# Patient Record
Sex: FEMALE | Race: WHITE | NOT HISPANIC OR LATINO | Employment: FULL TIME | URBAN - METROPOLITAN AREA
[De-identification: names, ages, dates, MRNs, and addresses within clinical notes are randomized per-mention and may not be internally consistent; named-entity substitution may affect disease eponyms.]

---

## 2017-01-09 ENCOUNTER — HOSPITAL ENCOUNTER (OUTPATIENT)
Dept: PHYSICAL THERAPY | Facility: CLINIC | Age: 52
Setting detail: THERAPIES SERIES
End: 2017-01-09
Attending: FAMILY MEDICINE
Payer: COMMERCIAL

## 2017-01-09 PROCEDURE — 97110 THERAPEUTIC EXERCISES: CPT | Mod: GP | Performed by: PHYSICAL THERAPIST

## 2017-01-09 PROCEDURE — 97033 APP MDLTY 1+IONTPHRSIS EA 15: CPT | Mod: GP | Performed by: PHYSICAL THERAPIST

## 2017-01-09 PROCEDURE — 97162 PT EVAL MOD COMPLEX 30 MIN: CPT | Mod: GP | Performed by: PHYSICAL THERAPIST

## 2017-01-09 PROCEDURE — 40000718 ZZHC STATISTIC PT DEPARTMENT ORTHO VISIT: Performed by: PHYSICAL THERAPIST

## 2017-01-09 NOTE — PROGRESS NOTES
"  TMJ/TMD  Home Exercise Progression:      Exercises Details Date Started Discontinued   Chin Tucks   X 5 5x per day  1/9/17    TATU   Through out day  1/9/17     Controlled Rotation   x5  5x per day  1/9/17     Rhythmic Stab   x5  5x per day     Sub. Occ. Stretch   10\" x 3 2-3x per day     Scap Stab   x10 2x per day  1/9/17     Self Distraction   x5  5x per day                                       "

## 2017-01-09 NOTE — PROGRESS NOTES
01/09/17 0900   General Information   Type of Visit Initial OP Ortho PT Evaluation   Start of Care Date 01/09/17   Referring Physician Crispin Rahman    Patient/Family Goals Statement Be able to chew harder food, Talk, Yawn.    Orders Evaluate and Treat   Orders Comment Henry, Phone,    Date of Order 12/28/16   Insurance Type Other   Insurance Comments/Visits Authorized [employer] - Auth'd    Medical Diagnosis L TMJ ID/Pain    Surgical/Medical history reviewed (Asthma, Fx R Foot, Carpel tunnel, Sprain R shoulder, )   Precautions/Limitations no known precautions/limitations   Body Part(s)   Body Part(s) TMJ   Presentation and Etiology   Pertinent history of current problem (include personal factors and/or comorbidities that impact the POC) Oct or Nov got sore throat and developed blisters in L ear, Went to the ENT, and was sent to dentist. Was made a top .  Current doctor adjusted the top  and is making a bottom one for the day. No ex's yet. Had TMJ problems when got braces removed in 1985. Didn't need treatment then and it has been ok over the years. Lately has been doing hot and cold packs.    Impairments A. Pain   Functional Limitations (Limited in choiced for food. Talking, yawning,)   Symptom Location L>R Pain in front of the ear.    How/Where did it occur From insidious onset   Onset date of current episode/exacerbation 12/28/16  (MD Visit )   Chronicity Chronic   Pain rating (0-10 point scale) (5/10 )   Frequency of pain/symptoms A. Constant   Pain/symptoms are: The same all the time   Pain/symptoms exacerbated by (Jaw use. )   Pain/symptoms eased by G. Heat;H. Cold   Progression of symptoms since onset: (Worse, then better. )   Prior Level of Function   Functional Level Prior Comment Independent w/ ADL's currently.    Current Level of Function   Current Community Support Other  (Lives alone )   Patient role/employment history A. Employed   Employment Comments [employer].    Living  environment [facility]   Fall Risk Screen   Fall screen completed by PT   Per patient - Fall 2 or more times in past year? No   Per patient - Fall with injury in past year? No   Is patient a fall risk? No   Functional Scales   Functional Scales Patient Specific Functional Scale;Other   Patient Specific Functional Scale Q2:;Q1:;Q3:   Q1: Chewing tough food or hard bread 8/10    Q2: Yawning 6/10   Q3: Talking 6/10    Other Scales  0 = No limitation.    TMJ Objective Findings   Observation No acute distress.    Integumentary  No swelling   Posture Head forward, dowager's, flexed at hips.    Joint noises None   Joint lock Does not lock   Pain Chewing;Talking;Yawning;Clenching;Brushing, flossing;Opening   Associated symptoms None   Headache None   Habits Unilateral chewing;Clenching   Tongue position Taught today    Difficulty swallowing No   Muscal Ridging No   Tongue Scalloping Yes  (Slight )   Accelerated Tooth Wear No   Overjet (mm) 3   Overbite (mm) 5   Intraoral mouth appliance Yes - appliance helps   Appliance wear Day and night   Bite/Occlusion Bite feels off   Major dental work (Just braces at 18 - 20 years of age. )   Tired or painful jaw muscles No   Opening click No   Closing click Yes   Crepitus No   Subluxation No   Early translation Yes   Passive testing - Distraction Normal   Passive testing - Translation Normal   Lateral Collateral Ligament Tender L>R    Temporomandibular Ligament Tender B   TMJ ROM Mandible Opening;Mandible Protrusion;Left Laterotrusion;Right Laterotrusion;Deviation with Opening   Palpation Posterior capsule;Lateral capsule  (L>R Masseter, L Med Pterygoid. )   Accessory Motion Rocabado (0-9)   Bite comment Feels like teeth bump in the front.    Mandible Opening 45   Mandible Protrusion 6   Left Laterotrusion 8   Right Laterotrusion 11   Deviation with Opening S Curve w/ opening   Lateral capsule Tender    Posterior capsule Tender    Translation comment About 15 mm    Rocabado  Comments 4/9    Planned Therapy Interventions   Planned Therapy Interventions Comment MT, STM, Jt mobs, Develop HEP    Planned Modality Interventions   Planned Modality Interventions Comments Iontophoresis, Phonophoresis.    Clinical Impression   Criteria for Skilled Therapeutic Interventions Met yes, treatment indicated   PT Diagnosis ID L TMJ, Pain   Influenced by the following impairments Pain   Functional limitations due to impairments Jaw functions, ie, talking, yawning, eating chewier foods.    Clinical Presentation Evolving/Changing   Clinical Presentation Rationale Asthma, High limitations w/Jaw functions, Decreased Laterotrusion, Posture, previous TMJ issues.    Clinical Decision Making (Complexity) Moderate complexity   Therapy Frequency 2 times/Week   Predicted Duration of Therapy Intervention (days/wks) 8 visits.    Risk & Benefits of therapy have been explained Yes   Patient, Family & other staff in agreement with plan of care Yes   Clinical Impression Comments ID L TMJ, Pain   Education Assessment   Preferred Learning Style Listening;Demonstration;Pictures/video   Barriers to Learning No barriers   ORTHO GOALS   PT Ortho Eval Goals 1;2;3;4   Ortho Goal 1   Goal Identifier 1   Goal Description STG: Pt able to Chew tough food like hard bread 5/10 on functional scale.    Target Date 01/30/17   Ortho Goal 2   Goal Identifier 2   Goal Description STG: Pt will be able to yawn 2/10 on functional scale.    Target Date 01/30/17   Ortho Goal 3   Goal Identifier 3   Goal Description STG: Pt will be able to talk for prolonged periods 2/10 on functional scale.    Target Date 01/30/17   Ortho Goal 4   Goal Identifier 4   Goal Description LTG: Pt will be independent w/HEP and self cares to manage TMJ sx's independently.    Target Date 02/10/17   Total Evaluation Time   Total Evaluation Time 65 Total (Eval x 30, Ex x 10, Ionto x 25)    Radha Nice PT, [facility] (#[id number])  [facility]  [place]            [phone]  Fax          [phone]  Appt #      [phone]

## 2017-01-11 ENCOUNTER — ALLIED HEALTH/NURSE VISIT (OUTPATIENT)
Dept: ALLERGY | Facility: CLINIC | Age: 52
End: 2017-01-11
Payer: COMMERCIAL

## 2017-01-11 DIAGNOSIS — J30.9 ALLERGIC RHINITIS, UNSPECIFIED: Primary | ICD-10-CM

## 2017-01-11 PROCEDURE — 95117 IMMUNOTHERAPY INJECTIONS: CPT

## 2017-01-12 ENCOUNTER — HOSPITAL ENCOUNTER (OUTPATIENT)
Dept: PHYSICAL THERAPY | Facility: CLINIC | Age: 52
Setting detail: THERAPIES SERIES
End: 2017-01-12
Attending: DENTIST
Payer: COMMERCIAL

## 2017-01-12 PROCEDURE — 97033 APP MDLTY 1+IONTPHRSIS EA 15: CPT | Mod: GP | Performed by: PHYSICAL THERAPIST

## 2017-01-12 PROCEDURE — 40000718 ZZHC STATISTIC PT DEPARTMENT ORTHO VISIT: Performed by: PHYSICAL THERAPIST

## 2017-01-18 ENCOUNTER — HOSPITAL ENCOUNTER (OUTPATIENT)
Dept: PHYSICAL THERAPY | Facility: CLINIC | Age: 52
Setting detail: THERAPIES SERIES
End: 2017-01-18
Attending: DENTIST
Payer: COMMERCIAL

## 2017-01-18 PROCEDURE — 97033 APP MDLTY 1+IONTPHRSIS EA 15: CPT | Mod: GP | Performed by: PHYSICAL THERAPIST

## 2017-01-18 PROCEDURE — 40000718 ZZHC STATISTIC PT DEPARTMENT ORTHO VISIT: Performed by: PHYSICAL THERAPIST

## 2017-01-18 PROCEDURE — 97110 THERAPEUTIC EXERCISES: CPT | Mod: GP | Performed by: PHYSICAL THERAPIST

## 2017-01-20 ENCOUNTER — HOSPITAL ENCOUNTER (OUTPATIENT)
Dept: PHYSICAL THERAPY | Facility: CLINIC | Age: 52
Setting detail: THERAPIES SERIES
End: 2017-01-20
Attending: DENTIST
Payer: COMMERCIAL

## 2017-01-20 PROCEDURE — 97140 MANUAL THERAPY 1/> REGIONS: CPT | Mod: GP | Performed by: PHYSICAL THERAPIST

## 2017-01-20 PROCEDURE — 97033 APP MDLTY 1+IONTPHRSIS EA 15: CPT | Mod: GP | Performed by: PHYSICAL THERAPIST

## 2017-01-20 PROCEDURE — 97110 THERAPEUTIC EXERCISES: CPT | Mod: GP | Performed by: PHYSICAL THERAPIST

## 2017-01-20 PROCEDURE — 40000718 ZZHC STATISTIC PT DEPARTMENT ORTHO VISIT: Performed by: PHYSICAL THERAPIST

## 2017-01-23 ENCOUNTER — HOSPITAL ENCOUNTER (OUTPATIENT)
Dept: PHYSICAL THERAPY | Facility: CLINIC | Age: 52
Setting detail: THERAPIES SERIES
End: 2017-01-23
Attending: DENTIST
Payer: COMMERCIAL

## 2017-01-23 PROCEDURE — 40000718 ZZHC STATISTIC PT DEPARTMENT ORTHO VISIT: Performed by: PHYSICAL THERAPIST

## 2017-01-23 PROCEDURE — 97033 APP MDLTY 1+IONTPHRSIS EA 15: CPT | Mod: GP | Performed by: PHYSICAL THERAPIST

## 2017-01-23 PROCEDURE — 97140 MANUAL THERAPY 1/> REGIONS: CPT | Mod: GP | Performed by: PHYSICAL THERAPIST

## 2017-01-26 ENCOUNTER — HOSPITAL ENCOUNTER (OUTPATIENT)
Dept: PHYSICAL THERAPY | Facility: CLINIC | Age: 52
Setting detail: THERAPIES SERIES
End: 2017-01-26
Attending: DENTIST
Payer: COMMERCIAL

## 2017-01-26 PROCEDURE — 97033 APP MDLTY 1+IONTPHRSIS EA 15: CPT | Mod: GP | Performed by: PHYSICAL THERAPIST

## 2017-01-26 PROCEDURE — 40000718 ZZHC STATISTIC PT DEPARTMENT ORTHO VISIT: Performed by: PHYSICAL THERAPIST

## 2017-01-26 PROCEDURE — 97140 MANUAL THERAPY 1/> REGIONS: CPT | Mod: GP | Performed by: PHYSICAL THERAPIST

## 2017-02-03 ENCOUNTER — HOSPITAL ENCOUNTER (OUTPATIENT)
Dept: PHYSICAL THERAPY | Facility: CLINIC | Age: 52
Setting detail: THERAPIES SERIES
End: 2017-02-03
Attending: DENTIST
Payer: COMMERCIAL

## 2017-02-03 PROCEDURE — 97140 MANUAL THERAPY 1/> REGIONS: CPT | Mod: GP | Performed by: PHYSICAL THERAPIST

## 2017-02-03 PROCEDURE — 40000718 ZZHC STATISTIC PT DEPARTMENT ORTHO VISIT: Performed by: PHYSICAL THERAPIST

## 2017-02-03 PROCEDURE — 97033 APP MDLTY 1+IONTPHRSIS EA 15: CPT | Mod: GP | Performed by: PHYSICAL THERAPIST

## 2017-02-03 NOTE — PROGRESS NOTES
"Outpatient Physical Therapy Discharge Note     Patient: Dilia Solomon  : 1965    Beginning/End Dates of Reporting Period:  17 to 2/3/2017.  Total # of Rx sessions:     Referring Provider: Crispin Rahman Diagnosis:  L TMJ ID/Pain      Client Self Report: No clicking, no pain, everything going well.     Objective Measurements:  Objective Measure: Rocabado   Details:     Objective Measure: TMJ ROM   Details: 17 Opening 50, Protrusion 8mm, Laterotrusion L 10, R 12 mm.   INIITIALLY: Opening 45, Protrusion 6 mm, Laterotrusion L 8, R 11,     Objective Measure: Early Translation   Details: 17 - 22 mm. INITIALLY: 15 mm     Goals:  Goal Identifier 1   Goal Description STG: Pt able to Chew tough food like hard bread 5/10 on functional scale.  17 No problems now with chewing.   Target Date 17   Date Met  17   Progress:     Goal Identifier 2   Goal Description STG: Pt will be able to yawn 2/10 on functional scale. 17 No problems w/ yawning now.    Target Date 17   Date Met  17   Progress:     Goal Identifier 3   Goal Description STG: Pt will be able to talk for prolonged periods 2/10 on functional scale. 17 Okay, no problem now.    Target Date 17   Date Met  17   Progress:     Goal Identifier 4   Goal Description LTG: Pt will be independent w/HEP and self cares to manage TMJ sx's independently.  2/3/17 MET    Target Date 02/10/17   Date Met  17   Progress:     Progress Toward Goals:   Progress this reporting period: Pt has met all STG and LTG\"s.     Plan:  Discharge from therapy.    Discharge:    Reason for Discharge: Patient has met all goals.  Patient chooses to discontinue therapy.    Equipment Issued: Has new splint.     Discharge Plan: Patient to continue home program.  Pt to follow up w/MD as appropriate.   Radha Nice, PT, [facility] (#[id number])  [facility]           [phone]  Fax          " [phone]  Appt #      [phone]

## 2017-02-08 ENCOUNTER — ALLIED HEALTH/NURSE VISIT (OUTPATIENT)
Dept: ALLERGY | Facility: CLINIC | Age: 52
End: 2017-02-08
Payer: COMMERCIAL

## 2017-02-08 DIAGNOSIS — J30.9 ALLERGIC RHINITIS, UNSPECIFIED: Primary | ICD-10-CM

## 2017-02-08 PROCEDURE — 95117 IMMUNOTHERAPY INJECTIONS: CPT

## 2017-03-03 ENCOUNTER — OFFICE VISIT (OUTPATIENT)
Dept: FAMILY MEDICINE | Facility: CLINIC | Age: 52
End: 2017-03-03
Payer: COMMERCIAL

## 2017-03-03 VITALS
HEART RATE: 79 BPM | BODY MASS INDEX: 35.93 KG/M2 | HEIGHT: 63 IN | OXYGEN SATURATION: 98 % | WEIGHT: 202.8 LBS | SYSTOLIC BLOOD PRESSURE: 108 MMHG | RESPIRATION RATE: 24 BRPM | TEMPERATURE: 97.6 F | DIASTOLIC BLOOD PRESSURE: 62 MMHG

## 2017-03-03 DIAGNOSIS — J32.1 CHRONIC FRONTAL SINUSITIS: Primary | ICD-10-CM

## 2017-03-03 PROCEDURE — 99213 OFFICE O/P EST LOW 20 MIN: CPT | Performed by: NURSE PRACTITIONER

## 2017-03-03 RX ORDER — AMOXICILLIN 500 MG/1
500 CAPSULE ORAL 3 TIMES DAILY
Qty: 30 CAPSULE | Refills: 0 | Status: SHIPPED | OUTPATIENT
Start: 2017-03-03 | End: 2017-05-16

## 2017-03-03 ASSESSMENT — PAIN SCALES - GENERAL: PAINLEVEL: MODERATE PAIN (5)

## 2017-03-03 NOTE — PATIENT INSTRUCTIONS
Complete full course of antibiotics even if you start to feel better.    Increase your fluids and rest and eat a well balanced diet.    Would be good to humidify the air in your home, especially in the bedroom.   Tylenol or Ibuprofen if able to take for fevers and discomfort. Do not exceed 4 grams of Tylenol in a 24 hour period. Take Ibuprofen with food.   Follow up in 3-5 days if not improving or return sooner if worsening or fail to improve as anticipated.

## 2017-03-03 NOTE — PROGRESS NOTES
"  SUBJECTIVE:                                                    Dilia Solomon is a 51 year old female who presents to clinic today for the following health issues:      RESPIRATORY SYMPTOMS      Duration: 4 days    Description  nasal congestion, facial pain/pressure, cough and ear pain left    Severity: moderate    Accompanying signs and symptoms: None    History (predisposing factors):  asthma    Precipitating or alleviating factors: None    Therapies tried and outcome:  nasal spray/wash - sinus rinses and cough drops (all natural)   Started last Weds. Has asthma flare. Left ear pain, some in the right. History of hearing loss on left. Pain from left ear down to jaw. Congestion started Weds. Nasal disch green. Doing sinus rinses with neti pot every night for allergies.   No fever. Just started cough today. Dry cough.  Asthma- no wheeziing . Cough does not keep awake at night.       Problem list and histories reviewed & adjusted, as indicated.  Additional history: as documented      Reviewed and updated as needed this visit by clinical staff  Tobacco  Allergies  Med Hx  Surg Hx  Fam Hx  Soc Hx      Reviewed and updated as needed this visit by Provider         ROS:  Constitutional, HEENT, cardiovascular, pulmonary, gi and gu systems are negative, except as otherwise noted.    OBJECTIVE:                                                    /62 (BP Location: Left arm, Patient Position: Chair, Cuff Size: Adult Regular)  Pulse 79  Temp 97.6  F (36.4  C) (Tympanic)  Resp 24  Ht 5' 3.25\" (1.607 m)  Wt 202 lb 12.8 oz (92 kg)  LMP 07/18/2015 (Approximate)  SpO2 98%  Breastfeeding? No  BMI 35.64 kg/m2  Body mass index is 35.64 kg/(m^2).  GENERAL: healthy, alert and no distress  HENT: normal cephalic/atraumatic, ear canals and TM's normal, nose and mouth without ulcers or lesions, oropharynx clear, oral mucous membranes moist and sinuses: maxillary, frontal tenderness and swelling on bilaterally  NECK: no " adenopathy, no asymmetry, masses, or scars and thyroid normal to palpation  RESP: lungs clear to auscultation - no rales, rhonchi or wheezes  CV: regular rate and rhythm, normal S1 S2, no S3 or S4, no murmur, click or rub, no peripheral edema and peripheral pulses strong  MS: no gross musculoskeletal defects noted, no edema    Diagnostic Test Results:  none      ASSESSMENT/PLAN:                                                        1. Chronic frontal sinusitis  - amoxicillin (AMOXIL) 500 MG capsule; Take 1 capsule (500 mg) by mouth 3 times daily  Dispense: 30 capsule; Refill: 0    Patient Instructions   Complete full course of antibiotics even if you start to feel better.    Increase your fluids and rest and eat a well balanced diet.    Would be good to humidify the air in your home, especially in the bedroom.   Tylenol or Ibuprofen if able to take for fevers and discomfort. Do not exceed 4 grams of Tylenol in a 24 hour period. Take Ibuprofen with food.   Follow up in 3-5 days if not improving or return sooner if worsening or fail to improve as anticipated.          Dinorah Engel, PAMELA, APRN CNP  [facility]

## 2017-03-03 NOTE — NURSING NOTE
"Chief Complaint   Patient presents with     URI       Initial LMP 07/18/2015 (Approximate) Estimated body mass index is 35.68 kg/(m^2) as calculated from the following:    Height as of 12/7/16: 5' 3\" (1.6 m).    Weight as of 12/7/16: 201 lb 6.4 oz (91.4 kg).  Medication Reconciliation: complete  "

## 2017-03-03 NOTE — MR AVS SNAPSHOT
"              After Visit Summary   3/3/2017    Dilia Solomon    MRN: [id number]           Patient Information     Date Of Birth          1965        Visit Information        Provider Department      3/3/2017 9:20 AM Dinorah Engel APRN CNP [facility]        Today's Diagnoses     Chronic frontal sinusitis    -  1      Care Instructions    Complete full course of antibiotics even if you start to feel better.    Increase your fluids and rest and eat a well balanced diet.    Would be good to humidify the air in your home, especially in the bedroom.   Tylenol or Ibuprofen if able to take for fevers and discomfort. Do not exceed 4 grams of Tylenol in a 24 hour period. Take Ibuprofen with food.   Follow up in 3-5 days if not improving or return sooner if worsening or fail to improve as anticipated.            Follow-ups after your visit        Who to contact     If you have questions or need follow up information about today's clinic visit or your schedule please contact [facility] directly at [phone].  Normal or non-critical lab and imaging results will be communicated to you by [employer], letter or phone within 4 business days after the clinic has received the results. If you do not hear from us within 7 days, please contact the clinic through [employer] or phone. If you have a critical or abnormal lab result, we will notify you by phone as soon as possible.  Submit refill requests through [employer] or call your pharmacy and they will forward the refill request to us. Please allow 3 business days for your refill to be completed.          Additional Information About Your Visit        [employer] Information     [employer] lets you send messages to your doctor, view your test results, renew your prescriptions, schedule appointments and more. To sign up, go to [email or web address] . Click on \"Log in\" on the left side of the screen, which will take you to the Welcome page. Then click " "on \"Sign up Now\" on the right side of the page.     You will be asked to enter the access code listed below, as well as some personal information. Please follow the directions to create your username and password.     Your access code is: [email or web address]  Expires: 2017 10:07 AM     Your access code will  in 90 days. If you need help or a new code, please call your [place] clinic or [phone].        Care EveryWhere ID     This is your Care EveryWhere ID. This could be used by other organizations to access your [place] medical records  [id number]        Your Vitals Were     Pulse Temperature Respirations Height Last Period Pulse Oximetry    79 97.6  F (36.4  C) (Tympanic) 24 5' 3.25\" (1.607 m) 2015 (Approximate) 98%    Breastfeeding? BMI (Body Mass Index)                No 35.64 kg/m2           Blood Pressure from Last 3 Encounters:   17 108/62   16 130/76   16 124/80    Weight from Last 3 Encounters:   17 202 lb 12.8 oz (92 kg)   16 201 lb 6.4 oz (91.4 kg)   16 200 lb (90.7 kg)              Today, you had the following     No orders found for display         Today's Medication Changes          These changes are accurate as of: 3/3/17 10:08 AM.  If you have any questions, ask your nurse or doctor.               Start taking these medicines.        Dose/Directions    amoxicillin 500 MG capsule   Commonly known as:  AMOXIL   Used for:  Chronic frontal sinusitis   Started by:  Dinorah Engel APRN CNP        Dose:  500 mg   Take 1 capsule (500 mg) by mouth 3 times daily   Quantity:  30 capsule   Refills:  0         These medicines have changed or have updated prescriptions.        Dose/Directions    * predniSONE 20 MG tablet   Commonly known as:  DELTASONE   This may have changed:  additional instructions   Used for:  Asthma flare   Changed by:  Natalya Wakefield MD        Dose:  20 mg   Take 1 tablet (20 mg) by mouth daily   Quantity:  5 tablet "   Refills:  0       * predniSONE 20 MG tablet   Commonly known as:  DELTASONE   This may have changed:  Another medication with the same name was changed. Make sure you understand how and when to take each.   Used for:  Asthma flare, Allergic rhinitis, unspecified   Changed by:  Bernardo Watson MD        2 tabs daily for 3 days, then one tab for 7 days   Quantity:  13 tablet   Refills:  0       * Notice:  This list has 2 medication(s) that are the same as other medications prescribed for you. Read the directions carefully, and ask your doctor or other care provider to review them with you.         Where to get your medicines      These medications were sent to [facility] [address] [address]     Phone:  [phone]     amoxicillin 500 MG capsule                Primary Care Provider Office Phone # Fax #    Cookie Conklin [phone]       [facility] [address] [facility] [address]        Thank you!     Thank you for choosing [facility]  for your care. Our goal is always to provide you with excellent care. Hearing back from our patients is one way we can continue to improve our services. Please take a few minutes to complete the written survey that you may receive in the mail after your visit with us. Thank you!             Your Updated Medication List - Protect others around you: Learn how to safely use, store and throw away your medicines at www.disposemymeds.org.          This list is accurate as of: 3/3/17 10:08 AM.  Always use your most recent med list.                   Brand Name Dispense Instructions for use    * albuterol (2.5 MG/3ML) 0.083% neb solution     1 Box    Take 1 vial (2.5 mg) by nebulization every 4 hours as needed       * albuterol 108 (90 BASE) MCG/ACT Inhaler    PROAIR HFA/PROVENTIL HFA/VENTOLIN HFA    1 Inhaler    Inhale 2 puffs into the lungs every 4 hours as needed       *  ALLERGEN IMMUNOTHERAPY PRESCRIPTION     10 mL    Name of Mix: Mix #1  Mold, Dust Mite Dust Mites F. 10,000 AU/mL, HS  1.2 ml Dust Mites P. 10,000 AU/mL, HS  0.8 ml  Alternaria alternata 1:20 w/v GLY, WILKINS  1.0 ml Aspergillus Fumigatus GLY 1:10 w/v, HS  1.0 ml Epicoccum 20, 000 pnu/mL (1:10 w/v),  ALK  1.0 ml Fusarium Vasinfectum GLY 1:10 w/v, HS  1.0 ml Hormodendrum Cladosporioides 1:10 w/, HS 1.0 ml Phoma HerbarumGLY 1:10 w/v, HS  1.0 ml Diluent: HSA qs to 10ml       * ALLERGEN IMMUNOTHERAPY PRESCRIPTION     10 mL    Name of Mix: Mix #2  Tree , Weeds Trees, Weeds Master Mix ([facility])  7.8 ml Diluent: HSA qs to 10ml       * ALLERGEN IMMUNOTHERAPY PRESCRIPTION     10 mL    Name of Mix: Mix #3  Cat, Dog, Grass Cat, Dog, Grass Master Mix ([facility]) 6.2 ml Diluent: HSA qs to 10ml       amoxicillin 500 MG capsule    AMOXIL    30 capsule    Take 1 capsule (500 mg) by mouth 3 times daily       azelastine 0.05 % Soln ophthalmic solution    OPTIVAR    1 Bottle    Apply 1 drop to eye 2 times daily       CERAVE Crea     2 Bottle    Externally apply 1 dose * topically 2 times daily       EPINEPHrine 0.3 MG/0.3ML injection     0.6 mL    Inject 0.3 mLs (0.3 mg) into the muscle once as needed for anaphylaxis       fluticasone-salmeterol 500-50 MCG/DOSE diskus inhaler    ADVAIR    3 Inhaler    Inhale 1 puff into the lungs 2 times daily       loratadine 10 MG tablet    CLARITIN    30 tablet    Take 1 tablet (10 mg) by mouth daily       montelukast 10 MG tablet    SINGULAIR    30 tablet    Take 1 tablet (10 mg) by mouth At Bedtime       MULTIVITAMIN PO      1 tab daily       * predniSONE 20 MG tablet    DELTASONE    5 tablet    Take 1 tablet (20 mg) by mouth daily       * predniSONE 20 MG tablet    DELTASONE    13 tablet    2 tabs daily for 3 days, then one tab for 7 days       triamcinolone 0.1 % cream    KENALOG    80 g    Apply  topically 2 times daily as needed.       * Notice:  This list has 7 medication(s) that are the same as  other medications prescribed for you. Read the directions carefully, and ask your doctor or other care provider to review them with you.

## 2017-03-22 ENCOUNTER — ALLIED HEALTH/NURSE VISIT (OUTPATIENT)
Dept: ALLERGY | Facility: CLINIC | Age: 52
End: 2017-03-22
Payer: COMMERCIAL

## 2017-03-22 DIAGNOSIS — J30.9 ALLERGIC RHINITIS, UNSPECIFIED: Primary | ICD-10-CM

## 2017-03-22 PROCEDURE — 95117 IMMUNOTHERAPY INJECTIONS: CPT

## 2017-03-22 NOTE — PROGRESS NOTES
Patient presented after waiting 30 minutes with no reaction to  injections. Discharged from clinic.    Shi Koo LPN

## 2017-03-22 NOTE — MR AVS SNAPSHOT
"              After Visit Summary   3/22/2017    Dilia Solomon    MRN: [id number]           Patient Information     Date Of Birth          1965        Visit Information        Provider Department      3/22/2017 11:15 AM ALLERGY [facility]        Today's Diagnoses     Allergic rhinitis, unspecified    -  1       Follow-ups after your visit        Your next 10 appointments already scheduled     Apr 05, 2017 11:00 AM CDT   Nurse Only with ALLERGY [facility] ([facility])    [address] [facility] [facility] [address] [phone]           Every allergy patient MUST wait 30 minutes after their allergy shot. No exceptions.  Xolair shots #1-3 should plan to wait 2 hours in clinic Xolair shots after #4 should plan 30 minute wait in clinic              Who to contact     If you have questions or need follow up information about today's clinic visit or your schedule please contact [facility] directly at [phone].  Normal or non-critical lab and imaging results will be communicated to you by Red Butlerhart, letter or phone within 4 business days after the clinic has received the results. If you do not hear from us within 7 days, please contact the clinic through Inquisitive System[employer] or phone. If you have a critical or abnormal lab result, we will notify you by phone as soon as possible.  Submit refill requests through Stream or call your pharmacy and they will forward the refill request to us. Please allow 3 business days for your refill to be completed.          Additional Information About Your Visit        Red ButlerharWututu Information     Stream lets you send messages to your doctor, view your test results, renew your prescriptions, schedule appointments and more. To sign up, go to [email or web address] . Click on \"Log in\" on the left side of the screen, which will take you to the Welcome page. Then click on \"Sign up Now\" on the right side of the " page.     You will be asked to enter the access code listed below, as well as some personal information. Please follow the directions to create your username and password.     Your access code is: [email or web address]  Expires: 2017 11:07 AM     Your access code will  in 90 days. If you need help or a new code, please call your [place] clinic or [phone].        Care EveryWhere ID     This is your Care EveryWhere ID. This could be used by other organizations to access your [place] medical records  [id number]        Your Vitals Were     Last Period                   2015 (Approximate)            Blood Pressure from Last 3 Encounters:   17 108/62   16 130/76   16 124/80    Weight from Last 3 Encounters:   17 202 lb 12.8 oz (92 kg)   16 201 lb 6.4 oz (91.4 kg)   16 200 lb (90.7 kg)              We Performed the Following     Allergy Shot: Two or more injections          Today's Medication Changes          These changes are accurate as of: 3/22/17  2:01 PM.  If you have any questions, ask your nurse or doctor.               These medicines have changed or have updated prescriptions.        Dose/Directions    * predniSONE 20 MG tablet   Commonly known as:  DELTASONE   This may have changed:  additional instructions   Used for:  Asthma flare        Dose:  20 mg   Take 1 tablet (20 mg) by mouth daily   Quantity:  5 tablet   Refills:  0       * predniSONE 20 MG tablet   Commonly known as:  DELTASONE   This may have changed:  Another medication with the same name was changed. Make sure you understand how and when to take each.   Used for:  Asthma flare, Allergic rhinitis, unspecified        2 tabs daily for 3 days, then one tab for 7 days   Quantity:  13 tablet   Refills:  0       * Notice:  This list has 2 medication(s) that are the same as other medications prescribed for you. Read the directions carefully, and ask your doctor or other care provider to review them with  you.             Primary Care Provider Office Phone # Fax #    Cookie Conklin [phone]       [facility] [address] [facility] [address]        Thank you!     Thank you for choosing [facility]  for your care. Our goal is always to provide you with excellent care. Hearing back from our patients is one way we can continue to improve our services. Please take a few minutes to complete the written survey that you may receive in the mail after your visit with us. Thank you!             Your Updated Medication List - Protect others around you: Learn how to safely use, store and throw away your medicines at www.disposemymeds.org.          This list is accurate as of: 3/22/17  2:01 PM.  Always use your most recent med list.                   Brand Name Dispense Instructions for use    * albuterol (2.5 MG/3ML) 0.083% neb solution     1 Box    Take 1 vial (2.5 mg) by nebulization every 4 hours as needed       * albuterol 108 (90 BASE) MCG/ACT Inhaler    PROAIR HFA/PROVENTIL HFA/VENTOLIN HFA    1 Inhaler    Inhale 2 puffs into the lungs every 4 hours as needed       * ALLERGEN IMMUNOTHERAPY PRESCRIPTION     10 mL    Name of Mix: Mix #1  Mold, Dust Mite Dust Mites F. 10,000 AU/mL, HS  1.2 ml Dust Mites P. 10,000 AU/mL, HS  0.8 ml  Alternaria alternata 1:20 w/v GLY, WILKINS  1.0 ml Aspergillus Fumigatus GLY 1:10 w/v, HS  1.0 ml Epicoccum 20, 000 pnu/mL (1:10 w/v),  ALK  1.0 ml Fusarium Vasinfectum GLY 1:10 w/v, HS  1.0 ml Hormodendrum Cladosporioides 1:10 w/, HS 1.0 ml Phoma HerbarumGLY 1:10 w/v, HS  1.0 ml Diluent: HSA qs to 10ml       * ALLERGEN IMMUNOTHERAPY PRESCRIPTION     10 mL    Name of Mix: Mix #2  Tree , Weeds Trees, Weeds Master Mix ([facility])  7.8 ml Diluent: HSA qs to 10ml       * ALLERGEN IMMUNOTHERAPY PRESCRIPTION     10 mL    Name of Mix: Mix #3  Cat, Dog, Grass Cat, Dog, Grass Master Mix ([facility]) 6.2 ml Diluent: HSA qs to 10ml       amoxicillin 500 MG capsule     AMOXIL    30 capsule    Take 1 capsule (500 mg) by mouth 3 times daily       azelastine 0.05 % Soln ophthalmic solution    OPTIVAR    1 Bottle    Apply 1 drop to eye 2 times daily       CERAVE Crea     2 Bottle    Externally apply 1 dose * topically 2 times daily       EPINEPHrine 0.3 MG/0.3ML injection     0.6 mL    Inject 0.3 mLs (0.3 mg) into the muscle once as needed for anaphylaxis       fluticasone-salmeterol 500-50 MCG/DOSE diskus inhaler    ADVAIR    3 Inhaler    Inhale 1 puff into the lungs 2 times daily       loratadine 10 MG tablet    CLARITIN    30 tablet    Take 1 tablet (10 mg) by mouth daily       montelukast 10 MG tablet    SINGULAIR    30 tablet    Take 1 tablet (10 mg) by mouth At Bedtime       MULTIVITAMIN PO      1 tab daily       * predniSONE 20 MG tablet    DELTASONE    5 tablet    Take 1 tablet (20 mg) by mouth daily       * predniSONE 20 MG tablet    DELTASONE    13 tablet    2 tabs daily for 3 days, then one tab for 7 days       triamcinolone 0.1 % cream    KENALOG    80 g    Apply  topically 2 times daily as needed.       * Notice:  This list has 7 medication(s) that are the same as other medications prescribed for you. Read the directions carefully, and ask your doctor or other care provider to review them with you.

## 2017-04-05 ENCOUNTER — ALLIED HEALTH/NURSE VISIT (OUTPATIENT)
Dept: ALLERGY | Facility: CLINIC | Age: 52
End: 2017-04-05
Payer: COMMERCIAL

## 2017-04-05 DIAGNOSIS — J30.9 ALLERGIC RHINITIS, UNSPECIFIED: Primary | ICD-10-CM

## 2017-04-05 PROCEDURE — 95117 IMMUNOTHERAPY INJECTIONS: CPT

## 2017-04-05 NOTE — MR AVS SNAPSHOT
"              After Visit Summary   4/5/2017    Dilia Solomon    MRN: [id number]           Patient Information     Date Of Birth          1965        Visit Information        Provider Department      4/5/2017 11:00 AM ALLERGY [facility]        Today's Diagnoses     Allergic rhinitis, unspecified    -  1       Follow-ups after your visit        Your next 10 appointments already scheduled     Apr 26, 2017  4:15 PM CDT   Nurse Only with ALLERGY [facility] ([facility])    [address] [address] [phone]           Every allergy patient MUST wait 30 minutes after their allergy shot. No exceptions.  Xolair shots #1-3 should plan to wait 2 hours in clinic Xolair shots after #4 should plan 30 minute wait in clinic              Who to contact     If you have questions or need follow up information about today's clinic visit or your schedule please contact [facility] directly at [phone].  Normal or non-critical lab and imaging results will be communicated to you by treadalonghart, letter or phone within 4 business days after the clinic has received the results. If you do not hear from us within 7 days, please contact the clinic through [employer] or phone. If you have a critical or abnormal lab result, we will notify you by phone as soon as possible.  Submit refill requests through [employer] or call your pharmacy and they will forward the refill request to us. Please allow 3 business days for your refill to be completed.          Additional Information About Your Visit        treadalongharDxNA Information     [employer] lets you send messages to your doctor, view your test results, renew your prescriptions, schedule appointments and more. To sign up, go to [email or web address] . Click on \"Log in\" on the left side of the screen, which will take you to the Welcome page. Then click on \"Sign up Now\" on the right side of the " page.     You will be asked to enter the access code listed below, as well as some personal information. Please follow the directions to create your username and password.     Your access code is: [email or web address]  Expires: 2017 11:07 AM     Your access code will  in 90 days. If you need help or a new code, please call your [place] clinic or [phone].        Care EveryWhere ID     This is your Care EveryWhere ID. This could be used by other organizations to access your [place] medical records  [id number]        Your Vitals Were     Last Period                   2015 (Approximate)            Blood Pressure from Last 3 Encounters:   17 108/62   16 130/76   16 124/80    Weight from Last 3 Encounters:   17 202 lb 12.8 oz (92 kg)   16 201 lb 6.4 oz (91.4 kg)   16 200 lb (90.7 kg)              Today, you had the following     No orders found for display         Today's Medication Changes          These changes are accurate as of: 17 11:59 AM.  If you have any questions, ask your nurse or doctor.               These medicines have changed or have updated prescriptions.        Dose/Directions    * predniSONE 20 MG tablet   Commonly known as:  DELTASONE   This may have changed:  additional instructions   Used for:  Asthma flare        Dose:  20 mg   Take 1 tablet (20 mg) by mouth daily   Quantity:  5 tablet   Refills:  0       * predniSONE 20 MG tablet   Commonly known as:  DELTASONE   This may have changed:  Another medication with the same name was changed. Make sure you understand how and when to take each.   Used for:  Asthma flare, Allergic rhinitis, unspecified        2 tabs daily for 3 days, then one tab for 7 days   Quantity:  13 tablet   Refills:  0       * Notice:  This list has 2 medication(s) that are the same as other medications prescribed for you. Read the directions carefully, and ask your doctor or other care provider to review them with you.              Primary Care Provider Office Phone # Fax #    Cookie Conklin [phone]       [facility] [address] [facility] [address]        Thank you!     Thank you for choosing [facility]  for your care. Our goal is always to provide you with excellent care. Hearing back from our patients is one way we can continue to improve our services. Please take a few minutes to complete the written survey that you may receive in the mail after your visit with us. Thank you!             Your Updated Medication List - Protect others around you: Learn how to safely use, store and throw away your medicines at www.disposemymeds.org.          This list is accurate as of: 4/5/17 11:59 AM.  Always use your most recent med list.                   Brand Name Dispense Instructions for use    * albuterol (2.5 MG/3ML) 0.083% neb solution     1 Box    Take 1 vial (2.5 mg) by nebulization every 4 hours as needed       * albuterol 108 (90 BASE) MCG/ACT Inhaler    PROAIR HFA/PROVENTIL HFA/VENTOLIN HFA    1 Inhaler    Inhale 2 puffs into the lungs every 4 hours as needed       * ALLERGEN IMMUNOTHERAPY PRESCRIPTION     10 mL    Name of Mix: Mix #1  Mold, Dust Mite Dust Mites F. 10,000 AU/mL, HS  1.2 ml Dust Mites P. 10,000 AU/mL, HS  0.8 ml  Alternaria alternata 1:20 w/v GLY, WILKINS  1.0 ml Aspergillus Fumigatus GLY 1:10 w/v, HS  1.0 ml Epicoccum 20, 000 pnu/mL (1:10 w/v),  ALK  1.0 ml Fusarium Vasinfectum GLY 1:10 w/v, HS  1.0 ml Hormodendrum Cladosporioides 1:10 w/, HS 1.0 ml Phoma HerbarumGLY 1:10 w/v, HS  1.0 ml Diluent: HSA qs to 10ml       * ALLERGEN IMMUNOTHERAPY PRESCRIPTION     10 mL    Name of Mix: Mix #2  Tree , Weeds Trees, Weeds Master Mix ([facility])  7.8 ml Diluent: HSA qs to 10ml       * ALLERGEN IMMUNOTHERAPY PRESCRIPTION     10 mL    Name of Mix: Mix #3  Cat, Dog, Grass Cat, Dog, Grass Master Mix ([facility]) 6.2 ml Diluent: HSA qs to 10ml       amoxicillin 500 MG capsule    AMOXIL    30  capsule    Take 1 capsule (500 mg) by mouth 3 times daily       azelastine 0.05 % Soln ophthalmic solution    OPTIVAR    1 Bottle    Apply 1 drop to eye 2 times daily       CERAVE Crea     2 Bottle    Externally apply 1 dose * topically 2 times daily       EPINEPHrine 0.3 MG/0.3ML injection     0.6 mL    Inject 0.3 mLs (0.3 mg) into the muscle once as needed for anaphylaxis       fluticasone-salmeterol 500-50 MCG/DOSE diskus inhaler    ADVAIR    3 Inhaler    Inhale 1 puff into the lungs 2 times daily       loratadine 10 MG tablet    CLARITIN    30 tablet    Take 1 tablet (10 mg) by mouth daily       montelukast 10 MG tablet    SINGULAIR    30 tablet    Take 1 tablet (10 mg) by mouth At Bedtime       MULTIVITAMIN PO      1 tab daily       * predniSONE 20 MG tablet    DELTASONE    5 tablet    Take 1 tablet (20 mg) by mouth daily       * predniSONE 20 MG tablet    DELTASONE    13 tablet    2 tabs daily for 3 days, then one tab for 7 days       triamcinolone 0.1 % cream    KENALOG    80 g    Apply  topically 2 times daily as needed.       * Notice:  This list has 7 medication(s) that are the same as other medications prescribed for you. Read the directions carefully, and ask your doctor or other care provider to review them with you.

## 2017-04-05 NOTE — PROGRESS NOTES
Patient presented after waiting 30 minutes with no reaction to  injections. Discharged from clinic.  Maricruz Parks RN

## 2017-04-26 ENCOUNTER — ALLIED HEALTH/NURSE VISIT (OUTPATIENT)
Dept: ALLERGY | Facility: CLINIC | Age: 52
End: 2017-04-26
Payer: COMMERCIAL

## 2017-04-26 DIAGNOSIS — J30.9 ALLERGIC RHINITIS, UNSPECIFIED: Primary | ICD-10-CM

## 2017-04-26 PROCEDURE — 95117 IMMUNOTHERAPY INJECTIONS: CPT

## 2017-04-26 NOTE — MR AVS SNAPSHOT
"              After Visit Summary   4/26/2017    Dilia Solomon    MRN: [id number]           Patient Information     Date Of Birth          1965        Visit Information        Provider Department      4/26/2017 3:45 PM ALLERGY [facility]        Today's Diagnoses     Allergic rhinitis, unspecified    -  1       Follow-ups after your visit        Your next 10 appointments already scheduled     May 17, 2017  4:00 PM CDT   Nurse Only with ALLERGY [facility] ([facility])    [address] [address] [phone]           Every allergy patient MUST wait 30 minutes after their allergy shot. No exceptions.  Xolair shots #1-3 should plan to wait 2 hours in clinic Xolair shots after #4 should plan 30 minute wait in clinic              Who to contact     If you have questions or need follow up information about today's clinic visit or your schedule please contact [facility] directly at [phone].  Normal or non-critical lab and imaging results will be communicated to you by Eteloshart, letter or phone within 4 business days after the clinic has received the results. If you do not hear from us within 7 days, please contact the clinic through Zwitt[employer] or phone. If you have a critical or abnormal lab result, we will notify you by phone as soon as possible.  Submit refill requests through [employer] or call your pharmacy and they will forward the refill request to us. Please allow 3 business days for your refill to be completed.          Additional Information About Your Visit        EtelosharGuestShots Information     [employer] lets you send messages to your doctor, view your test results, renew your prescriptions, schedule appointments and more. To sign up, go to [email or web address] . Click on \"Log in\" on the left side of the screen, which will take you to the Welcome page. Then click on \"Sign up Now\" on the right side of the " page.     You will be asked to enter the access code listed below, as well as some personal information. Please follow the directions to create your username and password.     Your access code is: [email or web address]  Expires: 2017 11:07 AM     Your access code will  in 90 days. If you need help or a new code, please call your [place] clinic or [phone].        Care EveryWhere ID     This is your Care EveryWhere ID. This could be used by other organizations to access your [place] medical records  [id number]        Your Vitals Were     Last Period                   2015 (Approximate)            Blood Pressure from Last 3 Encounters:   17 108/62   16 130/76   16 124/80    Weight from Last 3 Encounters:   17 202 lb 12.8 oz (92 kg)   16 201 lb 6.4 oz (91.4 kg)   16 200 lb (90.7 kg)              We Performed the Following     Allergy Shot: Two or more injections          Today's Medication Changes          These changes are accurate as of: 17  4:20 PM.  If you have any questions, ask your nurse or doctor.               These medicines have changed or have updated prescriptions.        Dose/Directions    * predniSONE 20 MG tablet   Commonly known as:  DELTASONE   This may have changed:  additional instructions   Used for:  Asthma flare        Dose:  20 mg   Take 1 tablet (20 mg) by mouth daily   Quantity:  5 tablet   Refills:  0       * predniSONE 20 MG tablet   Commonly known as:  DELTASONE   This may have changed:  Another medication with the same name was changed. Make sure you understand how and when to take each.   Used for:  Asthma flare, Allergic rhinitis, unspecified        2 tabs daily for 3 days, then one tab for 7 days   Quantity:  13 tablet   Refills:  0       * Notice:  This list has 2 medication(s) that are the same as other medications prescribed for you. Read the directions carefully, and ask your doctor or other care provider to review them with  you.             Primary Care Provider Office Phone # Fax #    Cookie Conklin [phone]       [facility] [address] [facility] [address]        Thank you!     Thank you for choosing [facility]  for your care. Our goal is always to provide you with excellent care. Hearing back from our patients is one way we can continue to improve our services. Please take a few minutes to complete the written survey that you may receive in the mail after your visit with us. Thank you!             Your Updated Medication List - Protect others around you: Learn how to safely use, store and throw away your medicines at www.disposemymeds.org.          This list is accurate as of: 4/26/17  4:20 PM.  Always use your most recent med list.                   Brand Name Dispense Instructions for use    * albuterol (2.5 MG/3ML) 0.083% neb solution     1 Box    Take 1 vial (2.5 mg) by nebulization every 4 hours as needed       * albuterol 108 (90 BASE) MCG/ACT Inhaler    PROAIR HFA/PROVENTIL HFA/VENTOLIN HFA    1 Inhaler    Inhale 2 puffs into the lungs every 4 hours as needed       * ALLERGEN IMMUNOTHERAPY PRESCRIPTION     10 mL    Name of Mix: Mix #1  Mold, Dust Mite Dust Mites F. 10,000 AU/mL, HS  1.2 ml Dust Mites P. 10,000 AU/mL, HS  0.8 ml  Alternaria alternata 1:20 w/v GLY, WILKINS  1.0 ml Aspergillus Fumigatus GLY 1:10 w/v, HS  1.0 ml Epicoccum 20, 000 pnu/mL (1:10 w/v),  ALK  1.0 ml Fusarium Vasinfectum GLY 1:10 w/v, HS  1.0 ml Hormodendrum Cladosporioides 1:10 w/, HS 1.0 ml Phoma HerbarumGLY 1:10 w/v, HS  1.0 ml Diluent: HSA qs to 10ml       * ALLERGEN IMMUNOTHERAPY PRESCRIPTION     10 mL    Name of Mix: Mix #2  Tree , Weeds Trees, Weeds Master Mix ([facility])  7.8 ml Diluent: HSA qs to 10ml       * ALLERGEN IMMUNOTHERAPY PRESCRIPTION     10 mL    Name of Mix: Mix #3  Cat, Dog, Grass Cat, Dog, Grass Master Mix ([facility]) 6.2 ml Diluent: HSA qs to 10ml       amoxicillin 500 MG capsule     AMOXIL    30 capsule    Take 1 capsule (500 mg) by mouth 3 times daily       azelastine 0.05 % Soln ophthalmic solution    OPTIVAR    1 Bottle    Apply 1 drop to eye 2 times daily       CERAVE Crea     2 Bottle    Externally apply 1 dose * topically 2 times daily       EPINEPHrine 0.3 MG/0.3ML injection     0.6 mL    Inject 0.3 mLs (0.3 mg) into the muscle once as needed for anaphylaxis       fluticasone-salmeterol 500-50 MCG/DOSE diskus inhaler    ADVAIR    3 Inhaler    Inhale 1 puff into the lungs 2 times daily       loratadine 10 MG tablet    CLARITIN    30 tablet    Take 1 tablet (10 mg) by mouth daily       montelukast 10 MG tablet    SINGULAIR    30 tablet    Take 1 tablet (10 mg) by mouth At Bedtime       MULTIVITAMIN PO      1 tab daily       * predniSONE 20 MG tablet    DELTASONE    5 tablet    Take 1 tablet (20 mg) by mouth daily       * predniSONE 20 MG tablet    DELTASONE    13 tablet    2 tabs daily for 3 days, then one tab for 7 days       triamcinolone 0.1 % cream    KENALOG    80 g    Apply  topically 2 times daily as needed.       * Notice:  This list has 7 medication(s) that are the same as other medications prescribed for you. Read the directions carefully, and ask your doctor or other care provider to review them with you.

## 2017-05-01 ENCOUNTER — TELEPHONE (OUTPATIENT)
Dept: ALLERGY | Facility: CLINIC | Age: 52
End: 2017-05-01

## 2017-05-01 NOTE — TELEPHONE ENCOUNTER
Patients C,D,G serum discarded as it will  5/10/17.  Last injection 17 so wouldn't be able to receive anymore from the vial.    Concepcion Vaca, CMA

## 2017-05-16 ENCOUNTER — OFFICE VISIT (OUTPATIENT)
Dept: FAMILY MEDICINE | Facility: CLINIC | Age: 52
End: 2017-05-16
Payer: COMMERCIAL

## 2017-05-16 VITALS
TEMPERATURE: 98 F | HEART RATE: 86 BPM | BODY MASS INDEX: 36.55 KG/M2 | DIASTOLIC BLOOD PRESSURE: 70 MMHG | RESPIRATION RATE: 24 BRPM | SYSTOLIC BLOOD PRESSURE: 120 MMHG | WEIGHT: 208 LBS | OXYGEN SATURATION: 98 %

## 2017-05-16 DIAGNOSIS — J32.1 CHRONIC FRONTAL SINUSITIS: Primary | ICD-10-CM

## 2017-05-16 PROCEDURE — 99213 OFFICE O/P EST LOW 20 MIN: CPT | Performed by: NURSE PRACTITIONER

## 2017-05-16 RX ORDER — AMOXICILLIN 500 MG/1
500 CAPSULE ORAL 3 TIMES DAILY
Qty: 30 CAPSULE | Refills: 0 | Status: SHIPPED | OUTPATIENT
Start: 2017-05-16 | End: 2017-06-28

## 2017-05-16 NOTE — PROGRESS NOTES
SUBJECTIVE:                                                    Dilia Solomon is a 52 year old female who presents to clinic today for the following health issues:      ALLERGIES      Duration: 3 days for sinus congestion and 1 days of laryngitis.    Had nausea on Saturday    Description:   Nasal congestion: YES    Accompanying signs and symptoms: laryngitis    History (similar episodes/allergy testing): allergy testing done and immunotherapy, allergic rhinitis and asthma    Precipitating or alleviating factors: patient states was exposed to work  To a bleach/dish detergent mix that  used on the floor and has been ill since    Therapies tried and outcome: None     Was exposed to bleach smell at work 3 days ago.  On the following day, she developed a dry sore throat.  Sinus congestion started last night. Sinus tenderness. Thick discharge with some blood in it..  2 days ago had sore throat only.   Today developed laryngitis.  Has never reacted to bleach in the past- but not supposed to be around due to asthma.   Apparently this happens every Friday and Saturday when halfway staff wash the floor..   In the past could smell the bleach but had no severe reaction. She would just leave the work area.   Until recently, has never said anything to her boss.  She has asked employer to use different chemical for cleanting floors   Wonders if this is considered workers Comp.    Problem list and histories reviewed & adjusted, as indicated.  Additional history: as documented      Reviewed and updated as needed this visit by clinical staff  Allergies       Reviewed and updated as needed this visit by Provider         ROS:  Constitutional, HEENT, cardiovascular, pulmonary, gi and gu systems are negative, except as otherwise noted.    OBJECTIVE:                                                    /70 (BP Location: Left arm, Patient Position: Chair, Cuff Size: Adult Regular)  Pulse 86  Temp 98  F (36.7  C)  (Tympanic)  Resp 24  Wt 208 lb (94.3 kg)  LMP 07/18/2015 (Approximate)  SpO2 98%  Breastfeeding? No  BMI 36.55 kg/m2  Body mass index is 36.55 kg/(m^2).  GENERAL: healthy, alert and no distress  HENT: normal cephalic/atraumatic, ear canals and TM's normal, nasal mucosa edematous , oropharynx clear, oral mucous membranes moist and sinuses: frontal tenderness bilaterally, frontal swelling bilaterally  NECK: no adenopathy, no asymmetry, masses, or scars and thyroid normal to palpation  RESP: lungs clear to auscultation - no rales, rhonchi or wheezes  CV: regular rate and rhythm, normal S1 S2, no S3 or S4, no murmur, click or rub, no peripheral edema and peripheral pulses strong    Diagnostic Test Results:  No results found for this or any previous visit (from the past 24 hour(s)).     ASSESSMENT/PLAN:                                                        1. Chronic frontal sinusitis  Sinusitis, not related to exposure to bleach smell at work. Her asthma may indeed be triggered by the bleach smell.  - amoxicillin (AMOXIL) 500 MG capsule; Take 1 capsule (500 mg) by mouth 3 times daily  Dispense: 30 capsule; Refill: 0    Patient Instructions     Complete full course of antibiotics even if you start to feel better.    Increase your fluids and rest and eat a well balanced diet.    Would be good to humidify the air in your home, especially in the bedroom.     Tylenol or Ibuprofen if able to take for fevers and discomfort. Do not exceed 4 grams of Tylenol in a 24 hour period. Take Ibuprofen with food.   Follow up in 3-5 days if not improving or return sooner if worsening or fail to improve as anticipated.            Dinorah Engel, NP, APRN [facility]

## 2017-05-16 NOTE — NURSING NOTE
"Chief Complaint   Patient presents with     Allergies       Initial LMP 07/18/2015 (Approximate) Estimated body mass index is 35.64 kg/(m^2) as calculated from the following:    Height as of 3/3/17: 5' 3.25\" (1.607 m).    Weight as of 3/3/17: 202 lb 12.8 oz (92 kg).  Medication Reconciliation: complete  "

## 2017-05-16 NOTE — LETTER
My Asthma Action Plan  Name: Dilia Solomon   YOB: 1965  Date: 5/16/2017   My doctor: Dinorah Engel, PAMELA, APRN CNP   My clinic: [facility]        My Control Medicine: Fluticasone + salmeterol (Advair) -  Diskus 500/50 mcg twice daily  Montelukast (Singulair) -  10 mg daily  My Rescue Medicine: Albuterol nebulizer solution as needed  Albuterol (Proair/Ventolin/Proventil) inhaler 2 puffs as needed  My Oral Steroid Medicine: Prednisone My Asthma Severity: moderate persistent  Avoid your asthma triggers: allergens               GREEN ZONE     Good Control    I feel good    No cough or wheeze    Can work, sleep and play without asthma symptoms       Take your asthma control medicine every day.     1. If exercise triggers your asthma, take your rescue medication    15 minutes before exercise or sports, and    During exercise if you have asthma symptoms  2. Spacer to use with inhaler: If you have a spacer, make sure to use it with your inhaler             YELLOW ZONE     Getting Worse  I have ANY of these:    I do not feel good    Cough or wheeze    Chest feels tight    Wake up at night   1. Keep taking your Green Zone medications  2. Start taking your rescue medicine:    every 20 minutes for up to 1 hour. Then every 4 hours for 24-48 hours.  3. If you stay in the Yellow Zone for more than 12-24 hours, contact your doctor.  4. If you do not return to the Green Zone in 12-24 hours or you get worse, start taking your oral steroid medicine if prescribed by your provider.           RED ZONE     Medical Alert - Get Help  I have ANY of these:    I feel awful    Medicine is not helping    Breathing getting harder    Trouble walking or talking    Nose opens wide to breathe       1. Take your rescue medicine NOW  2. If your provider has prescribed an oral steroid medicine, start taking it NOW  3. Call your doctor NOW  4. If you are still in the Red Zone after 20 minutes and you have not reached  your doctor:    Take your rescue medicine again and    Call 911 or go to the emergency room right away    See your regular doctor within 2 weeks of an Emergency Room or Urgent Care visit for follow-up treatment.        Electronically signed by: Kelsie Ruiz, May 16, 2017    Annual Reminders:  Meet with Asthma Educator,  Flu Shot in the Fall, consider Pneumonia Vaccination for patients with asthma (aged 19 and older).    Pharmacy:    [facility] - [place], [place] - [address] [facility] [place] [facility] [place], [place] - [address] [facility] [place], [place] - [address]                     Asthma Triggers  How To Control Things That Make Your Asthma Worse    Triggers are things that make your asthma worse.  Look at the list below to help you find your triggers and what you can do about them.  You can help prevent asthma flare-ups by staying away from your triggers.      Trigger                                                          What you can do   Cigarette Smoke  Tobacco smoke can make asthma worse. Do not allow smoking in your home, car or around you.  Be sure no one smokes at a child s day care or school.  If you smoke, ask your health care provider for ways to help you quit.  Ask family members to quit too.  Ask your health care provider for a referral to Quit Plan to help you quit smoking, or call 5-139-090-PLAN.     Colds, Flu, Bronchitis  These are common triggers of asthma. Wash your hands often.  Don t touch your eyes, nose or mouth.  Get a flu shot every year.     Dust Mites  These are tiny bugs that live in cloth or carpet. They are too small to see. Wash sheets and blankets in hot water every week.   Encase pillows and mattress in dust mite proof covers.  Avoid having carpet if you can. If you have carpet, vacuum weekly.   Use a dust mask and HEPA vacuum.   Pollen and Outdoor Mold  Some people are allergic to trees, grass, or weed pollen, or molds. Try to  keep your windows closed.  Limit time out doors when pollen count is high.   Ask you health care provider about taking medicine during allergy season.     Animal Dander  Some people are allergic to skin flakes, urine or saliva from pets with fur or feathers. Keep pets with fur or feathers out of your home.    If you can t keep the pet outdoors, then keep the pet out of your bedroom.  Keep the bedroom door closed.  Keep pets off cloth furniture and away from stuffed toys.     Mice, Rats, and Cockroaches  Some people are allergic to the waste from these pests.   Cover food and garbage.  Clean up spills and food crumbs.  Store grease in the refrigerator.   Keep food out of the bedroom.   Indoor Mold  This can be a trigger if your home has high moisture. Fix leaking faucets, pipes, or other sources of water.   Clean moldy surfaces.  Dehumidify basement if it is damp and smelly.   Smoke, Strong Odors, and Sprays  These can reduce air quality. Stay away from strong odors and sprays, such as perfume, powder, hair spray, paints, smoke incense, paint, cleaning products, candles and new carpet.   Exercise or Sports  Some people with asthma have this trigger. Be active!  Ask your doctor about taking medicine before sports or exercise to prevent symptoms.    Warm up for 5-10 minutes before and after sports or exercise.     Other Triggers of Asthma  Cold air:  Cover your nose and mouth with a scarf.  Sometimes laughing or crying can be a trigger.  Some medicines and food can trigger asthma.

## 2017-05-16 NOTE — MR AVS SNAPSHOT
"              After Visit Summary   5/16/2017    Dilia Solomon    MRN: [id number]           Patient Information     Date Of Birth          1965        Visit Information        Provider Department      5/16/2017 12:40 PM Dinorah Engel APRN CNP [facility]        Today's Diagnoses     Chronic frontal sinusitis    -  1      Care Instructions      Complete full course of antibiotics even if you start to feel better.    Increase your fluids and rest and eat a well balanced diet.    Would be good to humidify the air in your home, especially in the bedroom.     Tylenol or Ibuprofen if able to take for fevers and discomfort. Do not exceed 4 grams of Tylenol in a 24 hour period. Take Ibuprofen with food.   Follow up in 3-5 days if not improving or return sooner if worsening or fail to improve as anticipated.              Follow-ups after your visit        Who to contact     If you have questions or need follow up information about today's clinic visit or your schedule please contact [facility] directly at [phone].  Normal or non-critical lab and imaging results will be communicated to you by Screenburnhart, letter or phone within 4 business days after the clinic has received the results. If you do not hear from us within 7 days, please contact the clinic through [place] or phone. If you have a critical or abnormal lab result, we will notify you by phone as soon as possible.  Submit refill requests through SeatNinja or call your pharmacy and they will forward the refill request to us. Please allow 3 business days for your refill to be completed.          Additional Information About Your Visit        ScreenburnharChimerix Information     SeatNinja lets you send messages to your doctor, view your test results, renew your prescriptions, schedule appointments and more. To sign up, go to [email or web address] . Click on \"Log in\" on the left side of the screen, which will take you to the Welcome page. " "Then click on \"Sign up Now\" on the right side of the page.     You will be asked to enter the access code listed below, as well as some personal information. Please follow the directions to create your username and password.     Your access code is: [email or web address]  Expires: 2017 11:07 AM     Your access code will  in 90 days. If you need help or a new code, please call your [facility] clinic or [phone].        Care EveryWhere ID     This is your Care EveryWhere ID. This could be used by other organizations to access your [facility] medical records  [id number]        Your Vitals Were     Pulse Temperature Respirations Last Period Pulse Oximetry Breastfeeding?    86 98  F (36.7  C) (Tympanic) 24 2015 (Approximate) 98% No    BMI (Body Mass Index)                   36.55 kg/m2            Blood Pressure from Last 3 Encounters:   17 120/70   17 108/62   16 130/76    Weight from Last 3 Encounters:   17 208 lb (94.3 kg)   17 202 lb 12.8 oz (92 kg)   16 201 lb 6.4 oz (91.4 kg)              We Performed the Following     Asthma Action Plan (AAP)          Today's Medication Changes          These changes are accurate as of: 17  1:09 PM.  If you have any questions, ask your nurse or doctor.               These medicines have changed or have updated prescriptions.        Dose/Directions    * predniSONE 20 MG tablet   Commonly known as:  DELTASONE   This may have changed:  additional instructions   Used for:  Asthma flare   Changed by:  Natalya Wakefield MD        Dose:  20 mg   Take 1 tablet (20 mg) by mouth daily   Quantity:  5 tablet   Refills:  0       * predniSONE 20 MG tablet   Commonly known as:  DELTASONE   This may have changed:  Another medication with the same name was changed. Make sure you understand how and when to take each.   Used for:  Asthma flare, Allergic rhinitis, unspecified   Changed by:  Bernardo Watson MD        2 tabs daily for 3 days, " then one tab for 7 days   Quantity:  13 tablet   Refills:  0       * Notice:  This list has 2 medication(s) that are the same as other medications prescribed for you. Read the directions carefully, and ask your doctor or other care provider to review them with you.         Where to get your medicines      These medications were sent to [facility] - [place], [place] - [address]  [address] [facility] [address]     Phone:  [phone]     amoxicillin 500 MG capsule                Primary Care Provider Office Phone # Fax #    Cookie Conklin [phone]       [facility] [address]  [facility] [address]        Thank you!     Thank you for choosing [facility]  for your care. Our goal is always to provide you with excellent care. Hearing back from our patients is one way we can continue to improve our services. Please take a few minutes to complete the written survey that you may receive in the mail after your visit with us. Thank you!             Your Updated Medication List - Protect others around you: Learn how to safely use, store and throw away your medicines at www.disposemymeds.org.          This list is accurate as of: 5/16/17  1:09 PM.  Always use your most recent med list.                   Brand Name Dispense Instructions for use    * albuterol (2.5 MG/3ML) 0.083% neb solution     1 Box    Take 1 vial (2.5 mg) by nebulization every 4 hours as needed       * albuterol 108 (90 BASE) MCG/ACT Inhaler    PROAIR HFA/PROVENTIL HFA/VENTOLIN HFA    1 Inhaler    Inhale 2 puffs into the lungs every 4 hours as needed       * ALLERGEN IMMUNOTHERAPY PRESCRIPTION     10 mL    Name of Mix: Mix #1  Mold, Dust Mite Dust Mites F. 10,000 AU/mL, HS  1.2 ml Dust Mites P. 10,000 AU/mL, HS  0.8 ml  Alternaria alternata 1:20 w/v GLY, WILKINS  1.0 ml Aspergillus Fumigatus GLY 1:10 w/v, HS  1.0 ml Epicoccum 20, 000 pnu/mL (1:10 w/v),  ALK  1.0 ml Fusarium  Vasinfectum GLY 1:10 w/v, HS  1.0 ml Hormodendrum Cladosporioides 1:10 w/, HS 1.0 ml Phoma HerbarumGLY 1:10 w/v, HS  1.0 ml Diluent: HSA qs to 10ml       * ALLERGEN IMMUNOTHERAPY PRESCRIPTION     10 mL    Name of Mix: Mix #2  Tree , Weeds Trees, Weeds Master Mix ([facility])  7.8 ml Diluent: HSA qs to 10ml       * ALLERGEN IMMUNOTHERAPY PRESCRIPTION     10 mL    Name of Mix: Mix #3  Cat, Dog, Grass Cat, Dog, Grass Master Mix ([facility]) 6.2 ml Diluent: HSA qs to 10ml       amoxicillin 500 MG capsule    AMOXIL    30 capsule    Take 1 capsule (500 mg) by mouth 3 times daily       azelastine 0.05 % Soln ophthalmic solution    OPTIVAR    1 Bottle    Apply 1 drop to eye 2 times daily       CERAVE Crea     2 Bottle    Externally apply 1 dose * topically 2 times daily       EPINEPHrine 0.3 MG/0.3ML injection     0.6 mL    Inject 0.3 mLs (0.3 mg) into the muscle once as needed for anaphylaxis       fluticasone-salmeterol 500-50 MCG/DOSE diskus inhaler    ADVAIR    3 Inhaler    Inhale 1 puff into the lungs 2 times daily       loratadine 10 MG tablet    CLARITIN    30 tablet    Take 1 tablet (10 mg) by mouth daily       montelukast 10 MG tablet    SINGULAIR    30 tablet    Take 1 tablet (10 mg) by mouth At Bedtime       MULTIVITAMIN PO      1 tab daily       * predniSONE 20 MG tablet    DELTASONE    5 tablet    Take 1 tablet (20 mg) by mouth daily       * predniSONE 20 MG tablet    DELTASONE    13 tablet    2 tabs daily for 3 days, then one tab for 7 days       triamcinolone 0.1 % cream    KENALOG    80 g    Apply  topically 2 times daily as needed.       * Notice:  This list has 7 medication(s) that are the same as other medications prescribed for you. Read the directions carefully, and ask your doctor or other care provider to review them with you.

## 2017-05-17 ASSESSMENT — ASTHMA QUESTIONNAIRES: ACT_TOTALSCORE: 19

## 2017-05-30 ENCOUNTER — TELEPHONE (OUTPATIENT)
Dept: ALLERGY | Facility: CLINIC | Age: 52
End: 2017-05-30

## 2017-06-28 ENCOUNTER — OFFICE VISIT (OUTPATIENT)
Dept: ALLERGY | Facility: CLINIC | Age: 52
End: 2017-06-28
Payer: COMMERCIAL

## 2017-06-28 VITALS
DIASTOLIC BLOOD PRESSURE: 72 MMHG | HEART RATE: 72 BPM | TEMPERATURE: 98.5 F | BODY MASS INDEX: 37.04 KG/M2 | WEIGHT: 210.76 LBS | SYSTOLIC BLOOD PRESSURE: 129 MMHG | OXYGEN SATURATION: 100 %

## 2017-06-28 DIAGNOSIS — J45.40 MODERATE PERSISTENT ASTHMA WITHOUT COMPLICATION: Primary | ICD-10-CM

## 2017-06-28 DIAGNOSIS — J30.89 OTHER ALLERGIC RHINITIS: ICD-10-CM

## 2017-06-28 LAB
FEF 25/75: NORMAL
FEV-1: NORMAL
FEV1/FVC: NORMAL
FVC: NORMAL

## 2017-06-28 PROCEDURE — 36415 COLL VENOUS BLD VENIPUNCTURE: CPT | Performed by: ALLERGY & IMMUNOLOGY

## 2017-06-28 PROCEDURE — 86003 ALLG SPEC IGE CRUDE XTRC EA: CPT | Mod: 90 | Performed by: ALLERGY & IMMUNOLOGY

## 2017-06-28 PROCEDURE — 99000 SPECIMEN HANDLING OFFICE-LAB: CPT | Performed by: ALLERGY & IMMUNOLOGY

## 2017-06-28 PROCEDURE — 94010 BREATHING CAPACITY TEST: CPT | Performed by: ALLERGY & IMMUNOLOGY

## 2017-06-28 PROCEDURE — 99213 OFFICE O/P EST LOW 20 MIN: CPT | Mod: 25 | Performed by: ALLERGY & IMMUNOLOGY

## 2017-06-28 PROCEDURE — 86003 ALLG SPEC IGE CRUDE XTRC EA: CPT | Mod: 91 | Performed by: ALLERGY & IMMUNOLOGY

## 2017-06-28 NOTE — LETTER
My Asthma Action Plan  Name: Dilia Solomon   YOB: 1965  Date: 6/28/17  My doctor: Butch Horowitz MD  My clinic: [facility]      My Control Medicine: Fluticasone+salmeterol (Advair) -  Diskus 500/50 mcg - 1 puff twice daily  Montelukast (Singulair) -  10 mg        Dose: 1 tablet daily  My Rescue Medicine: Albuterol (Proair/Ventolin/Proventil) HFA        Dose: 2-4 puffs every 4 hours as needed   My Asthma Severity: moderate persistent  Avoid your asthma triggers: smoke and upper respiratory infections        GREEN ZONE   Good Control    I feel good    No cough or wheeze    Can work, sleep and play without asthma symptoms       Take your asthma control medicine every day.     1. If exercise triggers your asthma, take your rescue medication    15 minutes before exercise or sports, and    During exercise if you have asthma symptoms  2. Spacer to use with inhaler: If you have a spacer, make sure to use it with your inhaler             YELLOW ZONE Getting Worse  I have ANY of these:    I do not feel good    Cough or wheeze    Chest feels tight    Wake up at night   1. Keep taking your Green Zone medications  2. Start taking your rescue medicine:    every 20 minutes for up to 1 hour. Then every 4 hours for 24-48 hours.  3. If you stay in the Yellow Zone for more than 12-24 hours, contact your doctor.             RED ZONE Medical Alert - Get Help  I have ANY of these:    I feel awful    Medicine is not helping    Breathing getting harder    Trouble walking or talking    Nose opens wide to breathe       1. Take your rescue medicine NOW  2. If your provider has prescribed an oral steroid medicine, start taking it NOW  3. Call your doctor NOW  4. If you are still in the Red Zone after 20 minutes and you have not reached your doctor:    Take your rescue medicine again and    Call 911 or go to the emergency room right away    See your regular doctor within 2 weeks of an Emergency Room or Urgent Care  visit for follow-up treatment.        The above medication may be given at school or day care?: N/A (Adult Patient)  Child can carry and use inhaler(s) at school with approval of school nurse?: N/A (Adult Patient)    Electronically signed by: Butch Horowitz, June 28, 2017    Annual Reminders:  Meet with Asthma Educator,  Flu Shot in the Fall, consider Pneumonia Vaccination for patients with asthma (aged 19 and older).    Pharmacy:    [facility] [place], [place] - [address] [facility] [place] [address] [facility] [address] [place] [address] - [address]                    Asthma Triggers  How To Control Things That Make Your Asthma Worse    Triggers are things that make your asthma worse.  Look at the list below to help you find your triggers and what you can do about them.  You can help prevent asthma flare-ups by staying away from your triggers.      Trigger                                                          What you can do   Cigarette Smoke  Tobacco smoke can make asthma worse. Do not allow smoking in your home, car or around you.  Be sure no one smokes at a child s day care or school.  If you smoke, ask your health care provider for ways to help you quit.  Ask family members to quit too.  Ask your health care provider for a referral to Quit Plan to help you quit smoking, or call 4-614-426-PLAN.     Colds, Flu, Bronchitis  These are common triggers of asthma. Wash your hands often.  Don t touch your eyes, nose or mouth.  Get a flu shot every year.     Dust Mites  These are tiny bugs that live in cloth or carpet. They are too small to see. Wash sheets and blankets in hot water every week.   Encase pillows and mattress in dust mite proof covers.  Avoid having carpet if you can. If you have carpet, vacuum weekly.   Use a dust mask and HEPA vacuum.   Pollen and Outdoor Mold  Some people are allergic to trees, grass, or weed pollen, or molds. Try to  keep your windows closed.  Limit time out doors when pollen count is high.   Ask you health care provider about taking medicine during allergy season.     Animal Dander  Some people are allergic to skin flakes, urine or saliva from pets with fur or feathers. Keep pets with fur or feathers out of your home.    If you can t keep the pet outdoors, then keep the pet out of your bedroom.  Keep the bedroom door closed.  Keep pets off cloth furniture and away from stuffed toys.     Mice, Rats, and Cockroaches  Some people are allergic to the waste from these pests.   Cover food and garbage.  Clean up spills and food crumbs.  Store grease in the refrigerator.   Keep food out of the bedroom.   Indoor Mold  This can be a trigger if your home has high moisture. Fix leaking faucets, pipes, or other sources of water.   Clean moldy surfaces.  Dehumidify basement if it is damp and smelly.   Smoke, Strong Odors, and Sprays  These can reduce air quality. Stay away from strong odors and sprays, such as perfume, powder, hair spray, paints, smoke incense, paint, cleaning products, candles and new carpet.   Exercise or Sports  Some people with asthma have this trigger. Be active!  Ask your doctor about taking medicine before sports or exercise to prevent symptoms.    Warm up for 5-10 minutes before and after sports or exercise.     Other Triggers of Asthma  Cold air:  Cover your nose and mouth with a scarf.  Sometimes laughing or crying can be a trigger.  Some medicines and food can trigger asthma.

## 2017-06-28 NOTE — PROGRESS NOTES
Dilia Solomon was seen in the Allergy Clinic at [facility]. The following are my recommendations regarding her Moderate Persistent Asthma and Allergic Rhinitis    1. Will obtain in vitro IgE testing to seasonal and perennial aeroallergens  2. Consider allergen immunotherapy treatment pending lab results  3. Continue loratadine 10mg daily  4. Begin fluticasone nasal spray, 2 sprays in each nostril daily  5. Continue sinus irrigation rinse daily  6. Continue advair 500/50mcg, 1 puff twice daily  7. Continue montelukast 10mg daily  8. Continue albuterol HFA, 2-4 puffs every 4 hours as needed  9. Follow-up in 3 months      Dilia Solomon is a 52 year old American female who is seen today for follow-up of allergies and ashtma. She states that last month she did not feel well and had increased asthma symptoms. Dilia attributes this having increased asthma symptoms. She has continued to take advair and montelukast daily. Over the last several weeks Dilia reports that her asthma has been well controlled. She denies nocturnal asthma symptoms or limitations in her activity.    Dilia discontinued immunotherapy in April. She states that in May she had symtpoms of itchy eyes, nasal congestion, chest tightness, and left ear pain. She was taking claritin, singulair, and tylenol daily and also resumed using a sinus irrigation rinse. In the last month her allergy symptoms have improved but she continues to have mild symptoms. Dilia would like to resume allergen immunotherapy treatment.      REVIEW OF SYSTEMS:  General: negative for weight gain. negative for weight loss. negative for changes in sleep.   Eyes: positive  for itching. positive  for redness. positive  for tearing/watering.  Ears: positive  for fullness. positive  for hearing loss. negative for dizziness.   Nose: negative for snoring.negative for changes in smell. negative for drainage.   Throat: positive  for hoarseness. positive  for sore throat.  positive  for trouble swallowing.   Lungs: negative for shortness of breath.negative for wheezing. negative for sputum production.   Cardiovascular: negative for chest pain. negative for swelling of ankles. negative for fast or irregular heartbeat.   Gastrointestinal: negative for nausea. negative for heartburn. negative for acid reflux.   Musculoskeletal: negative for joint pain. negative for joint stiffness. negative for joint swelling.   Neurologic: negative for seizures. negative for fainting. negative for weakness.   Psychiatric: negative for changes in mood. negative for anxiety.   Endocrine: negative for cold intolerance. negative for heat intolerance. negative for tremors.   Hematologic: negative for easy bruising. negative for easy bleeding.  Integumentary: negative for rash. negative for scaling. negative for nail changes.       Current Outpatient Prescriptions:      amoxicillin (AMOXIL) 500 MG capsule, Take 1 capsule (500 mg) by mouth 3 times daily, Disp: 30 capsule, Rfl: 0     loratadine (CLARITIN) 10 MG tablet, Take 1 tablet (10 mg) by mouth daily, Disp: 30 tablet, Rfl: 11     fluticasone-salmeterol (ADVAIR) 500-50 MCG/DOSE diskus inhaler, Inhale 1 puff into the lungs 2 times daily, Disp: 3 Inhaler, Rfl: 1     azelastine (OPTIVAR) 0.05 % SOLN ophthalmic solution, Apply 1 drop to eye 2 times daily, Disp: 1 Bottle, Rfl: 5     montelukast (SINGULAIR) 10 MG tablet, Take 1 tablet (10 mg) by mouth At Bedtime, Disp: 30 tablet, Rfl: 11     Emollient (CERAVE) CREA, Externally apply 1 dose * topically 2 times daily, Disp: 2 Bottle, Rfl: 11     albuterol (2.5 MG/3ML) 0.083% neb solution, Take 1 vial (2.5 mg) by nebulization every 4 hours as needed, Disp: 1 Box, Rfl: 3     EPINEPHrine 0.3 MG/0.3ML injection, Inject 0.3 mLs (0.3 mg) into the muscle once as needed for anaphylaxis, Disp: 0.6 mL, Rfl: 3     triamcinolone (KENALOG) 0.1 % cream, Apply  topically 2 times daily as needed., Disp: 80 g, Rfl: 2     albuterol  (PROAIR HFA/PROVENTIL HFA/VENTOLIN HFA) 108 (90 BASE) MCG/ACT Inhaler, Inhale 2 puffs into the lungs every 4 hours as needed, Disp: 1 Inhaler, Rfl: 2     predniSONE (DELTASONE) 20 MG tablet, 2 tabs daily for 3 days, then one tab for 7 days, Disp: 13 tablet, Rfl: 0     predniSONE (DELTASONE) 20 MG tablet, Take 1 tablet (20 mg) by mouth daily (Patient taking differently: Take 20 mg by mouth daily Take as needed for asthma.), Disp: 5 tablet, Rfl: 0     ORDER FOR ALLERGEN IMMUNOTHERAPY, Name of Mix: Mix #1  Mold, Dust Mite Dust Mites F. 10,000 AU/mL, HS  1.2 ml Dust Mites P. 10,000 AU/mL, HS  0.8 ml  Alternaria alternata 1:20 w/v GLY, WILKINS  1.0 ml Aspergillus Fumigatus GLY 1:10 w/v, HS  1.0 ml Epicoccum 20, 000 pnu/mL (1:10 w/v),  ALK  1.0 ml Fusarium Vasinfectum GLY 1:10 w/v, HS  1.0 ml Hormodendrum Cladosporioides 1:10 w/, HS 1.0 ml Phoma HerbarumGLY 1:10 w/v, HS  1.0 ml Diluent: HSA qs to 10ml, Disp: 10 mL, Rfl: PRN     ORDER FOR ALLERGEN IMMUNOTHERAPY, Name of Mix: Mix #2  Tree , Weeds Trees, Weeds Master Mix ([facility])  7.8 ml Diluent: HSA qs to 10ml, Disp: 10 mL, Rfl: PRN     ORDER FOR ALLERGEN IMMUNOTHERAPY, Name of Mix: Mix #3  Cat, Dog, Grass Cat, Dog, Grass Master Mix ([facility]) 6.2 ml Diluent: HSA qs to 10ml, Disp: 10 mL, Rfl: PRN     MULTIVITAMIN OR, 1 tab daily, Disp: , Rfl:     EXAM:   /72 (BP Location: Right arm, Patient Position: Chair, Cuff Size: Adult Large)  Pulse 72  Temp 98.5  F (36.9  C) (Tympanic)  Wt 210 lb 12.2 oz (95.6 kg)  LMP 07/18/2015 (Approximate)  SpO2 100%  BMI 37.04 kg/m2  GENERAL APPEARANCE: alert, cooperative and not in distress  SKIN: no rashes, no lesions  HEAD: atraumatic, normocephalic  ENT: no scars or lesions, nasal exam showed no discharge, swelling or lesions noted, tongue midline and normal, soft palate, uvula, and tonsils normal  NECK: no asymmetry, masses, or scars, supple without significant adenopathy  LUNGS: unlabored respirations, no intercostal retractions or  accessory muscle use, clear to auscultation without rales or wheezes  HEART: regular rate and rhythm without murmurs and normal S1 and S2  MUSCULOSKELETAL: no musculoskeletal defects are noted  NEURO: no focal deficits noted, mental status intact  PSYCH: does not appear depressed or anxious and short and long term memory appears intact      WORKUP:   SPIROMETRY       FVC 3.07L (92% of predicted).     FEV1 2.52L (95% of predicted).     FEV1/FVC 82%     FEF 25%-75%  2.70L/s (103% of predicted).    These values are consistent with normal lung function without evidence of airflow obstruction.    Asthma Control Test (ACT) total score: 20      ASSESSMENT/PLAN:  Dilia Solomon is a 52 year old female here for follow-up of asthma and allergic rhinitis. Her asthma has remained well controlled since she was last seen 6 months ago. She also recently completed a 5 year course of allergen immunotherapy treatment but reports that her symptoms have returned. Her previous skin testing results were reviewed - she had positive tests based only on intradermal skin tests with no documented negative intradermal control. Dilia was counseled that these results are not likely to be consistent with significant aeroallergen sensitization. We discussed symptoms of nonallergic rhinitis along with potential causes of nonallergic rhinitis. Dilia remained interested in pursuing further evaluation for allergic rhinitis. Skin prick testing could not be performed today due to recent antihistamine use and we will pursue additional evaluation with IgE testing.     1. Will obtain in vitro IgE testing to seasonal and perennial aeroallergens  2. Consider allergen immunotherapy treatment pending lab results  3. Continue loratadine 10mg daily  4. Begin fluticasone nasal spray, 2 sprays in each nostril daily  5. Continue sinus irrigation rinse daily  6. Continue advair 500/50mcg, 1 puff twice daily  7. Continue montelukast 10mg daily  8. Continue albuterol  HFA, 2-4 puffs every 4 hours as needed  9. Follow-up in 3 months      Butch Horowitz MD  Allergy/Immunology  [facility] and [place]      Chart documentation done in part with Dragon Voice Recognition Software. Although reviewed after completion, some word and grammatical errors may remain.

## 2017-06-28 NOTE — NURSING NOTE
"Chief Complaint   Patient presents with     Allergy Recheck     Asthma Recheck       Initial /72 (BP Location: Right arm, Patient Position: Chair, Cuff Size: Adult Large)  Pulse 72  Temp 98.5  F (36.9  C) (Tympanic)  Wt 210 lb 12.2 oz (95.6 kg)  LMP 07/18/2015 (Approximate)  SpO2 100%  BMI 37.04 kg/m2 Estimated body mass index is 37.04 kg/(m^2) as calculated from the following:    Height as of 3/3/17: 5' 3.25\" (1.607 m).    Weight as of this encounter: 210 lb 12.2 oz (95.6 kg).  Medication Reconciliation: complete    "

## 2017-06-28 NOTE — MR AVS SNAPSHOT
After Visit Summary   6/28/2017    Dilia Solomon    MRN: [id number]           Patient Information     Date Of Birth          1965        Visit Information        Provider Department      6/28/2017 4:00 PM Butch Horowitz MD [facility]        Today's Diagnoses     Moderate persistent asthma without complication    -  1    Other allergic rhinitis          Care Instructions    If you have any questions regarding your allergies, asthma, or what we discussed during your visit today please call the allergy clinic or contact us via [employer].    [facility] ([place], [place]): [phone]      Continue to take your advair and singulair (montelukast) as prescribed    Keep taking the claritin (loratadine) once to twice daily    If your blood tests are negative for allergies then we will not be able to start you back on shots. You may be having symptoms but we will need to treat your symptoms with medication    If you want to do repeat skin tests you will need to stop the claritin for at least 7 days before coming back to the clinic for testing          Follow-ups after your visit        Who to contact     If you have questions or need follow up information about today's clinic visit or your schedule please contact [facility] directly at [phone].  Normal or non-critical lab and imaging results will be communicated to you by MyChart, letter or phone within 4 business days after the clinic has received the results. If you do not hear from us within 7 days, please contact the clinic through [employer] or phone. If you have a critical or abnormal lab result, we will notify you by phone as soon as possible.  Submit refill requests through [employer] or call your pharmacy and they will forward the refill request to us. Please allow 3 business days for your refill to be completed.          Additional Information About Your Visit        Care EveryWhere ID     This is your Care  EveryWhere ID. This could be used by other organizations to access your [facility] medical records  [id number]        Your Vitals Were     Pulse Temperature Last Period Pulse Oximetry BMI (Body Mass Index)       72 98.5  F (36.9  C) (Tympanic) 07/18/2015 (Approximate) 100% 37.04 kg/m2        Blood Pressure from Last 3 Encounters:   06/28/17 129/72   05/16/17 120/70   03/03/17 108/62    Weight from Last 3 Encounters:   06/28/17 210 lb 12.2 oz (95.6 kg)   05/16/17 208 lb (94.3 kg)   03/03/17 202 lb 12.8 oz (92 kg)              We Performed the Following     Allergen alternaria alternata IgE     Allergen neymar white IgE     Allergen aspergillus fumigatus IgE     Allergen cat epithellium IgE     Allergen Cedar IgE     Allergen cladosporium herbarum IgE     Allergen cottonwood IgE     Allergen D farinae IgE     Allergen D pteronyssinus IgE     Allergen dog epithelium IgE     Allergen elm IgE     Allergen English plantain IgE     Allergen Epicoccum purpurascens IgE     Allergen giant ragweed IgE     Allergen Jonathan grass IgE     Allergen lamb's quarter IgE     Allergen maple box elder IgE     Allergen Mugwort IgE     Allergen Kapaa White     Allergen oak white IgE     Allergen orchard grass IgE     Allergen penicillium notatum IgE     Allergen ragweed short IgE     Allergen Red Kapaa IgE     Allergen Sagebrush Wormwood IgE     Allergen Sheep Sorrel IgE     Allergen silver  birch IgE     Allergen thistle Russian IgE     Allergen ronald IgE     Allergen Bellevue Tree     Allergen Weed Nettle IgE     Allergen white pine IgE     Allergen, Kochia/Firebush     Spirometry, Breathing Capacity: Normal Order, Clinic Performed          Today's Medication Changes          These changes are accurate as of: 6/28/17  4:36 PM.  If you have any questions, ask your nurse or doctor.               Stop taking these medicines if you haven't already. Please contact your care team if you have questions.     predniSONE 20 MG tablet    Commonly known as:  DELTASONE   Stopped by:  Butch Horowitz MD                    Primary Care Provider Office Phone # Fax #    Cookie Conklin [phone]       [facility] [address] [facility] [address]        Equal Access to Services     ARAVINDGABBY APRIL : Hadii aad ku hadasho Soomaali, waaxda luqadaha, qaybta kaalmada adeegyada, waxay idiin hayaan adetanika bangura laVandanajulio cesarjoy howard. So [facility] [phone].    ATENCIÓN: Si habla español, tiene a pruitt disposición servicios gratuitos de asistencia lingüística. Llame al [phone].    We comply with applicable federal civil rights laws and [place] laws. We do not discriminate on the basis of race, color, national origin, age, disability sex, sexual orientation or gender identity.            Thank you!     Thank you for choosing [facility]  for your care. Our goal is always to provide you with excellent care. Hearing back from our patients is one way we can continue to improve our services. Please take a few minutes to complete the written survey that you may receive in the mail after your visit with us. Thank you!             Your Updated Medication List - Protect others around you: Learn how to safely use, store and throw away your medicines at www.disposemymeds.org.          This list is accurate as of: 6/28/17  4:36 PM.  Always use your most recent med list.                   Brand Name Dispense Instructions for use Diagnosis    * albuterol (2.5 MG/3ML) 0.083% neb solution     1 Box    Take 1 vial (2.5 mg) by nebulization every 4 hours as needed    Moderate persistent asthma, uncomplicated       * albuterol 108 (90 BASE) MCG/ACT Inhaler    PROAIR HFA/PROVENTIL HFA/VENTOLIN HFA    1 Inhaler    Inhale 2 puffs into the lungs every 4 hours as needed    Moderate persistent asthma, uncomplicated       azelastine 0.05 % Soln ophthalmic solution    OPTIVAR    1 Bottle    Apply 1 drop to eye 2 times daily    Conjunctivitis, allergic,  unspecified laterality       CERAVE Crea     2 Bottle    Externally apply 1 dose * topically 2 times daily    Eczema, unspecified type       EPINEPHrine 0.3 MG/0.3ML injection     0.6 mL    Inject 0.3 mLs (0.3 mg) into the muscle once as needed for anaphylaxis    Food allergy, Need for desensitization to allergens       fluticasone-salmeterol 500-50 MCG/DOSE diskus inhaler    ADVAIR    3 Inhaler    Inhale 1 puff into the lungs 2 times daily    Moderate persistent asthma, uncomplicated       loratadine 10 MG tablet    CLARITIN    30 tablet    Take 1 tablet (10 mg) by mouth daily    Seasonal allergic rhinitis, unspecified allergic rhinitis trigger, Chronic allergic rhinitis, Conjunctivitis, allergic, unspecified laterality       montelukast 10 MG tablet    SINGULAIR    30 tablet    Take 1 tablet (10 mg) by mouth At Bedtime    Moderate persistent asthma, uncomplicated, Seasonal allergic rhinitis, unspecified allergic rhinitis trigger, Chronic allergic rhinitis       MULTIVITAMIN PO      1 tab daily        triamcinolone 0.1 % cream    KENALOG    80 g    Apply  topically 2 times daily as needed.    Eczema, unspecified type       * Notice:  This list has 2 medication(s) that are the same as other medications prescribed for you. Read the directions carefully, and ask your doctor or other care provider to review them with you.

## 2017-06-28 NOTE — PATIENT INSTRUCTIONS
If you have any questions regarding your allergies, asthma, or what we discussed during your visit today please call the allergy clinic or contact us via [employer].    [facility] ([place], [place]): [phone]      Continue to take your advair and singulair (montelukast) as prescribed    Keep taking the claritin (loratadine) once to twice daily    If your blood tests are negative for allergies then we will not be able to start you back on shots. You may be having symptoms but we will need to treat your symptoms with medication    If you want to do repeat skin tests you will need to stop the claritin for at least 7 days before coming back to the clinic for testing

## 2017-06-29 ASSESSMENT — ASTHMA QUESTIONNAIRES: ACT_TOTALSCORE: 20

## 2017-07-02 LAB
CALIF WALNUT IGE QN: 1.14
DEPRECATED MISC ALLERGEN IGE RAST QL: NORMAL
WHITE MULBERRY IGE QN: 0.42

## 2017-07-06 ENCOUNTER — TELEPHONE (OUTPATIENT)
Dept: ALLERGY | Facility: CLINIC | Age: 52
End: 2017-07-06

## 2017-07-06 LAB
A ALTERNATA IGE QN: 0.39 KU(A)/L
A FUMIGATUS IGE QN: 1.12 KU(A)/L
C HERBARUM IGE QN: 0.49 KU(A)/L
CAT DANDER IGG QN: 0.42 KU(A)/L
CEDAR IGE QN: 0.14 KU(A)/L
COCKSFOOT IGE QN: 1.09 KU(A)/L
COMMON RAGWEED IGE QN: 0.4 KU(A)/L
COTTONWOOD IGE QN: 2.13 KU(A)/L
D FARINAE IGE QN: 1.63 KU(A)/L
D PTERONYSS IGE QN: 0.63 KU(A)/L
DOG DANDER+EPITH IGE QN: 0.34 KU(A)/L
E PURPURASCENS IGE QN: 1.41 KU(A)/L
EAST WHITE PINE IGE QN: 0.2 KU(A)/L
ENGL PLANTAIN IGE QN: 0.56 KU(A)/L
FIREBUSH IGE QN: 0.46 KU(A)/L
GIANT RAGWEED IGE QN: 1.24 KU(A)/L
GOOSEFOOT IGE QN: 0.93 KU(A)/L
JOHNSON GRASS IGE QN: 0.49 KU(A)/L
MAPLE IGE QN: 0.72 KU(A)/L
MUGWORT IGE QN: 1.58 KU(A)/L
NETTLE IGE QN: 3.11 KU(A)/L
P NOTATUM IGE QN: 5.69 KU(A)/L
RED MULBERRY IGE QN: 0.46 KU(A)/L
SALTWORT IGE QN: 0.74 KU(A)/L
SHEEP SORREL IGE QN: 0.79 KU(A)/L
SILVER BIRCH IGE QN: 0.45 KU(A)/L
TIMOTHY IGE QN: 1.29 KU(A)/L
WHITE ASH IGE QN: 1.19 KU(A)/L
WHITE ELM IGE QN: 0.72 KU(A)/L
WHITE OAK IGE QN: 0.5 KU(A)/L
WORMWOOD IGE QN: 1.06 KU(A)/L

## 2017-07-06 NOTE — TELEPHONE ENCOUNTER
Please call patient to discuss lab results. Her tests were positive to cat, dog, dust mite, and pollen. If she is interested in resuming immunotherapy treatment we can re-start with a new prescription. She will require at least 3 but possibly 4 injections based on her current lab results. A new consent form will need to be signed before new serum orders can be placed.

## 2017-07-10 NOTE — TELEPHONE ENCOUNTER
Yes, molds will be included in her allergy shots. She will not need additional skin testing. Once consent form has been signed orders can be placed for immunotherapy. Please advise patient that she will need a total of 4 injections in order to include all of the necessary seasonal/environmental allergens into her immunotherapy treatment.

## 2017-07-10 NOTE — TELEPHONE ENCOUNTER
Spoke with patient and informed her that molds will be included in her shots and she will get 4 injections then.  Patient states understanding.  Also aware no additional testing is needed.  Patient will come to her appointment on Wed to review the packet and sign consent form.  Dee Martínez RN

## 2017-07-10 NOTE — TELEPHONE ENCOUNTER
Patient called back and spoke with her regarding Dr. Horowitz's message.  Patient states understanding and specifically asks about mold allergy.  She did show some positive results, would this be included in her shots?  Patient also thinks she should have skin testing done for the same allergies.  I told patient that lab and skin testing for environmental allergies usually go hand in hand and didn't think this would be necessary.  Made an appointment for nurse visit for this Wed at 4.  Will need to review allergy packet and have patient sign consent form.    Patient states she plans to stop her antihistamines on Wednesday in case Dr. Horowitz does want to do skin testing the following week.  Please advise if skin testing is necessary and if molds would be included in her allergy shots?  Dee Martínez RN

## 2017-07-12 ENCOUNTER — ALLIED HEALTH/NURSE VISIT (OUTPATIENT)
Dept: ALLERGY | Facility: CLINIC | Age: 52
End: 2017-07-12
Payer: COMMERCIAL

## 2017-07-12 DIAGNOSIS — Z71.9 COUNSELED BY NURSE: Primary | ICD-10-CM

## 2017-07-12 PROCEDURE — 99207 ZZC NO CHARGE NURSE ONLY: CPT | Performed by: ALLERGY & IMMUNOLOGY

## 2017-07-12 NOTE — NURSING NOTE
Reviewed immunotherapy packet with patient. Patient states understanding. Has no further questions. Patient signed the consent form for immunotherapy and was told that the Allergy Lab would contact patient once the serums arrive. Signed consent sent to jenn.   Kaylee GRANT RN  Specialty Flex

## 2017-07-12 NOTE — MR AVS SNAPSHOT
After Visit Summary   7/12/2017    Dilia Solomon    MRN: [id number]           Patient Information     Date Of Birth          1965        Visit Information        Provider Department      7/12/2017 4:00 PM Butch Horowitz MD [facility]        Today's Diagnoses     Counseled by nurse    -  1       Follow-ups after your visit        Who to contact     If you have questions or need follow up information about today's clinic visit or your schedule please contact [facility] directly at [phone].  Normal or non-critical lab and imaging results will be communicated to you by MyChart, letter or phone within 4 business days after the clinic has received the results. If you do not hear from us within 7 days, please contact the clinic through MyChart or phone. If you have a critical or abnormal lab result, we will notify you by phone as soon as possible.  Submit refill requests through [employer] or call your pharmacy and they will forward the refill request to us. Please allow 3 business days for your refill to be completed.          Additional Information About Your Visit        Care EveryWhere ID     This is your Care EveryWhere ID. This could be used by other organizations to access your [place] medical records  [id number]        Your Vitals Were     Last Period                   07/18/2015 (Approximate)            Blood Pressure from Last 3 Encounters:   06/28/17 129/72   05/16/17 120/70   03/03/17 108/62    Weight from Last 3 Encounters:   06/28/17 210 lb 12.2 oz (95.6 kg)   05/16/17 208 lb (94.3 kg)   03/03/17 202 lb 12.8 oz (92 kg)              Today, you had the following     No orders found for display       Primary Care Provider Office Phone # Fax #    Cookie Conklin [phone]       [facility] [address] [place] [address]        Equal Access to Services     KODY CHEN : sruthi Abdi qaybta  hieu zacarias rudyjoy kanwal shen ah. [facility] [phone].    ATENCIÓN: Si pamela santo, tiene a pruitt disposición servicios gratuitos de asistencia lingüística. Lucas al [phone].    We comply with applicable federal civil rights laws and [place] laws. We do not discriminate on the basis of race, color, national origin, age, disability sex, sexual orientation or gender identity.            Thank you!     Thank you for choosing [facility]  for your care. Our goal is always to provide you with excellent care. Hearing back from our patients is one way we can continue to improve our services. Please take a few minutes to complete the written survey that you may receive in the mail after your visit with us. Thank you!             Your Updated Medication List - Protect others around you: Learn how to safely use, store and throw away your medicines at www.disposemymeds.org.          This list is accurate as of: 7/12/17  5:31 PM.  Always use your most recent med list.                   Brand Name Dispense Instructions for use Diagnosis    * albuterol (2.5 MG/3ML) 0.083% neb solution     1 Box    Take 1 vial (2.5 mg) by nebulization every 4 hours as needed    Moderate persistent asthma, uncomplicated       * albuterol 108 (90 BASE) MCG/ACT Inhaler    PROAIR HFA/PROVENTIL HFA/VENTOLIN HFA    1 Inhaler    Inhale 2 puffs into the lungs every 4 hours as needed    Moderate persistent asthma, uncomplicated       azelastine 0.05 % Soln ophthalmic solution    OPTIVAR    1 Bottle    Apply 1 drop to eye 2 times daily    Conjunctivitis, allergic, unspecified laterality       CERAVE Crea     2 Bottle    Externally apply 1 dose * topically 2 times daily    Eczema, unspecified type       EPINEPHrine 0.3 MG/0.3ML injection     0.6 mL    Inject 0.3 mLs (0.3 mg) into the muscle once as needed for anaphylaxis    Food allergy, Need for desensitization to allergens       fluticasone-salmeterol 500-50  MCG/DOSE diskus inhaler    ADVAIR    3 Inhaler    Inhale 1 puff into the lungs 2 times daily    Moderate persistent asthma, uncomplicated       loratadine 10 MG tablet    CLARITIN    30 tablet    Take 1 tablet (10 mg) by mouth daily    Seasonal allergic rhinitis, unspecified allergic rhinitis trigger, Chronic allergic rhinitis, Conjunctivitis, allergic, unspecified laterality       montelukast 10 MG tablet    SINGULAIR    30 tablet    Take 1 tablet (10 mg) by mouth At Bedtime    Moderate persistent asthma, uncomplicated, Seasonal allergic rhinitis, unspecified allergic rhinitis trigger, Chronic allergic rhinitis       MULTIVITAMIN PO      1 tab daily        triamcinolone 0.1 % cream    KENALOG    80 g    Apply  topically 2 times daily as needed.    Eczema, unspecified type       * Notice:  This list has 2 medication(s) that are the same as other medications prescribed for you. Read the directions carefully, and ask your doctor or other care provider to review them with you.

## 2017-07-13 DIAGNOSIS — J30.1 CHRONIC SEASONAL ALLERGIC RHINITIS DUE TO POLLEN: ICD-10-CM

## 2017-07-13 DIAGNOSIS — J30.89 ALLERGIC RHINITIS DUE TO MOLD: ICD-10-CM

## 2017-07-13 DIAGNOSIS — J30.89 ALLERGIC RHINITIS DUE TO DUST MITE: ICD-10-CM

## 2017-07-13 DIAGNOSIS — J30.81 CHRONIC ALLERGIC RHINITIS DUE TO ANIMAL HAIR AND DANDER: Primary | ICD-10-CM

## 2017-07-13 NOTE — PROGRESS NOTES
ALLERGY SOLUTION NEW REQUEST    Dilia Solomon 1965 MRN: [id number]    DATE NEEDED:  1-2 weeks  Vial Color   Content   Top Dose         Vial Size  Green 1:1,000, Blue 1:100, Yellow 1:10 and Red 1:1  Molds    Red 1:1 0.5   5mL  Green 1:1,000, Blue 1:100, Yellow 1:10 and Red 1:1  Cat, Dog, Dust Mite  Red 1:1 0.5   5mL  Green 1:1,000, Blue 1:100, Yellow 1:10 and Red 1:1  Grass, Trees   Red 1:1 0.5   5mL  Green 1:1,000, Blue 1:100, Yellow 1:10 and Red 1:1  Weeds    Red 1:1 0.5   5mL        Shot Clinic Location:  [place]  Ship to Location: [place]  Special Instructions:  Please call patient once serum is ready so she may schedule appointments      Updated Prescription Needed: No      Requester Signature  Butch Horowitz MD

## 2017-07-18 ENCOUNTER — TELEPHONE (OUTPATIENT)
Dept: ALLERGY | Facility: CLINIC | Age: 52
End: 2017-07-18

## 2017-07-18 DIAGNOSIS — J30.2 SEASONAL ALLERGIC RHINITIS: Primary | ICD-10-CM

## 2017-07-18 PROCEDURE — 95165 ANTIGEN THERAPY SERVICES: CPT | Performed by: ALLERGY & IMMUNOLOGY

## 2017-07-18 NOTE — PROGRESS NOTES
Allergy serums billed at [place].     Vials received below:    Vial Color Content                      Vial Size Expiration Date  Green 1:1,000 Cat, Dog, Dust Mite 5mL  1/17/18  Blue 1:100 Cat, Dog, Dust Mite 5mL  7/17/18  Yellow 1:10 Cat, Dog, Dust Mite 5mL  7/17/18  Red 1:1 Cat, Dog, Dust Mite 5mL  7/17/18    Green 1:1,000 Molds 5mL  1/17/18  Blue 1:100 Molds 5mL  7/17/18  Yellow 1:10 Molds 5mL  7/17/18  Red 1:1 Molds 5mL  7/17/18      Green 1:1,000 Weeds 5mL  1/17/18  Blue 1:100 Weeds 5mL  7/17/18  Yellow 1:10 Weeds 5mL  7/17/18  Red 1:1 Weeds 5mL  7/17/18      Green 1:1,000 Grass, Trees 5mL  1/17/18  Blue 1:100 Grass, Trees 5mL  7/17/18  Yellow 1:10 Grass, Trees 5mL  7/17/18  Red 1:1 Grass, Trees 5mL  7/17/18        Signature  Tami Solis  Original Refill encounter date: 7/13/17      Signature  Tami Solis

## 2017-07-18 NOTE — PROGRESS NOTES
Allergy serums received at [place].     Vials received below:    Vial Color Content                      Vial Size Expiration Date  Green 1:1,000 Cat, Dog, Dust Mite 5mL  1/17/18  Blue 1:100 Cat, Dog, Dust Mite 5mL  7/17/18  Yellow 1:10 Cat, Dog, Dust Mite 5mL  7/17/18  Red 1:1 Cat, Dog, Dust Mite 5mL  7/17/18    Green 1:1,000 Molds 5mL  1/17/18  Blue 1:100 Molds 5mL  7/17/18  Yellow 1:10 Molds 5mL  7/17/18  Red 1:1 Molds 5mL  7/17/18      Green 1:1,000 Weeds 5mL  1/17/18  Blue 1:100 Weeds 5mL  7/17/18  Yellow 1:10 Weeds 5mL  7/17/18  Red 1:1 Weeds 5mL  7/17/18      Green 1:1,000 Grass, Trees 5mL  1/17/18  Blue 1:100 Grass, Trees 5mL  7/17/18  Yellow 1:10 Grass, Trees 5mL  7/17/18  Red 1:1 Grass, Trees 5mL  7/17/18        Jason Solis

## 2017-07-18 NOTE — TELEPHONE ENCOUNTER
Left message to notify patient that allergy serums have been received in [place] and that she may call to schedule injections at her convenience.     Tami Solis MA

## 2017-07-19 ENCOUNTER — ALLIED HEALTH/NURSE VISIT (OUTPATIENT)
Dept: ALLERGY | Facility: CLINIC | Age: 52
End: 2017-07-19
Payer: COMMERCIAL

## 2017-07-19 DIAGNOSIS — J30.9 ALLERGIC RHINITIS, UNSPECIFIED: Primary | ICD-10-CM

## 2017-07-19 PROCEDURE — 99207 ZZC NO CHARGE LOS: CPT

## 2017-07-19 PROCEDURE — 95117 IMMUNOTHERAPY INJECTIONS: CPT

## 2017-07-19 NOTE — MR AVS SNAPSHOT
After Visit Summary   7/19/2017    Dilia Solomon    MRN: [id number]           Patient Information     Date Of Birth          1965        Visit Information        Provider Department      7/19/2017 3:15 PM ALLERGY [facility]        Today's Diagnoses     Allergic rhinitis, unspecified    -  1       Follow-ups after your visit        Who to contact     If you have questions or need follow up information about today's clinic visit or your schedule please contact [facility] directly at [phone].  Normal or non-critical lab and imaging results will be communicated to you by MyChart, letter or phone within 4 business days after the clinic has received the results. If you do not hear from us within 7 days, please contact the clinic through MyChart or phone. If you have a critical or abnormal lab result, we will notify you by phone as soon as possible.  Submit refill requests through [email or web address] or call your pharmacy and they will forward the refill request to us. Please allow 3 business days for your refill to be completed.          Additional Information About Your Visit        Care EveryWhere ID     This is your Care EveryWhere ID. This could be used by other organizations to access your [place] medical records  [id number]        Your Vitals Were     Last Period                   07/18/2015 (Approximate)            Blood Pressure from Last 3 Encounters:   06/28/17 129/72   05/16/17 120/70   03/03/17 108/62    Weight from Last 3 Encounters:   06/28/17 210 lb 12.2 oz (95.6 kg)   05/16/17 208 lb (94.3 kg)   03/03/17 202 lb 12.8 oz (92 kg)              We Performed the Following     Allergy Shot: Two or more injections        Primary Care Provider Office Phone # Fax #    Cookie Conklin [phone]       [facility] [address] [place] [address]        Equal Access to Services     KODY CHEN : Marcel Anguiano,  wajavida luqadaha, qaybta kaalmada anna, hieu christiansonaan ah. So [facility] [phone].    ATENCIÓN: Si pamela santo, tiene a pruitt disposición servicios gratuitos de asistencia lingüística. Lucas al [phone].    We comply with applicable federal civil rights laws and [place] laws. We do not discriminate on the basis of race, color, national origin, age, disability sex, sexual orientation or gender identity.            Thank you!     Thank you for choosing [facility]  for your care. Our goal is always to provide you with excellent care. Hearing back from our patients is one way we can continue to improve our services. Please take a few minutes to complete the written survey that you may receive in the mail after your visit with us. Thank you!             Your Updated Medication List - Protect others around you: Learn how to safely use, store and throw away your medicines at www.disposemymeds.org.          This list is accurate as of: 7/19/17  3:52 PM.  Always use your most recent med list.                   Brand Name Dispense Instructions for use Diagnosis    * albuterol (2.5 MG/3ML) 0.083% neb solution     1 Box    Take 1 vial (2.5 mg) by nebulization every 4 hours as needed    Moderate persistent asthma, uncomplicated       * albuterol 108 (90 BASE) MCG/ACT Inhaler    PROAIR HFA/PROVENTIL HFA/VENTOLIN HFA    1 Inhaler    Inhale 2 puffs into the lungs every 4 hours as needed    Moderate persistent asthma, uncomplicated       * ALLERGEN IMMUNOTHERAPY PRESCRIPTION     5 mL    Name of Mix: Mix #1  Mold Alternaria Tenuis GLY 1:10 w/v, HS  0.5 ml Aspergillus Fumigatus GLY 1:10 w/v, HS  0.5 ml Epicoccum Nigrum 1:10 w/v, HS 0.5 ml Hormodendrum Cladosporioides 1:10 w/v, HS 0.5 ml Penicillium Mix GLY 1:10 w/v, HS  0.5 ml Diluent: HSA qs to 5ml    Chronic allergic rhinitis due to animal hair and dander, Allergic rhinitis due to dust mite, Allergic rhinitis due to mold, Chronic seasonal  allergic rhinitis due to pollen       * ALLERGEN IMMUNOTHERAPY PRESCRIPTION     5 mL    Name of Mix: Mix #2  Dust Mite, Cat, Dog Cat Hair, Standardized 10,000 BAU/mL, ALK  2.0 ml Dog Hair Dander, A. P.  1:100 w/v, HS  1.0 ml Dust Mites F 30,000AU/mL, HS  0.3 ml Dust Mites P. 30,000 AU/mL, HS  0.3 ml  Diluent: HSA qs to 5ml    Chronic allergic rhinitis due to animal hair and dander, Allergic rhinitis due to dust mite, Allergic rhinitis due to mold, Chronic seasonal allergic rhinitis due to pollen       * ALLERGEN IMMUNOTHERAPY PRESCRIPTION     5 mL    Name of Mix: Mix #3 Grass,Tree  Dmitry,White GLY 1:20w/v, HS 0.5ml Birch Mix GLY 1:20w/v, HS 0.5ml Boxelder-Maple Mix BHR (Boxelder Hard Red) 1:20w/v, HS 0.5ml [place],Common GLY 1:20w/v, HS 0.5ml Elm,American GLY 1:20w/v, HS 0.5ml [place] Mix GLY 1:20w/v, HS 0.5ml Oak Mix RVW GLY 1:20w/v, HS 0.5ml [place] Tree,Black GLY 1:20w/v, HS 0.5ml Grass Mix #7 100,000 BAU/mL, HS 0.4ml Jonathan Grass 1:20w/v, HS 0.5ml Diluent: HSA qs to 5ml    Chronic allergic rhinitis due to animal hair and dander, Allergic rhinitis due to dust mite, Allergic rhinitis due to mold, Chronic seasonal allergic rhinitis due to pollen       * ALLERGEN IMMUNOTHERAPY PRESCRIPTION     5 mL    Name of Mix: Mix #4  Weeds Kochia GLY 1:20 w/v, HS 0.5 ml Lamb's Quarters GLY 1:20 w/v, HS 0.5 ml Nettle GLY 1:20 w/v, HS 0.5 ml Plantain, English GLY 1:20 w/v, HS 0.5 ml Ragweed Mixed 1:20 w/v ALK  0.5 ml Russian Thistle GLY 1:20 w/v, HS 0.5 ml Sagebrush, Mugwort GLY 1:20 w/v, HS 0.5 ml Sorrel, Sheep GLY 1:20 w/v, HS 0.5 ml Diluent: HSA qs to 5ml    Chronic allergic rhinitis due to animal hair and dander, Allergic rhinitis due to dust mite, Allergic rhinitis due to mold, Chronic seasonal allergic rhinitis due to pollen       azelastine 0.05 % Soln ophthalmic solution    OPTIVAR    1 Bottle    Apply 1 drop to eye 2 times daily    Conjunctivitis, allergic, unspecified laterality       CERAVE Crea     2 Bottle     Externally apply 1 dose * topically 2 times daily    Eczema, unspecified type       EPINEPHrine 0.3 MG/0.3ML injection 2-pack    EPIPEN/ADRENACLICK/or ANY BX GENERIC EQUIV    0.6 mL    Inject 0.3 mLs (0.3 mg) into the muscle once as needed for anaphylaxis    Food allergy, Need for desensitization to allergens       fluticasone-salmeterol 500-50 MCG/DOSE diskus inhaler    ADVAIR    3 Inhaler    Inhale 1 puff into the lungs 2 times daily    Moderate persistent asthma, uncomplicated       loratadine 10 MG tablet    CLARITIN    30 tablet    Take 1 tablet (10 mg) by mouth daily    Seasonal allergic rhinitis, unspecified allergic rhinitis trigger, Chronic allergic rhinitis, Conjunctivitis, allergic, unspecified laterality       montelukast 10 MG tablet    SINGULAIR    30 tablet    Take 1 tablet (10 mg) by mouth At Bedtime    Moderate persistent asthma, uncomplicated, Seasonal allergic rhinitis, unspecified allergic rhinitis trigger, Chronic allergic rhinitis       MULTIVITAMIN PO      1 tab daily        triamcinolone 0.1 % cream    KENALOG    80 g    Apply  topically 2 times daily as needed.    Eczema, unspecified type       * Notice:  This list has 6 medication(s) that are the same as other medications prescribed for you. Read the directions carefully, and ask your doctor or other care provider to review them with you.

## 2017-07-25 ENCOUNTER — ALLIED HEALTH/NURSE VISIT (OUTPATIENT)
Dept: ALLERGY | Facility: CLINIC | Age: 52
End: 2017-07-25
Payer: COMMERCIAL

## 2017-07-25 DIAGNOSIS — J30.9 ALLERGIC RHINITIS, UNSPECIFIED: Primary | ICD-10-CM

## 2017-07-25 PROCEDURE — 95117 IMMUNOTHERAPY INJECTIONS: CPT

## 2017-07-25 PROCEDURE — 99207 ZZC NO CHARGE LOS: CPT

## 2017-07-25 NOTE — PROGRESS NOTES
Patient presented after waiting 30 minutes with no reaction to  injections. Discharged from clinic.      Gia Bae RN

## 2017-07-25 NOTE — MR AVS SNAPSHOT
After Visit Summary   7/25/2017    Dilia Solomon    MRN: [id number]           Patient Information     Date Of Birth          1965        Visit Information        Provider Department      7/25/2017 7:00 AM ALLERGY [facility]        Today's Diagnoses     Allergic rhinitis, unspecified    -  1       Follow-ups after your visit        Your next 10 appointments already scheduled     Jul 28, 2017  7:15 AM CDT   Nurse Only with ALLERGY [facility] ([facility])    [address] [address]   [phone]           Every allergy patient MUST wait 30 minutes after their allergy shot. No exceptions.  Xolair shots #1-3 should plan to wait 2 hours in clinic Xolair shots after #4 should plan 30 minute wait in clinic            Aug 01, 2017  7:00 AM CDT   Nurse Only with ALLERGY [facility] ([facility])    [address] [address]   [phone]           Every allergy patient MUST wait 30 minutes after their allergy shot. No exceptions.  Xolair shots #1-3 should plan to wait 2 hours in clinic Xolair shots after #4 should plan 30 minute wait in clinic            Aug 04, 2017  7:00 AM CDT   Nurse Only with ALLERGY [facility] ([facility])    [address] [address]   [phone]           Every allergy patient MUST wait 30 minutes after their allergy shot. No exceptions.  Xolair shots #1-3 should plan to wait 2 hours in clinic Xolair shots after #4 should plan 30 minute wait in clinic            Aug 08, 2017  7:15 AM CDT   Nurse Only with ALLERGY [facility] ([facility])    [address] [place] [address]   [phone]           Every allergy patient MUST wait 30 minutes after their allergy shot. No exceptions.  Xolair shots #1-3 should  plan to wait 2 hours in clinic Xolair shots after #4 should plan 30 minute wait in clinic            Aug 11, 2017  7:00 AM CDT   Nurse Only with ALLERGY [facility] ([facility])    [address] [place] [address]   [phone]           Every allergy patient MUST wait 30 minutes after their allergy shot. No exceptions.  Xolair shots #1-3 should plan to wait 2 hours in clinic Xolair shots after #4 should plan 30 minute wait in clinic            Aug 15, 2017  7:00 AM CDT   Nurse Only with ALLERGY [facility] ([facility])    [address] [place] [address]   [phone]           Every allergy patient MUST wait 30 minutes after their allergy shot. No exceptions.  Xolair shots #1-3 should plan to wait 2 hours in clinic Xolair shots after #4 should plan 30 minute wait in clinic            Aug 18, 2017  7:00 AM CDT   Nurse Only with ALLERGY [facility] ([facility])    [address] [place] [address]   [phone]           Every allergy patient MUST wait 30 minutes after their allergy shot. No exceptions.  Xolair shots #1-3 should plan to wait 2 hours in clinic Xolair shots after #4 should plan 30 minute wait in clinic            Aug 22, 2017  7:00 AM CDT   Nurse Only with ALLERGY [facility] ([facility])    [address] [place] [address]   [phone]           Every allergy patient MUST wait 30 minutes after their allergy shot. No exceptions.  Xolair shots #1-3 should plan to wait 2 hours in clinic Xolair shots after #4 should plan 30 minute wait in clinic            Aug 25, 2017  7:00 AM CDT   Nurse Only with ALLERGY [facility] ([facility])    [address] [place] [address]   [phone]           Every allergy patient MUST wait 30  minutes after their allergy shot. No exceptions.  Xolair shots #1-3 should plan to wait 2 hours in clinic Xolair shots after #4 should plan 30 minute wait in clinic            Aug 29, 2017  7:00 AM CDT   Nurse Only with ALLERGY [facility] ([facility])    [address] [address]   [phone]           Every allergy patient MUST wait 30 minutes after their allergy shot. No exceptions.  Xolair shots #1-3 should plan to wait 2 hours in clinic Xolair shots after #4 should plan 30 minute wait in clinic              Who to contact     If you have questions or need follow up information about today's clinic visit or your schedule please contact [facility] directly at [phone].  Normal or non-critical lab and imaging results will be communicated to you by MyChart, letter or phone within 4 business days after the clinic has received the results. If you do not hear from us within 7 days, please contact the clinic through MyChart or phone. If you have a critical or abnormal lab result, we will notify you by phone as soon as possible.  Submit refill requests through [employer] or call your pharmacy and they will forward the refill request to us. Please allow 3 business days for your refill to be completed.          Additional Information About Your Visit        Care EveryWhere ID     This is your Care EveryWhere ID. This could be used by other organizations to access your [place] medical records  [id number]        Your Vitals Were     Last Period                   07/18/2015 (Approximate)            Blood Pressure from Last 3 Encounters:   06/28/17 129/72   05/16/17 120/70   03/03/17 108/62    Weight from Last 3 Encounters:   06/28/17 210 lb 12.2 oz (95.6 kg)   05/16/17 208 lb (94.3 kg)   03/03/17 202 lb 12.8 oz (92 kg)              We Performed the Following     Allergy Shot: Two or more injections        Primary Care Provider Office Phone #  Fax #    Cookie Conklin [phone]       [facility] [address] [facility] [address]        Equal Access to Services     ARAVINDGABBY APRIL : Hadii ulices amaral tonio Sokashmirali, waaxda luqadaha, qaybta kaalmada ademelida, hieu bangura larhinajoy howard. So [facility] [phone].    ATENCIÓN: Si habla español, tiene a pruitt disposición servicios gratuitos de asistencia lingüística. Llame al [phone].    We comply with applicable federal civil rights laws and [place] laws. We do not discriminate on the basis of race, color, national origin, age, disability sex, sexual orientation or gender identity.            Thank you!     Thank you for choosing [facility]  for your care. Our goal is always to provide you with excellent care. Hearing back from our patients is one way we can continue to improve our services. Please take a few minutes to complete the written survey that you may receive in the mail after your visit with us. Thank you!             Your Updated Medication List - Protect others around you: Learn how to safely use, store and throw away your medicines at www.disposemymeds.org.          This list is accurate as of: 7/25/17  7:36 AM.  Always use your most recent med list.                   Brand Name Dispense Instructions for use Diagnosis    * albuterol (2.5 MG/3ML) 0.083% neb solution     1 Box    Take 1 vial (2.5 mg) by nebulization every 4 hours as needed    Moderate persistent asthma, uncomplicated       * albuterol 108 (90 BASE) MCG/ACT Inhaler    PROAIR HFA/PROVENTIL HFA/VENTOLIN HFA    1 Inhaler    Inhale 2 puffs into the lungs every 4 hours as needed    Moderate persistent asthma, uncomplicated       * ALLERGEN IMMUNOTHERAPY PRESCRIPTION     5 mL    Name of Mix: Mix #1  Mold Alternaria Tenuis GLY 1:10 w/v, HS  0.5 ml Aspergillus Fumigatus GLY 1:10 w/v, HS  0.5 ml Epicoccum Nigrum 1:10 w/v, HS 0.5 ml Hormodendrum Cladosporioides 1:10 w/v, HS 0.5 ml  Penicillium Mix GLY 1:10 w/v, HS  0.5 ml Diluent: HSA qs to 5ml    Chronic allergic rhinitis due to animal hair and dander, Allergic rhinitis due to dust mite, Allergic rhinitis due to mold, Chronic seasonal allergic rhinitis due to pollen       * ALLERGEN IMMUNOTHERAPY PRESCRIPTION     5 mL    Name of Mix: Mix #2  Dust Mite, Cat, Dog Cat Hair, Standardized 10,000 BAU/mL, ALK  2.0 ml Dog Hair Dander, A. P.  1:100 w/v, HS  1.0 ml Dust Mites F 30,000AU/mL, HS  0.3 ml Dust Mites P. 30,000 AU/mL, HS  0.3 ml  Diluent: HSA qs to 5ml    Chronic allergic rhinitis due to animal hair and dander, Allergic rhinitis due to dust mite, Allergic rhinitis due to mold, Chronic seasonal allergic rhinitis due to pollen       * ALLERGEN IMMUNOTHERAPY PRESCRIPTION     5 mL    Name of Mix: Mix #3 Grass,Tree  Dmitry,White GLY 1:20w/v, HS 0.5ml Birch Mix GLY 1:20w/v, HS 0.5ml Boxelder-Maple Mix BHR (Boxelder Hard Red) 1:20w/v, HS 0.5ml [place],Common GLY 1:20w/v, HS 0.5ml Elm,American GLY 1:20w/v, HS 0.5ml [place] Mix GLY 1:20w/v, HS 0.5ml Oak Mix RVW GLY 1:20w/v, HS 0.5ml [place] Tree,Black GLY 1:20w/v, HS 0.5ml Grass Mix #7 100,000 BAU/mL, HS 0.4ml Jonathan Grass 1:20w/v, HS 0.5ml Diluent: HSA qs to 5ml    Chronic allergic rhinitis due to animal hair and dander, Allergic rhinitis due to dust mite, Allergic rhinitis due to mold, Chronic seasonal allergic rhinitis due to pollen       * ALLERGEN IMMUNOTHERAPY PRESCRIPTION     5 mL    Name of Mix: Mix #4  Weeds Kochia GLY 1:20 w/v, HS 0.5 ml Lamb's Quarters GLY 1:20 w/v, HS 0.5 ml Nettle GLY 1:20 w/v, HS 0.5 ml Plantain, English GLY 1:20 w/v, HS 0.5 ml Ragweed Mixed 1:20 w/v ALK  0.5 ml Russian Thistle GLY 1:20 w/v, HS 0.5 ml Sagebrush, Mugwort GLY 1:20 w/v, HS 0.5 ml Sorrel, Sheep GLY 1:20 w/v, HS 0.5 ml Diluent: HSA qs to 5ml    Chronic allergic rhinitis due to animal hair and dander, Allergic rhinitis due to dust mite, Allergic rhinitis due to mold, Chronic seasonal allergic rhinitis due to  pollen       azelastine 0.05 % Soln ophthalmic solution    OPTIVAR    1 Bottle    Apply 1 drop to eye 2 times daily    Conjunctivitis, allergic, unspecified laterality       CERAVE Crea     2 Bottle    Externally apply 1 dose * topically 2 times daily    Eczema, unspecified type       EPINEPHrine 0.3 MG/0.3ML injection 2-pack    EPIPEN/ADRENACLICK/or ANY BX GENERIC EQUIV    0.6 mL    Inject 0.3 mLs (0.3 mg) into the muscle once as needed for anaphylaxis    Food allergy, Need for desensitization to allergens       fluticasone-salmeterol 500-50 MCG/DOSE diskus inhaler    ADVAIR    3 Inhaler    Inhale 1 puff into the lungs 2 times daily    Moderate persistent asthma, uncomplicated       loratadine 10 MG tablet    CLARITIN    30 tablet    Take 1 tablet (10 mg) by mouth daily    Seasonal allergic rhinitis, unspecified allergic rhinitis trigger, Chronic allergic rhinitis, Conjunctivitis, allergic, unspecified laterality       montelukast 10 MG tablet    SINGULAIR    30 tablet    Take 1 tablet (10 mg) by mouth At Bedtime    Moderate persistent asthma, uncomplicated, Seasonal allergic rhinitis, unspecified allergic rhinitis trigger, Chronic allergic rhinitis       MULTIVITAMIN PO      1 tab daily        triamcinolone 0.1 % cream    KENALOG    80 g    Apply  topically 2 times daily as needed.    Eczema, unspecified type       * Notice:  This list has 6 medication(s) that are the same as other medications prescribed for you. Read the directions carefully, and ask your doctor or other care provider to review them with you.

## 2017-07-28 ENCOUNTER — ALLIED HEALTH/NURSE VISIT (OUTPATIENT)
Dept: ALLERGY | Facility: CLINIC | Age: 52
End: 2017-07-28
Payer: COMMERCIAL

## 2017-07-28 DIAGNOSIS — J30.9 ALLERGIC RHINITIS, UNSPECIFIED: Primary | ICD-10-CM

## 2017-07-28 PROCEDURE — 99207 ZZC NO CHARGE LOS: CPT

## 2017-07-28 PROCEDURE — 95117 IMMUNOTHERAPY INJECTIONS: CPT

## 2017-07-28 NOTE — MR AVS SNAPSHOT
After Visit Summary   7/28/2017    Dilia Solomon    MRN: [id number]           Patient Information     Date Of Birth          1965        Visit Information        Provider Department      7/28/2017 7:15 AM ALLERGY [facility]        Today's Diagnoses     Allergic rhinitis, unspecified    -  1       Follow-ups after your visit        Your next 10 appointments already scheduled     Aug 01, 2017  7:00 AM CDT   Nurse Only with ALLERGY [facility] ([facility])    [address] [address]   [phone]           Every allergy patient MUST wait 30 minutes after their allergy shot. No exceptions.  Xolair shots #1-3 should plan to wait 2 hours in clinic Xolair shots after #4 should plan 30 minute wait in clinic            Aug 04, 2017  7:00 AM CDT   Nurse Only with ALLERGY [facility] ([facility])    [address] [address]   [phone]           Every allergy patient MUST wait 30 minutes after their allergy shot. No exceptions.  Xolair shots #1-3 should plan to wait 2 hours in clinic Xolair shots after #4 should plan 30 minute wait in clinic            Aug 08, 2017  7:15 AM CDT   Nurse Only with ALLERGY [facility] ([facility])    [address] [address]   [phone]           Every allergy patient MUST wait 30 minutes after their allergy shot. No exceptions.  Xolair shots #1-3 should plan to wait 2 hours in clinic Xolair shots after #4 should plan 30 minute wait in clinic            Aug 11, 2017  7:00 AM CDT   Nurse Only with ALLERGY [facility] ([facility])    [address] [place] [address]   [phone]           Every allergy patient MUST wait 30 minutes after their allergy shot. No exceptions.  Xolair shots #1-3 should  plan to wait 2 hours in clinic Xolair shots after #4 should plan 30 minute wait in clinic            Aug 15, 2017  7:00 AM CDT   Nurse Only with ALLERGY [facility] ([facility])    [address] [place] [address]   [phone]           Every allergy patient MUST wait 30 minutes after their allergy shot. No exceptions.  Xolair shots #1-3 should plan to wait 2 hours in clinic Xolair shots after #4 should plan 30 minute wait in clinic            Aug 18, 2017  7:00 AM CDT   Nurse Only with ALLERGY [facility] ([facility])    [address] [place] [address]   [phone]           Every allergy patient MUST wait 30 minutes after their allergy shot. No exceptions.  Xolair shots #1-3 should plan to wait 2 hours in clinic Xolair shots after #4 should plan 30 minute wait in clinic            Aug 22, 2017  7:00 AM CDT   Nurse Only with ALLERGY [facility] ([facility])    [address] [place] [address]   [phone]           Every allergy patient MUST wait 30 minutes after their allergy shot. No exceptions.  Xolair shots #1-3 should plan to wait 2 hours in clinic Xolair shots after #4 should plan 30 minute wait in clinic            Aug 25, 2017  7:00 AM CDT   Nurse Only with ALLERGY [facility] ([facility])    [address] [place] [address]   [phone]           Every allergy patient MUST wait 30 minutes after their allergy shot. No exceptions.  Xolair shots #1-3 should plan to wait 2 hours in clinic Xolair shots after #4 should plan 30 minute wait in clinic            Aug 29, 2017  7:00 AM CDT   Nurse Only with ALLERGY [facility] ([facility])    [address] [place] [address]   [phone]           Every allergy patient MUST wait 30  minutes after their allergy shot. No exceptions.  Xolair shots #1-3 should plan to wait 2 hours in clinic Xolair shots after #4 should plan 30 minute wait in clinic              Who to contact     If you have questions or need follow up information about today's clinic visit or your schedule please contact [facility] directly at [phone].  Normal or non-critical lab and imaging results will be communicated to you by MyChart, letter or phone within 4 business days after the clinic has received the results. If you do not hear from us within 7 days, please contact the clinic through MyChart or phone. If you have a critical or abnormal lab result, we will notify you by phone as soon as possible.  Submit refill requests through [employer] or call your pharmacy and they will forward the refill request to us. Please allow 3 business days for your refill to be completed.          Additional Information About Your Visit        Care EveryWhere ID     This is your Care EveryWhere ID. This could be used by other organizations to access your [place] medical records  [id number]        Your Vitals Were     Last Period                   07/18/2015 (Approximate)            Blood Pressure from Last 3 Encounters:   06/28/17 129/72   05/16/17 120/70   03/03/17 108/62    Weight from Last 3 Encounters:   06/28/17 210 lb 12.2 oz (95.6 kg)   05/16/17 208 lb (94.3 kg)   03/03/17 202 lb 12.8 oz (92 kg)              We Performed the Following     Allergy Shot: Two or more injections        Primary Care Provider Office Phone # Fax #    Cookie Conklin [phone]       [facility] [address] [place] [address]        Equal Access to Services     GABBY CHEN : Hadii ulices Anguiano, waaxda luqadaha, qaybta kaalmahieu carson. So [facility] [phone].    ATENCIÓN: Si habla español, tiene a pruitt disposición servicios gratuitos de asistencia  lingüísticaJorge Zavala al [phone].    We comply with applicable federal civil rights laws and [place] laws. We do not discriminate on the basis of race, color, national origin, age, disability sex, sexual orientation or gender identity.            Thank you!     Thank you for choosing [facility]  for your care. Our goal is always to provide you with excellent care. Hearing back from our patients is one way we can continue to improve our services. Please take a few minutes to complete the written survey that you may receive in the mail after your visit with us. Thank you!             Your Updated Medication List - Protect others around you: Learn how to safely use, store and throw away your medicines at www.disposemymeds.org.          This list is accurate as of: 7/28/17  8:23 AM.  Always use your most recent med list.                   Brand Name Dispense Instructions for use Diagnosis    * albuterol (2.5 MG/3ML) 0.083% neb solution     1 Box    Take 1 vial (2.5 mg) by nebulization every 4 hours as needed    Moderate persistent asthma, uncomplicated       * albuterol 108 (90 BASE) MCG/ACT Inhaler    PROAIR HFA/PROVENTIL HFA/VENTOLIN HFA    1 Inhaler    Inhale 2 puffs into the lungs every 4 hours as needed    Moderate persistent asthma, uncomplicated       * ALLERGEN IMMUNOTHERAPY PRESCRIPTION     5 mL    Name of Mix: Mix #1  Mold Alternaria Tenuis GLY 1:10 w/v, HS  0.5 ml Aspergillus Fumigatus GLY 1:10 w/v, HS  0.5 ml Epicoccum Nigrum 1:10 w/v, HS 0.5 ml Hormodendrum Cladosporioides 1:10 w/v, HS 0.5 ml Penicillium Mix GLY 1:10 w/v, HS  0.5 ml Diluent: HSA qs to 5ml    Chronic allergic rhinitis due to animal hair and dander, Allergic rhinitis due to dust mite, Allergic rhinitis due to mold, Chronic seasonal allergic rhinitis due to pollen       * ALLERGEN IMMUNOTHERAPY PRESCRIPTION     5 mL    Name of Mix: Mix #2  Dust Mite, Cat, Dog Cat Hair, Standardized 10,000 BAU/mL, ALK  2.0 ml Dog Hair Dander,  A. P.  1:100 w/v, HS  1.0 ml Dust Mites F 30,000AU/mL, HS  0.3 ml Dust Mites P. 30,000 AU/mL, HS  0.3 ml  Diluent: HSA qs to 5ml    Chronic allergic rhinitis due to animal hair and dander, Allergic rhinitis due to dust mite, Allergic rhinitis due to mold, Chronic seasonal allergic rhinitis due to pollen       * ALLERGEN IMMUNOTHERAPY PRESCRIPTION     5 mL    Name of Mix: Mix #3 Grass,Tree  Dmitry,White GLY 1:20w/v, HS 0.5ml Birch Mix GLY 1:20w/v, HS 0.5ml Boxelder-Maple Mix BHR (Boxelder Hard Red) 1:20w/v, HS 0.5ml [place],Common GLY 1:20w/v, HS 0.5ml Elm,American GLY 1:20w/v, HS 0.5ml [place] Mix GLY 1:20w/v, HS 0.5ml Oak Mix RVW GLY 1:20w/v, HS 0.5ml [place] Tree,Black GLY 1:20w/v, HS 0.5ml Grass Mix #7 100,000 BAU/mL, HS 0.4ml Jonathan Grass 1:20w/v, HS 0.5ml Diluent: HSA qs to 5ml    Chronic allergic rhinitis due to animal hair and dander, Allergic rhinitis due to dust mite, Allergic rhinitis due to mold, Chronic seasonal allergic rhinitis due to pollen       * ALLERGEN IMMUNOTHERAPY PRESCRIPTION     5 mL    Name of Mix: Mix #4  Weeds Kochia GLY 1:20 w/v, HS 0.5 ml Lamb's Quarters GLY 1:20 w/v, HS 0.5 ml Nettle GLY 1:20 w/v, HS 0.5 ml Plantain, English GLY 1:20 w/v, HS 0.5 ml Ragweed Mixed 1:20 w/v ALK  0.5 ml Russian Thistle GLY 1:20 w/v, HS 0.5 ml Sagebrush, Mugwort GLY 1:20 w/v, HS 0.5 ml Sorrel, Sheep GLY 1:20 w/v, HS 0.5 ml Diluent: HSA qs to 5ml    Chronic allergic rhinitis due to animal hair and dander, Allergic rhinitis due to dust mite, Allergic rhinitis due to mold, Chronic seasonal allergic rhinitis due to pollen       azelastine 0.05 % Soln ophthalmic solution    OPTIVAR    1 Bottle    Apply 1 drop to eye 2 times daily    Conjunctivitis, allergic, unspecified laterality       CERAVE Crea     2 Bottle    Externally apply 1 dose * topically 2 times daily    Eczema, unspecified type       EPINEPHrine 0.3 MG/0.3ML injection 2-pack    EPIPEN/ADRENACLICK/or ANY BX GENERIC EQUIV    0.6 mL    Inject 0.3 mLs  (0.3 mg) into the muscle once as needed for anaphylaxis    Food allergy, Need for desensitization to allergens       fluticasone-salmeterol 500-50 MCG/DOSE diskus inhaler    ADVAIR    3 Inhaler    Inhale 1 puff into the lungs 2 times daily    Moderate persistent asthma, uncomplicated       loratadine 10 MG tablet    CLARITIN    30 tablet    Take 1 tablet (10 mg) by mouth daily    Seasonal allergic rhinitis, unspecified allergic rhinitis trigger, Chronic allergic rhinitis, Conjunctivitis, allergic, unspecified laterality       montelukast 10 MG tablet    SINGULAIR    30 tablet    Take 1 tablet (10 mg) by mouth At Bedtime    Moderate persistent asthma, uncomplicated, Seasonal allergic rhinitis, unspecified allergic rhinitis trigger, Chronic allergic rhinitis       MULTIVITAMIN PO      1 tab daily        triamcinolone 0.1 % cream    KENALOG    80 g    Apply  topically 2 times daily as needed.    Eczema, unspecified type       * Notice:  This list has 6 medication(s) that are the same as other medications prescribed for you. Read the directions carefully, and ask your doctor or other care provider to review them with you.

## 2017-08-01 ENCOUNTER — ALLIED HEALTH/NURSE VISIT (OUTPATIENT)
Dept: ALLERGY | Facility: CLINIC | Age: 52
End: 2017-08-01
Payer: COMMERCIAL

## 2017-08-01 DIAGNOSIS — J30.9 ALLERGIC RHINITIS, UNSPECIFIED: Primary | ICD-10-CM

## 2017-08-01 PROCEDURE — 99207 ZZC NO CHARGE LOS: CPT

## 2017-08-01 PROCEDURE — 95117 IMMUNOTHERAPY INJECTIONS: CPT

## 2017-08-01 NOTE — MR AVS SNAPSHOT
After Visit Summary   8/1/2017    Dilia Solomon    MRN: [id number]           Patient Information     Date Of Birth          1965        Visit Information        Provider Department      8/1/2017 7:00 AM ALLERGY [facility]        Today's Diagnoses     Allergic rhinitis, unspecified    -  1       Follow-ups after your visit        Your next 10 appointments already scheduled     Aug 04, 2017  7:00 AM CDT   Nurse Only with ALLERGY [facility] ([facility])    [address] [address]   [phone]           Every allergy patient MUST wait 30 minutes after their allergy shot. No exceptions.  Xolair shots #1-3 should plan to wait 2 hours in clinic Xolair shots after #4 should plan 30 minute wait in clinic            Aug 08, 2017  7:15 AM CDT   Nurse Only with ALLERGY [facility] ([facility])    [address] [address]   [phone]           Every allergy patient MUST wait 30 minutes after their allergy shot. No exceptions.  Xolair shots #1-3 should plan to wait 2 hours in clinic Xolair shots after #4 should plan 30 minute wait in clinic            Aug 11, 2017  7:00 AM CDT   Nurse Only with ALLERGY [facility] ([facility])    [address] [address]   [phone]           Every allergy patient MUST wait 30 minutes after their allergy shot. No exceptions.  Xolair shots #1-3 should plan to wait 2 hours in clinic Xolair shots after #4 should plan 30 minute wait in clinic            Aug 15, 2017  7:00 AM CDT   Nurse Only with ALLERGY [facility] ([facility])    [address] [place] [address]   [phone]           Every allergy patient MUST wait 30 minutes after their allergy shot. No exceptions.  Xolair shots #1-3 should plan  to wait 2 hours in clinic Xolair shots after #4 should plan 30 minute wait in clinic            Aug 18, 2017  7:00 AM CDT   Nurse Only with ALLERGY [facility] ([facility])    [address] [place] [address]   [phone]           Every allergy patient MUST wait 30 minutes after their allergy shot. No exceptions.  Xolair shots #1-3 should plan to wait 2 hours in clinic Xolair shots after #4 should plan 30 minute wait in clinic            Aug 22, 2017  7:00 AM CDT   Nurse Only with ALLERGY [facility] ([facility])    [address] [address]   [phone]           Every allergy patient MUST wait 30 minutes after their allergy shot. No exceptions.  Xolair shots #1-3 should plan to wait 2 hours in clinic Xolair shots after #4 should plan 30 minute wait in clinic            Aug 25, 2017  7:00 AM CDT   Nurse Only with ALLERGY [facility] ([facility])    [address] [address]   [phone]           Every allergy patient MUST wait 30 minutes after their allergy shot. No exceptions.  Xolair shots #1-3 should plan to wait 2 hours in clinic Xolair shots after #4 should plan 30 minute wait in clinic            Aug 29, 2017  7:00 AM CDT   Nurse Only with ALLERGY [facility] ([facility])    [address] [address]   [phone]           Every allergy patient MUST wait 30 minutes after their allergy shot. No exceptions.  Xolair shots #1-3 should plan to wait 2 hours in clinic Xolair shots after #4 should plan 30 minute wait in clinic              Who to contact     If you have questions or need follow up information about today's clinic visit or your schedule please contact [facility] directly at [phone].  Normal or non-critical lab and imaging results  will be communicated to you by MyChart, letter or phone within 4 business days after the clinic has received the results. If you do not hear from us within 7 days, please contact the clinic through MyChart or phone. If you have a critical or abnormal lab result, we will notify you by phone as soon as possible.  Submit refill requests through [employer] or call your pharmacy and they will forward the refill request to us. Please allow 3 business days for your refill to be completed.          Additional Information About Your Visit        Care EveryWhere ID     This is your Care EveryWhere ID. This could be used by other organizations to access your [place] medical records  [id number]        Your Vitals Were     Last Period                   07/18/2015 (Approximate)            Blood Pressure from Last 3 Encounters:   06/28/17 129/72   05/16/17 120/70   03/03/17 108/62    Weight from Last 3 Encounters:   06/28/17 210 lb 12.2 oz (95.6 kg)   05/16/17 208 lb (94.3 kg)   03/03/17 202 lb 12.8 oz (92 kg)              We Performed the Following     Allergy Shot: Two or more injections        Primary Care Provider Office Phone # Fax #    Cookie Conklin [phone]       [facility] [address] [place] [address]        Equal Access to Services     KODY CHEN : Hadii ulices ku hadasho Soomaali, waaxda luqadaha, qaybta kaalmada adeegyada, waxay idiin hayjulio cesarn kanwal howard. So [facility] [phone].    ATENCIÓN: Si habla español, tiene a pruitt disposición servicios gratuitos de asistencia lingüística. Llame al [phone].    We comply with applicable federal civil rights laws and [place] laws. We do not discriminate on the basis of race, color, national origin, age, disability sex, sexual orientation or gender identity.            Thank you!     Thank you for choosing [facility]  for your care. Our goal is always to provide you with excellent care. Hearing back from our  patients is one way we can continue to improve our services. Please take a few minutes to complete the written survey that you may receive in the mail after your visit with us. Thank you!             Your Updated Medication List - Protect others around you: Learn how to safely use, store and throw away your medicines at www.disposemymeds.org.          This list is accurate as of: 8/1/17  9:01 AM.  Always use your most recent med list.                   Brand Name Dispense Instructions for use Diagnosis    * albuterol (2.5 MG/3ML) 0.083% neb solution     1 Box    Take 1 vial (2.5 mg) by nebulization every 4 hours as needed    Moderate persistent asthma, uncomplicated       * albuterol 108 (90 BASE) MCG/ACT Inhaler    PROAIR HFA/PROVENTIL HFA/VENTOLIN HFA    1 Inhaler    Inhale 2 puffs into the lungs every 4 hours as needed    Moderate persistent asthma, uncomplicated       * ALLERGEN IMMUNOTHERAPY PRESCRIPTION     5 mL    Name of Mix: Mix #1  Mold Alternaria Tenuis GLY 1:10 w/v, HS  0.5 ml Aspergillus Fumigatus GLY 1:10 w/v, HS  0.5 ml Epicoccum Nigrum 1:10 w/v, HS 0.5 ml Hormodendrum Cladosporioides 1:10 w/v, HS 0.5 ml Penicillium Mix GLY 1:10 w/v, HS  0.5 ml Diluent: HSA qs to 5ml    Chronic allergic rhinitis due to animal hair and dander, Allergic rhinitis due to dust mite, Allergic rhinitis due to mold, Chronic seasonal allergic rhinitis due to pollen       * ALLERGEN IMMUNOTHERAPY PRESCRIPTION     5 mL    Name of Mix: Mix #2  Dust Mite, Cat, Dog Cat Hair, Standardized 10,000 BAU/mL, ALK  2.0 ml Dog Hair Dander, A. P.  1:100 w/v, HS  1.0 ml Dust Mites F 30,000AU/mL, HS  0.3 ml Dust Mites P. 30,000 AU/mL, HS  0.3 ml  Diluent: HSA qs to 5ml    Chronic allergic rhinitis due to animal hair and dander, Allergic rhinitis due to dust mite, Allergic rhinitis due to mold, Chronic seasonal allergic rhinitis due to pollen       * ALLERGEN IMMUNOTHERAPY PRESCRIPTION     5 mL    Name of Mix: Mix #3 Grass,Tree  Dmitry,White GLY  1:20w/v, HS 0.5ml Birch Mix GLY 1:20w/v, HS 0.5ml Boxelder-Maple Mix BHR (Boxelder Hard Red) 1:20w/v, HS 0.5ml [place],Common GLY 1:20w/v, HS 0.5ml Elm,American GLY 1:20w/v, HS 0.5ml [place] Mix GLY 1:20w/v, HS 0.5ml Oak Mix RVW GLY 1:20w/v, HS 0.5ml [place] Tree,Black GLY 1:20w/v, HS 0.5ml Grass Mix #7 100,000 BAU/mL, HS 0.4ml Jonathan Grass 1:20w/v, HS 0.5ml Diluent: HSA qs to 5ml    Chronic allergic rhinitis due to animal hair and dander, Allergic rhinitis due to dust mite, Allergic rhinitis due to mold, Chronic seasonal allergic rhinitis due to pollen       * ALLERGEN IMMUNOTHERAPY PRESCRIPTION     5 mL    Name of Mix: Mix #4  Weeds Kochia GLY 1:20 w/v, HS 0.5 ml Lamb's Quarters GLY 1:20 w/v, HS 0.5 ml Nettle GLY 1:20 w/v, HS 0.5 ml Plantain, English GLY 1:20 w/v, HS 0.5 ml Ragweed Mixed 1:20 w/v ALK  0.5 ml Russian Thistle GLY 1:20 w/v, HS 0.5 ml Sagebrush, Mugwort GLY 1:20 w/v, HS 0.5 ml Sorrel, Sheep GLY 1:20 w/v, HS 0.5 ml Diluent: HSA qs to 5ml    Chronic allergic rhinitis due to animal hair and dander, Allergic rhinitis due to dust mite, Allergic rhinitis due to mold, Chronic seasonal allergic rhinitis due to pollen       azelastine 0.05 % Soln ophthalmic solution    OPTIVAR    1 Bottle    Apply 1 drop to eye 2 times daily    Conjunctivitis, allergic, unspecified laterality       CERAVE Crea     2 Bottle    Externally apply 1 dose * topically 2 times daily    Eczema, unspecified type       EPINEPHrine 0.3 MG/0.3ML injection 2-pack    EPIPEN/ADRENACLICK/or ANY BX GENERIC EQUIV    0.6 mL    Inject 0.3 mLs (0.3 mg) into the muscle once as needed for anaphylaxis    Food allergy, Need for desensitization to allergens       fluticasone-salmeterol 500-50 MCG/DOSE diskus inhaler    ADVAIR    3 Inhaler    Inhale 1 puff into the lungs 2 times daily    Moderate persistent asthma, uncomplicated       loratadine 10 MG tablet    CLARITIN    30 tablet    Take 1 tablet (10 mg) by mouth daily    Seasonal allergic  rhinitis, unspecified allergic rhinitis trigger, Chronic allergic rhinitis, Conjunctivitis, allergic, unspecified laterality       montelukast 10 MG tablet    SINGULAIR    30 tablet    Take 1 tablet (10 mg) by mouth At Bedtime    Moderate persistent asthma, uncomplicated, Seasonal allergic rhinitis, unspecified allergic rhinitis trigger, Chronic allergic rhinitis       MULTIVITAMIN PO      1 tab daily        triamcinolone 0.1 % cream    KENALOG    80 g    Apply  topically 2 times daily as needed.    Eczema, unspecified type       * Notice:  This list has 6 medication(s) that are the same as other medications prescribed for you. Read the directions carefully, and ask your doctor or other care provider to review them with you.

## 2017-08-04 ENCOUNTER — ALLIED HEALTH/NURSE VISIT (OUTPATIENT)
Dept: ALLERGY | Facility: CLINIC | Age: 52
End: 2017-08-04
Payer: COMMERCIAL

## 2017-08-04 DIAGNOSIS — J30.9 ALLERGIC RHINITIS, UNSPECIFIED: Primary | ICD-10-CM

## 2017-08-04 PROCEDURE — 99207 ZZC NO CHARGE LOS: CPT

## 2017-08-04 PROCEDURE — 95117 IMMUNOTHERAPY INJECTIONS: CPT

## 2017-08-04 NOTE — MR AVS SNAPSHOT
After Visit Summary   8/4/2017    Dilia Solomon    MRN: [id number]           Patient Information     Date Of Birth          1965        Visit Information        Provider Department      8/4/2017 7:00 AM ALLERGY [facility]        Today's Diagnoses     Allergic rhinitis, unspecified    -  1       Follow-ups after your visit        Your next 10 appointments already scheduled     Aug 08, 2017  7:15 AM CDT   Nurse Only with ALLERGY [facility] ([facility])    [address] [address]   [phone]           Every allergy patient MUST wait 30 minutes after their allergy shot. No exceptions.  Xolair shots #1-3 should plan to wait 2 hours in clinic Xolair shots after #4 should plan 30 minute wait in clinic            Aug 11, 2017  7:00 AM CDT   Nurse Only with ALLERGY [facility] ([facility])    [address] [address]   [phone]           Every allergy patient MUST wait 30 minutes after their allergy shot. No exceptions.  Xolair shots #1-3 should plan to wait 2 hours in clinic Xolair shots after #4 should plan 30 minute wait in clinic            Aug 15, 2017  7:00 AM CDT   Nurse Only with ALLERGY [facility] ([facility])    [address] [address]   [phone]           Every allergy patient MUST wait 30 minutes after their allergy shot. No exceptions.  Xolair shots #1-3 should plan to wait 2 hours in clinic Xolair shots after #4 should plan 30 minute wait in clinic            Aug 18, 2017  7:00 AM CDT   Nurse Only with ALLERGY [facility] ([facility])    [address] [facility] [place] [address]   [phone]           Every allergy patient MUST wait 30 minutes after their allergy shot. No exceptions.  Xolair shots #1-3 should plan  to wait 2 hours in clinic Xolair shots after #4 should plan 30 minute wait in clinic            Aug 22, 2017  7:00 AM CDT   Nurse Only with ALLERGY [facility] ([facility])    [address] [address]   [phone]           Every allergy patient MUST wait 30 minutes after their allergy shot. No exceptions.  Xolair shots #1-3 should plan to wait 2 hours in clinic Xolair shots after #4 should plan 30 minute wait in clinic            Aug 25, 2017  7:00 AM CDT   Nurse Only with ALLERGY [facility] ([facility])    [address] [address]   [phone]           Every allergy patient MUST wait 30 minutes after their allergy shot. No exceptions.  Xolair shots #1-3 should plan to wait 2 hours in clinic Xolair shots after #4 should plan 30 minute wait in clinic            Aug 29, 2017  7:00 AM CDT   Nurse Only with ALLERGY [facility] ([facility])    [address] [address]   [phone]           Every allergy patient MUST wait 30 minutes after their allergy shot. No exceptions.  Xolair shots #1-3 should plan to wait 2 hours in clinic Xolair shots after #4 should plan 30 minute wait in clinic              Who to contact     If you have questions or need follow up information about today's clinic visit or your schedule please contact [facility] directly at [phone].  Normal or non-critical lab and imaging results will be communicated to you by MyChart, letter or phone within 4 business days after the clinic has received the results. If you do not hear from us within 7 days, please contact the clinic through MyChart or phone. If you have a critical or abnormal lab result, we will notify you by phone as soon as possible.  Submit refill requests through [employer] or call your pharmacy and they will forward the refill  request to us. Please allow 3 business days for your refill to be completed.          Additional Information About Your Visit        Care EveryWhere ID     This is your Care EveryWhere ID. This could be used by other organizations to access your [place] medical records  [id number]        Your Vitals Were     Last Period                   07/18/2015 (Approximate)            Blood Pressure from Last 3 Encounters:   06/28/17 129/72   05/16/17 120/70   03/03/17 108/62    Weight from Last 3 Encounters:   06/28/17 210 lb 12.2 oz (95.6 kg)   05/16/17 208 lb (94.3 kg)   03/03/17 202 lb 12.8 oz (92 kg)              We Performed the Following     Allergy Shot: Two or more injections        Primary Care Provider Office Phone # Fax #    Cookie Conklin [phone]       [facility] [address] [facility] [address]        Equal Access to Services     KODY CHEN : Hadii aad ku hadasho Soomaali, waaxda luqadaha, qaybta kaalmada adeegyada, waxay idiin hayaan kanwal macaramark jeffersonn . So [facility] [phone].    ATENCIÓN: Si habla español, tiene a pruitt disposición servicios gratuitos de asistencia lingüística. Lucas al [phone].    We comply with applicable federal civil rights laws and [place] laws. We do not discriminate on the basis of race, color, national origin, age, disability sex, sexual orientation or gender identity.            Thank you!     Thank you for choosing [facility]  for your care. Our goal is always to provide you with excellent care. Hearing back from our patients is one way we can continue to improve our services. Please take a few minutes to complete the written survey that you may receive in the mail after your visit with us. Thank you!             Your Updated Medication List - Protect others around you: Learn how to safely use, store and throw away your medicines at www.disposemymeds.org.          This list is accurate as of: 8/4/17  7:35 AM.  Always use  your most recent med list.                   Brand Name Dispense Instructions for use Diagnosis    * albuterol (2.5 MG/3ML) 0.083% neb solution     1 Box    Take 1 vial (2.5 mg) by nebulization every 4 hours as needed    Moderate persistent asthma, uncomplicated       * albuterol 108 (90 BASE) MCG/ACT Inhaler    PROAIR HFA/PROVENTIL HFA/VENTOLIN HFA    1 Inhaler    Inhale 2 puffs into the lungs every 4 hours as needed    Moderate persistent asthma, uncomplicated       * ALLERGEN IMMUNOTHERAPY PRESCRIPTION     5 mL    Name of Mix: Mix #1  Mold Alternaria Tenuis GLY 1:10 w/v, HS  0.5 ml Aspergillus Fumigatus GLY 1:10 w/v, HS  0.5 ml Epicoccum Nigrum 1:10 w/v, HS 0.5 ml Hormodendrum Cladosporioides 1:10 w/v, HS 0.5 ml Penicillium Mix GLY 1:10 w/v, HS  0.5 ml Diluent: HSA qs to 5ml    Chronic allergic rhinitis due to animal hair and dander, Allergic rhinitis due to dust mite, Allergic rhinitis due to mold, Chronic seasonal allergic rhinitis due to pollen       * ALLERGEN IMMUNOTHERAPY PRESCRIPTION     5 mL    Name of Mix: Mix #2  Dust Mite, Cat, Dog Cat Hair, Standardized 10,000 BAU/mL, ALK  2.0 ml Dog Hair Dander, A. P.  1:100 w/v, HS  1.0 ml Dust Mites F 30,000AU/mL, HS  0.3 ml Dust Mites P. 30,000 AU/mL, HS  0.3 ml  Diluent: HSA qs to 5ml    Chronic allergic rhinitis due to animal hair and dander, Allergic rhinitis due to dust mite, Allergic rhinitis due to mold, Chronic seasonal allergic rhinitis due to pollen       * ALLERGEN IMMUNOTHERAPY PRESCRIPTION     5 mL    Name of Mix: Mix #3 Grass,Tree  Dmitry,White GLY 1:20w/v, HS 0.5ml Birch Mix GLY 1:20w/v, HS 0.5ml Boxelder-Maple Mix BHR (Boxelder Hard Red) 1:20w/v, HS 0.5ml [place],Common GLY 1:20w/v, HS 0.5ml Elm,American GLY 1:20w/v, HS 0.5ml [place] Mix GLY 1:20w/v, HS 0.5ml Oak Mix RVW GLY 1:20w/v, HS 0.5ml [place] Tree,Black GLY 1:20w/v, HS 0.5ml Grass Mix #7 100,000 BAU/mL, HS 0.4ml Jonathan Grass 1:20w/v, HS 0.5ml Diluent: HSA qs to 5ml    Chronic allergic  rhinitis due to animal hair and dander, Allergic rhinitis due to dust mite, Allergic rhinitis due to mold, Chronic seasonal allergic rhinitis due to pollen       * ALLERGEN IMMUNOTHERAPY PRESCRIPTION     5 mL    Name of Mix: Mix #4  Weeds Kochia GLY 1:20 w/v, HS 0.5 ml Lamb's Quarters GLY 1:20 w/v, HS 0.5 ml Nettle GLY 1:20 w/v, HS 0.5 ml Plantain, English GLY 1:20 w/v, HS 0.5 ml Ragweed Mixed 1:20 w/v ALK  0.5 ml Russian Thistle GLY 1:20 w/v, HS 0.5 ml Sagebrush, Mugwort GLY 1:20 w/v, HS 0.5 ml Sorrel, Sheep GLY 1:20 w/v, HS 0.5 ml Diluent: HSA qs to 5ml    Chronic allergic rhinitis due to animal hair and dander, Allergic rhinitis due to dust mite, Allergic rhinitis due to mold, Chronic seasonal allergic rhinitis due to pollen       azelastine 0.05 % Soln ophthalmic solution    OPTIVAR    1 Bottle    Apply 1 drop to eye 2 times daily    Conjunctivitis, allergic, unspecified laterality       CERAVE Crea     2 Bottle    Externally apply 1 dose * topically 2 times daily    Eczema, unspecified type       EPINEPHrine 0.3 MG/0.3ML injection 2-pack    EPIPEN/ADRENACLICK/or ANY BX GENERIC EQUIV    0.6 mL    Inject 0.3 mLs (0.3 mg) into the muscle once as needed for anaphylaxis    Food allergy, Need for desensitization to allergens       fluticasone-salmeterol 500-50 MCG/DOSE diskus inhaler    ADVAIR    3 Inhaler    Inhale 1 puff into the lungs 2 times daily    Moderate persistent asthma, uncomplicated       loratadine 10 MG tablet    CLARITIN    30 tablet    Take 1 tablet (10 mg) by mouth daily    Seasonal allergic rhinitis, unspecified allergic rhinitis trigger, Chronic allergic rhinitis, Conjunctivitis, allergic, unspecified laterality       montelukast 10 MG tablet    SINGULAIR    30 tablet    Take 1 tablet (10 mg) by mouth At Bedtime    Moderate persistent asthma, uncomplicated, Seasonal allergic rhinitis, unspecified allergic rhinitis trigger, Chronic allergic rhinitis       MULTIVITAMIN PO      1 tab daily         triamcinolone 0.1 % cream    KENALOG    80 g    Apply  topically 2 times daily as needed.    Eczema, unspecified type       * Notice:  This list has 6 medication(s) that are the same as other medications prescribed for you. Read the directions carefully, and ask your doctor or other care provider to review them with you.

## 2017-08-08 ENCOUNTER — ALLIED HEALTH/NURSE VISIT (OUTPATIENT)
Dept: ALLERGY | Facility: CLINIC | Age: 52
End: 2017-08-08
Payer: COMMERCIAL

## 2017-08-08 DIAGNOSIS — J30.9 ALLERGIC RHINITIS, UNSPECIFIED: Primary | ICD-10-CM

## 2017-08-08 PROCEDURE — 99207 ZZC NO CHARGE LOS: CPT

## 2017-08-08 PROCEDURE — 95117 IMMUNOTHERAPY INJECTIONS: CPT

## 2017-08-08 NOTE — MR AVS SNAPSHOT
After Visit Summary   8/8/2017    Dilia Solomon    MRN: [id number]           Patient Information     Date Of Birth          1965        Visit Information        Provider Department      8/8/2017 7:15 AM ALLERGY [facility]        Today's Diagnoses     Allergic rhinitis, unspecified    -  1       Follow-ups after your visit        Your next 10 appointments already scheduled     Aug 11, 2017  7:00 AM CDT   Nurse Only with ALLERGY [facility] ([facility])    [address] [address]   [phone]           Every allergy patient MUST wait 30 minutes after their allergy shot. No exceptions.  Xolair shots #1-3 should plan to wait 2 hours in clinic Xolair shots after #4 should plan 30 minute wait in clinic            Aug 15, 2017  7:00 AM CDT   Nurse Only with ALLERGY [facility] ([facility])    [address] [address]   [phone]           Every allergy patient MUST wait 30 minutes after their allergy shot. No exceptions.  Xolair shots #1-3 should plan to wait 2 hours in clinic Xolair shots after #4 should plan 30 minute wait in clinic            Aug 18, 2017  7:00 AM CDT   Nurse Only with ALLERGY [facility] ([facility])    [address] [address]   [phone]           Every allergy patient MUST wait 30 minutes after their allergy shot. No exceptions.  Xolair shots #1-3 should plan to wait 2 hours in clinic Xolair shots after #4 should plan 30 minute wait in clinic            Aug 22, 2017  7:00 AM CDT   Nurse Only with ALLERGY [facility] ([facility])    [address] [place] [address]   [phone]           Every allergy patient MUST wait 30 minutes after their allergy shot. No exceptions.  Xolair shots #1-3 should plan  to wait 2 hours in clinic Xolair shots after #4 should plan 30 minute wait in clinic            Aug 25, 2017  7:00 AM CDT   Nurse Only with [place]ERGY [facility] [place]    [address] [address]   [phone]           Every allergy patient MUST wait 30 minutes after their allergy shot. No exceptions.  Xolair shots #1-3 should plan to wait 2 hours in clinic Xolair shots after #4 should plan 30 minute wait in clinic            Aug 29, 2017  7:00 AM CDT   Nurse Only with [place]ERGY [facility] [place]    [address] [address]   [phone]           Every allergy patient MUST wait 30 minutes after their allergy shot. No exceptions.  Xolair shots #1-3 should plan to wait 2 hours in clinic Xolair shots after #4 should plan 30 minute wait in clinic              Who to contact     If you have questions or need follow up information about today's clinic visit or your schedule please contact [facility] directly at [phone].  Normal or non-critical lab and imaging results will be communicated to you by MyChart, letter or phone within 4 business days after the clinic has received the results. If you do not hear from us within 7 days, please contact the clinic through MyChart or phone. If you have a critical or abnormal lab result, we will notify you by phone as soon as possible.  Submit refill requests through [employer]onew or call your pharmacy and they will forward the refill request to us. Please allow 3 business days for your refill to be completed.          Additional Information About Your Visit        Care EveryWhere ID     This is your Care EveryWhere ID. This could be used by other organizations to access your [place] medical records  [id number]        Your Vitals Were     Last Period                   07/18/2015 (Approximate)            Blood Pressure from Last 3  Encounters:   06/28/17 129/72   05/16/17 120/70   03/03/17 108/62    Weight from Last 3 Encounters:   06/28/17 210 lb 12.2 oz (95.6 kg)   05/16/17 208 lb (94.3 kg)   03/03/17 202 lb 12.8 oz (92 kg)              We Performed the Following     Allergy Shot: Two or more injections        Primary Care Provider Office Phone # Fax #    Cookie Conklin [phone]       [facility] [address] [facility] [address]        Equal Access to Services     : Hadii aad ku hadasho Soomaali, waaxda luqadaha, qaybta kaalmada adeegyada, hieu cross hayfrandy adetanika shen . So [facility] [phone].    ATENCIÓN: Si habla español, tiene a pruitt disposición servicios gratuitos de asistencia lingüística. [facility] [phone].    We comply with applicable federal civil rights laws and [place] laws. We do not discriminate on the basis of race, color, national origin, age, disability sex, sexual orientation or gender identity.            Thank you!     Thank you for choosing [facility]  for your care. Our goal is always to provide you with excellent care. Hearing back from our patients is one way we can continue to improve our services. Please take a few minutes to complete the written survey that you may receive in the mail after your visit with us. Thank you!             Your Updated Medication List - Protect others around you: Learn how to safely use, store and throw away your medicines at www.disposemymeds.org.          This list is accurate as of: 8/8/17  7:46 AM.  Always use your most recent med list.                   Brand Name Dispense Instructions for use Diagnosis    * albuterol (2.5 MG/3ML) 0.083% neb solution     1 Box    Take 1 vial (2.5 mg) by nebulization every 4 hours as needed    Moderate persistent asthma, uncomplicated       * albuterol 108 (90 BASE) MCG/ACT Inhaler    PROAIR HFA/PROVENTIL HFA/VENTOLIN HFA    1 Inhaler    Inhale 2 puffs into the lungs every 4  hours as needed    Moderate persistent asthma, uncomplicated       * ALLERGEN IMMUNOTHERAPY PRESCRIPTION     5 mL    Name of Mix: Mix #1  Mold Alternaria Tenuis GLY 1:10 w/v, HS  0.5 ml Aspergillus Fumigatus GLY 1:10 w/v, HS  0.5 ml Epicoccum Nigrum 1:10 w/v, HS 0.5 ml Hormodendrum Cladosporioides 1:10 w/v, HS 0.5 ml Penicillium Mix GLY 1:10 w/v, HS  0.5 ml Diluent: HSA qs to 5ml    Chronic allergic rhinitis due to animal hair and dander, Allergic rhinitis due to dust mite, Allergic rhinitis due to mold, Chronic seasonal allergic rhinitis due to pollen       * ALLERGEN IMMUNOTHERAPY PRESCRIPTION     5 mL    Name of Mix: Mix #2  Dust Mite, Cat, Dog Cat Hair, Standardized 10,000 BAU/mL, ALK  2.0 ml Dog Hair Dander, A. P.  1:100 w/v, HS  1.0 ml Dust Mites F 30,000AU/mL, HS  0.3 ml Dust Mites P. 30,000 AU/mL, HS  0.3 ml  Diluent: HSA qs to 5ml    Chronic allergic rhinitis due to animal hair and dander, Allergic rhinitis due to dust mite, Allergic rhinitis due to mold, Chronic seasonal allergic rhinitis due to pollen       * ALLERGEN IMMUNOTHERAPY PRESCRIPTION     5 mL    Name of Mix: Mix #3 Grass,Tree  Dmitry,White GLY 1:20w/v, HS 0.5ml Birch Mix GLY 1:20w/v, HS 0.5ml Boxelder-Maple Mix BHR (Boxelder Hard Red) 1:20w/v, HS 0.5ml [place],Common GLY 1:20w/v, HS 0.5ml Elm,American GLY 1:20w/v, HS 0.5ml [place] Mix GLY 1:20w/v, HS 0.5ml Oak Mix RVW GLY 1:20w/v, HS 0.5ml [place] Tree,Black GLY 1:20w/v, HS 0.5ml Grass Mix #7 100,000 BAU/mL, HS 0.4ml Jonathan Grass 1:20w/v, HS 0.5ml Diluent: HSA qs to 5ml    Chronic allergic rhinitis due to animal hair and dander, Allergic rhinitis due to dust mite, Allergic rhinitis due to mold, Chronic seasonal allergic rhinitis due to pollen       * ALLERGEN IMMUNOTHERAPY PRESCRIPTION     5 mL    Name of Mix: Mix #4  Weeds Kochia GLY 1:20 w/v, HS 0.5 ml Lamb's Quarters GLY 1:20 w/v, HS 0.5 ml Nettle GLY 1:20 w/v, HS 0.5 ml Plantain, English GLY 1:20 w/v, HS 0.5 ml Ragweed Mixed 1:20 w/v ALK   0.5 ml Russian Thistle GLY 1:20 w/v, HS 0.5 ml Sagebrush, Mugwort GLY 1:20 w/v, HS 0.5 ml Sorrel, Sheep GLY 1:20 w/v, HS 0.5 ml Diluent: HSA qs to 5ml    Chronic allergic rhinitis due to animal hair and dander, Allergic rhinitis due to dust mite, Allergic rhinitis due to mold, Chronic seasonal allergic rhinitis due to pollen       azelastine 0.05 % Soln ophthalmic solution    OPTIVAR    1 Bottle    Apply 1 drop to eye 2 times daily    Conjunctivitis, allergic, unspecified laterality       CERAVE Crea     2 Bottle    Externally apply 1 dose * topically 2 times daily    Eczema, unspecified type       EPINEPHrine 0.3 MG/0.3ML injection 2-pack    EPIPEN/ADRENACLICK/or ANY BX GENERIC EQUIV    0.6 mL    Inject 0.3 mLs (0.3 mg) into the muscle once as needed for anaphylaxis    Food allergy, Need for desensitization to allergens       fluticasone-salmeterol 500-50 MCG/DOSE diskus inhaler    ADVAIR    3 Inhaler    Inhale 1 puff into the lungs 2 times daily    Moderate persistent asthma, uncomplicated       loratadine 10 MG tablet    CLARITIN    30 tablet    Take 1 tablet (10 mg) by mouth daily    Seasonal allergic rhinitis, unspecified allergic rhinitis trigger, Chronic allergic rhinitis, Conjunctivitis, allergic, unspecified laterality       montelukast 10 MG tablet    SINGULAIR    30 tablet    Take 1 tablet (10 mg) by mouth At Bedtime    Moderate persistent asthma, uncomplicated, Seasonal allergic rhinitis, unspecified allergic rhinitis trigger, Chronic allergic rhinitis       MULTIVITAMIN PO      1 tab daily        triamcinolone 0.1 % cream    KENALOG    80 g    Apply  topically 2 times daily as needed.    Eczema, unspecified type       * Notice:  This list has 6 medication(s) that are the same as other medications prescribed for you. Read the directions carefully, and ask your doctor or other care provider to review them with you.

## 2017-08-11 ENCOUNTER — ALLIED HEALTH/NURSE VISIT (OUTPATIENT)
Dept: ALLERGY | Facility: CLINIC | Age: 52
End: 2017-08-11
Payer: COMMERCIAL

## 2017-08-11 DIAGNOSIS — J30.9 ALLERGIC RHINITIS, UNSPECIFIED: Primary | ICD-10-CM

## 2017-08-11 PROCEDURE — 95117 IMMUNOTHERAPY INJECTIONS: CPT

## 2017-08-11 PROCEDURE — 99207 ZZC NO CHARGE LOS: CPT

## 2017-08-11 NOTE — MR AVS SNAPSHOT
After Visit Summary   8/11/2017    Dilia Solomon    MRN: [id number]           Patient Information     Date Of Birth          1965        Visit Information        Provider Department      8/11/2017 7:00 AM ALLERGY [facility]        Today's Diagnoses     Allergic rhinitis, unspecified    -  1       Follow-ups after your visit        Your next 10 appointments already scheduled     Aug 15, 2017  7:00 AM CDT   Nurse Only with ALLERGY [facility] ([facility])    [address] [address]   [phone]           Every allergy patient MUST wait 30 minutes after their allergy shot. No exceptions.  Xolair shots #1-3 should plan to wait 2 hours in clinic Xolair shots after #4 should plan 30 minute wait in clinic            Aug 18, 2017  7:00 AM CDT   Nurse Only with ALLERGY [facility] ([facility])    [address] [address]   [phone]           Every allergy patient MUST wait 30 minutes after their allergy shot. No exceptions.  Xolair shots #1-3 should plan to wait 2 hours in clinic Xolair shots after #4 should plan 30 minute wait in clinic            Aug 22, 2017  7:00 AM CDT   Nurse Only with ALLERGY [facility] ([facility])    [address] [address]   [phone]           Every allergy patient MUST wait 30 minutes after their allergy shot. No exceptions.  Xolair shots #1-3 should plan to wait 2 hours in clinic Xolair shots after #4 should plan 30 minute wait in clinic            Aug 25, 2017  7:00 AM CDT   Nurse Only with ALLERGY [facility] ([facility])    [address] [place] [address]   [phone]           Every allergy patient MUST wait 30 minutes after their allergy shot. No exceptions.  Xolair shots #1-3 should  plan to wait 2 hours in clinic Xolair shots after #4 should plan 30 minute wait in clinic            Aug 29, 2017  7:00 AM CDT   Nurse Only with ALLERGY [facility] ([facility])    [address] [address]   [phone]           Every allergy patient MUST wait 30 minutes after their allergy shot. No exceptions.  Xolair shots #1-3 should plan to wait 2 hours in clinic Xolair shots after #4 should plan 30 minute wait in clinic              Who to contact     If you have questions or need follow up information about today's clinic visit or your schedule please contact [facility] directly at [phone].  Normal or non-critical lab and imaging results will be communicated to you by MyChart, letter or phone within 4 business days after the clinic has received the results. If you do not hear from us within 7 days, please contact the clinic through MyChart or phone. If you have a critical or abnormal lab result, we will notify you by phone as soon as possible.  Submit refill requests through [employer] or call your pharmacy and they will forward the refill request to us. Please allow 3 business days for your refill to be completed.          Additional Information About Your Visit        Care EveryWhere ID     This is your Care EveryWhere ID. This could be used by other organizations to access your [place] medical records  [id number]        Your Vitals Were     Last Period                   07/18/2015 (Approximate)            Blood Pressure from Last 3 Encounters:   06/28/17 129/72   05/16/17 120/70   03/03/17 108/62    Weight from Last 3 Encounters:   06/28/17 210 lb 12.2 oz (95.6 kg)   05/16/17 208 lb (94.3 kg)   03/03/17 202 lb 12.8 oz (92 kg)              We Performed the Following     Allergy Shot: Two or more injections        Primary Care Provider Office Phone # Fax #    Cookie Conklin [phone]  [facility] [address]        Equal Access to Services     GABBY APRIL : Hadii aad ku matti Sokashmirali, waaxda luqadaha, qaybta kaalmada moisesmelida, hieu ellain hayaajoy schofield willem hermelinda howard. So [facility] [phone].    ATENCIÓN: Si habla español, tiene a pruitt disposición servicios gratuitos de asistencia lingüística. [facility] [phone].    We comply with applicable federal civil rights laws and [place] laws. We do not discriminate on the basis of race, color, national origin, age, disability sex, sexual orientation or gender identity.            Thank you!     Thank you for choosing [facility]  for your care. Our goal is always to provide you with excellent care. Hearing back from our patients is one way we can continue to improve our services. Please take a few minutes to complete the written survey that you may receive in the mail after your visit with us. Thank you!             Your Updated Medication List - Protect others around you: Learn how to safely use, store and throw away your medicines at www.disposemymeds.org.          This list is accurate as of: 8/11/17  7:42 AM.  Always use your most recent med list.                   Brand Name Dispense Instructions for use Diagnosis    * albuterol (2.5 MG/3ML) 0.083% neb solution     1 Box    Take 1 vial (2.5 mg) by nebulization every 4 hours as needed    Moderate persistent asthma, uncomplicated       * albuterol 108 (90 BASE) MCG/ACT Inhaler    PROAIR HFA/PROVENTIL HFA/VENTOLIN HFA    1 Inhaler    Inhale 2 puffs into the lungs every 4 hours as needed    Moderate persistent asthma, uncomplicated       * ALLERGEN IMMUNOTHERAPY PRESCRIPTION     5 mL    Name of Mix: Mix #1  Mold Alternaria Tenuis GLY 1:10 w/v, HS  0.5 ml Aspergillus Fumigatus GLY 1:10 w/v, HS  0.5 ml Epicoccum Nigrum 1:10 w/v, HS 0.5 ml Hormodendrum Cladosporioides 1:10 w/v, HS 0.5 ml Penicillium Mix GLY 1:10 w/v, HS  0.5 ml Diluent: HSA qs to 5ml    Chronic allergic rhinitis due to  animal hair and dander, Allergic rhinitis due to dust mite, Allergic rhinitis due to mold, Chronic seasonal allergic rhinitis due to pollen       * ALLERGEN IMMUNOTHERAPY PRESCRIPTION     5 mL    Name of Mix: Mix #2  Dust Mite, Cat, Dog Cat Hair, Standardized 10,000 BAU/mL, ALK  2.0 ml Dog Hair Dander, A. P.  1:100 w/v, HS  1.0 ml Dust Mites F 30,000AU/mL, HS  0.3 ml Dust Mites P. 30,000 AU/mL, HS  0.3 ml  Diluent: HSA qs to 5ml    Chronic allergic rhinitis due to animal hair and dander, Allergic rhinitis due to dust mite, Allergic rhinitis due to mold, Chronic seasonal allergic rhinitis due to pollen       * ALLERGEN IMMUNOTHERAPY PRESCRIPTION     5 mL    Name of Mix: Mix #3 Grass,Tree  Dmitry,White GLY 1:20w/v, HS 0.5ml Birch Mix GLY 1:20w/v, HS 0.5ml Boxelder-Maple Mix BHR (Boxelder Hard Red) 1:20w/v, HS 0.5ml [place],Common GLY 1:20w/v, HS 0.5ml Elm,American GLY 1:20w/v, HS 0.5ml [place] Mix GLY 1:20w/v, HS 0.5ml Oak Mix RVW GLY 1:20w/v, HS 0.5ml [place] Tree,Black GLY 1:20w/v, HS 0.5ml Grass Mix #7 100,000 BAU/mL, HS 0.4ml Jonathan Grass 1:20w/v, HS 0.5ml Diluent: HSA qs to 5ml    Chronic allergic rhinitis due to animal hair and dander, Allergic rhinitis due to dust mite, Allergic rhinitis due to mold, Chronic seasonal allergic rhinitis due to pollen       * ALLERGEN IMMUNOTHERAPY PRESCRIPTION     5 mL    Name of Mix: Mix #4  Weeds Kochia GLY 1:20 w/v, HS 0.5 ml Lamb's Quarters GLY 1:20 w/v, HS 0.5 ml Nettle GLY 1:20 w/v, HS 0.5 ml Plantain, English GLY 1:20 w/v, HS 0.5 ml Ragweed Mixed 1:20 w/v ALK  0.5 ml Russian Thistle GLY 1:20 w/v, HS 0.5 ml Sagebrush, Mugwort GLY 1:20 w/v, HS 0.5 ml Sorrel, Sheep GLY 1:20 w/v, HS 0.5 ml Diluent: HSA qs to 5ml    Chronic allergic rhinitis due to animal hair and dander, Allergic rhinitis due to dust mite, Allergic rhinitis due to mold, Chronic seasonal allergic rhinitis due to pollen       azelastine 0.05 % Soln ophthalmic solution    OPTIVAR    1 Bottle    Apply 1 drop to  eye 2 times daily    Conjunctivitis, allergic, unspecified laterality       CERAVE Crea     2 Bottle    Externally apply 1 dose * topically 2 times daily    Eczema, unspecified type       EPINEPHrine 0.3 MG/0.3ML injection 2-pack    EPIPEN/ADRENACLICK/or ANY BX GENERIC EQUIV    0.6 mL    Inject 0.3 mLs (0.3 mg) into the muscle once as needed for anaphylaxis    Food allergy, Need for desensitization to allergens       fluticasone-salmeterol 500-50 MCG/DOSE diskus inhaler    ADVAIR    3 Inhaler    Inhale 1 puff into the lungs 2 times daily    Moderate persistent asthma, uncomplicated       loratadine 10 MG tablet    CLARITIN    30 tablet    Take 1 tablet (10 mg) by mouth daily    Seasonal allergic rhinitis, unspecified allergic rhinitis trigger, Chronic allergic rhinitis, Conjunctivitis, allergic, unspecified laterality       montelukast 10 MG tablet    SINGULAIR    30 tablet    Take 1 tablet (10 mg) by mouth At Bedtime    Moderate persistent asthma, uncomplicated, Seasonal allergic rhinitis, unspecified allergic rhinitis trigger, Chronic allergic rhinitis       MULTIVITAMIN PO      1 tab daily        triamcinolone 0.1 % cream    KENALOG    80 g    Apply  topically 2 times daily as needed.    Eczema, unspecified type       * Notice:  This list has 6 medication(s) that are the same as other medications prescribed for you. Read the directions carefully, and ask your doctor or other care provider to review them with you.

## 2017-08-15 ENCOUNTER — ALLIED HEALTH/NURSE VISIT (OUTPATIENT)
Dept: ALLERGY | Facility: CLINIC | Age: 52
End: 2017-08-15
Payer: COMMERCIAL

## 2017-08-15 DIAGNOSIS — J30.9 ALLERGIC RHINITIS, UNSPECIFIED: Primary | ICD-10-CM

## 2017-08-15 PROCEDURE — 95117 IMMUNOTHERAPY INJECTIONS: CPT

## 2017-08-15 PROCEDURE — 99207 ZZC NO CHARGE LOS: CPT

## 2017-08-15 NOTE — MR AVS SNAPSHOT
After Visit Summary   8/15/2017    Dilia Solomon    MRN: [id number]           Patient Information     Date Of Birth          1965        Visit Information        Provider Department      8/15/2017 7:00 AM ALLERGY [facility]        Today's Diagnoses     Allergic rhinitis, unspecified    -  1       Follow-ups after your visit        Your next 10 appointments already scheduled     Aug 18, 2017  7:00 AM CDT   Nurse Only with ALLERGY [facility] ([facility])    [address] [address]   [phone]           Every allergy patient MUST wait 30 minutes after their allergy shot. No exceptions.  Xolair shots #1-3 should plan to wait 2 hours in clinic Xolair shots after #4 should plan 30 minute wait in clinic            Aug 22, 2017  7:00 AM CDT   Nurse Only with ALLERGY [facility] ([facility])    [address] [address]   [phone]           Every allergy patient MUST wait 30 minutes after their allergy shot. No exceptions.  Xolair shots #1-3 should plan to wait 2 hours in clinic Xolair shots after #4 should plan 30 minute wait in clinic            Aug 25, 2017  7:00 AM CDT   Nurse Only with ALLERGY [facility] ([facility])    [address] [address]   [phone]           Every allergy patient MUST wait 30 minutes after their allergy shot. No exceptions.  Xolair shots #1-3 should plan to wait 2 hours in clinic Xolair shots after #4 should plan 30 minute wait in clinic            Aug 29, 2017  7:00 AM CDT   Nurse Only with ALLERGY [facility] ([facility])    [address] [place] [address]   [phone]           Every allergy patient MUST wait 30 minutes after their allergy shot. No exceptions.  Xolair shots #1-3 should  "plan to wait 2 hours in clinic Xolair shots after #4 should plan 30 minute wait in clinic              Who to contact     If you have questions or need follow up information about today's clinic visit or your schedule please contact [facility] directly at [phone].  Normal or non-critical lab and imaging results will be communicated to you by MyChart, letter or phone within 4 business days after the clinic has received the results. If you do not hear from us within 7 days, please contact the clinic through MyChart or phone. If you have a critical or abnormal lab result, we will notify you by phone as soon as possible.  Submit refill requests through [employer] or call your pharmacy and they will forward the refill request to us. Please allow 3 business days for your refill to be completed.          Additional Information About Your Visit        MyChart Information     [employer] lets you send messages to your doctor, view your test results, renew your prescriptions, schedule appointments and more. To sign up, go to [email or web address] . Click on \"Log in\" on the left side of the screen, which will take you to the Welcome page. Then click on \"Sign up Now\" on the right side of the page.     You will be asked to enter the access code listed below, as well as some personal information. Please follow the directions to create your username and password.     Your access code is: [email or web address]  Expires: 2017  7:40 AM     Your access code will  in 90 days. If you need help or a new code, please call your [facility] clinic or [phone].        Care EveryWhere ID     This is your Care EveryWhere ID. This could be used by other organizations to access your [facility] medical records  [id number]        Your Vitals Were     Last Period                   2015 (Approximate)            Blood Pressure from Last 3 Encounters:   17 129/72   17 120/70   17 108/62    Weight from Last 3 " Encounters:   06/28/17 210 lb 12.2 oz (95.6 kg)   05/16/17 208 lb (94.3 kg)   03/03/17 202 lb 12.8 oz (92 kg)              We Performed the Following     Allergy Shot: Two or more injections        Primary Care Provider Office Phone # Fax #    Cookie Conklin [phone]       [address] [facility] [address]        Equal Access to Services     KODY CHEN : Hadii aad ku hadasho Soomaali, waaxda luqadaha, qaybta kaalmada adeegyada, waxay idiin hayaan adeeg atifsh ladejan . So [facility] [phone].    ATENCIÓN: Si maricarmenla espcharito, tiene a pruitt disposición servicios gratuitos de asistencia lingüística. Llame al [phone].    We comply with applicable federal civil rights laws and [place] laws. We do not discriminate on the basis of race, color, national origin, age, disability sex, sexual orientation or gender identity.            Thank you!     Thank you for choosing [facility]  for your care. Our goal is always to provide you with excellent care. Hearing back from our patients is one way we can continue to improve our services. Please take a few minutes to complete the written survey that you may receive in the mail after your visit with us. Thank you!             Your Updated Medication List - Protect others around you: Learn how to safely use, store and throw away your medicines at www.disposemymeds.org.          This list is accurate as of: 8/15/17  7:40 AM.  Always use your most recent med list.                   Brand Name Dispense Instructions for use Diagnosis    * albuterol (2.5 MG/3ML) 0.083% neb solution     1 Box    Take 1 vial (2.5 mg) by nebulization every 4 hours as needed    Moderate persistent asthma, uncomplicated       * albuterol 108 (90 BASE) MCG/ACT Inhaler    PROAIR HFA/PROVENTIL HFA/VENTOLIN HFA    1 Inhaler    Inhale 2 puffs into the lungs every 4 hours as needed    Moderate persistent asthma, uncomplicated       * ALLERGEN IMMUNOTHERAPY PRESCRIPTION     5 mL     Name of Mix: Mix #1  Mold Alternaria Tenuis GLY 1:10 w/v, HS  0.5 ml Aspergillus Fumigatus GLY 1:10 w/v, HS  0.5 ml Epicoccum Nigrum 1:10 w/v, HS 0.5 ml Hormodendrum Cladosporioides 1:10 w/v, HS 0.5 ml Penicillium Mix GLY 1:10 w/v, HS  0.5 ml Diluent: HSA qs to 5ml    Chronic allergic rhinitis due to animal hair and dander, Allergic rhinitis due to dust mite, Allergic rhinitis due to mold, Chronic seasonal allergic rhinitis due to pollen       * ALLERGEN IMMUNOTHERAPY PRESCRIPTION     5 mL    Name of Mix: Mix #2  Dust Mite, Cat, Dog Cat Hair, Standardized 10,000 BAU/mL, ALK  2.0 ml Dog Hair Dander, A. P.  1:100 w/v, HS  1.0 ml Dust Mites F 30,000AU/mL, HS  0.3 ml Dust Mites P. 30,000 AU/mL, HS  0.3 ml  Diluent: HSA qs to 5ml    Chronic allergic rhinitis due to animal hair and dander, Allergic rhinitis due to dust mite, Allergic rhinitis due to mold, Chronic seasonal allergic rhinitis due to pollen       * ALLERGEN IMMUNOTHERAPY PRESCRIPTION     5 mL    Name of Mix: Mix #3 Grass,Tree  Dmitry,White GLY 1:20w/v, HS 0.5ml Birch Mix GLY 1:20w/v, HS 0.5ml Boxelder-Maple Mix BHR (Boxelder Hard Red) 1:20w/v, HS 0.5ml [place],Common GLY 1:20w/v, HS 0.5ml Elm,American GLY 1:20w/v, HS 0.5ml [place] Mix GLY 1:20w/v, HS 0.5ml Oak Mix RVW GLY 1:20w/v, HS 0.5ml [place] Tree,Black GLY 1:20w/v, HS 0.5ml Grass Mix #7 100,000 BAU/mL, HS 0.4ml Jonathan Grass 1:20w/v, HS 0.5ml Diluent: HSA qs to 5ml    Chronic allergic rhinitis due to animal hair and dander, Allergic rhinitis due to dust mite, Allergic rhinitis due to mold, Chronic seasonal allergic rhinitis due to pollen       * ALLERGEN IMMUNOTHERAPY PRESCRIPTION     5 mL    Name of Mix: Mix #4  Weeds Kochia GLY 1:20 w/v, HS 0.5 ml Lamb's Quarters GLY 1:20 w/v, HS 0.5 ml Nettle GLY 1:20 w/v, HS 0.5 ml Plantain, English GLY 1:20 w/v, HS 0.5 ml Ragweed Mixed 1:20 w/v ALK  0.5 ml Russian Thistle GLY 1:20 w/v, HS 0.5 ml Sagebrush, Mugwort GLY 1:20 w/v, HS 0.5 ml Sorrel, Sheep GLY 1:20  w/v, HS 0.5 ml Diluent: HSA qs to 5ml    Chronic allergic rhinitis due to animal hair and dander, Allergic rhinitis due to dust mite, Allergic rhinitis due to mold, Chronic seasonal allergic rhinitis due to pollen       azelastine 0.05 % Soln ophthalmic solution    OPTIVAR    1 Bottle    Apply 1 drop to eye 2 times daily    Conjunctivitis, allergic, unspecified laterality       CERAVE Crea     2 Bottle    Externally apply 1 dose * topically 2 times daily    Eczema, unspecified type       EPINEPHrine 0.3 MG/0.3ML injection 2-pack    EPIPEN/ADRENACLICK/or ANY BX GENERIC EQUIV    0.6 mL    Inject 0.3 mLs (0.3 mg) into the muscle once as needed for anaphylaxis    Food allergy, Need for desensitization to allergens       fluticasone-salmeterol 500-50 MCG/DOSE diskus inhaler    ADVAIR    3 Inhaler    Inhale 1 puff into the lungs 2 times daily    Moderate persistent asthma, uncomplicated       loratadine 10 MG tablet    CLARITIN    30 tablet    Take 1 tablet (10 mg) by mouth daily    Seasonal allergic rhinitis, unspecified allergic rhinitis trigger, Chronic allergic rhinitis, Conjunctivitis, allergic, unspecified laterality       montelukast 10 MG tablet    SINGULAIR    30 tablet    Take 1 tablet (10 mg) by mouth At Bedtime    Moderate persistent asthma, uncomplicated, Seasonal allergic rhinitis, unspecified allergic rhinitis trigger, Chronic allergic rhinitis       MULTIVITAMIN PO      1 tab daily        triamcinolone 0.1 % cream    KENALOG    80 g    Apply  topically 2 times daily as needed.    Eczema, unspecified type       * Notice:  This list has 6 medication(s) that are the same as other medications prescribed for you. Read the directions carefully, and ask your doctor or other care provider to review them with you.

## 2017-08-18 ENCOUNTER — ALLIED HEALTH/NURSE VISIT (OUTPATIENT)
Dept: ALLERGY | Facility: CLINIC | Age: 52
End: 2017-08-18
Payer: COMMERCIAL

## 2017-08-18 DIAGNOSIS — J30.9 ALLERGIC RHINITIS, UNSPECIFIED: Primary | ICD-10-CM

## 2017-08-18 PROCEDURE — 95117 IMMUNOTHERAPY INJECTIONS: CPT

## 2017-08-18 NOTE — MR AVS SNAPSHOT
After Visit Summary   8/18/2017    Dilia Solomon    MRN: [id number]           Patient Information     Date Of Birth          1965        Visit Information        Provider Department      8/18/2017 7:00 AM ALLERGY [facility]        Today's Diagnoses     Allergic rhinitis, unspecified    -  1       Follow-ups after your visit        Your next 10 appointments already scheduled     Aug 22, 2017  7:00 AM CDT   Nurse Only with ALLERGY [facility] ([facility])    [address] [address]   [phone]           Every allergy patient MUST wait 30 minutes after their allergy shot. No exceptions.  Xolair shots #1-3 should plan to wait 2 hours in clinic Xolair shots after #4 should plan 30 minute wait in clinic            Aug 25, 2017  7:00 AM CDT   Nurse Only with ALLERGY [facility] ([facility])    [address] [address]   [phone]           Every allergy patient MUST wait 30 minutes after their allergy shot. No exceptions.  Xolair shots #1-3 should plan to wait 2 hours in clinic Xolair shots after #4 should plan 30 minute wait in clinic            Aug 29, 2017  7:00 AM CDT   Nurse Only with ALLERGY [facility] ([facility])    [address] [address]   [phone]           Every allergy patient MUST wait 30 minutes after their allergy shot. No exceptions.  Xolair shots #1-3 should plan to wait 2 hours in clinic Xolair shots after #4 should plan 30 minute wait in clinic            Sep 05, 2017  7:15 AM CDT   Nurse Only with ALLERGY [facility] ([facility])    [address] [place] [address]   [phone]           Every allergy patient MUST wait 30 minutes after their allergy shot. No exceptions.  Xolair shots #1-3 should  plan to wait 2 hours in clinic Xolair shots after #4 should plan 30 minute wait in clinic            Sep 08, 2017  7:00 AM CDT   Nurse Only with ALLERGY [facility] ([facility])    [address] [place] [address]   [phone]           Every allergy patient MUST wait 30 minutes after their allergy shot. No exceptions.  Xolair shots #1-3 should plan to wait 2 hours in clinic Xolair shots after #4 should plan 30 minute wait in clinic            Sep 12, 2017  7:00 AM CDT   Nurse Only with ALLERGY [facility] ([facility])    [address] [place] [address]   [phone]           Every allergy patient MUST wait 30 minutes after their allergy shot. No exceptions.  Xolair shots #1-3 should plan to wait 2 hours in clinic Xolair shots after #4 should plan 30 minute wait in clinic            Sep 15, 2017  7:00 AM CDT   Nurse Only with ALLERGY [facility] ([facility])    [address] [place] [address]   [phone]           Every allergy patient MUST wait 30 minutes after their allergy shot. No exceptions.  Xolair shots #1-3 should plan to wait 2 hours in clinic Xolair shots after #4 should plan 30 minute wait in clinic            Sep 19, 2017  7:00 AM CDT   Nurse Only with ALLERGY [facility] ([facility])    [address] [place] [address]   [phone]           Every allergy patient MUST wait 30 minutes after their allergy shot. No exceptions.  Xolair shots #1-3 should plan to wait 2 hours in clinic Xolair shots after #4 should plan 30 minute wait in clinic            Sep 22, 2017  7:00 AM CDT   Nurse Only with ALLERGY [facility] ([facility])    [address] [place] [address]   [phone]           Every allergy patient MUST wait 30  "minutes after their allergy shot. No exceptions.  Xolair shots #1-3 should plan to wait 2 hours in clinic Xolair shots after #4 should plan 30 minute wait in clinic            Sep 26, 2017  7:00 AM CDT   Nurse Only with ALLERGY [facility] ([facility])    [address] [address]   [phone]           Every allergy patient MUST wait 30 minutes after their allergy shot. No exceptions.  Xolair shots #1-3 should plan to wait 2 hours in clinic Xolair shots after #4 should plan 30 minute wait in clinic              Who to contact     If you have questions or need follow up information about today's clinic visit or your schedule please contact [facility] directly at [phone].  Normal or non-critical lab and imaging results will be communicated to you by [employer], letter or phone within 4 business days after the clinic has received the results. If you do not hear from us within 7 days, please contact the clinic through [employer] or phone. If you have a critical or abnormal lab result, we will notify you by phone as soon as possible.  Submit refill requests through [employer] or call your pharmacy and they will forward the refill request to us. Please allow 3 business days for your refill to be completed.          Additional Information About Your Visit        [employer] Information     [employer] lets you send messages to your doctor, view your test results, renew your prescriptions, schedule appointments and more. To sign up, go to [email or web address] . Click on \"Log in\" on the left side of the screen, which will take you to the Welcome page. Then click on \"Sign up Now\" on the right side of the page.     You will be asked to enter the access code listed below, as well as some personal information. Please follow the directions to create your username and password.     Your access code is: [email or web address]  Expires: 11/13/2017  7:40 AM     Your access " code will  in 90 days. If you need help or a new code, please call your [place] clinic or [phone].        Care EveryWhere ID     This is your Care EveryWhere ID. This could be used by other organizations to access your [place] medical records  [id number]        Your Vitals Were     Last Period                   2015 (Approximate)            Blood Pressure from Last 3 Encounters:   17 129/72   17 120/70   17 108/62    Weight from Last 3 Encounters:   17 210 lb 12.2 oz (95.6 kg)   17 208 lb (94.3 kg)   17 202 lb 12.8 oz (92 kg)              We Performed the Following     Allergy Shot: Two or more injections        Primary Care Provider Office Phone # Fax #    Cookie Conklin [phone]       [address] [place] [address]        Equal Access to Services     KODY CHEN : Hadii aad ku hadasho Soomaali, waaxda luqadaha, qaybta kaalmada adeegyada, waxay ellain hayjulio cesarn kanwal shen . So [facility] [phone].    ATENCIÓN: Si habla español, tiene a pruitt disposición servicios gratuitos de asistencia lingüística. Llame al [phone].    We comply with applicable federal civil rights laws and [place] laws. We do not discriminate on the basis of race, color, national origin, age, disability sex, sexual orientation or gender identity.            Thank you!     Thank you for choosing [facility]  for your care. Our goal is always to provide you with excellent care. Hearing back from our patients is one way we can continue to improve our services. Please take a few minutes to complete the written survey that you may receive in the mail after your visit with us. Thank you!             Your Updated Medication List - Protect others around you: Learn how to safely use, store and throw away your medicines at www.disposemymeds.org.          This list is accurate as of: 17  7:42 AM.  Always use your most recent med list.                    Brand Name Dispense Instructions for use Diagnosis    * albuterol (2.5 MG/3ML) 0.083% neb solution     1 Box    Take 1 vial (2.5 mg) by nebulization every 4 hours as needed    Moderate persistent asthma, uncomplicated       * albuterol 108 (90 BASE) MCG/ACT Inhaler    PROAIR HFA/PROVENTIL HFA/VENTOLIN HFA    1 Inhaler    Inhale 2 puffs into the lungs every 4 hours as needed    Moderate persistent asthma, uncomplicated       * ALLERGEN IMMUNOTHERAPY PRESCRIPTION     5 mL    Name of Mix: Mix #1  Mold Alternaria Tenuis GLY 1:10 w/v, HS  0.5 ml Aspergillus Fumigatus GLY 1:10 w/v, HS  0.5 ml Epicoccum Nigrum 1:10 w/v, HS 0.5 ml Hormodendrum Cladosporioides 1:10 w/v, HS 0.5 ml Penicillium Mix GLY 1:10 w/v, HS  0.5 ml Diluent: HSA qs to 5ml    Chronic allergic rhinitis due to animal hair and dander, Allergic rhinitis due to dust mite, Allergic rhinitis due to mold, Chronic seasonal allergic rhinitis due to pollen       * ALLERGEN IMMUNOTHERAPY PRESCRIPTION     5 mL    Name of Mix: Mix #2  Dust Mite, Cat, Dog Cat Hair, Standardized 10,000 BAU/mL, ALK  2.0 ml Dog Hair Dander, A. P.  1:100 w/v, HS  1.0 ml Dust Mites F 30,000AU/mL, HS  0.3 ml Dust Mites P. 30,000 AU/mL, HS  0.3 ml  Diluent: HSA qs to 5ml    Chronic allergic rhinitis due to animal hair and dander, Allergic rhinitis due to dust mite, Allergic rhinitis due to mold, Chronic seasonal allergic rhinitis due to pollen       * ALLERGEN IMMUNOTHERAPY PRESCRIPTION     5 mL    Name of Mix: Mix #3 Grass,Tree  Dmitry,White GLY 1:20w/v, HS 0.5ml Birch Mix GLY 1:20w/v, HS 0.5ml Boxelder-Maple Mix BHR (Boxelder Hard Red) 1:20w/v, HS 0.5ml [place],Common GLY 1:20w/v, HS 0.5ml Elm,American GLY 1:20w/v, HS 0.5ml [place] Mix GLY 1:20w/v, HS 0.5ml Oak Mix RVW GLY 1:20w/v, HS 0.5ml [place] Tree,Black GLY 1:20w/v, HS 0.5ml Grass Mix #7 100,000 BAU/mL, HS 0.4ml Jonathan Grass 1:20w/v, HS 0.5ml Diluent: HSA qs to 5ml    Chronic allergic rhinitis due to animal hair and dander,  Allergic rhinitis due to dust mite, Allergic rhinitis due to mold, Chronic seasonal allergic rhinitis due to pollen       * ALLERGEN IMMUNOTHERAPY PRESCRIPTION     5 mL    Name of Mix: Mix #4  Weeds Kochia GLY 1:20 w/v, HS 0.5 ml Lamb's Quarters GLY 1:20 w/v, HS 0.5 ml Nettle GLY 1:20 w/v, HS 0.5 ml Plantain, English GLY 1:20 w/v, HS 0.5 ml Ragweed Mixed 1:20 w/v ALK  0.5 ml Russian Thistle GLY 1:20 w/v, HS 0.5 ml Sagebrush, Mugwort GLY 1:20 w/v, HS 0.5 ml Sorrel, Sheep GLY 1:20 w/v, HS 0.5 ml Diluent: HSA qs to 5ml    Chronic allergic rhinitis due to animal hair and dander, Allergic rhinitis due to dust mite, Allergic rhinitis due to mold, Chronic seasonal allergic rhinitis due to pollen       azelastine 0.05 % Soln ophthalmic solution    OPTIVAR    1 Bottle    Apply 1 drop to eye 2 times daily    Conjunctivitis, allergic, unspecified laterality       CERAVE Crea     2 Bottle    Externally apply 1 dose * topically 2 times daily    Eczema, unspecified type       EPINEPHrine 0.3 MG/0.3ML injection 2-pack    EPIPEN/ADRENACLICK/or ANY BX GENERIC EQUIV    0.6 mL    Inject 0.3 mLs (0.3 mg) into the muscle once as needed for anaphylaxis    Food allergy, Need for desensitization to allergens       fluticasone-salmeterol 500-50 MCG/DOSE diskus inhaler    ADVAIR    3 Inhaler    Inhale 1 puff into the lungs 2 times daily    Moderate persistent asthma, uncomplicated       loratadine 10 MG tablet    CLARITIN    30 tablet    Take 1 tablet (10 mg) by mouth daily    Seasonal allergic rhinitis, unspecified allergic rhinitis trigger, Chronic allergic rhinitis, Conjunctivitis, allergic, unspecified laterality       montelukast 10 MG tablet    SINGULAIR    30 tablet    Take 1 tablet (10 mg) by mouth At Bedtime    Moderate persistent asthma, uncomplicated, Seasonal allergic rhinitis, unspecified allergic rhinitis trigger, Chronic allergic rhinitis       MULTIVITAMIN PO      1 tab daily        triamcinolone 0.1 % cream    KENALOG    80  g    Apply  topically 2 times daily as needed.    Eczema, unspecified type       * Notice:  This list has 6 medication(s) that are the same as other medications prescribed for you. Read the directions carefully, and ask your doctor or other care provider to review them with you.

## 2017-08-22 ENCOUNTER — ALLIED HEALTH/NURSE VISIT (OUTPATIENT)
Dept: ALLERGY | Facility: CLINIC | Age: 52
End: 2017-08-22
Payer: COMMERCIAL

## 2017-08-22 DIAGNOSIS — J30.9 ALLERGIC RHINITIS, UNSPECIFIED: Primary | ICD-10-CM

## 2017-08-22 PROCEDURE — 95117 IMMUNOTHERAPY INJECTIONS: CPT

## 2017-08-22 NOTE — MR AVS SNAPSHOT
After Visit Summary   8/22/2017    Dilia Solomon    MRN: [id number]           Patient Information     Date Of Birth          1965        Visit Information        Provider Department      8/22/2017 7:00 AM ALLERGY [facility]        Today's Diagnoses     Allergic rhinitis, unspecified    -  1       Follow-ups after your visit        Your next 10 appointments already scheduled     Aug 25, 2017  7:00 AM CDT   Nurse Only with ALLERGY [facility] ([facility])    [address] [address]   [phone]           Every allergy patient MUST wait 30 minutes after their allergy shot. No exceptions.  Xolair shots #1-3 should plan to wait 2 hours in clinic Xolair shots after #4 should plan 30 minute wait in clinic            Aug 29, 2017  7:00 AM CDT   Nurse Only with ALLERGY [facility] ([facility])    [address] [address]   [phone]           Every allergy patient MUST wait 30 minutes after their allergy shot. No exceptions.  Xolair shots #1-3 should plan to wait 2 hours in clinic Xolair shots after #4 should plan 30 minute wait in clinic            Sep 05, 2017  7:15 AM CDT   Nurse Only with ALLERGY [facility] ([facility])    [address] [address]   [phone]           Every allergy patient MUST wait 30 minutes after their allergy shot. No exceptions.  Xolair shots #1-3 should plan to wait 2 hours in clinic Xolair shots after #4 should plan 30 minute wait in clinic            Sep 08, 2017  7:00 AM CDT   Nurse Only with ALLERGY [facility] ([facility])    [address] [facility] [address] [facility] [place] [address]   [phone]           Every allergy patient MUST wait 30 minutes after their allergy shot. No exceptions.  Xolair shots #1-3 should  plan to wait 2 hours in clinic Xolair shots after #4 should plan 30 minute wait in clinic            Sep 12, 2017  7:00 AM CDT   Nurse Only with ALLERGY [facility] ([facility])    [address] [facility] [place] [address]   [phone]           Every allergy patient MUST wait 30 minutes after their allergy shot. No exceptions.  Xolair shots #1-3 should plan to wait 2 hours in clinic Xolair shots after #4 should plan 30 minute wait in clinic            Sep 15, 2017  7:00 AM CDT   Nurse Only with ALLERGY [facility] ([facility])    [address] [facility] [place] [address]   [phone]           Every allergy patient MUST wait 30 minutes after their allergy shot. No exceptions.  Xolair shots #1-3 should plan to wait 2 hours in clinic Xolair shots after #4 should plan 30 minute wait in clinic            Sep 19, 2017  7:00 AM CDT   Nurse Only with ALLERGY [facility] ([facility])    [address] [facility] [place] [address]   [phone]           Every allergy patient MUST wait 30 minutes after their allergy shot. No exceptions.  Xolair shots #1-3 should plan to wait 2 hours in clinic Xolair shots after #4 should plan 30 minute wait in clinic            Sep 22, 2017  7:00 AM CDT   Nurse Only with ALLERGY [facility] ([facility])    [address] [facility] [facility] [place] [address]   [phone]           Every allergy patient MUST wait 30 minutes after their allergy shot. No exceptions.  Xolair shots #1-3 should plan to wait 2 hours in clinic Xolair shots after #4 should plan 30 minute wait in clinic            Sep 26, 2017  7:00 AM CDT   Nurse Only with ALLERGY [facility] ([facility])    [address] [facility] [facility] [place] [address]   [phone]           Every allergy patient MUST wait 30  "minutes after their allergy shot. No exceptions.  Xolair shots #1-3 should plan to wait 2 hours in clinic Xolair shots after #4 should plan 30 minute wait in clinic            Sep 29, 2017  7:00 AM CDT   Nurse Only with ALLERGY [facility] ([facility])    [address] [facility] [address]   [phone]           Every allergy patient MUST wait 30 minutes after their allergy shot. No exceptions.  Xolair shots #1-3 should plan to wait 2 hours in clinic Xolair shots after #4 should plan 30 minute wait in clinic              Who to contact     If you have questions or need follow up information about today's clinic visit or your schedule please contact [facility] directly at [phone].  Normal or non-critical lab and imaging results will be communicated to you by Yuuguuhart, letter or phone within 4 business days after the clinic has received the results. If you do not hear from us within 7 days, please contact the clinic through Yuuguuhart or phone. If you have a critical or abnormal lab result, we will notify you by phone as soon as possible.  Submit refill requests through SNAPP' or call your pharmacy and they will forward the refill request to us. Please allow 3 business days for your refill to be completed.          Additional Information About Your Visit        YuuguuharHackerRank Information     SNAPP' lets you send messages to your doctor, view your test results, renew your prescriptions, schedule appointments and more. To sign up, go to [email or web address] . Click on \"Log in\" on the left side of the screen, which will take you to the Welcome page. Then click on \"Sign up Now\" on the right side of the page.     You will be asked to enter the access code listed below, as well as some personal information. Please follow the directions to create your username and password.     Your access code is: [email or web address]  Expires: 11/13/2017  7:40 AM     Your access " code will  in 90 days. If you need help or a new code, please call your [place] clinic or [phone].        Care EveryWhere ID     This is your Care EveryWhere ID. This could be used by other organizations to access your [place] medical records  [id number]        Your Vitals Were     Last Period                   2015 (Approximate)            Blood Pressure from Last 3 Encounters:   17 129/72   17 120/70   17 108/62    Weight from Last 3 Encounters:   17 210 lb 12.2 oz (95.6 kg)   17 208 lb (94.3 kg)   17 202 lb 12.8 oz (92 kg)              We Performed the Following     Allergy Shot: Two or more injections        Primary Care Provider Office Phone # Fax #    Cookie Conklin [phone]       [address] [place] [address]        Equal Access to Services     KODY CHEN : Hadii aad ku hadasho Soomaali, waaxda luqadaha, qaybta kaalmada adeegyada, waxay ellain hayjulio cesarn kanwal shen . So [facility] [phone].    ATENCIÓN: Si habla español, tiene a pruitt disposición servicios gratuitos de asistencia lingüística. Llame al [phone].    We comply with applicable federal civil rights laws and [place] laws. We do not discriminate on the basis of race, color, national origin, age, disability sex, sexual orientation or gender identity.            Thank you!     Thank you for choosing [facility]  for your care. Our goal is always to provide you with excellent care. Hearing back from our patients is one way we can continue to improve our services. Please take a few minutes to complete the written survey that you may receive in the mail after your visit with us. Thank you!             Your Updated Medication List - Protect others around you: Learn how to safely use, store and throw away your medicines at www.disposemymeds.org.          This list is accurate as of: 17  7:53 AM.  Always use your most recent med list.                    Brand Name Dispense Instructions for use Diagnosis    * albuterol (2.5 MG/3ML) 0.083% neb solution     1 Box    Take 1 vial (2.5 mg) by nebulization every 4 hours as needed    Moderate persistent asthma, uncomplicated       * albuterol 108 (90 BASE) MCG/ACT Inhaler    PROAIR HFA/PROVENTIL HFA/VENTOLIN HFA    1 Inhaler    Inhale 2 puffs into the lungs every 4 hours as needed    Moderate persistent asthma, uncomplicated       * ALLERGEN IMMUNOTHERAPY PRESCRIPTION     5 mL    Name of Mix: Mix #1  Mold Alternaria Tenuis GLY 1:10 w/v, HS  0.5 ml Aspergillus Fumigatus GLY 1:10 w/v, HS  0.5 ml Epicoccum Nigrum 1:10 w/v, HS 0.5 ml Hormodendrum Cladosporioides 1:10 w/v, HS 0.5 ml Penicillium Mix GLY 1:10 w/v, HS  0.5 ml Diluent: HSA qs to 5ml    Chronic allergic rhinitis due to animal hair and dander, Allergic rhinitis due to dust mite, Allergic rhinitis due to mold, Chronic seasonal allergic rhinitis due to pollen       * ALLERGEN IMMUNOTHERAPY PRESCRIPTION     5 mL    Name of Mix: Mix #2  Dust Mite, Cat, Dog Cat Hair, Standardized 10,000 BAU/mL, ALK  2.0 ml Dog Hair Dander, A. P.  1:100 w/v, HS  1.0 ml Dust Mites F 30,000AU/mL, HS  0.3 ml Dust Mites P. 30,000 AU/mL, HS  0.3 ml  Diluent: HSA qs to 5ml    Chronic allergic rhinitis due to animal hair and dander, Allergic rhinitis due to dust mite, Allergic rhinitis due to mold, Chronic seasonal allergic rhinitis due to pollen       * ALLERGEN IMMUNOTHERAPY PRESCRIPTION     5 mL    Name of Mix: Mix #3 Grass,Tree  Dmitry,White GLY 1:20w/v, HS 0.5ml Birch Mix GLY 1:20w/v, HS 0.5ml Boxelder-Maple Mix BHR (Boxelder Hard Red) 1:20w/v, HS 0.5ml [place],Common GLY 1:20w/v, HS 0.5ml Elm,American GLY 1:20w/v, HS 0.5ml [place] Mix GLY 1:20w/v, HS 0.5ml Oak Mix RVW GLY 1:20w/v, HS 0.5ml [place] Tree,Black GLY 1:20w/v, HS 0.5ml Grass Mix #7 100,000 BAU/mL, HS 0.4ml Jonathan Grass 1:20w/v, HS 0.5ml Diluent: HSA qs to 5ml    Chronic allergic rhinitis due to animal hair and dander,  Allergic rhinitis due to dust mite, Allergic rhinitis due to mold, Chronic seasonal allergic rhinitis due to pollen       * ALLERGEN IMMUNOTHERAPY PRESCRIPTION     5 mL    Name of Mix: Mix #4  Weeds Kochia GLY 1:20 w/v, HS 0.5 ml Lamb's Quarters GLY 1:20 w/v, HS 0.5 ml Nettle GLY 1:20 w/v, HS 0.5 ml Plantain, English GLY 1:20 w/v, HS 0.5 ml Ragweed Mixed 1:20 w/v ALK  0.5 ml Russian Thistle GLY 1:20 w/v, HS 0.5 ml Sagebrush, Mugwort GLY 1:20 w/v, HS 0.5 ml Sorrel, Sheep GLY 1:20 w/v, HS 0.5 ml Diluent: HSA qs to 5ml    Chronic allergic rhinitis due to animal hair and dander, Allergic rhinitis due to dust mite, Allergic rhinitis due to mold, Chronic seasonal allergic rhinitis due to pollen       azelastine 0.05 % Soln ophthalmic solution    OPTIVAR    1 Bottle    Apply 1 drop to eye 2 times daily    Conjunctivitis, allergic, unspecified laterality       CERAVE Crea     2 Bottle    Externally apply 1 dose * topically 2 times daily    Eczema, unspecified type       EPINEPHrine 0.3 MG/0.3ML injection 2-pack    EPIPEN/ADRENACLICK/or ANY BX GENERIC EQUIV    0.6 mL    Inject 0.3 mLs (0.3 mg) into the muscle once as needed for anaphylaxis    Food allergy, Need for desensitization to allergens       fluticasone-salmeterol 500-50 MCG/DOSE diskus inhaler    ADVAIR    3 Inhaler    Inhale 1 puff into the lungs 2 times daily    Moderate persistent asthma, uncomplicated       loratadine 10 MG tablet    CLARITIN    30 tablet    Take 1 tablet (10 mg) by mouth daily    Seasonal allergic rhinitis, unspecified allergic rhinitis trigger, Chronic allergic rhinitis, Conjunctivitis, allergic, unspecified laterality       montelukast 10 MG tablet    SINGULAIR    30 tablet    Take 1 tablet (10 mg) by mouth At Bedtime    Moderate persistent asthma, uncomplicated, Seasonal allergic rhinitis, unspecified allergic rhinitis trigger, Chronic allergic rhinitis       MULTIVITAMIN PO      1 tab daily        triamcinolone 0.1 % cream    KENALOG    80  g    Apply  topically 2 times daily as needed.    Eczema, unspecified type       * Notice:  This list has 6 medication(s) that are the same as other medications prescribed for you. Read the directions carefully, and ask your doctor or other care provider to review them with you.

## 2017-08-29 ENCOUNTER — ALLIED HEALTH/NURSE VISIT (OUTPATIENT)
Dept: ALLERGY | Facility: CLINIC | Age: 52
End: 2017-08-29
Payer: COMMERCIAL

## 2017-08-29 DIAGNOSIS — J30.9 ALLERGIC RHINITIS, UNSPECIFIED: Primary | ICD-10-CM

## 2017-08-29 PROCEDURE — 95117 IMMUNOTHERAPY INJECTIONS: CPT

## 2017-08-29 NOTE — MR AVS SNAPSHOT
After Visit Summary   8/29/2017    Dilia Solomon    MRN: [id number]           Patient Information     Date Of Birth          1965        Visit Information        Provider Department      8/29/2017 7:00 AM ALLERGY [facility]        Today's Diagnoses     Allergic rhinitis, unspecified    -  1       Follow-ups after your visit        Your next 10 appointments already scheduled     Sep 05, 2017  7:15 AM CDT   Nurse Only with ALLERGY [facility] ([facility])    [address] [place] [address]   [phone]           Every allergy patient MUST wait 30 minutes after their allergy shot. No exceptions.  Xolair shots #1-3 should plan to wait 2 hours in clinic Xolair shots after #4 should plan 30 minute wait in clinic            Sep 08, 2017  7:00 AM CDT   Nurse Only with ALLERGY [facility] ([facility])    [address] [facility] [address]   [phone]           Every allergy patient MUST wait 30 minutes after their allergy shot. No exceptions.  Xolair shots #1-3 should plan to wait 2 hours in clinic Xolair shots after #4 should plan 30 minute wait in clinic            Sep 12, 2017  7:00 AM CDT   Nurse Only with ALLERGY [facility] ([facility])    [address] [address]   [phone]           Every allergy patient MUST wait 30 minutes after their allergy shot. No exceptions.  Xolair shots #1-3 should plan to wait 2 hours in clinic Xolair shots after #4 should plan 30 minute wait in clinic            Sep 15, 2017  7:00 AM CDT   Nurse Only with ALLERGY [facility] ([facility])    [address] [facility] [place] [address]   [phone]           Every allergy patient MUST wait 30 minutes after their allergy shot. No exceptions.  Xolair shots #1-3 should  plan to wait 2 hours in clinic Xolair shots after #4 should plan 30 minute wait in clinic            Sep 19, 2017  7:00 AM CDT   Nurse Only with ALLERGY [facility] ([facility])    [address] [facility] [place] [address]   [phone]           Every allergy patient MUST wait 30 minutes after their allergy shot. No exceptions.  Xolair shots #1-3 should plan to wait 2 hours in clinic Xolair shots after #4 should plan 30 minute wait in clinic            Sep 22, 2017  7:00 AM CDT   Nurse Only with ALLERGY [facility] ([facility])    [address] [facility] [address]   [phone]           Every allergy patient MUST wait 30 minutes after their allergy shot. No exceptions.  Xolair shots #1-3 should plan to wait 2 hours in clinic Xolair shots after #4 should plan 30 minute wait in clinic            Sep 26, 2017  7:00 AM CDT   Nurse Only with ALLERGY [facility] ([facility])    [address] [facility] [address]   [phone]           Every allergy patient MUST wait 30 minutes after their allergy shot. No exceptions.  Xolair shots #1-3 should plan to wait 2 hours in clinic Xolair shots after #4 should plan 30 minute wait in clinic            Sep 29, 2017  7:00 AM CDT   Nurse Only with ALLERGY [facility] ([facility])    [address] [facility] [address]   [phone]           Every allergy patient MUST wait 30 minutes after their allergy shot. No exceptions.  Xolair shots #1-3 should plan to wait 2 hours in clinic Xolair shots after #4 should plan 30 minute wait in clinic              Who to contact     If you have questions or need follow up information about today's clinic visit or your schedule please contact [facility] directly at [phone].  Normal or non-critical lab and imaging  "results will be communicated to you by MyChart, letter or phone within 4 business days after the clinic has received the results. If you do not hear from us within 7 days, please contact the clinic through Tristart or phone. If you have a critical or abnormal lab result, we will notify you by phone as soon as possible.  Submit refill requests through Absolute Antibody or call your pharmacy and they will forward the refill request to us. Please allow 3 business days for your refill to be completed.          Additional Information About Your Visit        InThrMaharWadeCo Specialties Information     Absolute Antibody lets you send messages to your doctor, view your test results, renew your prescriptions, schedule appointments and more. To sign up, go to [email or web address] . Click on \"Log in\" on the left side of the screen, which will take you to the Welcome page. Then click on \"Sign up Now\" on the right side of the page.     You will be asked to enter the access code listed below, as well as some personal information. Please follow the directions to create your username and password.     Your access code is: [email or web address]  Expires: 2017  7:40 AM     Your access code will  in 90 days. If you need help or a new code, please call your [place] clinic or [phone].        Care EveryWhere ID     This is your Care EveryWhere ID. This could be used by other organizations to access your [place] medical records  [id number]        Your Vitals Were     Last Period                   2015 (Approximate)            Blood Pressure from Last 3 Encounters:   17 129/72   17 120/70   17 108/62    Weight from Last 3 Encounters:   17 210 lb 12.2 oz (95.6 kg)   17 208 lb (94.3 kg)   17 202 lb 12.8 oz (92 kg)              We Performed the Following     Allergy Shot: Two or more injections        Primary Care Provider Office Phone # Fax #    Cookie Conklin [phone]       [address] [place] [address] " [phone]        Equal Access to Services     [facility]: Hadii aad ku matti Anguiano, waaxda luqadaha, qaybta kaalmada anna, hieu america rudyjoy schofiedl willem hermelinda . So [facility] [phone].    ATENCIÓN: Si habla español, tiene a pruitt disposición servicios gratuitos de asistencia lingüística. Jenniferame al [phone].    We comply with applicable federal civil rights laws and [place] laws. We do not discriminate on the basis of race, color, national origin, age, disability sex, sexual orientation or gender identity.            Thank you!     Thank you for choosing [facility]  for your care. Our goal is always to provide you with excellent care. Hearing back from our patients is one way we can continue to improve our services. Please take a few minutes to complete the written survey that you may receive in the mail after your visit with us. Thank you!             Your Updated Medication List - Protect others around you: Learn how to safely use, store and throw away your medicines at www.disposemymeds.org.          This list is accurate as of: 8/29/17  9:57 AM.  Always use your most recent med list.                   Brand Name Dispense Instructions for use Diagnosis    * albuterol (2.5 MG/3ML) 0.083% neb solution     1 Box    Take 1 vial (2.5 mg) by nebulization every 4 hours as needed    Moderate persistent asthma, uncomplicated       * albuterol 108 (90 BASE) MCG/ACT Inhaler    PROAIR HFA/PROVENTIL HFA/VENTOLIN HFA    1 Inhaler    Inhale 2 puffs into the lungs every 4 hours as needed    Moderate persistent asthma, uncomplicated       * ALLERGEN IMMUNOTHERAPY PRESCRIPTION     5 mL    Name of Mix: Mix #1  Mold Alternaria Tenuis GLY 1:10 w/v, HS  0.5 ml Aspergillus Fumigatus GLY 1:10 w/v, HS  0.5 ml Epicoccum Nigrum 1:10 w/v, HS 0.5 ml Hormodendrum Cladosporioides 1:10 w/v, HS 0.5 ml Penicillium Mix GLY 1:10 w/v, HS  0.5 ml Diluent: HSA qs to 5ml    Chronic allergic rhinitis due to animal hair and  dander, Allergic rhinitis due to dust mite, Allergic rhinitis due to mold, Chronic seasonal allergic rhinitis due to pollen       * ALLERGEN IMMUNOTHERAPY PRESCRIPTION     5 mL    Name of Mix: Mix #2  Dust Mite, Cat, Dog Cat Hair, Standardized 10,000 BAU/mL, ALK  2.0 ml Dog Hair Dander, A. P.  1:100 w/v, HS  1.0 ml Dust Mites F 30,000AU/mL, HS  0.3 ml Dust Mites P. 30,000 AU/mL, HS  0.3 ml  Diluent: HSA qs to 5ml    Chronic allergic rhinitis due to animal hair and dander, Allergic rhinitis due to dust mite, Allergic rhinitis due to mold, Chronic seasonal allergic rhinitis due to pollen       * ALLERGEN IMMUNOTHERAPY PRESCRIPTION     5 mL    Name of Mix: Mix #3 Grass,Tree  Dmitry,White GLY 1:20w/v, HS 0.5ml Birch Mix GLY 1:20w/v, HS 0.5ml Boxelder-Maple Mix BHR (Boxelder Hard Red) 1:20w/v, HS 0.5ml [place],Common GLY 1:20w/v, HS 0.5ml Elm,American GLY 1:20w/v, HS 0.5ml [place] Mix GLY 1:20w/v, HS 0.5ml Oak Mix RVW GLY 1:20w/v, HS 0.5ml [place] Tree,Black GLY 1:20w/v, HS 0.5ml Grass Mix #7 100,000 BAU/mL, HS 0.4ml Jonathan Grass 1:20w/v, HS 0.5ml Diluent: HSA qs to 5ml    Chronic allergic rhinitis due to animal hair and dander, Allergic rhinitis due to dust mite, Allergic rhinitis due to mold, Chronic seasonal allergic rhinitis due to pollen       * ALLERGEN IMMUNOTHERAPY PRESCRIPTION     5 mL    Name of Mix: Mix #4  Weeds Kochia GLY 1:20 w/v, HS 0.5 ml Lamb's Quarters GLY 1:20 w/v, HS 0.5 ml Nettle GLY 1:20 w/v, HS 0.5 ml Plantain, English GLY 1:20 w/v, HS 0.5 ml Ragweed Mixed 1:20 w/v ALK  0.5 ml Russian Thistle GLY 1:20 w/v, HS 0.5 ml Sagebrush, Mugwort GLY 1:20 w/v, HS 0.5 ml Sorrel, Sheep GLY 1:20 w/v, HS 0.5 ml Diluent: HSA qs to 5ml    Chronic allergic rhinitis due to animal hair and dander, Allergic rhinitis due to dust mite, Allergic rhinitis due to mold, Chronic seasonal allergic rhinitis due to pollen       azelastine 0.05 % Soln ophthalmic solution    OPTIVAR    1 Bottle    Apply 1 drop to eye 2 times daily     Conjunctivitis, allergic, unspecified laterality       CERAVE Crea     2 Bottle    Externally apply 1 dose * topically 2 times daily    Eczema, unspecified type       EPINEPHrine 0.3 MG/0.3ML injection 2-pack    EPIPEN/ADRENACLICK/or ANY BX GENERIC EQUIV    0.6 mL    Inject 0.3 mLs (0.3 mg) into the muscle once as needed for anaphylaxis    Food allergy, Need for desensitization to allergens       fluticasone-salmeterol 500-50 MCG/DOSE diskus inhaler    ADVAIR    3 Inhaler    Inhale 1 puff into the lungs 2 times daily    Moderate persistent asthma, uncomplicated       loratadine 10 MG tablet    CLARITIN    30 tablet    Take 1 tablet (10 mg) by mouth daily    Seasonal allergic rhinitis, unspecified allergic rhinitis trigger, Chronic allergic rhinitis, Conjunctivitis, allergic, unspecified laterality       montelukast 10 MG tablet    SINGULAIR    30 tablet    Take 1 tablet (10 mg) by mouth At Bedtime    Moderate persistent asthma, uncomplicated, Seasonal allergic rhinitis, unspecified allergic rhinitis trigger, Chronic allergic rhinitis       MULTIVITAMIN PO      1 tab daily        triamcinolone 0.1 % cream    KENALOG    80 g    Apply  topically 2 times daily as needed.    Eczema, unspecified type       * Notice:  This list has 6 medication(s) that are the same as other medications prescribed for you. Read the directions carefully, and ask your doctor or other care provider to review them with you.

## 2017-09-01 ENCOUNTER — OFFICE VISIT (OUTPATIENT)
Dept: FAMILY MEDICINE | Facility: CLINIC | Age: 52
End: 2017-09-01
Payer: COMMERCIAL

## 2017-09-01 VITALS
TEMPERATURE: 97.2 F | HEIGHT: 63 IN | HEART RATE: 72 BPM | WEIGHT: 208.3 LBS | SYSTOLIC BLOOD PRESSURE: 122 MMHG | DIASTOLIC BLOOD PRESSURE: 81 MMHG | BODY MASS INDEX: 36.91 KG/M2

## 2017-09-01 DIAGNOSIS — L30.9 DERMATITIS: Primary | ICD-10-CM

## 2017-09-01 PROCEDURE — 99213 OFFICE O/P EST LOW 20 MIN: CPT | Performed by: NURSE PRACTITIONER

## 2017-09-01 RX ORDER — PREDNISONE 20 MG/1
20 TABLET ORAL 2 TIMES DAILY
Qty: 10 TABLET | Refills: 0 | Status: SHIPPED | OUTPATIENT
Start: 2017-09-01 | End: 2017-09-06

## 2017-09-01 RX ORDER — TRIAMCINOLONE ACETONIDE 1 MG/G
CREAM TOPICAL
Qty: 80 G | Refills: 0 | Status: SHIPPED | OUTPATIENT
Start: 2017-09-01 | End: 2017-10-20

## 2017-09-01 NOTE — NURSING NOTE
"Chief Complaint   Patient presents with     Derm Problem     Rash for one week        Initial /81  Pulse 72  Temp 97.2  F (36.2  C) (Tympanic)  Ht 5' 3.25\" (1.607 m)  Wt 208 lb 4.8 oz (94.5 kg)  LMP 07/18/2015 (Approximate)  BMI 36.61 kg/m2 Estimated body mass index is 36.61 kg/(m^2) as calculated from the following:    Height as of this encounter: 5' 3.25\" (1.607 m).    Weight as of this encounter: 208 lb 4.8 oz (94.5 kg).  Medication Reconciliation: complete  "

## 2017-09-01 NOTE — PATIENT INSTRUCTIONS
Kenalog apply 3 times daily for up to 14 days  Prednisone 20 mg twice daily for 5 days  May also take Claritin 10 mg daily

## 2017-09-01 NOTE — MR AVS SNAPSHOT
After Visit Summary   9/1/2017    Dilia Solomon    MRN: [id number]           Patient Information     Date Of Birth          1965        Visit Information        Provider Department      9/1/2017 7:00 AM Bonnie Carrillo APRN CNP [facility]        Today's Diagnoses     Dermatitis    -  1      Care Instructions    Kenalog apply 3 times daily for up to 14 days  Prednisone 20 mg twice daily for 5 days  May also take Claritin 10 mg daily                Follow-ups after your visit        Your next 10 appointments already scheduled     Sep 05, 2017  7:15 AM CDT   Nurse Only with [facility]    [address] [facility] [address]   [phone]           Every allergy patient MUST wait 30 minutes after their allergy shot. No exceptions.  Xolair shots #1-3 should plan to wait 2 hours in clinic Xolair shots after #4 should plan 30 minute wait in clinic            Sep 08, 2017  7:00 AM CDT   Nurse Only with ALLERGY [facility] ([facility])    [address] [facility] [address]   [phone]           Every allergy patient MUST wait 30 minutes after their allergy shot. No exceptions.  Xolair shots #1-3 should plan to wait 2 hours in clinic Xolair shots after #4 should plan 30 minute wait in clinic            Sep 12, 2017  7:00 AM CDT   Nurse Only with ALLERGY [facility] ([facility])    [address] [facility] [address]   [phone]           Every allergy patient MUST wait 30 minutes after their allergy shot. No exceptions.  Xolair shots #1-3 should plan to wait 2 hours in clinic Xolair shots after #4 should plan 30 minute wait in clinic            Sep 15, 2017  7:00 AM CDT   Nurse Only with ALLERGY [facility] ([facility])    [address] [facility] [place]  [address]   [phone]           Every allergy patient MUST wait 30 minutes after their allergy shot. No exceptions.  Xolair shots #1-3 should plan to wait 2 hours in clinic Xolair shots after #4 should plan 30 minute wait in clinic            Sep 19, 2017  7:00 AM CDT   Nurse Only with ALLERGY [facility] ([facility])    [address] [facility] [place] [address]   [phone]           Every allergy patient MUST wait 30 minutes after their allergy shot. No exceptions.  Xolair shots #1-3 should plan to wait 2 hours in clinic Xolair shots after #4 should plan 30 minute wait in clinic            Sep 22, 2017  7:00 AM CDT   Nurse Only with ALLERGY [facility] ([facility])    [address] [facility] [place] [address]   [phone]           Every allergy patient MUST wait 30 minutes after their allergy shot. No exceptions.  Xolair shots #1-3 should plan to wait 2 hours in clinic Xolair shots after #4 should plan 30 minute wait in clinic            Sep 26, 2017  7:00 AM CDT   Nurse Only with ALLERGY [facility] ([facility])    [address] [facility] [place] [address]   [phone]           Every allergy patient MUST wait 30 minutes after their allergy shot. No exceptions.  Xolair shots #1-3 should plan to wait 2 hours in clinic Xolair shots after #4 should plan 30 minute wait in clinic            Sep 29, 2017  7:00 AM CDT   Nurse Only with ALLERGY [facility] ([facility])    [address] [facility] [place] [address]   [phone]           Every allergy patient MUST wait 30 minutes after their allergy shot. No exceptions.  Xolair shots #1-3 should plan to wait 2 hours in clinic Xolair shots after #4 should plan 30 minute wait in clinic              Who to contact     If you have questions or need follow up information about  "today's clinic visit or your schedule please contact [facility] directly at [phone].  Normal or non-critical lab and imaging results will be communicated to you by MyChart, letter or phone within 4 business days after the clinic has received the results. If you do not hear from us within 7 days, please contact the clinic through [employer] or phone. If you have a critical or abnormal lab result, we will notify you by phone as soon as possible.  Submit refill requests through [employer] or call your pharmacy and they will forward the refill request to us. Please allow 3 business days for your refill to be completed.          Additional Information About Your Visit        [employer] Information     [employer] lets you send messages to your doctor, view your test results, renew your prescriptions, schedule appointments and more. To sign up, go to [email or web address] . Click on \"Log in\" on the left side of the screen, which will take you to the Welcome page. Then click on \"Sign up Now\" on the right side of the page.     You will be asked to enter the access code listed below, as well as some personal information. Please follow the directions to create your username and password.     Your access code is: [email or web address]  Expires: 2017  7:40 AM     Your access code will  in 90 days. If you need help or a new code, please call your [place] clinic or [phone].        Care EveryWhere ID     This is your Care EveryWhere ID. This could be used by other organizations to access your [place] medical records  [id number]        Your Vitals Were     Pulse Temperature Height Last Period BMI (Body Mass Index)       72 97.2  F (36.2  C) (Tympanic) 5' 3.25\" (1.607 m) 2015 (Approximate) 36.61 kg/m2        Blood Pressure from Last 3 Encounters:   17 122/81   17 129/72   17 120/70    Weight from Last 3 Encounters:   17 208 lb 4.8 oz (94.5 kg)   17 210 lb 12.2 oz (95.6 kg) "   05/16/17 208 lb (94.3 kg)              Today, you had the following     No orders found for display         Today's Medication Changes          These changes are accurate as of: 9/1/17  7:26 AM.  If you have any questions, ask your nurse or doctor.               Start taking these medicines.        Dose/Directions    predniSONE 20 MG tablet   Commonly known as:  DELTASONE   Used for:  Dermatitis   Started by:  Bonnie Carrillo APRN CNP        Dose:  20 mg   Take 1 tablet (20 mg) by mouth 2 times daily   Quantity:  10 tablet   Refills:  0         These medicines have changed or have updated prescriptions.        Dose/Directions    * triamcinolone 0.1 % cream   Commonly known as:  KENALOG   This may have changed:  Another medication with the same name was added. Make sure you understand how and when to take each.   Used for:  Eczema, unspecified type   Changed by:  Butch Horowitz MD        Apply  topically 2 times daily as needed.   Quantity:  80 g   Refills:  2       * triamcinolone 0.1 % cream   Commonly known as:  KENALOG   This may have changed:  You were already taking a medication with the same name, and this prescription was added. Make sure you understand how and when to take each.   Used for:  Dermatitis   Changed by:  Bonnie Carrillo APRN CNP        Apply sparingly to affected area three times daily as needed   Quantity:  80 g   Refills:  0       * Notice:  This list has 2 medication(s) that are the same as other medications prescribed for you. Read the directions carefully, and ask your doctor or other care provider to review them with you.         Where to get your medicines      These medications were sent to [facility] [address] [address]     Phone:  [phone]     predniSONE 20 MG tablet    triamcinolone 0.1 % cream                Primary Care Provider Office Phone # Fax #    Cookie Conklin [phone]  [phone]       [address] [place] [id number]        Equal Access to Services     KODY APRIL : Hadii aad ku hadneymaro Sokashmirali, waaxda luqadaha, qaybta kaalmada ademelida, hieu ellain hayaajoy de leontanika bangura hermelinda . So [facility] [phone].    ATENCIÓN: Si habla español, tiene a pruitt disposición servicios gratuitos de asistencia lingüística. Llame al [phone].    We comply with applicable federal civil rights laws and [place] laws. We do not discriminate on the basis of race, color, national origin, age, disability sex, sexual orientation or gender identity.            Thank you!     Thank you for choosing [facility]  for your care. Our goal is always to provide you with excellent care. Hearing back from our patients is one way we can continue to improve our services. Please take a few minutes to complete the written survey that you may receive in the mail after your visit with us. Thank you!             Your Updated Medication List - Protect others around you: Learn how to safely use, store and throw away your medicines at www.disposemymeds.org.          This list is accurate as of: 9/1/17  7:26 AM.  Always use your most recent med list.                   Brand Name Dispense Instructions for use Diagnosis    * albuterol (2.5 MG/3ML) 0.083% neb solution     1 Box    Take 1 vial (2.5 mg) by nebulization every 4 hours as needed    Moderate persistent asthma, uncomplicated       * albuterol 108 (90 BASE) MCG/ACT Inhaler    PROAIR HFA/PROVENTIL HFA/VENTOLIN HFA    1 Inhaler    Inhale 2 puffs into the lungs every 4 hours as needed    Moderate persistent asthma, uncomplicated       * ALLERGEN IMMUNOTHERAPY PRESCRIPTION     5 mL    Name of Mix: Mix #1  Mold Alternaria Tenuis GLY 1:10 w/v, HS  0.5 ml Aspergillus Fumigatus GLY 1:10 w/v, HS  0.5 ml Epicoccum Nigrum 1:10 w/v, HS 0.5 ml Hormodendrum Cladosporioides 1:10 w/v, HS 0.5 ml Penicillium Mix GLY 1:10 w/v, HS  0.5 ml Diluent: HSA qs to 5ml    Chronic  allergic rhinitis due to animal hair and dander, Allergic rhinitis due to dust mite, Allergic rhinitis due to mold, Chronic seasonal allergic rhinitis due to pollen       * ALLERGEN IMMUNOTHERAPY PRESCRIPTION     5 mL    Name of Mix: Mix #2  Dust Mite, Cat, Dog Cat Hair, Standardized 10,000 BAU/mL, ALK  2.0 ml Dog Hair Dander, A. P.  1:100 w/v, HS  1.0 ml Dust Mites F 30,000AU/mL, HS  0.3 ml Dust Mites P. 30,000 AU/mL, HS  0.3 ml  Diluent: HSA qs to 5ml    Chronic allergic rhinitis due to animal hair and dander, Allergic rhinitis due to dust mite, Allergic rhinitis due to mold, Chronic seasonal allergic rhinitis due to pollen       * ALLERGEN IMMUNOTHERAPY PRESCRIPTION     5 mL    Name of Mix: Mix #3 Grass,Tree  Dmitry,White GLY 1:20w/v, HS 0.5ml Birch Mix GLY 1:20w/v, HS 0.5ml Boxelder-Maple Mix BHR (Boxelder Hard Red) 1:20w/v, HS 0.5ml McDonough,Common GLY 1:20w/v, HS 0.5ml Elm,American GLY 1:20w/v, HS 0.5ml Blanch Mix GLY 1:20w/v, HS 0.5ml Oak Mix RVW GLY 1:20w/v, HS 0.5ml Stone Tree,Black GLY 1:20w/v, HS 0.5ml Grass Mix #7 100,000 BAU/mL, HS 0.4ml Jonathan Grass 1:20w/v, HS 0.5ml Diluent: HSA qs to 5ml    Chronic allergic rhinitis due to animal hair and dander, Allergic rhinitis due to dust mite, Allergic rhinitis due to mold, Chronic seasonal allergic rhinitis due to pollen       * ALLERGEN IMMUNOTHERAPY PRESCRIPTION     5 mL    Name of Mix: Mix #4  Weeds Kochia GLY 1:20 w/v, HS 0.5 ml Lamb's Quarters GLY 1:20 w/v, HS 0.5 ml Nettle GLY 1:20 w/v, HS 0.5 ml Plantain, English GLY 1:20 w/v, HS 0.5 ml Ragweed Mixed 1:20 w/v ALK  0.5 ml Russian Thistle GLY 1:20 w/v, HS 0.5 ml Sagebrush, Mugwort GLY 1:20 w/v, HS 0.5 ml Sorrel, Sheep GLY 1:20 w/v, HS 0.5 ml Diluent: HSA qs to 5ml    Chronic allergic rhinitis due to animal hair and dander, Allergic rhinitis due to dust mite, Allergic rhinitis due to mold, Chronic seasonal allergic rhinitis due to pollen       azelastine 0.05 % Soln ophthalmic solution    OPTIVAR    1  Bottle    Apply 1 drop to eye 2 times daily    Conjunctivitis, allergic, unspecified laterality       CERAVE Crea     2 Bottle    Externally apply 1 dose * topically 2 times daily    Eczema, unspecified type       EPINEPHrine 0.3 MG/0.3ML injection 2-pack    EPIPEN/ADRENACLICK/or ANY BX GENERIC EQUIV    0.6 mL    Inject 0.3 mLs (0.3 mg) into the muscle once as needed for anaphylaxis    Food allergy, Need for desensitization to allergens       fluticasone-salmeterol 500-50 MCG/DOSE diskus inhaler    ADVAIR    3 Inhaler    Inhale 1 puff into the lungs 2 times daily    Moderate persistent asthma, uncomplicated       loratadine 10 MG tablet    CLARITIN    30 tablet    Take 1 tablet (10 mg) by mouth daily    Seasonal allergic rhinitis, unspecified allergic rhinitis trigger, Chronic allergic rhinitis, Conjunctivitis, allergic, unspecified laterality       montelukast 10 MG tablet    SINGULAIR    30 tablet    Take 1 tablet (10 mg) by mouth At Bedtime    Moderate persistent asthma, uncomplicated, Seasonal allergic rhinitis, unspecified allergic rhinitis trigger, Chronic allergic rhinitis       MULTIVITAMIN PO      1 tab daily        predniSONE 20 MG tablet    DELTASONE    10 tablet    Take 1 tablet (20 mg) by mouth 2 times daily    Dermatitis       * triamcinolone 0.1 % cream    KENALOG    80 g    Apply  topically 2 times daily as needed.    Eczema, unspecified type       * triamcinolone 0.1 % cream    KENALOG    80 g    Apply sparingly to affected area three times daily as needed    Dermatitis       * Notice:  This list has 8 medication(s) that are the same as other medications prescribed for you. Read the directions carefully, and ask your doctor or other care provider to review them with you.

## 2017-09-06 ENCOUNTER — OFFICE VISIT (OUTPATIENT)
Dept: ALLERGY | Facility: CLINIC | Age: 52
End: 2017-09-06
Payer: COMMERCIAL

## 2017-09-06 VITALS
DIASTOLIC BLOOD PRESSURE: 92 MMHG | BODY MASS INDEX: 37.03 KG/M2 | HEART RATE: 76 BPM | OXYGEN SATURATION: 98 % | SYSTOLIC BLOOD PRESSURE: 147 MMHG | HEIGHT: 63 IN | WEIGHT: 209 LBS

## 2017-09-06 DIAGNOSIS — L29.9 ITCHING: ICD-10-CM

## 2017-09-06 DIAGNOSIS — R21 RASH: Primary | ICD-10-CM

## 2017-09-06 PROCEDURE — 99213 OFFICE O/P EST LOW 20 MIN: CPT | Performed by: ALLERGY & IMMUNOLOGY

## 2017-09-06 RX ORDER — CETIRIZINE HYDROCHLORIDE 10 MG/1
10 TABLET ORAL AT BEDTIME
Qty: 30 TABLET | Refills: 11 | Status: SHIPPED | OUTPATIENT
Start: 2017-09-06 | End: 2017-12-06

## 2017-09-06 NOTE — NURSING NOTE
"Chief Complaint   Patient presents with     Allergy Recheck     3 month follow up on restarting immunotherapy     Derm Problem     Hives- started aprox 1 week ago       Initial BP (!) 147/92 (BP Location: Left arm, Patient Position: Chair, Cuff Size: Adult Large)  Pulse 76  Ht 5' 3.25\" (1.607 m)  Wt 209 lb (94.8 kg)  LMP 07/18/2015 (Approximate)  SpO2 98%  BMI 36.73 kg/m2 Estimated body mass index is 36.73 kg/(m^2) as calculated from the following:    Height as of this encounter: 5' 3.25\" (1.607 m).    Weight as of this encounter: 209 lb (94.8 kg).  Medication Reconciliation: complete    "

## 2017-09-06 NOTE — PATIENT INSTRUCTIONS
If you have any questions regarding your allergies, asthma, or what we discussed during your visit today please call the allergy clinic or contact us via [employer].    [facility] ([place], [place]): [phone]      1. Continue to take the claritin (loratadine) 10mg every morning    2. Continue to use the triamcinolone cream and apply this 2 to 3 times a day as needed    3. Start taking zyrtec (cetirizine) 10mg at bedtime to help with itching    If the rash does not go away then you will need to see the dermatologist (skin doctor)    You can start getting your allergy shots again

## 2017-09-06 NOTE — MR AVS SNAPSHOT
After Visit Summary   9/6/2017    Dilia Solomon    MRN: [id number]           Patient Information     Date Of Birth          1965        Visit Information        Provider Department      9/6/2017 9:00 AM Butch Horowitz MD [facility]        Today's Diagnoses     Rash    -  1    Itching          Care Instructions    If you have any questions regarding your allergies, asthma, or what we discussed during your visit today please call the allergy clinic or contact us via [employer].    [facility] ([place], [place]): [phone]      1. Continue to take the claritin (loratadine) 10mg every morning    2. Continue to use the triamcinolone cream and apply this 2 to 3 times a day as needed    3. Start taking zyrtec (cetirizine) 10mg at bedtime to help with itching    If the rash does not go away then you will need to see the dermatologist (skin doctor)    You can start getting your allergy shots again            Follow-ups after your visit        Your next 10 appointments already scheduled     Sep 08, 2017  7:00 AM CDT   Nurse Only with ALLERGY [facility] ([facility])    [address] [address]   [phone]           Every allergy patient MUST wait 30 minutes after their allergy shot. No exceptions.  Xolair shots #1-3 should plan to wait 2 hours in clinic Xolair shots after #4 should plan 30 minute wait in clinic            Sep 12, 2017  7:00 AM CDT   Nurse Only with ALLERGY [facility] ([facility])    [address] [address]   [phone]           Every allergy patient MUST wait 30 minutes after their allergy shot. No exceptions.  Xolair shots #1-3 should plan to wait 2 hours in clinic Xolair shots after #4 should plan 30 minute wait in clinic            Sep 15, 2017  7:00 AM CDT   Nurse Only with ALLERGY [facility]  [facility])    [address] [place] [address]   [phone]           Every allergy patient MUST wait 30 minutes after their allergy shot. No exceptions.  Xolair shots #1-3 should plan to wait 2 hours in clinic Xolair shots after #4 should plan 30 minute wait in clinic            Sep 19, 2017  7:00 AM CDT   Nurse Only with ALLERGY [facility] ([facility])    [address] [place] [address]   [phone]           Every allergy patient MUST wait 30 minutes after their allergy shot. No exceptions.  Xolair shots #1-3 should plan to wait 2 hours in clinic Xolair shots after #4 should plan 30 minute wait in clinic            Sep 22, 2017  7:00 AM CDT   Nurse Only with ALLERGY [facility] ([facility])    [address] [place] [address]   [phone]           Every allergy patient MUST wait 30 minutes after their allergy shot. No exceptions.  Xolair shots #1-3 should plan to wait 2 hours in clinic Xolair shots after #4 should plan 30 minute wait in clinic            Sep 26, 2017  7:00 AM CDT   Nurse Only with ALLERGY [facility] ([facility])    [address] [place] [address]   [phone]           Every allergy patient MUST wait 30 minutes after their allergy shot. No exceptions.  Xolair shots #1-3 should plan to wait 2 hours in clinic Xolair shots after #4 should plan 30 minute wait in clinic            Sep 29, 2017  7:00 AM CDT   Nurse Only with ALLERGY [facility] ([facility])    [address] [place] [address]   [phone]           Every allergy patient MUST wait 30 minutes after their allergy shot. No exceptions.  Xolair shots #1-3 should plan to wait 2 hours in clinic Xolair shots after #4 should plan 30 minute wait in clinic              Who to contact     If  "you have questions or need follow up information about today's clinic visit or your schedule please contact [facility] directly at [phone].  Normal or non-critical lab and imaging results will be communicated to you by MyChart, letter or phone within 4 business days after the clinic has received the results. If you do not hear from us within 7 days, please contact the clinic through [employer] or phone. If you have a critical or abnormal lab result, we will notify you by phone as soon as possible.  Submit refill requests through [employer] or call your pharmacy and they will forward the refill request to us. Please allow 3 business days for your refill to be completed.          Additional Information About Your Visit        [employer] Information     [employer] lets you send messages to your doctor, view your test results, renew your prescriptions, schedule appointments and more. To sign up, go to [email or web address] . Click on \"Log in\" on the left side of the screen, which will take you to the Welcome page. Then click on \"Sign up Now\" on the right side of the page.     You will be asked to enter the access code listed below, as well as some personal information. Please follow the directions to create your username and password.     Your access code is: [email or web address]  Expires: 2017  7:40 AM     Your access code will  in 90 days. If you need help or a new code, please call your [place] clinic or [phone].        Care EveryWhere ID     This is your Care EveryWhere ID. This could be used by other organizations to access your [place] medical records  [id number]        Your Vitals Were     Pulse Height Last Period Pulse Oximetry BMI (Body Mass Index)       76 5' 3.25\" (1.607 m) 2015 (Approximate) 98% 36.73 kg/m2        Blood Pressure from Last 3 Encounters:   17 (!) 147/92   17 122/81   17 129/72    Weight from Last 3 Encounters:   17 209 lb (94.8 kg)   17 " 208 lb 4.8 oz (94.5 kg)   06/28/17 210 lb 12.2 oz (95.6 kg)              Today, you had the following     No orders found for display         Today's Medication Changes          These changes are accurate as of: 9/6/17  9:17 AM.  If you have any questions, ask your nurse or doctor.               Start taking these medicines.        Dose/Directions    cetirizine 10 MG tablet   Commonly known as:  zyrTEC   Used for:  Rash, Itching   Started by:  Butch Horowitz MD        Dose:  10 mg   Take 1 tablet (10 mg) by mouth At Bedtime   Quantity:  30 tablet   Refills:  11            Where to get your medicines      These medications were sent to [facility] [address]  [address] [address]     Phone:  [phone]     cetirizine 10 MG tablet                Primary Care Provider Office Phone # Fax #    Cookie Conklin [phone]       [address] [place] [address]        Equal Access to Services     GABBY CHEN AH: Hadii aad ku hadasho Soomaali, waaxda luqadaha, qaybta kaalmada adeegyada, waxay idiin hayaan kanwal khlaith shen . So [facility] [phone].    ATENCIÓN: Si habla español, tiene a pruitt disposición servicios gratuitos de asistencia lingüística. Llame al [phone].    We comply with applicable federal civil rights laws and [place] laws. We do not discriminate on the basis of race, color, national origin, age, disability sex, sexual orientation or gender identity.            Thank you!     Thank you for choosing [facility]  for your care. Our goal is always to provide you with excellent care. Hearing back from our patients is one way we can continue to improve our services. Please take a few minutes to complete the written survey that you may receive in the mail after your visit with us. Thank you!             Your Updated Medication List - Protect others around you: Learn how to safely use, store and throw away your medicines at  www.disposemymeds.org.          This list is accurate as of: 9/6/17  9:17 AM.  Always use your most recent med list.                   Brand Name Dispense Instructions for use Diagnosis    * albuterol (2.5 MG/3ML) 0.083% neb solution     1 Box    Take 1 vial (2.5 mg) by nebulization every 4 hours as needed    Moderate persistent asthma, uncomplicated       * albuterol 108 (90 BASE) MCG/ACT Inhaler    PROAIR HFA/PROVENTIL HFA/VENTOLIN HFA    1 Inhaler    Inhale 2 puffs into the lungs every 4 hours as needed    Moderate persistent asthma, uncomplicated       * ALLERGEN IMMUNOTHERAPY PRESCRIPTION     5 mL    Name of Mix: Mix #1  Mold Alternaria Tenuis GLY 1:10 w/v, HS  0.5 ml Aspergillus Fumigatus GLY 1:10 w/v, HS  0.5 ml Epicoccum Nigrum 1:10 w/v, HS 0.5 ml Hormodendrum Cladosporioides 1:10 w/v, HS 0.5 ml Penicillium Mix GLY 1:10 w/v, HS  0.5 ml Diluent: HSA qs to 5ml    Chronic allergic rhinitis due to animal hair and dander, Allergic rhinitis due to dust mite, Allergic rhinitis due to mold, Chronic seasonal allergic rhinitis due to pollen       * ALLERGEN IMMUNOTHERAPY PRESCRIPTION     5 mL    Name of Mix: Mix #2  Dust Mite, Cat, Dog Cat Hair, Standardized 10,000 BAU/mL, ALK  2.0 ml Dog Hair Dander, A. P.  1:100 w/v, HS  1.0 ml Dust Mites F 30,000AU/mL, HS  0.3 ml Dust Mites P. 30,000 AU/mL, HS  0.3 ml  Diluent: HSA qs to 5ml    Chronic allergic rhinitis due to animal hair and dander, Allergic rhinitis due to dust mite, Allergic rhinitis due to mold, Chronic seasonal allergic rhinitis due to pollen       * ALLERGEN IMMUNOTHERAPY PRESCRIPTION     5 mL    Name of Mix: Mix #3 Grass,Tree  Dmitry,White GLY 1:20w/v, HS 0.5ml Birch Mix GLY 1:20w/v, HS 0.5ml Boxelder-Maple Mix BHR (Boxelder Hard Red) 1:20w/v, HS 0.5ml [place]lett,Common GLY 1:20w/v, HS 0.5ml Elm,American GLY 1:20w/v, HS 0.5ml Spring Mix GLY 1:20w/v, HS 0.5ml Oak Mix RVW GLY 1:20w/v, HS 0.5ml [place] Tree,Black GLY 1:20w/v, HS 0.5ml Grass Mix #7 100,000 BAU/mL,  HS 0.4ml Jonathan Grass 1:20w/v, HS 0.5ml Diluent: HSA qs to 5ml    Chronic allergic rhinitis due to animal hair and dander, Allergic rhinitis due to dust mite, Allergic rhinitis due to mold, Chronic seasonal allergic rhinitis due to pollen       * ALLERGEN IMMUNOTHERAPY PRESCRIPTION     5 mL    Name of Mix: Mix #4  Weeds Kochia GLY 1:20 w/v, HS 0.5 ml Lamb's Quarters GLY 1:20 w/v, HS 0.5 ml Nettle GLY 1:20 w/v, HS 0.5 ml Plantain, English GLY 1:20 w/v, HS 0.5 ml Ragweed Mixed 1:20 w/v ALK  0.5 ml Russian Thistle GLY 1:20 w/v, HS 0.5 ml Sagebrush, Mugwort GLY 1:20 w/v, HS 0.5 ml Sorrel, Sheep GLY 1:20 w/v, HS 0.5 ml Diluent: HSA qs to 5ml    Chronic allergic rhinitis due to animal hair and dander, Allergic rhinitis due to dust mite, Allergic rhinitis due to mold, Chronic seasonal allergic rhinitis due to pollen       azelastine 0.05 % Soln ophthalmic solution    OPTIVAR    1 Bottle    Apply 1 drop to eye 2 times daily    Conjunctivitis, allergic, unspecified laterality       CERAVE Crea     2 Bottle    Externally apply 1 dose * topically 2 times daily    Eczema, unspecified type       cetirizine 10 MG tablet    zyrTEC    30 tablet    Take 1 tablet (10 mg) by mouth At Bedtime    Rash, Itching       EPINEPHrine 0.3 MG/0.3ML injection 2-pack    EPIPEN/ADRENACLICK/or ANY BX GENERIC EQUIV    0.6 mL    Inject 0.3 mLs (0.3 mg) into the muscle once as needed for anaphylaxis    Food allergy, Need for desensitization to allergens       fluticasone-salmeterol 500-50 MCG/DOSE diskus inhaler    ADVAIR    3 Inhaler    Inhale 1 puff into the lungs 2 times daily    Moderate persistent asthma, uncomplicated       loratadine 10 MG tablet    CLARITIN    30 tablet    Take 1 tablet (10 mg) by mouth daily    Seasonal allergic rhinitis, unspecified allergic rhinitis trigger, Chronic allergic rhinitis, Conjunctivitis, allergic, unspecified laterality       montelukast 10 MG tablet    SINGULAIR    30 tablet    Take 1 tablet (10 mg) by mouth  At Bedtime    Moderate persistent asthma, uncomplicated, Seasonal allergic rhinitis, unspecified allergic rhinitis trigger, Chronic allergic rhinitis       MULTIVITAMIN PO      1 tab daily        predniSONE 20 MG tablet    DELTASONE    10 tablet    Take 1 tablet (20 mg) by mouth 2 times daily    Dermatitis       * triamcinolone 0.1 % cream    KENALOG    80 g    Apply  topically 2 times daily as needed.    Eczema, unspecified type       * triamcinolone 0.1 % cream    KENALOG    80 g    Apply sparingly to affected area three times daily as needed    Dermatitis       * Notice:  This list has 8 medication(s) that are the same as other medications prescribed for you. Read the directions carefully, and ask your doctor or other care provider to review them with you.

## 2017-09-06 NOTE — PROGRESS NOTES
Dilia Solomon was seen in the Allergy Clinic at [facility]. The following are my recommendations regarding her Rash and Itching    1. Continue loratadine 10mg every morning  2. Begin cetirizine 10mg every evening if needed  3. Continue to apply triamcinolone cream as needed  4. Follow-up with PCP and/or dermatology if rash returns  5. Continue with allergen immunotherapy per protocol  6. Follow-up in 3 months      Dilia Solomon is a 52 year old American female who is seen today for follow-up of a rash. She reports the symptoms began about 2 weeks ago and started on her right foot and spread to her neck, left wrist, and right flank down her right thigh. Dilia states the rash was very itchy and consisted of small, red dots almost like blisters. She noticed some fluid drainage from the lesions. Dilia was evaluated last week for these symptoms and prescribed a course of prednisone and topical steroid cream. She states the rash and itching have nearly completely resolved and she now only has a small residual rash on her left wrist.    Dilia reports she has been doing well with her immunotherapy injections and denies any significant reactions.      REVIEW OF SYSTEMS:  General: negative for weight gain. negative for weight loss. negative for changes in sleep.   Eyes: positive  for itching. positive  for redness. positive  for tearing/watering.  Ears: negative for fullness. negative for hearing loss. negative for dizziness.   Nose: negative for snoring.negative for changes in smell. negative for drainage.   Throat: negative for hoarseness. negative for sore throat. negative for trouble swallowing.   Lungs: negative for shortness of breath.negative for wheezing. negative for sputum production.   Cardiovascular: negative for chest pain. negative for swelling of ankles. negative for fast or irregular heartbeat.   Gastrointestinal: negative for nausea. negative for heartburn. negative for acid reflux.    Musculoskeletal: negative for joint pain. negative for joint stiffness. negative for joint swelling.   Neurologic: negative for seizures. negative for fainting. negative for weakness.   Psychiatric: negative for changes in mood. negative for anxiety.   Endocrine: negative for cold intolerance. negative for heat intolerance. negative for tremors.   Hematologic: negative for easy bruising. negative for easy bleeding.  Integumentary: positive  for rash. negative for scaling. negative for nail changes.       Current Outpatient Prescriptions:      triamcinolone (KENALOG) 0.1 % cream, Apply sparingly to affected area three times daily as needed, Disp: 80 g, Rfl: 0     predniSONE (DELTASONE) 20 MG tablet, Take 1 tablet (20 mg) by mouth 2 times daily, Disp: 10 tablet, Rfl: 0     ORDER FOR ALLERGEN IMMUNOTHERAPY, Name of Mix: Mix #1  Mold Alternaria Tenuis GLY 1:10 w/v, HS  0.5 ml Aspergillus Fumigatus GLY 1:10 w/v, HS  0.5 ml Epicoccum Nigrum 1:10 w/v, HS 0.5 ml Hormodendrum Cladosporioides 1:10 w/v, HS 0.5 ml Penicillium Mix GLY 1:10 w/v, HS  0.5 ml Diluent: HSA qs to 5ml, Disp: 5 mL, Rfl: PRN     ORDER FOR ALLERGEN IMMUNOTHERAPY, Name of Mix: Mix #2  Dust Mite, Cat, Dog Cat Hair, Standardized 10,000 BAU/mL, ALK  2.0 ml Dog Hair Dander, A. P.  1:100 w/v, HS  1.0 ml Dust Mites F 30,000AU/mL, HS  0.3 ml Dust Mites P. 30,000 AU/mL, HS  0.3 ml  Diluent: HSA qs to 5ml, Disp: 5 mL, Rfl: PRN     ORDER FOR ALLERGEN IMMUNOTHERAPY, Name of Mix: Mix #3 Grass,Tree  Dmitry,White GLY 1:20w/v, HS 0.5ml Birch Mix GLY 1:20w/v, HS 0.5ml Boxelder-Maple Mix BHR (Boxelder Hard Red) 1:20w/v, HS 0.5ml [place],Common GLY 1:20w/v, HS 0.5ml Elm,American GLY 1:20w/v, HS 0.5ml [place]se Mix GLY 1:20w/v, HS 0.5ml Oak Mix RVW GLY 1:20w/v, HS 0.5ml [place] Tree,Black GLY 1:20w/v, HS 0.5ml Grass Mix #7 100,000 BAU/mL, HS 0.4ml Jonathan Grass 1:20w/v, HS 0.5ml Diluent: HSA qs to 5ml, Disp: 5 mL, Rfl: PRN     ORDER FOR ALLERGEN IMMUNOTHERAPY, Name of Mix:  "Mix #4  Weeds Kochia GLY 1:20 w/v, HS 0.5 ml Lamb's Quarters GLY 1:20 w/v, HS 0.5 ml Nettle GLY 1:20 w/v, HS 0.5 ml Plantain, English GLY 1:20 w/v, HS 0.5 ml Ragweed Mixed 1:20 w/v ALK  0.5 ml Russian Thistle GLY 1:20 w/v, HS 0.5 ml Sagebrush, Mugwort GLY 1:20 w/v, HS 0.5 ml Sorrel, Sheep GLY 1:20 w/v, HS 0.5 ml Diluent: HSA qs to 5ml, Disp: 5 mL, Rfl: PRN     loratadine (CLARITIN) 10 MG tablet, Take 1 tablet (10 mg) by mouth daily, Disp: 30 tablet, Rfl: 11     fluticasone-salmeterol (ADVAIR) 500-50 MCG/DOSE diskus inhaler, Inhale 1 puff into the lungs 2 times daily, Disp: 3 Inhaler, Rfl: 1     azelastine (OPTIVAR) 0.05 % SOLN ophthalmic solution, Apply 1 drop to eye 2 times daily, Disp: 1 Bottle, Rfl: 5     montelukast (SINGULAIR) 10 MG tablet, Take 1 tablet (10 mg) by mouth At Bedtime, Disp: 30 tablet, Rfl: 11     Emollient (CERAVE) CREA, Externally apply 1 dose * topically 2 times daily, Disp: 2 Bottle, Rfl: 11     albuterol (2.5 MG/3ML) 0.083% neb solution, Take 1 vial (2.5 mg) by nebulization every 4 hours as needed (Patient not taking: Reported on 9/1/2017), Disp: 1 Box, Rfl: 3     EPINEPHrine 0.3 MG/0.3ML injection, Inject 0.3 mLs (0.3 mg) into the muscle once as needed for anaphylaxis (Patient not taking: Reported on 9/1/2017), Disp: 0.6 mL, Rfl: 3     triamcinolone (KENALOG) 0.1 % cream, Apply  topically 2 times daily as needed., Disp: 80 g, Rfl: 2     albuterol (PROAIR HFA/PROVENTIL HFA/VENTOLIN HFA) 108 (90 BASE) MCG/ACT Inhaler, Inhale 2 puffs into the lungs every 4 hours as needed, Disp: 1 Inhaler, Rfl: 2     MULTIVITAMIN OR, 1 tab daily, Disp: , Rfl:     EXAM:   BP (!) 147/92 (BP Location: Left arm, Patient Position: Chair, Cuff Size: Adult Large)  Pulse 76  Ht 1.607 m (5' 3.25\")  Wt 94.8 kg (209 lb)  LMP 07/18/2015 (Approximate)  SpO2 98%  BMI 36.73 kg/m2  GENERAL APPEARANCE: alert, cooperative and not in distress  SKIN: papular, erythematous rash on left wrist - extends about 2-3cm, scratch " marks on right flank but no other visible rash  HEAD: atraumatic, normocephalic  NECK: no asymmetry, masses, or scars, supple without significant adenopathy  LUNGS: unlabored respirations, no intercostal retractions or accessory muscle use, clear to auscultation without rales or wheezes  HEART: regular rate and rhythm without murmurs and normal S1 and S2  MUSCULOSKELETAL: no musculoskeletal defects are noted  NEURO: no focal deficits noted  PSYCH: does not appear depressed or anxious      WORKUP:  None    ASSESSMENT/PLAN:  Dilia Solomon is a 52 year old female here for evaluation of a rash. Her symptoms have now resolved with a small residual rash on her left wrist. Her current rash is not consistent in appearance with urticaria and these symptoms are unlikely to be related to her immunotherapy injections. Dilia was advised to continue taking antihistamines twice daily to help with any itching and may apply topical steroid to the rash on her wrist as needed.    1. Continue loratadine 10mg every morning  2. Begin cetirizine 10mg every evening if needed  3. Continue to apply triamcinolone cream as needed  4. Follow-up with PCP and/or dermatology if rash returns  5. Continue with allergen immunotherapy per protocol  6. Follow-up in 3 months      Butch Horowitz MD  Allergy/Immunology  [facility] and [place], [place]      Chart documentation done in part with Dragon Voice Recognition Software. Although reviewed after completion, some word and grammatical errors may remain.

## 2017-09-07 ASSESSMENT — ASTHMA QUESTIONNAIRES: ACT_TOTALSCORE: 22

## 2017-09-08 ENCOUNTER — ALLIED HEALTH/NURSE VISIT (OUTPATIENT)
Dept: ALLERGY | Facility: CLINIC | Age: 52
End: 2017-09-08
Payer: COMMERCIAL

## 2017-09-08 DIAGNOSIS — J30.9 ALLERGIC RHINITIS, UNSPECIFIED: Primary | ICD-10-CM

## 2017-09-08 PROCEDURE — 95117 IMMUNOTHERAPY INJECTIONS: CPT

## 2017-09-08 PROCEDURE — 99207 ZZC NO CHARGE LOS: CPT

## 2017-09-08 NOTE — MR AVS SNAPSHOT
After Visit Summary   9/8/2017    Dilia Solomon    MRN: [id number]           Patient Information     Date Of Birth          1965        Visit Information        Provider Department      9/8/2017 7:00 AM ALLERGY [facility]        Today's Diagnoses     Allergic rhinitis, unspecified    -  1       Follow-ups after your visit        Your next 10 appointments already scheduled     Sep 12, 2017  7:00 AM CDT   Nurse Only with ALLERGY [facility] ([facility])    [address] [address]   [phone]           Every allergy patient MUST wait 30 minutes after their allergy shot. No exceptions.  Xolair shots #1-3 should plan to wait 2 hours in clinic Xolair shots after #4 should plan 30 minute wait in clinic            Sep 15, 2017  7:00 AM CDT   Nurse Only with ALLERGY [facility] ([facility])    [address] [facility] [address]   [phone]           Every allergy patient MUST wait 30 minutes after their allergy shot. No exceptions.  Xolair shots #1-3 should plan to wait 2 hours in clinic Xolair shots after #4 should plan 30 minute wait in clinic            Sep 19, 2017  7:00 AM CDT   Nurse Only with ALLERGY [facility] ([facility])    [address] [facility] [address]   [phone]           Every allergy patient MUST wait 30 minutes after their allergy shot. No exceptions.  Xolair shots #1-3 should plan to wait 2 hours in clinic Xolair shots after #4 should plan 30 minute wait in clinic            Sep 22, 2017  7:00 AM CDT   Nurse Only with ALLERGY [facility] ([facility])    [address] [facility] [place] [address]   [phone]           Every allergy patient MUST wait 30 minutes after their allergy shot. No exceptions.  Xolair shots #1-3 should plan  to wait 2 hours in clinic Xolair shots after #4 should plan 30 minute wait in clinic            Sep 26, 2017  7:00 AM CDT   Nurse Only with ALLERGY [facility] ([facility])    [address] [address]   [phone]           Every allergy patient MUST wait 30 minutes after their allergy shot. No exceptions.  Xolair shots #1-3 should plan to wait 2 hours in clinic Xolair shots after #4 should plan 30 minute wait in clinic            Sep 29, 2017  7:00 AM CDT   Nurse Only with ALLERGY [facility] ([facility])    [address] [address]   [phone]           Every allergy patient MUST wait 30 minutes after their allergy shot. No exceptions.  Xolair shots #1-3 should plan to wait 2 hours in clinic Xolair shots after #4 should plan 30 minute wait in clinic            Dec 06, 2017  8:20 AM CST   Return Visit with Butch Horowitz MD   [facility] ([facility]    [address] [address]   [phone]              Who to contact     If you have questions or need follow up information about today's clinic visit or your schedule please contact [facility] directly at [phone].  Normal or non-critical lab and imaging results will be communicated to you by Ahalogyhart, letter or phone within 4 business days after the clinic has received the results. If you do not hear from us within 7 days, please contact the clinic through Hickiest or phone. If you have a critical or abnormal lab result, we will notify you by phone as soon as possible.  Submit refill requests through VasoNova or call your pharmacy and they will forward the refill request to us. Please allow 3 business days for your refill to be completed.          Additional Information About Your Visit        VasoNova Information     VasoNova lets you send messages to your doctor, view your  "test results, renew your prescriptions, schedule appointments and more. To sign up, go to [email or web address] . Click on \"Log in\" on the left side of the screen, which will take you to the Welcome page. Then click on \"Sign up Now\" on the right side of the page.     You will be asked to enter the access code listed below, as well as some personal information. Please follow the directions to create your username and password.     Your access code is: [email or web address]  Expires: 2017  7:40 AM     Your access code will  in 90 days. If you need help or a new code, please call your [place] clinic or [phone].        Care EveryWhere ID     This is your Care EveryWhere ID. This could be used by other organizations to access your [place] medical records  [id number]        Your Vitals Were     Last Period                   2015 (Approximate)            Blood Pressure from Last 3 Encounters:   17 (!) 147/92   17 122/81   17 129/72    Weight from Last 3 Encounters:   17 209 lb (94.8 kg)   17 208 lb 4.8 oz (94.5 kg)   17 210 lb 12.2 oz (95.6 kg)              We Performed the Following     Allergy Shot: Two or more injections        Primary Care Provider Office Phone # Fax #    Cookie Conklin [phone]       [address] [place] [address]        Equal Access to Services     [facility] : Hadii aad ku hadasho Soomaali, waaxda luqadaha, qaybta kaalmada adeegyada, waxay america shen . So [facility] [phone].    ATENCIÓN: Si habla español, tiene a pruitt disposición servicios gratuitos de asistencia lingüística. Llame al [phone].    We comply with applicable federal civil rights laws and [place] laws. We do not discriminate on the basis of race, color, national origin, age, disability sex, sexual orientation or gender identity.            Thank you!     Thank you for choosing [facility]  for your care. Our goal " is always to provide you with excellent care. Hearing back from our patients is one way we can continue to improve our services. Please take a few minutes to complete the written survey that you may receive in the mail after your visit with us. Thank you!             Your Updated Medication List - Protect others around you: Learn how to safely use, store and throw away your medicines at www.disposemymeds.org.          This list is accurate as of: 9/8/17  7:56 AM.  Always use your most recent med list.                   Brand Name Dispense Instructions for use Diagnosis    * albuterol (2.5 MG/3ML) 0.083% neb solution     1 Box    Take 1 vial (2.5 mg) by nebulization every 4 hours as needed    Moderate persistent asthma, uncomplicated       * albuterol 108 (90 BASE) MCG/ACT Inhaler    PROAIR HFA/PROVENTIL HFA/VENTOLIN HFA    1 Inhaler    Inhale 2 puffs into the lungs every 4 hours as needed    Moderate persistent asthma, uncomplicated       * ALLERGEN IMMUNOTHERAPY PRESCRIPTION     5 mL    Name of Mix: Mix #1  Mold Alternaria Tenuis GLY 1:10 w/v, HS  0.5 ml Aspergillus Fumigatus GLY 1:10 w/v, HS  0.5 ml Epicoccum Nigrum 1:10 w/v, HS 0.5 ml Hormodendrum Cladosporioides 1:10 w/v, HS 0.5 ml Penicillium Mix GLY 1:10 w/v, HS  0.5 ml Diluent: HSA qs to 5ml    Chronic allergic rhinitis due to animal hair and dander, Allergic rhinitis due to dust mite, Allergic rhinitis due to mold, Chronic seasonal allergic rhinitis due to pollen       * ALLERGEN IMMUNOTHERAPY PRESCRIPTION     5 mL    Name of Mix: Mix #2  Dust Mite, Cat, Dog Cat Hair, Standardized 10,000 BAU/mL, ALK  2.0 ml Dog Hair Dander, A. P.  1:100 w/v, HS  1.0 ml Dust Mites F 30,000AU/mL, HS  0.3 ml Dust Mites P. 30,000 AU/mL, HS  0.3 ml  Diluent: HSA qs to 5ml    Chronic allergic rhinitis due to animal hair and dander, Allergic rhinitis due to dust mite, Allergic rhinitis due to mold, Chronic seasonal allergic rhinitis due to pollen       * ALLERGEN IMMUNOTHERAPY  PRESCRIPTION     5 mL    Name of Mix: Mix #3 Grass,Tree  Dmitry,White GLY 1:20w/v, HS 0.5ml Birch Mix GLY 1:20w/v, HS 0.5ml Boxelder-Maple Mix BHR (Boxelder Hard Red) 1:20w/v, HS 0.5ml [place],Common GLY 1:20w/v, HS 0.5ml Elm,American GLY 1:20w/v, HS 0.5ml [place] Mix GLY 1:20w/v, HS 0.5ml Oak Mix RVW GLY 1:20w/v, HS 0.5ml [place] Tree,Black GLY 1:20w/v, HS 0.5ml Grass Mix #7 100,000 BAU/mL, HS 0.4ml Jonathan Grass 1:20w/v, HS 0.5ml Diluent: HSA qs to 5ml    Chronic allergic rhinitis due to animal hair and dander, Allergic rhinitis due to dust mite, Allergic rhinitis due to mold, Chronic seasonal allergic rhinitis due to pollen       * ALLERGEN IMMUNOTHERAPY PRESCRIPTION     5 mL    Name of Mix: Mix #4  Weeds Kochia GLY 1:20 w/v, HS 0.5 ml Lamb's Quarters GLY 1:20 w/v, HS 0.5 ml Nettle GLY 1:20 w/v, HS 0.5 ml Plantain, English GLY 1:20 w/v, HS 0.5 ml Ragweed Mixed 1:20 w/v ALK  0.5 ml Russian Thistle GLY 1:20 w/v, HS 0.5 ml Sagebrush, Mugwort GLY 1:20 w/v, HS 0.5 ml Sorrel, Sheep GLY 1:20 w/v, HS 0.5 ml Diluent: HSA qs to 5ml    Chronic allergic rhinitis due to animal hair and dander, Allergic rhinitis due to dust mite, Allergic rhinitis due to mold, Chronic seasonal allergic rhinitis due to pollen       azelastine 0.05 % Soln ophthalmic solution    OPTIVAR    1 Bottle    Apply 1 drop to eye 2 times daily    Conjunctivitis, allergic, unspecified laterality       CERAVE Crea     2 Bottle    Externally apply 1 dose * topically 2 times daily    Eczema, unspecified type       cetirizine 10 MG tablet    zyrTEC    30 tablet    Take 1 tablet (10 mg) by mouth At Bedtime    Rash, Itching       EPINEPHrine 0.3 MG/0.3ML injection 2-pack    EPIPEN/ADRENACLICK/or ANY BX GENERIC EQUIV    0.6 mL    Inject 0.3 mLs (0.3 mg) into the muscle once as needed for anaphylaxis    Food allergy, Need for desensitization to allergens       fluticasone-salmeterol 500-50 MCG/DOSE diskus inhaler    ADVAIR    3 Inhaler    Inhale 1 puff into the  lungs 2 times daily    Moderate persistent asthma, uncomplicated       loratadine 10 MG tablet    CLARITIN    30 tablet    Take 1 tablet (10 mg) by mouth daily    Seasonal allergic rhinitis, unspecified allergic rhinitis trigger, Chronic allergic rhinitis, Conjunctivitis, allergic, unspecified laterality       montelukast 10 MG tablet    SINGULAIR    30 tablet    Take 1 tablet (10 mg) by mouth At Bedtime    Moderate persistent asthma, uncomplicated, Seasonal allergic rhinitis, unspecified allergic rhinitis trigger, Chronic allergic rhinitis       MULTIVITAMIN PO      1 tab daily        predniSONE 20 MG tablet    DELTASONE    10 tablet    Take 1 tablet (20 mg) by mouth 2 times daily    Dermatitis       * triamcinolone 0.1 % cream    KENALOG    80 g    Apply  topically 2 times daily as needed.    Eczema, unspecified type       * triamcinolone 0.1 % cream    KENALOG    80 g    Apply sparingly to affected area three times daily as needed    Dermatitis       * Notice:  This list has 8 medication(s) that are the same as other medications prescribed for you. Read the directions carefully, and ask your doctor or other care provider to review them with you.

## 2017-09-12 ENCOUNTER — ALLIED HEALTH/NURSE VISIT (OUTPATIENT)
Dept: ALLERGY | Facility: CLINIC | Age: 52
End: 2017-09-12
Payer: COMMERCIAL

## 2017-09-12 DIAGNOSIS — J30.9 ALLERGIC RHINITIS, UNSPECIFIED: Primary | ICD-10-CM

## 2017-09-12 PROCEDURE — 95117 IMMUNOTHERAPY INJECTIONS: CPT

## 2017-09-12 NOTE — MR AVS SNAPSHOT
After Visit Summary   9/12/2017    Dilia Solomon    MRN: [id number]           Patient Information     Date Of Birth          1965        Visit Information        Provider Department      9/12/2017 7:00 AM ALLERGY [facility]        Today's Diagnoses     Allergic rhinitis, unspecified    -  1       Follow-ups after your visit        Your next 10 appointments already scheduled     Sep 15, 2017  7:00 AM CDT   Nurse Only with ALLERGY [facility] ([facility])    [address] [address]   [phone]           Every allergy patient MUST wait 30 minutes after their allergy shot. No exceptions.  Xolair shots #1-3 should plan to wait 2 hours in clinic Xolair shots after #4 should plan 30 minute wait in clinic            Sep 19, 2017  7:00 AM CDT   Nurse Only with ALLERGY [facility] ([facility])    [address] [address]   [phone]           Every allergy patient MUST wait 30 minutes after their allergy shot. No exceptions.  Xolair shots #1-3 should plan to wait 2 hours in clinic Xolair shots after #4 should plan 30 minute wait in clinic            Sep 22, 2017  7:00 AM CDT   Nurse Only with ALLERGY [facility] ([facility])    [address] [address]   [phone]           Every allergy patient MUST wait 30 minutes after their allergy shot. No exceptions.  Xolair shots #1-3 should plan to wait 2 hours in clinic Xolair shots after #4 should plan 30 minute wait in clinic            Sep 26, 2017  7:00 AM CDT   Nurse Only with ALLERGY [facility] ([facility])    [address] [place] [address]   [phone]           Every allergy patient MUST wait 30 minutes after their allergy shot. No exceptions.  Xolair shots #1-3 should  "plan to wait 2 hours in clinic Xolair shots after #4 should plan 30 minute wait in clinic            Sep 29, 2017  7:00 AM CDT   Nurse Only with ALLERGY [facility] ([facility])    [address] [address] [phone]           Every allergy patient MUST wait 30 minutes after their allergy shot. No exceptions.  Xolair shots #1-3 should plan to wait 2 hours in clinic Xolair shots after #4 should plan 30 minute wait in clinic            Dec 06, 2017  8:20 AM CST   Return Visit with Butch Horowitz MD   [facility] ([facility])    [address] [address] [phone]              Who to contact     If you have questions or need follow up information about today's clinic visit or your schedule please contact [facility] directly at [phone].  Normal or non-critical lab and imaging results will be communicated to you by GERShart, letter or phone within 4 business days after the clinic has received the results. If you do not hear from us within 7 days, please contact the clinic through Sunway Communication or phone. If you have a critical or abnormal lab result, we will notify you by phone as soon as possible.  Submit refill requests through Sunway Communication or call your pharmacy and they will forward the refill request to us. Please allow 3 business days for your refill to be completed.          Additional Information About Your Visit        Sunway Communication Information     [employer] Communication lets you send messages to your doctor, view your test results, renew your prescriptions, schedule appointments and more. To sign up, go to [email or web address] . Click on \"Log in\" on the left side of the screen, which will take you to the Welcome page. Then click on \"Sign up Now\" on the right side of the page.     You will be asked to enter the access code listed below, as well as some personal information. Please follow the directions to " create your username and password.     Your access code is: [email or web address]  Expires: 2017  7:40 AM     Your access code will  in 90 days. If you need help or a new code, please call your [place] clinic or [phone].        Care EveryWhere ID     This is your Care EveryWhere ID. This could be used by other organizations to access your [place] medical records  [id number]        Your Vitals Were     Last Period                   2015 (Approximate)            Blood Pressure from Last 3 Encounters:   17 (!) 147/92   17 122/81   17 129/72    Weight from Last 3 Encounters:   17 209 lb (94.8 kg)   17 208 lb 4.8 oz (94.5 kg)   17 210 lb 12.2 oz (95.6 kg)              We Performed the Following     Allergy Shot: Two or more injections        Primary Care Provider Office Phone # Fax #    Cookie Conklin [phone]       [address] [place] [address]        Equal Access to Services     [facility] : Hadii aad ku hadasho Soomaali, waaxda luqadaha, qaybta kaalmada adetanikayada, hieu shen . So [facility] [phone].    ATENCIÓN: Si habla español, tiene a pruitt disposición servicios gratuitos de asistencia lingüística. [facility] [phone].    We comply with applicable federal civil rights laws and [place] laws. We do not discriminate on the basis of race, color, national origin, age, disability sex, sexual orientation or gender identity.            Thank you!     Thank you for choosing [facility]  for your care. Our goal is always to provide you with excellent care. Hearing back from our patients is one way we can continue to improve our services. Please take a few minutes to complete the written survey that you may receive in the mail after your visit with us. Thank you!             Your Updated Medication List - Protect others around you: Learn how to safely use, store and throw away your medicines at  www.disposemymeds.org.          This list is accurate as of: 9/12/17  7:46 AM.  Always use your most recent med list.                   Brand Name Dispense Instructions for use Diagnosis    * albuterol (2.5 MG/3ML) 0.083% neb solution     1 Box    Take 1 vial (2.5 mg) by nebulization every 4 hours as needed    Moderate persistent asthma, uncomplicated       * albuterol 108 (90 BASE) MCG/ACT Inhaler    PROAIR HFA/PROVENTIL HFA/VENTOLIN HFA    1 Inhaler    Inhale 2 puffs into the lungs every 4 hours as needed    Moderate persistent asthma, uncomplicated       * ALLERGEN IMMUNOTHERAPY PRESCRIPTION     5 mL    Name of Mix: Mix #1  Mold Alternaria Tenuis GLY 1:10 w/v, HS  0.5 ml Aspergillus Fumigatus GLY 1:10 w/v, HS  0.5 ml Epicoccum Nigrum 1:10 w/v, HS 0.5 ml Hormodendrum Cladosporioides 1:10 w/v, HS 0.5 ml Penicillium Mix GLY 1:10 w/v, HS  0.5 ml Diluent: HSA qs to 5ml    Chronic allergic rhinitis due to animal hair and dander, Allergic rhinitis due to dust mite, Allergic rhinitis due to mold, Chronic seasonal allergic rhinitis due to pollen       * ALLERGEN IMMUNOTHERAPY PRESCRIPTION     5 mL    Name of Mix: Mix #2  Dust Mite, Cat, Dog Cat Hair, Standardized 10,000 BAU/mL, ALK  2.0 ml Dog Hair Dander, A. P.  1:100 w/v, HS  1.0 ml Dust Mites F 30,000AU/mL, HS  0.3 ml Dust Mites P. 30,000 AU/mL, HS  0.3 ml  Diluent: HSA qs to 5ml    Chronic allergic rhinitis due to animal hair and dander, Allergic rhinitis due to dust mite, Allergic rhinitis due to mold, Chronic seasonal allergic rhinitis due to pollen       * ALLERGEN IMMUNOTHERAPY PRESCRIPTION     5 mL    Name of Mix: Mix #3 Grass,Tree  Dmitry,White GLY 1:20w/v, HS 0.5ml Birch Mix GLY 1:20w/v, HS 0.5ml Boxelder-Maple Mix BHR (Boxelder Hard Red) 1:20w/v, HS 0.5ml [place],Common GLY 1:20w/v, HS 0.5ml Elm,American GLY 1:20w/v, HS 0.5ml [place] Mix GLY 1:20w/v, HS 0.5ml Oak Mix RVW GLY 1:20w/v, HS 0.5ml [place] Tree,Black GLY 1:20w/v, HS 0.5ml Grass Mix #7 100,000  BAU/mL, HS 0.4ml Jonathan Grass 1:20w/v, HS 0.5ml Diluent: HSA qs to 5ml    Chronic allergic rhinitis due to animal hair and dander, Allergic rhinitis due to dust mite, Allergic rhinitis due to mold, Chronic seasonal allergic rhinitis due to pollen       * ALLERGEN IMMUNOTHERAPY PRESCRIPTION     5 mL    Name of Mix: Mix #4  Weeds Kochia GLY 1:20 w/v, HS 0.5 ml Lamb's Quarters GLY 1:20 w/v, HS 0.5 ml Nettle GLY 1:20 w/v, HS 0.5 ml Plantain, English GLY 1:20 w/v, HS 0.5 ml Ragweed Mixed 1:20 w/v ALK  0.5 ml Russian Thistle GLY 1:20 w/v, HS 0.5 ml Sagebrush, Mugwort GLY 1:20 w/v, HS 0.5 ml Sorrel, Sheep GLY 1:20 w/v, HS 0.5 ml Diluent: HSA qs to 5ml    Chronic allergic rhinitis due to animal hair and dander, Allergic rhinitis due to dust mite, Allergic rhinitis due to mold, Chronic seasonal allergic rhinitis due to pollen       azelastine 0.05 % Soln ophthalmic solution    OPTIVAR    1 Bottle    Apply 1 drop to eye 2 times daily    Conjunctivitis, allergic, unspecified laterality       CERAVE Crea     2 Bottle    Externally apply 1 dose * topically 2 times daily    Eczema, unspecified type       cetirizine 10 MG tablet    zyrTEC    30 tablet    Take 1 tablet (10 mg) by mouth At Bedtime    Rash, Itching       EPINEPHrine 0.3 MG/0.3ML injection 2-pack    EPIPEN/ADRENACLICK/or ANY BX GENERIC EQUIV    0.6 mL    Inject 0.3 mLs (0.3 mg) into the muscle once as needed for anaphylaxis    Food allergy, Need for desensitization to allergens       fluticasone-salmeterol 500-50 MCG/DOSE diskus inhaler    ADVAIR    3 Inhaler    Inhale 1 puff into the lungs 2 times daily    Moderate persistent asthma, uncomplicated       loratadine 10 MG tablet    CLARITIN    30 tablet    Take 1 tablet (10 mg) by mouth daily    Seasonal allergic rhinitis, unspecified allergic rhinitis trigger, Chronic allergic rhinitis, Conjunctivitis, allergic, unspecified laterality       montelukast 10 MG tablet    SINGULAIR    30 tablet    Take 1 tablet (10 mg)  by mouth At Bedtime    Moderate persistent asthma, uncomplicated, Seasonal allergic rhinitis, unspecified allergic rhinitis trigger, Chronic allergic rhinitis       MULTIVITAMIN PO      1 tab daily        * triamcinolone 0.1 % cream    KENALOG    80 g    Apply  topically 2 times daily as needed.    Eczema, unspecified type       * triamcinolone 0.1 % cream    KENALOG    80 g    Apply sparingly to affected area three times daily as needed    Dermatitis       * Notice:  This list has 8 medication(s) that are the same as other medications prescribed for you. Read the directions carefully, and ask your doctor or other care provider to review them with you.

## 2017-09-15 ENCOUNTER — ALLIED HEALTH/NURSE VISIT (OUTPATIENT)
Dept: ALLERGY | Facility: CLINIC | Age: 52
End: 2017-09-15
Payer: COMMERCIAL

## 2017-09-15 DIAGNOSIS — J30.9 ALLERGIC RHINITIS, UNSPECIFIED: Primary | ICD-10-CM

## 2017-09-15 PROCEDURE — 95117 IMMUNOTHERAPY INJECTIONS: CPT

## 2017-09-15 NOTE — MR AVS SNAPSHOT
After Visit Summary   9/15/2017    Dilia Solomon    MRN: [id number]           Patient Information     Date Of Birth          1965        Visit Information        Provider Department      9/15/2017 7:00 AM ALLERGY [facility]        Today's Diagnoses     Allergic rhinitis, unspecified    -  1       Follow-ups after your visit        Your next 10 appointments already scheduled     Sep 19, 2017  7:00 AM CDT   Nurse Only with ALLERGY [facility] ([facility])    [address] [address]   [phone]           Every allergy patient MUST wait 30 minutes after their allergy shot. No exceptions.  Xolair shots #1-3 should plan to wait 2 hours in clinic Xolair shots after #4 should plan 30 minute wait in clinic            Sep 22, 2017  7:00 AM CDT   Nurse Only with ALLERGY [facility] ([facility])    [address] [address]   [phone]           Every allergy patient MUST wait 30 minutes after their allergy shot. No exceptions.  Xolair shots #1-3 should plan to wait 2 hours in clinic Xolair shots after #4 should plan 30 minute wait in clinic            Sep 26, 2017  7:00 AM CDT   Nurse Only with ALLERGY [facility] ([facility])    [address] [address]   [phone]           Every allergy patient MUST wait 30 minutes after their allergy shot. No exceptions.  Xolair shots #1-3 should plan to wait 2 hours in clinic Xolair shots after #4 should plan 30 minute wait in clinic            Sep 29, 2017  7:00 AM CDT   Nurse Only with ALLERGY [facility] ([facility])    [address] [facility] [place] [address]   [phone]           Every allergy patient MUST wait 30 minutes after their allergy shot. No exceptions.  Xolair shots #1-3 should  "plan to wait 2 hours in clinic Xolair shots after #4 should plan 30 minute wait in clinic            Dec 06, 2017  8:20 AM CST   [facility] with Butch Horowitz MD   [facility]    [address]  [facility] [address]   [phone]              Who to contact     If you have questions or need follow up information about today's clinic visit or your schedule please contact [facility] directly at [phone].  Normal or non-critical lab and imaging results will be communicated to you by [employer], letter or phone within 4 business days after the clinic has received the results. If you do not hear from us within 7 days, please contact the clinic through [employer] or phone. If you have a critical or abnormal lab result, we will notify you by phone as soon as possible.  Submit refill requests through Info or call your pharmacy and they will forward the refill request to us. Please allow 3 business days for your refill to be completed.          Additional Information About Your Visit        Info Information     Info lets you send messages to your doctor, view your test results, renew your prescriptions, schedule appointments and more. To sign up, go to [email or web address] . Click on \"Log in\" on the left side of the screen, which will take you to the Welcome page. Then click on \"Sign up Now\" on the right side of the page.     You will be asked to enter the access code listed below, as well as some personal information. Please follow the directions to create your username and password.     Your access code is: [email or web address]  Expires: 2017  7:40 AM     Your access code will  in 90 days. If you need help or a new code, please call your [facility] clinic or [phone].        Care EveryWhere ID     This is your Care EveryWhere ID. This could be used by other organizations to access your [facility] medical records  [id number]        Your Vitals " Were     Last Period                   07/18/2015 (Approximate)            Blood Pressure from Last 3 Encounters:   09/06/17 (!) 147/92   09/01/17 122/81   06/28/17 129/72    Weight from Last 3 Encounters:   09/06/17 209 lb (94.8 kg)   09/01/17 208 lb 4.8 oz (94.5 kg)   06/28/17 210 lb 12.2 oz (95.6 kg)              We Performed the Following     Allergy Shot: Two or more injections        Primary Care Provider Office Phone # Fax #    Cookie Conklin [phone]       [address] [facility] [address]        Equal Access to Services     [facility] APRIL : Hadii ulices muñozo Silvio, waaxda luqadaha, qaybta kaalmada anna, hieu shen . So [facility] [phone].    ATENCIÓN: Si habla español, tiene a pruitt disposición servicios gratuitos de asistencia lingüística. [facility] [phone].    We comply with applicable federal civil rights laws and [place] laws. We do not discriminate on the basis of race, color, national origin, age, disability sex, sexual orientation or gender identity.            Thank you!     Thank you for choosing [facility]  for your care. Our goal is always to provide you with excellent care. Hearing back from our patients is one way we can continue to improve our services. Please take a few minutes to complete the written survey that you may receive in the mail after your visit with us. Thank you!             Your Updated Medication List - Protect others around you: Learn how to safely use, store and throw away your medicines at www.disposemymeds.org.          This list is accurate as of: 9/15/17  7:56 AM.  Always use your most recent med list.                   Brand Name Dispense Instructions for use Diagnosis    * albuterol (2.5 MG/3ML) 0.083% neb solution     1 Box    Take 1 vial (2.5 mg) by nebulization every 4 hours as needed    Moderate persistent asthma, uncomplicated       * albuterol 108 (90 BASE) MCG/ACT Inhaler    PROAIR  HFA/PROVENTIL HFA/VENTOLIN HFA    1 Inhaler    Inhale 2 puffs into the lungs every 4 hours as needed    Moderate persistent asthma, uncomplicated       * ALLERGEN IMMUNOTHERAPY PRESCRIPTION     5 mL    Name of Mix: Mix #1  Mold Alternaria Tenuis GLY 1:10 w/v, HS  0.5 ml Aspergillus Fumigatus GLY 1:10 w/v, HS  0.5 ml Epicoccum Nigrum 1:10 w/v, HS 0.5 ml Hormodendrum Cladosporioides 1:10 w/v, HS 0.5 ml Penicillium Mix GLY 1:10 w/v, HS  0.5 ml Diluent: HSA qs to 5ml    Chronic allergic rhinitis due to animal hair and dander, Allergic rhinitis due to dust mite, Allergic rhinitis due to mold, Chronic seasonal allergic rhinitis due to pollen       * ALLERGEN IMMUNOTHERAPY PRESCRIPTION     5 mL    Name of Mix: Mix #2  Dust Mite, Cat, Dog Cat Hair, Standardized 10,000 BAU/mL, ALK  2.0 ml Dog Hair Dander, A. P.  1:100 w/v, HS  1.0 ml Dust Mites F 30,000AU/mL, HS  0.3 ml Dust Mites P. 30,000 AU/mL, HS  0.3 ml  Diluent: HSA qs to 5ml    Chronic allergic rhinitis due to animal hair and dander, Allergic rhinitis due to dust mite, Allergic rhinitis due to mold, Chronic seasonal allergic rhinitis due to pollen       * ALLERGEN IMMUNOTHERAPY PRESCRIPTION     5 mL    Name of Mix: Mix #3 Grass,Tree  Dmitry,White GLY 1:20w/v, HS 0.5ml Birch Mix GLY 1:20w/v, HS 0.5ml Boxelder-Maple Mix BHR (Boxelder Hard Red) 1:20w/v, HS 0.5ml [place],Common GLY 1:20w/v, HS 0.5ml Elm,American GLY 1:20w/v, HS 0.5ml [place] Mix GLY 1:20w/v, HS 0.5ml Oak Mix RVW GLY 1:20w/v, HS 0.5ml [place] Tree,Black GLY 1:20w/v, HS 0.5ml Grass Mix #7 100,000 BAU/mL, HS 0.4ml Jonathan Grass 1:20w/v, HS 0.5ml Diluent: HSA qs to 5ml    Chronic allergic rhinitis due to animal hair and dander, Allergic rhinitis due to dust mite, Allergic rhinitis due to mold, Chronic seasonal allergic rhinitis due to pollen       * ALLERGEN IMMUNOTHERAPY PRESCRIPTION     5 mL    Name of Mix: Mix #4  Weeds Kochia GLY 1:20 w/v, HS 0.5 ml Lamb's Quarters GLY 1:20 w/v, HS 0.5 ml Nettle GLY 1:20  w/v, HS 0.5 ml Plantain, English GLY 1:20 w/v, HS 0.5 ml Ragweed Mixed 1:20 w/v ALK  0.5 ml Russian Thistle GLY 1:20 w/v, HS 0.5 ml Sagebrush, Mugwort GLY 1:20 w/v, HS 0.5 ml Sorrel, Sheep GLY 1:20 w/v, HS 0.5 ml Diluent: HSA qs to 5ml    Chronic allergic rhinitis due to animal hair and dander, Allergic rhinitis due to dust mite, Allergic rhinitis due to mold, Chronic seasonal allergic rhinitis due to pollen       azelastine 0.05 % Soln ophthalmic solution    OPTIVAR    1 Bottle    Apply 1 drop to eye 2 times daily    Conjunctivitis, allergic, unspecified laterality       CERAVE Crea     2 Bottle    Externally apply 1 dose * topically 2 times daily    Eczema, unspecified type       cetirizine 10 MG tablet    zyrTEC    30 tablet    Take 1 tablet (10 mg) by mouth At Bedtime    Rash, Itching       EPINEPHrine 0.3 MG/0.3ML injection 2-pack    EPIPEN/ADRENACLICK/or ANY BX GENERIC EQUIV    0.6 mL    Inject 0.3 mLs (0.3 mg) into the muscle once as needed for anaphylaxis    Food allergy, Need for desensitization to allergens       fluticasone-salmeterol 500-50 MCG/DOSE diskus inhaler    ADVAIR    3 Inhaler    Inhale 1 puff into the lungs 2 times daily    Moderate persistent asthma, uncomplicated       loratadine 10 MG tablet    CLARITIN    30 tablet    Take 1 tablet (10 mg) by mouth daily    Seasonal allergic rhinitis, unspecified allergic rhinitis trigger, Chronic allergic rhinitis, Conjunctivitis, allergic, unspecified laterality       montelukast 10 MG tablet    SINGULAIR    30 tablet    Take 1 tablet (10 mg) by mouth At Bedtime    Moderate persistent asthma, uncomplicated, Seasonal allergic rhinitis, unspecified allergic rhinitis trigger, Chronic allergic rhinitis       MULTIVITAMIN PO      1 tab daily        * triamcinolone 0.1 % cream    KENALOG    80 g    Apply  topically 2 times daily as needed.    Eczema, unspecified type       * triamcinolone 0.1 % cream    KENALOG    80 g    Apply sparingly to affected area three  times daily as needed    Dermatitis       * Notice:  This list has 8 medication(s) that are the same as other medications prescribed for you. Read the directions carefully, and ask your doctor or other care provider to review them with you.

## 2017-09-19 ENCOUNTER — ALLIED HEALTH/NURSE VISIT (OUTPATIENT)
Dept: ALLERGY | Facility: CLINIC | Age: 52
End: 2017-09-19
Payer: COMMERCIAL

## 2017-09-19 DIAGNOSIS — J30.9 ALLERGIC RHINITIS, UNSPECIFIED: Primary | ICD-10-CM

## 2017-09-19 PROCEDURE — 95117 IMMUNOTHERAPY INJECTIONS: CPT

## 2017-09-19 NOTE — MR AVS SNAPSHOT
After Visit Summary   9/19/2017    Dilia Solomon    MRN: [id number]           Patient Information     Date Of Birth          1965        Visit Information        Provider Department      9/19/2017 7:00 AM ALLERGY [facility]        Today's Diagnoses     Allergic rhinitis, unspecified    -  1       Follow-ups after your visit        Your next 10 appointments already scheduled     Sep 22, 2017  7:00 AM CDT   Nurse Only with ALLERGY [facility] ([facility])    [address] [facility] [address]   [phone]           Every allergy patient MUST wait 30 minutes after their allergy shot. No exceptions.  Xolair shots #1-3 should plan to wait 2 hours in clinic Xolair shots after #4 should plan 30 minute wait in clinic            Sep 26, 2017  7:00 AM CDT   Nurse Only with ALLERGY [facility] ([facility])    [address] [facility] [address]   [phone]           Every allergy patient MUST wait 30 minutes after their allergy shot. No exceptions.  Xolair shots #1-3 should plan to wait 2 hours in clinic Xolair shots after #4 should plan 30 minute wait in clinic            Sep 29, 2017  7:00 AM CDT   Nurse Only with ALLERGY [facility] ([facility])    [address] [address]   [phone]           Every allergy patient MUST wait 30 minutes after their allergy shot. No exceptions.  Xolair shots #1-3 should plan to wait 2 hours in clinic Xolair shots after #4 should plan 30 minute wait in clinic            Dec 06, 2017  8:20 AM CST   Return Visit with Butch Horowitz MD   [facility] ([facility])    [address] [address]   [phone]              Who to contact     If you have questions or need follow up information about today's clinic visit or your  "schedule please contact [facility] directly at [phone].  Normal or non-critical lab and imaging results will be communicated to you by MyChart, letter or phone within 4 business days after the clinic has received the results. If you do not hear from us within 7 days, please contact the clinic through MyChart or phone. If you have a critical or abnormal lab result, we will notify you by phone as soon as possible.  Submit refill requests through [employer] or call your pharmacy and they will forward the refill request to us. Please allow 3 business days for your refill to be completed.          Additional Information About Your Visit        [employer] Information     [employer] lets you send messages to your doctor, view your test results, renew your prescriptions, schedule appointments and more. To sign up, go to [email or web address] . Click on \"Log in\" on the left side of the screen, which will take you to the Welcome page. Then click on \"Sign up Now\" on the right side of the page.     You will be asked to enter the access code listed below, as well as some personal information. Please follow the directions to create your username and password.     Your access code is: [email or web address]  Expires: 2017  7:40 AM     Your access code will  in 90 days. If you need help or a new code, please call your [place] clinic or [phone].        Care EveryWhere ID     This is your Care EveryWhere ID. This could be used by other organizations to access your [place] medical records  [id number]        Your Vitals Were     Last Period                   2015 (Approximate)            Blood Pressure from Last 3 Encounters:   17 (!) 147/92   17 122/81   17 129/72    Weight from Last 3 Encounters:   17 209 lb (94.8 kg)   17 208 lb 4.8 oz (94.5 kg)   17 210 lb 12.2 oz (95.6 kg)              We Performed the Following     Allergy Shot: Two or more injections        Primary " Care Provider Office Phone # Fax #    Cookie Conklin [phone]       [address] [place] [address]        Equal Access to Services     KODY WALTERSKAY : Hadii aad ku hadneymaro Sokashmirali, waaxda luqadaha, qaybta kaalmada ademelida, hieu jeffersonjoy lee. So [facility] [phone].    ATENCIÓN: Si habla español, tiene a pruitt disposición servicios gratuitos de asistencia lingüística. Llame al [phone].    We comply with applicable federal civil rights laws and [place] laws. We do not discriminate on the basis of race, color, national origin, age, disability sex, sexual orientation or gender identity.            Thank you!     Thank you for choosing [facility]  for your care. Our goal is always to provide you with excellent care. Hearing back from our patients is one way we can continue to improve our services. Please take a few minutes to complete the written survey that you may receive in the mail after your visit with us. Thank you!             Your Updated Medication List - Protect others around you: Learn how to safely use, store and throw away your medicines at www.disposemymeds.org.          This list is accurate as of: 9/19/17  7:47 AM.  Always use your most recent med list.                   Brand Name Dispense Instructions for use Diagnosis    * albuterol (2.5 MG/3ML) 0.083% neb solution     1 Box    Take 1 vial (2.5 mg) by nebulization every 4 hours as needed    Moderate persistent asthma, uncomplicated       * albuterol 108 (90 BASE) MCG/ACT Inhaler    PROAIR HFA/PROVENTIL HFA/VENTOLIN HFA    1 Inhaler    Inhale 2 puffs into the lungs every 4 hours as needed    Moderate persistent asthma, uncomplicated       * ALLERGEN IMMUNOTHERAPY PRESCRIPTION     5 mL    Name of Mix: Mix #1  Mold Alternaria Tenuis GLY 1:10 w/v, HS  0.5 ml Aspergillus Fumigatus GLY 1:10 w/v, HS  0.5 ml Epicoccum Nigrum 1:10 w/v, HS 0.5 ml Hormodendrum Cladosporioides 1:10 w/v, HS 0.5 ml  Penicillium Mix GLY 1:10 w/v, HS  0.5 ml Diluent: HSA qs to 5ml    Chronic allergic rhinitis due to animal hair and dander, Allergic rhinitis due to dust mite, Allergic rhinitis due to mold, Chronic seasonal allergic rhinitis due to pollen       * ALLERGEN IMMUNOTHERAPY PRESCRIPTION     5 mL    Name of Mix: Mix #2  Dust Mite, Cat, Dog Cat Hair, Standardized 10,000 BAU/mL, ALK  2.0 ml Dog Hair Dander, A. P.  1:100 w/v, HS  1.0 ml Dust Mites F 30,000AU/mL, HS  0.3 ml Dust Mites P. 30,000 AU/mL, HS  0.3 ml  Diluent: HSA qs to 5ml    Chronic allergic rhinitis due to animal hair and dander, Allergic rhinitis due to dust mite, Allergic rhinitis due to mold, Chronic seasonal allergic rhinitis due to pollen       * ALLERGEN IMMUNOTHERAPY PRESCRIPTION     5 mL    Name of Mix: Mix #3 Grass,Tree  Dmitry,White GLY 1:20w/v, HS 0.5ml Birch Mix GLY 1:20w/v, HS 0.5ml Boxelder-Maple Mix BHR (Boxelder Hard Red) 1:20w/v, HS 0.5ml [place],Common GLY 1:20w/v, HS 0.5ml Elm,American GLY 1:20w/v, HS 0.5ml [place] Mix GLY 1:20w/v, HS 0.5ml Oak Mix RVW GLY 1:20w/v, HS 0.5ml [place] Tree,Black GLY 1:20w/v, HS 0.5ml Grass Mix #7 100,000 BAU/mL, HS 0.4ml Jonathan Grass 1:20w/v, HS 0.5ml Diluent: HSA qs to 5ml    Chronic allergic rhinitis due to animal hair and dander, Allergic rhinitis due to dust mite, Allergic rhinitis due to mold, Chronic seasonal allergic rhinitis due to pollen       * ALLERGEN IMMUNOTHERAPY PRESCRIPTION     5 mL    Name of Mix: Mix #4  Weeds Kochia GLY 1:20 w/v, HS 0.5 ml Lamb's Quarters GLY 1:20 w/v, HS 0.5 ml Nettle GLY 1:20 w/v, HS 0.5 ml Plantain, English GLY 1:20 w/v, HS 0.5 ml Ragweed Mixed 1:20 w/v ALK  0.5 ml Russian Thistle GLY 1:20 w/v, HS 0.5 ml Sagebrush, Mugwort GLY 1:20 w/v, HS 0.5 ml Sorrel, Sheep GLY 1:20 w/v, HS 0.5 ml Diluent: HSA qs to 5ml    Chronic allergic rhinitis due to animal hair and dander, Allergic rhinitis due to dust mite, Allergic rhinitis due to mold, Chronic seasonal allergic rhinitis due to  pollen       azelastine 0.05 % Soln ophthalmic solution    OPTIVAR    1 Bottle    Apply 1 drop to eye 2 times daily    Conjunctivitis, allergic, unspecified laterality       CERAVE Crea     2 Bottle    Externally apply 1 dose * topically 2 times daily    Eczema, unspecified type       cetirizine 10 MG tablet    zyrTEC    30 tablet    Take 1 tablet (10 mg) by mouth At Bedtime    Rash, Itching       EPINEPHrine 0.3 MG/0.3ML injection 2-pack    EPIPEN/ADRENACLICK/or ANY BX GENERIC EQUIV    0.6 mL    Inject 0.3 mLs (0.3 mg) into the muscle once as needed for anaphylaxis    Food allergy, Need for desensitization to allergens       fluticasone-salmeterol 500-50 MCG/DOSE diskus inhaler    ADVAIR    3 Inhaler    Inhale 1 puff into the lungs 2 times daily    Moderate persistent asthma, uncomplicated       loratadine 10 MG tablet    CLARITIN    30 tablet    Take 1 tablet (10 mg) by mouth daily    Seasonal allergic rhinitis, unspecified allergic rhinitis trigger, Chronic allergic rhinitis, Conjunctivitis, allergic, unspecified laterality       montelukast 10 MG tablet    SINGULAIR    30 tablet    Take 1 tablet (10 mg) by mouth At Bedtime    Moderate persistent asthma, uncomplicated, Seasonal allergic rhinitis, unspecified allergic rhinitis trigger, Chronic allergic rhinitis       MULTIVITAMIN PO      1 tab daily        * triamcinolone 0.1 % cream    KENALOG    80 g    Apply  topically 2 times daily as needed.    Eczema, unspecified type       * triamcinolone 0.1 % cream    KENALOG    80 g    Apply sparingly to affected area three times daily as needed    Dermatitis       * Notice:  This list has 8 medication(s) that are the same as other medications prescribed for you. Read the directions carefully, and ask your doctor or other care provider to review them with you.

## 2017-09-22 ENCOUNTER — ALLIED HEALTH/NURSE VISIT (OUTPATIENT)
Dept: ALLERGY | Facility: CLINIC | Age: 52
End: 2017-09-22
Payer: COMMERCIAL

## 2017-09-22 DIAGNOSIS — J30.9 ALLERGIC RHINITIS, UNSPECIFIED: Primary | ICD-10-CM

## 2017-09-22 PROCEDURE — 95117 IMMUNOTHERAPY INJECTIONS: CPT

## 2017-09-22 NOTE — MR AVS SNAPSHOT
After Visit Summary   9/22/2017    Dilia Solomon    MRN: [id number]           Patient Information     Date Of Birth          1965        Visit Information        Provider Department      9/22/2017 7:00 AM ALLERGY [facility]        Today's Diagnoses     Allergic rhinitis, unspecified    -  1       Follow-ups after your visit        Your next 10 appointments already scheduled     Sep 26, 2017  7:00 AM CDT   Nurse Only with ALLERGY [facility] ([facility])    [address] [facility] [address]   [phone]           Every allergy patient MUST wait 30 minutes after their allergy shot. No exceptions.  Xolair shots #1-3 should plan to wait 2 hours in clinic Xolair shots after #4 should plan 30 minute wait in clinic            Sep 29, 2017  7:00 AM CDT   Nurse Only with ALLERGY [facility] ([facility])    [address] [facility] [address]   [phone]           Every allergy patient MUST wait 30 minutes after their allergy shot. No exceptions.  Xolair shots #1-3 should plan to wait 2 hours in clinic Xolair shots after #4 should plan 30 minute wait in clinic            Dec 06, 2017  8:20 AM CST   Return Visit with Butch Horowitz MD   [facility]    [address] [facility] [address]   [phone]              Who to contact     If you have questions or need follow up information about today's clinic visit or your schedule please contact [facility] directly at [phone].  Normal or non-critical lab and imaging results will be communicated to you by MyChart, letter or phone within 4 business days after the clinic has received the results. If you do not hear from us within 7 days, please contact the clinic through MyChart or phone. If you have a critical or abnormal lab result, we will notify you by  "phone as soon as possible.  Submit refill requests through BombBomb or call your pharmacy and they will forward the refill request to us. Please allow 3 business days for your refill to be completed.          Additional Information About Your Visit        GojimoharSecond Porch Information     BombBomb lets you send messages to your doctor, view your test results, renew your prescriptions, schedule appointments and more. To sign up, go to [email or web address] . Click on \"Log in\" on the left side of the screen, which will take you to the Welcome page. Then click on \"Sign up Now\" on the right side of the page.     You will be asked to enter the access code listed below, as well as some personal information. Please follow the directions to create your username and password.     Your access code is: [email or web address]  Expires: 2017  7:40 AM     Your access code will  in 90 days. If you need help or a new code, please call your [place] clinic or [phone].        Care EveryWhere ID     This is your Care EveryWhere ID. This could be used by other organizations to access your [place] medical records  [id number]        Your Vitals Were     Last Period                   2015 (Approximate)            Blood Pressure from Last 3 Encounters:   17 (!) 147/92   17 122/81   17 129/72    Weight from Last 3 Encounters:   17 209 lb (94.8 kg)   17 208 lb 4.8 oz (94.5 kg)   17 210 lb 12.2 oz (95.6 kg)              We Performed the Following     Allergy Shot: Two or more injections        Primary Care Provider Office Phone # Fax #    Cookie Conklin [phone] [address] [place] [address]        Equal Access to Services     GABBY CHEN : Hadii ulices Anguiano, wajavida celyadaha, qaybta kaalmada anna, hieu howard. So [facility] [phone].    ATENCIÓN: Si habla español, tiene a pruitt disposición servicios gratuitos de asistencia " lingüísticaJorge Zavala al [phone].    We comply with applicable federal civil rights laws and [place] laws. We do not discriminate on the basis of race, color, national origin, age, disability sex, sexual orientation or gender identity.            Thank you!     Thank you for choosing [facility]  for your care. Our goal is always to provide you with excellent care. Hearing back from our patients is one way we can continue to improve our services. Please take a few minutes to complete the written survey that you may receive in the mail after your visit with us. Thank you!             Your Updated Medication List - Protect others around you: Learn how to safely use, store and throw away your medicines at www.disposemymeds.org.          This list is accurate as of: 9/22/17  7:46 AM.  Always use your most recent med list.                   Brand Name Dispense Instructions for use Diagnosis    * albuterol (2.5 MG/3ML) 0.083% neb solution     1 Box    Take 1 vial (2.5 mg) by nebulization every 4 hours as needed    Moderate persistent asthma, uncomplicated       * albuterol 108 (90 BASE) MCG/ACT Inhaler    PROAIR HFA/PROVENTIL HFA/VENTOLIN HFA    1 Inhaler    Inhale 2 puffs into the lungs every 4 hours as needed    Moderate persistent asthma, uncomplicated       * ALLERGEN IMMUNOTHERAPY PRESCRIPTION     5 mL    Name of Mix: Mix #1  Mold Alternaria Tenuis GLY 1:10 w/v, HS  0.5 ml Aspergillus Fumigatus GLY 1:10 w/v, HS  0.5 ml Epicoccum Nigrum 1:10 w/v, HS 0.5 ml Hormodendrum Cladosporioides 1:10 w/v, HS 0.5 ml Penicillium Mix GLY 1:10 w/v, HS  0.5 ml Diluent: HSA qs to 5ml    Chronic allergic rhinitis due to animal hair and dander, Allergic rhinitis due to dust mite, Allergic rhinitis due to mold, Chronic seasonal allergic rhinitis due to pollen       * ALLERGEN IMMUNOTHERAPY PRESCRIPTION     5 mL    Name of Mix: Mix #2  Dust Mite, Cat, Dog Cat Hair, Standardized 10,000 BAU/mL, ALK  2.0 ml Dog Hair Dander,  A. P.  1:100 w/v, HS  1.0 ml Dust Mites F 30,000AU/mL, HS  0.3 ml Dust Mites P. 30,000 AU/mL, HS  0.3 ml  Diluent: HSA qs to 5ml    Chronic allergic rhinitis due to animal hair and dander, Allergic rhinitis due to dust mite, Allergic rhinitis due to mold, Chronic seasonal allergic rhinitis due to pollen       * ALLERGEN IMMUNOTHERAPY PRESCRIPTION     5 mL    Name of Mix: Mix #3 Grass,Tree  Dmitry,White GLY 1:20w/v, HS 0.5ml Birch Mix GLY 1:20w/v, HS 0.5ml Boxelder-Maple Mix BHR (Boxelder Hard Red) 1:20w/v, HS 0.5ml [place],Common GLY 1:20w/v, HS 0.5ml Elm,American GLY 1:20w/v, HS 0.5ml [place] Mix GLY 1:20w/v, HS 0.5ml Oak Mix RVW GLY 1:20w/v, HS 0.5ml [place] Tree,Black GLY 1:20w/v, HS 0.5ml Grass Mix #7 100,000 BAU/mL, HS 0.4ml Jonathan Grass 1:20w/v, HS 0.5ml Diluent: HSA qs to 5ml    Chronic allergic rhinitis due to animal hair and dander, Allergic rhinitis due to dust mite, Allergic rhinitis due to mold, Chronic seasonal allergic rhinitis due to pollen       * ALLERGEN IMMUNOTHERAPY PRESCRIPTION     5 mL    Name of Mix: Mix #4  Weeds Kochia GLY 1:20 w/v, HS 0.5 ml Lamb's Quarters GLY 1:20 w/v, HS 0.5 ml Nettle GLY 1:20 w/v, HS 0.5 ml Plantain, English GLY 1:20 w/v, HS 0.5 ml Ragweed Mixed 1:20 w/v ALK  0.5 ml Russian Thistle GLY 1:20 w/v, HS 0.5 ml Sagebrush, Mugwort GLY 1:20 w/v, HS 0.5 ml Sorrel, Sheep GLY 1:20 w/v, HS 0.5 ml Diluent: HSA qs to 5ml    Chronic allergic rhinitis due to animal hair and dander, Allergic rhinitis due to dust mite, Allergic rhinitis due to mold, Chronic seasonal allergic rhinitis due to pollen       azelastine 0.05 % Soln ophthalmic solution    OPTIVAR    1 Bottle    Apply 1 drop to eye 2 times daily    Conjunctivitis, allergic, unspecified laterality       CERAVE Crea     2 Bottle    Externally apply 1 dose * topically 2 times daily    Eczema, unspecified type       cetirizine 10 MG tablet    zyrTEC    30 tablet    Take 1 tablet (10 mg) by mouth At Bedtime    Rash, Itching        EPINEPHrine 0.3 MG/0.3ML injection 2-pack    EPIPEN/ADRENACLICK/or ANY BX GENERIC EQUIV    0.6 mL    Inject 0.3 mLs (0.3 mg) into the muscle once as needed for anaphylaxis    Food allergy, Need for desensitization to allergens       fluticasone-salmeterol 500-50 MCG/DOSE diskus inhaler    ADVAIR    3 Inhaler    Inhale 1 puff into the lungs 2 times daily    Moderate persistent asthma, uncomplicated       loratadine 10 MG tablet    CLARITIN    30 tablet    Take 1 tablet (10 mg) by mouth daily    Seasonal allergic rhinitis, unspecified allergic rhinitis trigger, Chronic allergic rhinitis, Conjunctivitis, allergic, unspecified laterality       montelukast 10 MG tablet    SINGULAIR    30 tablet    Take 1 tablet (10 mg) by mouth At Bedtime    Moderate persistent asthma, uncomplicated, Seasonal allergic rhinitis, unspecified allergic rhinitis trigger, Chronic allergic rhinitis       MULTIVITAMIN PO      1 tab daily        * triamcinolone 0.1 % cream    KENALOG    80 g    Apply  topically 2 times daily as needed.    Eczema, unspecified type       * triamcinolone 0.1 % cream    KENALOG    80 g    Apply sparingly to affected area three times daily as needed    Dermatitis       * Notice:  This list has 8 medication(s) that are the same as other medications prescribed for you. Read the directions carefully, and ask your doctor or other care provider to review them with you.

## 2017-09-26 ENCOUNTER — ALLIED HEALTH/NURSE VISIT (OUTPATIENT)
Dept: ALLERGY | Facility: CLINIC | Age: 52
End: 2017-09-26
Payer: COMMERCIAL

## 2017-09-26 DIAGNOSIS — J30.9 ALLERGIC RHINITIS, UNSPECIFIED: Primary | ICD-10-CM

## 2017-09-26 PROCEDURE — 99207 ZZC NO CHARGE LOS: CPT

## 2017-09-26 PROCEDURE — 95117 IMMUNOTHERAPY INJECTIONS: CPT

## 2017-09-26 NOTE — MR AVS SNAPSHOT
After Visit Summary   9/26/2017    Dilia Solomon    MRN: [id number]           Patient Information     Date Of Birth          1965        Visit Information        Provider Department      9/26/2017 7:00 AM ALLERGY [facility]        Today's Diagnoses     Allergic rhinitis, unspecified    -  1       Follow-ups after your visit        Your next 10 appointments already scheduled     Sep 29, 2017  7:00 AM CDT   Nurse Only with ALLERGY [facility] ([facility])    [address] [facility] [address]   [phone]           Every allergy patient MUST wait 30 minutes after their allergy shot. No exceptions.  Xolair shots #1-3 should plan to wait 2 hours in clinic Xolair shots after #4 should plan 30 minute wait in clinic            Oct 03, 2017  7:00 AM CDT   Nurse Only with ALLERGY [facility] ([facility])    [address] [facility] [address]   [phone]           Every allergy patient MUST wait 30 minutes after their allergy shot. No exceptions.  Xolair shots #1-3 should plan to wait 2 hours in clinic Xolair shots after #4 should plan 30 minute wait in clinic            Oct 06, 2017  7:00 AM CDT   Nurse Only with ALLERGY [facility] ([facility])    [address] [facility] [address]   [phone]           Every allergy patient MUST wait 30 minutes after their allergy shot. No exceptions.  Xolair shots #1-3 should plan to wait 2 hours in clinic Xolair shots after #4 should plan 30 minute wait in clinic            Oct 10, 2017  7:00 AM CDT   Nurse Only with ALLERGY [facility] ([facility])    [address] [place] [address]   [phone]           Every allergy patient MUST wait 30 minutes after their allergy shot. No exceptions.  Xolair shots #1-3 should  plan to wait 2 hours in clinic Xolair shots after #4 should plan 30 minute wait in clinic            Oct 13, 2017  7:15 AM CDT   Nurse Only with ALLERGY [facility] ([facility])    [address] [facility] [place] [address]   [phone]           Every allergy patient MUST wait 30 minutes after their allergy shot. No exceptions.  Xolair shots #1-3 should plan to wait 2 hours in clinic Xolair shots after #4 should plan 30 minute wait in clinic            Oct 17, 2017  7:00 AM CDT   Nurse Only with ALLERGY [facility] ([facility])    [address] [facility] [place] [address]   [phone]           Every allergy patient MUST wait 30 minutes after their allergy shot. No exceptions.  Xolair shots #1-3 should plan to wait 2 hours in clinic Xolair shots after #4 should plan 30 minute wait in clinic            Oct 20, 2017  7:00 AM CDT   Nurse Only with ALLERGY [facility] ([facility])    [address] [place] [address]   [phone]           Every allergy patient MUST wait 30 minutes after their allergy shot. No exceptions.  Xolair shots #1-3 should plan to wait 2 hours in clinic Xolair shots after #4 should plan 30 minute wait in clinic            Oct 24, 2017  7:00 AM CDT   Nurse Only with ALLERGY [facility] ([facility])    [address] [facility] [place] [address]   [phone]           Every allergy patient MUST wait 30 minutes after their allergy shot. No exceptions.  Xolair shots #1-3 should plan to wait 2 hours in clinic Xolair shots after #4 should plan 30 minute wait in clinic            Oct 27, 2017  7:15 AM CDT   Nurse Only with ALLERGY [facility] ([facility])    [address] [facility] [place] [address]   [phone]           Every allergy patient MUST wait 30  "minutes after their allergy shot. No exceptions.  Xolair shots #1-3 should plan to wait 2 hours in clinic Xolair shots after #4 should plan 30 minute wait in clinic            Oct 31, 2017  7:00 AM CDT   Nurse Only with ALLERGY [facility] ([facility])    [address] [facility] [address]   [phone]           Every allergy patient MUST wait 30 minutes after their allergy shot. No exceptions.  Xolair shots #1-3 should plan to wait 2 hours in clinic Xolair shots after #4 should plan 30 minute wait in clinic              Who to contact     If you have questions or need follow up information about today's clinic visit or your schedule please contact [facility] directly at [phone].  Normal or non-critical lab and imaging results will be communicated to you by Tigerspikehart, letter or phone within 4 business days after the clinic has received the results. If you do not hear from us within 7 days, please contact the clinic through Tigerspikehart or phone. If you have a critical or abnormal lab result, we will notify you by phone as soon as possible.  Submit refill requests through Akosha or call your pharmacy and they will forward the refill request to us. Please allow 3 business days for your refill to be completed.          Additional Information About Your Visit        TigerspikeharPact Apparel Information     Akosha lets you send messages to your doctor, view your test results, renew your prescriptions, schedule appointments and more. To sign up, go to [email or web address] . Click on \"Log in\" on the left side of the screen, which will take you to the Welcome page. Then click on \"Sign up Now\" on the right side of the page.     You will be asked to enter the access code listed below, as well as some personal information. Please follow the directions to create your username and password.     Your access code is: [email or web address]  Expires: 11/13/2017  7:40 AM     Your access " code will  in 90 days. If you need help or a new code, please call your [facility] clinic or [phone].        Care EveryWhere ID     This is your Care EveryWhere ID. This could be used by other organizations to access your [facility] medical records  [id number]        Your Vitals Were     Last Period                   2015 (Approximate)            Blood Pressure from Last 3 Encounters:   17 (!) 147/92   17 122/81   17 129/72    Weight from Last 3 Encounters:   17 209 lb (94.8 kg)   17 208 lb 4.8 oz (94.5 kg)   17 210 lb 12.2 oz (95.6 kg)              We Performed the Following     Allergy Shot: Two or more injections        Primary Care Provider Office Phone # Fax #    Cookie Conklin [phone]       [address] [place] [address]        Equal Access to Services     KODY CHEN : Hadii aad ku hadasho Soomaali, waaxda luqadaha, qaybta kaalmada adeegyada, waxay idiin hayjulio cesarn kanwal shen . So [facility] [phone].    ATENCIÓN: Si habla español, tiene a pruitt disposición servicios gratuitos de asistencia lingüística. Llame al [phone].    We comply with applicable federal civil rights laws and [place] laws. We do not discriminate on the basis of race, color, national origin, age, disability sex, sexual orientation or gender identity.            Thank you!     Thank you for choosing [facility]  for your care. Our goal is always to provide you with excellent care. Hearing back from our patients is one way we can continue to improve our services. Please take a few minutes to complete the written survey that you may receive in the mail after your visit with us. Thank you!             Your Updated Medication List - Protect others around you: Learn how to safely use, store and throw away your medicines at www.disposemymeds.org.          This list is accurate as of: 17  8:37 AM.  Always use your most recent med list.                    Brand Name Dispense Instructions for use Diagnosis    * albuterol (2.5 MG/3ML) 0.083% neb solution     1 Box    Take 1 vial (2.5 mg) by nebulization every 4 hours as needed    Moderate persistent asthma, uncomplicated       * albuterol 108 (90 BASE) MCG/ACT Inhaler    PROAIR HFA/PROVENTIL HFA/VENTOLIN HFA    1 Inhaler    Inhale 2 puffs into the lungs every 4 hours as needed    Moderate persistent asthma, uncomplicated       * ALLERGEN IMMUNOTHERAPY PRESCRIPTION     5 mL    Name of Mix: Mix #1  Mold Alternaria Tenuis GLY 1:10 w/v, HS  0.5 ml Aspergillus Fumigatus GLY 1:10 w/v, HS  0.5 ml Epicoccum Nigrum 1:10 w/v, HS 0.5 ml Hormodendrum Cladosporioides 1:10 w/v, HS 0.5 ml Penicillium Mix GLY 1:10 w/v, HS  0.5 ml Diluent: HSA qs to 5ml    Chronic allergic rhinitis due to animal hair and dander, Allergic rhinitis due to dust mite, Allergic rhinitis due to mold, Chronic seasonal allergic rhinitis due to pollen       * ALLERGEN IMMUNOTHERAPY PRESCRIPTION     5 mL    Name of Mix: Mix #2  Dust Mite, Cat, Dog Cat Hair, Standardized 10,000 BAU/mL, ALK  2.0 ml Dog Hair Dander, A. P.  1:100 w/v, HS  1.0 ml Dust Mites F 30,000AU/mL, HS  0.3 ml Dust Mites P. 30,000 AU/mL, HS  0.3 ml  Diluent: HSA qs to 5ml    Chronic allergic rhinitis due to animal hair and dander, Allergic rhinitis due to dust mite, Allergic rhinitis due to mold, Chronic seasonal allergic rhinitis due to pollen       * ALLERGEN IMMUNOTHERAPY PRESCRIPTION     5 mL    Name of Mix: Mix #3 Grass,Tree  Dmitry,White GLY 1:20w/v, HS 0.5ml Birch Mix GLY 1:20w/v, HS 0.5ml Boxelder-Maple Mix BHR (Boxelder Hard Red) 1:20w/v, HS 0.5ml [place],Common GLY 1:20w/v, HS 0.5ml Elm,American GLY 1:20w/v, HS 0.5ml [place] Mix GLY 1:20w/v, HS 0.5ml Oak Mix RVW GLY 1:20w/v, HS 0.5ml [place] Tree,Black GLY 1:20w/v, HS 0.5ml Grass Mix #7 100,000 BAU/mL, HS 0.4ml Jonathan Grass 1:20w/v, HS 0.5ml Diluent: HSA qs to 5ml    Chronic allergic rhinitis due to animal hair and dander,  Allergic rhinitis due to dust mite, Allergic rhinitis due to mold, Chronic seasonal allergic rhinitis due to pollen       * ALLERGEN IMMUNOTHERAPY PRESCRIPTION     5 mL    Name of Mix: Mix #4  Weeds Kochia GLY 1:20 w/v, HS 0.5 ml Lamb's Quarters GLY 1:20 w/v, HS 0.5 ml Nettle GLY 1:20 w/v, HS 0.5 ml Plantain, English GLY 1:20 w/v, HS 0.5 ml Ragweed Mixed 1:20 w/v ALK  0.5 ml Russian Thistle GLY 1:20 w/v, HS 0.5 ml Sagebrush, Mugwort GLY 1:20 w/v, HS 0.5 ml Sorrel, Sheep GLY 1:20 w/v, HS 0.5 ml Diluent: HSA qs to 5ml    Chronic allergic rhinitis due to animal hair and dander, Allergic rhinitis due to dust mite, Allergic rhinitis due to mold, Chronic seasonal allergic rhinitis due to pollen       azelastine 0.05 % Soln ophthalmic solution    OPTIVAR    1 Bottle    Apply 1 drop to eye 2 times daily    Conjunctivitis, allergic, unspecified laterality       CERAVE Crea     2 Bottle    Externally apply 1 dose * topically 2 times daily    Eczema, unspecified type       cetirizine 10 MG tablet    zyrTEC    30 tablet    Take 1 tablet (10 mg) by mouth At Bedtime    Rash, Itching       EPINEPHrine 0.3 MG/0.3ML injection 2-pack    EPIPEN/ADRENACLICK/or ANY BX GENERIC EQUIV    0.6 mL    Inject 0.3 mLs (0.3 mg) into the muscle once as needed for anaphylaxis    Food allergy, Need for desensitization to allergens       fluticasone-salmeterol 500-50 MCG/DOSE diskus inhaler    ADVAIR    3 Inhaler    Inhale 1 puff into the lungs 2 times daily    Moderate persistent asthma, uncomplicated       loratadine 10 MG tablet    CLARITIN    30 tablet    Take 1 tablet (10 mg) by mouth daily    Seasonal allergic rhinitis, unspecified allergic rhinitis trigger, Chronic allergic rhinitis, Conjunctivitis, allergic, unspecified laterality       montelukast 10 MG tablet    SINGULAIR    30 tablet    Take 1 tablet (10 mg) by mouth At Bedtime    Moderate persistent asthma, uncomplicated, Seasonal allergic rhinitis, unspecified allergic rhinitis trigger,  Chronic allergic rhinitis       MULTIVITAMIN PO      1 tab daily        * triamcinolone 0.1 % cream    KENALOG    80 g    Apply  topically 2 times daily as needed.    Eczema, unspecified type       * triamcinolone 0.1 % cream    KENALOG    80 g    Apply sparingly to affected area three times daily as needed    Dermatitis       * Notice:  This list has 8 medication(s) that are the same as other medications prescribed for you. Read the directions carefully, and ask your doctor or other care provider to review them with you.

## 2017-09-29 ENCOUNTER — ALLIED HEALTH/NURSE VISIT (OUTPATIENT)
Dept: ALLERGY | Facility: CLINIC | Age: 52
End: 2017-09-29
Payer: COMMERCIAL

## 2017-09-29 DIAGNOSIS — J30.9 ALLERGIC RHINITIS, UNSPECIFIED: Primary | ICD-10-CM

## 2017-09-29 PROCEDURE — 95117 IMMUNOTHERAPY INJECTIONS: CPT

## 2017-09-29 PROCEDURE — 99207 ZZC NO CHARGE LOS: CPT

## 2017-09-29 NOTE — MR AVS SNAPSHOT
After Visit Summary   9/29/2017    Dilia Solomon    MRN: [id number]           Patient Information     Date Of Birth          1965        Visit Information        Provider Department      9/29/2017 7:00 AM ALLERGY Formerly named [facility]        Today's Diagnoses     Allergic rhinitis, unspecified    -  1       Follow-ups after your visit        Your next 10 appointments already scheduled     Oct 03, 2017  7:00 AM CDT   Nurse Only with ALLERGY Formerly named [facility] ([facility])    [address] [facility] [address]   [phone]           Every allergy patient MUST wait 30 minutes after their allergy shot. No exceptions.  Xolair shots #1-3 should plan to wait 2 hours in clinic Xolair shots after #4 should plan 30 minute wait in clinic            Oct 06, 2017  7:00 AM CDT   Nurse Only with ALLERGY Formerly named [facility] ([facility])    [address] [facility] [address]   [phone]           Every allergy patient MUST wait 30 minutes after their allergy shot. No exceptions.  Xolair shots #1-3 should plan to wait 2 hours in clinic Xolair shots after #4 should plan 30 minute wait in clinic            Oct 10, 2017  7:00 AM CDT   Nurse Only with ALLERGY Formerly named [facility] ([facility])    [address] [facility] [address]   [phone]           Every allergy patient MUST wait 30 minutes after their allergy shot. No exceptions.  Xolair shots #1-3 should plan to wait 2 hours in clinic Xolair shots after #4 should plan 30 minute wait in clinic            Oct 13, 2017  7:15 AM CDT   Nurse Only with ALLERGY Formerly named [facility] ([facility])    [address] [facility] [place] [address]   [phone]           Every allergy patient MUST wait 30 minutes after their allergy shot. No exceptions.  Xolair shots #1-3 should  plan to wait 2 hours in clinic Xolair shots after #4 should plan 30 minute wait in clinic            Oct 17, 2017  7:00 AM CDT   Nurse Only with ALLERGY [facility] ([facility])    [address] [facility] [place] [address]   [phone]           Every allergy patient MUST wait 30 minutes after their allergy shot. No exceptions.  Xolair shots #1-3 should plan to wait 2 hours in clinic Xolair shots after #4 should plan 30 minute wait in clinic            Oct 20, 2017  7:00 AM CDT   Nurse Only with ALLERGY [facility] ([facility])    [address] [facility] [place] [address]   [phone]           Every allergy patient MUST wait 30 minutes after their allergy shot. No exceptions.  Xolair shots #1-3 should plan to wait 2 hours in clinic Xolair shots after #4 should plan 30 minute wait in clinic            Oct 24, 2017  7:00 AM CDT   Nurse Only with ALLERGY [facility] ([facility])    [address] [facility] [place] [address]   [phone]           Every allergy patient MUST wait 30 minutes after their allergy shot. No exceptions.  Xolair shots #1-3 should plan to wait 2 hours in clinic Xolair shots after #4 should plan 30 minute wait in clinic            Oct 27, 2017  7:15 AM CDT   Nurse Only with ALLERGY [facility] ([facility])    [address] [facility] [place] [address]   [phone]           Every allergy patient MUST wait 30 minutes after their allergy shot. No exceptions.  Xolair shots #1-3 should plan to wait 2 hours in clinic Xolair shots after #4 should plan 30 minute wait in clinic            Oct 31, 2017  7:00 AM CDT   Nurse Only with ALLERGY [facility] ([facility])    [address] [facility] [place] [address]   [phone]           Every allergy patient MUST wait 30  "minutes after their allergy shot. No exceptions.  Xolair shots #1-3 should plan to wait 2 hours in clinic Xolair shots after #4 should plan 30 minute wait in clinic            Nov 03, 2017  7:00 AM CDT   Nurse Only with ALLERGY [facility] ([facility])    [address] [facility] [address]   [phone]           Every allergy patient MUST wait 30 minutes after their allergy shot. No exceptions.  Xolair shots #1-3 should plan to wait 2 hours in clinic Xolair shots after #4 should plan 30 minute wait in clinic              Who to contact     If you have questions or need follow up information about today's clinic visit or your schedule please contact [facility] directly at [phone].  Normal or non-critical lab and imaging results will be communicated to you by Saiguohart, letter or phone within 4 business days after the clinic has received the results. If you do not hear from us within 7 days, please contact the clinic through Saiguohart or phone. If you have a critical or abnormal lab result, we will notify you by phone as soon as possible.  Submit refill requests through OpenSearchServer or call your pharmacy and they will forward the refill request to us. Please allow 3 business days for your refill to be completed.          Additional Information About Your Visit        SaiguoharAppSlingr Information     OpenSearchServer lets you send messages to your doctor, view your test results, renew your prescriptions, schedule appointments and more. To sign up, go to [email or web address] . Click on \"Log in\" on the left side of the screen, which will take you to the Welcome page. Then click on \"Sign up Now\" on the right side of the page.     You will be asked to enter the access code listed below, as well as some personal information. Please follow the directions to create your username and password.     Your access code is: [email or web address]  Expires: 11/13/2017  7:40 AM     Your access " code will  in 90 days. If you need help or a new code, please call your [place] clinic or [phone].        Care EveryWhere ID     This is your Care EveryWhere ID. This could be used by other organizations to access your [place] medical records  [id number]        Your Vitals Were     Last Period                   2015 (Approximate)            Blood Pressure from Last 3 Encounters:   17 (!) 147/92   17 122/81   17 129/72    Weight from Last 3 Encounters:   17 209 lb (94.8 kg)   17 208 lb 4.8 oz (94.5 kg)   17 210 lb 12.2 oz (95.6 kg)              We Performed the Following     Allergy Shot: Two or more injections        Primary Care Provider Office Phone # Fax #    Cookie Conklin [phone]       [address] [place] [address]        Equal Access to Services     KODY CHEN : Hadii aad ku hadasho Soomaali, waaxda luqadaha, qaybta kaalmada adeegyada, waxay idiin hayjulio cesarn kanwal shen . So [facility] [phone].    ATENCIÓN: Si habla español, tiene a pruitt disposición servicios gratuitos de asistencia lingüística. Llame al [phone].    We comply with applicable federal civil rights laws and [place] laws. We do not discriminate on the basis of race, color, national origin, age, disability sex, sexual orientation or gender identity.            Thank you!     Thank you for choosing [facility]  for your care. Our goal is always to provide you with excellent care. Hearing back from our patients is one way we can continue to improve our services. Please take a few minutes to complete the written survey that you may receive in the mail after your visit with us. Thank you!             Your Updated Medication List - Protect others around you: Learn how to safely use, store and throw away your medicines at www.disposemymeds.org.          This list is accurate as of: 17  7:44 AM.  Always use your most recent med list.                    Brand Name Dispense Instructions for use Diagnosis    * albuterol (2.5 MG/3ML) 0.083% neb solution     1 Box    Take 1 vial (2.5 mg) by nebulization every 4 hours as needed    Moderate persistent asthma, uncomplicated       * albuterol 108 (90 BASE) MCG/ACT Inhaler    PROAIR HFA/PROVENTIL HFA/VENTOLIN HFA    1 Inhaler    Inhale 2 puffs into the lungs every 4 hours as needed    Moderate persistent asthma, uncomplicated       * ALLERGEN IMMUNOTHERAPY PRESCRIPTION     5 mL    Name of Mix: Mix #1  Mold Alternaria Tenuis GLY 1:10 w/v, HS  0.5 ml Aspergillus Fumigatus GLY 1:10 w/v, HS  0.5 ml Epicoccum Nigrum 1:10 w/v, HS 0.5 ml Hormodendrum Cladosporioides 1:10 w/v, HS 0.5 ml Penicillium Mix GLY 1:10 w/v, HS  0.5 ml Diluent: HSA qs to 5ml    Chronic allergic rhinitis due to animal hair and dander, Allergic rhinitis due to dust mite, Allergic rhinitis due to mold, Chronic seasonal allergic rhinitis due to pollen       * ALLERGEN IMMUNOTHERAPY PRESCRIPTION     5 mL    Name of Mix: Mix #2  Dust Mite, Cat, Dog Cat Hair, Standardized 10,000 BAU/mL, ALK  2.0 ml Dog Hair Dander, A. P.  1:100 w/v, HS  1.0 ml Dust Mites F 30,000AU/mL, HS  0.3 ml Dust Mites P. 30,000 AU/mL, HS  0.3 ml  Diluent: HSA qs to 5ml    Chronic allergic rhinitis due to animal hair and dander, Allergic rhinitis due to dust mite, Allergic rhinitis due to mold, Chronic seasonal allergic rhinitis due to pollen       * ALLERGEN IMMUNOTHERAPY PRESCRIPTION     5 mL    Name of Mix: Mix #3 Grass,Tree  Dmitry,White GLY 1:20w/v, HS 0.5ml Birch Mix GLY 1:20w/v, HS 0.5ml Boxelder-Maple Mix BHR (Boxelder Hard Red) 1:20w/v, HS 0.5ml [place],Common GLY 1:20w/v, HS 0.5ml Elm,American GLY 1:20w/v, HS 0.5ml [place] Mix GLY 1:20w/v, HS 0.5ml Oak Mix RVW GLY 1:20w/v, HS 0.5ml [place] Tree,Black GLY 1:20w/v, HS 0.5ml Grass Mix #7 100,000 BAU/mL, HS 0.4ml Jonathan Grass 1:20w/v, HS 0.5ml Diluent: HSA qs to 5ml    Chronic allergic rhinitis due to animal hair and dander,  Allergic rhinitis due to dust mite, Allergic rhinitis due to mold, Chronic seasonal allergic rhinitis due to pollen       * ALLERGEN IMMUNOTHERAPY PRESCRIPTION     5 mL    Name of Mix: Mix #4  Weeds Kochia GLY 1:20 w/v, HS 0.5 ml Lamb's Quarters GLY 1:20 w/v, HS 0.5 ml Nettle GLY 1:20 w/v, HS 0.5 ml Plantain, English GLY 1:20 w/v, HS 0.5 ml Ragweed Mixed 1:20 w/v ALK  0.5 ml Russian Thistle GLY 1:20 w/v, HS 0.5 ml Sagebrush, Mugwort GLY 1:20 w/v, HS 0.5 ml Sorrel, Sheep GLY 1:20 w/v, HS 0.5 ml Diluent: HSA qs to 5ml    Chronic allergic rhinitis due to animal hair and dander, Allergic rhinitis due to dust mite, Allergic rhinitis due to mold, Chronic seasonal allergic rhinitis due to pollen       azelastine 0.05 % Soln ophthalmic solution    OPTIVAR    1 Bottle    Apply 1 drop to eye 2 times daily    Conjunctivitis, allergic, unspecified laterality       CERAVE Crea     2 Bottle    Externally apply 1 dose * topically 2 times daily    Eczema, unspecified type       cetirizine 10 MG tablet    zyrTEC    30 tablet    Take 1 tablet (10 mg) by mouth At Bedtime    Rash, Itching       EPINEPHrine 0.3 MG/0.3ML injection 2-pack    EPIPEN/ADRENACLICK/or ANY BX GENERIC EQUIV    0.6 mL    Inject 0.3 mLs (0.3 mg) into the muscle once as needed for anaphylaxis    Food allergy, Need for desensitization to allergens       fluticasone-salmeterol 500-50 MCG/DOSE diskus inhaler    ADVAIR    3 Inhaler    Inhale 1 puff into the lungs 2 times daily    Moderate persistent asthma, uncomplicated       loratadine 10 MG tablet    CLARITIN    30 tablet    Take 1 tablet (10 mg) by mouth daily    Seasonal allergic rhinitis, unspecified allergic rhinitis trigger, Chronic allergic rhinitis, Conjunctivitis, allergic, unspecified laterality       montelukast 10 MG tablet    SINGULAIR    30 tablet    Take 1 tablet (10 mg) by mouth At Bedtime    Moderate persistent asthma, uncomplicated, Seasonal allergic rhinitis, unspecified allergic rhinitis trigger,  Chronic allergic rhinitis       MULTIVITAMIN PO      1 tab daily        * triamcinolone 0.1 % cream    KENALOG    80 g    Apply  topically 2 times daily as needed.    Eczema, unspecified type       * triamcinolone 0.1 % cream    KENALOG    80 g    Apply sparingly to affected area three times daily as needed    Dermatitis       * Notice:  This list has 8 medication(s) that are the same as other medications prescribed for you. Read the directions carefully, and ask your doctor or other care provider to review them with you.

## 2017-10-03 ENCOUNTER — ALLIED HEALTH/NURSE VISIT (OUTPATIENT)
Dept: ALLERGY | Facility: CLINIC | Age: 52
End: 2017-10-03
Payer: COMMERCIAL

## 2017-10-03 DIAGNOSIS — J30.1 CHRONIC SEASONAL ALLERGIC RHINITIS DUE TO POLLEN: Primary | ICD-10-CM

## 2017-10-03 PROCEDURE — 95117 IMMUNOTHERAPY INJECTIONS: CPT

## 2017-10-03 NOTE — MR AVS SNAPSHOT
After Visit Summary   10/3/2017    Dilia Solomon    MRN: [id number]           Patient Information     Date Of Birth          1965        Visit Information        Provider Department      10/3/2017 7:00 AM ALLERGY [facility]        Today's Diagnoses     Chronic seasonal allergic rhinitis due to pollen    -  1       Follow-ups after your visit        Your next 10 appointments already scheduled     Oct 06, 2017  7:00 AM CDT   Nurse Only with ALLERGY [facility] ([facility])    [address] [facility] [place] [address]   [phone]           Every allergy patient MUST wait 30 minutes after their allergy shot. No exceptions.  Xolair shots #1-3 should plan to wait 2 hours in clinic Xolair shots after #4 should plan 30 minute wait in clinic            Oct 10, 2017  7:00 AM CDT   Nurse Only with ALLERGY [facility] ([facility])    [address] [facility] [address]   [phone]           Every allergy patient MUST wait 30 minutes after their allergy shot. No exceptions.  Xolair shots #1-3 should plan to wait 2 hours in clinic Xolair shots after #4 should plan 30 minute wait in clinic            Oct 13, 2017  7:15 AM CDT   Nurse Only with ALLERGY [facility] ([facility])    [address] [facility] [place] [address]   [phone]           Every allergy patient MUST wait 30 minutes after their allergy shot. No exceptions.  Xolair shots #1-3 should plan to wait 2 hours in clinic Xolair shots after #4 should plan 30 minute wait in clinic            Oct 17, 2017  7:00 AM CDT   Nurse Only with ALLERGY [facility] ([facility])    [address] [facility] [place] [address]   [phone]           Every allergy patient MUST wait 30 minutes after their allergy shot. No exceptions.  Xolair  shots #1-3 should plan to wait 2 hours in clinic Xolair shots after #4 should plan 30 minute wait in clinic            Oct 20, 2017  7:00 AM CDT   Nurse Only with ALLERGY [facility] ([facility])    [address] [facility] [facility] [place] [address]   [phone]           Every allergy patient MUST wait 30 minutes after their allergy shot. No exceptions.  Xolair shots #1-3 should plan to wait 2 hours in clinic Xolair shots after #4 should plan 30 minute wait in clinic            Oct 24, 2017  7:00 AM CDT   Nurse Only with ALLERGY [facility] ([facility])    [address] [facility] [place] [address]   [phone]           Every allergy patient MUST wait 30 minutes after their allergy shot. No exceptions.  Xolair shots #1-3 should plan to wait 2 hours in clinic Xolair shots after #4 should plan 30 minute wait in clinic            Oct 27, 2017  7:15 AM CDT   Nurse Only with ALLERGY [facility] ([facility])    [address] [facility] [place] [address]   [phone]           Every allergy patient MUST wait 30 minutes after their allergy shot. No exceptions.  Xolair shots #1-3 should plan to wait 2 hours in clinic Xolair shots after #4 should plan 30 minute wait in clinic            Oct 31, 2017  7:00 AM CDT   Nurse Only with ALLERGY [facility] ([facility])    [address] [facility] [place] [address]   [phone]           Every allergy patient MUST wait 30 minutes after their allergy shot. No exceptions.  Xolair shots #1-3 should plan to wait 2 hours in clinic Xolair shots after #4 should plan 30 minute wait in clinic            Nov 03, 2017  7:00 AM CDT   Nurse Only with ALLERGY [facility] ([facility])    [address] [facility] [place] [address]   [phone]           Every allergy  "patient MUST wait 30 minutes after their allergy shot. No exceptions.  Xolair shots #1-3 should plan to wait 2 hours in clinic Xolair shots after #4 should plan 30 minute wait in clinic            Dec 06, 2017  8:20 AM CST   Return Visit with Butch Horowitz MD   [facility] ([facility])    [address] [place]  [facility] [address]   [phone]              Who to contact     If you have questions or need follow up information about today's clinic visit or your schedule please contact [facility] directly at [phone].  Normal or non-critical lab and imaging results will be communicated to you by MyChart, letter or phone within 4 business days after the clinic has received the results. If you do not hear from us within 7 days, please contact the clinic through [employer] or phone. If you have a critical or abnormal lab result, we will notify you by phone as soon as possible.  Submit refill requests through Roll[employer] or call your pharmacy and they will forward the refill request to us. Please allow 3 business days for your refill to be completed.          Additional Information About Your Visit        [employer] Information     Rollbar lets you send messages to your doctor, view your test results, renew your prescriptions, schedule appointments and more. To sign up, go to [email or web address] . Click on \"Log in\" on the left side of the screen, which will take you to the Welcome page. Then click on \"Sign up Now\" on the right side of the page.     You will be asked to enter the access code listed below, as well as some personal information. Please follow the directions to create your username and password.     Your access code is: [email or web address]  Expires: 2017  7:40 AM     Your access code will  in 90 days. If you need help or a new code, please call your [facility] or [phone].        Care EveryWhere ID     This is your Care EveryWhere ID. This could be used " by other organizations to access your [place] medical records  [id number]        Your Vitals Were     Last Period                   07/18/2015 (Approximate)            Blood Pressure from Last 3 Encounters:   09/06/17 (!) 147/92   09/01/17 122/81   06/28/17 129/72    Weight from Last 3 Encounters:   09/06/17 209 lb (94.8 kg)   09/01/17 208 lb 4.8 oz (94.5 kg)   06/28/17 210 lb 12.2 oz (95.6 kg)              We Performed the Following     Allergy Shot: Two or more injections        Primary Care Provider Office Phone # Fax #    Cookie Conklin [phone] [address] [place] [address]        Equal Access to Services     KODY CHEN : Hadii aad ku hadasho Sofaith, waaxda luqadaha, qaybta kaalmada adeegyada, waxay america howard. So [facility] [phone].    ATENCIÓN: Si habla español, tiene a pruitt disposición servicios gratuitos de asistencia lingüística. [facility] [phone].    We comply with applicable federal civil rights laws and [place] laws. We do not discriminate on the basis of race, color, national origin, age, disability, sex, sexual orientation, or gender identity.            Thank you!     Thank you for choosing [facility]  for your care. Our goal is always to provide you with excellent care. Hearing back from our patients is one way we can continue to improve our services. Please take a few minutes to complete the written survey that you may receive in the mail after your visit with us. Thank you!             Your Updated Medication List - Protect others around you: Learn how to safely use, store and throw away your medicines at www.disposemymeds.org.          This list is accurate as of: 10/3/17  9:00 AM.  Always use your most recent med list.                   Brand Name Dispense Instructions for use Diagnosis    * albuterol (2.5 MG/3ML) 0.083% neb solution     1 Box    Take 1 vial (2.5 mg) by nebulization every 4 hours as needed     Moderate persistent asthma, uncomplicated       * albuterol 108 (90 BASE) MCG/ACT Inhaler    PROAIR HFA/PROVENTIL HFA/VENTOLIN HFA    1 Inhaler    Inhale 2 puffs into the lungs every 4 hours as needed    Moderate persistent asthma, uncomplicated       * ALLERGEN IMMUNOTHERAPY PRESCRIPTION     5 mL    Name of Mix: Mix #1  Mold Alternaria Tenuis GLY 1:10 w/v, HS  0.5 ml Aspergillus Fumigatus GLY 1:10 w/v, HS  0.5 ml Epicoccum Nigrum 1:10 w/v, HS 0.5 ml Hormodendrum Cladosporioides 1:10 w/v, HS 0.5 ml Penicillium Mix GLY 1:10 w/v, HS  0.5 ml Diluent: HSA qs to 5ml    Chronic allergic rhinitis due to animal hair and dander, Allergic rhinitis due to dust mite, Allergic rhinitis due to mold, Chronic seasonal allergic rhinitis due to pollen       * ALLERGEN IMMUNOTHERAPY PRESCRIPTION     5 mL    Name of Mix: Mix #2  Dust Mite, Cat, Dog Cat Hair, Standardized 10,000 BAU/mL, ALK  2.0 ml Dog Hair Dander, A. P.  1:100 w/v, HS  1.0 ml Dust Mites F 30,000AU/mL, HS  0.3 ml Dust Mites P. 30,000 AU/mL, HS  0.3 ml  Diluent: HSA qs to 5ml    Chronic allergic rhinitis due to animal hair and dander, Allergic rhinitis due to dust mite, Allergic rhinitis due to mold, Chronic seasonal allergic rhinitis due to pollen       * ALLERGEN IMMUNOTHERAPY PRESCRIPTION     5 mL    Name of Mix: Mix #3 Grass,Tree  Dmitry,White GLY 1:20w/v, HS 0.5ml Birch Mix GLY 1:20w/v, HS 0.5ml Boxelder-Maple Mix BHR (Boxelder Hard Red) 1:20w/v, HS 0.5ml Lyman,Common GLY 1:20w/v, HS 0.5ml Elm,American GLY 1:20w/v, HS 0.5ml [place] Mix GLY 1:20w/v, HS 0.5ml Oak Mix RVW GLY 1:20w/v, HS 0.5ml [place] Tree,Black GLY 1:20w/v, HS 0.5ml Grass Mix #7 100,000 BAU/mL, HS 0.4ml Jonathan Grass 1:20w/v, HS 0.5ml Diluent: HSA qs to 5ml    Chronic allergic rhinitis due to animal hair and dander, Allergic rhinitis due to dust mite, Allergic rhinitis due to mold, Chronic seasonal allergic rhinitis due to pollen       * ALLERGEN IMMUNOTHERAPY PRESCRIPTION     5 mL    Name of Mix:  Mix #4  Weeds Kochia GLY 1:20 w/v, HS 0.5 ml Lamb's Quarters GLY 1:20 w/v, HS 0.5 ml Nettle GLY 1:20 w/v, HS 0.5 ml Plantain, English GLY 1:20 w/v, HS 0.5 ml Ragweed Mixed 1:20 w/v ALK  0.5 ml Russian Thistle GLY 1:20 w/v, HS 0.5 ml Sagebrush, Mugwort GLY 1:20 w/v, HS 0.5 ml Sorrel, Sheep GLY 1:20 w/v, HS 0.5 ml Diluent: HSA qs to 5ml    Chronic allergic rhinitis due to animal hair and dander, Allergic rhinitis due to dust mite, Allergic rhinitis due to mold, Chronic seasonal allergic rhinitis due to pollen       azelastine 0.05 % Soln ophthalmic solution    OPTIVAR    1 Bottle    Apply 1 drop to eye 2 times daily    Conjunctivitis, allergic, unspecified laterality       CERAVE Crea     2 Bottle    Externally apply 1 dose * topically 2 times daily    Eczema, unspecified type       cetirizine 10 MG tablet    zyrTEC    30 tablet    Take 1 tablet (10 mg) by mouth At Bedtime    Rash, Itching       EPINEPHrine 0.3 MG/0.3ML injection 2-pack    EPIPEN/ADRENACLICK/or ANY BX GENERIC EQUIV    0.6 mL    Inject 0.3 mLs (0.3 mg) into the muscle once as needed for anaphylaxis    Food allergy, Need for desensitization to allergens       fluticasone-salmeterol 500-50 MCG/DOSE diskus inhaler    ADVAIR    3 Inhaler    Inhale 1 puff into the lungs 2 times daily    Moderate persistent asthma, uncomplicated       loratadine 10 MG tablet    CLARITIN    30 tablet    Take 1 tablet (10 mg) by mouth daily    Seasonal allergic rhinitis, unspecified allergic rhinitis trigger, Chronic allergic rhinitis, Conjunctivitis, allergic, unspecified laterality       montelukast 10 MG tablet    SINGULAIR    30 tablet    Take 1 tablet (10 mg) by mouth At Bedtime    Moderate persistent asthma, uncomplicated, Seasonal allergic rhinitis, unspecified allergic rhinitis trigger, Chronic allergic rhinitis       MULTIVITAMIN PO      1 tab daily        * triamcinolone 0.1 % cream    KENALOG    80 g    Apply  topically 2 times daily as needed.    Eczema,  unspecified type       * triamcinolone 0.1 % cream    KENALOG    80 g    Apply sparingly to affected area three times daily as needed    Dermatitis       * Notice:  This list has 8 medication(s) that are the same as other medications prescribed for you. Read the directions carefully, and ask your doctor or other care provider to review them with you.

## 2017-10-06 ENCOUNTER — ALLIED HEALTH/NURSE VISIT (OUTPATIENT)
Dept: ALLERGY | Facility: CLINIC | Age: 52
End: 2017-10-06
Payer: COMMERCIAL

## 2017-10-06 DIAGNOSIS — J30.2 CHRONIC SEASONAL ALLERGIC RHINITIS, UNSPECIFIED TRIGGER: Primary | ICD-10-CM

## 2017-10-06 PROCEDURE — 95117 IMMUNOTHERAPY INJECTIONS: CPT

## 2017-10-06 NOTE — PROGRESS NOTES
Patient presented after waiting 30 minutes with no reaction to  injections. Discharged from clinic.    Kaylee GRANT RN  Specialty Flex

## 2017-10-06 NOTE — MR AVS SNAPSHOT
After Visit Summary   10/6/2017    Dilia Solomon    MRN: [id number]           Patient Information     Date Of Birth          1965        Visit Information        Provider Department      10/6/2017 7:00 AM ALLERGY [facility]        Today's Diagnoses     Chronic seasonal allergic rhinitis, unspecified trigger    -  1       Follow-ups after your visit        Your next 10 appointments already scheduled     Oct 10, 2017  7:00 AM CDT   Nurse Only with ALLERGY [facility] ([facility])    [address] [facility] [place] [address]   [phone]           Every allergy patient MUST wait 30 minutes after their allergy shot. No exceptions.  Xolair shots #1-3 should plan to wait 2 hours in clinic Xolair shots after #4 should plan 30 minute wait in clinic            Oct 13, 2017  7:15 AM CDT   Nurse Only with ALLERGY [facility] ([facility])    [address] [facility] [place] [address]   [phone]           Every allergy patient MUST wait 30 minutes after their allergy shot. No exceptions.  Xolair shots #1-3 should plan to wait 2 hours in clinic Xolair shots after #4 should plan 30 minute wait in clinic            Oct 17, 2017  7:00 AM CDT   Nurse Only with ALLERGY [facility] ([facility])    [address] [facility] [place] [address]   [phone]           Every allergy patient MUST wait 30 minutes after their allergy shot. No exceptions.  Xolair shots #1-3 should plan to wait 2 hours in clinic Xolair shots after #4 should plan 30 minute wait in clinic            Oct 20, 2017  7:00 AM CDT   Nurse Only with ALLERGY [facility] ([facility])    [address] [place] [address]   [phone]           Every allergy patient MUST wait 30 minutes after their allergy shot. No exceptions.   Xolair shots #1-3 should plan to wait 2 hours in clinic Xolair shots after #4 should plan 30 minute wait in clinic            Oct 24, 2017  7:00 AM CDT   Nurse Only with ALLERGY [facility] ([facility])    [address] [facility] [facility] [address]   [phone]           Every allergy patient MUST wait 30 minutes after their allergy shot. No exceptions.  Xolair shots #1-3 should plan to wait 2 hours in clinic Xolair shots after #4 should plan 30 minute wait in clinic            Oct 27, 2017  7:15 AM CDT   Nurse Only with ALLERGY [facility] ([facility])    [address] [facility] [address]   [phone]           Every allergy patient MUST wait 30 minutes after their allergy shot. No exceptions.  Xolair shots #1-3 should plan to wait 2 hours in clinic Xolair shots after #4 should plan 30 minute wait in clinic            Oct 31, 2017  7:00 AM CDT   Nurse Only with ALLERGY [facility] ([facility])    [address] [facility] [address]   [phone]           Every allergy patient MUST wait 30 minutes after their allergy shot. No exceptions.  Xolair shots #1-3 should plan to wait 2 hours in clinic Xolair shots after #4 should plan 30 minute wait in clinic            Nov 03, 2017  7:00 AM CDT   Nurse Only with ALLERGY [facility] ([facility])    [address] [facility] [address]   [phone]           Every allergy patient MUST wait 30 minutes after their allergy shot. No exceptions.  Xolair shots #1-3 should plan to wait 2 hours in clinic Xolair shots after #4 should plan 30 minute wait in clinic            Dec 06, 2017  8:20 AM CST   Return Visit with Butch Horowitz MD   [facility] ([facility])    [address] [facility] [address]   [phone]              Who to  "contact     If you have questions or need follow up information about today's clinic visit or your schedule please contact [facility] directly at [phone].  Normal or non-critical lab and imaging results will be communicated to you by MyChart, letter or phone within 4 business days after the clinic has received the results. If you do not hear from us within 7 days, please contact the clinic through MyChart or phone. If you have a critical or abnormal lab result, we will notify you by phone as soon as possible.  Submit refill requests through [email or web address] or call your pharmacy and they will forward the refill request to us. Please allow 3 business days for your refill to be completed.          Additional Information About Your Visit        [email or web address] Information     [email or web address] lets you send messages to your doctor, view your test results, renew your prescriptions, schedule appointments and more. To sign up, go to [email or web address] . Click on \"Log in\" on the left side of the screen, which will take you to the Welcome page. Then click on \"Sign up Now\" on the right side of the page.     You will be asked to enter the access code listed below, as well as some personal information. Please follow the directions to create your username and password.     Your access code is: [email or web address]  Expires: 2017  7:40 AM     Your access code will  in 90 days. If you need help or a new code, please call your [place] clinic or [phone].        Care EveryWhere ID     This is your Care EveryWhere ID. This could be used by other organizations to access your [place] medical records  [id number]        Your Vitals Were     Last Period                   2015 (Approximate)            Blood Pressure from Last 3 Encounters:   17 (!) 147/92   17 122/81   17 129/72    Weight from Last 3 Encounters:   17 209 lb (94.8 kg)   17 208 lb 4.8 oz (94.5 kg)   17 210 lb 12.2 oz (95.6 " kg)              We Performed the Following     Allergy Shot: Two or more injections        Primary Care Provider Office Phone # Fax #    Cookie Conklin [phone]       [address] [place] [address]        Equal Access to Services     KODY CHEN : Hadii aad ku hadneymaro Soomaali, waaxda luqadaha, qaybta kaalmada adeegyada, hieu grantn kanwal jeffersonjoy howard. So [facility] [phone].    ATENCIÓN: Si habla español, tiene a pruitt disposición servicios gratuitos de asistencia lingüística. Llame al [phone].    We comply with applicable federal civil rights laws and [place] laws. We do not discriminate on the basis of race, color, national origin, age, disability, sex, sexual orientation, or gender identity.            Thank you!     Thank you for choosing [facility]  for your care. Our goal is always to provide you with excellent care. Hearing back from our patients is one way we can continue to improve our services. Please take a few minutes to complete the written survey that you may receive in the mail after your visit with us. Thank you!             Your Updated Medication List - Protect others around you: Learn how to safely use, store and throw away your medicines at www.disposemymeds.org.          This list is accurate as of: 10/6/17  7:44 AM.  Always use your most recent med list.                   Brand Name Dispense Instructions for use Diagnosis    * albuterol (2.5 MG/3ML) 0.083% neb solution     1 Box    Take 1 vial (2.5 mg) by nebulization every 4 hours as needed    Moderate persistent asthma, uncomplicated       * albuterol 108 (90 BASE) MCG/ACT Inhaler    PROAIR HFA/PROVENTIL HFA/VENTOLIN HFA    1 Inhaler    Inhale 2 puffs into the lungs every 4 hours as needed    Moderate persistent asthma, uncomplicated       * ALLERGEN IMMUNOTHERAPY PRESCRIPTION     5 mL    Name of Mix: Mix #1  Mold Alternaria Tenuis GLY 1:10 w/v, HS  0.5 ml Aspergillus Fumigatus GLY 1:10  w/v, HS  0.5 ml Epicoccum Nigrum 1:10 w/v, HS 0.5 ml Hormodendrum Cladosporioides 1:10 w/v, HS 0.5 ml Penicillium Mix GLY 1:10 w/v, HS  0.5 ml Diluent: HSA qs to 5ml    Chronic allergic rhinitis due to animal hair and dander, Allergic rhinitis due to dust mite, Allergic rhinitis due to mold, Chronic seasonal allergic rhinitis due to pollen       * ALLERGEN IMMUNOTHERAPY PRESCRIPTION     5 mL    Name of Mix: Mix #2  Dust Mite, Cat, Dog Cat Hair, Standardized 10,000 BAU/mL, ALK  2.0 ml Dog Hair Dander, A. P.  1:100 w/v, HS  1.0 ml Dust Mites F 30,000AU/mL, HS  0.3 ml Dust Mites P. 30,000 AU/mL, HS  0.3 ml  Diluent: HSA qs to 5ml    Chronic allergic rhinitis due to animal hair and dander, Allergic rhinitis due to dust mite, Allergic rhinitis due to mold, Chronic seasonal allergic rhinitis due to pollen       * ALLERGEN IMMUNOTHERAPY PRESCRIPTION     5 mL    Name of Mix: Mix #3 Grass,Tree  Dmitry,White GLY 1:20w/v, HS 0.5ml Birch Mix GLY 1:20w/v, HS 0.5ml Boxelder-Maple Mix BHR (Boxelder Hard Red) 1:20w/v, HS 0.5ml Evans,Common GLY 1:20w/v, HS 0.5ml Elm,American GLY 1:20w/v, HS 0.5ml [place] Mix GLY 1:20w/v, HS 0.5ml Oak Mix RVW GLY 1:20w/v, HS 0.5ml [place] Tree,Black GLY 1:20w/v, HS 0.5ml Grass Mix #7 100,000 BAU/mL, HS 0.4ml Jonathan Grass 1:20w/v, HS 0.5ml Diluent: HSA qs to 5ml    Chronic allergic rhinitis due to animal hair and dander, Allergic rhinitis due to dust mite, Allergic rhinitis due to mold, Chronic seasonal allergic rhinitis due to pollen       * ALLERGEN IMMUNOTHERAPY PRESCRIPTION     5 mL    Name of Mix: Mix #4  Weeds Kochia GLY 1:20 w/v, HS 0.5 ml Lamb's Quarters GLY 1:20 w/v, HS 0.5 ml Nettle GLY 1:20 w/v, HS 0.5 ml Plantain, English GLY 1:20 w/v, HS 0.5 ml Ragweed Mixed 1:20 w/v ALK  0.5 ml Russian Thistle GLY 1:20 w/v, HS 0.5 ml Sagebrush, Mugwort GLY 1:20 w/v, HS 0.5 ml Sorrel, Sheep GLY 1:20 w/v, HS 0.5 ml Diluent: HSA qs to 5ml    Chronic allergic rhinitis due to animal hair and dander,  Allergic rhinitis due to dust mite, Allergic rhinitis due to mold, Chronic seasonal allergic rhinitis due to pollen       azelastine 0.05 % Soln ophthalmic solution    OPTIVAR    1 Bottle    Apply 1 drop to eye 2 times daily    Conjunctivitis, allergic, unspecified laterality       CERAVE Crea     2 Bottle    Externally apply 1 dose * topically 2 times daily    Eczema, unspecified type       cetirizine 10 MG tablet    zyrTEC    30 tablet    Take 1 tablet (10 mg) by mouth At Bedtime    Rash, Itching       EPINEPHrine 0.3 MG/0.3ML injection 2-pack    EPIPEN/ADRENACLICK/or ANY BX GENERIC EQUIV    0.6 mL    Inject 0.3 mLs (0.3 mg) into the muscle once as needed for anaphylaxis    Food allergy, Need for desensitization to allergens       fluticasone-salmeterol 500-50 MCG/DOSE diskus inhaler    ADVAIR    3 Inhaler    Inhale 1 puff into the lungs 2 times daily    Moderate persistent asthma, uncomplicated       loratadine 10 MG tablet    CLARITIN    30 tablet    Take 1 tablet (10 mg) by mouth daily    Seasonal allergic rhinitis, unspecified allergic rhinitis trigger, Chronic allergic rhinitis, Conjunctivitis, allergic, unspecified laterality       montelukast 10 MG tablet    SINGULAIR    30 tablet    Take 1 tablet (10 mg) by mouth At Bedtime    Moderate persistent asthma, uncomplicated, Seasonal allergic rhinitis, unspecified allergic rhinitis trigger, Chronic allergic rhinitis       MULTIVITAMIN PO      1 tab daily        * triamcinolone 0.1 % cream    KENALOG    80 g    Apply  topically 2 times daily as needed.    Eczema, unspecified type       * triamcinolone 0.1 % cream    KENALOG    80 g    Apply sparingly to affected area three times daily as needed    Dermatitis       * Notice:  This list has 8 medication(s) that are the same as other medications prescribed for you. Read the directions carefully, and ask your doctor or other care provider to review them with you.

## 2017-10-10 ENCOUNTER — ALLIED HEALTH/NURSE VISIT (OUTPATIENT)
Dept: ALLERGY | Facility: CLINIC | Age: 52
End: 2017-10-10
Payer: COMMERCIAL

## 2017-10-10 DIAGNOSIS — J30.1 ALLERGIC RHINITIS DUE TO POLLEN: Primary | ICD-10-CM

## 2017-10-10 PROCEDURE — 99207 ZZC NO CHARGE LOS: CPT

## 2017-10-10 PROCEDURE — 95117 IMMUNOTHERAPY INJECTIONS: CPT

## 2017-10-10 NOTE — MR AVS SNAPSHOT
After Visit Summary   10/10/2017    Dilia Solomon    MRN: [id number]           Patient Information     Date Of Birth          1965        Visit Information        Provider Department      10/10/2017 7:00 AM ALLERGY [facility] [place]        Today's Diagnoses     Allergic rhinitis due to pollen    -  1       Follow-ups after your visit        Your next 10 appointments already scheduled     Oct 13, 2017  7:15 AM CDT   Nurse Only with ALLERGY [facility] ([facility])    [address] [address]   [phone]           Every allergy patient MUST wait 30 minutes after their allergy shot. No exceptions.  Xolair shots #1-3 should plan to wait 2 hours in clinic Xolair shots after #4 should plan 30 minute wait in clinic            Oct 17, 2017  7:00 AM CDT   Nurse Only with ALLERGY [facility] ([facility])    [address] [address]   [phone]           Every allergy patient MUST wait 30 minutes after their allergy shot. No exceptions.  Xolair shots #1-3 should plan to wait 2 hours in clinic Xolair shots after #4 should plan 30 minute wait in clinic            Oct 20, 2017  7:00 AM CDT   Nurse Only with ALLERGY [facility] ([facility])    [address] [address]   [phone]           Every allergy patient MUST wait 30 minutes after their allergy shot. No exceptions.  Xolair shots #1-3 should plan to wait 2 hours in clinic Xolair shots after #4 should plan 30 minute wait in clinic            Oct 24, 2017  7:00 AM CDT   Nurse Only with ALLERGY [facility] ([facility])    [address] [facility] [place] [address]   [phone]           Every allergy patient MUST wait 30 minutes after their allergy shot. No exceptions.  Xolair shots #1-3 should  plan to wait 2 hours in clinic Xolair shots after #4 should plan 30 minute wait in clinic            Oct 27, 2017  7:15 AM CDT   Nurse Only with ALLERGY [facility] ([facility])    [address] [address]   [phone]           Every allergy patient MUST wait 30 minutes after their allergy shot. No exceptions.  Xolair shots #1-3 should plan to wait 2 hours in clinic Xolair shots after #4 should plan 30 minute wait in clinic            Oct 31, 2017  7:00 AM CDT   Nurse Only with ALLERGY [facility] ([facility])    [address] [address]   [phone]           Every allergy patient MUST wait 30 minutes after their allergy shot. No exceptions.  Xolair shots #1-3 should plan to wait 2 hours in clinic Xolair shots after #4 should plan 30 minute wait in clinic            Nov 03, 2017  7:00 AM CDT   Nurse Only with ALLERGY [facility] ([facility])    [address] [address]   [phone]           Every allergy patient MUST wait 30 minutes after their allergy shot. No exceptions.  Xolair shots #1-3 should plan to wait 2 hours in clinic Xolair shots after #4 should plan 30 minute wait in clinic            Dec 06, 2017  8:20 AM CST   Return Visit with Butch Horowitz MD   [facility] ([facility])    [address] [address]   [phone]              Who to contact     If you have questions or need follow up information about today's clinic visit or your schedule please contact [facility] directly at [phone].  Normal or non-critical lab and imaging results will be communicated to you by MyChart, letter or phone within 4 business days after the clinic has received the results. If you do not hear from us within 7 days, please contact the clinic through [employer] or  "phone. If you have a critical or abnormal lab result, we will notify you by phone as soon as possible.  Submit refill requests through [employer] or call your pharmacy and they will forward the refill request to us. Please allow 3 business days for your refill to be completed.          Additional Information About Your Visit        [employer] Information     [employer] lets you send messages to your doctor, view your test results, renew your prescriptions, schedule appointments and more. To sign up, go to [email or web address] . Click on \"Log in\" on the left side of the screen, which will take you to the Welcome page. Then click on \"Sign up Now\" on the right side of the page.     You will be asked to enter the access code listed below, as well as some personal information. Please follow the directions to create your username and password.     Your access code is: [email or web address]  Expires: 2017  7:40 AM     Your access code will  in 90 days. If you need help or a new code, please call your [place] clinic or [phone].        Care EveryWhere ID     This is your Care EveryWhere ID. This could be used by other organizations to access your [place] medical records  [id number]        Your Vitals Were     Last Period                   2015 (Approximate)            Blood Pressure from Last 3 Encounters:   17 (!) 147/92   17 122/81   17 129/72    Weight from Last 3 Encounters:   17 209 lb (94.8 kg)   17 208 lb 4.8 oz (94.5 kg)   17 210 lb 12.2 oz (95.6 kg)              We Performed the Following     Allergy Shot: Two or more injections        Primary Care Provider Office Phone # Fax #    Cookie Conklin [phone] [address] [place] [address]        Equal Access to Services     GABBY CHEN : Marcel Anguiano, sruthi mayberry, hieu schaefer. [facility] [phone].    ATENCIÓN: Si " pamela santo, tiene a pruitt disposición servicios gratuitos de asistencia lingüística. Lucas kruger [phone].    We comply with applicable federal civil rights laws and [place] laws. We do not discriminate on the basis of race, color, national origin, age, disability, sex, sexual orientation, or gender identity.            Thank you!     Thank you for choosing [facility]  for your care. Our goal is always to provide you with excellent care. Hearing back from our patients is one way we can continue to improve our services. Please take a few minutes to complete the written survey that you may receive in the mail after your visit with us. Thank you!             Your Updated Medication List - Protect others around you: Learn how to safely use, store and throw away your medicines at www.disposemymeds.org.          This list is accurate as of: 10/10/17  8:28 AM.  Always use your most recent med list.                   Brand Name Dispense Instructions for use Diagnosis    * albuterol (2.5 MG/3ML) 0.083% neb solution     1 Box    Take 1 vial (2.5 mg) by nebulization every 4 hours as needed    Moderate persistent asthma, uncomplicated       * albuterol 108 (90 BASE) MCG/ACT Inhaler    PROAIR HFA/PROVENTIL HFA/VENTOLIN HFA    1 Inhaler    Inhale 2 puffs into the lungs every 4 hours as needed    Moderate persistent asthma, uncomplicated       * ALLERGEN IMMUNOTHERAPY PRESCRIPTION     5 mL    Name of Mix: Mix #1  Mold Alternaria Tenuis GLY 1:10 w/v, HS  0.5 ml Aspergillus Fumigatus GLY 1:10 w/v, HS  0.5 ml Epicoccum Nigrum 1:10 w/v, HS 0.5 ml Hormodendrum Cladosporioides 1:10 w/v, HS 0.5 ml Penicillium Mix GLY 1:10 w/v, HS  0.5 ml Diluent: HSA qs to 5ml    Chronic allergic rhinitis due to animal hair and dander, Allergic rhinitis due to dust mite, Allergic rhinitis due to mold, Chronic seasonal allergic rhinitis due to pollen       * ALLERGEN IMMUNOTHERAPY PRESCRIPTION     5 mL    Name of Mix: Mix #2  Dust Mite,  Cat, Dog Cat Hair, Standardized 10,000 BAU/mL, ALK  2.0 ml Dog Hair Dander, A. P.  1:100 w/v, HS  1.0 ml Dust Mites F 30,000AU/mL, HS  0.3 ml Dust Mites P. 30,000 AU/mL, HS  0.3 ml  Diluent: HSA qs to 5ml    Chronic allergic rhinitis due to animal hair and dander, Allergic rhinitis due to dust mite, Allergic rhinitis due to mold, Chronic seasonal allergic rhinitis due to pollen       * ALLERGEN IMMUNOTHERAPY PRESCRIPTION     5 mL    Name of Mix: Mix #3 Grass,Tree  Dmitry,White GLY 1:20w/v, HS 0.5ml Birch Mix GLY 1:20w/v, HS 0.5ml Boxelder-Maple Mix BHR (Boxelder Hard Red) 1:20w/v, HS 0.5ml [place],Common GLY 1:20w/v, HS 0.5ml Elm,American GLY 1:20w/v, HS 0.5ml [place] Mix GLY 1:20w/v, HS 0.5ml Oak Mix RVW GLY 1:20w/v, HS 0.5ml [place] Tree,Black GLY 1:20w/v, HS 0.5ml Grass Mix #7 100,000 BAU/mL, HS 0.4ml Jonathan Grass 1:20w/v, HS 0.5ml Diluent: HSA qs to 5ml    Chronic allergic rhinitis due to animal hair and dander, Allergic rhinitis due to dust mite, Allergic rhinitis due to mold, Chronic seasonal allergic rhinitis due to pollen       * ALLERGEN IMMUNOTHERAPY PRESCRIPTION     5 mL    Name of Mix: Mix #4  Weeds Kochia GLY 1:20 w/v, HS 0.5 ml Lamb's Quarters GLY 1:20 w/v, HS 0.5 ml Nettle GLY 1:20 w/v, HS 0.5 ml Plantain, English GLY 1:20 w/v, HS 0.5 ml Ragweed Mixed 1:20 w/v ALK  0.5 ml Russian Thistle GLY 1:20 w/v, HS 0.5 ml Sagebrush, Mugwort GLY 1:20 w/v, HS 0.5 ml Sorrel, Sheep GLY 1:20 w/v, HS 0.5 ml Diluent: HSA qs to 5ml    Chronic allergic rhinitis due to animal hair and dander, Allergic rhinitis due to dust mite, Allergic rhinitis due to mold, Chronic seasonal allergic rhinitis due to pollen       azelastine 0.05 % Soln ophthalmic solution    OPTIVAR    1 Bottle    Apply 1 drop to eye 2 times daily    Conjunctivitis, allergic, unspecified laterality       CERAVE Crea     2 Bottle    Externally apply 1 dose * topically 2 times daily    Eczema, unspecified type       cetirizine 10 MG tablet    zyrTEC    30  tablet    Take 1 tablet (10 mg) by mouth At Bedtime    Rash, Itching       EPINEPHrine 0.3 MG/0.3ML injection 2-pack    EPIPEN/ADRENACLICK/or ANY BX GENERIC EQUIV    0.6 mL    Inject 0.3 mLs (0.3 mg) into the muscle once as needed for anaphylaxis    Food allergy, Need for desensitization to allergens       fluticasone-salmeterol 500-50 MCG/DOSE diskus inhaler    ADVAIR    3 Inhaler    Inhale 1 puff into the lungs 2 times daily    Moderate persistent asthma, uncomplicated       loratadine 10 MG tablet    CLARITIN    30 tablet    Take 1 tablet (10 mg) by mouth daily    Seasonal allergic rhinitis, unspecified allergic rhinitis trigger, Chronic allergic rhinitis, Conjunctivitis, allergic, unspecified laterality       montelukast 10 MG tablet    SINGULAIR    30 tablet    Take 1 tablet (10 mg) by mouth At Bedtime    Moderate persistent asthma, uncomplicated, Seasonal allergic rhinitis, unspecified allergic rhinitis trigger, Chronic allergic rhinitis       MULTIVITAMIN PO      1 tab daily        * triamcinolone 0.1 % cream    KENALOG    80 g    Apply  topically 2 times daily as needed.    Eczema, unspecified type       * triamcinolone 0.1 % cream    KENALOG    80 g    Apply sparingly to affected area three times daily as needed    Dermatitis       * Notice:  This list has 8 medication(s) that are the same as other medications prescribed for you. Read the directions carefully, and ask your doctor or other care provider to review them with you.

## 2017-10-13 ENCOUNTER — ALLIED HEALTH/NURSE VISIT (OUTPATIENT)
Dept: ALLERGY | Facility: CLINIC | Age: 52
End: 2017-10-13
Payer: COMMERCIAL

## 2017-10-13 DIAGNOSIS — J30.2 SEASONAL ALLERGIC RHINITIS: Primary | ICD-10-CM

## 2017-10-13 PROCEDURE — 99207 ZZC NO CHARGE LOS: CPT

## 2017-10-13 PROCEDURE — 95117 IMMUNOTHERAPY INJECTIONS: CPT

## 2017-10-13 NOTE — MR AVS SNAPSHOT
After Visit Summary   10/13/2017    Dilia Solomon    MRN: [id number]           Patient Information     Date Of Birth          1965        Visit Information        Provider Department      10/13/2017 8:45 AM ALLERGY [facility]        Today's Diagnoses     Seasonal allergic rhinitis    -  1       Follow-ups after your visit        Your next 10 appointments already scheduled     Oct 17, 2017  7:00 AM CDT   Nurse Only with ALLERGY [facility] ([facility])    [address] [address]   [phone]           Every allergy patient MUST wait 30 minutes after their allergy shot. No exceptions.  Xolair shots #1-3 should plan to wait 2 hours in clinic Xolair shots after #4 should plan 30 minute wait in clinic            Oct 20, 2017  7:00 AM CDT   Nurse Only with ALLERGY [facility] ([facility])    [address] [address]   [phone]           Every allergy patient MUST wait 30 minutes after their allergy shot. No exceptions.  Xolair shots #1-3 should plan to wait 2 hours in clinic Xolair shots after #4 should plan 30 minute wait in clinic            Oct 24, 2017  7:00 AM CDT   Nurse Only with ALLERGY [facility] ([facility])    [address] [address]   [phone]           Every allergy patient MUST wait 30 minutes after their allergy shot. No exceptions.  Xolair shots #1-3 should plan to wait 2 hours in clinic Xolair shots after #4 should plan 30 minute wait in clinic            Oct 27, 2017  7:15 AM CDT   Nurse Only with ALLERGY [facility] ([facility])    [address] [address]   [phone]           Every allergy patient MUST wait 30 minutes after their allergy shot. No exceptions.  Xolair shots #1-3 should plan  to wait 2 hours in clinic Xolair shots after #4 should plan 30 minute wait in clinic            Oct 31, 2017  7:00 AM CDT   Nurse Only with ALLERGY [facility] ([facility])    [address] [facility] [address]   [phone]           Every allergy patient MUST wait 30 minutes after their allergy shot. No exceptions.  Xolair shots #1-3 should plan to wait 2 hours in clinic Xolair shots after #4 should plan 30 minute wait in clinic            Nov 03, 2017  7:00 AM CDT   Nurse Only with ALLERGY [facility] ([facility])    [address] [address]   [phone]           Every allergy patient MUST wait 30 minutes after their allergy shot. No exceptions.  Xolair shots #1-3 should plan to wait 2 hours in clinic Xolair shots after #4 should plan 30 minute wait in clinic            Dec 06, 2017  8:20 AM CST   Return Visit with Butch Horowitz MD   [facility] ([facility])    [address] [address]   [phone]              Who to contact     If you have questions or need follow up information about today's clinic visit or your schedule please contact [facility] directly at [phone].  Normal or non-critical lab and imaging results will be communicated to you by Living[employer], letter or phone within 4 business days after the clinic has received the results. If you do not hear from us within 7 days, please contact the clinic through [employer] or phone. If you have a critical or abnormal lab result, we will notify you by phone as soon as possible.  Submit refill requests through [employer] or call your pharmacy and they will forward the refill request to us. Please allow 3 business days for your refill to be completed.          Additional Information About Your Visit        [employer] Information     [employer] lets you send messages to your doctor, view your  "test results, renew your prescriptions, schedule appointments and more. To sign up, go to [email or web address] . Click on \"Log in\" on the left side of the screen, which will take you to the Welcome page. Then click on \"Sign up Now\" on the right side of the page.     You will be asked to enter the access code listed below, as well as some personal information. Please follow the directions to create your username and password.     Your access code is: [email or web address]  Expires: 2017  7:40 AM     Your access code will  in 90 days. If you need help or a new code, please call your [place] clinic or [phone].        Care EveryWhere ID     This is your Care EveryWhere ID. This could be used by other organizations to access your [place] medical records  [id number]        Your Vitals Were     Last Period                   2015 (Approximate)            Blood Pressure from Last 3 Encounters:   17 (!) 147/92   17 122/81   17 129/72    Weight from Last 3 Encounters:   17 209 lb (94.8 kg)   17 208 lb 4.8 oz (94.5 kg)   17 210 lb 12.2 oz (95.6 kg)              We Performed the Following     Allergy Shot: Two or more injections        Primary Care Provider Office Phone # Fax #    Cookie Conklin [phone]       [address] [place] [address]        Equal Access to Services     [facility]: Hadii aad ku hadasho Soomaali, waaxda luqadaha, qaybta kaalmada adeegyada, waxay america shen . So [facility] [phone].    ATENCIÓN: Si habla español, tiene a pruitt disposición servicios gratuitos de asistencia lingüística. Llame al [phone].    We comply with applicable federal civil rights laws and [place] laws. We do not discriminate on the basis of race, color, national origin, age, disability, sex, sexual orientation, or gender identity.            Thank you!     Thank you for choosing [facility]  for your care. Our " goal is always to provide you with excellent care. Hearing back from our patients is one way we can continue to improve our services. Please take a few minutes to complete the written survey that you may receive in the mail after your visit with us. Thank you!             Your Updated Medication List - Protect others around you: Learn how to safely use, store and throw away your medicines at www.disposemymeds.org.          This list is accurate as of: 10/13/17  9:33 AM.  Always use your most recent med list.                   Brand Name Dispense Instructions for use Diagnosis    * albuterol (2.5 MG/3ML) 0.083% neb solution     1 Box    Take 1 vial (2.5 mg) by nebulization every 4 hours as needed    Moderate persistent asthma, uncomplicated       * albuterol 108 (90 BASE) MCG/ACT Inhaler    PROAIR HFA/PROVENTIL HFA/VENTOLIN HFA    1 Inhaler    Inhale 2 puffs into the lungs every 4 hours as needed    Moderate persistent asthma, uncomplicated       * ALLERGEN IMMUNOTHERAPY PRESCRIPTION     5 mL    Name of Mix: Mix #1  Mold Alternaria Tenuis GLY 1:10 w/v, HS  0.5 ml Aspergillus Fumigatus GLY 1:10 w/v, HS  0.5 ml Epicoccum Nigrum 1:10 w/v, HS 0.5 ml Hormodendrum Cladosporioides 1:10 w/v, HS 0.5 ml Penicillium Mix GLY 1:10 w/v, HS  0.5 ml Diluent: HSA qs to 5ml    Chronic allergic rhinitis due to animal hair and dander, Allergic rhinitis due to dust mite, Allergic rhinitis due to mold, Chronic seasonal allergic rhinitis due to pollen       * ALLERGEN IMMUNOTHERAPY PRESCRIPTION     5 mL    Name of Mix: Mix #2  Dust Mite, Cat, Dog Cat Hair, Standardized 10,000 BAU/mL, ALK  2.0 ml Dog Hair Dander, A. P.  1:100 w/v, HS  1.0 ml Dust Mites F 30,000AU/mL, HS  0.3 ml Dust Mites P. 30,000 AU/mL, HS  0.3 ml  Diluent: HSA qs to 5ml    Chronic allergic rhinitis due to animal hair and dander, Allergic rhinitis due to dust mite, Allergic rhinitis due to mold, Chronic seasonal allergic rhinitis due to pollen       * ALLERGEN  IMMUNOTHERAPY PRESCRIPTION     5 mL    Name of Mix: Mix #3 Grass,Tree  Dmitry,White GLY 1:20w/v, HS 0.5ml Birch Mix GLY 1:20w/v, HS 0.5ml Boxelder-Maple Mix BHR (Boxelder Hard Red) 1:20w/v, HS 0.5ml [place],Common GLY 1:20w/v, HS 0.5ml Elm,American GLY 1:20w/v, HS 0.5ml [place] Mix GLY 1:20w/v, HS 0.5ml Oak Mix RVW GLY 1:20w/v, HS 0.5ml [place] Tree,Black GLY 1:20w/v, HS 0.5ml Grass Mix #7 100,000 BAU/mL, HS 0.4ml Jonathan Grass 1:20w/v, HS 0.5ml Diluent: HSA qs to 5ml    Chronic allergic rhinitis due to animal hair and dander, Allergic rhinitis due to dust mite, Allergic rhinitis due to mold, Chronic seasonal allergic rhinitis due to pollen       * ALLERGEN IMMUNOTHERAPY PRESCRIPTION     5 mL    Name of Mix: Mix #4  Weeds Kochia GLY 1:20 w/v, HS 0.5 ml Lamb's Quarters GLY 1:20 w/v, HS 0.5 ml Nettle GLY 1:20 w/v, HS 0.5 ml Plantain, English GLY 1:20 w/v, HS 0.5 ml Ragweed Mixed 1:20 w/v ALK  0.5 ml Russian Thistle GLY 1:20 w/v, HS 0.5 ml Sagebrush, Mugwort GLY 1:20 w/v, HS 0.5 ml Sorrel, Sheep GLY 1:20 w/v, HS 0.5 ml Diluent: HSA qs to 5ml    Chronic allergic rhinitis due to animal hair and dander, Allergic rhinitis due to dust mite, Allergic rhinitis due to mold, Chronic seasonal allergic rhinitis due to pollen       azelastine 0.05 % Soln ophthalmic solution    OPTIVAR    1 Bottle    Apply 1 drop to eye 2 times daily    Conjunctivitis, allergic, unspecified laterality       CERAVE Crea     2 Bottle    Externally apply 1 dose * topically 2 times daily    Eczema, unspecified type       cetirizine 10 MG tablet    zyrTEC    30 tablet    Take 1 tablet (10 mg) by mouth At Bedtime    Rash, Itching       EPINEPHrine 0.3 MG/0.3ML injection 2-pack    EPIPEN/ADRENACLICK/or ANY BX GENERIC EQUIV    0.6 mL    Inject 0.3 mLs (0.3 mg) into the muscle once as needed for anaphylaxis    Food allergy, Need for desensitization to allergens       fluticasone-salmeterol 500-50 MCG/DOSE diskus inhaler    ADVAIR    3 Inhaler    Inhale 1  puff into the lungs 2 times daily    Moderate persistent asthma, uncomplicated       loratadine 10 MG tablet    CLARITIN    30 tablet    Take 1 tablet (10 mg) by mouth daily    Seasonal allergic rhinitis, unspecified allergic rhinitis trigger, Chronic allergic rhinitis, Conjunctivitis, allergic, unspecified laterality       montelukast 10 MG tablet    SINGULAIR    30 tablet    Take 1 tablet (10 mg) by mouth At Bedtime    Moderate persistent asthma, uncomplicated, Seasonal allergic rhinitis, unspecified allergic rhinitis trigger, Chronic allergic rhinitis       MULTIVITAMIN PO      1 tab daily        * triamcinolone 0.1 % cream    KENALOG    80 g    Apply  topically 2 times daily as needed.    Eczema, unspecified type       * triamcinolone 0.1 % cream    KENALOG    80 g    Apply sparingly to affected area three times daily as needed    Dermatitis       * Notice:  This list has 8 medication(s) that are the same as other medications prescribed for you. Read the directions carefully, and ask your doctor or other care provider to review them with you.

## 2017-10-17 ENCOUNTER — ALLIED HEALTH/NURSE VISIT (OUTPATIENT)
Dept: ALLERGY | Facility: CLINIC | Age: 52
End: 2017-10-17
Payer: COMMERCIAL

## 2017-10-17 DIAGNOSIS — J30.1 ALLERGIC RHINITIS DUE TO POLLEN: Primary | ICD-10-CM

## 2017-10-17 PROCEDURE — 99207 ZZC NO CHARGE LOS: CPT

## 2017-10-17 PROCEDURE — 95117 IMMUNOTHERAPY INJECTIONS: CPT

## 2017-10-17 NOTE — MR AVS SNAPSHOT
After Visit Summary   10/17/2017    Dilia Solomon    MRN: [id number]           Patient Information     Date Of Birth          1965        Visit Information        Provider Department      10/17/2017 7:00 AM ALLERGY [facility]        Today's Diagnoses     Allergic rhinitis due to pollen    -  1       Follow-ups after your visit        Your next 10 appointments already scheduled     Oct 20, 2017  7:00 AM CDT   Nurse Only with ALLERGY [facility] ([facility])    [address] [address]   [phone]           Every allergy patient MUST wait 30 minutes after their allergy shot. No exceptions.  Xolair shots #1-3 should plan to wait 2 hours in clinic Xolair shots after #4 should plan 30 minute wait in clinic            Oct 24, 2017  7:00 AM CDT   Nurse Only with ALLERGY [facility] ([facility])    [address] [address]   [phone]           Every allergy patient MUST wait 30 minutes after their allergy shot. No exceptions.  Xolair shots #1-3 should plan to wait 2 hours in clinic Xolair shots after #4 should plan 30 minute wait in clinic            Oct 27, 2017  7:15 AM CDT   Nurse Only with ALLERGY [facility] ([facility])    [address] [address]   [phone]           Every allergy patient MUST wait 30 minutes after their allergy shot. No exceptions.  Xolair shots #1-3 should plan to wait 2 hours in clinic Xolair shots after #4 should plan 30 minute wait in clinic            Oct 31, 2017  7:00 AM CDT   Nurse Only with ALLERGY [facility] ([facility])    [address] [facility] [place] [address]   [phone]           Every allergy patient MUST wait 30 minutes after their allergy shot. No exceptions.  Xolair shots #1-3 should  "plan to wait 2 hours in clinic Xolair shots after #4 should plan 30 minute wait in clinic            Nov 03, 2017  7:00 AM CDT   Nurse Only with ALLERGY [facility] ([facility])    [address] [address] [phone]           Every allergy patient MUST wait 30 minutes after their allergy shot. No exceptions.  Xolair shots #1-3 should plan to wait 2 hours in clinic Xolair shots after #4 should plan 30 minute wait in clinic            Dec 06, 2017  8:20 AM CST   Return Visit with Butch Horowitz MD   [facility] ([facility])    [address] [address] [phone]              Who to contact     If you have questions or need follow up information about today's clinic visit or your schedule please contact [facility] directly at [phone].  Normal or non-critical lab and imaging results will be communicated to you by [employer], letter or phone within 4 business days after the clinic has received the results. If you do not hear from us within 7 days, please contact the clinic through [employer] or phone. If you have a critical or abnormal lab result, we will notify you by phone as soon as possible.  Submit refill requests through [employer] or call your pharmacy and they will forward the refill request to us. Please allow 3 business days for your refill to be completed.          Additional Information About Your Visit        [employer] Information     [employer] lets you send messages to your doctor, view your test results, renew your prescriptions, schedule appointments and more. To sign up, go to [email or web address] . Click on \"Log in\" on the left side of the screen, which will take you to the Welcome page. Then click on \"Sign up Now\" on the right side of the page.     You will be asked to enter the access code listed below, as well as some personal information. Please follow the directions to " create your username and password.     Your access code is: [email or web address]  Expires: 2017  7:40 AM     Your access code will  in 90 days. If you need help or a new code, please call your [place] clinic or [phone].        Care EveryWhere ID     This is your Care EveryWhere ID. This could be used by other organizations to access your [place] medical records  [id number]        Your Vitals Were     Last Period                   2015 (Approximate)            Blood Pressure from Last 3 Encounters:   17 (!) 147/92   17 122/81   17 129/72    Weight from Last 3 Encounters:   17 209 lb (94.8 kg)   17 208 lb 4.8 oz (94.5 kg)   17 210 lb 12.2 oz (95.6 kg)              We Performed the Following     Allergy Shot: Two or more injections        Primary Care Provider Office Phone # Fax #    Cookie Conklin [phone]       [address] [place] [address]        Equal Access to Services     [facility] : Hadii aad ku hadasho Soomaali, waaxda luqadaha, qaybta kaalmada adetanikayada, hieu shen . So [facility] [phone].    ATENCIÓN: Si habla español, tiene a pruitt disposición servicios gratuitos de asistencia lingüística. [facility] [phone].    We comply with applicable federal civil rights laws and [place] laws. We do not discriminate on the basis of race, color, national origin, age, disability, sex, sexual orientation, or gender identity.            Thank you!     Thank you for choosing [facility]  for your care. Our goal is always to provide you with excellent care. Hearing back from our patients is one way we can continue to improve our services. Please take a few minutes to complete the written survey that you may receive in the mail after your visit with us. Thank you!             Your Updated Medication List - Protect others around you: Learn how to safely use, store and throw away your medicines at  www.disposemymeds.org.          This list is accurate as of: 10/17/17  8:27 AM.  Always use your most recent med list.                   Brand Name Dispense Instructions for use Diagnosis    * albuterol (2.5 MG/3ML) 0.083% neb solution     1 Box    Take 1 vial (2.5 mg) by nebulization every 4 hours as needed    Moderate persistent asthma, uncomplicated       * albuterol 108 (90 BASE) MCG/ACT Inhaler    PROAIR HFA/PROVENTIL HFA/VENTOLIN HFA    1 Inhaler    Inhale 2 puffs into the lungs every 4 hours as needed    Moderate persistent asthma, uncomplicated       * ALLERGEN IMMUNOTHERAPY PRESCRIPTION     5 mL    Name of Mix: Mix #1  Mold Alternaria Tenuis GLY 1:10 w/v, HS  0.5 ml Aspergillus Fumigatus GLY 1:10 w/v, HS  0.5 ml Epicoccum Nigrum 1:10 w/v, HS 0.5 ml Hormodendrum Cladosporioides 1:10 w/v, HS 0.5 ml Penicillium Mix GLY 1:10 w/v, HS  0.5 ml Diluent: HSA qs to 5ml    Chronic allergic rhinitis due to animal hair and dander, Allergic rhinitis due to dust mite, Allergic rhinitis due to mold, Chronic seasonal allergic rhinitis due to pollen       * ALLERGEN IMMUNOTHERAPY PRESCRIPTION     5 mL    Name of Mix: Mix #2  Dust Mite, Cat, Dog Cat Hair, Standardized 10,000 BAU/mL, ALK  2.0 ml Dog Hair Dander, A. P.  1:100 w/v, HS  1.0 ml Dust Mites F 30,000AU/mL, HS  0.3 ml Dust Mites P. 30,000 AU/mL, HS  0.3 ml  Diluent: HSA qs to 5ml    Chronic allergic rhinitis due to animal hair and dander, Allergic rhinitis due to dust mite, Allergic rhinitis due to mold, Chronic seasonal allergic rhinitis due to pollen       * ALLERGEN IMMUNOTHERAPY PRESCRIPTION     5 mL    Name of Mix: Mix #3 Grass,Tree  Dmitry,White GLY 1:20w/v, HS 0.5ml Birch Mix GLY 1:20w/v, HS 0.5ml Boxelder-Maple Mix BHR (Boxelder Hard Red) 1:20w/v, HS 0.5ml Ch[place],Common GLY 1:20w/v, HS 0.5ml Elm,American GLY 1:20w/v, HS 0.5ml [place] Mix GLY 1:20w/v, HS 0.5ml Oak Mix RVW GLY 1:20w/v, HS 0.5ml [place] Tree,Black GLY 1:20w/v, HS 0.5ml Grass Mix #7 100,000  BAU/mL, HS 0.4ml [place] Grass 1:20w/v, HS 0.5ml Diluent: HSA qs to 5ml    Chronic allergic rhinitis due to animal hair and dander, Allergic rhinitis due to dust mite, Allergic rhinitis due to mold, Chronic seasonal allergic rhinitis due to pollen       * ALLERGEN IMMUNOTHERAPY PRESCRIPTION     5 mL    Name of Mix: Mix #4  Weeds Kochia GLY 1:20 w/v, HS 0.5 ml Lamb's Quarters GLY 1:20 w/v, HS 0.5 ml Nettle GLY 1:20 w/v, HS 0.5 ml Plantain, English GLY 1:20 w/v, HS 0.5 ml Ragweed Mixed 1:20 w/v ALK  0.5 ml Russian Thistle GLY 1:20 w/v, HS 0.5 ml Sagebrush, Mugwort GLY 1:20 w/v, HS 0.5 ml Sorrel, Sheep GLY 1:20 w/v, HS 0.5 ml Diluent: HSA qs to 5ml    Chronic allergic rhinitis due to animal hair and dander, Allergic rhinitis due to dust mite, Allergic rhinitis due to mold, Chronic seasonal allergic rhinitis due to pollen       azelastine 0.05 % Soln ophthalmic solution    OPTIVAR    1 Bottle    Apply 1 drop to eye 2 times daily    Conjunctivitis, allergic, unspecified laterality       CERAVE Crea     2 Bottle    Externally apply 1 dose * topically 2 times daily    Eczema, unspecified type       cetirizine 10 MG tablet    zyrTEC    30 tablet    Take 1 tablet (10 mg) by mouth At Bedtime    Rash, Itching       EPINEPHrine 0.3 MG/0.3ML injection 2-pack    EPIPEN/ADRENACLICK/or ANY BX GENERIC EQUIV    0.6 mL    Inject 0.3 mLs (0.3 mg) into the muscle once as needed for anaphylaxis    Food allergy, Need for desensitization to allergens       fluticasone-salmeterol 500-50 MCG/DOSE diskus inhaler    ADVAIR    3 Inhaler    Inhale 1 puff into the lungs 2 times daily    Moderate persistent asthma, uncomplicated       loratadine 10 MG tablet    CLARITIN    30 tablet    Take 1 tablet (10 mg) by mouth daily    Seasonal allergic rhinitis, unspecified allergic rhinitis trigger, Chronic allergic rhinitis, Conjunctivitis, allergic, unspecified laterality       montelukast 10 MG tablet    SINGULAIR    30 tablet    Take 1 tablet (10 mg)  by mouth At Bedtime    Moderate persistent asthma, uncomplicated, Seasonal allergic rhinitis, unspecified allergic rhinitis trigger, Chronic allergic rhinitis       MULTIVITAMIN PO      1 tab daily        * triamcinolone 0.1 % cream    KENALOG    80 g    Apply  topically 2 times daily as needed.    Eczema, unspecified type       * triamcinolone 0.1 % cream    KENALOG    80 g    Apply sparingly to affected area three times daily as needed    Dermatitis       * Notice:  This list has 8 medication(s) that are the same as other medications prescribed for you. Read the directions carefully, and ask your doctor or other care provider to review them with you.

## 2017-10-20 ENCOUNTER — ALLIED HEALTH/NURSE VISIT (OUTPATIENT)
Dept: ALLERGY | Facility: CLINIC | Age: 52
End: 2017-10-20
Payer: COMMERCIAL

## 2017-10-20 DIAGNOSIS — L30.9 DERMATITIS: ICD-10-CM

## 2017-10-20 DIAGNOSIS — J45.40 MODERATE PERSISTENT ASTHMA, UNCOMPLICATED: ICD-10-CM

## 2017-10-20 DIAGNOSIS — J30.2 CHRONIC SEASONAL ALLERGIC RHINITIS DUE TO OTHER ALLERGEN: Primary | ICD-10-CM

## 2017-10-20 PROCEDURE — 95117 IMMUNOTHERAPY INJECTIONS: CPT

## 2017-10-20 PROCEDURE — 99207 ZZC NO CHARGE LOS: CPT

## 2017-10-23 RX ORDER — ALBUTEROL SULFATE 90 UG/1
2 AEROSOL, METERED RESPIRATORY (INHALATION) EVERY 4 HOURS PRN
Qty: 1 INHALER | Refills: 2 | Status: SHIPPED | OUTPATIENT
Start: 2017-10-23 | End: 2017-12-06

## 2017-10-24 ENCOUNTER — ALLIED HEALTH/NURSE VISIT (OUTPATIENT)
Dept: ALLERGY | Facility: CLINIC | Age: 52
End: 2017-10-24
Payer: COMMERCIAL

## 2017-10-24 DIAGNOSIS — J30.2 CHRONIC SEASONAL ALLERGIC RHINITIS, UNSPECIFIED TRIGGER: Primary | ICD-10-CM

## 2017-10-24 PROCEDURE — 95117 IMMUNOTHERAPY INJECTIONS: CPT

## 2017-10-24 PROCEDURE — 99207 ZZC NO CHARGE LOS: CPT

## 2017-10-24 RX ORDER — TRIAMCINOLONE ACETONIDE 1 MG/G
CREAM TOPICAL
Qty: 80 G | Refills: 0 | Status: SHIPPED | OUTPATIENT
Start: 2017-10-24 | End: 2018-01-24 | Stop reason: DRUGHIGH

## 2017-10-24 NOTE — MR AVS SNAPSHOT
After Visit Summary   10/24/2017    Dilia Solomon    MRN: [id number]           Patient Information     Date Of Birth          1965        Visit Information        Provider Department      10/24/2017 7:00 AM ALLERGY [facility]        Today's Diagnoses     Chronic seasonal allergic rhinitis, unspecified trigger    -  1       Follow-ups after your visit        Your next 10 appointments already scheduled     Oct 27, 2017  7:15 AM CDT   Nurse Only with ALLERGY [facility] ([facility])    [address] [address]   [phone]           Every allergy patient MUST wait 30 minutes after their allergy shot. No exceptions.  Xolair shots #1-3 should plan to wait 2 hours in clinic Xolair shots after #4 should plan 30 minute wait in clinic            Oct 31, 2017  7:00 AM CDT   Nurse Only with ALLERGY [facility] ([facility])    [address] [address]   [phone]           Every allergy patient MUST wait 30 minutes after their allergy shot. No exceptions.  Xolair shots #1-3 should plan to wait 2 hours in clinic Xolair shots after #4 should plan 30 minute wait in clinic            Nov 03, 2017  7:00 AM CDT   Nurse Only with ALLERGY [facility] ([facility])    [address] [address]   [phone]           Every allergy patient MUST wait 30 minutes after their allergy shot. No exceptions.  Xolair shots #1-3 should plan to wait 2 hours in clinic Xolair shots after #4 should plan 30 minute wait in clinic            Dec 06, 2017  8:20 AM CST   Return Visit with Butch Horowitz MD   [facility]    [address] [address]   [phone]              Who to contact     If you have questions or need follow up information about today's  "clinic visit or your schedule please contact [facility] directly at [phone].  Normal or non-critical lab and imaging results will be communicated to you by MyChart, letter or phone within 4 business days after the clinic has received the results. If you do not hear from us within 7 days, please contact the clinic through [employer] or phone. If you have a critical or abnormal lab result, we will notify you by phone as soon as possible.  Submit refill requests through [employer] or call your pharmacy and they will forward the refill request to us. Please allow 3 business days for your refill to be completed.          Additional Information About Your Visit        MyChart Information     [employer] lets you send messages to your doctor, view your test results, renew your prescriptions, schedule appointments and more. To sign up, go to [email or web address] . Click on \"Log in\" on the left side of the screen, which will take you to the Welcome page. Then click on \"Sign up Now\" on the right side of the page.     You will be asked to enter the access code listed below, as well as some personal information. Please follow the directions to create your username and password.     Your access code is: [email or web address]  Expires: 2017  7:40 AM     Your access code will  in 90 days. If you need help or a new code, please call your [place] clinic or [phone].        Care EveryWhere ID     This is your Care EveryWhere ID. This could be used by other organizations to access your [place] medical records  [id number]        Your Vitals Were     Last Period                   2015 (Approximate)            Blood Pressure from Last 3 Encounters:   17 (!) 147/92   17 122/81   17 129/72    Weight from Last 3 Encounters:   17 209 lb (94.8 kg)   17 208 lb 4.8 oz (94.5 kg)   17 210 lb 12.2 oz (95.6 kg)              We Performed the Following     Allergy Shot: Two or more " injections        Primary Care Provider Office Phone # Fax #    Cookie Conklin [phone]       [address] [place] [address]        Equal Access to Services     KODY WALTERSKAY : Hadagatha ulices ku tonio Sokashmirali, waaxda luqadaha, qaybta kaalmada ashishda, hieu jeffersonjoy lee. So [facility] [phone].    ATENCIÓN: Si habla español, tiene a pruitt disposición servicios gratuitos de asistencia lingüística. Llame al [phone].    We comply with applicable federal civil rights laws and [place] laws. We do not discriminate on the basis of race, color, national origin, age, disability, sex, sexual orientation, or gender identity.            Thank you!     Thank you for choosing [facility]  for your care. Our goal is always to provide you with excellent care. Hearing back from our patients is one way we can continue to improve our services. Please take a few minutes to complete the written survey that you may receive in the mail after your visit with us. Thank you!             Your Updated Medication List - Protect others around you: Learn how to safely use, store and throw away your medicines at www.disposemymeds.org.          This list is accurate as of: 10/24/17  7:57 AM.  Always use your most recent med list.                   Brand Name Dispense Instructions for use Diagnosis    * albuterol (2.5 MG/3ML) 0.083% neb solution     1 Box    Take 1 vial (2.5 mg) by nebulization every 4 hours as needed    Moderate persistent asthma, uncomplicated       * albuterol 108 (90 BASE) MCG/ACT Inhaler    PROAIR HFA/PROVENTIL HFA/VENTOLIN HFA    1 Inhaler    Inhale 2 puffs into the lungs every 4 hours as needed    Moderate persistent asthma, uncomplicated       * ALLERGEN IMMUNOTHERAPY PRESCRIPTION     5 mL    Name of Mix: Mix #1  Mold Alternaria Tenuis GLY 1:10 w/v, HS  0.5 ml Aspergillus Fumigatus GLY 1:10 w/v, HS  0.5 ml Epicoccum Nigrum 1:10 w/v, HS 0.5 ml Hormodendrum  Cladosporioides 1:10 w/v, HS 0.5 ml Penicillium Mix GLY 1:10 w/v, HS  0.5 ml Diluent: HSA qs to 5ml    Chronic allergic rhinitis due to animal hair and dander, Allergic rhinitis due to dust mite, Allergic rhinitis due to mold, Chronic seasonal allergic rhinitis due to pollen       * ALLERGEN IMMUNOTHERAPY PRESCRIPTION     5 mL    Name of Mix: Mix #2  Dust Mite, Cat, Dog Cat Hair, Standardized 10,000 BAU/mL, ALK  2.0 ml Dog Hair Dander, A. P.  1:100 w/v, HS  1.0 ml Dust Mites F 30,000AU/mL, HS  0.3 ml Dust Mites P. 30,000 AU/mL, HS  0.3 ml  Diluent: HSA qs to 5ml    Chronic allergic rhinitis due to animal hair and dander, Allergic rhinitis due to dust mite, Allergic rhinitis due to mold, Chronic seasonal allergic rhinitis due to pollen       * ALLERGEN IMMUNOTHERAPY PRESCRIPTION     5 mL    Name of Mix: Mix #3 Grass,Tree  Dmitry,White GLY 1:20w/v, HS 0.5ml Birch Mix GLY 1:20w/v, HS 0.5ml Boxelder-Maple Mix BHR (Boxelder Hard Red) 1:20w/v, HS 0.5ml [place],Common GLY 1:20w/v, HS 0.5ml Elm,American GLY 1:20w/v, HS 0.5ml [place] Mix GLY 1:20w/v, HS 0.5ml Oak Mix RVW GLY 1:20w/v, HS 0.5ml [place] Tree,Black GLY 1:20w/v, HS 0.5ml Grass Mix #7 100,000 BAU/mL, HS 0.4ml Jonathan Grass 1:20w/v, HS 0.5ml Diluent: HSA qs to 5ml    Chronic allergic rhinitis due to animal hair and dander, Allergic rhinitis due to dust mite, Allergic rhinitis due to mold, Chronic seasonal allergic rhinitis due to pollen       * ALLERGEN IMMUNOTHERAPY PRESCRIPTION     5 mL    Name of Mix: Mix #4  Weeds Kochia GLY 1:20 w/v, HS 0.5 ml Lamb's Quarters GLY 1:20 w/v, HS 0.5 ml Nettle GLY 1:20 w/v, HS 0.5 ml Plantain, English GLY 1:20 w/v, HS 0.5 ml Ragweed Mixed 1:20 w/v ALK  0.5 ml Russian Thistle GLY 1:20 w/v, HS 0.5 ml Sagebrush, Mugwort GLY 1:20 w/v, HS 0.5 ml Sorrel, Sheep GLY 1:20 w/v, HS 0.5 ml Diluent: HSA qs to 5ml    Chronic allergic rhinitis due to animal hair and dander, Allergic rhinitis due to dust mite, Allergic rhinitis due to mold,  Chronic seasonal allergic rhinitis due to pollen       azelastine 0.05 % Soln ophthalmic solution    OPTIVAR    1 Bottle    Apply 1 drop to eye 2 times daily    Conjunctivitis, allergic, unspecified laterality       CERAVE Crea     2 Bottle    Externally apply 1 dose * topically 2 times daily    Eczema, unspecified type       cetirizine 10 MG tablet    zyrTEC    30 tablet    Take 1 tablet (10 mg) by mouth At Bedtime    Rash, Itching       EPINEPHrine 0.3 MG/0.3ML injection 2-pack    EPIPEN/ADRENACLICK/or ANY BX GENERIC EQUIV    0.6 mL    Inject 0.3 mLs (0.3 mg) into the muscle once as needed for anaphylaxis    Food allergy, Need for desensitization to allergens       fluticasone-salmeterol 500-50 MCG/DOSE diskus inhaler    ADVAIR    3 Inhaler    Inhale 1 puff into the lungs 2 times daily    Moderate persistent asthma, uncomplicated       loratadine 10 MG tablet    CLARITIN    30 tablet    Take 1 tablet (10 mg) by mouth daily    Seasonal allergic rhinitis, unspecified allergic rhinitis trigger, Chronic allergic rhinitis, Conjunctivitis, allergic, unspecified laterality       montelukast 10 MG tablet    SINGULAIR    30 tablet    Take 1 tablet (10 mg) by mouth At Bedtime    Moderate persistent asthma, uncomplicated, Seasonal allergic rhinitis, unspecified allergic rhinitis trigger, Chronic allergic rhinitis       MULTIVITAMIN PO      1 tab daily        * triamcinolone 0.1 % cream    KENALOG    80 g    Apply  topically 2 times daily as needed.    Eczema, unspecified type       * triamcinolone 0.1 % cream    KENALOG    80 g    APPLY SPARINGLY TO AFFECTED AREA THREE TIMES A DAY AS NEEDED    Dermatitis       * Notice:  This list has 8 medication(s) that are the same as other medications prescribed for you. Read the directions carefully, and ask your doctor or other care provider to review them with you.

## 2017-10-27 ENCOUNTER — ALLIED HEALTH/NURSE VISIT (OUTPATIENT)
Dept: ALLERGY | Facility: CLINIC | Age: 52
End: 2017-10-27
Payer: COMMERCIAL

## 2017-10-27 DIAGNOSIS — J30.2 SEASONAL ALLERGIC RHINITIS, UNSPECIFIED CHRONICITY, UNSPECIFIED TRIGGER: Primary | ICD-10-CM

## 2017-10-27 PROCEDURE — 99207 ZZC NO CHARGE LOS: CPT

## 2017-10-27 PROCEDURE — 95117 IMMUNOTHERAPY INJECTIONS: CPT

## 2017-10-27 NOTE — PROGRESS NOTES
Patient presented after waiting 30 minutes with no reaction to  injections. Discharged from clinic.    Dee Martínez RN

## 2017-10-31 ENCOUNTER — ALLIED HEALTH/NURSE VISIT (OUTPATIENT)
Dept: ALLERGY | Facility: CLINIC | Age: 52
End: 2017-10-31
Payer: COMMERCIAL

## 2017-10-31 DIAGNOSIS — J30.2 CHRONIC SEASONAL ALLERGIC RHINITIS DUE TO OTHER ALLERGEN: Primary | ICD-10-CM

## 2017-10-31 PROCEDURE — 99207 ZZC NO CHARGE LOS: CPT

## 2017-10-31 PROCEDURE — 95117 IMMUNOTHERAPY INJECTIONS: CPT

## 2017-10-31 NOTE — MR AVS SNAPSHOT
After Visit Summary   10/31/2017    Dilia Solomon    MRN: [id number]           Patient Information     Date Of Birth          1965        Visit Information        Provider Department      10/31/2017 7:45 AM ALLERGY [facility]        Today's Diagnoses     Chronic seasonal allergic rhinitis due to other allergen    -  1       Follow-ups after your visit        Your next 10 appointments already scheduled     Nov 03, 2017  7:00 AM CDT   Nurse Only with ALLERGY [facility] ([facility])    [address] [address]   [phone]           Every allergy patient MUST wait 30 minutes after their allergy shot. No exceptions.  Xolair shots #1-3 should plan to wait 2 hours in clinic Xolair shots after #4 should plan 30 minute wait in clinic            Dec 06, 2017  8:20 AM CST   Return Visit with Butch Horwoitz MD   [facility] ([facility])    [address] [address]   [phone]              Who to contact     If you have questions or need follow up information about today's clinic visit or your schedule please contact [facility] directly at [phone].  Normal or non-critical lab and imaging results will be communicated to you by [employer], letter or phone within 4 business days after the clinic has received the results. If you do not hear from us within 7 days, please contact the clinic through [employer] or phone. If you have a critical or abnormal lab result, we will notify you by phone as soon as possible.  Submit refill requests through [employer] or call your pharmacy and they will forward the refill request to us. Please allow 3 business days for your refill to be completed.          Additional Information About Your Visit        X[employer]ala Information     [employer] lets you send messages to your doctor, view your test results, renew your prescriptions,  "schedule appointments and more. To sign up, go to [email or web address] . Click on \"Log in\" on the left side of the screen, which will take you to the Welcome page. Then click on \"Sign up Now\" on the right side of the page.     You will be asked to enter the access code listed below, as well as some personal information. Please follow the directions to create your username and password.     Your access code is: [email or web address]  Expires: 2017  7:40 AM     Your access code will  in 90 days. If you need help or a new code, please call your [facility] clinic or [phone].        Care EveryWhere ID     This is your Care EveryWhere ID. This could be used by other organizations to access your [facility] medical records  [id number]        Your Vitals Were     Last Period                   2015 (Approximate)            Blood Pressure from Last 3 Encounters:   17 (!) 147/92   17 122/81   17 129/72    Weight from Last 3 Encounters:   17 209 lb (94.8 kg)   17 208 lb 4.8 oz (94.5 kg)   17 210 lb 12.2 oz (95.6 kg)              We Performed the Following     Allergy Shot: Two or more injections        Primary Care Provider Office Phone # Fax #    Cookie Conklin [phone]       [address] [place] [address]        Equal Access to Services     [facility] : Hadii ulices ku hadasho Soomaali, waaxda luqadaha, qaybta kaalmada kanwalyamariaa, hieu shen . So [facility] [phone].    ATENCIÓN: Si habla russellañol, tiene a pruitt disposición servicios gratuitos de asistencia lingüística. Llame al [phone].    We comply with applicable federal civil rights laws and [place] laws. We do not discriminate on the basis of race, color, national origin, age, disability, sex, sexual orientation, or gender identity.            Thank you!     Thank you for choosing [facility]  for your care. Our goal is always to provide you with " excellent care. Hearing back from our patients is one way we can continue to improve our services. Please take a few minutes to complete the written survey that you may receive in the mail after your visit with us. Thank you!             Your Updated Medication List - Protect others around you: Learn how to safely use, store and throw away your medicines at www.disposemymeds.org.          This list is accurate as of: 10/31/17  8:48 AM.  Always use your most recent med list.                   Brand Name Dispense Instructions for use Diagnosis    * albuterol (2.5 MG/3ML) 0.083% neb solution     1 Box    Take 1 vial (2.5 mg) by nebulization every 4 hours as needed    Moderate persistent asthma, uncomplicated       * albuterol 108 (90 BASE) MCG/ACT Inhaler    PROAIR HFA/PROVENTIL HFA/VENTOLIN HFA    1 Inhaler    Inhale 2 puffs into the lungs every 4 hours as needed    Moderate persistent asthma, uncomplicated       * ALLERGEN IMMUNOTHERAPY PRESCRIPTION     5 mL    Name of Mix: Mix #1  Mold Alternaria Tenuis GLY 1:10 w/v, HS  0.5 ml Aspergillus Fumigatus GLY 1:10 w/v, HS  0.5 ml Epicoccum Nigrum 1:10 w/v, HS 0.5 ml Hormodendrum Cladosporioides 1:10 w/v, HS 0.5 ml Penicillium Mix GLY 1:10 w/v, HS  0.5 ml Diluent: HSA qs to 5ml    Chronic allergic rhinitis due to animal hair and dander, Allergic rhinitis due to dust mite, Allergic rhinitis due to mold, Chronic seasonal allergic rhinitis due to pollen       * ALLERGEN IMMUNOTHERAPY PRESCRIPTION     5 mL    Name of Mix: Mix #2  Dust Mite, Cat, Dog Cat Hair, Standardized 10,000 BAU/mL, ALK  2.0 ml Dog Hair Dander, A. P.  1:100 w/v, HS  1.0 ml Dust Mites F 30,000AU/mL, HS  0.3 ml Dust Mites P. 30,000 AU/mL, HS  0.3 ml  Diluent: HSA qs to 5ml    Chronic allergic rhinitis due to animal hair and dander, Allergic rhinitis due to dust mite, Allergic rhinitis due to mold, Chronic seasonal allergic rhinitis due to pollen       * ALLERGEN IMMUNOTHERAPY PRESCRIPTION     5 mL    Name of  Mix: Mix #3 Grass,Tree  Dmitry,White GLY 1:20w/v, HS 0.5ml Birch Mix GLY 1:20w/v, HS 0.5ml Boxelder-Maple Mix BHR (Boxelder Hard Red) 1:20w/v, HS 0.5ml [place],Common GLY 1:20w/v, HS 0.5ml Elm,American GLY 1:20w/v, HS 0.5ml Adrian Mix GLY 1:20w/v, HS 0.5ml Oak Mix RVW GLY 1:20w/v, HS 0.5ml [place] Tree,Black GLY 1:20w/v, HS 0.5ml Grass Mix #7 100,000 BAU/mL, HS 0.4ml Jonathan Grass 1:20w/v, HS 0.5ml Diluent: HSA qs to 5ml    Chronic allergic rhinitis due to animal hair and dander, Allergic rhinitis due to dust mite, Allergic rhinitis due to mold, Chronic seasonal allergic rhinitis due to pollen       * ALLERGEN IMMUNOTHERAPY PRESCRIPTION     5 mL    Name of Mix: Mix #4  Weeds Kochia GLY 1:20 w/v, HS 0.5 ml Lamb's Quarters GLY 1:20 w/v, HS 0.5 ml Nettle GLY 1:20 w/v, HS 0.5 ml Plantain, English GLY 1:20 w/v, HS 0.5 ml Ragweed Mixed 1:20 w/v ALK  0.5 ml Russian Thistle GLY 1:20 w/v, HS 0.5 ml Sagebrush, Mugwort GLY 1:20 w/v, HS 0.5 ml Sorrel, Sheep GLY 1:20 w/v, HS 0.5 ml Diluent: HSA qs to 5ml    Chronic allergic rhinitis due to animal hair and dander, Allergic rhinitis due to dust mite, Allergic rhinitis due to mold, Chronic seasonal allergic rhinitis due to pollen       azelastine 0.05 % Soln ophthalmic solution    OPTIVAR    1 Bottle    Apply 1 drop to eye 2 times daily    Conjunctivitis, allergic, unspecified laterality       CERAVE Crea     2 Bottle    Externally apply 1 dose * topically 2 times daily    Eczema, unspecified type       cetirizine 10 MG tablet    zyrTEC    30 tablet    Take 1 tablet (10 mg) by mouth At Bedtime    Rash, Itching       EPINEPHrine 0.3 MG/0.3ML injection 2-pack    EPIPEN/ADRENACLICK/or ANY BX GENERIC EQUIV    0.6 mL    Inject 0.3 mLs (0.3 mg) into the muscle once as needed for anaphylaxis    Food allergy, Need for desensitization to allergens       fluticasone-salmeterol 500-50 MCG/DOSE diskus inhaler    ADVAIR    3 Inhaler    Inhale 1 puff into the lungs 2 times daily    Moderate  persistent asthma, uncomplicated       loratadine 10 MG tablet    CLARITIN    30 tablet    Take 1 tablet (10 mg) by mouth daily    Seasonal allergic rhinitis, unspecified allergic rhinitis trigger, Chronic allergic rhinitis, Conjunctivitis, allergic, unspecified laterality       montelukast 10 MG tablet    SINGULAIR    30 tablet    Take 1 tablet (10 mg) by mouth At Bedtime    Moderate persistent asthma, uncomplicated, Seasonal allergic rhinitis, unspecified allergic rhinitis trigger, Chronic allergic rhinitis       MULTIVITAMIN PO      1 tab daily        * triamcinolone 0.1 % cream    KENALOG    80 g    Apply  topically 2 times daily as needed.    Eczema, unspecified type       * triamcinolone 0.1 % cream    KENALOG    80 g    APPLY SPARINGLY TO AFFECTED AREA THREE TIMES A DAY AS NEEDED    Dermatitis       * Notice:  This list has 8 medication(s) that are the same as other medications prescribed for you. Read the directions carefully, and ask your doctor or other care provider to review them with you.

## 2017-11-03 ENCOUNTER — ALLIED HEALTH/NURSE VISIT (OUTPATIENT)
Dept: ALLERGY | Facility: CLINIC | Age: 52
End: 2017-11-03
Payer: COMMERCIAL

## 2017-11-03 DIAGNOSIS — J30.1 CHRONIC ALLERGIC RHINITIS DUE TO POLLEN, UNSPECIFIED SEASONALITY: Primary | ICD-10-CM

## 2017-11-03 PROCEDURE — 95117 IMMUNOTHERAPY INJECTIONS: CPT

## 2017-11-03 NOTE — MR AVS SNAPSHOT
"              After Visit Summary   11/3/2017    Dilia Solomon    MRN: [id number]           Patient Information     Date Of Birth          1965        Visit Information        Provider Department      11/3/2017 7:00 AM ALLERGY [facility] - [facility]        Today's Diagnoses     Chronic allergic rhinitis due to pollen, unspecified seasonality    -  1       Follow-ups after your visit        Your next 10 appointments already scheduled     Dec 06, 2017  8:20 AM CST   Return Visit with Butch Horowitz MD   [facility]    [address] [address] [phone]              Who to contact     If you have questions or need follow up information about today's clinic visit or your schedule please contact [facility] directly at [phone].  Normal or non-critical lab and imaging results will be communicated to you by [employer], letter or phone within 4 business days after the clinic has received the results. If you do not hear from us within 7 days, please contact the clinic through MyChart or phone. If you have a critical or abnormal lab result, we will notify you by phone as soon as possible.  Submit refill requests through [employer] or call your pharmacy and they will forward the refill request to us. Please allow 3 business days for your refill to be completed.          Additional Information About Your Visit        MyChart Information     [employer] lets you send messages to your doctor, view your test results, renew your prescriptions, schedule appointments and more. To sign up, go to [email or web address] . Click on \"Log in\" on the left side of the screen, which will take you to the Welcome page. Then click on \"Sign up Now\" on the right side of the page.     You will be asked to enter the access code listed below, as well as some personal information. Please follow the directions to create your username and password.     Your " access code is: [email or web address]  Expires: 2017  7:40 AM     Your access code will  in 90 days. If you need help or a new code, please call your [facility] clinic or [phone].        Care EveryWhere ID     This is your Care EveryWhere ID. This could be used by other organizations to access your [facility] medical records  [id number]        Your Vitals Were     Last Period                   2015 (Approximate)            Blood Pressure from Last 3 Encounters:   17 (!) 147/92   17 122/81   17 129/72    Weight from Last 3 Encounters:   17 94.8 kg (209 lb)   17 94.5 kg (208 lb 4.8 oz)   17 95.6 kg (210 lb 12.2 oz)              We Performed the Following     Allergy Shot: Two or more injections        Primary Care Provider Office Phone # Fax #    Cookie Conklin [phone]       [address] [place] [address]        Equal Access to Services     [facility]: Hadii aad ku hadasho Soomaali, waaxda luqadaha, qaybta kaalmada adeegyada, waxay america hayfrandy shen . So [facility] [phone].    ATENCIÓN: Si habla español, tiene a pruitt disposición servicios gratuitos de asistencia lingüística. Llame al [phone].    We comply with applicable federal civil rights laws and [place] laws. We do not discriminate on the basis of race, color, national origin, age, disability, sex, sexual orientation, or gender identity.            Thank you!     Thank you for choosing [facility]  for your care. Our goal is always to provide you with excellent care. Hearing back from our patients is one way we can continue to improve our services. Please take a few minutes to complete the written survey that you may receive in the mail after your visit with us. Thank you!             Your Updated Medication List - Protect others around you: Learn how to safely use, store and throw away your medicines at www.disposemymeds.org.          This list is  accurate as of: 11/3/17  8:09 AM.  Always use your most recent med list.                   Brand Name Dispense Instructions for use Diagnosis    * albuterol (2.5 MG/3ML) 0.083% neb solution     1 Box    Take 1 vial (2.5 mg) by nebulization every 4 hours as needed    Moderate persistent asthma, uncomplicated       * albuterol 108 (90 BASE) MCG/ACT Inhaler    PROAIR HFA/PROVENTIL HFA/VENTOLIN HFA    1 Inhaler    Inhale 2 puffs into the lungs every 4 hours as needed    Moderate persistent asthma, uncomplicated       * ALLERGEN IMMUNOTHERAPY PRESCRIPTION     5 mL    Name of Mix: Mix #1  Mold Alternaria Tenuis GLY 1:10 w/v, HS  0.5 ml Aspergillus Fumigatus GLY 1:10 w/v, HS  0.5 ml Epicoccum Nigrum 1:10 w/v, HS 0.5 ml Hormodendrum Cladosporioides 1:10 w/v, HS 0.5 ml Penicillium Mix GLY 1:10 w/v, HS  0.5 ml Diluent: HSA qs to 5ml    Chronic allergic rhinitis due to animal hair and dander, Allergic rhinitis due to dust mite, Allergic rhinitis due to mold, Chronic seasonal allergic rhinitis due to pollen       * ALLERGEN IMMUNOTHERAPY PRESCRIPTION     5 mL    Name of Mix: Mix #2  Dust Mite, Cat, Dog Cat Hair, Standardized 10,000 BAU/mL, ALK  2.0 ml Dog Hair Dander, A. P.  1:100 w/v, HS  1.0 ml Dust Mites F 30,000AU/mL, HS  0.3 ml Dust Mites P. 30,000 AU/mL, HS  0.3 ml  Diluent: HSA qs to 5ml    Chronic allergic rhinitis due to animal hair and dander, Allergic rhinitis due to dust mite, Allergic rhinitis due to mold, Chronic seasonal allergic rhinitis due to pollen       * ALLERGEN IMMUNOTHERAPY PRESCRIPTION     5 mL    Name of Mix: Mix #3 Grass,Tree  Dmitry,White GLY 1:20w/v, HS 0.5ml Birch Mix GLY 1:20w/v, HS 0.5ml Boxelder-Maple Mix BHR (Boxelder Hard Red) 1:20w/v, HS 0.5ml [place],Common GLY 1:20w/v, HS 0.5ml Elm,American GLY 1:20w/v, HS 0.5ml [place] Mix GLY 1:20w/v, HS 0.5ml Oak Mix RVW GLY 1:20w/v, HS 0.5ml [place] Tree,Black GLY 1:20w/v, HS 0.5ml Grass Mix #7 100,000 BAU/mL, HS 0.4ml Jonathan Grass 1:20w/v, HS 0.5ml  Diluent: HSA qs to 5ml    Chronic allergic rhinitis due to animal hair and dander, Allergic rhinitis due to dust mite, Allergic rhinitis due to mold, Chronic seasonal allergic rhinitis due to pollen       * ALLERGEN IMMUNOTHERAPY PRESCRIPTION     5 mL    Name of Mix: Mix #4  Weeds Kochia GLY 1:20 w/v, HS 0.5 ml Lamb's Quarters GLY 1:20 w/v, HS 0.5 ml Nettle GLY 1:20 w/v, HS 0.5 ml Plantain, English GLY 1:20 w/v, HS 0.5 ml Ragweed Mixed 1:20 w/v ALK  0.5 ml Russian Thistle GLY 1:20 w/v, HS 0.5 ml Sagebrush, Mugwort GLY 1:20 w/v, HS 0.5 ml Sorrel, Sheep GLY 1:20 w/v, HS 0.5 ml Diluent: HSA qs to 5ml    Chronic allergic rhinitis due to animal hair and dander, Allergic rhinitis due to dust mite, Allergic rhinitis due to mold, Chronic seasonal allergic rhinitis due to pollen       azelastine 0.05 % Soln ophthalmic solution    OPTIVAR    1 Bottle    Apply 1 drop to eye 2 times daily    Conjunctivitis, allergic, unspecified laterality       CERAVE Crea     2 Bottle    Externally apply 1 dose * topically 2 times daily    Eczema, unspecified type       cetirizine 10 MG tablet    zyrTEC    30 tablet    Take 1 tablet (10 mg) by mouth At Bedtime    Rash, Itching       EPINEPHrine 0.3 MG/0.3ML injection 2-pack    EPIPEN/ADRENACLICK/or ANY BX GENERIC EQUIV    0.6 mL    Inject 0.3 mLs (0.3 mg) into the muscle once as needed for anaphylaxis    Food allergy, Need for desensitization to allergens       fluticasone-salmeterol 500-50 MCG/DOSE diskus inhaler    ADVAIR    3 Inhaler    Inhale 1 puff into the lungs 2 times daily    Moderate persistent asthma, uncomplicated       loratadine 10 MG tablet    CLARITIN    30 tablet    Take 1 tablet (10 mg) by mouth daily    Seasonal allergic rhinitis, unspecified allergic rhinitis trigger, Chronic allergic rhinitis, Conjunctivitis, allergic, unspecified laterality       montelukast 10 MG tablet    SINGULAIR    30 tablet    Take 1 tablet (10 mg) by mouth At Bedtime    Moderate persistent asthma,  uncomplicated, Seasonal allergic rhinitis, unspecified allergic rhinitis trigger, Chronic allergic rhinitis       MULTIVITAMIN PO      1 tab daily        * triamcinolone 0.1 % cream    KENALOG    80 g    Apply  topically 2 times daily as needed.    Eczema, unspecified type       * triamcinolone 0.1 % cream    KENALOG    80 g    APPLY SPARINGLY TO AFFECTED AREA THREE TIMES A DAY AS NEEDED    Dermatitis       * Notice:  This list has 8 medication(s) that are the same as other medications prescribed for you. Read the directions carefully, and ask your doctor or other care provider to review them with you.

## 2017-11-07 ENCOUNTER — ALLIED HEALTH/NURSE VISIT (OUTPATIENT)
Dept: ALLERGY | Facility: CLINIC | Age: 52
End: 2017-11-07
Payer: COMMERCIAL

## 2017-11-07 DIAGNOSIS — J30.2 CHRONIC SEASONAL ALLERGIC RHINITIS, UNSPECIFIED TRIGGER: Primary | ICD-10-CM

## 2017-11-07 PROCEDURE — 99207 ZZC NO CHARGE LOS: CPT

## 2017-11-07 PROCEDURE — 95117 IMMUNOTHERAPY INJECTIONS: CPT

## 2017-11-07 NOTE — MR AVS SNAPSHOT
After Visit Summary   11/7/2017    Dilia Solomon    MRN: [id number]           Patient Information     Date Of Birth          1965        Visit Information        Provider Department      11/7/2017 7:00 AM ALLERGY [facility]        Today's Diagnoses     Chronic seasonal allergic rhinitis, unspecified trigger    -  1       Follow-ups after your visit        Your next 10 appointments already scheduled     Nov 10, 2017  7:00 AM CST   Nurse Only with ALLERGY [facility] ([facility])    [address] [address]   [phone]           Every allergy patient MUST wait 30 minutes after their allergy shot. No exceptions.  Xolair shots #1-3 should plan to wait 2 hours in clinic Xolair shots after #4 should plan 30 minute wait in clinic            Nov 14, 2017  7:00 AM CST   Nurse Only with ALLERGY [facility] ([facility])    [address] [address]   [phone]           Every allergy patient MUST wait 30 minutes after their allergy shot. No exceptions.  Xolair shots #1-3 should plan to wait 2 hours in clinic Xolair shots after #4 should plan 30 minute wait in clinic            Nov 17, 2017  7:00 AM CST   Nurse Only with ALLERGY [facility] ([facility])    [address] [address]   [phone]           Every allergy patient MUST wait 30 minutes after their allergy shot. No exceptions.  Xolair shots #1-3 should plan to wait 2 hours in clinic Xolair shots after #4 should plan 30 minute wait in clinic            Nov 21, 2017  7:00 AM CST   Nurse Only with ALLERGY [facility] ([facility])    [address] [address]   [phone]           Every allergy patient MUST wait 30 minutes after their allergy shot. No exceptions.   Xolair shots #1-3 should plan to wait 2 hours in clinic Xolair shots after #4 should plan 30 minute wait in clinic            Nov 24, 2017  7:15 AM CST   Nurse Only with ALLERGY [facility] ([facility])    [address] [facility] [address] [facility] [address]   [phone]           Every allergy patient MUST wait 30 minutes after their allergy shot. No exceptions.  Xolair shots #1-3 should plan to wait 2 hours in clinic Xolair shots after #4 should plan 30 minute wait in clinic            Nov 28, 2017  7:00 AM CST   Nurse Only with ALLERGY [facility] ([facility])    [address] [facility] [address]   [phone]           Every allergy patient MUST wait 30 minutes after their allergy shot. No exceptions.  Xolair shots #1-3 should plan to wait 2 hours in clinic Xolair shots after #4 should plan 30 minute wait in clinic            Dec 01, 2017  7:00 AM CST   Nurse Only with ALLERGY [facility] ([facility])    [address] [facility] [address] [facility] [address]   [phone]           Every allergy patient MUST wait 30 minutes after their allergy shot. No exceptions.  Xolair shots #1-3 should plan to wait 2 hours in clinic Xolair shots after #4 should plan 30 minute wait in clinic            Dec 06, 2017  8:20 AM CST   Return Visit with Butch Horowitz MD   [facility] ([facility])    [address] [facility] [address] [facility] [address]   [phone]              Who to contact     If you have questions or need follow up information about today's clinic visit or your schedule please contact [facility] directly at [phone].  Normal or non-critical lab and imaging results will be communicated to you by MyChart, letter or phone within 4 business days after the clinic has received the results. If you do not hear from us within 7 days, please contact the  "clinic through basnohart or phone. If you have a critical or abnormal lab result, we will notify you by phone as soon as possible.  Submit refill requests through [employer] or call your pharmacy and they will forward the refill request to us. Please allow 3 business days for your refill to be completed.          Additional Information About Your Visit        basnohart Information     [employer] lets you send messages to your doctor, view your test results, renew your prescriptions, schedule appointments and more. To sign up, go to [email or web address] . Click on \"Log in\" on the left side of the screen, which will take you to the Welcome page. Then click on \"Sign up Now\" on the right side of the page.     You will be asked to enter the access code listed below, as well as some personal information. Please follow the directions to create your username and password.     Your access code is: [email or web address]  Expires: 2017  6:40 AM     Your access code will  in 90 days. If you need help or a new code, please call your [place] clinic or [phone].        Care EveryWhere ID     This is your Care EveryWhere ID. This could be used by other organizations to access your [place] medical records  [id number]        Your Vitals Were     Last Period                   2015 (Approximate)            Blood Pressure from Last 3 Encounters:   17 (!) 147/92   17 122/81   17 129/72    Weight from Last 3 Encounters:   17 94.8 kg (209 lb)   17 94.5 kg (208 lb 4.8 oz)   17 95.6 kg (210 lb 12.2 oz)              We Performed the Following     Allergy Shot: Two or more injections        Primary Care Provider Office Phone # Fax #    Cookie Conklin [phone] [address] [place] [address]        Equal Access to Services     KODY CHEN AH: Hadii aad ku hadasho Sofaith, waaxda luqadaha, qaybta kaalmada adeegyada, hieu shen . So [facility] " [phone].    ATENCIÓN: Si pamela santo, tiene a pruitt disposición servicios gratuitos de asistencia lingüística. Lucas kruger [phone].    We comply with applicable federal civil rights laws and [place] laws. We do not discriminate on the basis of race, color, national origin, age, disability, sex, sexual orientation, or gender identity.            Thank you!     Thank you for choosing [facility]  for your care. Our goal is always to provide you with excellent care. Hearing back from our patients is one way we can continue to improve our services. Please take a few minutes to complete the written survey that you may receive in the mail after your visit with us. Thank you!             Your Updated Medication List - Protect others around you: Learn how to safely use, store and throw away your medicines at www.disposemymeds.org.          This list is accurate as of: 11/7/17  7:46 AM.  Always use your most recent med list.                   Brand Name Dispense Instructions for use Diagnosis    * albuterol (2.5 MG/3ML) 0.083% neb solution     1 Box    Take 1 vial (2.5 mg) by nebulization every 4 hours as needed    Moderate persistent asthma, uncomplicated       * albuterol 108 (90 BASE) MCG/ACT Inhaler    PROAIR HFA/PROVENTIL HFA/VENTOLIN HFA    1 Inhaler    Inhale 2 puffs into the lungs every 4 hours as needed    Moderate persistent asthma, uncomplicated       * ALLERGEN IMMUNOTHERAPY PRESCRIPTION     5 mL    Name of Mix: Mix #1  Mold Alternaria Tenuis GLY 1:10 w/v, HS  0.5 ml Aspergillus Fumigatus GLY 1:10 w/v, HS  0.5 ml Epicoccum Nigrum 1:10 w/v, HS 0.5 ml Hormodendrum Cladosporioides 1:10 w/v, HS 0.5 ml Penicillium Mix GLY 1:10 w/v, HS  0.5 ml Diluent: HSA qs to 5ml    Chronic allergic rhinitis due to animal hair and dander, Allergic rhinitis due to dust mite, Allergic rhinitis due to mold, Chronic seasonal allergic rhinitis due to pollen       * ALLERGEN IMMUNOTHERAPY PRESCRIPTION     5 mL    Name  of Mix: Mix #2  Dust Mite, Cat, Dog Cat Hair, Standardized 10,000 BAU/mL, ALK  2.0 ml Dog Hair Dander, A. P.  1:100 w/v, HS  1.0 ml Dust Mites F 30,000AU/mL, HS  0.3 ml Dust Mites P. 30,000 AU/mL, HS  0.3 ml  Diluent: HSA qs to 5ml    Chronic allergic rhinitis due to animal hair and dander, Allergic rhinitis due to dust mite, Allergic rhinitis due to mold, Chronic seasonal allergic rhinitis due to pollen       * ALLERGEN IMMUNOTHERAPY PRESCRIPTION     5 mL    Name of Mix: Mix #3 Grass,Tree  Dmitry,White GLY 1:20w/v, HS 0.5ml Birch Mix GLY 1:20w/v, HS 0.5ml Boxelder-Maple Mix BHR (Boxelder Hard Red) 1:20w/v, HS 0.5ml [place],Common GLY 1:20w/v, HS 0.5ml Elm,American GLY 1:20w/v, HS 0.5ml [place] Mix GLY 1:20w/v, HS 0.5ml Oak Mix RVW GLY 1:20w/v, HS 0.5ml [place] Tree,Black GLY 1:20w/v, HS 0.5ml Grass Mix #7 100,000 BAU/mL, HS 0.4ml Jonathan Grass 1:20w/v, HS 0.5ml Diluent: HSA qs to 5ml    Chronic allergic rhinitis due to animal hair and dander, Allergic rhinitis due to dust mite, Allergic rhinitis due to mold, Chronic seasonal allergic rhinitis due to pollen       * ALLERGEN IMMUNOTHERAPY PRESCRIPTION     5 mL    Name of Mix: Mix #4  Weeds Kochia GLY 1:20 w/v, HS 0.5 ml Lamb's Quarters GLY 1:20 w/v, HS 0.5 ml Nettle GLY 1:20 w/v, HS 0.5 ml Plantain, English GLY 1:20 w/v, HS 0.5 ml Ragweed Mixed 1:20 w/v ALK  0.5 ml Russian Thistle GLY 1:20 w/v, HS 0.5 ml Sagebrush, Mugwort GLY 1:20 w/v, HS 0.5 ml Sorrel, Sheep GLY 1:20 w/v, HS 0.5 ml Diluent: HSA qs to 5ml    Chronic allergic rhinitis due to animal hair and dander, Allergic rhinitis due to dust mite, Allergic rhinitis due to mold, Chronic seasonal allergic rhinitis due to pollen       azelastine 0.05 % Soln ophthalmic solution    OPTIVAR    1 Bottle    Apply 1 drop to eye 2 times daily    Conjunctivitis, allergic, unspecified laterality       CERAVE Crea     2 Bottle    Externally apply 1 dose * topically 2 times daily    Eczema, unspecified type       cetirizine 10  MG tablet    zyrTEC    30 tablet    Take 1 tablet (10 mg) by mouth At Bedtime    Rash, Itching       EPINEPHrine 0.3 MG/0.3ML injection 2-pack    EPIPEN/ADRENACLICK/or ANY BX GENERIC EQUIV    0.6 mL    Inject 0.3 mLs (0.3 mg) into the muscle once as needed for anaphylaxis    Food allergy, Need for desensitization to allergens       fluticasone-salmeterol 500-50 MCG/DOSE diskus inhaler    ADVAIR    3 Inhaler    Inhale 1 puff into the lungs 2 times daily    Moderate persistent asthma, uncomplicated       loratadine 10 MG tablet    CLARITIN    30 tablet    Take 1 tablet (10 mg) by mouth daily    Seasonal allergic rhinitis, unspecified allergic rhinitis trigger, Chronic allergic rhinitis, Conjunctivitis, allergic, unspecified laterality       montelukast 10 MG tablet    SINGULAIR    30 tablet    Take 1 tablet (10 mg) by mouth At Bedtime    Moderate persistent asthma, uncomplicated, Seasonal allergic rhinitis, unspecified allergic rhinitis trigger, Chronic allergic rhinitis       MULTIVITAMIN PO      1 tab daily        * triamcinolone 0.1 % cream    KENALOG    80 g    Apply  topically 2 times daily as needed.    Eczema, unspecified type       * triamcinolone 0.1 % cream    KENALOG    80 g    APPLY SPARINGLY TO AFFECTED AREA THREE TIMES A DAY AS NEEDED    Dermatitis       * Notice:  This list has 8 medication(s) that are the same as other medications prescribed for you. Read the directions carefully, and ask your doctor or other care provider to review them with you.

## 2017-11-10 ENCOUNTER — ALLIED HEALTH/NURSE VISIT (OUTPATIENT)
Dept: ALLERGY | Facility: CLINIC | Age: 52
End: 2017-11-10
Payer: COMMERCIAL

## 2017-11-10 DIAGNOSIS — J30.2 CHRONIC SEASONAL ALLERGIC RHINITIS DUE TO OTHER ALLERGEN: Primary | ICD-10-CM

## 2017-11-10 PROCEDURE — 95117 IMMUNOTHERAPY INJECTIONS: CPT

## 2017-11-10 NOTE — MR AVS SNAPSHOT
After Visit Summary   11/10/2017    Dilia Solomon    MRN: [id number]           Patient Information     Date Of Birth          1965        Visit Information        Provider Department      11/10/2017 7:00 AM ALLERGY [facility]        Today's Diagnoses     Chronic seasonal allergic rhinitis due to other allergen    -  1       Follow-ups after your visit        Your next 10 appointments already scheduled     Nov 14, 2017  7:00 AM CST   Nurse Only with ALLERGY [facility] ([facility])    [address] [facility] [address]   [phone]           Every allergy patient MUST wait 30 minutes after their allergy shot. No exceptions.  Xolair shots #1-3 should plan to wait 2 hours in clinic Xolair shots after #4 should plan 30 minute wait in clinic            Nov 17, 2017  7:00 AM CST   Nurse Only with ALLERGY [facility] ([facility])    [address] [facility] [address]   [phone]           Every allergy patient MUST wait 30 minutes after their allergy shot. No exceptions.  Xolair shots #1-3 should plan to wait 2 hours in clinic Xolair shots after #4 should plan 30 minute wait in clinic            Nov 21, 2017  7:00 AM CST   Nurse Only with ALLERGY [facility] ([facility])    [address] [facility] [address]   [phone]           Every allergy patient MUST wait 30 minutes after their allergy shot. No exceptions.  Xolair shots #1-3 should plan to wait 2 hours in clinic Xolair shots after #4 should plan 30 minute wait in clinic            Nov 24, 2017  7:15 AM CST   Nurse Only with ALLERGY [facility] ([facility])    [address] [facility] [address]   [phone]           Every allergy patient MUST wait 30 minutes after their allergy shot. No exceptions.   Xolair shots #1-3 should plan to wait 2 hours in clinic Xolair shots after #4 should plan 30 minute wait in clinic            Nov 28, 2017  7:00 AM CST   Nurse Only with ALLERGY [facility] ([facility])    [address] [address]   [phone]           Every allergy patient MUST wait 30 minutes after their allergy shot. No exceptions.  Xolair shots #1-3 should plan to wait 2 hours in clinic Xolair shots after #4 should plan 30 minute wait in clinic            Dec 01, 2017  7:00 AM CST   Nurse Only with ALLERGY [facility] ([facility])    [address] [facility] [address]   [phone]           Every allergy patient MUST wait 30 minutes after their allergy shot. No exceptions.  Xolair shots #1-3 should plan to wait 2 hours in clinic Xolair shots after #4 should plan 30 minute wait in clinic            Dec 06, 2017  8:20 AM CST   Return Visit with Butch Horowitz MD   [facility] ([facility])    [address] [facility] [address]   [phone]              Who to contact     If you have questions or need follow up information about today's clinic visit or your schedule please contact [facility] directly at [phone].  Normal or non-critical lab and imaging results will be communicated to you by [employer], letter or phone within 4 business days after the clinic has received the results. If you do not hear from us within 7 days, please contact the clinic through [employer] or phone. If you have a critical or abnormal lab result, we will notify you by phone as soon as possible.  Submit refill requests through [employer] or call your pharmacy and they will forward the refill request to us. Please allow 3 business days for your refill to be completed.          Additional Information About Your Visit        [employer] Information     [employer] lets you send  "messages to your doctor, view your test results, renew your prescriptions, schedule appointments and more. To sign up, go to [email or web address] . Click on \"Log in\" on the left side of the screen, which will take you to the Welcome page. Then click on \"Sign up Now\" on the right side of the page.     You will be asked to enter the access code listed below, as well as some personal information. Please follow the directions to create your username and password.     Your access code is: [email or web address]  Expires: 2017  6:40 AM     Your access code will  in 90 days. If you need help or a new code, please call your [place] clinic or [phone].        Care EveryWhere ID     This is your Care EveryWhere ID. This could be used by other organizations to access your [place] medical records  [id number]        Your Vitals Were     Last Period                   2015 (Approximate)            Blood Pressure from Last 3 Encounters:   17 (!) 147/92   17 122/81   17 129/72    Weight from Last 3 Encounters:   17 94.8 kg (209 lb)   17 94.5 kg (208 lb 4.8 oz)   17 95.6 kg (210 lb 12.2 oz)              We Performed the Following     Allergy Shot: Two or more injections        Primary Care Provider Office Phone # Fax #    Cookie Conklin [phone]       [address] [place] [address]        Equal Access to Services     [facility] : Hadii ulices ku hadasho Sokashmirali, waaxda luqadaha, qaybta kaalmada adeegyada, hieu howard. So [facility] [place] [facility] [phone].    ATENCIÓN: Si habla español, tiene a pruitt disposición servicios gratuitos de asistencia lingüística. Llame al [phone].    We comply with applicable federal civil rights laws and [place] laws. We do not discriminate on the basis of race, color, national origin, age, disability, sex, sexual orientation, or gender identity.            Thank you!     Thank you for choosing [facility]  for your care. Our goal is always to provide you with excellent care. Hearing back from our patients is one way we can continue to improve our services. Please take a few minutes to complete the written survey that you may receive in the mail after your visit with us. Thank you!             Your Updated Medication List - Protect others around you: Learn how to safely use, store and throw away your medicines at www.disposemymeds.org.          This list is accurate as of: 11/10/17  7:59 AM.  Always use your most recent med list.                   Brand Name Dispense Instructions for use Diagnosis    * albuterol (2.5 MG/3ML) 0.083% neb solution     1 Box    Take 1 vial (2.5 mg) by nebulization every 4 hours as needed    Moderate persistent asthma, uncomplicated       * albuterol 108 (90 BASE) MCG/ACT Inhaler    PROAIR HFA/PROVENTIL HFA/VENTOLIN HFA    1 Inhaler    Inhale 2 puffs into the lungs every 4 hours as needed    Moderate persistent asthma, uncomplicated       * ALLERGEN IMMUNOTHERAPY PRESCRIPTION     5 mL    Name of Mix: Mix #1  Mold Alternaria Tenuis GLY 1:10 w/v, HS  0.5 ml Aspergillus Fumigatus GLY 1:10 w/v, HS  0.5 ml Epicoccum Nigrum 1:10 w/v, HS 0.5 ml Hormodendrum Cladosporioides 1:10 w/v, HS 0.5 ml Penicillium Mix GLY 1:10 w/v, HS  0.5 ml Diluent: HSA qs to 5ml    Chronic allergic rhinitis due to animal hair and dander, Allergic rhinitis due to dust mite, Allergic rhinitis due to mold, Chronic seasonal allergic rhinitis due to pollen       * ALLERGEN IMMUNOTHERAPY PRESCRIPTION     5 mL    Name of Mix: Mix #2  Dust Mite, Cat, Dog Cat Hair, Standardized 10,000 BAU/mL, ALK  2.0 ml Dog Hair Dander, A. P.  1:100 w/v, HS  1.0 ml Dust Mites F 30,000AU/mL, HS  0.3 ml Dust Mites P. 30,000 AU/mL, HS  0.3 ml  Diluent: HSA qs to 5ml    Chronic allergic rhinitis due to animal hair and dander, Allergic rhinitis due to dust mite, Allergic rhinitis due to mold, Chronic seasonal allergic rhinitis due to  pollen       * ALLERGEN IMMUNOTHERAPY PRESCRIPTION     5 mL    Name of Mix: Mix #3 Grass,Tree  Dmitry,White GLY 1:20w/v, HS 0.5ml Birch Mix GLY 1:20w/v, HS 0.5ml Boxelder-Maple Mix BHR (Boxelder Hard Red) 1:20w/v, HS 0.5ml [place],Common GLY 1:20w/v, HS 0.5ml Elm,American GLY 1:20w/v, HS 0.5ml [place] Mix GLY 1:20w/v, HS 0.5ml Oak Mix RVW GLY 1:20w/v, HS 0.5ml [place] Tree,Black GLY 1:20w/v, HS 0.5ml Grass Mix #7 100,000 BAU/mL, HS 0.4ml Jonathan Grass 1:20w/v, HS 0.5ml Diluent: HSA qs to 5ml    Chronic allergic rhinitis due to animal hair and dander, Allergic rhinitis due to dust mite, Allergic rhinitis due to mold, Chronic seasonal allergic rhinitis due to pollen       * ALLERGEN IMMUNOTHERAPY PRESCRIPTION     5 mL    Name of Mix: Mix #4  Weeds Kochia GLY 1:20 w/v, HS 0.5 ml Lamb's Quarters GLY 1:20 w/v, HS 0.5 ml Nettle GLY 1:20 w/v, HS 0.5 ml Plantain, English GLY 1:20 w/v, HS 0.5 ml Ragweed Mixed 1:20 w/v ALK  0.5 ml Russian Thistle GLY 1:20 w/v, HS 0.5 ml Sagebrush, Mugwort GLY 1:20 w/v, HS 0.5 ml Sorrel, Sheep GLY 1:20 w/v, HS 0.5 ml Diluent: HSA qs to 5ml    Chronic allergic rhinitis due to animal hair and dander, Allergic rhinitis due to dust mite, Allergic rhinitis due to mold, Chronic seasonal allergic rhinitis due to pollen       azelastine 0.05 % Soln ophthalmic solution    OPTIVAR    1 Bottle    Apply 1 drop to eye 2 times daily    Conjunctivitis, allergic, unspecified laterality       CERAVE Crea     2 Bottle    Externally apply 1 dose * topically 2 times daily    Eczema, unspecified type       cetirizine 10 MG tablet    zyrTEC    30 tablet    Take 1 tablet (10 mg) by mouth At Bedtime    Rash, Itching       EPINEPHrine 0.3 MG/0.3ML injection 2-pack    EPIPEN/ADRENACLICK/or ANY BX GENERIC EQUIV    0.6 mL    Inject 0.3 mLs (0.3 mg) into the muscle once as needed for anaphylaxis    Food allergy, Need for desensitization to allergens       fluticasone-salmeterol 500-50 MCG/DOSE diskus inhaler    ADVAIR     3 Inhaler    Inhale 1 puff into the lungs 2 times daily    Moderate persistent asthma, uncomplicated       loratadine 10 MG tablet    CLARITIN    30 tablet    Take 1 tablet (10 mg) by mouth daily    Seasonal allergic rhinitis, unspecified allergic rhinitis trigger, Chronic allergic rhinitis, Conjunctivitis, allergic, unspecified laterality       montelukast 10 MG tablet    SINGULAIR    30 tablet    Take 1 tablet (10 mg) by mouth At Bedtime    Moderate persistent asthma, uncomplicated, Seasonal allergic rhinitis, unspecified allergic rhinitis trigger, Chronic allergic rhinitis       MULTIVITAMIN PO      1 tab daily        * triamcinolone 0.1 % cream    KENALOG    80 g    Apply  topically 2 times daily as needed.    Eczema, unspecified type       * triamcinolone 0.1 % cream    KENALOG    80 g    APPLY SPARINGLY TO AFFECTED AREA THREE TIMES A DAY AS NEEDED    Dermatitis       * Notice:  This list has 8 medication(s) that are the same as other medications prescribed for you. Read the directions carefully, and ask your doctor or other care provider to review them with you.

## 2017-11-14 ENCOUNTER — ALLIED HEALTH/NURSE VISIT (OUTPATIENT)
Dept: ALLERGY | Facility: CLINIC | Age: 52
End: 2017-11-14
Payer: COMMERCIAL

## 2017-11-14 DIAGNOSIS — J30.2 CHRONIC SEASONAL ALLERGIC RHINITIS, UNSPECIFIED TRIGGER: Primary | ICD-10-CM

## 2017-11-14 DIAGNOSIS — J30.81 CHRONIC ALLERGIC RHINITIS DUE TO ANIMAL HAIR AND DANDER: ICD-10-CM

## 2017-11-14 DIAGNOSIS — J30.89 ALLERGIC RHINITIS DUE TO MOLD: ICD-10-CM

## 2017-11-14 DIAGNOSIS — J30.89 ALLERGIC RHINITIS DUE TO DUST MITE: ICD-10-CM

## 2017-11-14 DIAGNOSIS — J30.1 CHRONIC SEASONAL ALLERGIC RHINITIS DUE TO POLLEN: ICD-10-CM

## 2017-11-14 PROCEDURE — 99207 ZZC NO CHARGE LOS: CPT

## 2017-11-14 PROCEDURE — 95117 IMMUNOTHERAPY INJECTIONS: CPT

## 2017-11-14 NOTE — MR AVS SNAPSHOT
After Visit Summary   11/14/2017    Dilia Solomon    MRN: [id number]           Patient Information     Date Of Birth          1965        Visit Information        Provider Department      11/14/2017 7:00 AM ALLERGY [facility]        Today's Diagnoses     Chronic seasonal allergic rhinitis, unspecified trigger    -  1       Follow-ups after your visit        Your next 10 appointments already scheduled     Nov 17, 2017  7:00 AM CST   Nurse Only with ALLERGY [facility] ([facility])    [address] [address]   [phone]           Every allergy patient MUST wait 30 minutes after their allergy shot. No exceptions.  Xolair shots #1-3 should plan to wait 2 hours in clinic Xolair shots after #4 should plan 30 minute wait in clinic            Nov 21, 2017  7:00 AM CST   Nurse Only with ALLERGY [facility] ([facility])    [address] [address]   [phone]           Every allergy patient MUST wait 30 minutes after their allergy shot. No exceptions.  Xolair shots #1-3 should plan to wait 2 hours in clinic Xolair shots after #4 should plan 30 minute wait in clinic            Nov 24, 2017  7:15 AM CST   Nurse Only with ALLERGY [facility] ([facility])    [address] [address]   [phone]           Every allergy patient MUST wait 30 minutes after their allergy shot. No exceptions.  Xolair shots #1-3 should plan to wait 2 hours in clinic Xolair shots after #4 should plan 30 minute wait in clinic            Nov 28, 2017  7:00 AM CST   Nurse Only with ALLERGY [facility] ([facility])    [address] [address]   [phone]           Every allergy patient MUST wait 30 minutes after their allergy shot. No exceptions.   "Xolair shots #1-3 should plan to wait 2 hours in clinic Xolair shots after #4 should plan 30 minute wait in clinic            Dec 01, 2017  7:00 AM CST   Nurse Only with ALLERGY [facility] ([facility])    [address] [address]   [phone]           Every allergy patient MUST wait 30 minutes after their allergy shot. No exceptions.  Xolair shots #1-3 should plan to wait 2 hours in clinic Xolair shots after #4 should plan 30 minute wait in clinic            Dec 06, 2017  8:20 AM CST   Return Visit with Butch Horowitz MD   [facility]    [address] [address] [phone]              Who to contact     If you have questions or need follow up information about today's clinic visit or your schedule please contact [facility] directly at [phone].  Normal or non-critical lab and imaging results will be communicated to you by textPlushart, letter or phone within 4 business days after the clinic has received the results. If you do not hear from us within 7 days, please contact the clinic through textPlushart or phone. If you have a critical or abnormal lab result, we will notify you by phone as soon as possible.  Submit refill requests through [employer] or call your pharmacy and they will forward the refill request to us. Please allow 3 business days for your refill to be completed.          Additional Information About Your Visit        textPlushart Information     [employer] lets you send messages to your doctor, view your test results, renew your prescriptions, schedule appointments and more. To sign up, go to [email or web address] . Click on \"Log in\" on the left side of the screen, which will take you to the Welcome page. Then click on \"Sign up Now\" on the right side of the page.     You will be asked to enter the access code listed below, as well as some personal information. Please " follow the directions to create your username and password.     Your access code is: [email or web address]  Expires: 2018  7:58 AM     Your access code will  in 90 days. If you need help or a new code, please call your [place] clinic or [phone].        Care EveryWhere ID     This is your Care EveryWhere ID. This could be used by other organizations to access your [place] medical records  [id number]        Your Vitals Were     Last Period                   2015 (Approximate)            Blood Pressure from Last 3 Encounters:   17 (!) 147/92   17 122/81   17 129/72    Weight from Last 3 Encounters:   17 94.8 kg (209 lb)   17 94.5 kg (208 lb 4.8 oz)   17 95.6 kg (210 lb 12.2 oz)              We Performed the Following     Allergy Shot: Two or more injections        Primary Care Provider Office Phone # Fax #    Julimateo Conklin [phone]       [address] [place] [address]        Equal Access to Services     [facility] AH: Hadii aad ku hadasho Soomaali, waaxda luqadaha, qaybta kaalmada adeegyada, hieu shen . So [facility] [phone].    ATENCIÓN: Si habla español, tiene a pruitt disposición servicios gratuitos de asistencia lingüística. [facility] [phone].    We comply with applicable federal civil rights laws and [place] laws. We do not discriminate on the basis of race, color, national origin, age, disability, sex, sexual orientation, or gender identity.            Thank you!     Thank you for choosing [facility]  for your care. Our goal is always to provide you with excellent care. Hearing back from our patients is one way we can continue to improve our services. Please take a few minutes to complete the written survey that you may receive in the mail after your visit with us. Thank you!             Your Updated Medication List - Protect others around you: Learn how to safely use, store and throw away  your medicines at www.disposemymeds.org.          This list is accurate as of: 11/14/17  7:58 AM.  Always use your most recent med list.                   Brand Name Dispense Instructions for use Diagnosis    * albuterol (2.5 MG/3ML) 0.083% neb solution     1 Box    Take 1 vial (2.5 mg) by nebulization every 4 hours as needed    Moderate persistent asthma, uncomplicated       * albuterol 108 (90 BASE) MCG/ACT Inhaler    PROAIR HFA/PROVENTIL HFA/VENTOLIN HFA    1 Inhaler    Inhale 2 puffs into the lungs every 4 hours as needed    Moderate persistent asthma, uncomplicated       * ALLERGEN IMMUNOTHERAPY PRESCRIPTION     5 mL    Name of Mix: Mix #1  Mold Alternaria Tenuis GLY 1:10 w/v, HS  0.5 ml Aspergillus Fumigatus GLY 1:10 w/v, HS  0.5 ml Epicoccum Nigrum 1:10 w/v, HS 0.5 ml Hormodendrum Cladosporioides 1:10 w/v, HS 0.5 ml Penicillium Mix GLY 1:10 w/v, HS  0.5 ml Diluent: HSA qs to 5ml    Chronic allergic rhinitis due to animal hair and dander, Allergic rhinitis due to dust mite, Allergic rhinitis due to mold, Chronic seasonal allergic rhinitis due to pollen       * ALLERGEN IMMUNOTHERAPY PRESCRIPTION     5 mL    Name of Mix: Mix #2  Dust Mite, Cat, Dog Cat Hair, Standardized 10,000 BAU/mL, ALK  2.0 ml Dog Hair Dander, A. P.  1:100 w/v, HS  1.0 ml Dust Mites F 30,000AU/mL, HS  0.3 ml Dust Mites P. 30,000 AU/mL, HS  0.3 ml  Diluent: HSA qs to 5ml    Chronic allergic rhinitis due to animal hair and dander, Allergic rhinitis due to dust mite, Allergic rhinitis due to mold, Chronic seasonal allergic rhinitis due to pollen       * ALLERGEN IMMUNOTHERAPY PRESCRIPTION     5 mL    Name of Mix: Mix #3 Grass,Tree  Dmitry,White GLY 1:20w/v, HS 0.5ml Birch Mix GLY 1:20w/v, HS 0.5ml Boxelder-Maple Mix BHR (Boxelder Hard Red) 1:20w/v, HS 0.5ml [place],Common GLY 1:20w/v, HS 0.5ml Elm,American GLY 1:20w/v, HS 0.5ml [place]on Mix GLY 1:20w/v, HS 0.5ml Oak Mix RVW GLY 1:20w/v, HS 0.5ml [place] Tree,Black GLY 1:20w/v, HS 0.5ml Grass  Mix #7 100,000 BAU/mL, HS 0.4ml Jonathan Grass 1:20w/v, HS 0.5ml Diluent: HSA qs to 5ml    Chronic allergic rhinitis due to animal hair and dander, Allergic rhinitis due to dust mite, Allergic rhinitis due to mold, Chronic seasonal allergic rhinitis due to pollen       * ALLERGEN IMMUNOTHERAPY PRESCRIPTION     5 mL    Name of Mix: Mix #4  Weeds Kochia GLY 1:20 w/v, HS 0.5 ml Lamb's Quarters GLY 1:20 w/v, HS 0.5 ml Nettle GLY 1:20 w/v, HS 0.5 ml Plantain, English GLY 1:20 w/v, HS 0.5 ml Ragweed Mixed 1:20 w/v ALK  0.5 ml Russian Thistle GLY 1:20 w/v, HS 0.5 ml Sagebrush, Mugwort GLY 1:20 w/v, HS 0.5 ml Sorrel, Sheep GLY 1:20 w/v, HS 0.5 ml Diluent: HSA qs to 5ml    Chronic allergic rhinitis due to animal hair and dander, Allergic rhinitis due to dust mite, Allergic rhinitis due to mold, Chronic seasonal allergic rhinitis due to pollen       azelastine 0.05 % Soln ophthalmic solution    OPTIVAR    1 Bottle    Apply 1 drop to eye 2 times daily    Conjunctivitis, allergic, unspecified laterality       CERAVE Crea     2 Bottle    Externally apply 1 dose * topically 2 times daily    Eczema, unspecified type       cetirizine 10 MG tablet    zyrTEC    30 tablet    Take 1 tablet (10 mg) by mouth At Bedtime    Rash, Itching       EPINEPHrine 0.3 MG/0.3ML injection 2-pack    EPIPEN/ADRENACLICK/or ANY BX GENERIC EQUIV    0.6 mL    Inject 0.3 mLs (0.3 mg) into the muscle once as needed for anaphylaxis    Food allergy, Need for desensitization to allergens       fluticasone-salmeterol 500-50 MCG/DOSE diskus inhaler    ADVAIR    3 Inhaler    Inhale 1 puff into the lungs 2 times daily    Moderate persistent asthma, uncomplicated       loratadine 10 MG tablet    CLARITIN    30 tablet    Take 1 tablet (10 mg) by mouth daily    Seasonal allergic rhinitis, unspecified allergic rhinitis trigger, Chronic allergic rhinitis, Conjunctivitis, allergic, unspecified laterality       montelukast 10 MG tablet    SINGULAIR    30 tablet    Take 1  tablet (10 mg) by mouth At Bedtime    Moderate persistent asthma, uncomplicated, Seasonal allergic rhinitis, unspecified allergic rhinitis trigger, Chronic allergic rhinitis       MULTIVITAMIN PO      1 tab daily        * triamcinolone 0.1 % cream    KENALOG    80 g    Apply  topically 2 times daily as needed.    Eczema, unspecified type       * triamcinolone 0.1 % cream    KENALOG    80 g    APPLY SPARINGLY TO AFFECTED AREA THREE TIMES A DAY AS NEEDED    Dermatitis       * Notice:  This list has 8 medication(s) that are the same as other medications prescribed for you. Read the directions carefully, and ask your doctor or other care provider to review them with you.

## 2017-11-14 NOTE — TELEPHONE ENCOUNTER
ALLERGY SOLUTION RE-ORDER REQUEST    Dilia Solomon 1965 MRN: [id number]    DATE NEEDED:  11/28/2017  Vial Color Content   Top Dose       Last Dose     Vial Size  Red 1:1 Weeds   Red 1:1 0.5   Red 1:10.45 5  Red 1:1 Grass, Trees   Red 1:1 0.5   Red 1:10.45 5  Red 1:1 Molds   Red 1:1 0.5   Red 1:10.45 5  Red 1:1 Cat, Dog, Dust Mite   Red 1:1 0.5   Red 1:10.45 5      Shot Clinic Location:  [place]  Ship to Location: [place]  Special Instructions:  n/a      Updated Prescription Needed: No      Requester Signature  Bella Altamirano

## 2017-11-16 DIAGNOSIS — J30.2 CHRONIC SEASONAL ALLERGIC RHINITIS DUE TO OTHER ALLERGEN: Primary | ICD-10-CM

## 2017-11-16 PROCEDURE — 95165 ANTIGEN THERAPY SERVICES: CPT | Performed by: ALLERGY & IMMUNOLOGY

## 2017-11-16 NOTE — PROGRESS NOTES
Allergy serums billed at [place].     Vials received below:    Vials received below:    Vial Color Content                      Vial Size Expiration Date  Red 1:1 Cat, Dog, Dust Mite 5mL  11/15/18  Red 1:1 Weeds 5mL  11/15/18  Red 1:1 Molds 5mL  11/15/18  Red 1:1 Grass, Trees 5mL  11/15/18    Original Refill encounter date: 11/14/18      Signature  Silver Araujo

## 2017-11-16 NOTE — TELEPHONE ENCOUNTER
Allergy serums received at [place].     Vials received below:    Vial Color Content                      Vial Size Expiration Date  Red 1:1 Cat, Dog, Dust Mite 5mL  11/15/18  Red 1:1 Weeds 5mL  11/15/18  Red 1:1 Molds 5mL  11/15/18  Red 1:1 Grass, Trees 5mL  11/15/18      Signature  Silver Araujo

## 2017-11-17 ENCOUNTER — ALLIED HEALTH/NURSE VISIT (OUTPATIENT)
Dept: ALLERGY | Facility: CLINIC | Age: 52
End: 2017-11-17
Payer: COMMERCIAL

## 2017-11-17 DIAGNOSIS — J30.1 CHRONIC ALLERGIC RHINITIS DUE TO POLLEN, UNSPECIFIED SEASONALITY: Primary | ICD-10-CM

## 2017-11-17 PROCEDURE — 99207 ZZC NO CHARGE LOS: CPT

## 2017-11-17 PROCEDURE — 95117 IMMUNOTHERAPY INJECTIONS: CPT

## 2017-11-17 NOTE — MR AVS SNAPSHOT
After Visit Summary   11/17/2017    Dilia Solomon    MRN: [id number]           Patient Information     Date Of Birth          1965        Visit Information        Provider Department      11/17/2017 7:00 AM ALLERGY [facility]        Today's Diagnoses     Chronic allergic rhinitis due to pollen, unspecified seasonality    -  1       Follow-ups after your visit        Your next 10 appointments already scheduled     Dec 01, 2017  7:00 AM CST   Nurse Only with ALLERGY [facility] ([facility])    [address] [address]   [phone]           Every allergy patient MUST wait 30 minutes after their allergy shot. No exceptions.  Xolair shots #1-3 should plan to wait 2 hours in clinic Xolair shots after #4 should plan 30 minute wait in clinic            Dec 06, 2017  8:20 AM CST   Return Visit with Butch Horowitz MD   [facility] ([facility]    [address] [address]   [phone]              Who to contact     If you have questions or need follow up information about today's clinic visit or your schedule please contact [facility] directly at [phone].  Normal or non-critical lab and imaging results will be communicated to you by [employer] Medicalhart, letter or phone within 4 business days after the clinic has received the results. If you do not hear from us within 7 days, please contact the clinic through [employer] or phone. If you have a critical or abnormal lab result, we will notify you by phone as soon as possible.  Submit refill requests through [employer] or call your pharmacy and they will forward the refill request to us. Please allow 3 business days for your refill to be completed.          Additional Information About Your Visit        [employer] Information     [employer] lets you send messages to your doctor, view your test results, renew your  "prescriptions, schedule appointments and more. To sign up, go to [email or web address] . Click on \"Log in\" on the left side of the screen, which will take you to the Welcome page. Then click on \"Sign up Now\" on the right side of the page.     You will be asked to enter the access code listed below, as well as some personal information. Please follow the directions to create your username and password.     Your access code is: [email or web address]  Expires: 2018  7:58 AM     Your access code will  in 90 days. If you need help or a new code, please call your [place] clinic or [phone].        Care EveryWhere ID     This is your Care EveryWhere ID. This could be used by other organizations to access your [place] medical records  [id number]        Your Vitals Were     Last Period                   2015 (Approximate)            Blood Pressure from Last 3 Encounters:   17 (!) 147/92   17 122/81   17 129/72    Weight from Last 3 Encounters:   17 94.8 kg (209 lb)   17 94.5 kg (208 lb 4.8 oz)   17 95.6 kg (210 lb 12.2 oz)              We Performed the Following     Allergy Shot: Two or more injections        Primary Care Provider Office Phone # Fax #    Cookie Conklin [phone]       [address] [place] [address]        Equal Access to Services     [facility] AH: Hadii aad ku hadasho Soomaali, waaxda luqadaha, qaybta kaalmada adeegyada, hieu shen . So [facility] [phone].    ATENCIÓN: Si habla español, tiene a pruitt disposición servicios gratuitos de asistencia lingüística. Llame al [phone].    We comply with applicable federal civil rights laws and [place] laws. We do not discriminate on the basis of race, color, national origin, age, disability, sex, sexual orientation, or gender identity.            Thank you!     Thank you for choosing [facility]  for your care. Our goal is always to provide you " with excellent care. Hearing back from our patients is one way we can continue to improve our services. Please take a few minutes to complete the written survey that you may receive in the mail after your visit with us. Thank you!             Your Updated Medication List - Protect others around you: Learn how to safely use, store and throw away your medicines at www.disposemymeds.org.          This list is accurate as of: 11/17/17  8:05 AM.  Always use your most recent med list.                   Brand Name Dispense Instructions for use Diagnosis    * albuterol (2.5 MG/3ML) 0.083% neb solution     1 Box    Take 1 vial (2.5 mg) by nebulization every 4 hours as needed    Moderate persistent asthma, uncomplicated       * albuterol 108 (90 BASE) MCG/ACT Inhaler    PROAIR HFA/PROVENTIL HFA/VENTOLIN HFA    1 Inhaler    Inhale 2 puffs into the lungs every 4 hours as needed    Moderate persistent asthma, uncomplicated       * ALLERGEN IMMUNOTHERAPY PRESCRIPTION     5 mL    Name of Mix: Mix #1  Mold Alternaria Tenuis 1:10 w/v, HS  0.5 ml Aspergillus Fumigatus 1:10 w/v, HS  0.5 ml Epicoccum Nigrum 1:10 w/v, HS 0.5 ml Hormodendrum Cladosporioides 1:10 w/v, HS 0.5 ml Penicillium Mix 1:10 w/v, HS  0.5 ml Diluent: HSA qs to 5ml    Chronic allergic rhinitis due to animal hair and dander, Allergic rhinitis due to dust mite, Allergic rhinitis due to mold, Chronic seasonal allergic rhinitis due to pollen       * ALLERGEN IMMUNOTHERAPY PRESCRIPTION     5 mL    Name of Mix: Mix #2  Dust Mite, Cat, Dog Cat Hair, Standardized 10,000 BAU/mL, ALK  2.0 ml Dog Hair Dander, A. P.  1:100 w/v, HS  1.0 ml Dust Mites F 30,000AU/mL, HS  0.3 ml Dust Mites P. 30,000 AU/mL, HS  0.3 ml  Diluent: HSA qs to 5ml    Chronic allergic rhinitis due to animal hair and dander, Allergic rhinitis due to dust mite, Allergic rhinitis due to mold, Chronic seasonal allergic rhinitis due to pollen       * ALLERGEN IMMUNOTHERAPY PRESCRIPTION     5 mL    Name of Mix:  Mix #3 Grass,Tree  Dmitry,White 1:20w/v, HS 0.5ml Birch Mix PRW 1:20w/v, HS 0.5ml Boxelder-Maple Mix BHR (Boxelder Hard Red) 1:20w/v, HS 0.5ml [place],Common 1:20w/v, HS 0.5ml Elm,American 1:20w/v, HS 0.5ml [place] Mix 1:20w/v, HS 0.5ml Oak Mix RVW 1:20w/v, HS 0.5ml [place] Tree,Black 1:20w/v, HS 0.5ml Grass Mix #7 100,000 BAU/mL, HS 0.4ml Jonathan Grass 1:20w/v, HS 0.5ml Diluent: HSA qs to 5ml    Chronic allergic rhinitis due to animal hair and dander, Allergic rhinitis due to dust mite, Allergic rhinitis due to mold, Chronic seasonal allergic rhinitis due to pollen       * ALLERGEN IMMUNOTHERAPY PRESCRIPTION     5 mL    Name of Mix: Mix #4  Weeds Kochia 1:20 w/v, HS 0.5 ml Lamb's Quarters 1:20 w/v, HS 0.5 ml Nettle 1:20 w/v, HS 0.5 ml Plantain, English 1:20 w/v, HS 0.5 ml Ragweed Mixed 1:20 w/v ALK  0.5 ml Russian Thistle 1:20 w/v, HS 0.5 ml Sagebrush, Mugwort 1:20 w/v, HS 0.5 ml Sorrel, Sheep 1:20 w/v, HS 0.5 ml Diluent: HSA qs to 5ml    Chronic allergic rhinitis due to animal hair and dander, Allergic rhinitis due to dust mite, Allergic rhinitis due to mold, Chronic seasonal allergic rhinitis due to pollen       azelastine 0.05 % Soln ophthalmic solution    OPTIVAR    1 Bottle    Apply 1 drop to eye 2 times daily    Conjunctivitis, allergic, unspecified laterality       CERAVE Crea     2 Bottle    Externally apply 1 dose * topically 2 times daily    Eczema, unspecified type       cetirizine 10 MG tablet    zyrTEC    30 tablet    Take 1 tablet (10 mg) by mouth At Bedtime    Rash, Itching       EPINEPHrine 0.3 MG/0.3ML injection 2-pack    EPIPEN/ADRENACLICK/or ANY BX GENERIC EQUIV    0.6 mL    Inject 0.3 mLs (0.3 mg) into the muscle once as needed for anaphylaxis    Food allergy, Need for desensitization to allergens       fluticasone-salmeterol 500-50 MCG/DOSE diskus inhaler    ADVAIR    3 Inhaler    Inhale 1 puff into the lungs 2 times daily    Moderate persistent asthma, uncomplicated       loratadine 10 MG  tablet    CLARITIN    30 tablet    Take 1 tablet (10 mg) by mouth daily    Seasonal allergic rhinitis, unspecified allergic rhinitis trigger, Chronic allergic rhinitis, Conjunctivitis, allergic, unspecified laterality       montelukast 10 MG tablet    SINGULAIR    30 tablet    Take 1 tablet (10 mg) by mouth At Bedtime    Moderate persistent asthma, uncomplicated, Seasonal allergic rhinitis, unspecified allergic rhinitis trigger, Chronic allergic rhinitis       MULTIVITAMIN PO      1 tab daily        * triamcinolone 0.1 % cream    KENALOG    80 g    Apply  topically 2 times daily as needed.    Eczema, unspecified type       * triamcinolone 0.1 % cream    KENALOG    80 g    APPLY SPARINGLY TO AFFECTED AREA THREE TIMES A DAY AS NEEDED    Dermatitis       * Notice:  This list has 8 medication(s) that are the same as other medications prescribed for you. Read the directions carefully, and ask your doctor or other care provider to review them with you.

## 2017-12-01 ENCOUNTER — ALLIED HEALTH/NURSE VISIT (OUTPATIENT)
Dept: ALLERGY | Facility: CLINIC | Age: 52
End: 2017-12-01
Payer: COMMERCIAL

## 2017-12-01 DIAGNOSIS — J30.2 CHRONIC SEASONAL ALLERGIC RHINITIS DUE TO OTHER ALLERGEN: Primary | ICD-10-CM

## 2017-12-01 PROCEDURE — 99207 ZZC NO CHARGE LOS: CPT

## 2017-12-01 PROCEDURE — 95117 IMMUNOTHERAPY INJECTIONS: CPT

## 2017-12-01 NOTE — MR AVS SNAPSHOT
"              After Visit Summary   12/1/2017    Dilia Solomon    MRN: [id number]           Patient Information     Date Of Birth          1965        Visit Information        Provider Department      12/1/2017 7:00 AM ALLERGY [facility]        Today's Diagnoses     Chronic seasonal allergic rhinitis due to other allergen    -  1       Follow-ups after your visit        Your next 10 appointments already scheduled     Dec 06, 2017  8:20 AM CST   Return Visit with Butch Horowitz MD   [facility]    [address] [address] [phone]              Who to contact     If you have questions or need follow up information about today's clinic visit or your schedule please contact [facility] directly at [phone].  Normal or non-critical lab and imaging results will be communicated to you by MyChart, letter or phone within 4 business days after the clinic has received the results. If you do not hear from us within 7 days, please contact the clinic through MyChart or phone. If you have a critical or abnormal lab result, we will notify you by phone as soon as possible.  Submit refill requests through [employer] or call your pharmacy and they will forward the refill request to us. Please allow 3 business days for your refill to be completed.          Additional Information About Your Visit        MyChart Information     [employer] lets you send messages to your doctor, view your test results, renew your prescriptions, schedule appointments and more. To sign up, go to [email or web address] . Click on \"Log in\" on the left side of the screen, which will take you to the Welcome page. Then click on \"Sign up Now\" on the right side of the page.     You will be asked to enter the access code listed below, as well as some personal information. Please follow the directions to create your username and password.     Your access " code is: [email or web address]  Expires: 2018  7:58 AM     Your access code will  in 90 days. If you need help or a new code, please call your [place] clinic or [phone].        Care EveryWhere ID     This is your Care EveryWhere ID. This could be used by other organizations to access your [place] medical records  [id number]        Your Vitals Were     Last Period                   2015 (Approximate)            Blood Pressure from Last 3 Encounters:   17 (!) 147/92   17 122/81   17 129/72    Weight from Last 3 Encounters:   17 94.8 kg (209 lb)   17 94.5 kg (208 lb 4.8 oz)   17 95.6 kg (210 lb 12.2 oz)              We Performed the Following     Allergy Shot: Two or more injections        Primary Care Provider Office Phone # Fax #    Cookie Conklin [phone]       [address] [place] [address]        Equal Access to Services     [facility]: Hadii aad ku hadasho Soomaali, waaxda luqadaha, qaybta kaalmada adeegyada, waxay ellain hayfrandy shen . So [facility] [phone].    ATENCIÓN: Si habla español, tiene a pruitt disposición servicios gratuitos de asistencia lingüística. Llame al [phone].    We comply with applicable federal civil rights laws and [place] laws. We do not discriminate on the basis of race, color, national origin, age, disability, sex, sexual orientation, or gender identity.            Thank you!     Thank you for choosing [facility]  for your care. Our goal is always to provide you with excellent care. Hearing back from our patients is one way we can continue to improve our services. Please take a few minutes to complete the written survey that you may receive in the mail after your visit with us. Thank you!             Your Updated Medication List - Protect others around you: Learn how to safely use, store and throw away your medicines at www.disposemymeds.org.          This list is accurate as  of: 12/1/17  8:07 AM.  Always use your most recent med list.                   Brand Name Dispense Instructions for use Diagnosis    * albuterol (2.5 MG/3ML) 0.083% neb solution     1 Box    Take 1 vial (2.5 mg) by nebulization every 4 hours as needed    Moderate persistent asthma, uncomplicated       * albuterol 108 (90 BASE) MCG/ACT Inhaler    PROAIR HFA/PROVENTIL HFA/VENTOLIN HFA    1 Inhaler    Inhale 2 puffs into the lungs every 4 hours as needed    Moderate persistent asthma, uncomplicated       * ALLERGEN IMMUNOTHERAPY PRESCRIPTION     5 mL    Name of Mix: Mix #1  Mold Alternaria Tenuis 1:10 w/v, HS  0.5 ml Aspergillus Fumigatus 1:10 w/v, HS  0.5 ml Epicoccum Nigrum 1:10 w/v, HS 0.5 ml Hormodendrum Cladosporioides 1:10 w/v, HS 0.5 ml Penicillium Mix 1:10 w/v, HS  0.5 ml Diluent: HSA qs to 5ml    Chronic allergic rhinitis due to animal hair and dander, Allergic rhinitis due to dust mite, Allergic rhinitis due to mold, Chronic seasonal allergic rhinitis due to pollen       * ALLERGEN IMMUNOTHERAPY PRESCRIPTION     5 mL    Name of Mix: Mix #2  Dust Mite, Cat, Dog Cat Hair, Standardized 10,000 BAU/mL, ALK  2.0 ml Dog Hair Dander, A. P.  1:100 w/v, HS  1.0 ml Dust Mites F 30,000AU/mL, HS  0.3 ml Dust Mites P. 30,000 AU/mL, HS  0.3 ml  Diluent: HSA qs to 5ml    Chronic allergic rhinitis due to animal hair and dander, Allergic rhinitis due to dust mite, Allergic rhinitis due to mold, Chronic seasonal allergic rhinitis due to pollen       * ALLERGEN IMMUNOTHERAPY PRESCRIPTION     5 mL    Name of Mix: Mix #3 Grass,Tree  Dmitry,White 1:20w/v, HS 0.5ml Birch Mix PRW 1:20w/v, HS 0.5ml Boxelder-Maple Mix BHR (Boxelder Hard Red) 1:20w/v, HS 0.5ml [place],Common 1:20w/v, HS 0.5ml Elm,American 1:20w/v, HS 0.5ml [place] Mix 1:20w/v, HS 0.5ml Oak Mix RVW 1:20w/v, HS 0.5ml [place]yn Tree,Black 1:20w/v, HS 0.5ml Grass Mix #7 100,000 BAU/mL, HS 0.4ml Jonathan Grass 1:20w/v, HS 0.5ml Diluent: HSA qs to 5ml    Chronic allergic  rhinitis due to animal hair and dander, Allergic rhinitis due to dust mite, Allergic rhinitis due to mold, Chronic seasonal allergic rhinitis due to pollen       * ALLERGEN IMMUNOTHERAPY PRESCRIPTION     5 mL    Name of Mix: Mix #4  Weeds Kochia 1:20 w/v, HS 0.5 ml Lamb's Quarters 1:20 w/v, HS 0.5 ml Nettle 1:20 w/v, HS 0.5 ml Plantain, English 1:20 w/v, HS 0.5 ml Ragweed Mixed 1:20 w/v ALK  0.5 ml Russian Thistle 1:20 w/v, HS 0.5 ml Sagebrush, Mugwort 1:20 w/v, HS 0.5 ml Sorrel, Sheep 1:20 w/v, HS 0.5 ml Diluent: HSA qs to 5ml    Chronic allergic rhinitis due to animal hair and dander, Allergic rhinitis due to dust mite, Allergic rhinitis due to mold, Chronic seasonal allergic rhinitis due to pollen       azelastine 0.05 % Soln ophthalmic solution    OPTIVAR    1 Bottle    Apply 1 drop to eye 2 times daily    Conjunctivitis, allergic, unspecified laterality       CERAVE Crea     2 Bottle    Externally apply 1 dose * topically 2 times daily    Eczema, unspecified type       cetirizine 10 MG tablet    zyrTEC    30 tablet    Take 1 tablet (10 mg) by mouth At Bedtime    Rash, Itching       EPINEPHrine 0.3 MG/0.3ML injection 2-pack    EPIPEN/ADRENACLICK/or ANY BX GENERIC EQUIV    0.6 mL    Inject 0.3 mLs (0.3 mg) into the muscle once as needed for anaphylaxis    Food allergy, Need for desensitization to allergens       fluticasone-salmeterol 500-50 MCG/DOSE diskus inhaler    ADVAIR    3 Inhaler    Inhale 1 puff into the lungs 2 times daily    Moderate persistent asthma, uncomplicated       loratadine 10 MG tablet    CLARITIN    30 tablet    Take 1 tablet (10 mg) by mouth daily    Seasonal allergic rhinitis, unspecified allergic rhinitis trigger, Chronic allergic rhinitis, Conjunctivitis, allergic, unspecified laterality       montelukast 10 MG tablet    SINGULAIR    30 tablet    Take 1 tablet (10 mg) by mouth At Bedtime    Moderate persistent asthma, uncomplicated, Seasonal allergic rhinitis, unspecified allergic  rhinitis trigger, Chronic allergic rhinitis       MULTIVITAMIN PO      1 tab daily        * triamcinolone 0.1 % cream    KENALOG    80 g    Apply  topically 2 times daily as needed.    Eczema, unspecified type       * triamcinolone 0.1 % cream    KENALOG    80 g    APPLY SPARINGLY TO AFFECTED AREA THREE TIMES A DAY AS NEEDED    Dermatitis       * Notice:  This list has 8 medication(s) that are the same as other medications prescribed for you. Read the directions carefully, and ask your doctor or other care provider to review them with you.

## 2017-12-04 ENCOUNTER — OFFICE VISIT (OUTPATIENT)
Dept: FAMILY MEDICINE | Facility: CLINIC | Age: 52
End: 2017-12-04
Payer: OTHER MISCELLANEOUS

## 2017-12-04 ENCOUNTER — RADIANT APPOINTMENT (OUTPATIENT)
Dept: GENERAL RADIOLOGY | Facility: CLINIC | Age: 52
End: 2017-12-04
Attending: NURSE PRACTITIONER
Payer: COMMERCIAL

## 2017-12-04 VITALS
SYSTOLIC BLOOD PRESSURE: 120 MMHG | RESPIRATION RATE: 18 BRPM | HEART RATE: 83 BPM | DIASTOLIC BLOOD PRESSURE: 60 MMHG | WEIGHT: 216 LBS | TEMPERATURE: 97.4 F | BODY MASS INDEX: 37.96 KG/M2 | OXYGEN SATURATION: 98 %

## 2017-12-04 DIAGNOSIS — S99.921A INJURY OF RIGHT FOOT, INITIAL ENCOUNTER: Primary | ICD-10-CM

## 2017-12-04 DIAGNOSIS — S99.921A INJURY OF RIGHT FOOT, INITIAL ENCOUNTER: ICD-10-CM

## 2017-12-04 DIAGNOSIS — Z02.6 ENCOUNTER RELATED TO WORKER'S COMPENSATION CLAIM: ICD-10-CM

## 2017-12-04 PROCEDURE — 73630 X-RAY EXAM OF FOOT: CPT | Mod: RT

## 2017-12-04 PROCEDURE — 99214 OFFICE O/P EST MOD 30 MIN: CPT | Performed by: NURSE PRACTITIONER

## 2017-12-04 ASSESSMENT — PAIN SCALES - GENERAL: PAINLEVEL: EXTREME PAIN (8)

## 2017-12-04 NOTE — PATIENT INSTRUCTIONS
We will call you with the results of your x ray    Wear the boot when up    Elevate your leg frequently and apply ice.    At work, you can stand for 4 hours of your shift. Remainder of shift you should work from sitting position

## 2017-12-04 NOTE — NURSING NOTE
"Chief Complaint   Patient presents with     Work Comp       Initial /60  Pulse 83  Temp 97.4  F (36.3  C) (Tympanic)  Resp 18  Wt 216 lb (98 kg)  LMP 07/18/2015 (Approximate)  SpO2 98%  Breastfeeding? No  BMI 37.96 kg/m2 Estimated body mass index is 37.96 kg/(m^2) as calculated from the following:    Height as of 9/6/17: 5' 3.25\" (1.607 m).    Weight as of this encounter: 216 lb (98 kg).  Medication Reconciliation: complete    Health Maintenance that is potentially due pending provider review:  NONE    n/a    Is there anyone who you would like to be able to receive your results? No  If yes have patient fill out UVALDO    "

## 2017-12-04 NOTE — MR AVS SNAPSHOT
"              After Visit Summary   12/4/2017    Dilia Solomon    MRN: [id number]           Patient Information     Date Of Birth          1965        Visit Information        Provider Department      12/4/2017 10:00 AM Dinorah Engel APRN CNP [facility]        Today's Diagnoses     Injury of right foot, initial encounter    -  1    Encounter related to worker's compensation claim          Care Instructions    We will call you with the results of your x ray    Wear the boot when up    Elevate your leg frequently and apply ice.    At work, you can stand for 4 hours of your shift. Remainder of shift you should work from sitting position          Follow-ups after your visit        Your next 10 appointments already scheduled     Dec 06, 2017  8:20 AM CST   Return [facility] with Butch Horowitz MD   [facility]    [address] [address] [phone]              Who to contact     If you have questions or need follow up information about today's clinic visit or your schedule please contact [facility] directly at [phone].  Normal or non-critical lab and imaging results will be communicated to you by MyChart, letter or phone within 4 business days after the clinic has received the results. If you do not hear from us within 7 days, please contact the clinic through [employer] or phone. If you have a critical or abnormal lab result, we will notify you by phone as soon as possible.  Submit refill requests through "[employer] or call your pharmacy and they will forward the refill request to us. Please allow 3 business days for your refill to be completed.          Additional Information About Your Visit        MyChart Information     "[employer] lets you send messages to your doctor, view your test results, renew your prescriptions, schedule appointments and more. To sign up, go to [email or web address] . Click on \"Log in\" on " "the left side of the screen, which will take you to the Welcome page. Then click on \"Sign up Now\" on the right side of the page.     You will be asked to enter the access code listed below, as well as some personal information. Please follow the directions to create your username and password.     Your access code is: [email or web address]  Expires: 2018  7:58 AM     Your access code will  in 90 days. If you need help or a new code, please call your [place] clinic or [phone].        Care EveryWhere ID     This is your Care EveryWhere ID. This could be used by other organizations to access your [place] medical records  [id number]        Your Vitals Were     Pulse Temperature Respirations Last Period Pulse Oximetry Breastfeeding?    83 97.4  F (36.3  C) (Tympanic) 18 2015 (Approximate) 98% No    BMI (Body Mass Index)                   37.96 kg/m2            Blood Pressure from Last 3 Encounters:   17 120/60   17 (!) 147/92   17 122/81    Weight from Last 3 Encounters:   17 216 lb (98 kg)   17 209 lb (94.8 kg)   17 208 lb 4.8 oz (94.5 kg)               Primary Care Provider Office Phone # Fax #    Cookie Conklin [phone]       [address] [place] [address]        Equal Access to Services     [facility]: Hadii ulices muñozo Sofaith, waaxda luqadaha, qaybta kaalmada anna, hieu howard. So [facility] [phone].    ATENCIÓN: Si habla español, tiene a pruitt disposición servicios gratuitos de asistencia lingüística. Llame al [phone].    We comply with applicable federal civil rights laws and [place] laws. We do not discriminate on the basis of race, color, national origin, age, disability, sex, sexual orientation, or gender identity.            Thank you!     Thank you for choosing [facility]  for your care. Our goal is always to provide you with excellent care. Hearing back from our patients " is one way we can continue to improve our services. Please take a few minutes to complete the written survey that you may receive in the mail after your visit with us. Thank you!             Your Updated Medication List - Protect others around you: Learn how to safely use, store and throw away your medicines at www.disposemymeds.org.          This list is accurate as of: 12/4/17 11:00 AM.  Always use your most recent med list.                   Brand Name Dispense Instructions for use Diagnosis    * albuterol (2.5 MG/3ML) 0.083% neb solution     1 Box    Take 1 vial (2.5 mg) by nebulization every 4 hours as needed    Moderate persistent asthma, uncomplicated       * albuterol 108 (90 BASE) MCG/ACT Inhaler    PROAIR HFA/PROVENTIL HFA/VENTOLIN HFA    1 Inhaler    Inhale 2 puffs into the lungs every 4 hours as needed    Moderate persistent asthma, uncomplicated       * ALLERGEN IMMUNOTHERAPY PRESCRIPTION     5 mL    Name of Mix: Mix #1  Mold Alternaria Tenuis 1:10 w/v, HS  0.5 ml Aspergillus Fumigatus 1:10 w/v, HS  0.5 ml Epicoccum Nigrum 1:10 w/v, HS 0.5 ml Hormodendrum Cladosporioides 1:10 w/v, HS 0.5 ml Penicillium Mix 1:10 w/v, HS  0.5 ml Diluent: HSA qs to 5ml    Chronic allergic rhinitis due to animal hair and dander, Allergic rhinitis due to dust mite, Allergic rhinitis due to mold, Chronic seasonal allergic rhinitis due to pollen       * ALLERGEN IMMUNOTHERAPY PRESCRIPTION     5 mL    Name of Mix: Mix #2  Dust Mite, Cat, Dog Cat Hair, Standardized 10,000 BAU/mL, ALK  2.0 ml Dog Hair Dander, A. P.  1:100 w/v, HS  1.0 ml Dust Mites F 30,000AU/mL, HS  0.3 ml Dust Mites P. 30,000 AU/mL, HS  0.3 ml  Diluent: HSA qs to 5ml    Chronic allergic rhinitis due to animal hair and dander, Allergic rhinitis due to dust mite, Allergic rhinitis due to mold, Chronic seasonal allergic rhinitis due to pollen       * ALLERGEN IMMUNOTHERAPY PRESCRIPTION     5 mL    Name of Mix: Mix #3 Grass,Tree  Dmitry,White 1:20w/v, HS 0.5ml Birch  Mix PRW 1:20w/v, HS 0.5ml Boxelder-Maple Mix BHR (Boxelder Hard Red) 1:20w/v, HS 0.5ml [place],Common 1:20w/v, HS 0.5ml Elm,American 1:20w/v, HS 0.5ml [place] Mix 1:20w/v, HS 0.5ml Oak Mix RVW 1:20w/v, HS 0.5ml [place] Tree,Black 1:20w/v, HS 0.5ml Grass Mix #7 100,000 BAU/mL, HS 0.4ml Jonathan Grass 1:20w/v, HS 0.5ml Diluent: HSA qs to 5ml    Chronic allergic rhinitis due to animal hair and dander, Allergic rhinitis due to dust mite, Allergic rhinitis due to mold, Chronic seasonal allergic rhinitis due to pollen       * ALLERGEN IMMUNOTHERAPY PRESCRIPTION     5 mL    Name of Mix: Mix #4  Weeds Kochia 1:20 w/v, HS 0.5 ml Lamb's Quarters 1:20 w/v, HS 0.5 ml Nettle 1:20 w/v, HS 0.5 ml Plantain, English 1:20 w/v, HS 0.5 ml Ragweed Mixed 1:20 w/v ALK  0.5 ml Russian Thistle 1:20 w/v, HS 0.5 ml Sagebrush, Mugwort 1:20 w/v, HS 0.5 ml Sorrel, Sheep 1:20 w/v, HS 0.5 ml Diluent: HSA qs to 5ml    Chronic allergic rhinitis due to animal hair and dander, Allergic rhinitis due to dust mite, Allergic rhinitis due to mold, Chronic seasonal allergic rhinitis due to pollen       azelastine 0.05 % Soln ophthalmic solution    OPTIVAR    1 Bottle    Apply 1 drop to eye 2 times daily    Conjunctivitis, allergic, unspecified laterality       CERAVE Crea     2 Bottle    Externally apply 1 dose * topically 2 times daily    Eczema, unspecified type       cetirizine 10 MG tablet    zyrTEC    30 tablet    Take 1 tablet (10 mg) by mouth At Bedtime    Rash, Itching       EPINEPHrine 0.3 MG/0.3ML injection 2-pack    EPIPEN/ADRENACLICK/or ANY BX GENERIC EQUIV    0.6 mL    Inject 0.3 mLs (0.3 mg) into the muscle once as needed for anaphylaxis    Food allergy, Need for desensitization to allergens       fluticasone-salmeterol 500-50 MCG/DOSE diskus inhaler    ADVAIR    3 Inhaler    Inhale 1 puff into the lungs 2 times daily    Moderate persistent asthma, uncomplicated       loratadine 10 MG tablet    CLARITIN    30 tablet    Take 1 tablet (10 mg)  by mouth daily    Seasonal allergic rhinitis, unspecified allergic rhinitis trigger, Chronic allergic rhinitis, Conjunctivitis, allergic, unspecified laterality       montelukast 10 MG tablet    SINGULAIR    30 tablet    Take 1 tablet (10 mg) by mouth At Bedtime    Moderate persistent asthma, uncomplicated, Seasonal allergic rhinitis, unspecified allergic rhinitis trigger, Chronic allergic rhinitis       MULTIVITAMIN PO      1 tab daily        * triamcinolone 0.1 % cream    KENALOG    80 g    Apply  topically 2 times daily as needed.    Eczema, unspecified type       * triamcinolone 0.1 % cream    KENALOG    80 g    APPLY SPARINGLY TO AFFECTED AREA THREE TIMES A DAY AS NEEDED    Dermatitis       * Notice:  This list has 8 medication(s) that are the same as other medications prescribed for you. Read the directions carefully, and ask your doctor or other care provider to review them with you.

## 2017-12-04 NOTE — PROGRESS NOTES
SUBJECTIVE:   Dilia Solomon is a 52 year old female who presents to clinic today for the following health issues:      Musculoskeletal problem/pain      Duration: 12/1/2017    Description    Location: right foot (toes) mainly great toe    Bruise on right forearm    Intensity:  8/10    Accompanying signs and symptoms: swelling    History  Previous similar problem: no   Previous evaluation:  None  2002 had surgery on right foot for fx    Precipitating or alleviating factors:  Trauma or overuse: YES- at work, 2 foot long cutting board dropped on foot when placing it on the counter  Aggravating factors include: standing and walking    Therapies tried and outcome: ice and using an old walking boot    Continued to work all weekend    Is scheduled to work today at 1:00 pm      Injury occurred 3 days ago.  Patient has been using an old walking boot/Air cast.  Continues to have pain with walking boot in place.  He has pain when she tries to move her toes.  Toes are swollen and bruised.  Has been using ice but no OTCs such as ibuprofen and Tylenol      Problem list and histories reviewed & adjusted, as indicated.  Additional history: as documented    Patient Active Problem List   Diagnosis     Need for prophylactic vaccination and inoculation against influenza     Sprain of interphalangeal (joint) of hand     Radial styloid tenosynovitis     Carpal tunnel syndrome     Synovial cyst of popliteal space     Pain in joint, lower leg     Hyperlipidemia LDL goal <130     Advanced directives, counseling/discussion     Moderate persistent asthma     Allergic rhinitis due to dust mite     Food allergy     FH: diabetes mellitus     Chronic allergic rhinitis due to animal hair and dander     Past Surgical History:   Procedure Laterality Date     COLONOSCOPY N/A 10/6/2016    Procedure: COLONOSCOPY;  Surgeon: Shiva Johns MD;  Location: [facility]     ETHMOIDECTOMY  12/30/2013    Procedure: ETHMOIDECTOMY;  Bilateral Submucousal Reduction  of InferiorTurbinates and Total Ethmoidectomy with Multiple Sinusotomies;  Surgeon: Lio More MD;  Location: [facility]     SURGICAL HISTORY OF -   5/04    carpal tunnel release, bilateral       Social History   Substance Use Topics     Smoking status: Never Smoker     Smokeless tobacco: Never Used     Alcohol use No     Family History   Problem Relation Age of Onset     C.A.D. Mother      DIABETES Mother      CANCER Father      brain     Breast Cancer Maternal Aunt      Cancer - colorectal No family hx of          Current Outpatient Prescriptions   Medication Sig Dispense Refill     ORDER FOR ALLERGEN IMMUNOTHERAPY Name of Mix: Mix #1  Mold  Alternaria Tenuis 1:10 w/v, HS  0.5 ml  Aspergillus Fumigatus 1:10 w/v, HS  0.5 ml  Epicoccum Nigrum 1:10 w/v, HS 0.5 ml  Hormodendrum Cladosporioides 1:10 w/v, HS 0.5 ml  Penicillium Mix 1:10 w/v, HS  0.5 ml  Diluent: HSA qs to 5ml 5 mL PRN     ORDER FOR ALLERGEN IMMUNOTHERAPY Name of Mix: Mix #2  Dust Mite, Cat, Dog  Cat Hair, Standardized 10,000 BAU/mL, ALK  2.0 ml  Dog Hair Dander, A. P.  1:100 w/v, HS  1.0 ml  Dust Mites F 30,000AU/mL, HS  0.3 ml  Dust Mites P. 30,000 AU/mL, HS  0.3 ml   Diluent: HSA qs to 5ml 5 mL PRN     ORDER FOR ALLERGEN IMMUNOTHERAPY Name of Mix: Mix #3 Grass,Tree   Dmitry,White 1:20w/v, HS 0.5ml  Birch Mix PRW 1:20w/v, HS 0.5ml  Boxelder-Maple Mix BHR (Boxelder Hard Red) 1:20w/v, HS 0.5ml  Lares,Common 1:20w/v, HS 0.5ml  Elm,American 1:20w/v, HS 0.5ml  [place] Mix 1:20w/v, HS 0.5ml  Oak Mix RVW 1:20w/v, HS 0.5ml  [place] Tree,Black 1:20w/v, HS 0.5ml  Grass Mix #7 100,000 BAU/mL, HS 0.4ml  Jonathan Grass 1:20w/v, HS 0.5ml  Diluent: HSA qs to 5ml 5 mL PRN     ORDER FOR ALLERGEN IMMUNOTHERAPY Name of Mix: Mix #4  Weeds  Kochia 1:20 w/v, HS 0.5 ml  Lamb's Quarters 1:20 w/v, HS 0.5 ml  Nettle 1:20 w/v, HS 0.5 ml  Plantain, English 1:20 w/v, HS 0.5 ml  Ragweed Mixed 1:20 w/v ALK  0.5 ml  Russian Thistle 1:20 w/v, HS 0.5 ml  Sagebrush, Mugwort 1:20  w/v, HS 0.5 ml  Sorrel, Sheep 1:20 w/v, HS 0.5 ml  Diluent: HSA qs to 5ml 5 mL PRN     triamcinolone (KENALOG) 0.1 % cream APPLY SPARINGLY TO AFFECTED AREA THREE TIMES A DAY AS NEEDED 80 g 0     azelastine (OPTIVAR) 0.05 % SOLN ophthalmic solution Apply 1 drop to eye 2 times daily 1 Bottle 5     Emollient (CERAVE) CREA Externally apply 1 dose * topically 2 times daily 2 Bottle 11     albuterol (2.5 MG/3ML) 0.083% neb solution Take 1 vial (2.5 mg) by nebulization every 4 hours as needed (Patient not taking: Reported on 12/6/2017) 1 Box 3     EPINEPHrine 0.3 MG/0.3ML injection Inject 0.3 mLs (0.3 mg) into the muscle once as needed for anaphylaxis (Patient not taking: Reported on 12/6/2017) 0.6 mL 3     MULTIVITAMIN OR 1 tab daily       order for DME Equipment being ordered: Set of [employer]   [place] [phone] 1 Device 0     fluticasone (FLONASE) 50 MCG/ACT spray Spray 2 sprays into both nostrils daily 1 Bottle 11     triamcinolone (KENALOG) 0.1 % cream Apply  topically 2 times daily as needed. 80 g 2     montelukast (SINGULAIR) 10 MG tablet Take 1 tablet (10 mg) by mouth At Bedtime 30 tablet 11     fluticasone-salmeterol (ADVAIR) 500-50 MCG/DOSE diskus inhaler Inhale 1 puff into the lungs 2 times daily 3 Inhaler 1     loratadine (CLARITIN) 10 MG tablet Take 1 tablet (10 mg) by mouth daily 30 tablet 11     cetirizine (ZYRTEC) 10 MG tablet Take 1 tablet (10 mg) by mouth At Bedtime 30 tablet 11     albuterol (PROAIR HFA/PROVENTIL HFA/VENTOLIN HFA) 108 (90 BASE) MCG/ACT Inhaler Inhale 2 puffs into the lungs every 4 hours as needed 1 Inhaler 3     order for DME Equipment being ordered: knee scooter (Patient not taking: Reported on 12/6/2017) 1 Units 0         Reviewed and updated as needed this visit by clinical staffTobacco  Allergies  Meds  Problems       Reviewed and updated as needed this visit by Provider  Allergies  Meds  Problems         ROS:  Constitutional, HEENT, cardiovascular, pulmonary,  gi and gu systems are negative, except as otherwise noted.      OBJECTIVE:   /60  Pulse 83  Temp 97.4  F (36.3  C) (Tympanic)  Resp 18  Wt 216 lb (98 kg)  LMP 07/18/2015 (Approximate)  SpO2 98%  Breastfeeding? No  BMI 37.96 kg/m2  Body mass index is 37.96 kg/(m^2).  GENERAL: healthy, alert and no distress  RESP: lungs clear to auscultation - no rales, rhonchi or wheezes  CV: regular rate and rhythm, normal S1 S2, no S3 or S4, no murmur, click or rub, no peripheral edema and peripheral pulses strong  MS: Entire right great toe to  PIP and second toe are ecchymotic and swollen.. Swelling noted from great through 4th toes.  Unable to move toes due to pain.    Diagnostic Test Results:    XR FOOT RT G/E 3 VW 12/4/2017 11:00 AM     HISTORY: cutting board dropped on foot at work; Injury of right foot,  initial encounter; Encounter related to worker's compensation claim         IMPRESSION: Fracture of the distal end of the proximal phalanx of the  great toe.     WALKER ARELLANO MD    ASSESSMENT/PLAN:        (Z02.6) Encounter related to worker's compensation claim  Comment: Incident occurred when wooden cutting board fell onto her foot  Plan: XR Foot Right G/E 3 Views      (S99.921A) Injury of right foot, initial encounter  (primary encounter diagnosis)  Comment: Work injury.  Considerable swelling bruising and decreased range of motion.  Will obtain x-ray to rule out fracture.  Plan: XR Foot Right G/E 3 Views          Addendum: Spoke with Ortho on 12/4/2017.  Ortho Evra reviewed x-ray and due to fracture patient  should be non-weightbearing with boot on until seen by podiatry in 1-2 weeks.  I attempted to reach patient by phone, left her a detailed message and asked her to call back.  We will plan to get a knee scooter for patient to use so that she is nonweightbearing.      Patient Instructions   We will call you with the results of your x ray    Wear the boot when up    Elevate your leg frequently and apply  ice.    At work, you can stand for 4 hours of your shift. Remainder of shift you should work from sitting position      Dinorah Engel NP, APRN [facility]

## 2017-12-05 ENCOUNTER — TELEPHONE (OUTPATIENT)
Dept: FAMILY MEDICINE | Facility: CLINIC | Age: 52
End: 2017-12-05

## 2017-12-05 DIAGNOSIS — S92.401A CLOSED NON-PHYSEAL FRACTURE OF PHALANX OF RIGHT GREAT TOE, UNSPECIFIED PHALANX, INITIAL ENCOUNTER: Primary | ICD-10-CM

## 2017-12-05 NOTE — TELEPHONE ENCOUNTER
Called patient with findings of yesterday's X ray: Fracture of the distal end of the proximal phalanx of the right great toe. Reviewed Ortho recommendations- wear boot and non weightbearing until seen by Podiatry in 1-2 weeks. Will schedule appointment for patient per her request as she is at work. Call back at [phone] (work) or message at home [phone]. Patient voices understanding. Dinorah Engel RNC, NP  [facility]

## 2017-12-06 ENCOUNTER — OFFICE VISIT (OUTPATIENT)
Dept: ALLERGY | Facility: CLINIC | Age: 52
End: 2017-12-06
Payer: COMMERCIAL

## 2017-12-06 ENCOUNTER — TELEPHONE (OUTPATIENT)
Dept: FAMILY MEDICINE | Facility: CLINIC | Age: 52
End: 2017-12-06

## 2017-12-06 VITALS
BODY MASS INDEX: 37.85 KG/M2 | HEIGHT: 63 IN | WEIGHT: 213.63 LBS | SYSTOLIC BLOOD PRESSURE: 118 MMHG | OXYGEN SATURATION: 99 % | DIASTOLIC BLOOD PRESSURE: 75 MMHG | HEART RATE: 86 BPM

## 2017-12-06 DIAGNOSIS — J30.81 CHRONIC ALLERGIC RHINITIS DUE TO ANIMAL HAIR AND DANDER: ICD-10-CM

## 2017-12-06 DIAGNOSIS — J30.89 ALLERGIC RHINITIS DUE TO DUST MITE: ICD-10-CM

## 2017-12-06 DIAGNOSIS — J45.40 MODERATE PERSISTENT ASTHMA WITHOUT COMPLICATION: Primary | ICD-10-CM

## 2017-12-06 DIAGNOSIS — J30.1 CHRONIC SEASONAL ALLERGIC RHINITIS DUE TO POLLEN: ICD-10-CM

## 2017-12-06 DIAGNOSIS — H10.13 ALLERGIC CONJUNCTIVITIS OF BOTH EYES: ICD-10-CM

## 2017-12-06 DIAGNOSIS — L30.9 ECZEMA, UNSPECIFIED TYPE: ICD-10-CM

## 2017-12-06 DIAGNOSIS — J30.89 ALLERGIC RHINITIS DUE TO MOLD: ICD-10-CM

## 2017-12-06 LAB
FEF 25/75: NORMAL
FEV-1: NORMAL
FEV1/FVC: NORMAL
FVC: NORMAL

## 2017-12-06 PROCEDURE — 99214 OFFICE O/P EST MOD 30 MIN: CPT | Mod: 25 | Performed by: ALLERGY & IMMUNOLOGY

## 2017-12-06 PROCEDURE — 94010 BREATHING CAPACITY TEST: CPT | Performed by: ALLERGY & IMMUNOLOGY

## 2017-12-06 RX ORDER — CETIRIZINE HYDROCHLORIDE 10 MG/1
10 TABLET ORAL AT BEDTIME
Qty: 30 TABLET | Refills: 11 | Status: SHIPPED | OUTPATIENT
Start: 2017-12-06 | End: 2018-12-05

## 2017-12-06 RX ORDER — FLUTICASONE PROPIONATE 50 MCG
2 SPRAY, SUSPENSION (ML) NASAL DAILY
Qty: 1 BOTTLE | Refills: 11 | Status: SHIPPED | OUTPATIENT
Start: 2017-12-06 | End: 2018-12-05

## 2017-12-06 RX ORDER — ALBUTEROL SULFATE 90 UG/1
2 AEROSOL, METERED RESPIRATORY (INHALATION) EVERY 4 HOURS PRN
Qty: 1 INHALER | Refills: 3 | Status: SHIPPED | OUTPATIENT
Start: 2017-12-06 | End: 2018-12-05

## 2017-12-06 RX ORDER — MONTELUKAST SODIUM 10 MG/1
10 TABLET ORAL AT BEDTIME
Qty: 30 TABLET | Refills: 11 | Status: SHIPPED | OUTPATIENT
Start: 2017-12-06 | End: 2018-12-05

## 2017-12-06 RX ORDER — TRIAMCINOLONE ACETONIDE 1 MG/G
CREAM TOPICAL
Qty: 80 G | Refills: 2 | Status: SHIPPED | OUTPATIENT
Start: 2017-12-06 | End: 2018-07-17

## 2017-12-06 RX ORDER — LORATADINE 10 MG/1
10 TABLET ORAL DAILY
Qty: 30 TABLET | Refills: 11 | Status: SHIPPED | OUTPATIENT
Start: 2017-12-06 | End: 2018-12-05

## 2017-12-06 NOTE — TELEPHONE ENCOUNTER
Panel Management Review      Patient has the following on her problem list:     Asthma review     ACT Total Scores 12/6/2017   ACT TOTAL SCORE -   ASTHMA ER VISITS -   ASTHMA HOSPITALIZATIONS -   ACT TOTAL SCORE (Goal Greater than or Equal to 20) 22   In the past 12 months, how many times did you visit the emergency room for your asthma without being admitted to the hospital? 0   In the past 12 months, how many times were you hospitalized overnight because of your asthma? 0      1. Is Asthma diagnosis on the Problem List? Yes    2. Is Asthma listed on Health Maintenance? Yes    3. Patient is due for:  none        Composite cancer screening  Chart review shows that this patient is due/due soon for the following Pap Smear  Summary:    Patient is due/failing the following:   PAP    Action needed:   Patient needs office visit for physical.    Type of outreach:    Sent letter.    Questions for provider review:    None                                                                                                                                    Deysi WOLF [facility]       Chart routed to Care Team .

## 2017-12-06 NOTE — PROGRESS NOTES
Dilia Solomon was seen in the Allergy Clinic at [facility]. The following are my recommendations regarding her Moderate Persistent Asthma, Allergic Rhinitis Due to Animals, Allergic Rhinitis Due to Pollen, Allergic Rhinitis Due to Dust Mites, Allergic Rhinitis Due to Mold, Allergic Conjunctivitis and Eczema    1. Continue advair 500/50mcg, 1 puff twice daily  2. Continue albuterol HFA, 2-4 puffs every 4 hours as needed  3. Begin fluticasone nasal spray, 2 sprays in each nostril daily - appropriate nasal spray technique reviewed  4. Resume sinus irrigation rinse daily  5. Continue loratadine 10mg every morning  6. Continue cetirizine 10mg every evening  7. Continue montelukast 10mg daily  8. Apply triamcinolone 0.1% cream to affected areas twice daily as needed  9. Follow-up in 6 months, sooner if needed      Dilia Solomon is a 52 year old American female who is seen today for follow-up of asthma and allergic rhinitis. Dilia feels that her asthma has been well controlled. She denies nocturnal asthma symptoms or limitations in her activity. She has had no exacerbations or need for prednisone for her asthma in the past year. Dilia uses her albuterol inhaler only when she gets sick and hasn't used it in the last several months. She continues to take advair twice daily.    Dilia has resumed immunotherapy treatment and feels she is doing better than she had been on her previous injections. She continues to take zyrtec and singulair daily and hasn't been using any nasal sprays or eye drops. She has had no systemic reactions to immunotherapy injections.    Up until last week Dilia states she had been feeling healthy. She feels that now that the weather has become cold she is starting to get a sinus infection. Dilia reports having sneezing, watery eyes, and nasal congestion along with pain in her left ear. She denies fevers or chills and does not have a cough. Dilia does not feel her symptoms warrant  antibiotic therapy but she has been on antibiotics many times in the past for sinus symptoms. She is not currently using any nasal sprays. In the past Dilia did use a sinus irrigation rinse but has not been using this recently.      REVIEW OF SYSTEMS:  General: negative for weight gain. negative for weight loss. negative for changes in sleep.   Eyes: positive  for itching. positive  for redness. positive  for tearing/watering.  Ears: positive  for fullness (Left). negative for hearing loss. negative for dizziness.   Nose: negative for snoring.negative for changes in smell. negative for drainage. Positive for congestion.  Throat: negative for hoarseness. negative for sore throat. negative for trouble swallowing.   Lungs: negative for shortness of breath.negative for wheezing. negative for sputum production.   Cardiovascular: negative for chest pain. negative for swelling of ankles. negative for fast or irregular heartbeat.   Gastrointestinal: negative for nausea. negative for heartburn. negative for acid reflux.   Musculoskeletal: negative for joint pain. negative for joint stiffness. negative for joint swelling.   Neurologic: negative for seizures. negative for fainting. negative for weakness.   Psychiatric: negative for changes in mood. negative for anxiety.   Endocrine: negative for cold intolerance. negative for heat intolerance. negative for tremors.   Hematologic: negative for easy bruising. negative for easy bleeding.  Integumentary: negative for rash. negative for scaling. negative for nail changes.       Current Outpatient Prescriptions:      order for DME, Equipment being ordered: knee scooter, Disp: 1 Units, Rfl: 0     ORDER FOR ALLERGEN IMMUNOTHERAPY, Name of Mix: Mix #1  Mold Alternaria Tenuis 1:10 w/v, HS  0.5 ml Aspergillus Fumigatus 1:10 w/v, HS  0.5 ml Epicoccum Nigrum 1:10 w/v, HS 0.5 ml Hormodendrum Cladosporioides 1:10 w/v, HS 0.5 ml Penicillium Mix 1:10 w/v, HS  0.5 ml Diluent: HSA qs to 5ml,  Disp: 5 mL, Rfl: PRN     ORDER FOR ALLERGEN IMMUNOTHERAPY, Name of Mix: Mix #2  Dust Mite, Cat, Dog Cat Hair, Standardized 10,000 BAU/mL, ALK  2.0 ml Dog Hair Dander, A. P.  1:100 w/v, HS  1.0 ml Dust Mites F 30,000AU/mL, HS  0.3 ml Dust Mites P. 30,000 AU/mL, HS  0.3 ml  Diluent: HSA qs to 5ml, Disp: 5 mL, Rfl: PRN     ORDER FOR ALLERGEN IMMUNOTHERAPY, Name of Mix: Mix #3 Grass,Tree  Dmitry,White 1:20w/v, HS 0.5ml Birch Mix PRW 1:20w/v, HS 0.5ml Boxelder-Maple Mix BHR (Boxelder Hard Red) 1:20w/v, HS 0.5ml [place],Common 1:20w/v, HS 0.5ml Elm,American 1:20w/v, HS 0.5ml [place] Mix 1:20w/v, HS 0.5ml Oak Mix RVW 1:20w/v, HS 0.5ml [place] Tree,Black 1:20w/v, HS 0.5ml Grass Mix #7 100,000 BAU/mL, HS 0.4ml Jonathan Grass 1:20w/v, HS 0.5ml Diluent: HSA qs to 5ml, Disp: 5 mL, Rfl: PRN     ORDER FOR ALLERGEN IMMUNOTHERAPY, Name of Mix: Mix #4  Weeds Kochia 1:20 w/v, HS 0.5 ml Lamb's Quarters 1:20 w/v, HS 0.5 ml Nettle 1:20 w/v, HS 0.5 ml Plantain, English 1:20 w/v, HS 0.5 ml Ragweed Mixed 1:20 w/v ALK  0.5 ml Russian Thistle 1:20 w/v, HS 0.5 ml Sagebrush, Mugwort 1:20 w/v, HS 0.5 ml Sorrel, Sheep 1:20 w/v, HS 0.5 ml Diluent: HSA qs to 5ml, Disp: 5 mL, Rfl: PRN     triamcinolone (KENALOG) 0.1 % cream, APPLY SPARINGLY TO AFFECTED AREA THREE TIMES A DAY AS NEEDED, Disp: 80 g, Rfl: 0     albuterol (PROAIR HFA/PROVENTIL HFA/VENTOLIN HFA) 108 (90 BASE) MCG/ACT Inhaler, Inhale 2 puffs into the lungs every 4 hours as needed, Disp: 1 Inhaler, Rfl: 2     cetirizine (ZYRTEC) 10 MG tablet, Take 1 tablet (10 mg) by mouth At Bedtime, Disp: 30 tablet, Rfl: 11     loratadine (CLARITIN) 10 MG tablet, Take 1 tablet (10 mg) by mouth daily, Disp: 30 tablet, Rfl: 11     fluticasone-salmeterol (ADVAIR) 500-50 MCG/DOSE diskus inhaler, Inhale 1 puff into the lungs 2 times daily, Disp: 3 Inhaler, Rfl: 1     azelastine (OPTIVAR) 0.05 % SOLN ophthalmic solution, Apply 1 drop to eye 2 times daily, Disp: 1 Bottle, Rfl: 5     montelukast (SINGULAIR) 10  "MG tablet, Take 1 tablet (10 mg) by mouth At Bedtime, Disp: 30 tablet, Rfl: 11     Emollient (CERAVE) CREA, Externally apply 1 dose * topically 2 times daily, Disp: 2 Bottle, Rfl: 11     albuterol (2.5 MG/3ML) 0.083% neb solution, Take 1 vial (2.5 mg) by nebulization every 4 hours as needed, Disp: 1 Box, Rfl: 3     EPINEPHrine 0.3 MG/0.3ML injection, Inject 0.3 mLs (0.3 mg) into the muscle once as needed for anaphylaxis, Disp: 0.6 mL, Rfl: 3     triamcinolone (KENALOG) 0.1 % cream, Apply  topically 2 times daily as needed., Disp: 80 g, Rfl: 2     MULTIVITAMIN OR, 1 tab daily, Disp: , Rfl:     EXAM:   /75 (BP Location: Left arm, Patient Position: Chair, Cuff Size: Adult Large)  Pulse 86  Ht 1.607 m (5' 3.27\")  Wt 96.9 kg (213 lb 10 oz)  LMP 07/18/2015 (Approximate)  SpO2 99%  BMI 37.52 kg/m2  GENERAL APPEARANCE: alert, cooperative and not in distress  SKIN: no rashes, no lesions  HEAD: atraumatic, normocephalic  ENT: no scars or lesions, nasal exam showed clear rhinorrhea, tongue midline and normal, soft palate, uvula, and tonsils normal  NECK: no asymmetry, masses, or scars, supple without significant adenopathy  LUNGS: unlabored respirations, no intercostal retractions or accessory muscle use, clear to auscultation without rales or wheezes  HEART: regular rate and rhythm without murmurs and normal S1 and S2  MUSCULOSKELETAL: no musculoskeletal defects are noted  NEURO: no focal deficits noted  PSYCH: does not appear depressed or anxious      WORKUP:  Spirometry  SPIROMETRY       FVC 2.32L (70% of predicted).     FEV1 2.08L (78% of predicted).     FEV1/FVC 89%     FEF 25%-75%  2.85L/s (109% of predicted).    These values are consistent with mild airflow restriction. There has been decrease in FVC and FEV1 when compared to values obtained on 6/28/17.    Asthma Control Test (ACT) total score: 22       ASSESSMENT/PLAN:  Dilai Solomon is a 52 year old female here for follow-up of asthma and allergic " rhinitis. She has also begun having some URI symptoms in the past few days but does not feel these symptoms are severe. Dilia reports good control of her asthma though spirometry values were decreased today. She has been tolerating immunotherapy and feels her rhinitis symptoms have been well controlled.    1. Continue advair 500/50mcg, 1 puff twice daily  2. Continue albuterol HFA, 2-4 puffs every 4 hours as needed  3. Begin fluticasone nasal spray, 2 sprays in each nostril daily - appropriate nasal spray technique reviewed  4. Resume sinus irrigation rinse daily  5. Continue loratadine 10mg every morning  6. Continue cetirizine 10mg every evening  7. Continue montelukast 10mg daily  8. Apply triamcinolone 0.1% cream to affected areas twice daily as needed  9. Follow-up in 6 months, sooner if needed      Butch Horowitz MD  Allergy/Immunology  [facility] and [place], [place]      Chart documentation done in part with Dragon Voice Recognition Software. Although reviewed after completion, some word and grammatical errors may remain.

## 2017-12-06 NOTE — TELEPHONE ENCOUNTER
Dilia called and said [employer] is wanting to get the work form today if possible. Dilia says she is to be no weight bearing for 1-2 weeks. She got got a knee cart because she was afraid to use crutches. She will see Dr Vallejo in  next Wednesday, Dec 13th.

## 2017-12-06 NOTE — LETTER
12/6/2017         RE: Dilia Solomon  [address] [facility] [address]        Dear Colleague,    Thank you for referring your patient, Dilia Solomon, to the [facility]. Please see a copy of my visit note below.    Dilia Solomon was seen in the Allergy Clinic at [facility]. The following are my recommendations regarding her Moderate Persistent Asthma, Allergic Rhinitis Due to Animals, Allergic Rhinitis Due to Pollen, Allergic Rhinitis Due to Dust Mites, Allergic Rhinitis Due to Mold, Allergic Conjunctivitis and Eczema    1. Continue advair 500/50mcg, 1 puff twice daily  2. Continue albuterol HFA, 2-4 puffs every 4 hours as needed  3. Begin fluticasone nasal spray, 2 sprays in each nostril daily - appropriate nasal spray technique reviewed  4. Resume sinus irrigation rinse daily  5. Continue loratadine 10mg every morning  6. Continue cetirizine 10mg every evening  7. Continue montelukast 10mg daily  8. Apply triamcinolone 0.1% cream to affected areas twice daily as needed  9. Follow-up in 6 months, sooner if needed      Dilai Solomon is a 52 year old American female who is seen today for follow-up of asthma and allergic rhinitis. Dilia feels that her asthma has been well controlled. She denies nocturnal asthma symptoms or limitations in her activity. She has had no exacerbations or need for prednisone for her asthma in the past year. Dilia uses her albuterol inhaler only when she gets sick and hasn't used it in the last several months. She continues to take advair twice daily.    Dilia has resumed immunotherapy treatment and feels she is doing better than she had been on her previous injections. She continues to take zyrtec and singulair daily and hasn't been using any nasal sprays or eye drops. She has had no systemic reactions to immunotherapy injections.    Up until last week Dilia states she had been feeling healthy. She feels that now that the weather has become cold she  is starting to get a sinus infection. Dilia reports having sneezing, watery eyes, and nasal congestion along with pain in her left ear. She denies fevers or chills and does not have a cough. Dilia does not feel her symptoms warrant antibiotic therapy but she has been on antibiotics many times in the past for sinus symptoms. She is not currently using any nasal sprays. In the past Dilia did use a sinus irrigation rinse but has not been using this recently.      REVIEW OF SYSTEMS:  General: negative for weight gain. negative for weight loss. negative for changes in sleep.   Eyes: positive  for itching. positive  for redness. positive  for tearing/watering.  Ears: positive  for fullness (Left). negative for hearing loss. negative for dizziness.   Nose: negative for snoring.negative for changes in smell. negative for drainage. Positive for congestion.  Throat: negative for hoarseness. negative for sore throat. negative for trouble swallowing.   Lungs: negative for shortness of breath.negative for wheezing. negative for sputum production.   Cardiovascular: negative for chest pain. negative for swelling of ankles. negative for fast or irregular heartbeat.   Gastrointestinal: negative for nausea. negative for heartburn. negative for acid reflux.   Musculoskeletal: negative for joint pain. negative for joint stiffness. negative for joint swelling.   Neurologic: negative for seizures. negative for fainting. negative for weakness.   Psychiatric: negative for changes in mood. negative for anxiety.   Endocrine: negative for cold intolerance. negative for heat intolerance. negative for tremors.   Hematologic: negative for easy bruising. negative for easy bleeding.  Integumentary: negative for rash. negative for scaling. negative for nail changes.       Current Outpatient Prescriptions:      order for DME, Equipment being ordered: knee scooter, Disp: 1 Units, Rfl: 0     ORDER FOR ALLERGEN IMMUNOTHERAPY, Name of Mix: Mix #1  Mold  Alternaria Tenuis 1:10 w/v, HS  0.5 ml Aspergillus Fumigatus 1:10 w/v, HS  0.5 ml Epicoccum Nigrum 1:10 w/v, HS 0.5 ml Hormodendrum Cladosporioides 1:10 w/v, HS 0.5 ml Penicillium Mix 1:10 w/v, HS  0.5 ml Diluent: HSA qs to 5ml, Disp: 5 mL, Rfl: PRN     ORDER FOR ALLERGEN IMMUNOTHERAPY, Name of Mix: Mix #2  Dust Mite, Cat, Dog Cat Hair, Standardized 10,000 BAU/mL, ALK  2.0 ml Dog Hair Dander, A. P.  1:100 w/v, HS  1.0 ml Dust Mites F 30,000AU/mL, HS  0.3 ml Dust Mites P. 30,000 AU/mL, HS  0.3 ml  Diluent: HSA qs to 5ml, Disp: 5 mL, Rfl: PRN     ORDER FOR ALLERGEN IMMUNOTHERAPY, Name of Mix: Mix #3 Grass,Tree  Dmitry,White 1:20w/v, HS 0.5ml Birch Mix PRW 1:20w/v, HS 0.5ml Boxelder-Maple Mix BHR (Boxelder Hard Red) 1:20w/v, HS 0.5ml [place],Common 1:20w/v, HS 0.5ml Elm,American 1:20w/v, HS 0.5ml [place] Mix 1:20w/v, HS 0.5ml Oak Mix RVW 1:20w/v, HS 0.5ml [place] Tree,Black 1:20w/v, HS 0.5ml Grass Mix #7 100,000 BAU/mL, HS 0.4ml Jonathan Grass 1:20w/v, HS 0.5ml Diluent: HSA qs to 5ml, Disp: 5 mL, Rfl: PRN     ORDER FOR ALLERGEN IMMUNOTHERAPY, Name of Mix: Mix #4  Weeds Kochia 1:20 w/v, HS 0.5 ml Lamb's Quarters 1:20 w/v, HS 0.5 ml Nettle 1:20 w/v, HS 0.5 ml Plantain, English 1:20 w/v, HS 0.5 ml Ragweed Mixed 1:20 w/v ALK  0.5 ml Russian Thistle 1:20 w/v, HS 0.5 ml Sagebrush, Mugwort 1:20 w/v, HS 0.5 ml Sorrel, Sheep 1:20 w/v, HS 0.5 ml Diluent: HSA qs to 5ml, Disp: 5 mL, Rfl: PRN     triamcinolone (KENALOG) 0.1 % cream, APPLY SPARINGLY TO AFFECTED AREA THREE TIMES A DAY AS NEEDED, Disp: 80 g, Rfl: 0     albuterol (PROAIR HFA/PROVENTIL HFA/VENTOLIN HFA) 108 (90 BASE) MCG/ACT Inhaler, Inhale 2 puffs into the lungs every 4 hours as needed, Disp: 1 Inhaler, Rfl: 2     cetirizine (ZYRTEC) 10 MG tablet, Take 1 tablet (10 mg) by mouth At Bedtime, Disp: 30 tablet, Rfl: 11     loratadine (CLARITIN) 10 MG tablet, Take 1 tablet (10 mg) by mouth daily, Disp: 30 tablet, Rfl: 11     fluticasone-salmeterol (ADVAIR) 500-50 MCG/DOSE  "diskus inhaler, Inhale 1 puff into the lungs 2 times daily, Disp: 3 Inhaler, Rfl: 1     azelastine (OPTIVAR) 0.05 % SOLN ophthalmic solution, Apply 1 drop to eye 2 times daily, Disp: 1 Bottle, Rfl: 5     montelukast (SINGULAIR) 10 MG tablet, Take 1 tablet (10 mg) by mouth At Bedtime, Disp: 30 tablet, Rfl: 11     Emollient (CERAVE) CREA, Externally apply 1 dose * topically 2 times daily, Disp: 2 Bottle, Rfl: 11     albuterol (2.5 MG/3ML) 0.083% neb solution, Take 1 vial (2.5 mg) by nebulization every 4 hours as needed, Disp: 1 Box, Rfl: 3     EPINEPHrine 0.3 MG/0.3ML injection, Inject 0.3 mLs (0.3 mg) into the muscle once as needed for anaphylaxis, Disp: 0.6 mL, Rfl: 3     triamcinolone (KENALOG) 0.1 % cream, Apply  topically 2 times daily as needed., Disp: 80 g, Rfl: 2     MULTIVITAMIN OR, 1 tab daily, Disp: , Rfl:     EXAM:   /75 (BP Location: Left arm, Patient Position: Chair, Cuff Size: Adult Large)  Pulse 86  Ht 1.607 m (5' 3.27\")  Wt 96.9 kg (213 lb 10 oz)  LMP 07/18/2015 (Approximate)  SpO2 99%  BMI 37.52 kg/m2  GENERAL APPEARANCE: alert, cooperative and not in distress  SKIN: no rashes, no lesions  HEAD: atraumatic, normocephalic  ENT: no scars or lesions, nasal exam showed clear rhinorrhea, tongue midline and normal, soft palate, uvula, and tonsils normal  NECK: no asymmetry, masses, or scars, supple without significant adenopathy  LUNGS: unlabored respirations, no intercostal retractions or accessory muscle use, clear to auscultation without rales or wheezes  HEART: regular rate and rhythm without murmurs and normal S1 and S2  MUSCULOSKELETAL: no musculoskeletal defects are noted  NEURO: no focal deficits noted  PSYCH: does not appear depressed or anxious      WORKUP:  Spirometry  SPIROMETRY       FVC 2.32L (70% of predicted).     FEV1 2.08L (78% of predicted).     FEV1/FVC 89%     FEF 25%-75%  2.85L/s (109% of predicted).    These values are consistent with mild airflow restriction. There has " been decrease in FVC and FEV1 when compared to values obtained on 6/28/17.    Asthma Control Test (ACT) total score: 22       ASSESSMENT/PLAN:  Dilia Solomon is a 52 year old female here for follow-up of asthma and allergic rhinitis. She has also begun having some URI symptoms in the past few days but does not feel these symptoms are severe. Dilia reports good control of her asthma though spirometry values were decreased today. She has been tolerating immunotherapy and feels her rhinitis symptoms have been well controlled.    1. Continue advair 500/50mcg, 1 puff twice daily  2. Continue albuterol HFA, 2-4 puffs every 4 hours as needed  3. Begin fluticasone nasal spray, 2 sprays in each nostril daily - appropriate nasal spray technique reviewed  4. Resume sinus irrigation rinse daily  5. Continue loratadine 10mg every morning  6. Continue cetirizine 10mg every evening  7. Continue montelukast 10mg daily  8. Apply triamcinolone 0.1% cream to affected areas twice daily as needed  9. Follow-up in 6 months, sooner if needed      Butch Horowitz MD  Allergy/Immunology  [place], [place]      Chart documentation done in part with Dragon Voice Recognition Software. Although reviewed after completion, some word and grammatical errors may remain.      Again, thank you for allowing me to participate in the care of your patient.        Sincerely,        Butch Horowitz MD

## 2017-12-06 NOTE — NURSING NOTE
"Chief Complaint   Patient presents with     Allergy Recheck     Follow up dermatitis.       Initial /75 (BP Location: Left arm, Patient Position: Chair, Cuff Size: Adult Large)  Pulse 86  Ht 1.607 m (5' 3.27\")  Wt 96.9 kg (213 lb 10 oz)  LMP 07/18/2015 (Approximate)  SpO2 99%  BMI 37.52 kg/m2 Estimated body mass index is 37.52 kg/(m^2) as calculated from the following:    Height as of this encounter: 1.607 m (5' 3.27\").    Weight as of this encounter: 96.9 kg (213 lb 10 oz).  Medication Reconciliation: complete    "

## 2017-12-06 NOTE — LETTER
[facility]  [address] [facility] [address]  Phone: [phone]    December 6, 2017      Dilia Solomon  [address] [facility] [address]      Dear Dilia:    As part of [facility]'s commitment to health and wellness, we inform our patient when records indicate the need for specific health screening.  Please review the following health screening recommendations based on your age:    Ages 20-40:  Annual physical that includes a breast exam, pelvic exam and possibly a pap smear and other lab work.    Ages 40-75:  Annual physical that includes a breast exam, pelvic exam and possibly a pap smear and other lab work.  You also need to obtain a screening mammogram every year starting at age 40. At age 50, it is recommended that you be screened for colon cancer with a colonoscopy.  If your initial colonoscopy is negative, you probably won't need another one for 10 years.    Please bring a list of your current medications with you to your appointment.  If you will need refills prior to your appointment, please have your pharmacy fax a refill request to the appropriate provider; this helps to ensure that the correct medication and dose are ordered.    To schedule an appointment with a [facility] physician or nurse practitioner, please call:    [facility]            [phone]    [facility]           [phone]    [facility] Diagnostic Department           Family Practice Clinic . . . . . . [phone]                  (To Schedule a Mammogram)            Internal Medicine Clinic. . . . .  [phone]             OB/Gyn Clinic . . . . . . . . . . .  [phone]           Specialty Clinic . . . . . . . . . .  [phone]    Please schedule a physical exam.  Thank you,  Na Engel RNC,NP/dw

## 2017-12-06 NOTE — MR AVS SNAPSHOT
After Visit Summary   12/6/2017    Dilia Solomon    MRN: [id number]           Patient Information     Date Of Birth          1965        Visit Information        Provider Department      12/6/2017 8:20 AM Butch Horowitz MD [facility]        Today's Diagnoses     Moderate persistent asthma without complication    -  1    Eczema, unspecified type        Allergic conjunctivitis of both eyes        Rash        Itching        Chronic allergic rhinitis due to animal hair and dander        Allergic rhinitis due to dust mite        Allergic rhinitis due to mold        Chronic seasonal allergic rhinitis due to pollen          Care Instructions    If you have any questions regarding your allergies, asthma, or what we discussed during your visit today please call the allergy clinic or contact us via [employer].    [facility] ([place], [place]): [phone]      Continue to use your advair twice daily    Start using your albuterol every 4 hours as needed to help with your chest tightness and shortness of breath    Start using the flonase nasal spray for your nasal congestion - use 2 sprays in each nostril once a day    Use the sinus rinse twice a day    Follow-up in 6 months - sooner if needed. If your symptoms are getting worse you can see someone in [place] or come back to see me if you can drive down here safely.            Follow-ups after your visit        Follow-up notes from your care team     Return in about 6 months (around 6/6/2018).      Your next 10 appointments already scheduled     Dec 13, 2017  2:00 PM CST   New Visit with Carlos Vallejo DPM   [facility] ([facility]    [address] [facility] [address] [phone]              Who to contact     If you have questions or need follow up information about today's clinic visit or your schedule please contact [facility] directly at [phone].  Normal or non-critical  "lab and imaging results will be communicated to you by MyChart, letter or phone within 4 business days after the clinic has received the results. If you do not hear from us within 7 days, please contact the clinic through Get[employer] or phone. If you have a critical or abnormal lab result, we will notify you by phone as soon as possible.  Submit refill requests through ED01 or call your pharmacy and they will forward the refill request to us. Please allow 3 business days for your refill to be completed.          Additional Information About Your Visit        [employer] Information     ED01 lets you send messages to your doctor, view your test results, renew your prescriptions, schedule appointments and more. To sign up, go to [email or web address] . Click on \"Log in\" on the left side of the screen, which will take you to the Welcome page. Then click on \"Sign up Now\" on the right side of the page.     You will be asked to enter the access code listed below, as well as some personal information. Please follow the directions to create your username and password.     Your access code is: [email or web address]  Expires: 2018  7:58 AM     Your access code will  in 90 days. If you need help or a new code, please call your [place] clinic or [phone].        Care EveryWhere ID     This is your Care EveryWhere ID. This could be used by other organizations to access your [place] medical records  [id number]        Your Vitals Were     Pulse Height Last Period Pulse Oximetry BMI (Body Mass Index)       86 1.607 m (5' 3.27\") 2015 (Approximate) 99% 37.52 kg/m2        Blood Pressure from Last 3 Encounters:   17 118/75   17 120/60   17 (!) 147/92    Weight from Last 3 Encounters:   17 96.9 kg (213 lb 10 oz)   17 98 kg (216 lb)   17 94.8 kg (209 lb)              We Performed the Following     Spirometry, Breathing Capacity: Normal Order, Clinic Performed          Today's Medication " Changes          These changes are accurate as of: 12/6/17  8:59 AM.  If you have any questions, ask your nurse or doctor.               Start taking these medicines.        Dose/Directions    fluticasone 50 MCG/ACT spray   Commonly known as:  FLONASE   Used for:  Chronic allergic rhinitis due to animal hair and dander, Allergic rhinitis due to dust mite, Allergic rhinitis due to mold, Chronic seasonal allergic rhinitis due to pollen   Started by:  Butch Horowitz MD        Dose:  2 spray   Spray 2 sprays into both nostrils daily   Quantity:  1 Bottle   Refills:  11            Where to get your medicines      These medications were sent to [address] [address]     Phone:  [phone]     albuterol 108 (90 BASE) MCG/ACT Inhaler    cetirizine 10 MG tablet    fluticasone-salmeterol 500-50 MCG/DOSE diskus inhaler    loratadine 10 MG tablet    montelukast 10 MG tablet    triamcinolone 0.1 % cream         These medications were sent to [facility] [address] [facility]  [address] [facility] [address]     Phone:  [phone]     fluticasone 50 MCG/ACT spray                Primary Care Provider Office Phone # Fax #    JuliCheyenne Conklin [phone]       [address] [place] [address]        Equal Access to Services     KODY CHEN : Hadii ulices ku hadasho Soomaali, waaxda luqadaha, qaybta kaalmada kanwalyada, hieu howard. So [facility] [phone].    ATENCIÓN: Si habla español, tiene a pruitt disposición servicios gratuitos de asistencia lingüística. Lucas al [phone].    We comply with applicable federal civil rights laws and [place] laws. We do not discriminate on the basis of race, color, national origin, age, disability, sex, sexual orientation, or gender identity.            Thank you!     Thank you for choosing [facility]  for your care. Our goal is  always to provide you with excellent care. Hearing back from our patients is one way we can continue to improve our services. Please take a few minutes to complete the written survey that you may receive in the mail after your visit with us. Thank you!             Your Updated Medication List - Protect others around you: Learn how to safely use, store and throw away your medicines at www.disposemymeds.org.          This list is accurate as of: 12/6/17  8:59 AM.  Always use your most recent med list.                   Brand Name Dispense Instructions for use Diagnosis    * albuterol (2.5 MG/3ML) 0.083% neb solution     1 Box    Take 1 vial (2.5 mg) by nebulization every 4 hours as needed    Moderate persistent asthma, uncomplicated       * albuterol 108 (90 BASE) MCG/ACT Inhaler    PROAIR HFA/PROVENTIL HFA/VENTOLIN HFA    1 Inhaler    Inhale 2 puffs into the lungs every 4 hours as needed    Moderate persistent asthma without complication       * ALLERGEN IMMUNOTHERAPY PRESCRIPTION     5 mL    Name of Mix: Mix #1  Mold Alternaria Tenuis 1:10 w/v, HS  0.5 ml Aspergillus Fumigatus 1:10 w/v, HS  0.5 ml Epicoccum Nigrum 1:10 w/v, HS 0.5 ml Hormodendrum Cladosporioides 1:10 w/v, HS 0.5 ml Penicillium Mix 1:10 w/v, HS  0.5 ml Diluent: HSA qs to 5ml    Chronic allergic rhinitis due to animal hair and dander, Allergic rhinitis due to dust mite, Allergic rhinitis due to mold, Chronic seasonal allergic rhinitis due to pollen       * ALLERGEN IMMUNOTHERAPY PRESCRIPTION     5 mL    Name of Mix: Mix #2  Dust Mite, Cat, Dog Cat Hair, Standardized 10,000 BAU/mL, ALK  2.0 ml Dog Hair Dander, A. P.  1:100 w/v, HS  1.0 ml Dust Mites F 30,000AU/mL, HS  0.3 ml Dust Mites P. 30,000 AU/mL, HS  0.3 ml  Diluent: HSA qs to 5ml    Chronic allergic rhinitis due to animal hair and dander, Allergic rhinitis due to dust mite, Allergic rhinitis due to mold, Chronic seasonal allergic rhinitis due to pollen       * ALLERGEN IMMUNOTHERAPY PRESCRIPTION      5 mL    Name of Mix: Mix #3 Grass,Tree  Dmitry,White 1:20w/v, HS 0.5ml Birch Mix PRW 1:20w/v, HS 0.5ml Boxelder-Maple Mix BHR (Boxelder Hard Red) 1:20w/v, HS 0.5ml [place],Common 1:20w/v, HS 0.5ml Elm,American 1:20w/v, HS 0.5ml [place] Mix 1:20w/v, HS 0.5ml Oak Mix RVW 1:20w/v, HS 0.5ml [place] Tree,Black 1:20w/v, HS 0.5ml Grass Mix #7 100,000 BAU/mL, HS 0.4ml Jonathan Grass 1:20w/v, HS 0.5ml Diluent: HSA qs to 5ml    Chronic allergic rhinitis due to animal hair and dander, Allergic rhinitis due to dust mite, Allergic rhinitis due to mold, Chronic seasonal allergic rhinitis due to pollen       * ALLERGEN IMMUNOTHERAPY PRESCRIPTION     5 mL    Name of Mix: Mix #4  Weeds Kochia 1:20 w/v, HS 0.5 ml Lamb's Quarters 1:20 w/v, HS 0.5 ml Nettle 1:20 w/v, HS 0.5 ml Plantain, English 1:20 w/v, HS 0.5 ml Ragweed Mixed 1:20 w/v ALK  0.5 ml Russian Thistle 1:20 w/v, HS 0.5 ml Sagebrush, Mugwort 1:20 w/v, HS 0.5 ml Sorrel, Sheep 1:20 w/v, HS 0.5 ml Diluent: HSA qs to 5ml    Chronic allergic rhinitis due to animal hair and dander, Allergic rhinitis due to dust mite, Allergic rhinitis due to mold, Chronic seasonal allergic rhinitis due to pollen       azelastine 0.05 % Soln ophthalmic solution    OPTIVAR    1 Bottle    Apply 1 drop to eye 2 times daily    Conjunctivitis, allergic, unspecified laterality       CERAVE Crea     2 Bottle    Externally apply 1 dose * topically 2 times daily    Eczema, unspecified type       cetirizine 10 MG tablet    zyrTEC    30 tablet    Take 1 tablet (10 mg) by mouth At Bedtime    Allergic conjunctivitis of both eyes, Chronic allergic rhinitis due to animal hair and dander, Allergic rhinitis due to dust mite, Allergic rhinitis due to mold, Chronic seasonal allergic rhinitis due to pollen       EPINEPHrine 0.3 MG/0.3ML injection 2-pack    EPIPEN/ADRENACLICK/or ANY BX GENERIC EQUIV    0.6 mL    Inject 0.3 mLs (0.3 mg) into the muscle once as needed for anaphylaxis    Food allergy, Need for  desensitization to allergens       fluticasone 50 MCG/ACT spray    FLONASE    1 Bottle    Spray 2 sprays into both nostrils daily    Chronic allergic rhinitis due to animal hair and dander, Allergic rhinitis due to dust mite, Allergic rhinitis due to mold, Chronic seasonal allergic rhinitis due to pollen       fluticasone-salmeterol 500-50 MCG/DOSE diskus inhaler    ADVAIR    3 Inhaler    Inhale 1 puff into the lungs 2 times daily    Moderate persistent asthma without complication       loratadine 10 MG tablet    CLARITIN    30 tablet    Take 1 tablet (10 mg) by mouth daily    Allergic conjunctivitis of both eyes, Chronic allergic rhinitis due to animal hair and dander, Allergic rhinitis due to dust mite, Allergic rhinitis due to mold, Chronic seasonal allergic rhinitis due to pollen       montelukast 10 MG tablet    SINGULAIR    30 tablet    Take 1 tablet (10 mg) by mouth At Bedtime    Moderate persistent asthma without complication, Chronic allergic rhinitis due to animal hair and dander, Allergic rhinitis due to dust mite, Allergic rhinitis due to mold, Chronic seasonal allergic rhinitis due to pollen       MULTIVITAMIN PO      1 tab daily        order for DME     1 Units    Equipment being ordered: knee scooter    Closed non-physeal fracture of phalanx of right great toe, unspecified phalanx, initial encounter       * triamcinolone 0.1 % cream    KENALOG    80 g    APPLY SPARINGLY TO AFFECTED AREA THREE TIMES A DAY AS NEEDED    Dermatitis       * triamcinolone 0.1 % cream    KENALOG    80 g    Apply  topically 2 times daily as needed.    Eczema, unspecified type       * Notice:  This list has 8 medication(s) that are the same as other medications prescribed for you. Read the directions carefully, and ask your doctor or other care provider to review them with you.

## 2017-12-06 NOTE — PATIENT INSTRUCTIONS
If you have any questions regarding your allergies, asthma, or what we discussed during your visit today please call the allergy clinic or contact us via [employer].    [facility] ([place], [place]): [phone]      Continue to use your advair twice daily    Start using your albuterol every 4 hours as needed to help with your chest tightness and shortness of breath    Start using the flonase nasal spray for your nasal congestion - use 2 sprays in each nostril once a day    Use the sinus rinse twice a day    Follow-up in 6 months - sooner if needed. If your symptoms are getting worse you can see someone in [place] or come back to see me if you can drive down here safely.

## 2017-12-06 NOTE — TELEPHONE ENCOUNTER
Reason for Call:  Form, our goal is to have forms completed with 72 hours, however, some forms may require a visit or additional information.    Type of letter, form or note:  work   work restrictions     Who is the form from?: Work ability form [employer]     Where did the form come from: form was faxed in    What clinic location was the form placed at?: [place]    Where the form was placed: [facility]           Additional comments: fax back to [phone]    Call taken on 12/6/2017 at 10:05 AM by Jessica Reyes

## 2017-12-07 ENCOUNTER — TELEPHONE (OUTPATIENT)
Dept: FAMILY MEDICINE | Facility: CLINIC | Age: 52
End: 2017-12-07

## 2017-12-07 DIAGNOSIS — S92.401A CLOSED NON-PHYSEAL FRACTURE OF PHALANX OF RIGHT GREAT TOE, UNSPECIFIED PHALANX, INITIAL ENCOUNTER: Primary | ICD-10-CM

## 2017-12-07 ASSESSMENT — ASTHMA QUESTIONNAIRES: ACT_TOTALSCORE: 22

## 2017-12-07 NOTE — TELEPHONE ENCOUNTER
Pt is requesting a order for crutches. She received the Knee Walker the handle bars are loose. Pt is requesting the crutches as her work won't let her work with the restrictions.. Did explain to Pt she should contact [employer] to see if that is normal or they need to tighten up the bars. Pt is requesting the scrip to be faxed also. [employer] [phone]  [facility]

## 2017-12-07 NOTE — TELEPHONE ENCOUNTER
Reason for Call:  Form, our goal is to have forms completed with 72 hours, however, some forms may require a visit or additional information.    Type of letter, form or note:  worker's compensation    Who is the form from?: [employer] company- work comp (if other please explain)    Where did the form come from: form was faxed in    What clinic location was the form placed at?: [place]    Where the form was placed: [facility]      Call taken on 12/7/2017 at 2:45 PM by Lizeth Pedraza

## 2017-12-08 NOTE — TELEPHONE ENCOUNTER
Pt returned call notified and understood. Pt said they did fix her Handle Bars.  [facility] [place] [facility]

## 2017-12-13 ENCOUNTER — OFFICE VISIT (OUTPATIENT)
Dept: ALLERGY | Facility: CLINIC | Age: 52
End: 2017-12-13
Payer: COMMERCIAL

## 2017-12-13 ENCOUNTER — OFFICE VISIT (OUTPATIENT)
Dept: PODIATRY | Facility: CLINIC | Age: 52
End: 2017-12-13
Payer: COMMERCIAL

## 2017-12-13 VITALS — BODY MASS INDEX: 37.74 KG/M2 | HEART RATE: 80 BPM | HEIGHT: 63 IN | WEIGHT: 213 LBS

## 2017-12-13 VITALS
HEART RATE: 83 BPM | WEIGHT: 212.96 LBS | OXYGEN SATURATION: 97 % | DIASTOLIC BLOOD PRESSURE: 88 MMHG | SYSTOLIC BLOOD PRESSURE: 126 MMHG | HEIGHT: 63 IN | BODY MASS INDEX: 37.73 KG/M2

## 2017-12-13 DIAGNOSIS — J01.90 ACUTE SINUSITIS WITH SYMPTOMS > 10 DAYS: Primary | ICD-10-CM

## 2017-12-13 DIAGNOSIS — S92.411A CLOSED DISPLACED FRACTURE OF PROXIMAL PHALANX OF RIGHT GREAT TOE, INITIAL ENCOUNTER: Primary | ICD-10-CM

## 2017-12-13 PROCEDURE — 99203 OFFICE O/P NEW LOW 30 MIN: CPT | Performed by: PODIATRIST

## 2017-12-13 PROCEDURE — 99213 OFFICE O/P EST LOW 20 MIN: CPT | Performed by: ALLERGY & IMMUNOLOGY

## 2017-12-13 RX ORDER — AMOXICILLIN 500 MG/1
500 CAPSULE ORAL 3 TIMES DAILY
Qty: 30 CAPSULE | Refills: 0 | Status: SHIPPED | OUTPATIENT
Start: 2017-12-13 | End: 2018-01-17

## 2017-12-13 NOTE — PROGRESS NOTES
Dilia Solomon was seen in the Allergy Clinic at [facility]. The following are my recommendations regarding her Acute Sinusitis    1. Begin amoxicillin 500mg three times daily x 10 days  2. Continue advair 500/50mcg, 1 puff twice daily  3. Continue albuterol HFA, 2-4 puffs every 4 hours as needed  4. Follow-up if symptoms are not improving      Dilia Solomon is a 52 year old American female who is seen today for follow-up of acute respiratory symptoms. Her symptoms have been worsening since last week. Dilia reports coughing up green sputum and having sinus pressure. She continues to do sinus irrigation rinses and is having green secretions in the drainage. She denies fevers/chills and states that her asthma has been controlled. Since her visit last week she has been using albuterol every 4 hours to help prevent acute asthma symptoms and continues to take her advair twice daily.       REVIEW OF SYSTEMS:  General: negative for weight gain. negative for weight loss. negative for changes in sleep.   Eyes: positive  for itching. positive  for redness. positive  for tearing/watering.  Ears: positive  for fullness(left). negative for hearing loss. negative for dizziness.   Nose: negative for snoring.negative for changes in smell. negative for drainage. Positive for congestion.  Throat: negative for hoarseness. negative for sore throat. positive  for trouble swallowing.   Lungs: negative for shortness of breath.negative for wheezing. positive  for sputum production.   Cardiovascular: negative for chest pain. negative for swelling of ankles. negative for fast or irregular heartbeat.   Gastrointestinal: negative for nausea. negative for heartburn. negative for acid reflux.   Musculoskeletal: negative for joint pain. negative for joint stiffness. negative for joint swelling.   Neurologic: negative for seizures. negative for fainting. negative for weakness.   Psychiatric: negative for changes in mood.  negative for anxiety.   Endocrine: negative for cold intolerance. negative for heat intolerance. negative for tremors.   Hematologic: negative for easy bruising. negative for easy bleeding.  Integumentary: negative for rash. negative for scaling. negative for nail changes.       Current Outpatient Prescriptions:      order for DME, Equipment being ordered: Dynaflex insert, Disp: 1 Units, Rfl: 0     order for DME, Equipment being ordered: Set of [employer]  [place] [phone], Disp: 1 Device, Rfl: 0     fluticasone (FLONASE) 50 MCG/ACT spray, Spray 2 sprays into both nostrils daily, Disp: 1 Bottle, Rfl: 11     triamcinolone (KENALOG) 0.1 % cream, Apply  topically 2 times daily as needed., Disp: 80 g, Rfl: 2     montelukast (SINGULAIR) 10 MG tablet, Take 1 tablet (10 mg) by mouth At Bedtime, Disp: 30 tablet, Rfl: 11     fluticasone-salmeterol (ADVAIR) 500-50 MCG/DOSE diskus inhaler, Inhale 1 puff into the lungs 2 times daily, Disp: 3 Inhaler, Rfl: 1     loratadine (CLARITIN) 10 MG tablet, Take 1 tablet (10 mg) by mouth daily, Disp: 30 tablet, Rfl: 11     cetirizine (ZYRTEC) 10 MG tablet, Take 1 tablet (10 mg) by mouth At Bedtime, Disp: 30 tablet, Rfl: 11     albuterol (PROAIR HFA/PROVENTIL HFA/VENTOLIN HFA) 108 (90 BASE) MCG/ACT Inhaler, Inhale 2 puffs into the lungs every 4 hours as needed, Disp: 1 Inhaler, Rfl: 3     order for DME, Equipment being ordered: knee scooter (Patient not taking: Reported on 12/6/2017), Disp: 1 Units, Rfl: 0     ORDER FOR ALLERGEN IMMUNOTHERAPY, Name of Mix: Mix #1  Mold Alternaria Tenuis 1:10 w/v, HS  0.5 ml Aspergillus Fumigatus 1:10 w/v, HS  0.5 ml Epicoccum Nigrum 1:10 w/v, HS 0.5 ml Hormodendrum Cladosporioides 1:10 w/v, HS 0.5 ml Penicillium Mix 1:10 w/v, HS  0.5 ml Diluent: HSA qs to 5ml, Disp: 5 mL, Rfl: PRN     ORDER FOR ALLERGEN IMMUNOTHERAPY, Name of Mix: Mix #2  Dust Mite, Cat, Dog Cat Hair, Standardized 10,000 BAU/mL, ALK  2.0 ml Dog Hair Dander, A. P.  1:100 w/v,  "HS  1.0 ml Dust Mites F 30,000AU/mL, HS  0.3 ml Dust Mites P. 30,000 AU/mL, HS  0.3 ml  Diluent: HSA qs to 5ml, Disp: 5 mL, Rfl: PRN     ORDER FOR ALLERGEN IMMUNOTHERAPY, Name of Mix: Mix #3 Grass,Tree  Dmitry,White 1:20w/v, HS 0.5ml Birch Mix PRW 1:20w/v, HS 0.5ml Boxelder-Maple Mix BHR (Boxelder Hard Red) 1:20w/v, HS 0.5ml Cottle,Common 1:20w/v, HS 0.5ml Elm,American 1:20w/v, HS 0.5ml Lincoln Mix 1:20w/v, HS 0.5ml Oak Mix RVW 1:20w/v, HS 0.5ml [place] Tree,Black 1:20w/v, HS 0.5ml Grass Mix #7 100,000 BAU/mL, HS 0.4ml Jonathan Grass 1:20w/v, HS 0.5ml Diluent: HSA qs to 5ml, Disp: 5 mL, Rfl: PRN     ORDER FOR ALLERGEN IMMUNOTHERAPY, Name of Mix: Mix #4  Weeds Kochia 1:20 w/v, HS 0.5 ml Lamb's Quarters 1:20 w/v, HS 0.5 ml Nettle 1:20 w/v, HS 0.5 ml Plantain, English 1:20 w/v, HS 0.5 ml Ragweed Mixed 1:20 w/v ALK  0.5 ml Russian Thistle 1:20 w/v, HS 0.5 ml Sagebrush, Mugwort 1:20 w/v, HS 0.5 ml Sorrel, Sheep 1:20 w/v, HS 0.5 ml Diluent: HSA qs to 5ml, Disp: 5 mL, Rfl: PRN     triamcinolone (KENALOG) 0.1 % cream, APPLY SPARINGLY TO AFFECTED AREA THREE TIMES A DAY AS NEEDED, Disp: 80 g, Rfl: 0     azelastine (OPTIVAR) 0.05 % SOLN ophthalmic solution, Apply 1 drop to eye 2 times daily, Disp: 1 Bottle, Rfl: 5     Emollient (CERAVE) CREA, Externally apply 1 dose * topically 2 times daily, Disp: 2 Bottle, Rfl: 11     albuterol (2.5 MG/3ML) 0.083% neb solution, Take 1 vial (2.5 mg) by nebulization every 4 hours as needed, Disp: 1 Box, Rfl: 3     EPINEPHrine 0.3 MG/0.3ML injection, Inject 0.3 mLs (0.3 mg) into the muscle once as needed for anaphylaxis, Disp: 0.6 mL, Rfl: 3     MULTIVITAMIN OR, 1 tab daily, Disp: , Rfl:     EXAM:   /88 (BP Location: Left arm, Patient Position: Chair, Cuff Size: Adult Large)  Pulse 83  Ht 1.607 m (5' 3.27\")  Wt 96.6 kg (212 lb 15.4 oz)  LMP 07/18/2015 (Approximate)  SpO2 97%  BMI 37.41 kg/m2  GENERAL APPEARANCE: alert, cooperative and not in distress  SKIN: no rashes, no " lesions  HEAD: atraumatic, normocephalic  ENT: no scars or lesions, nasal exam showed mucosal erythema and mucosal edema, tongue midline and normal, soft palate, uvula, and tonsils normal  NECK: no asymmetry, masses, or scars, supple without significant adenopathy  LUNGS: unlabored respirations, no intercostal retractions or accessory muscle use, clear to auscultation without rales or wheezes  HEART: regular rate and rhythm without murmurs and normal S1 and S2  MUSCULOSKELETAL: no musculoskeletal defects are noted  NEURO: no focal deficits noted  PSYCH: does not appear depressed or anxious      WORKUP:  None    ASSESSMENT/PLAN:  Dilia Solomon is a 52 year old female here for evaluation of persistent sinus pressure, nasal congestion, and cough. She began having some symptoms over 1 week ago which were initially mild but her symptoms have progressed over the last week.    1. Begin amoxicillin 500mg three times daily x 10 days  2. Continue advair 500/50mcg, 1 puff twice daily  3. Continue albuterol HFA, 2-4 puffs every 4 hours as needed  4. Follow-up if symptoms are not improving      Butch Horowitz MD  Allergy/Immunology  [facility] and [place], [place]      Chart documentation done in part with Dragon Voice Recognition Software. Although reviewed after completion, some word and grammatical errors may remain.

## 2017-12-13 NOTE — NURSING NOTE
"Chief Complaint   Patient presents with     Consult     Right great toe fracture- xray done 12/4/2017       Initial Pulse 80  Ht 5' 3.27\" (1.607 m)  Wt 213 lb (96.6 kg)  LMP 07/18/2015 (Approximate)  BMI 37.41 kg/m2 Estimated body mass index is 37.41 kg/(m^2) as calculated from the following:    Height as of this encounter: 5' 3.27\" (1.607 m).    Weight as of this encounter: 213 lb (96.6 kg).  Medication Reconciliation: complete     Bella Altamirano MA        "

## 2017-12-13 NOTE — MR AVS SNAPSHOT
After Visit Summary   12/13/2017    Dilia Solomon    MRN: [id number]           Patient Information     Date Of Birth          1965        Visit Information        Provider Department      12/13/2017 2:00 PM Carlos Vallejo DPM [facility]        Today's Diagnoses     Closed displaced fracture of proximal phalanx of right great toe, initial encounter    -  1      Care Instructions    TOE & METATARSAL FRACTURES  The structure of the foot is complex, consisting of bones, muscles, tendons, and other soft tissues. Of the 26 bones in the foot, 19 are toe bones (phalanges) and metatarsal bones (the long bones in the midfoot). Fractures of the toe and metatarsal bones are common and require evaluation by a specialist. A foot and ankle surgeon should be seen for proper diagnosis and treatment, even if initial treatment has been received in an emergency room.  A fracture is a break in the bone. Fractures can be divided into two categories: traumatic fractures and stress fractures.  TRAMATIC FRACTURES (also called acute fractures) are caused by a direct blow or impact, such as seriously stubbing your toe. Traumatic fractures can be displaced or non-displaced. If the fracture is displaced, the bone is broken in such a way that it has changed in position (dislocated).  Signs and symptoms of a traumatic fracture include:  You may hear a sound at the time of the break.    Pinpoint pain  (pain at the place of impact) at the time the fracture occurs and perhaps for a few hours later, but often the pain goes away after several hours.   Crooked or abnormal appearance of the toe.   Bruising and swelling the next day.   It is not true that  if you can walk on it, it s not broken.  Evaluation by a foot and ankle surgeon is always recommended.   STRESS FRACTURES are tiny, hairline breaks that are usually caused by repetitive stress. Stress fractures often afflict athletes who, for example, too  rapidly increase their running mileage. They can also be caused by an abnormal foot structure, deformities, or osteoporosis. Improper footwear may also lead to stress fractures. Stress fractures should not be ignored. They require proper medical attention to heal correctly.  Symptoms of stress fractures include:  Pain with or after normal activity   Pain that goes away when resting and then returns when standing or during activity    Pinpoint pain  (pain at the site of the fracture) when touched   Swelling, but no bruising   IMPROPER TREATMENT  Some people say that  the doctor can t do anything for a broken bone in the foot.  This is usually not true. In fact, if a fractured toe or metatarsal bone is not treated correctly, serious complications may develop. For example:  A deformity in the bony architecture which may limit the ability to move the foot or cause difficulty in fitting shoes   Arthritis, which may be caused by a fracture in a joint (the juncture where two bones meet), or may be a result of angular deformities that develop when a displaced fracture is severe or hasn t been properly corrected   Chronic pain and deformity   Non-union, or failure to heal, can lead to subsequent surgery or chronic pain.   PROPER TREATMENT FOR TOES  Fractures of the toe bones are almost always traumatic fractures. Treatment for traumatic fractures depends on the break itself and may include these options:  Rest. Sometimes rest is all that is needed to treat a traumatic fracture of the toe.   Splinting. The toe may be fitted with a splint to keep it in a fixed position.   Rigid or stiff-soled shoe. Wearing a stiff-soled shoe protects the toe and helps keep it properly positioned.    Brad taping  the fractured toe to another toe is sometimes appropriate, but in other cases it may be harmful.   Surgery. If the break is badly displaced or if the joint is affected, surgery may be necessary. Surgery often involves the use of  fixation devices, such as pins.   PROPER TREATMENT OF METATARSALS  Breaks in the metatarsal bones may be either stress or traumatic fractures. Certain kinds of fractures of the metatarsal bones present unique challenges.  For example, sometimes a fracture of the first metatarsal bone (behind the big toe) can lead to arthritis. Since the big toe is used so frequently and bears more weight than other toes, arthritis in that area can make it painful to walk, bend, or even stand.  Another type of break, called a Looney fracture, occurs at the base of the fifth metatarsal bone (behind the little toe). It is often misdiagnosed as an ankle sprain, and misdiagnosis can have serious consequences since sprains and fractures require different treatments. Your foot and ankle surgeon is an expert in correctly identifying these conditions as well as other problems of the foot.  Treatment of metatarsal fractures depends on the type and extent of the fracture, and may include:  Rest. Sometimes rest is the only treatment needed to promote healing of a stress or traumatic fracture of a metatarsal bone.   Avoid the offending activity. Because stress fractures result from repetitive stress, it is important to avoid the activity that led to the fracture. Crutches or a wheelchair are sometimes required to offload weight from the foot to give it time to heal.   Immobilization, casting, or rigid shoe. A stiff-soled shoe or other form of immobilization may be used to protect the fractured bone while it is healing.   Surgery. Some traumatic fractures of the metatarsal bones require surgery, especially if the break is badly displaced.   Follow-up care. Your foot and ankle surgeon will provide instructions for care following surgical or non-surgical treatment. Physical therapy, exercises and rehabilitation may be included in a schedule for return to normal activities.             Follow-ups after your visit        Follow-up notes from your care  team     Return in about 4 weeks (around 1/10/2018).      Your next 10 appointments already scheduled     Dec 13, 2017  3:40 PM CST   [facility]owitz [facility]    [address] [address]   [phone]            Dec 15, 2017  7:00 AM CST   Nurse Only with ALLERGY [facility] ([facility])    [address] [address]   [phone]           Every allergy patient MUST wait 30 minutes after their allergy shot. No exceptions.  Xolair shots #1-3 should plan to wait 2 hours in clinic Xolair shots after #4 should plan 30 minute wait in clinic            Dec 29, 2017  7:00 AM CST   Nurse Only with ALLERGY [facility] ([facility])    [address] [address]   [phone]           Every allergy patient MUST wait 30 minutes after their allergy shot. No exceptions.  Xolair shots #1-3 should plan to wait 2 hours in clinic Xolair shots after #4 should plan 30 minute wait in clinic            Jun 06, 2018  8:20 AM CDT   Return Visit with Butch Horowitz MD   [facility]    [address] [address]   [phone]              Who to contact     If you have questions or need follow up information about today's clinic visit or your schedule please contact [facility] directly at [phone].  Normal or non-critical lab and imaging results will be communicated to you by MyChart, letter or phone within 4 business days after the clinic has received the results. If you do not hear from us within 7 days, please contact the clinic through [employer] or phone. If you have a critical or abnormal lab result, we will notify you by phone as soon as possible.  Submit refill requests through [employer] or call your pharmacy and they will forward the refill request  "to us. Please allow 3 business days for your refill to be completed.          Additional Information About Your Visit        Care EveryWhere ID     This is your Care EveryWhere ID. This could be used by other organizations to access your [facility] medical records  [id number]        Your Vitals Were     Pulse Height Last Period BMI (Body Mass Index)          80 5' 3.27\" (1.607 m) 07/18/2015 (Approximate) 37.41 kg/m2         Blood Pressure from Last 3 Encounters:   12/06/17 118/75   12/04/17 120/60   09/06/17 (!) 147/92    Weight from Last 3 Encounters:   12/13/17 213 lb (96.6 kg)   12/06/17 213 lb 10 oz (96.9 kg)   12/04/17 216 lb (98 kg)              Today, you had the following     No orders found for display         Today's Medication Changes          These changes are accurate as of: 12/13/17  2:24 PM.  If you have any questions, ask your nurse or doctor.               These medicines have changed or have updated prescriptions.        Dose/Directions    * order for DME   This may have changed:  Another medication with the same name was added. Make sure you understand how and when to take each.   Used for:  Closed non-physeal fracture of phalanx of right great toe, unspecified phalanx, initial encounter   Changed by:  Dinorah Engel APRN CNP        Equipment being ordered: knee scooter   Quantity:  1 Units   Refills:  0       * order for DME   This may have changed:  Another medication with the same name was added. Make sure you understand how and when to take each.   Used for:  Closed non-physeal fracture of phalanx of right great toe, unspecified phalanx, initial encounter   Changed by:  Dinorah Engel APRN CNP        Equipment being ordered: Set of Crutches  [employer] [phone]   Quantity:  1 Device   Refills:  0       * order for DME   This may have changed:  You were already taking a medication with the same name, and this prescription was added. Make sure you understand how and when " to take each.   Used for:  Closed displaced fracture of proximal phalanx of right great toe, initial encounter   Changed by:  Carlos Vallejo DPM        Equipment being ordered: Dynaflex insert   Quantity:  1 Units   Refills:  0       * Notice:  This list has 3 medication(s) that are the same as other medications prescribed for you. Read the directions carefully, and ask your doctor or other care provider to review them with you.         Where to get your medicines      Some of these will need a paper prescription and others can be bought over the counter.  Ask your nurse if you have questions.     Bring a paper prescription for each of these medications     order for DME                Primary Care Provider Office Phone # Fax #    Cookie Conklin [phone]       [address] [place] [id number]        Equal Access to Services     KODY CHEN : Marcel muñozo Sofaith, waaxda luqadaha, qaybta kaalmada adeegyada, hieu howard. So [facility] [phone].    ATENCIÓN: Si habla español, tiene a pruitt disposición servicios gratuitos de asistencia lingüística. Llame al [phone].    We comply with applicable federal civil rights laws and [place] laws. We do not discriminate on the basis of race, color, national origin, age, disability, sex, sexual orientation, or gender identity.            Thank you!     Thank you for choosing [facility]  for your care. Our goal is always to provide you with excellent care. Hearing back from our patients is one way we can continue to improve our services. Please take a few minutes to complete the written survey that you may receive in the mail after your visit with us. Thank you!             Your Updated Medication List - Protect others around you: Learn how to safely use, store and throw away your medicines at www.disposemymeds.org.          This list is accurate as of: 12/13/17  2:24 PM.  Always use your most  recent med list.                   Brand Name Dispense Instructions for use Diagnosis    * albuterol (2.5 MG/3ML) 0.083% neb solution     1 Box    Take 1 vial (2.5 mg) by nebulization every 4 hours as needed    Moderate persistent asthma, uncomplicated       * albuterol 108 (90 BASE) MCG/ACT Inhaler    PROAIR HFA/PROVENTIL HFA/VENTOLIN HFA    1 Inhaler    Inhale 2 puffs into the lungs every 4 hours as needed    Moderate persistent asthma without complication       * ALLERGEN IMMUNOTHERAPY PRESCRIPTION     5 mL    Name of Mix: Mix #1  Mold Alternaria Tenuis 1:10 w/v, HS  0.5 ml Aspergillus Fumigatus 1:10 w/v, HS  0.5 ml Epicoccum Nigrum 1:10 w/v, HS 0.5 ml Hormodendrum Cladosporioides 1:10 w/v, HS 0.5 ml Penicillium Mix 1:10 w/v, HS  0.5 ml Diluent: HSA qs to 5ml    Chronic allergic rhinitis due to animal hair and dander, Allergic rhinitis due to dust mite, Allergic rhinitis due to mold, Chronic seasonal allergic rhinitis due to pollen       * ALLERGEN IMMUNOTHERAPY PRESCRIPTION     5 mL    Name of Mix: Mix #2  Dust Mite, Cat, Dog Cat Hair, Standardized 10,000 BAU/mL, ALK  2.0 ml Dog Hair Dander, A. P.  1:100 w/v, HS  1.0 ml Dust Mites F 30,000AU/mL, HS  0.3 ml Dust Mites P. 30,000 AU/mL, HS  0.3 ml  Diluent: HSA qs to 5ml    Chronic allergic rhinitis due to animal hair and dander, Allergic rhinitis due to dust mite, Allergic rhinitis due to mold, Chronic seasonal allergic rhinitis due to pollen       * ALLERGEN IMMUNOTHERAPY PRESCRIPTION     5 mL    Name of Mix: Mix #3 Grass,Tree  Dmitry,White 1:20w/v, HS 0.5ml Birch Mix PRW 1:20w/v, HS 0.5ml Boxelder-Maple Mix BHR (Boxelder Hard Red) 1:20w/v, HS 0.5ml [place],Common 1:20w/v, HS 0.5ml Elm,American 1:20w/v, HS 0.5ml Abbott Mix 1:20w/v, HS 0.5ml Oak Mix RVW 1:20w/v, HS 0.5ml [place] Tree,Black 1:20w/v, HS 0.5ml Grass Mix #7 100,000 BAU/mL, HS 0.4ml Jonathan Grass 1:20w/v, HS 0.5ml Diluent: HSA qs to 5ml    Chronic allergic rhinitis due to animal hair and dander,  Allergic rhinitis due to dust mite, Allergic rhinitis due to mold, Chronic seasonal allergic rhinitis due to pollen       * ALLERGEN IMMUNOTHERAPY PRESCRIPTION     5 mL    Name of Mix: Mix #4  Weeds Kochia 1:20 w/v, HS 0.5 ml Lamb's Quarters 1:20 w/v, HS 0.5 ml Nettle 1:20 w/v, HS 0.5 ml Plantain, English 1:20 w/v, HS 0.5 ml Ragweed Mixed 1:20 w/v ALK  0.5 ml Russian Thistle 1:20 w/v, HS 0.5 ml Sagebrush, Mugwort 1:20 w/v, HS 0.5 ml Sorrel, Sheep 1:20 w/v, HS 0.5 ml Diluent: HSA qs to 5ml    Chronic allergic rhinitis due to animal hair and dander, Allergic rhinitis due to dust mite, Allergic rhinitis due to mold, Chronic seasonal allergic rhinitis due to pollen       azelastine 0.05 % Soln ophthalmic solution    OPTIVAR    1 Bottle    Apply 1 drop to eye 2 times daily    Conjunctivitis, allergic, unspecified laterality       CERAVE Crea     2 Bottle    Externally apply 1 dose * topically 2 times daily    Eczema, unspecified type       cetirizine 10 MG tablet    zyrTEC    30 tablet    Take 1 tablet (10 mg) by mouth At Bedtime    Allergic conjunctivitis of both eyes, Chronic allergic rhinitis due to animal hair and dander, Allergic rhinitis due to dust mite, Allergic rhinitis due to mold, Chronic seasonal allergic rhinitis due to pollen       EPINEPHrine 0.3 MG/0.3ML injection 2-pack    EPIPEN/ADRENACLICK/or ANY BX GENERIC EQUIV    0.6 mL    Inject 0.3 mLs (0.3 mg) into the muscle once as needed for anaphylaxis    Food allergy, Need for desensitization to allergens       fluticasone 50 MCG/ACT spray    FLONASE    1 Bottle    Spray 2 sprays into both nostrils daily    Chronic allergic rhinitis due to animal hair and dander, Allergic rhinitis due to dust mite, Allergic rhinitis due to mold, Chronic seasonal allergic rhinitis due to pollen       fluticasone-salmeterol 500-50 MCG/DOSE diskus inhaler    ADVAIR    3 Inhaler    Inhale 1 puff into the lungs 2 times daily    Moderate persistent asthma without complication        loratadine 10 MG tablet    CLARITIN    30 tablet    Take 1 tablet (10 mg) by mouth daily    Allergic conjunctivitis of both eyes, Chronic allergic rhinitis due to animal hair and dander, Allergic rhinitis due to dust mite, Allergic rhinitis due to mold, Chronic seasonal allergic rhinitis due to pollen       montelukast 10 MG tablet    SINGULAIR    30 tablet    Take 1 tablet (10 mg) by mouth At Bedtime    Moderate persistent asthma without complication, Chronic allergic rhinitis due to animal hair and dander, Allergic rhinitis due to dust mite, Allergic rhinitis due to mold, Chronic seasonal allergic rhinitis due to pollen       MULTIVITAMIN PO      1 tab daily        * order for DME     1 Units    Equipment being ordered: knee scooter    Closed non-physeal fracture of phalanx of right great toe, unspecified phalanx, initial encounter       * order for DME     1 Device    Equipment being ordered: Set of [facility]  [place] [employer] [phone]    Closed non-physeal fracture of phalanx of right great toe, unspecified phalanx, initial encounter       * order for DME     1 Units    Equipment being ordered: Dynaflex insert    Closed displaced fracture of proximal phalanx of right great toe, initial encounter       * triamcinolone 0.1 % cream    KENALOG    80 g    APPLY SPARINGLY TO AFFECTED AREA THREE TIMES A DAY AS NEEDED    Dermatitis       * triamcinolone 0.1 % cream    KENALOG    80 g    Apply  topically 2 times daily as needed.    Eczema, unspecified type       * Notice:  This list has 11 medication(s) that are the same as other medications prescribed for you. Read the directions carefully, and ask your doctor or other care provider to review them with you.

## 2017-12-13 NOTE — LETTER
December 13, 2017      Dilia Solomon  [address] [place] [id number]        To Whom It May Concern:    Dilia Solomon was seen in our clinic. She may return to work without restrictions.      Sincerely,          Carlos Vallejo DPM

## 2017-12-13 NOTE — Clinical Note
12/13/2017         RE: Dilia Solomon  [address] [facility] [address]        Dear Colleague,    Thank you for referring your patient, Dilia Solomon, to the [facility]. Please see a copy of my visit note below.    Dilia Solomon was seen in the Allergy Clinic at [facility]. The following are my recommendations regarding her {Allergydiagnoses:110484}      Dilia Solomon is a 52 year old American female who is seen today for follow-up of acute respiratory symptoms. Her symptoms are worsening since last week. Dilia reports coughing up green sputum and having worsening sinus pressure. She continues to od sinu sirrigation rinses and is having green secretions in the drainage. Denies fevers/chills. Asthma remains well controlled.    Has been using albuterol every 4 hours since last week to help prevent acute asthma symptoms. Continues to do advair twice daily.    REVIEW OF SYSTEMS:  General: negative for weight gain. negative for weight loss. negative for changes in sleep.   Eyes: positive  for itching. positive  for redness. positive  for tearing/watering.  Ears: positive  for fullness(left). negative for hearing loss. negative for dizziness.   Nose: negative for snoring.negative for changes in smell. negative for drainage. Positive for congestion.  Throat: negative for hoarseness. negative for sore throat. positive  for trouble swallowing.   Lungs: negative for shortness of breath.negative for wheezing. positive  for sputum production.   Cardiovascular: negative for chest pain. negative for swelling of ankles. negative for fast or irregular heartbeat.   Gastrointestinal: negative for nausea. negative for heartburn. negative for acid reflux.   Musculoskeletal: negative for joint pain. negative for joint stiffness. negative for joint swelling.   Neurologic: negative for seizures. negative for fainting. negative for weakness.   Psychiatric: negative for changes in mood. negative  for anxiety.   Endocrine: negative for cold intolerance. negative for heat intolerance. negative for tremors.   Hematologic: negative for easy bruising. negative for easy bleeding.  Integumentary: negative for rash. negative for scaling. negative for nail changes.       Current Outpatient Prescriptions:      order for DME, Equipment being ordered: Dynaflex insert, Disp: 1 Units, Rfl: 0     order for DME, Equipment being ordered: Set of [employer]  [place] WEbook [phone], Disp: 1 Device, Rfl: 0     fluticasone (FLONASE) 50 MCG/ACT spray, Spray 2 sprays into both nostrils daily, Disp: 1 Bottle, Rfl: 11     triamcinolone (KENALOG) 0.1 % cream, Apply  topically 2 times daily as needed., Disp: 80 g, Rfl: 2     montelukast (SINGULAIR) 10 MG tablet, Take 1 tablet (10 mg) by mouth At Bedtime, Disp: 30 tablet, Rfl: 11     fluticasone-salmeterol (ADVAIR) 500-50 MCG/DOSE diskus inhaler, Inhale 1 puff into the lungs 2 times daily, Disp: 3 Inhaler, Rfl: 1     loratadine (CLARITIN) 10 MG tablet, Take 1 tablet (10 mg) by mouth daily, Disp: 30 tablet, Rfl: 11     cetirizine (ZYRTEC) 10 MG tablet, Take 1 tablet (10 mg) by mouth At Bedtime, Disp: 30 tablet, Rfl: 11     albuterol (PROAIR HFA/PROVENTIL HFA/VENTOLIN HFA) 108 (90 BASE) MCG/ACT Inhaler, Inhale 2 puffs into the lungs every 4 hours as needed, Disp: 1 Inhaler, Rfl: 3     order for DME, Equipment being ordered: knee scooter (Patient not taking: Reported on 12/6/2017), Disp: 1 Units, Rfl: 0     ORDER FOR ALLERGEN IMMUNOTHERAPY, Name of Mix: Mix #1  Mold Alternaria Tenuis 1:10 w/v, HS  0.5 ml Aspergillus Fumigatus 1:10 w/v, HS  0.5 ml Epicoccum Nigrum 1:10 w/v, HS 0.5 ml Hormodendrum Cladosporioides 1:10 w/v, HS 0.5 ml Penicillium Mix 1:10 w/v, HS  0.5 ml Diluent: HSA qs to 5ml, Disp: 5 mL, Rfl: PRN     ORDER FOR ALLERGEN IMMUNOTHERAPY, Name of Mix: Mix #2  Dust Mite, Cat, Dog Cat Hair, Standardized 10,000 BAU/mL, ALK  2.0 ml Dog Hair Dander, A. P.  1:100 w/v, HS  1.0  "ml Dust Mites F 30,000AU/mL, HS  0.3 ml Dust Mites P. 30,000 AU/mL, HS  0.3 ml  Diluent: HSA qs to 5ml, Disp: 5 mL, Rfl: PRN     ORDER FOR ALLERGEN IMMUNOTHERAPY, Name of Mix: Mix #3 Grass,Tree  Dmitry,White 1:20w/v, HS 0.5ml Birch Mix PRW 1:20w/v, HS 0.5ml Boxelder-Maple Mix BHR (Boxelder Hard Red) 1:20w/v, HS 0.5ml [place],Common 1:20w/v, HS 0.5ml Elm,American 1:20w/v, HS 0.5ml [place] Mix 1:20w/v, HS 0.5ml Oak Mix RVW 1:20w/v, HS 0.5ml [place] Tree,Black 1:20w/v, HS 0.5ml Grass Mix #7 100,000 BAU/mL, HS 0.4ml Jonathan Grass 1:20w/v, HS 0.5ml Diluent: HSA qs to 5ml, Disp: 5 mL, Rfl: PRN     ORDER FOR ALLERGEN IMMUNOTHERAPY, Name of Mix: Mix #4  Weeds Kochia 1:20 w/v, HS 0.5 ml Lamb's Quarters 1:20 w/v, HS 0.5 ml Nettle 1:20 w/v, HS 0.5 ml Plantain, English 1:20 w/v, HS 0.5 ml Ragweed Mixed 1:20 w/v ALK  0.5 ml Russian Thistle 1:20 w/v, HS 0.5 ml Sagebrush, Mugwort 1:20 w/v, HS 0.5 ml Sorrel, Sheep 1:20 w/v, HS 0.5 ml Diluent: HSA qs to 5ml, Disp: 5 mL, Rfl: PRN     triamcinolone (KENALOG) 0.1 % cream, APPLY SPARINGLY TO AFFECTED AREA THREE TIMES A DAY AS NEEDED, Disp: 80 g, Rfl: 0     azelastine (OPTIVAR) 0.05 % SOLN ophthalmic solution, Apply 1 drop to eye 2 times daily, Disp: 1 Bottle, Rfl: 5     Emollient (CERAVE) CREA, Externally apply 1 dose * topically 2 times daily, Disp: 2 Bottle, Rfl: 11     albuterol (2.5 MG/3ML) 0.083% neb solution, Take 1 vial (2.5 mg) by nebulization every 4 hours as needed, Disp: 1 Box, Rfl: 3     EPINEPHrine 0.3 MG/0.3ML injection, Inject 0.3 mLs (0.3 mg) into the muscle once as needed for anaphylaxis, Disp: 0.6 mL, Rfl: 3     MULTIVITAMIN OR, 1 tab daily, Disp: , Rfl:     EXAM:   /88 (BP Location: Left arm, Patient Position: Chair, Cuff Size: Adult Large)  Pulse 83  Ht 1.607 m (5' 3.27\")  Wt 96.6 kg (212 lb 15.4 oz)  LMP 07/18/2015 (Approximate)  SpO2 97%  BMI 37.41 kg/m2  GENERAL APPEARANCE: { :770989}  SKIN: {SKIN:701343}  HEAD: {EXAM NCC CONST HEAD:796270}  ENT: { " :815858}  NECK: { :774120}  LUNGS: { :600749}  HEART: { :108853}  MUSCULOSKELETAL: { :043900}  NEURO: { :873400}  PSYCH: { :099088}      WORKUP:  {ALLERGYWORKUP:356055}    ASSESSMENT/PLAN:  Dilia Solomon is a 52 year old female    ***    Butch Horowitz MD  Allergy/Immunology  [place], [place]      Chart documentation done in part with Dragon Voice Recognition Software. Although reviewed after completion, some word and grammatical errors may remain.      Again, thank you for allowing me to participate in the care of your patient.        Sincerely,        Butch Horowitz MD

## 2017-12-13 NOTE — LETTER
12/13/2017         RE: Dilia Solomon  [address] [facility] [address]        Dear Colleague,    Thank you for referring your patient, Dilia Solomon, to the [facility]. Please see a copy of my visit note below.    PATIENT HISTORY:  Dilia Solomon is a 52 year old female who presents to clinic with a chief complaint of a painful right foot.  The patient relates the pain is located on the dorsal aspect on the right great toe.  The patient relates injuring the right great toe.  X-rays reveal a fractured right great toe.    REVIEW OF SYSTEMS:  Constitutional, HEENT, cardiovascular, pulmonary, GI, , musculoskeletal, neuro, skin, endocrine and psych systems are negative, except as otherwise noted.     PAST MEDICAL HISTORY:   Past Medical History:   Diagnosis Date     Injury, other and unspecified, shoulder and upper arm 9/03        PAST SURGICAL HISTORY:   Past Surgical History:   Procedure Laterality Date     COLONOSCOPY N/A 10/6/2016    Procedure: COLONOSCOPY;  Surgeon: Shiva Johns MD;  Location: [facility]     ETHMOIDECTOMY  12/30/2013    Procedure: ETHMOIDECTOMY;  Bilateral Submucousal Reduction of InferiorTurbinates and Total Ethmoidectomy with Multiple Sinusotomies;  Surgeon: Lio More MD;  Location: [facility]     SURGICAL HISTORY OF -   5/04    carpal tunnel release, bilateral        MEDICATIONS:   Current Outpatient Prescriptions:      order for DME, Equipment being ordered: Set of [employer] [id number], Disp: 1 Device, Rfl: 0     fluticasone (FLONASE) 50 MCG/ACT spray, Spray 2 sprays into both nostrils daily, Disp: 1 Bottle, Rfl: 11     triamcinolone (KENALOG) 0.1 % cream, Apply  topically 2 times daily as needed., Disp: 80 g, Rfl: 2     montelukast (SINGULAIR) 10 MG tablet, Take 1 tablet (10 mg) by mouth At Bedtime, Disp: 30 tablet, Rfl: 11     fluticasone-salmeterol (ADVAIR) 500-50 MCG/DOSE diskus inhaler, Inhale 1 puff into the lungs 2 times daily,  Disp: 3 Inhaler, Rfl: 1     loratadine (CLARITIN) 10 MG tablet, Take 1 tablet (10 mg) by mouth daily, Disp: 30 tablet, Rfl: 11     cetirizine (ZYRTEC) 10 MG tablet, Take 1 tablet (10 mg) by mouth At Bedtime, Disp: 30 tablet, Rfl: 11     albuterol (PROAIR HFA/PROVENTIL HFA/VENTOLIN HFA) 108 (90 BASE) MCG/ACT Inhaler, Inhale 2 puffs into the lungs every 4 hours as needed, Disp: 1 Inhaler, Rfl: 3     ORDER FOR ALLERGEN IMMUNOTHERAPY, Name of Mix: Mix #1  Mold Alternaria Tenuis 1:10 w/v, HS  0.5 ml Aspergillus Fumigatus 1:10 w/v, HS  0.5 ml Epicoccum Nigrum 1:10 w/v, HS 0.5 ml Hormodendrum Cladosporioides 1:10 w/v, HS 0.5 ml Penicillium Mix 1:10 w/v, HS  0.5 ml Diluent: HSA qs to 5ml, Disp: 5 mL, Rfl: PRN     ORDER FOR ALLERGEN IMMUNOTHERAPY, Name of Mix: Mix #2  Dust Mite, Cat, Dog Cat Hair, Standardized 10,000 BAU/mL, ALK  2.0 ml Dog Hair Dander, A. P.  1:100 w/v, HS  1.0 ml Dust Mites F 30,000AU/mL, HS  0.3 ml Dust Mites P. 30,000 AU/mL, HS  0.3 ml  Diluent: HSA qs to 5ml, Disp: 5 mL, Rfl: PRN     ORDER FOR ALLERGEN IMMUNOTHERAPY, Name of Mix: Mix #3 Grass,Tree  Dmitry,White 1:20w/v, HS 0.5ml Birch Mix PRW 1:20w/v, HS 0.5ml Boxelder-Maple Mix BHR (Boxelder Hard Red) 1:20w/v, HS 0.5ml [place],Common 1:20w/v, HS 0.5ml Elm,American 1:20w/v, HS 0.5ml [place] Mix 1:20w/v, HS 0.5ml Oak Mix RVW 1:20w/v, HS 0.5ml [place] Tree,Black 1:20w/v, HS 0.5ml Grass Mix #7 100,000 BAU/mL, HS 0.4ml Jonathan Grass 1:20w/v, HS 0.5ml Diluent: HSA qs to 5ml, Disp: 5 mL, Rfl: PRN     ORDER FOR ALLERGEN IMMUNOTHERAPY, Name of Mix: Mix #4  Weeds Kochia 1:20 w/v, HS 0.5 ml Lamb's Quarters 1:20 w/v, HS 0.5 ml Nettle 1:20 w/v, HS 0.5 ml Plantain, English 1:20 w/v, HS 0.5 ml Ragweed Mixed 1:20 w/v ALK  0.5 ml Russian Thistle 1:20 w/v, HS 0.5 ml Sagebrush, Mugwort 1:20 w/v, HS 0.5 ml Sorrel, Sheep 1:20 w/v, HS 0.5 ml Diluent: HSA qs to 5ml, Disp: 5 mL, Rfl: PRN     triamcinolone (KENALOG) 0.1 % cream, APPLY SPARINGLY TO AFFECTED AREA THREE TIMES A  "DAY AS NEEDED, Disp: 80 g, Rfl: 0     azelastine (OPTIVAR) 0.05 % SOLN ophthalmic solution, Apply 1 drop to eye 2 times daily, Disp: 1 Bottle, Rfl: 5     Emollient (CERAVE) CREA, Externally apply 1 dose * topically 2 times daily, Disp: 2 Bottle, Rfl: 11     albuterol (2.5 MG/3ML) 0.083% neb solution, Take 1 vial (2.5 mg) by nebulization every 4 hours as needed, Disp: 1 Box, Rfl: 3     EPINEPHrine 0.3 MG/0.3ML injection, Inject 0.3 mLs (0.3 mg) into the muscle once as needed for anaphylaxis, Disp: 0.6 mL, Rfl: 3     MULTIVITAMIN OR, 1 tab daily, Disp: , Rfl:      order for DME, Equipment being ordered: knee scooter (Patient not taking: Reported on 12/6/2017), Disp: 1 Units, Rfl: 0     ALLERGIES:    Allergies   Allergen Reactions     Clinoril [Nsaids] Hives     Tolerates ibuprofen and aspirin without hives     Pineapple Hives        SOCIAL HISTORY:   Social History     Social History     Marital status: Single     Spouse name: N/A     Number of children: N/A     Years of education: N/A     Occupational History     Not on file.     Social History Main Topics     Smoking status: Never Smoker     Smokeless tobacco: Never Used     Alcohol use No     Drug use: No     Sexual activity: No     Other Topics Concern     Parent/Sibling W/ Cabg, Mi Or Angioplasty Before 65f 55m? Yes     mother     Social History Narrative        FAMILY HISTORY:   Family History   Problem Relation Age of Onset     C.A.D. Mother      DIABETES Mother      CANCER Father      brain     Breast Cancer Maternal Aunt      Cancer - colorectal No family hx of         EXAM:Vitals: Pulse 80  Ht 5' 3.27\" (1.607 m)  Wt 213 lb (96.6 kg)  LMP 07/18/2015 (Approximate)  BMI 37.41 kg/m2  BMI= Body mass index is 37.41 kg/(m^2).  Weight management plan: Patient was referred to their PCP to discuss a diet and exercise plan.    General appearance: Patient is alert and fully cooperative with history & exam.  No sign of distress is noted during the visit.   "   Psychiatric: Affect is pleasant & appropriate.  Patient appears motivated to improve health.     Respiratory: Breathing is regular & unlabored while sitting.     HEENT: Hearing is intact to spoken word.  Speech is clear.  No gross evidence of visual impairment that would impact ambulation.     Dermatologic: Skin is intact to both lower extremities without significant lesions, rash or abrasion.  No paronychia or evidence of soft tissue infection is noted.     Vascular: DP & PT pulses are intact & regular bilaterally.  No significant edema or varicosities noted.  CFT and skin temperature is normal to both lower extremities.     Neurologic: Lower extremity sensation is intact to light touch.  No evidence of weakness or contracture in the lower extremities.  No evidence of neuropathy.     Musculoskeletal: Patient is non-ambulatory with crutches.  No gross ankle deformity noted.  No foot or ankle joint effusion is noted.    One notes positive edema, positive ecchymosis.  One notes pain with palpation over the dorsal aspect overlying the great toe on the right.    Radiograph review of previous films including non weightbearing AP, lateral and medial oblique views of the right foot reveals an apparent displaced fracture of the great toe proximal phalanx.  All joint margins appear stable.  There is no apparent tumor formation noted.  There is no evidence of foreign body.    Assessment:  1. Closed displaced fracture of the proximal phalanx of the great toe of the right foot.    Plan:  I have explained to Dilia  about the conditions.  We discussed both conservative and surgical treatment options with all associated risks and benefits.  At this time, I do not believe there is need for surgery.  The patient will return to the office in one month for reevaluation and repeat x-rays.      Disclaimer: This note consists of symbols derived from keyboarding, dictation and/or voice recognition software. As a result, there may be  errors in the script that have gone undetected. Please consider this when interpreting information found in this chart.       CAROLE Vallejo D.P.M., F.A.C.F.A.S.      Again, thank you for allowing me to participate in the care of your patient.        Sincerely,        Carlos Vallejo DPM

## 2017-12-13 NOTE — PATIENT INSTRUCTIONS
TOE & METATARSAL FRACTURES  The structure of the foot is complex, consisting of bones, muscles, tendons, and other soft tissues. Of the 26 bones in the foot, 19 are toe bones (phalanges) and metatarsal bones (the long bones in the midfoot). Fractures of the toe and metatarsal bones are common and require evaluation by a specialist. A foot and ankle surgeon should be seen for proper diagnosis and treatment, even if initial treatment has been received in an emergency room.  A fracture is a break in the bone. Fractures can be divided into two categories: traumatic fractures and stress fractures.  TRAMATIC FRACTURES (also called acute fractures) are caused by a direct blow or impact, such as seriously stubbing your toe. Traumatic fractures can be displaced or non-displaced. If the fracture is displaced, the bone is broken in such a way that it has changed in position (dislocated).  Signs and symptoms of a traumatic fracture include:  You may hear a sound at the time of the break.    Pinpoint pain  (pain at the place of impact) at the time the fracture occurs and perhaps for a few hours later, but often the pain goes away after several hours.   Crooked or abnormal appearance of the toe.   Bruising and swelling the next day.   It is not true that  if you can walk on it, it s not broken.  Evaluation by a foot and ankle surgeon is always recommended.   STRESS FRACTURES are tiny, hairline breaks that are usually caused by repetitive stress. Stress fractures often afflict athletes who, for example, too rapidly increase their running mileage. They can also be caused by an abnormal foot structure, deformities, or osteoporosis. Improper footwear may also lead to stress fractures. Stress fractures should not be ignored. They require proper medical attention to heal correctly.  Symptoms of stress fractures include:  Pain with or after normal activity   Pain that goes away when resting and then returns when standing or during  activity    Pinpoint pain  (pain at the site of the fracture) when touched   Swelling, but no bruising   IMPROPER TREATMENT  Some people say that  the doctor can t do anything for a broken bone in the foot.  This is usually not true. In fact, if a fractured toe or metatarsal bone is not treated correctly, serious complications may develop. For example:  A deformity in the bony architecture which may limit the ability to move the foot or cause difficulty in fitting shoes   Arthritis, which may be caused by a fracture in a joint (the juncture where two bones meet), or may be a result of angular deformities that develop when a displaced fracture is severe or hasn t been properly corrected   Chronic pain and deformity   Non-union, or failure to heal, can lead to subsequent surgery or chronic pain.   PROPER TREATMENT FOR TOES  Fractures of the toe bones are almost always traumatic fractures. Treatment for traumatic fractures depends on the break itself and may include these options:  Rest. Sometimes rest is all that is needed to treat a traumatic fracture of the toe.   Splinting. The toe may be fitted with a splint to keep it in a fixed position.   Rigid or stiff-soled shoe. Wearing a stiff-soled shoe protects the toe and helps keep it properly positioned.    Brad taping  the fractured toe to another toe is sometimes appropriate, but in other cases it may be harmful.   Surgery. If the break is badly displaced or if the joint is affected, surgery may be necessary. Surgery often involves the use of fixation devices, such as pins.   PROPER TREATMENT OF METATARSALS  Breaks in the metatarsal bones may be either stress or traumatic fractures. Certain kinds of fractures of the metatarsal bones present unique challenges.  For example, sometimes a fracture of the first metatarsal bone (behind the big toe) can lead to arthritis. Since the big toe is used so frequently and bears more weight than other toes, arthritis in that area  can make it painful to walk, bend, or even stand.  Another type of break, called a Looney fracture, occurs at the base of the fifth metatarsal bone (behind the little toe). It is often misdiagnosed as an ankle sprain, and misdiagnosis can have serious consequences since sprains and fractures require different treatments. Your foot and ankle surgeon is an expert in correctly identifying these conditions as well as other problems of the foot.  Treatment of metatarsal fractures depends on the type and extent of the fracture, and may include:  Rest. Sometimes rest is the only treatment needed to promote healing of a stress or traumatic fracture of a metatarsal bone.   Avoid the offending activity. Because stress fractures result from repetitive stress, it is important to avoid the activity that led to the fracture. Crutches or a wheelchair are sometimes required to offload weight from the foot to give it time to heal.   Immobilization, casting, or rigid shoe. A stiff-soled shoe or other form of immobilization may be used to protect the fractured bone while it is healing.   Surgery. Some traumatic fractures of the metatarsal bones require surgery, especially if the break is badly displaced.   Follow-up care. Your foot and ankle surgeon will provide instructions for care following surgical or non-surgical treatment. Physical therapy, exercises and rehabilitation may be included in a schedule for return to normal activities.

## 2017-12-13 NOTE — PROGRESS NOTES
PATIENT HISTORY:  Dilia Solomon is a 52 year old female who presents to clinic with a chief complaint of a painful right foot.  The patient relates the pain is located on the dorsal aspect on the right great toe.  The patient relates injuring the right great toe.  X-rays reveal a fractured right great toe.    REVIEW OF SYSTEMS:  Constitutional, HEENT, cardiovascular, pulmonary, GI, , musculoskeletal, neuro, skin, endocrine and psych systems are negative, except as otherwise noted.     PAST MEDICAL HISTORY:   Past Medical History:   Diagnosis Date     Injury, other and unspecified, shoulder and upper arm 9/03        PAST SURGICAL HISTORY:   Past Surgical History:   Procedure Laterality Date     COLONOSCOPY N/A 10/6/2016    Procedure: COLONOSCOPY;  Surgeon: Shiva Johns MD;  Location: [facility]     ETHMOIDECTOMY  12/30/2013    Procedure: ETHMOIDECTOMY;  Bilateral Submucousal Reduction of InferiorTurbinates and Total Ethmoidectomy with Multiple Sinusotomies;  Surgeon: Lio More MD;  Location: [facility]     SURGICAL HISTORY OF -   5/04    carpal tunnel release, bilateral        MEDICATIONS:   Current Outpatient Prescriptions:      order for DME, Equipment being ordered: Set of Focus [phone], Disp: 1 Device, Rfl: 0     fluticasone (FLONASE) 50 MCG/ACT spray, Spray 2 sprays into both nostrils daily, Disp: 1 Bottle, Rfl: 11     triamcinolone (KENALOG) 0.1 % cream, Apply  topically 2 times daily as needed., Disp: 80 g, Rfl: 2     montelukast (SINGULAIR) 10 MG tablet, Take 1 tablet (10 mg) by mouth At Bedtime, Disp: 30 tablet, Rfl: 11     fluticasone-salmeterol (ADVAIR) 500-50 MCG/DOSE diskus inhaler, Inhale 1 puff into the lungs 2 times daily, Disp: 3 Inhaler, Rfl: 1     loratadine (CLARITIN) 10 MG tablet, Take 1 tablet (10 mg) by mouth daily, Disp: 30 tablet, Rfl: 11     cetirizine (ZYRTEC) 10 MG tablet, Take 1 tablet (10 mg) by mouth At Bedtime, Disp: 30 tablet, Rfl: 11     albuterol  (PROAIR HFA/PROVENTIL HFA/VENTOLIN HFA) 108 (90 BASE) MCG/ACT Inhaler, Inhale 2 puffs into the lungs every 4 hours as needed, Disp: 1 Inhaler, Rfl: 3     ORDER FOR ALLERGEN IMMUNOTHERAPY, Name of Mix: Mix #1  Mold Alternaria Tenuis 1:10 w/v, HS  0.5 ml Aspergillus Fumigatus 1:10 w/v, HS  0.5 ml Epicoccum Nigrum 1:10 w/v, HS 0.5 ml Hormodendrum Cladosporioides 1:10 w/v, HS 0.5 ml Penicillium Mix 1:10 w/v, HS  0.5 ml Diluent: HSA qs to 5ml, Disp: 5 mL, Rfl: PRN     ORDER FOR ALLERGEN IMMUNOTHERAPY, Name of Mix: Mix #2  Dust Mite, Cat, Dog Cat Hair, Standardized 10,000 BAU/mL, ALK  2.0 ml Dog Hair Dander, A. P.  1:100 w/v, HS  1.0 ml Dust Mites F 30,000AU/mL, HS  0.3 ml Dust Mites P. 30,000 AU/mL, HS  0.3 ml  Diluent: HSA qs to 5ml, Disp: 5 mL, Rfl: PRN     ORDER FOR ALLERGEN IMMUNOTHERAPY, Name of Mix: Mix #3 Grass,Tree  Dmitry,White 1:20w/v, HS 0.5ml Birch Mix PRW 1:20w/v, HS 0.5ml Boxelder-Maple Mix BHR (Boxelder Hard Red) 1:20w/v, HS 0.5ml [place],Common 1:20w/v, HS 0.5ml Elm,American 1:20w/v, HS 0.5ml [place] Mix 1:20w/v, HS 0.5ml Oak Mix RVW 1:20w/v, HS 0.5ml [place] Tree,Black 1:20w/v, HS 0.5ml Grass Mix #7 100,000 BAU/mL, HS 0.4ml Jonathan Grass 1:20w/v, HS 0.5ml Diluent: HSA qs to 5ml, Disp: 5 mL, Rfl: PRN     ORDER FOR ALLERGEN IMMUNOTHERAPY, Name of Mix: Mix #4  Weeds Kochia 1:20 w/v, HS 0.5 ml Lamb's Quarters 1:20 w/v, HS 0.5 ml Nettle 1:20 w/v, HS 0.5 ml Plantain, English 1:20 w/v, HS 0.5 ml Ragweed Mixed 1:20 w/v ALK  0.5 ml Russian Thistle 1:20 w/v, HS 0.5 ml Sagebrush, Mugwort 1:20 w/v, HS 0.5 ml Sorrel, Sheep 1:20 w/v, HS 0.5 ml Diluent: HSA qs to 5ml, Disp: 5 mL, Rfl: PRN     triamcinolone (KENALOG) 0.1 % cream, APPLY SPARINGLY TO AFFECTED AREA THREE TIMES A DAY AS NEEDED, Disp: 80 g, Rfl: 0     azelastine (OPTIVAR) 0.05 % SOLN ophthalmic solution, Apply 1 drop to eye 2 times daily, Disp: 1 Bottle, Rfl: 5     Emollient (CERAVE) CREA, Externally apply 1 dose * topically 2 times daily, Disp: 2 Bottle,  "Rfl: 11     albuterol (2.5 MG/3ML) 0.083% neb solution, Take 1 vial (2.5 mg) by nebulization every 4 hours as needed, Disp: 1 Box, Rfl: 3     EPINEPHrine 0.3 MG/0.3ML injection, Inject 0.3 mLs (0.3 mg) into the muscle once as needed for anaphylaxis, Disp: 0.6 mL, Rfl: 3     MULTIVITAMIN OR, 1 tab daily, Disp: , Rfl:      order for DME, Equipment being ordered: knee scooter (Patient not taking: Reported on 12/6/2017), Disp: 1 Units, Rfl: 0     ALLERGIES:    Allergies   Allergen Reactions     Clinoril [Nsaids] Hives     Tolerates ibuprofen and aspirin without hives     Pineapple Hives        SOCIAL HISTORY:   Social History     Social History     Marital status: Single     Spouse name: N/A     Number of children: N/A     Years of education: N/A     Occupational History     Not on file.     Social History Main Topics     Smoking status: Never Smoker     Smokeless tobacco: Never Used     Alcohol use No     Drug use: No     Sexual activity: No     Other Topics Concern     Parent/Sibling W/ Cabg, Mi Or Angioplasty Before 65f 55m? Yes     mother     Social History Narrative        FAMILY HISTORY:   Family History   Problem Relation Age of Onset     C.A.D. Mother      DIABETES Mother      CANCER Father      brain     Breast Cancer Maternal Aunt      Cancer - colorectal No family hx of         EXAM:Vitals: Pulse 80  Ht 5' 3.27\" (1.607 m)  Wt 213 lb (96.6 kg)  LMP 07/18/2015 (Approximate)  BMI 37.41 kg/m2  BMI= Body mass index is 37.41 kg/(m^2).  Weight management plan: Patient was referred to their PCP to discuss a diet and exercise plan.    General appearance: Patient is alert and fully cooperative with history & exam.  No sign of distress is noted during the visit.     Psychiatric: Affect is pleasant & appropriate.  Patient appears motivated to improve health.     Respiratory: Breathing is regular & unlabored while sitting.     HEENT: Hearing is intact to spoken word.  Speech is clear.  No gross evidence of visual " impairment that would impact ambulation.     Dermatologic: Skin is intact to both lower extremities without significant lesions, rash or abrasion.  No paronychia or evidence of soft tissue infection is noted.     Vascular: DP & PT pulses are intact & regular bilaterally.  No significant edema or varicosities noted.  CFT and skin temperature is normal to both lower extremities.     Neurologic: Lower extremity sensation is intact to light touch.  No evidence of weakness or contracture in the lower extremities.  No evidence of neuropathy.     Musculoskeletal: Patient is non-ambulatory with crutches.  No gross ankle deformity noted.  No foot or ankle joint effusion is noted.    One notes positive edema, positive ecchymosis.  One notes pain with palpation over the dorsal aspect overlying the great toe on the right.    Radiograph review of previous films including non weightbearing AP, lateral and medial oblique views of the right foot reveals an apparent displaced fracture of the great toe proximal phalanx.  All joint margins appear stable.  There is no apparent tumor formation noted.  There is no evidence of foreign body.    Assessment:  1. Closed displaced fracture of the proximal phalanx of the great toe of the right foot.    Plan:  I have explained to Dilia  about the conditions.  We discussed both conservative and surgical treatment options with all associated risks and benefits.  At this time, I do not believe there is need for surgery.  The patient will return to the office in one month for reevaluation and repeat x-rays.      Disclaimer: This note consists of symbols derived from keyboarding, dictation and/or voice recognition software. As a result, there may be errors in the script that have gone undetected. Please consider this when interpreting information found in this chart.       CAROLE Vallejo D.P.M., [email or web address].

## 2017-12-13 NOTE — NURSING NOTE
"Chief Complaint   Patient presents with     Allergy Recheck     1 week follow up, no sx improvement in last week       Initial /88 (BP Location: Left arm, Patient Position: Chair, Cuff Size: Adult Large)  Pulse 83  Ht 1.607 m (5' 3.27\")  Wt 96.6 kg (212 lb 15.4 oz)  LMP 07/18/2015 (Approximate)  SpO2 97%  BMI 37.41 kg/m2 Estimated body mass index is 37.41 kg/(m^2) as calculated from the following:    Height as of this encounter: 1.607 m (5' 3.27\").    Weight as of this encounter: 96.6 kg (212 lb 15.4 oz).  Medication Reconciliation: complete    "

## 2017-12-13 NOTE — MR AVS SNAPSHOT
After Visit Summary   12/13/2017    Dilia Solomon    MRN: [id number]           Patient Information     Date Of Birth          1965        Visit Information        Provider Department      12/13/2017 3:40 PM Butch Horowitz MD [facility]        Today's Diagnoses     Acute sinusitis with symptoms > 10 days    -  1      Care Instructions    If you have any questions regarding your allergies, asthma, or what we discussed during your visit today please call the allergy clinic or contact us via [employer].    [facility]: [phone]      Take the amoxicillin 3 times day for 10 days. Take this with food. Follow-up if you are not getting better. Continue to use your albuterol as needed.          Follow-ups after your visit        Your next 10 appointments already scheduled     Dec 15, 2017  7:00 AM CST   Nurse Only with ALLERGY [facility] ([facility])    [address] [facility] [address]   [phone]           Every allergy patient MUST wait 30 minutes after their allergy shot. No exceptions.  Xolair shots #1-3 should plan to wait 2 hours in clinic Xolair shots after #4 should plan 30 minute wait in clinic            Dec 29, 2017  7:00 AM CST   Nurse Only with ALLERGY [facility] ([facility])    [address] [facility] [address]   [phone]           Every allergy patient MUST wait 30 minutes after their allergy shot. No exceptions.  Xolair shots #1-3 should plan to wait 2 hours in clinic Xolair shots after #4 should plan 30 minute wait in clinic            Erasto 10, 2018  1:40 PM CST   New Visit with Carlos Vallejo DPM   [facility]    [address] [facility] [address]   [phone]            Jun 06, 2018  8:20 AM CDT   Return Visit with Butch Horowitz MD   [facility] ([facility]     "[address]  [facility] [address]   [phone]              Who to contact     If you have questions or need follow up information about today's clinic visit or your schedule please contact [facility] directly at [phone].  Normal or non-critical lab and imaging results will be communicated to you by MyChart, letter or phone within 4 business days after the clinic has received the results. If you do not hear from us within 7 days, please contact the clinic through MyChart or phone. If you have a critical or abnormal lab result, we will notify you by phone as soon as possible.  Submit refill requests through "[employer] or call your pharmacy and they will forward the refill request to us. Please allow 3 business days for your refill to be completed.          Additional Information About Your Visit        Care EveryWhere ID     This is your Care EveryWhere ID. This could be used by other organizations to access your [facility] medical records  [id number]        Your Vitals Were     Pulse Height Last Period Pulse Oximetry BMI (Body Mass Index)       83 1.607 m (5' 3.27\") 07/18/2015 (Approximate) 97% 37.41 kg/m2        Blood Pressure from Last 3 Encounters:   12/13/17 126/88   12/06/17 118/75   12/04/17 120/60    Weight from Last 3 Encounters:   12/13/17 96.6 kg (212 lb 15.4 oz)   12/13/17 96.6 kg (213 lb)   12/06/17 96.9 kg (213 lb 10 oz)              Today, you had the following     No orders found for display         Today's Medication Changes          These changes are accurate as of: 12/13/17  3:49 PM.  If you have any questions, ask your nurse or doctor.               Start taking these medicines.        Dose/Directions    amoxicillin 500 MG capsule   Commonly known as:  AMOXIL   Used for:  Acute sinusitis with symptoms > 10 days   Started by:  Butch Horowitz MD        Dose:  500 mg   Take 1 capsule (500 mg) by mouth 3 times daily   Quantity:  30 capsule   Refills:  0         These " medicines have changed or have updated prescriptions.        Dose/Directions    * order for DME   This may have changed:  Another medication with the same name was added. Make sure you understand how and when to take each.   Used for:  Closed non-physeal fracture of phalanx of right great toe, unspecified phalanx, initial encounter   Changed by:  Dinorah Engel APRN CNP        Equipment being ordered: knee scooter   Quantity:  1 Units   Refills:  0       * order for DME   This may have changed:  Another medication with the same name was added. Make sure you understand how and when to take each.   Used for:  Closed non-physeal fracture of phalanx of right great toe, unspecified phalanx, initial encounter   Changed by:  Dinorah Engel APRN CNP        Equipment being ordered: Set of [employer] [phone]   Quantity:  1 Device   Refills:  0       * order for DME   This may have changed:  You were already taking a medication with the same name, and this prescription was added. Make sure you understand how and when to take each.   Used for:  Closed displaced fracture of proximal phalanx of right great toe, initial encounter   Changed by:  Carlos Vallejo DPM        Equipment being ordered: Dynaflex insert   Quantity:  1 Units   Refills:  0       * Notice:  This list has 3 medication(s) that are the same as other medications prescribed for you. Read the directions carefully, and ask your doctor or other care provider to review them with you.         Where to get your medicines      These medications were sent to [facility] [place], [place] - [address]  [address] [facility] [address]     Phone:  [phone]     amoxicillin 500 MG capsule         Some of these will need a paper prescription and others can be bought over the counter.  Ask your nurse if you have questions.     Bring a paper prescription for each of these medications     order for DME                 Primary Care Provider Office Phone # Fax #    Cookie Conklin [phone]       [address] [place] [address]        Equal Access to Services     KODY WALTERSKAY : Hadagatha ulices amaral tonio Sokashmirali, waaxda luqadaha, qaybta kaalmada anna, hieu jeffersonjoy lee. So [facility] [phone].    ATENCIÓN: Si habla español, tiene a pruitt disposición servicios gratuitos de asistencia lingüística. Llame al [phone].    We comply with applicable federal civil rights laws and [place] laws. We do not discriminate on the basis of race, color, national origin, age, disability, sex, sexual orientation, or gender identity.            Thank you!     Thank you for choosing [facility]  for your care. Our goal is always to provide you with excellent care. Hearing back from our patients is one way we can continue to improve our services. Please take a few minutes to complete the written survey that you may receive in the mail after your visit with us. Thank you!             Your Updated Medication List - Protect others around you: Learn how to safely use, store and throw away your medicines at www.disposemymeds.org.          This list is accurate as of: 12/13/17  3:49 PM.  Always use your most recent med list.                   Brand Name Dispense Instructions for use Diagnosis    * albuterol (2.5 MG/3ML) 0.083% neb solution     1 Box    Take 1 vial (2.5 mg) by nebulization every 4 hours as needed    Moderate persistent asthma, uncomplicated       * albuterol 108 (90 BASE) MCG/ACT Inhaler    PROAIR HFA/PROVENTIL HFA/VENTOLIN HFA    1 Inhaler    Inhale 2 puffs into the lungs every 4 hours as needed    Moderate persistent asthma without complication       * ALLERGEN IMMUNOTHERAPY PRESCRIPTION     5 mL    Name of Mix: Mix #1  Mold Alternaria Tenuis 1:10 w/v, HS  0.5 ml Aspergillus Fumigatus 1:10 w/v, HS  0.5 ml Epicoccum Nigrum 1:10 w/v, HS 0.5 ml Hormodendrum Cladosporioides 1:10  w/v, HS 0.5 ml Penicillium Mix 1:10 w/v, HS  0.5 ml Diluent: HSA qs to 5ml    Chronic allergic rhinitis due to animal hair and dander, Allergic rhinitis due to dust mite, Allergic rhinitis due to mold, Chronic seasonal allergic rhinitis due to pollen       * ALLERGEN IMMUNOTHERAPY PRESCRIPTION     5 mL    Name of Mix: Mix #2  Dust Mite, Cat, Dog Cat Hair, Standardized 10,000 BAU/mL, ALK  2.0 ml Dog Hair Dander, A. P.  1:100 w/v, HS  1.0 ml Dust Mites F 30,000AU/mL, HS  0.3 ml Dust Mites P. 30,000 AU/mL, HS  0.3 ml  Diluent: HSA qs to 5ml    Chronic allergic rhinitis due to animal hair and dander, Allergic rhinitis due to dust mite, Allergic rhinitis due to mold, Chronic seasonal allergic rhinitis due to pollen       * ALLERGEN IMMUNOTHERAPY PRESCRIPTION     5 mL    Name of Mix: Mix #3 Grass,Tree  Dmitry,White 1:20w/v, HS 0.5ml Birch Mix PRW 1:20w/v, HS 0.5ml Boxelder-Maple Mix BHR (Boxelder Hard Red) 1:20w/v, HS 0.5ml [place],Common 1:20w/v, HS 0.5ml Elm,American 1:20w/v, HS 0.5ml [place] Mix 1:20w/v, HS 0.5ml Oak Mix RVW 1:20w/v, HS 0.5ml [place] Tree,Black 1:20w/v, HS 0.5ml Grass Mix #7 100,000 BAU/mL, HS 0.4ml Jonathan Grass 1:20w/v, HS 0.5ml Diluent: HSA qs to 5ml    Chronic allergic rhinitis due to animal hair and dander, Allergic rhinitis due to dust mite, Allergic rhinitis due to mold, Chronic seasonal allergic rhinitis due to pollen       * ALLERGEN IMMUNOTHERAPY PRESCRIPTION     5 mL    Name of Mix: Mix #4  Weeds Kochia 1:20 w/v, HS 0.5 ml Lamb's Quarters 1:20 w/v, HS 0.5 ml Nettle 1:20 w/v, HS 0.5 ml Plantain, English 1:20 w/v, HS 0.5 ml Ragweed Mixed 1:20 w/v ALK  0.5 ml Russian Thistle 1:20 w/v, HS 0.5 ml Sagebrush, Mugwort 1:20 w/v, HS 0.5 ml Sorrel, Sheep 1:20 w/v, HS 0.5 ml Diluent: HSA qs to 5ml    Chronic allergic rhinitis due to animal hair and dander, Allergic rhinitis due to dust mite, Allergic rhinitis due to mold, Chronic seasonal allergic rhinitis due to pollen       amoxicillin 500 MG capsule     AMOXIL    30 capsule    Take 1 capsule (500 mg) by mouth 3 times daily    Acute sinusitis with symptoms > 10 days       azelastine 0.05 % Soln ophthalmic solution    OPTIVAR    1 Bottle    Apply 1 drop to eye 2 times daily    Conjunctivitis, allergic, unspecified laterality       CERAVE Crea     2 Bottle    Externally apply 1 dose * topically 2 times daily    Eczema, unspecified type       cetirizine 10 MG tablet    zyrTEC    30 tablet    Take 1 tablet (10 mg) by mouth At Bedtime    Allergic conjunctivitis of both eyes, Chronic allergic rhinitis due to animal hair and dander, Allergic rhinitis due to dust mite, Allergic rhinitis due to mold, Chronic seasonal allergic rhinitis due to pollen       EPINEPHrine 0.3 MG/0.3ML injection 2-pack    EPIPEN/ADRENACLICK/or ANY BX GENERIC EQUIV    0.6 mL    Inject 0.3 mLs (0.3 mg) into the muscle once as needed for anaphylaxis    Food allergy, Need for desensitization to allergens       fluticasone 50 MCG/ACT spray    FLONASE    1 Bottle    Spray 2 sprays into both nostrils daily    Chronic allergic rhinitis due to animal hair and dander, Allergic rhinitis due to dust mite, Allergic rhinitis due to mold, Chronic seasonal allergic rhinitis due to pollen       fluticasone-salmeterol 500-50 MCG/DOSE diskus inhaler    ADVAIR    3 Inhaler    Inhale 1 puff into the lungs 2 times daily    Moderate persistent asthma without complication       loratadine 10 MG tablet    CLARITIN    30 tablet    Take 1 tablet (10 mg) by mouth daily    Allergic conjunctivitis of both eyes, Chronic allergic rhinitis due to animal hair and dander, Allergic rhinitis due to dust mite, Allergic rhinitis due to mold, Chronic seasonal allergic rhinitis due to pollen       montelukast 10 MG tablet    SINGULAIR    30 tablet    Take 1 tablet (10 mg) by mouth At Bedtime    Moderate persistent asthma without complication, Chronic allergic rhinitis due to animal hair and dander, Allergic rhinitis due to dust mite,  Allergic rhinitis due to mold, Chronic seasonal allergic rhinitis due to pollen       MULTIVITAMIN PO      1 tab daily        * order for DME     1 Units    Equipment being ordered: knee scooter    Closed non-physeal fracture of phalanx of right great toe, unspecified phalanx, initial encounter       * order for DME     1 Device    Equipment being ordered: Set of Crutches  [place] [facility] [phone]    Closed non-physeal fracture of phalanx of right great toe, unspecified phalanx, initial encounter       * order for DME     1 Units    Equipment being ordered: Dynaflex insert    Closed displaced fracture of proximal phalanx of right great toe, initial encounter       * triamcinolone 0.1 % cream    KENALOG    80 g    APPLY SPARINGLY TO AFFECTED AREA THREE TIMES A DAY AS NEEDED    Dermatitis       * triamcinolone 0.1 % cream    KENALOG    80 g    Apply  topically 2 times daily as needed.    Eczema, unspecified type       * Notice:  This list has 11 medication(s) that are the same as other medications prescribed for you. Read the directions carefully, and ask your doctor or other care provider to review them with you.

## 2017-12-13 NOTE — PATIENT INSTRUCTIONS
If you have any questions regarding your allergies, asthma, or what we discussed during your visit today please call the allergy clinic or contact us via [employer].    [facility]: [phone]      Take the amoxicillin 3 times day for 10 days. Take this with food. Follow-up if you are not getting better. Continue to use your albuterol as needed.

## 2017-12-14 DIAGNOSIS — H10.10 CONJUNCTIVITIS, ALLERGIC, UNSPECIFIED LATERALITY: ICD-10-CM

## 2017-12-14 DIAGNOSIS — Z91.018 FOOD ALLERGY: ICD-10-CM

## 2017-12-14 DIAGNOSIS — Z51.6 NEED FOR DESENSITIZATION TO ALLERGENS: ICD-10-CM

## 2017-12-15 ENCOUNTER — ALLIED HEALTH/NURSE VISIT (OUTPATIENT)
Dept: ALLERGY | Facility: CLINIC | Age: 52
End: 2017-12-15
Payer: COMMERCIAL

## 2017-12-15 DIAGNOSIS — J30.2 CHRONIC SEASONAL ALLERGIC RHINITIS DUE TO OTHER ALLERGEN: Primary | ICD-10-CM

## 2017-12-15 PROCEDURE — 99207 ZZC NO CHARGE LOS: CPT

## 2017-12-15 PROCEDURE — 95117 IMMUNOTHERAPY INJECTIONS: CPT

## 2017-12-15 RX ORDER — EPINEPHRINE 0.3 MG/.3ML
0.3 INJECTION SUBCUTANEOUS
Qty: 0.6 ML | Refills: 3 | Status: SHIPPED | OUTPATIENT
Start: 2017-12-15 | End: 2019-04-12

## 2017-12-15 RX ORDER — AZELASTINE HYDROCHLORIDE 0.5 MG/ML
1 SOLUTION/ DROPS OPHTHALMIC 2 TIMES DAILY
Qty: 1 BOTTLE | Refills: 5 | Status: SHIPPED | OUTPATIENT
Start: 2017-12-15 | End: 2018-12-05

## 2017-12-15 NOTE — TELEPHONE ENCOUNTER
Rx request is for epi-pen and azelastine eye drops.  Both meds filled per [facility] protocol.  Dee Martínez RN

## 2017-12-15 NOTE — MR AVS SNAPSHOT
After Visit Summary   12/15/2017    Dilia Solomon    MRN: [id number]           Patient Information     Date Of Birth          1965        Visit Information        Provider Department      12/15/2017 7:00 AM ALLERGY [facility]        Today's Diagnoses     Chronic seasonal allergic rhinitis due to other allergen    -  1       Follow-ups after your visit        Your next 10 appointments already scheduled     Dec 29, 2017  7:00 AM CST   Nurse Only with ALLERGY [facility] ([facility])    [address] [facility] [facility] [address]   [phone]           Every allergy patient MUST wait 30 minutes after their allergy shot. No exceptions.  Xolair shots #1-3 should plan to wait 2 hours in clinic Xolair shots after #4 should plan 30 minute wait in clinic            Erasto 10, 2018  1:40 PM CST   New Visit with Carlos Vallejo DPM   [facility]    [address] [place] [address]   [phone]            Jun 06, 2018  8:20 AM CDT   Return Visit with Butch Horowitz MD   [facility] ([facility])    [address] [facility] [address]   [phone]              Who to contact     If you have questions or need follow up information about today's clinic visit or your schedule please contact [facility] directly at [phone].  Normal or non-critical lab and imaging results will be communicated to you by MyChart, letter or phone within 4 business days after the clinic has received the results. If you do not hear from us within 7 days, please contact the clinic through MyChart or phone. If you have a critical or abnormal lab result, we will notify you by phone as soon as possible.  Submit refill requests through [employer] or call your pharmacy and they will forward the refill request to us. Please allow 3 business days for your refill to be  completed.          Additional Information About Your Visit        Care EveryWhere ID     This is your Care EveryWhere ID. This could be used by other organizations to access your [place] medical records  [id number]        Your Vitals Were     Last Period                   07/18/2015 (Approximate)            Blood Pressure from Last 3 Encounters:   12/13/17 126/88   12/06/17 118/75   12/04/17 120/60    Weight from Last 3 Encounters:   12/13/17 96.6 kg (212 lb 15.4 oz)   12/13/17 96.6 kg (213 lb)   12/06/17 96.9 kg (213 lb 10 oz)              We Performed the Following     Allergy Shot: Two or more injections        Primary Care Provider Office Phone # Fax #    Cookie Conklin [phone] [address] [place] [address]        Equal Access to Services     KODY CHEN : Hadii ulices amaral hadasho Soomaali, waaxda luqadaha, qaybta kaalmada adeegyada, hieu shen . So [facility] [phone].    ATENCIÓN: Si habla español, tiene a pruitt disposición servicios gratuitos de asistencia lingüística. Lucas al [phone].    We comply with applicable federal civil rights laws and [place] laws. We do not discriminate on the basis of race, color, national origin, age, disability, sex, sexual orientation, or gender identity.            Thank you!     Thank you for choosing [facility]  for your care. Our goal is always to provide you with excellent care. Hearing back from our patients is one way we can continue to improve our services. Please take a few minutes to complete the written survey that you may receive in the mail after your visit with us. Thank you!             Your Updated Medication List - Protect others around you: Learn how to safely use, store and throw away your medicines at www.disposemymeds.org.          This list is accurate as of: 12/15/17  7:43 AM.  Always use your most recent med list.                   Brand Name Dispense Instructions for use  Diagnosis    * albuterol (2.5 MG/3ML) 0.083% neb solution     1 Box    Take 1 vial (2.5 mg) by nebulization every 4 hours as needed    Moderate persistent asthma, uncomplicated       * albuterol 108 (90 BASE) MCG/ACT Inhaler    PROAIR HFA/PROVENTIL HFA/VENTOLIN HFA    1 Inhaler    Inhale 2 puffs into the lungs every 4 hours as needed    Moderate persistent asthma without complication       * ALLERGEN IMMUNOTHERAPY PRESCRIPTION     5 mL    Name of Mix: Mix #1  Mold Alternaria Tenuis 1:10 w/v, HS  0.5 ml Aspergillus Fumigatus 1:10 w/v, HS  0.5 ml Epicoccum Nigrum 1:10 w/v, HS 0.5 ml Hormodendrum Cladosporioides 1:10 w/v, HS 0.5 ml Penicillium Mix 1:10 w/v, HS  0.5 ml Diluent: HSA qs to 5ml    Chronic allergic rhinitis due to animal hair and dander, Allergic rhinitis due to dust mite, Allergic rhinitis due to mold, Chronic seasonal allergic rhinitis due to pollen       * ALLERGEN IMMUNOTHERAPY PRESCRIPTION     5 mL    Name of Mix: Mix #2  Dust Mite, Cat, Dog Cat Hair, Standardized 10,000 BAU/mL, ALK  2.0 ml Dog Hair Dander, A. P.  1:100 w/v, HS  1.0 ml Dust Mites F 30,000AU/mL, HS  0.3 ml Dust Mites P. 30,000 AU/mL, HS  0.3 ml  Diluent: HSA qs to 5ml    Chronic allergic rhinitis due to animal hair and dander, Allergic rhinitis due to dust mite, Allergic rhinitis due to mold, Chronic seasonal allergic rhinitis due to pollen       * ALLERGEN IMMUNOTHERAPY PRESCRIPTION     5 mL    Name of Mix: Mix #3 Grass,Tree  Dmitry,White 1:20w/v, HS 0.5ml Birch Mix PRW 1:20w/v, HS 0.5ml Boxelder-Maple Mix BHR (Boxelder Hard Red) 1:20w/v, HS 0.5ml [place],Common 1:20w/v, HS 0.5ml Elm,American 1:20w/v, HS 0.5ml [place] Mix 1:20w/v, HS 0.5ml Oak Mix RVW 1:20w/v, HS 0.5ml [place] Tree,Black 1:20w/v, HS 0.5ml Grass Mix #7 100,000 BAU/mL, HS 0.4ml Jonathan Grass 1:20w/v, HS 0.5ml Diluent: HSA qs to 5ml    Chronic allergic rhinitis due to animal hair and dander, Allergic rhinitis due to dust mite, Allergic rhinitis due to mold, Chronic  seasonal allergic rhinitis due to pollen       * ALLERGEN IMMUNOTHERAPY PRESCRIPTION     5 mL    Name of Mix: Mix #4  Weeds Kochia 1:20 w/v, HS 0.5 ml Lamb's Quarters 1:20 w/v, HS 0.5 ml Nettle 1:20 w/v, HS 0.5 ml Plantain, English 1:20 w/v, HS 0.5 ml Ragweed Mixed 1:20 w/v ALK  0.5 ml Russian Thistle 1:20 w/v, HS 0.5 ml Sagebrush, Mugwort 1:20 w/v, HS 0.5 ml Sorrel, Sheep 1:20 w/v, HS 0.5 ml Diluent: HSA qs to 5ml    Chronic allergic rhinitis due to animal hair and dander, Allergic rhinitis due to dust mite, Allergic rhinitis due to mold, Chronic seasonal allergic rhinitis due to pollen       amoxicillin 500 MG capsule    AMOXIL    30 capsule    Take 1 capsule (500 mg) by mouth 3 times daily    Acute sinusitis with symptoms > 10 days       azelastine 0.05 % Soln ophthalmic solution    OPTIVAR    1 Bottle    Apply 1 drop to eye 2 times daily    Conjunctivitis, allergic, unspecified laterality       CERAVE Crea     2 Bottle    Externally apply 1 dose * topically 2 times daily    Eczema, unspecified type       cetirizine 10 MG tablet    zyrTEC    30 tablet    Take 1 tablet (10 mg) by mouth At Bedtime    Allergic conjunctivitis of both eyes, Chronic allergic rhinitis due to animal hair and dander, Allergic rhinitis due to dust mite, Allergic rhinitis due to mold, Chronic seasonal allergic rhinitis due to pollen       EPINEPHrine 0.3 MG/0.3ML injection 2-pack    EPIPEN/ADRENACLICK/or ANY BX GENERIC EQUIV    0.6 mL    Inject 0.3 mLs (0.3 mg) into the muscle once as needed for anaphylaxis    Food allergy, Need for desensitization to allergens       fluticasone 50 MCG/ACT spray    FLONASE    1 Bottle    Spray 2 sprays into both nostrils daily    Chronic allergic rhinitis due to animal hair and dander, Allergic rhinitis due to dust mite, Allergic rhinitis due to mold, Chronic seasonal allergic rhinitis due to pollen       fluticasone-salmeterol 500-50 MCG/DOSE diskus inhaler    ADVAIR    3 Inhaler    Inhale 1 puff into the  lungs 2 times daily    Moderate persistent asthma without complication       loratadine 10 MG tablet    CLARITIN    30 tablet    Take 1 tablet (10 mg) by mouth daily    Allergic conjunctivitis of both eyes, Chronic allergic rhinitis due to animal hair and dander, Allergic rhinitis due to dust mite, Allergic rhinitis due to mold, Chronic seasonal allergic rhinitis due to pollen       montelukast 10 MG tablet    SINGULAIR    30 tablet    Take 1 tablet (10 mg) by mouth At Bedtime    Moderate persistent asthma without complication, Chronic allergic rhinitis due to animal hair and dander, Allergic rhinitis due to dust mite, Allergic rhinitis due to mold, Chronic seasonal allergic rhinitis due to pollen       MULTIVITAMIN PO      1 tab daily        * order for DME     1 Units    Equipment being ordered: knee scooter    Closed non-physeal fracture of phalanx of right great toe, unspecified phalanx, initial encounter       * order for DME     1 Device    Equipment being ordered: Set of [employer]  [place] [employer] [phone]    Closed non-physeal fracture of phalanx of right great toe, unspecified phalanx, initial encounter       * order for DME     1 Units    Equipment being ordered: Dynaflex insert    Closed displaced fracture of proximal phalanx of right great toe, initial encounter       * triamcinolone 0.1 % cream    KENALOG    80 g    APPLY SPARINGLY TO AFFECTED AREA THREE TIMES A DAY AS NEEDED    Dermatitis       * triamcinolone 0.1 % cream    KENALOG    80 g    Apply  topically 2 times daily as needed.    Eczema, unspecified type       * Notice:  This list has 11 medication(s) that are the same as other medications prescribed for you. Read the directions carefully, and ask your doctor or other care provider to review them with you.

## 2017-12-16 ENCOUNTER — HEALTH MAINTENANCE LETTER (OUTPATIENT)
Age: 52
End: 2017-12-16

## 2017-12-29 ENCOUNTER — ALLIED HEALTH/NURSE VISIT (OUTPATIENT)
Dept: ALLERGY | Facility: CLINIC | Age: 52
End: 2017-12-29
Payer: COMMERCIAL

## 2017-12-29 DIAGNOSIS — J30.2 CHRONIC SEASONAL ALLERGIC RHINITIS DUE TO OTHER ALLERGEN: Primary | ICD-10-CM

## 2017-12-29 PROCEDURE — 99207 ZZC NO CHARGE LOS: CPT

## 2017-12-29 PROCEDURE — 95117 IMMUNOTHERAPY INJECTIONS: CPT

## 2017-12-29 NOTE — MR AVS SNAPSHOT
After Visit Summary   12/29/2017    Dilia Solomon    MRN: [id number]           Patient Information     Date Of Birth          1965        Visit Information        Provider Department      12/29/2017 7:00 AM ALLERGY [facility]        Today's Diagnoses     Chronic seasonal allergic rhinitis due to other allergen    -  1       Follow-ups after your visit        Your next 10 appointments already scheduled     Erasto 10, 2018  1:40 PM CST   [facility] Visit with Carlos Vallejo DPM   [facility]    [address] [facility] [address]   [phone]            Jan 12, 2018  7:00 AM CST   Nurse Only with ALLERGY [facility] ([facility])    [address] [address]   [phone]           Every allergy patient MUST wait 30 minutes after their allergy shot. No exceptions.  Xolair shots #1-3 should plan to wait 2 hours in clinic Xolair shots after #4 should plan 30 minute wait in clinic            Jan 26, 2018  7:00 AM CST   Nurse Only with ALLERGY [facility] ([facility])    [address] [facility] [address]   [phone]           Every allergy patient MUST wait 30 minutes after their allergy shot. No exceptions.  Xolair shots #1-3 should plan to wait 2 hours in clinic Xolair shots after #4 should plan 30 minute wait in clinic            Jun 06, 2018  8:20 AM CDT   Return Visit with Butch Horowitz MD   [facility]    [address] [facility] [address]   [phone]              Who to contact     If you have questions or need follow up information about today's clinic visit or your schedule please contact [facility] directly at [phone].  Normal or non-critical lab and imaging results will be communicated to you by MyChart, letter or phone within 4  business days after the clinic has received the results. If you do not hear from us within 7 days, please contact the clinic through [employer] or phone. If you have a critical or abnormal lab result, we will notify you by phone as soon as possible.  Submit refill requests through [employer] or call your pharmacy and they will forward the refill request to us. Please allow 3 business days for your refill to be completed.          Additional Information About Your Visit        Care EveryWhere ID     This is your Care EveryWhere ID. This could be used by other organizations to access your [place] medical records  [id number]        Your Vitals Were     Last Period                   07/18/2015 (Approximate)            Blood Pressure from Last 3 Encounters:   12/13/17 126/88   12/06/17 118/75   12/04/17 120/60    Weight from Last 3 Encounters:   12/13/17 96.6 kg (212 lb 15.4 oz)   12/13/17 96.6 kg (213 lb)   12/06/17 96.9 kg (213 lb 10 oz)              We Performed the Following     Allergy Shot: Two or more injections        Primary Care Provider Office Phone # Fax #    Cookie Conklin [phone]       [address] [place] [address]        Equal Access to Services     KODY CHEN AH: Hadii ulices muñozo Sofaith, waaxda luqadaha, qaybta kaalmada adeegyada, hieu howard. So [facility] [phone].    ATENCIÓN: Si habla español, tiene a pruitt disposición servicios gratuitos de asistencia lingüística. Lucas al [phone].    We comply with applicable federal civil rights laws and [place] laws. We do not discriminate on the basis of race, color, national origin, age, disability, sex, sexual orientation, or gender identity.            Thank you!     Thank you for choosing [facility]  for your care. Our goal is always to provide you with excellent care. Hearing back from our patients is one way we can continue to improve our services. Please take a few minutes to  complete the written survey that you may receive in the mail after your visit with us. Thank you!             Your Updated Medication List - Protect others around you: Learn how to safely use, store and throw away your medicines at www.disposemymeds.org.          This list is accurate as of: 12/29/17  7:53 AM.  Always use your most recent med list.                   Brand Name Dispense Instructions for use Diagnosis    * albuterol (2.5 MG/3ML) 0.083% neb solution     1 Box    Take 1 vial (2.5 mg) by nebulization every 4 hours as needed    Moderate persistent asthma, uncomplicated       * albuterol 108 (90 BASE) MCG/ACT Inhaler    PROAIR HFA/PROVENTIL HFA/VENTOLIN HFA    1 Inhaler    Inhale 2 puffs into the lungs every 4 hours as needed    Moderate persistent asthma without complication       * ALLERGEN IMMUNOTHERAPY PRESCRIPTION     5 mL    Name of Mix: Mix #1  Mold Alternaria Tenuis 1:10 w/v, HS  0.5 ml Aspergillus Fumigatus 1:10 w/v, HS  0.5 ml Epicoccum Nigrum 1:10 w/v, HS 0.5 ml Hormodendrum Cladosporioides 1:10 w/v, HS 0.5 ml Penicillium Mix 1:10 w/v, HS  0.5 ml Diluent: HSA qs to 5ml    Chronic allergic rhinitis due to animal hair and dander, Allergic rhinitis due to dust mite, Allergic rhinitis due to mold, Chronic seasonal allergic rhinitis due to pollen       * ALLERGEN IMMUNOTHERAPY PRESCRIPTION     5 mL    Name of Mix: Mix #2  Dust Mite, Cat, Dog Cat Hair, Standardized 10,000 BAU/mL, ALK  2.0 ml Dog Hair Dander, A. P.  1:100 w/v, HS  1.0 ml Dust Mites F 30,000AU/mL, HS  0.3 ml Dust Mites P. 30,000 AU/mL, HS  0.3 ml  Diluent: HSA qs to 5ml    Chronic allergic rhinitis due to animal hair and dander, Allergic rhinitis due to dust mite, Allergic rhinitis due to mold, Chronic seasonal allergic rhinitis due to pollen       * ALLERGEN IMMUNOTHERAPY PRESCRIPTION     5 mL    Name of Mix: Mix #3 Grass,Tree  Dmitry,White 1:20w/v, HS 0.5ml Birch Mix PRW 1:20w/v, HS 0.5ml Boxelder-Maple Mix BHR (Boxelder Hard Red)  1:20w/v, HS 0.5ml [place],Common 1:20w/v, HS 0.5ml Elm,American 1:20w/v, HS 0.5ml [place] Mix 1:20w/v, HS 0.5ml Oak Mix RVW 1:20w/v, HS 0.5ml [place] Tree,Black 1:20w/v, HS 0.5ml Grass Mix #7 100,000 BAU/mL, HS 0.4ml Jonathan Grass 1:20w/v, HS 0.5ml Diluent: HSA qs to 5ml    Chronic allergic rhinitis due to animal hair and dander, Allergic rhinitis due to dust mite, Allergic rhinitis due to mold, Chronic seasonal allergic rhinitis due to pollen       * ALLERGEN IMMUNOTHERAPY PRESCRIPTION     5 mL    Name of Mix: Mix #4  Weeds Kochia 1:20 w/v, HS 0.5 ml Lamb's Quarters 1:20 w/v, HS 0.5 ml Nettle 1:20 w/v, HS 0.5 ml Plantain, English 1:20 w/v, HS 0.5 ml Ragweed Mixed 1:20 w/v ALK  0.5 ml Russian Thistle 1:20 w/v, HS 0.5 ml Sagebrush, Mugwort 1:20 w/v, HS 0.5 ml Sorrel, Sheep 1:20 w/v, HS 0.5 ml Diluent: HSA qs to 5ml    Chronic allergic rhinitis due to animal hair and dander, Allergic rhinitis due to dust mite, Allergic rhinitis due to mold, Chronic seasonal allergic rhinitis due to pollen       amoxicillin 500 MG capsule    AMOXIL    30 capsule    Take 1 capsule (500 mg) by mouth 3 times daily    Acute sinusitis with symptoms > 10 days       azelastine 0.05 % Soln ophthalmic solution    OPTIVAR    1 Bottle    Apply 1 drop to eye 2 times daily    Conjunctivitis, allergic, unspecified laterality       CERAVE Crea     2 Bottle    Externally apply 1 dose * topically 2 times daily    Eczema, unspecified type       cetirizine 10 MG tablet    zyrTEC    30 tablet    Take 1 tablet (10 mg) by mouth At Bedtime    Allergic conjunctivitis of both eyes, Chronic allergic rhinitis due to animal hair and dander, Allergic rhinitis due to dust mite, Allergic rhinitis due to mold, Chronic seasonal allergic rhinitis due to pollen       EPINEPHrine 0.3 MG/0.3ML injection 2-pack    EPIPEN/ADRENACLICK/or ANY BX GENERIC EQUIV    0.6 mL    Inject 0.3 mLs (0.3 mg) into the muscle once as needed for anaphylaxis    Food allergy, Need for  desensitization to allergens       fluticasone 50 MCG/ACT spray    FLONASE    1 Bottle    Spray 2 sprays into both nostrils daily    Chronic allergic rhinitis due to animal hair and dander, Allergic rhinitis due to dust mite, Allergic rhinitis due to mold, Chronic seasonal allergic rhinitis due to pollen       fluticasone-salmeterol 500-50 MCG/DOSE diskus inhaler    ADVAIR    3 Inhaler    Inhale 1 puff into the lungs 2 times daily    Moderate persistent asthma without complication       loratadine 10 MG tablet    CLARITIN    30 tablet    Take 1 tablet (10 mg) by mouth daily    Allergic conjunctivitis of both eyes, Chronic allergic rhinitis due to animal hair and dander, Allergic rhinitis due to dust mite, Allergic rhinitis due to mold, Chronic seasonal allergic rhinitis due to pollen       montelukast 10 MG tablet    SINGULAIR    30 tablet    Take 1 tablet (10 mg) by mouth At Bedtime    Moderate persistent asthma without complication, Chronic allergic rhinitis due to animal hair and dander, Allergic rhinitis due to dust mite, Allergic rhinitis due to mold, Chronic seasonal allergic rhinitis due to pollen       MULTIVITAMIN PO      1 tab daily        * order for DME     1 Units    Equipment being ordered: knee scooter    Closed non-physeal fracture of phalanx of right great toe, unspecified phalanx, initial encounter       * order for DME     1 Device    Equipment being ordered: Set of [employer]  [place] [employer] [place] [phone]    Closed non-physeal fracture of phalanx of right great toe, unspecified phalanx, initial encounter       * order for DME     1 Units    Equipment being ordered: Dynaflex insert    Closed displaced fracture of proximal phalanx of right great toe, initial encounter       * triamcinolone 0.1 % cream    KENALOG    80 g    APPLY SPARINGLY TO AFFECTED AREA THREE TIMES A DAY AS NEEDED    Dermatitis       * triamcinolone 0.1 % cream    KENALOG    80 g    Apply  topically 2 times daily as needed.     Eczema, unspecified type       * Notice:  This list has 11 medication(s) that are the same as other medications prescribed for you. Read the directions carefully, and ask your doctor or other care provider to review them with you.

## 2018-01-10 ENCOUNTER — RADIANT APPOINTMENT (OUTPATIENT)
Dept: GENERAL RADIOLOGY | Facility: CLINIC | Age: 53
End: 2018-01-10
Attending: NURSE PRACTITIONER
Payer: COMMERCIAL

## 2018-01-10 ENCOUNTER — OFFICE VISIT (OUTPATIENT)
Dept: PODIATRY | Facility: CLINIC | Age: 53
End: 2018-01-10
Payer: COMMERCIAL

## 2018-01-10 VITALS — HEIGHT: 63 IN | HEART RATE: 80 BPM | WEIGHT: 212 LBS | BODY MASS INDEX: 37.56 KG/M2

## 2018-01-10 DIAGNOSIS — S92.411D CLOSED DISPLACED FRACTURE OF PROXIMAL PHALANX OF RIGHT GREAT TOE WITH ROUTINE HEALING, SUBSEQUENT ENCOUNTER: Primary | ICD-10-CM

## 2018-01-10 PROCEDURE — 99213 OFFICE O/P EST LOW 20 MIN: CPT | Performed by: PODIATRIST

## 2018-01-10 PROCEDURE — 73660 X-RAY EXAM OF TOE(S): CPT | Mod: RT

## 2018-01-10 NOTE — MR AVS SNAPSHOT
After Visit Summary   1/10/2018    Dilia Solomon    MRN: [id number]           Patient Information     Date Of Birth          1965        Visit Information        Provider Department      1/10/2018 1:40 PM Carlos Vallejo DPM [facility]        Today's Diagnoses     Closed displaced fracture of proximal phalanx of right great toe with routine healing, subsequent encounter    -  1      Care Instructions    TOE & METATARSAL FRACTURES  The structure of the foot is complex, consisting of bones, muscles, tendons, and other soft tissues. Of the 26 bones in the foot, 19 are toe bones (phalanges) and metatarsal bones (the long bones in the midfoot). Fractures of the toe and metatarsal bones are common and require evaluation by a specialist. A foot and ankle surgeon should be seen for proper diagnosis and treatment, even if initial treatment has been received in an emergency room.  A fracture is a break in the bone. Fractures can be divided into two categories: traumatic fractures and stress fractures.  TRAMATIC FRACTURES (also called acute fractures) are caused by a direct blow or impact, such as seriously stubbing your toe. Traumatic fractures can be displaced or non-displaced. If the fracture is displaced, the bone is broken in such a way that it has changed in position (dislocated).  Signs and symptoms of a traumatic fracture include:  You may hear a sound at the time of the break.    Pinpoint pain  (pain at the place of impact) at the time the fracture occurs and perhaps for a few hours later, but often the pain goes away after several hours.   Crooked or abnormal appearance of the toe.   Bruising and swelling the next day.   It is not true that  if you can walk on it, it s not broken.  Evaluation by a foot and ankle surgeon is always recommended.   STRESS FRACTURES are tiny, hairline breaks that are usually caused by repetitive stress. Stress fractures often afflict athletes who,  for example, too rapidly increase their running mileage. They can also be caused by an abnormal foot structure, deformities, or osteoporosis. Improper footwear may also lead to stress fractures. Stress fractures should not be ignored. They require proper medical attention to heal correctly.  Symptoms of stress fractures include:  Pain with or after normal activity   Pain that goes away when resting and then returns when standing or during activity    Pinpoint pain  (pain at the site of the fracture) when touched   Swelling, but no bruising   IMPROPER TREATMENT  Some people say that  the doctor can t do anything for a broken bone in the foot.  This is usually not true. In fact, if a fractured toe or metatarsal bone is not treated correctly, serious complications may develop. For example:  A deformity in the bony architecture which may limit the ability to move the foot or cause difficulty in fitting shoes   Arthritis, which may be caused by a fracture in a joint (the juncture where two bones meet), or may be a result of angular deformities that develop when a displaced fracture is severe or hasn t been properly corrected   Chronic pain and deformity   Non-union, or failure to heal, can lead to subsequent surgery or chronic pain.   PROPER TREATMENT FOR TOES  Fractures of the toe bones are almost always traumatic fractures. Treatment for traumatic fractures depends on the break itself and may include these options:  Rest. Sometimes rest is all that is needed to treat a traumatic fracture of the toe.   Splinting. The toe may be fitted with a splint to keep it in a fixed position.   Rigid or stiff-soled shoe. Wearing a stiff-soled shoe protects the toe and helps keep it properly positioned.    Brad taping  the fractured toe to another toe is sometimes appropriate, but in other cases it may be harmful.   Surgery. If the break is badly displaced or if the joint is affected, surgery may be necessary. Surgery often involves  the use of fixation devices, such as pins.   PROPER TREATMENT OF METATARSALS  Breaks in the metatarsal bones may be either stress or traumatic fractures. Certain kinds of fractures of the metatarsal bones present unique challenges.  For example, sometimes a fracture of the first metatarsal bone (behind the big toe) can lead to arthritis. Since the big toe is used so frequently and bears more weight than other toes, arthritis in that area can make it painful to walk, bend, or even stand.  Another type of break, called a Looney fracture, occurs at the base of the fifth metatarsal bone (behind the little toe). It is often misdiagnosed as an ankle sprain, and misdiagnosis can have serious consequences since sprains and fractures require different treatments. Your foot and ankle surgeon is an expert in correctly identifying these conditions as well as other problems of the foot.  Treatment of metatarsal fractures depends on the type and extent of the fracture, and may include:  Rest. Sometimes rest is the only treatment needed to promote healing of a stress or traumatic fracture of a metatarsal bone.   Avoid the offending activity. Because stress fractures result from repetitive stress, it is important to avoid the activity that led to the fracture. Crutches or a wheelchair are sometimes required to offload weight from the foot to give it time to heal.   Immobilization, casting, or rigid shoe. A stiff-soled shoe or other form of immobilization may be used to protect the fractured bone while it is healing.   Surgery. Some traumatic fractures of the metatarsal bones require surgery, especially if the break is badly displaced.   Follow-up care. Your foot and ankle surgeon will provide instructions for care following surgical or non-surgical treatment. Physical therapy, exercises and rehabilitation may be included in a schedule for return to normal activities.             Follow-ups after your visit        Follow-up notes  from your care team     Return in about 4 weeks (around 2/7/2018).      Your next 10 appointments already scheduled     Jan 24, 2018 12:00 PM CST   [facility]    [address] [address]   [phone]            Jan 26, 2018  7:00 AM CST   Nurse Only with ALLERGY MA - [facility] ([facility])    [address] [address]   [phone]           Every allergy patient MUST wait 30 minutes after their allergy shot. No exceptions.  Xolair shots #1-3 should plan to wait 2 hours in clinic Xolair shots after #4 should plan 30 minute wait in clinic            Feb 07, 2018  1:40 PM CST   New Visit with Carlos Vallejo DPM   [facility] ([facility])    [address] [facility] [address]   [phone]            Jun 06, 2018  8:20 AM CDT   Return Visit with Butch Horowitz MD   [facility]    [address] [address]   [phone]              Who to contact     If you have questions or need follow up information about today's clinic visit or your schedule please contact [facility] directly at [phone].  Normal or non-critical lab and imaging results will be communicated to you by MyChart, letter or phone within 4 business days after the clinic has received the results. If you do not hear from us within 7 days, please contact the clinic through MyChart or phone. If you have a critical or abnormal lab result, we will notify you by phone as soon as possible.  Submit refill requests through [employer] or call your pharmacy and they will forward the refill request to us. Please allow 3 business days for your refill to be completed.          Additional Information About Your Visit        Care EveryWhere ID     This is your Care EveryWhere ID. This could be  "used by other organizations to access your [facility] medical records  [id number]        Your Vitals Were     Pulse Height Last Period BMI (Body Mass Index)          80 5' 3.27\" (1.607 m) 07/18/2015 (Approximate) 37.23 kg/m2         Blood Pressure from Last 3 Encounters:   01/17/18 120/81   12/13/17 126/88   12/06/17 118/75    Weight from Last 3 Encounters:   01/17/18 212 lb 1.3 oz (96.2 kg)   01/10/18 212 lb (96.2 kg)   12/13/17 212 lb 15.4 oz (96.6 kg)              Today, you had the following     No orders found for display       Primary Care Provider Office Phone # Fax #    Cookie Conklin [phone] [address] [place] [address]        Equal Access to Services     [facility]: Hadii ulices chino Sofaith, waaxda luqadaha, qaybta kaalmada kanwalyamairaa, hieu shen . So [facility] [phone].    ATENCIÓN: Si habla español, tiene a pruitt disposición servicios gratuitos de asistencia lingüística. Lucas al [phone].    We comply with applicable federal civil rights laws and [place] laws. We do not discriminate on the basis of race, color, national origin, age, disability, sex, sexual orientation, or gender identity.            Thank you!     Thank you for choosing [facility]  for your care. Our goal is always to provide you with excellent care. Hearing back from our patients is one way we can continue to improve our services. Please take a few minutes to complete the written survey that you may receive in the mail after your visit with us. Thank you!             Your Updated Medication List - Protect others around you: Learn how to safely use, store and throw away your medicines at www.disposemymeds.org.          This list is accurate as of: 1/10/18 11:59 PM.  Always use your most recent med list.                   Brand Name Dispense Instructions for use Diagnosis    * albuterol (2.5 MG/3ML) 0.083% neb solution     1 Box    Take 1 vial (2.5 " mg) by nebulization every 4 hours as needed    Moderate persistent asthma, uncomplicated       * albuterol 108 (90 BASE) MCG/ACT Inhaler    PROAIR HFA/PROVENTIL HFA/VENTOLIN HFA    1 Inhaler    Inhale 2 puffs into the lungs every 4 hours as needed    Moderate persistent asthma without complication       * ALLERGEN IMMUNOTHERAPY PRESCRIPTION     5 mL    Name of Mix: Mix #1  Mold Alternaria Tenuis 1:10 w/v, HS  0.5 ml Aspergillus Fumigatus 1:10 w/v, HS  0.5 ml Epicoccum Nigrum 1:10 w/v, HS 0.5 ml Hormodendrum Cladosporioides 1:10 w/v, HS 0.5 ml Penicillium Mix 1:10 w/v, HS  0.5 ml Diluent: HSA qs to 5ml    Chronic allergic rhinitis due to animal hair and dander, Allergic rhinitis due to dust mite, Allergic rhinitis due to mold, Chronic seasonal allergic rhinitis due to pollen       * ALLERGEN IMMUNOTHERAPY PRESCRIPTION     5 mL    Name of Mix: Mix #2  Dust Mite, Cat, Dog Cat Hair, Standardized 10,000 BAU/mL, ALK  2.0 ml Dog Hair Dander, A. P.  1:100 w/v, HS  1.0 ml Dust Mites F 30,000AU/mL, HS  0.3 ml Dust Mites P. 30,000 AU/mL, HS  0.3 ml  Diluent: HSA qs to 5ml    Chronic allergic rhinitis due to animal hair and dander, Allergic rhinitis due to dust mite, Allergic rhinitis due to mold, Chronic seasonal allergic rhinitis due to pollen       * ALLERGEN IMMUNOTHERAPY PRESCRIPTION     5 mL    Name of Mix: Mix #3 Grass,Tree  Dmitry,White 1:20w/v, HS 0.5ml Birch Mix PRW 1:20w/v, HS 0.5ml Boxelder-Maple Mix BHR (Boxelder Hard Red) 1:20w/v, HS 0.5ml [place],Common 1:20w/v, HS 0.5ml Elm,American 1:20w/v, HS 0.5ml [place] Mix 1:20w/v, HS 0.5ml Oak Mix RVW 1:20w/v, HS 0.5ml [place] Tree,Black 1:20w/v, HS 0.5ml Grass Mix #7 100,000 BAU/mL, HS 0.4ml Jonathan Grass 1:20w/v, HS 0.5ml Diluent: HSA qs to 5ml    Chronic allergic rhinitis due to animal hair and dander, Allergic rhinitis due to dust mite, Allergic rhinitis due to mold, Chronic seasonal allergic rhinitis due to pollen       * ALLERGEN IMMUNOTHERAPY PRESCRIPTION     5 mL     Name of Mix: Mix #4  Weeds Kochia 1:20 w/v, HS 0.5 ml Lamb's Quarters 1:20 w/v, HS 0.5 ml Nettle 1:20 w/v, HS 0.5 ml Plantain, English 1:20 w/v, HS 0.5 ml Ragweed Mixed 1:20 w/v ALK  0.5 ml Russian Thistle 1:20 w/v, HS 0.5 ml Sagebrush, Mugwort 1:20 w/v, HS 0.5 ml Sorrel, Sheep 1:20 w/v, HS 0.5 ml Diluent: HSA qs to 5ml    Chronic allergic rhinitis due to animal hair and dander, Allergic rhinitis due to dust mite, Allergic rhinitis due to mold, Chronic seasonal allergic rhinitis due to pollen       azelastine 0.05 % Soln ophthalmic solution    OPTIVAR    1 Bottle    Apply 1 drop to eye 2 times daily    Conjunctivitis, allergic, unspecified laterality       CERAVE Crea     2 Bottle    Externally apply 1 dose * topically 2 times daily    Eczema, unspecified type       cetirizine 10 MG tablet    zyrTEC    30 tablet    Take 1 tablet (10 mg) by mouth At Bedtime    Allergic conjunctivitis of both eyes, Chronic allergic rhinitis due to animal hair and dander, Allergic rhinitis due to dust mite, Allergic rhinitis due to mold, Chronic seasonal allergic rhinitis due to pollen       EPINEPHrine 0.3 MG/0.3ML injection 2-pack    EPIPEN/ADRENACLICK/or ANY BX GENERIC EQUIV    0.6 mL    Inject 0.3 mLs (0.3 mg) into the muscle once as needed for anaphylaxis    Food allergy, Need for desensitization to allergens       fluticasone 50 MCG/ACT spray    FLONASE    1 Bottle    Spray 2 sprays into both nostrils daily    Chronic allergic rhinitis due to animal hair and dander, Allergic rhinitis due to dust mite, Allergic rhinitis due to mold, Chronic seasonal allergic rhinitis due to pollen       fluticasone-salmeterol 500-50 MCG/DOSE diskus inhaler    ADVAIR    3 Inhaler    Inhale 1 puff into the lungs 2 times daily    Moderate persistent asthma without complication       loratadine 10 MG tablet    CLARITIN    30 tablet    Take 1 tablet (10 mg) by mouth daily    Allergic conjunctivitis of both eyes, Chronic allergic rhinitis due to  animal hair and dander, Allergic rhinitis due to dust mite, Allergic rhinitis due to mold, Chronic seasonal allergic rhinitis due to pollen       montelukast 10 MG tablet    SINGULAIR    30 tablet    Take 1 tablet (10 mg) by mouth At Bedtime    Moderate persistent asthma without complication, Chronic allergic rhinitis due to animal hair and dander, Allergic rhinitis due to dust mite, Allergic rhinitis due to mold, Chronic seasonal allergic rhinitis due to pollen       MULTIVITAMIN PO      1 tab daily        order for DME     1 Units    Equipment being ordered: Dynaflex insert    Closed displaced fracture of proximal phalanx of right great toe, initial encounter       * triamcinolone 0.1 % cream    KENALOG    80 g    APPLY SPARINGLY TO AFFECTED AREA THREE TIMES A DAY AS NEEDED    Dermatitis       * triamcinolone 0.1 % cream    KENALOG    80 g    Apply  topically 2 times daily as needed.    Eczema, unspecified type       * Notice:  This list has 8 medication(s) that are the same as other medications prescribed for you. Read the directions carefully, and ask your doctor or other care provider to review them with you.

## 2018-01-10 NOTE — PROGRESS NOTES
Dilia returns to the office for reevaluation of the right foot.  The patient relates following the instructions given at the last visit with noted less pain.  The patient relates overall more  improvement in pain and function of the right foot.  The patient relates no other problems.    PAST MEDICAL HISTORY:   Past Medical History:   Diagnosis Date     Injury, other and unspecified, shoulder and upper arm 9/03       BMI= Body mass index is 37.23 kg/(m^2).    Weight management plan: Patient was referred to their PCP to discuss a diet and exercise plan.    Physical Exam:    General: The patient appears to have a pleasant mental affect.    Lower extremity physical exam:  Neurovascular status is intact with palpable pedal pulses and intact epicritic sensations.  Muscular exam is within normal limits to major muscle groups.  Integument is intact.      One notes decreased edema.  One notes less pain on palpation around the right great toe.  No surrounding erythema.    Radiograph evaluation including weightbearing AP, lateral and medial oblique views of the right foot reveals interval healing with increased trabeculation of the proximal phalangeal fracture.    Assessment:      ICD-10-CM    1. Closed displaced fracture of proximal phalanx of right great toe with routine healing, subsequent encounter S92.411D        Plan:  I have explained to Dilia about the conditions.  At this time, the patient will advance weight bearing as tolerated in supportive, protective shoes.  The patient will return in one month for reevaluation and repeat x-rays.    Disclaimer: This note consists of symbols derived from keyboarding, dictation and/or voice recognition software. As a result, there may be errors in the script that have gone undetected. Please consider this when interpreting information found in this chart.       CAROLE Vallejo D.P.M., TJ.F.A.S.

## 2018-01-10 NOTE — NURSING NOTE
"Chief Complaint   Patient presents with     RECHECK     Right great toe fracture, pt states toe is still hurtful and swollen.       Initial Pulse 80  Ht 5' 3.27\" (1.607 m)  Wt 212 lb (96.2 kg)  LMP 07/18/2015 (Approximate)  BMI 37.23 kg/m2 Estimated body mass index is 37.23 kg/(m^2) as calculated from the following:    Height as of this encounter: 5' 3.27\" (1.607 m).    Weight as of this encounter: 212 lb (96.2 kg).  Medication Reconciliation: complete     Bella Altamirano MA        "

## 2018-01-10 NOTE — LETTER
1/10/2018         RE: Dilia Solomon  [address] [facility] [address]        Dear Colleague,    Thank you for referring your patient, Dilia Solomon, to the [facility]. Please see a copy of my visit note below.    Dilia returns to the office for reevaluation of the right foot.  The patient relates following the instructions given at the last visit with noted less pain.  The patient relates overall more  improvement in pain and function of the right foot.  The patient relates no other problems.    PAST MEDICAL HISTORY:   Past Medical History:   Diagnosis Date     Injury, other and unspecified, shoulder and upper arm 9/03       BMI= Body mass index is 37.23 kg/(m^2).    Weight management plan: Patient was referred to their PCP to discuss a diet and exercise plan.    Physical Exam:    General: The patient appears to have a pleasant mental affect.    Lower extremity physical exam:  Neurovascular status is intact with palpable pedal pulses and intact epicritic sensations.  Muscular exam is within normal limits to major muscle groups.  Integument is intact.      One notes decreased edema.  One notes less pain on palpation around the right great toe.  No surrounding erythema.    Radiograph evaluation including weightbearing AP, lateral and medial oblique views of the right foot reveals interval healing with increased trabeculation of the proximal phalangeal fracture.    Assessment:      ICD-10-CM    1. Closed displaced fracture of proximal phalanx of right great toe with routine healing, subsequent encounter S92.411D        Plan:  I have explained to Dilia about the conditions.  At this time, the patient will advance weight bearing as tolerated in supportive, protective shoes.  The patient will return in one month for reevaluation and repeat x-rays.    Disclaimer: This note consists of symbols derived from keyboarding, dictation and/or voice recognition software. As a result, there may be errors  in the script that have gone undetected. Please consider this when interpreting information found in this chart.       CAROLE Vallejo D.P.M., F.A.C.F.A.S.      Again, thank you for allowing me to participate in the care of your patient.        Sincerely,        Carlos Vallejo DPM

## 2018-01-12 ENCOUNTER — ALLIED HEALTH/NURSE VISIT (OUTPATIENT)
Dept: ALLERGY | Facility: CLINIC | Age: 53
End: 2018-01-12
Payer: COMMERCIAL

## 2018-01-12 DIAGNOSIS — J30.2 CHRONIC SEASONAL ALLERGIC RHINITIS DUE TO OTHER ALLERGEN: Primary | ICD-10-CM

## 2018-01-12 PROCEDURE — 99207 ZZC NO CHARGE LOS: CPT

## 2018-01-12 PROCEDURE — 95117 IMMUNOTHERAPY INJECTIONS: CPT

## 2018-01-12 NOTE — MR AVS SNAPSHOT
After Visit Summary   1/12/2018    Dilia Solomon    MRN: [id number]           Patient Information     Date Of Birth          1965        Visit Information        Provider Department      1/12/2018 7:00 AM [facility]        Today's Diagnoses     Chronic seasonal allergic rhinitis due to other allergen    -  1       Follow-ups after your visit        Your next 10 appointments already scheduled     Jan 26, 2018  7:00 AM CST   Nurse Only with [facility] ([facility])    [address] [place] [address]   [phone]           Every allergy patient MUST wait 30 minutes after their allergy shot. No exceptions.  Xolair shots #1-3 should plan to wait 2 hours in clinic Xolair shots after #4 should plan 30 minute wait in clinic            Feb 07, 2018  1:40 PM CST   New Visit with Carlos Vallejo DPM   [facility] ([facility])    [address] [place] [id number]   [phone]            Jun 06, 2018  8:20 AM CDT   Return Visit with Butch Horowitz MD   [facility] ([facility])    [address] [id number] [phone]              Who to contact     If you have questions or need follow up information about today's clinic visit or your schedule please contact [facility] directly at [phone].  Normal or non-critical lab and imaging results will be communicated to you by MyChart, letter or phone within 4 business days after the clinic has received the results. If you do not hear from us within 7 days, please contact the clinic through MyChart or phone. If you have a critical or abnormal lab result, we will notify you by phone as soon as possible.  Submit refill requests through [employer] or call your pharmacy and they will forward the refill request to us. Please allow 3 business days for your refill to be  completed.          Additional Information About Your Visit        Care EveryWhere ID     This is your Care EveryWhere ID. This could be used by other organizations to access your [facility] medical records  [id number]        Your Vitals Were     Last Period                   07/18/2015 (Approximate)            Blood Pressure from Last 3 Encounters:   12/13/17 126/88   12/06/17 118/75   12/04/17 120/60    Weight from Last 3 Encounters:   01/10/18 96.2 kg (212 lb)   12/13/17 96.6 kg (212 lb 15.4 oz)   12/13/17 96.6 kg (213 lb)              We Performed the Following     Allergy Shot: Two or more injections        Primary Care Provider Office Phone # Fax #    Cookie Conklin [phone] [address] [place] [id number]        Equal Access to Services     [facility] : Hadii aad ku hadasho Soomaali, waaxda luqadaha, qaybta kaalmada adeegyada, waxay america hayfrandy shen . So [facility] [phone].    ATENCIÓN: Si habla español, tiene a pruitt disposición servicios gratuitos de asistencia lingüística. Lucas al [phone].    We comply with applicable federal civil rights laws and [place] laws. We do not discriminate on the basis of race, color, national origin, age, disability, sex, sexual orientation, or gender identity.            Thank you!     Thank you for choosing [facility]  for your care. Our goal is always to provide you with excellent care. Hearing back from our patients is one way we can continue to improve our services. Please take a few minutes to complete the written survey that you may receive in the mail after your visit with us. Thank you!             Your Updated Medication List - Protect others around you: Learn how to safely use, store and throw away your medicines at www.disposemymeds.org.          This list is accurate as of: 1/12/18  7:51 AM.  Always use your most recent med list.                   Brand Name Dispense Instructions for use Diagnosis     * albuterol (2.5 MG/3ML) 0.083% neb solution     1 Box    Take 1 vial (2.5 mg) by nebulization every 4 hours as needed    Moderate persistent asthma, uncomplicated       * albuterol 108 (90 BASE) MCG/ACT Inhaler    PROAIR HFA/PROVENTIL HFA/VENTOLIN HFA    1 Inhaler    Inhale 2 puffs into the lungs every 4 hours as needed    Moderate persistent asthma without complication       * ALLERGEN IMMUNOTHERAPY PRESCRIPTION     5 mL    Name of Mix: Mix #1  Mold Alternaria Tenuis 1:10 w/v, HS  0.5 ml Aspergillus Fumigatus 1:10 w/v, HS  0.5 ml Epicoccum Nigrum 1:10 w/v, HS 0.5 ml Hormodendrum Cladosporioides 1:10 w/v, HS 0.5 ml Penicillium Mix 1:10 w/v, HS  0.5 ml Diluent: HSA qs to 5ml    Chronic allergic rhinitis due to animal hair and dander, Allergic rhinitis due to dust mite, Allergic rhinitis due to mold, Chronic seasonal allergic rhinitis due to pollen       * ALLERGEN IMMUNOTHERAPY PRESCRIPTION     5 mL    Name of Mix: Mix #2  Dust Mite, Cat, Dog Cat Hair, Standardized 10,000 BAU/mL, ALK  2.0 ml Dog Hair Dander, A. P.  1:100 w/v, HS  1.0 ml Dust Mites F 30,000AU/mL, HS  0.3 ml Dust Mites P. 30,000 AU/mL, HS  0.3 ml  Diluent: HSA qs to 5ml    Chronic allergic rhinitis due to animal hair and dander, Allergic rhinitis due to dust mite, Allergic rhinitis due to mold, Chronic seasonal allergic rhinitis due to pollen       * ALLERGEN IMMUNOTHERAPY PRESCRIPTION     5 mL    Name of Mix: Mix #3 Grass,Tree  Dmitry,White 1:20w/v, HS 0.5ml Birch Mix PRW 1:20w/v, HS 0.5ml Boxelder-Maple Mix BHR (Boxelder Hard Red) 1:20w/v, HS 0.5ml [place],Common 1:20w/v, HS 0.5ml Elm,American 1:20w/v, HS 0.5ml [place] Mix 1:20w/v, HS 0.5ml Oak Mix RVW 1:20w/v, HS 0.5ml [place] Tree,Black 1:20w/v, HS 0.5ml Grass Mix #7 100,000 BAU/mL, HS 0.4ml Jonathan Grass 1:20w/v, HS 0.5ml Diluent: HSA qs to 5ml    Chronic allergic rhinitis due to animal hair and dander, Allergic rhinitis due to dust mite, Allergic rhinitis due to mold, Chronic seasonal  allergic rhinitis due to pollen       * ALLERGEN IMMUNOTHERAPY PRESCRIPTION     5 mL    Name of Mix: Mix #4  Weeds Kochia 1:20 w/v, HS 0.5 ml Lamb's Quarters 1:20 w/v, HS 0.5 ml Nettle 1:20 w/v, HS 0.5 ml Plantain, English 1:20 w/v, HS 0.5 ml Ragweed Mixed 1:20 w/v ALK  0.5 ml Russian Thistle 1:20 w/v, HS 0.5 ml Sagebrush, Mugwort 1:20 w/v, HS 0.5 ml Sorrel, Sheep 1:20 w/v, HS 0.5 ml Diluent: HSA qs to 5ml    Chronic allergic rhinitis due to animal hair and dander, Allergic rhinitis due to dust mite, Allergic rhinitis due to mold, Chronic seasonal allergic rhinitis due to pollen       amoxicillin 500 MG capsule    AMOXIL    30 capsule    Take 1 capsule (500 mg) by mouth 3 times daily    Acute sinusitis with symptoms > 10 days       azelastine 0.05 % Soln ophthalmic solution    OPTIVAR    1 Bottle    Apply 1 drop to eye 2 times daily    Conjunctivitis, allergic, unspecified laterality       CERAVE Crea     2 Bottle    Externally apply 1 dose * topically 2 times daily    Eczema, unspecified type       cetirizine 10 MG tablet    zyrTEC    30 tablet    Take 1 tablet (10 mg) by mouth At Bedtime    Allergic conjunctivitis of both eyes, Chronic allergic rhinitis due to animal hair and dander, Allergic rhinitis due to dust mite, Allergic rhinitis due to mold, Chronic seasonal allergic rhinitis due to pollen       EPINEPHrine 0.3 MG/0.3ML injection 2-pack    EPIPEN/ADRENACLICK/or ANY BX GENERIC EQUIV    0.6 mL    Inject 0.3 mLs (0.3 mg) into the muscle once as needed for anaphylaxis    Food allergy, Need for desensitization to allergens       fluticasone 50 MCG/ACT spray    FLONASE    1 Bottle    Spray 2 sprays into both nostrils daily    Chronic allergic rhinitis due to animal hair and dander, Allergic rhinitis due to dust mite, Allergic rhinitis due to mold, Chronic seasonal allergic rhinitis due to pollen       fluticasone-salmeterol 500-50 MCG/DOSE diskus inhaler    ADVAIR    3 Inhaler    Inhale 1 puff into the lungs 2  times daily    Moderate persistent asthma without complication       loratadine 10 MG tablet    CLARITIN    30 tablet    Take 1 tablet (10 mg) by mouth daily    Allergic conjunctivitis of both eyes, Chronic allergic rhinitis due to animal hair and dander, Allergic rhinitis due to dust mite, Allergic rhinitis due to mold, Chronic seasonal allergic rhinitis due to pollen       montelukast 10 MG tablet    SINGULAIR    30 tablet    Take 1 tablet (10 mg) by mouth At Bedtime    Moderate persistent asthma without complication, Chronic allergic rhinitis due to animal hair and dander, Allergic rhinitis due to dust mite, Allergic rhinitis due to mold, Chronic seasonal allergic rhinitis due to pollen       MULTIVITAMIN PO      1 tab daily        * order for DME     1 Units    Equipment being ordered: knee scooter    Closed non-physeal fracture of phalanx of right great toe, unspecified phalanx, initial encounter       * order for DME     1 Device    Equipment being ordered: Set of [employer]elks  [employer] [employer] [phone]    Closed non-physeal fracture of phalanx of right great toe, unspecified phalanx, initial encounter       * order for DME     1 Units    Equipment being ordered: Dynaflex insert    Closed displaced fracture of proximal phalanx of right great toe, initial encounter       * triamcinolone 0.1 % cream    KENALOG    80 g    APPLY SPARINGLY TO AFFECTED AREA THREE TIMES A DAY AS NEEDED    Dermatitis       * triamcinolone 0.1 % cream    KENALOG    80 g    Apply  topically 2 times daily as needed.    Eczema, unspecified type       * Notice:  This list has 11 medication(s) that are the same as other medications prescribed for you. Read the directions carefully, and ask your doctor or other care provider to review them with you.

## 2018-01-17 ENCOUNTER — OFFICE VISIT (OUTPATIENT)
Dept: ALLERGY | Facility: CLINIC | Age: 53
End: 2018-01-17
Payer: COMMERCIAL

## 2018-01-17 VITALS
WEIGHT: 212.08 LBS | SYSTOLIC BLOOD PRESSURE: 120 MMHG | BODY MASS INDEX: 37.58 KG/M2 | HEART RATE: 97 BPM | DIASTOLIC BLOOD PRESSURE: 81 MMHG | OXYGEN SATURATION: 100 % | HEIGHT: 63 IN | TEMPERATURE: 96.7 F

## 2018-01-17 DIAGNOSIS — J32.9 RECURRENT SINUSITIS: ICD-10-CM

## 2018-01-17 DIAGNOSIS — H93.8X2 EAR FULLNESS, LEFT: ICD-10-CM

## 2018-01-17 DIAGNOSIS — H91.91 HEARING LOSS OF RIGHT EAR, UNSPECIFIED HEARING LOSS TYPE: Primary | ICD-10-CM

## 2018-01-17 DIAGNOSIS — J02.9 SORE THROAT: ICD-10-CM

## 2018-01-17 PROCEDURE — 99213 OFFICE O/P EST LOW 20 MIN: CPT | Performed by: ALLERGY & IMMUNOLOGY

## 2018-01-17 NOTE — NURSING NOTE
"Chief Complaint   Patient presents with     Pharyngitis     Sore throat and left ear pain started 12 hours ago       Initial /81 (BP Location: Left arm, Patient Position: Sitting, Cuff Size: Adult Large)  Pulse 97  Temp 96.7  F (35.9  C) (Tympanic)  Ht 1.607 m (5' 3.27\")  Wt 96.2 kg (212 lb 1.3 oz)  LMP 07/18/2015 (Approximate)  SpO2 100%  BMI 37.25 kg/m2 Estimated body mass index is 37.25 kg/(m^2) as calculated from the following:    Height as of this encounter: 1.607 m (5' 3.27\").    Weight as of this encounter: 96.2 kg (212 lb 1.3 oz).  Medication Reconciliation: complete    "

## 2018-01-17 NOTE — PROGRESS NOTES
Dilia Solomon was seen in the Allergy Clinic at [facility]. The following are my recommendations regarding her Sore Throat, Left Ear Fullness, Left Sided Hearing Loss, and Recurrent Sinusitis    1. Continue symptomatic management for current symptoms - call or return to clinic if not improved within 7 to 10 days  2. Consult placed for further evaluation with ENT      Dilia Solomon is a 52 year old American female who is seen today for sore throat and ear pain. She states that these symptoms began last night. Dilia completed a course of amoxicillin last month for treatment of acute sinusitis and reports that her symptoms resolved but she developed left sided ear pressure and sore throat about 1 week after finishing the antibiotics. These symptoms subsequently self-resolved. Dilia states that she had been feeling well until developing symptoms again last night. She has been using her sinus irrigation rinse daily, flonase daily, and gargling with salt water. She denies fevers, chills, cough, shortness of breath, and fatigue.    Dilia also reports that she has been experiencing hearing loss on the left. These symptoms worsen when she has the ear pain and fullness.      REVIEW OF SYSTEMS:  General: negative for weight gain. negative for weight loss. negative for changes in sleep.   Eyes: positive  for itching. positive  for redness. positive  for tearing/watering.  Ears: positive  for fullness. positive  for hearing loss. negative for dizziness.   Nose: negative for snoring.negative for changes in smell. positive  for drainage.   Throat: negative for hoarseness. positive  for sore throat. negative for trouble swallowing.   Lungs: negative for shortness of breath.negative for wheezing. negative for sputum production.   Cardiovascular: negative for chest pain. negative for swelling of ankles. negative for fast or irregular heartbeat.   Gastrointestinal: negative for nausea. negative for heartburn.  negative for acid reflux.   Musculoskeletal: negative for joint pain. negative for joint stiffness. negative for joint swelling.   Neurologic: negative for seizures. negative for fainting. negative for weakness.   Psychiatric: negative for changes in mood. negative for anxiety.   Endocrine: negative for cold intolerance. negative for heat intolerance. negative for tremors.   Hematologic: negative for easy bruising. negative for easy bleeding.  Integumentary: negative for rash. negative for scaling. negative for nail changes.       Current Outpatient Prescriptions:      azelastine (OPTIVAR) 0.05 % SOLN ophthalmic solution, Apply 1 drop to eye 2 times daily, Disp: 1 Bottle, Rfl: 5     EPINEPHrine (EPIPEN/ADRENACLICK/OR ANY BX GENERIC EQUIV) 0.3 MG/0.3ML injection 2-pack, Inject 0.3 mLs (0.3 mg) into the muscle once as needed for anaphylaxis, Disp: 0.6 mL, Rfl: 3     order for DME, Equipment being ordered: Dynaflex insert, Disp: 1 Units, Rfl: 0     amoxicillin (AMOXIL) 500 MG capsule, Take 1 capsule (500 mg) by mouth 3 times daily, Disp: 30 capsule, Rfl: 0     order for DME, Equipment being ordered: Set of [facility] [employer] [phone], Disp: 1 Device, Rfl: 0     fluticasone (FLONASE) 50 MCG/ACT spray, Spray 2 sprays into both nostrils daily, Disp: 1 Bottle, Rfl: 11     triamcinolone (KENALOG) 0.1 % cream, Apply  topically 2 times daily as needed., Disp: 80 g, Rfl: 2     montelukast (SINGULAIR) 10 MG tablet, Take 1 tablet (10 mg) by mouth At Bedtime, Disp: 30 tablet, Rfl: 11     fluticasone-salmeterol (ADVAIR) 500-50 MCG/DOSE diskus inhaler, Inhale 1 puff into the lungs 2 times daily, Disp: 3 Inhaler, Rfl: 1     loratadine (CLARITIN) 10 MG tablet, Take 1 tablet (10 mg) by mouth daily, Disp: 30 tablet, Rfl: 11     cetirizine (ZYRTEC) 10 MG tablet, Take 1 tablet (10 mg) by mouth At Bedtime, Disp: 30 tablet, Rfl: 11     albuterol (PROAIR HFA/PROVENTIL HFA/VENTOLIN HFA) 108 (90 BASE) MCG/ACT Inhaler, Inhale 2  puffs into the lungs every 4 hours as needed, Disp: 1 Inhaler, Rfl: 3     order for DME, Equipment being ordered: knee scooter, Disp: 1 Units, Rfl: 0     ORDER FOR ALLERGEN IMMUNOTHERAPY, Name of Mix: Mix #1  Mold Alternaria Tenuis 1:10 w/v, HS  0.5 ml Aspergillus Fumigatus 1:10 w/v, HS  0.5 ml Epicoccum Nigrum 1:10 w/v, HS 0.5 ml Hormodendrum Cladosporioides 1:10 w/v, HS 0.5 ml Penicillium Mix 1:10 w/v, HS  0.5 ml Diluent: HSA qs to 5ml, Disp: 5 mL, Rfl: PRN     ORDER FOR ALLERGEN IMMUNOTHERAPY, Name of Mix: Mix #2  Dust Mite, Cat, Dog Cat Hair, Standardized 10,000 BAU/mL, ALK  2.0 ml Dog Hair Dander, A. P.  1:100 w/v, HS  1.0 ml Dust Mites F 30,000AU/mL, HS  0.3 ml Dust Mites P. 30,000 AU/mL, HS  0.3 ml  Diluent: HSA qs to 5ml, Disp: 5 mL, Rfl: PRN     ORDER FOR ALLERGEN IMMUNOTHERAPY, Name of Mix: Mix #3 Grass,Tree  Dmitry,White 1:20w/v, HS 0.5ml Birch Mix PRW 1:20w/v, HS 0.5ml Boxelder-Maple Mix BHR (Boxelder Hard Red) 1:20w/v, HS 0.5ml [place],Common 1:20w/v, HS 0.5ml Elm,American 1:20w/v, HS 0.5ml [place] Mix 1:20w/v, HS 0.5ml Oak Mix RVW 1:20w/v, HS 0.5ml [place] Tree,Black 1:20w/v, HS 0.5ml Grass Mix #7 100,000 BAU/mL, HS 0.4ml Jonathan Grass 1:20w/v, HS 0.5ml Diluent: HSA qs to 5ml, Disp: 5 mL, Rfl: PRN     ORDER FOR ALLERGEN IMMUNOTHERAPY, Name of Mix: Mix #4  Weeds Kochia 1:20 w/v, HS 0.5 ml Lamb's Quarters 1:20 w/v, HS 0.5 ml Nettle 1:20 w/v, HS 0.5 ml Plantain, English 1:20 w/v, HS 0.5 ml Ragweed Mixed 1:20 w/v ALK  0.5 ml Russian Thistle 1:20 w/v, HS 0.5 ml Sagebrush, Mugwort 1:20 w/v, HS 0.5 ml Sorrel, Sheep 1:20 w/v, HS 0.5 ml Diluent: HSA qs to 5ml, Disp: 5 mL, Rfl: PRN     triamcinolone (KENALOG) 0.1 % cream, APPLY SPARINGLY TO AFFECTED AREA THREE TIMES A DAY AS NEEDED, Disp: 80 g, Rfl: 0     Emollient (CERAVE) CREA, Externally apply 1 dose * topically 2 times daily, Disp: 2 Bottle, Rfl: 11     albuterol (2.5 MG/3ML) 0.083% neb solution, Take 1 vial (2.5 mg) by nebulization every 4 hours as needed,  "Disp: 1 Box, Rfl: 3     MULTIVITAMIN OR, 1 tab daily, Disp: , Rfl:     EXAM:   /81 (BP Location: Left arm, Patient Position: Sitting, Cuff Size: Adult Large)  Pulse 97  Temp 96.7  F (35.9  C) (Tympanic)  Ht 1.607 m (5' 3.27\")  Wt 96.2 kg (212 lb 1.3 oz)  LMP 07/18/2015 (Approximate)  SpO2 100%  BMI 37.25 kg/m2  GENERAL APPEARANCE: alert, cooperative and not in distress  SKIN: no rashes, no lesions  HEAD: atraumatic, normocephalic  ENT: no scars or lesions, nasal exam showed no discharge, swelling or lesions noted, otoscopy showed external auditory canals clear, tympanic membranes normal, tongue midline and normal, soft palate, uvula, and tonsils normal, no pharyngeal erythema  NECK: no asymmetry, masses, or scars, supple without significant adenopathy  LUNGS: unlabored respirations, no intercostal retractions or accessory muscle use, clear to auscultation without rales or wheezes  HEART: regular rate and rhythm without murmurs and normal S1 and S2  MUSCULOSKELETAL: no musculoskeletal defects are noted  NEURO: no focal deficits noted  PSYCH: does not appear depressed or anxious      WORKUP:  None    ASSESSMENT/PLAN:  Dilia Solomon is a 52 year old female here for evaluation of sore throat and ear pain. Her symptoms began last night and she denies other associated symptoms. She recently completed a course of amoxicillin for acute sinusitis and has had recurrent symptoms of throat pain and ear pain/fullness. Dilia requests evaluation with ENT given her recurrent symptoms as well as left sided hearing loss.    1. Continue symptomatic management for current symptoms - call or return to clinic if not improved within 7 to 10 days  2. Consult placed for further evaluation with ENT      Butch Horowitz MD  Allergy/Immunology  [facility] and [employer], [place]      Chart documentation done in part with Dragon Voice Recognition Software. Although reviewed after completion, some word and grammatical errors may " remain.

## 2018-01-17 NOTE — PATIENT INSTRUCTIONS
If you have any questions regarding your allergies, asthma, or what we discussed during your visit today please call the allergy clinic or contact us via [employer].    [facility] ([place], [place]): [phone]      Schedule an appointment with ENT to discuss your ongoing symptoms    Continue with the flonase, sinus irrigations, and gargling with salt water    Call/return to clinic if your symptoms do not improve in 7 to 10 days

## 2018-01-17 NOTE — LETTER
1/17/2018         RE: Dilia Solomon  [address] [facility] [address]        Dear Colleague,    Thank you for referring your patient, Dilia Solomon, to the [facility]. Please see a copy of my visit note below.    Dilia Solomon was seen in the Allergy Clinic at [facility]. The following are my recommendations regarding her Sore Throat, Left Ear Fullness, Left Sided Hearing Loss, and Recurrent Sinusitis    1. Continue symptomatic management for current symptoms - call or return to clinic if not improved within 7 to 10 days  2. Consult placed for further evaluation with ENT      Dilia Solomon is a 52 year old American female who is seen today for sore throat and ear pain. She states that these symptoms began last night. Dilia completed a course of amoxicillin last month for treatment of acute sinusitis and reports that her symptoms resolved but she developed left sided ear pressure and sore throat about 1 week after finishing the antibiotics. These symptoms subsequently self-resolved. Dilia states that she had been feeling well until developing symptoms again last night. She has been using her sinus irrigation rinse daily, flonase daily, and gargling with salt water. She denies fevers, chills, cough, shortness of breath, and fatigue.    Dilia also reports that she has been experiencing hearing loss on the left. These symptoms worsen when she has the ear pain and fullness.      REVIEW OF SYSTEMS:  General: negative for weight gain. negative for weight loss. negative for changes in sleep.   Eyes: positive  for itching. positive  for redness. positive  for tearing/watering.  Ears: positive  for fullness. positive  for hearing loss. negative for dizziness.   Nose: negative for snoring.negative for changes in smell. positive  for drainage.   Throat: negative for hoarseness. positive  for sore throat. negative for trouble swallowing.   Lungs: negative for shortness of  breath.negative for wheezing. negative for sputum production.   Cardiovascular: negative for chest pain. negative for swelling of ankles. negative for fast or irregular heartbeat.   Gastrointestinal: negative for nausea. negative for heartburn. negative for acid reflux.   Musculoskeletal: negative for joint pain. negative for joint stiffness. negative for joint swelling.   Neurologic: negative for seizures. negative for fainting. negative for weakness.   Psychiatric: negative for changes in mood. negative for anxiety.   Endocrine: negative for cold intolerance. negative for heat intolerance. negative for tremors.   Hematologic: negative for easy bruising. negative for easy bleeding.  Integumentary: negative for rash. negative for scaling. negative for nail changes.       Current Outpatient Prescriptions:      azelastine (OPTIVAR) 0.05 % SOLN ophthalmic solution, Apply 1 drop to eye 2 times daily, Disp: 1 Bottle, Rfl: 5     EPINEPHrine (EPIPEN/ADRENACLICK/OR ANY BX GENERIC EQUIV) 0.3 MG/0.3ML injection 2-pack, Inject 0.3 mLs (0.3 mg) into the muscle once as needed for anaphylaxis, Disp: 0.6 mL, Rfl: 3     order for DME, Equipment being ordered: Dynaflex insert, Disp: 1 Units, Rfl: 0     amoxicillin (AMOXIL) 500 MG capsule, Take 1 capsule (500 mg) by mouth 3 times daily, Disp: 30 capsule, Rfl: 0     order for DME, Equipment being ordered: Set of [facility] [employer] [phone], Disp: 1 Device, Rfl: 0     fluticasone (FLONASE) 50 MCG/ACT spray, Spray 2 sprays into both nostrils daily, Disp: 1 Bottle, Rfl: 11     triamcinolone (KENALOG) 0.1 % cream, Apply  topically 2 times daily as needed., Disp: 80 g, Rfl: 2     montelukast (SINGULAIR) 10 MG tablet, Take 1 tablet (10 mg) by mouth At Bedtime, Disp: 30 tablet, Rfl: 11     fluticasone-salmeterol (ADVAIR) 500-50 MCG/DOSE diskus inhaler, Inhale 1 puff into the lungs 2 times daily, Disp: 3 Inhaler, Rfl: 1     loratadine (CLARITIN) 10 MG tablet, Take 1 tablet (10  mg) by mouth daily, Disp: 30 tablet, Rfl: 11     cetirizine (ZYRTEC) 10 MG tablet, Take 1 tablet (10 mg) by mouth At Bedtime, Disp: 30 tablet, Rfl: 11     albuterol (PROAIR HFA/PROVENTIL HFA/VENTOLIN HFA) 108 (90 BASE) MCG/ACT Inhaler, Inhale 2 puffs into the lungs every 4 hours as needed, Disp: 1 Inhaler, Rfl: 3     order for DME, Equipment being ordered: knee scooter, Disp: 1 Units, Rfl: 0     ORDER FOR ALLERGEN IMMUNOTHERAPY, Name of Mix: Mix #1  Mold Alternaria Tenuis 1:10 w/v, HS  0.5 ml Aspergillus Fumigatus 1:10 w/v, HS  0.5 ml Epicoccum Nigrum 1:10 w/v, HS 0.5 ml Hormodendrum Cladosporioides 1:10 w/v, HS 0.5 ml Penicillium Mix 1:10 w/v, HS  0.5 ml Diluent: HSA qs to 5ml, Disp: 5 mL, Rfl: PRN     ORDER FOR ALLERGEN IMMUNOTHERAPY, Name of Mix: Mix #2  Dust Mite, Cat, Dog Cat Hair, Standardized 10,000 BAU/mL, ALK  2.0 ml Dog Hair Dander, A. P.  1:100 w/v, HS  1.0 ml Dust Mites F 30,000AU/mL, HS  0.3 ml Dust Mites P. 30,000 AU/mL, HS  0.3 ml  Diluent: HSA qs to 5ml, Disp: 5 mL, Rfl: PRN     ORDER FOR ALLERGEN IMMUNOTHERAPY, Name of Mix: Mix #3 Grass,Tree  Dmitry,White 1:20w/v, HS 0.5ml Birch Mix PRW 1:20w/v, HS 0.5ml Boxelder-Maple Mix BHR (Boxelder Hard Red) 1:20w/v, HS 0.5ml [place],Common 1:20w/v, HS 0.5ml Elm,American 1:20w/v, HS 0.5ml [place] Mix 1:20w/v, HS 0.5ml Oak Mix RVW 1:20w/v, HS 0.5ml [place] Tree,Black 1:20w/v, HS 0.5ml Grass Mix #7 100,000 BAU/mL, HS 0.4ml Jonathan Grass 1:20w/v, HS 0.5ml Diluent: HSA qs to 5ml, Disp: 5 mL, Rfl: PRN     ORDER FOR ALLERGEN IMMUNOTHERAPY, Name of Mix: Mix #4  Weeds Kochia 1:20 w/v, HS 0.5 ml Lamb's Quarters 1:20 w/v, HS 0.5 ml Nettle 1:20 w/v, HS 0.5 ml Plantain, English 1:20 w/v, HS 0.5 ml Ragweed Mixed 1:20 w/v ALK  0.5 ml Russian Thistle 1:20 w/v, HS 0.5 ml Sagebrush, Mugwort 1:20 w/v, HS 0.5 ml Sorrel, Sheep 1:20 w/v, HS 0.5 ml Diluent: HSA qs to 5ml, Disp: 5 mL, Rfl: PRN     triamcinolone (KENALOG) 0.1 % cream, APPLY SPARINGLY TO AFFECTED AREA THREE TIMES A DAY  "AS NEEDED, Disp: 80 g, Rfl: 0     Emollient (CERAVE) CREA, Externally apply 1 dose * topically 2 times daily, Disp: 2 Bottle, Rfl: 11     albuterol (2.5 MG/3ML) 0.083% neb solution, Take 1 vial (2.5 mg) by nebulization every 4 hours as needed, Disp: 1 Box, Rfl: 3     MULTIVITAMIN OR, 1 tab daily, Disp: , Rfl:     EXAM:   /81 (BP Location: Left arm, Patient Position: Sitting, Cuff Size: Adult Large)  Pulse 97  Temp 96.7  F (35.9  C) (Tympanic)  Ht 1.607 m (5' 3.27\")  Wt 96.2 kg (212 lb 1.3 oz)  LMP 07/18/2015 (Approximate)  SpO2 100%  BMI 37.25 kg/m2  GENERAL APPEARANCE: alert, cooperative and not in distress  SKIN: no rashes, no lesions  HEAD: atraumatic, normocephalic  ENT: no scars or lesions, nasal exam showed no discharge, swelling or lesions noted, otoscopy showed external auditory canals clear, tympanic membranes normal, tongue midline and normal, soft palate, uvula, and tonsils normal, no pharyngeal erythema  NECK: no asymmetry, masses, or scars, supple without significant adenopathy  LUNGS: unlabored respirations, no intercostal retractions or accessory muscle use, clear to auscultation without rales or wheezes  HEART: regular rate and rhythm without murmurs and normal S1 and S2  MUSCULOSKELETAL: no musculoskeletal defects are noted  NEURO: no focal deficits noted  PSYCH: does not appear depressed or anxious      WORKUP:  None    ASSESSMENT/PLAN:  Dilia Solomon is a 52 year old female here for evaluation of sore throat and ear pain. Her symptoms began last night and she denies other associated symptoms. She recently completed a course of amoxicillin for acute sinusitis and has had recurrent symptoms of throat pain and ear pain/fullness. Dilia requests evaluation with ENT given her recurrent symptoms as well as left sided hearing loss.    1. Continue symptomatic management for current symptoms - call or return to clinic if not improved within 7 to 10 days  2. Consult placed for further evaluation " with ENT      Butch Horowitz MD  Allergy/Immunology  [facility] and [place], [place]      Chart documentation done in part with Dragon Voice Recognition Software. Although reviewed after completion, some word and grammatical errors may remain.      Again, thank you for allowing me to participate in the care of your patient.        Sincerely,        Butch Horowitz MD

## 2018-01-17 NOTE — MR AVS SNAPSHOT
After Visit Summary   1/17/2018    Dilia Solomon    MRN: [id number]           Patient Information     Date Of Birth          1965        Visit Information        Provider Department      1/17/2018 9:40 AM Butch Horowitz MD [facility]        Today's Diagnoses     Hearing loss of right ear, unspecified hearing loss type    -  1    Ear fullness, left        Recurrent sinusitis          Care Instructions    If you have any questions regarding your allergies, asthma, or what we discussed during your visit today please call the allergy clinic or contact us via [employer].    [facility] ([place], [place]): [phone]      Schedule an appointment with ENT to discuss your ongoing symptoms    Continue with the flonase, sinus irrigations, and gargling with salt water    Call/return to clinic if your symptoms do not improve in 7 to 10 days            Follow-ups after your visit        Additional Services     OTOLARYNGOLOGY REFERRAL       Your provider has referred you to: [facility]: [facility] [phone]   [email or web address]    Please be aware that coverage of these services is subject to the terms and limitations of your health insurance plan.  Call member services at your health plan with any benefit or coverage questions.      Please bring the following with you to your appointment:    (1) Any X-Rays, CTs or MRIs which have been performed.  Contact the facility where they were done to arrange for  prior to your scheduled appointment.   (2) List of current medications  (3) This referral request   (4) Any documents/labs given to you for this referral                  Your next 10 appointments already scheduled     Jan 26, 2018  7:00 AM CST   Nurse Only with ALLERGY [facility] ([facility])    [address] [facility] [address] [phone]           Every allergy patient MUST wait 30  "minutes after their allergy shot. No exceptions.  Xolair shots #1-3 should plan to wait 2 hours in clinic Xolair shots after #4 should plan 30 minute wait in clinic            Feb 07, 2018  1:40 PM CST   New Visit with Carlos Vallejo DPM   [facility] ([facility])    [address] [facility] [address]   [phone]            Jun 06, 2018  8:20 AM CDT   Return Visit with Butch Horowitz MD   [facility] ([facility]    [address] [address] [phone]              Who to contact     If you have questions or need follow up information about today's clinic visit or your schedule please contact [facility] directly at [phone].  Normal or non-critical lab and imaging results will be communicated to you by MyChart, letter or phone within 4 business days after the clinic has received the results. If you do not hear from us within 7 days, please contact the clinic through MyChart or phone. If you have a critical or abnormal lab result, we will notify you by phone as soon as possible.  Submit refill requests through [employer] or call your pharmacy and they will forward the refill request to us. Please allow 3 business days for your refill to be completed.          Additional Information About Your Visit        Care EveryWhere ID     This is your Care EveryWhere ID. This could be used by other organizations to access your [facility] medical records  [id number]        Your Vitals Were     Pulse Temperature Height Last Period Pulse Oximetry BMI (Body Mass Index)    97 96.7  F (35.9  C) (Tympanic) 1.607 m (5' 3.27\") 07/18/2015 (Approximate) 100% 37.25 kg/m2       Blood Pressure from Last 3 Encounters:   01/17/18 120/81   12/13/17 126/88   12/06/17 118/75    Weight from Last 3 Encounters:   01/17/18 96.2 kg (212 lb 1.3 oz)   01/10/18 96.2 kg (212 lb)   12/13/17 96.6 kg (212 lb 15.4 oz)              We Performed the " Following     OTOLARYNGOLOGY REFERRAL        Primary Care Provider Office Phone # Fax #    Cookie Conklin [phone]       [address] [place] [address]        Equal Access to Services     ARAVINDGABBY APRIL : Hadii aad ku hadneymaro Soomaali, waaxda luqadaha, qaybta kaalmada adeegyada, waxmarjorie grantn kanwal jeffersonjoy howard. So [facility] [phone].    ATENCIÓN: Si habla español, tiene a pruitt disposición servicios gratuitos de asistencia lingüística. Llame al [phone].    We comply with applicable federal civil rights laws and [place] laws. We do not discriminate on the basis of race, color, national origin, age, disability, sex, sexual orientation, or gender identity.            Thank you!     Thank you for choosing [facility]  for your care. Our goal is always to provide you with excellent care. Hearing back from our patients is one way we can continue to improve our services. Please take a few minutes to complete the written survey that you may receive in the mail after your visit with us. Thank you!             Your Updated Medication List - Protect others around you: Learn how to safely use, store and throw away your medicines at www.disposemymeds.org.          This list is accurate as of: 1/17/18 10:08 AM.  Always use your most recent med list.                   Brand Name Dispense Instructions for use Diagnosis    * albuterol (2.5 MG/3ML) 0.083% neb solution     1 Box    Take 1 vial (2.5 mg) by nebulization every 4 hours as needed    Moderate persistent asthma, uncomplicated       * albuterol 108 (90 BASE) MCG/ACT Inhaler    PROAIR HFA/PROVENTIL HFA/VENTOLIN HFA    1 Inhaler    Inhale 2 puffs into the lungs every 4 hours as needed    Moderate persistent asthma without complication       * ALLERGEN IMMUNOTHERAPY PRESCRIPTION     5 mL    Name of Mix: Mix #1  Mold Alternaria Tenuis 1:10 w/v, HS  0.5 ml Aspergillus Fumigatus 1:10 w/v, HS  0.5 ml Epicoccum Nigrum 1:10 w/v, HS 0.5  ml Hormodendrum Cladosporioides 1:10 w/v, HS 0.5 ml Penicillium Mix 1:10 w/v, HS  0.5 ml Diluent: HSA qs to 5ml    Chronic allergic rhinitis due to animal hair and dander, Allergic rhinitis due to dust mite, Allergic rhinitis due to mold, Chronic seasonal allergic rhinitis due to pollen       * ALLERGEN IMMUNOTHERAPY PRESCRIPTION     5 mL    Name of Mix: Mix #2  Dust Mite, Cat, Dog Cat Hair, Standardized 10,000 BAU/mL, ALK  2.0 ml Dog Hair Dander, A. P.  1:100 w/v, HS  1.0 ml Dust Mites F 30,000AU/mL, HS  0.3 ml Dust Mites P. 30,000 AU/mL, HS  0.3 ml  Diluent: HSA qs to 5ml    Chronic allergic rhinitis due to animal hair and dander, Allergic rhinitis due to dust mite, Allergic rhinitis due to mold, Chronic seasonal allergic rhinitis due to pollen       * ALLERGEN IMMUNOTHERAPY PRESCRIPTION     5 mL    Name of Mix: Mix #3 Grass,Tree  Dmitry,White 1:20w/v, HS 0.5ml Birch Mix PRW 1:20w/v, HS 0.5ml Boxelder-Maple Mix BHR (Boxelder Hard Red) 1:20w/v, HS 0.5ml [place],Common 1:20w/v, HS 0.5ml Elm,American 1:20w/v, HS 0.5ml [place] Mix 1:20w/v, HS 0.5ml Oak Mix RVW 1:20w/v, HS 0.5ml [place] Tree,Black 1:20w/v, HS 0.5ml Grass Mix #7 100,000 BAU/mL, HS 0.4ml Jonathan Grass 1:20w/v, HS 0.5ml Diluent: HSA qs to 5ml    Chronic allergic rhinitis due to animal hair and dander, Allergic rhinitis due to dust mite, Allergic rhinitis due to mold, Chronic seasonal allergic rhinitis due to pollen       * ALLERGEN IMMUNOTHERAPY PRESCRIPTION     5 mL    Name of Mix: Mix #4  Weeds Kochia 1:20 w/v, HS 0.5 ml Lamb's Quarters 1:20 w/v, HS 0.5 ml Nettle 1:20 w/v, HS 0.5 ml Plantain, English 1:20 w/v, HS 0.5 ml Ragweed Mixed 1:20 w/v ALK  0.5 ml Russian Thistle 1:20 w/v, HS 0.5 ml Sagebrush, Mugwort 1:20 w/v, HS 0.5 ml Sorrel, Sheep 1:20 w/v, HS 0.5 ml Diluent: HSA qs to 5ml    Chronic allergic rhinitis due to animal hair and dander, Allergic rhinitis due to dust mite, Allergic rhinitis due to mold, Chronic seasonal allergic rhinitis due to  pollen       azelastine 0.05 % Soln ophthalmic solution    OPTIVAR    1 Bottle    Apply 1 drop to eye 2 times daily    Conjunctivitis, allergic, unspecified laterality       CERAVE Crea     2 Bottle    Externally apply 1 dose * topically 2 times daily    Eczema, unspecified type       cetirizine 10 MG tablet    zyrTEC    30 tablet    Take 1 tablet (10 mg) by mouth At Bedtime    Allergic conjunctivitis of both eyes, Chronic allergic rhinitis due to animal hair and dander, Allergic rhinitis due to dust mite, Allergic rhinitis due to mold, Chronic seasonal allergic rhinitis due to pollen       EPINEPHrine 0.3 MG/0.3ML injection 2-pack    EPIPEN/ADRENACLICK/or ANY BX GENERIC EQUIV    0.6 mL    Inject 0.3 mLs (0.3 mg) into the muscle once as needed for anaphylaxis    Food allergy, Need for desensitization to allergens       fluticasone 50 MCG/ACT spray    FLONASE    1 Bottle    Spray 2 sprays into both nostrils daily    Chronic allergic rhinitis due to animal hair and dander, Allergic rhinitis due to dust mite, Allergic rhinitis due to mold, Chronic seasonal allergic rhinitis due to pollen       fluticasone-salmeterol 500-50 MCG/DOSE diskus inhaler    ADVAIR    3 Inhaler    Inhale 1 puff into the lungs 2 times daily    Moderate persistent asthma without complication       loratadine 10 MG tablet    CLARITIN    30 tablet    Take 1 tablet (10 mg) by mouth daily    Allergic conjunctivitis of both eyes, Chronic allergic rhinitis due to animal hair and dander, Allergic rhinitis due to dust mite, Allergic rhinitis due to mold, Chronic seasonal allergic rhinitis due to pollen       montelukast 10 MG tablet    SINGULAIR    30 tablet    Take 1 tablet (10 mg) by mouth At Bedtime    Moderate persistent asthma without complication, Chronic allergic rhinitis due to animal hair and dander, Allergic rhinitis due to dust mite, Allergic rhinitis due to mold, Chronic seasonal allergic rhinitis due to pollen       MULTIVITAMIN PO      1  tab daily        order for DME     1 Units    Equipment being ordered: Dynaflex insert    Closed displaced fracture of proximal phalanx of right great toe, initial encounter       * triamcinolone 0.1 % cream    KENALOG    80 g    APPLY SPARINGLY TO AFFECTED AREA THREE TIMES A DAY AS NEEDED    Dermatitis       * triamcinolone 0.1 % cream    KENALOG    80 g    Apply  topically 2 times daily as needed.    Eczema, unspecified type       * Notice:  This list has 8 medication(s) that are the same as other medications prescribed for you. Read the directions carefully, and ask your doctor or other care provider to review them with you.

## 2018-01-22 ENCOUNTER — TELEPHONE (OUTPATIENT)
Dept: PODIATRY | Facility: CLINIC | Age: 53
End: 2018-01-22

## 2018-01-22 NOTE — LETTER
[facility] [place]  [address]  [address]  [facility] [address]  Phone: [phone]  Fax: [phone]      January 23, 2018    Regarding:  Dilia Solomon  1965    Greetings:    I am writing to request a pair of orthopedic shoes which provide an offloading toe box and rigid arch to better offload and protect the fractured great toe for my patient Dilia Solomon.  I believe this request is medically necessary due to her fractured great toe.  It will, or is reasonably expected to, prevent the onset of nonunion.  It will assist the individual to achieve or maintain maximum functional capacity in performing daily activities, taking into account both the functional capacity of the individual and those functional capacities that are appropriate for individuals of the same age.   Please let me know if you require additional information from my records.     Yours truly,  CAROLE Vallejo DPM, FACFAS

## 2018-01-22 NOTE — TELEPHONE ENCOUNTER
Reason for Call:  Other call back    Detailed comments: Pt states she needs a pair of shoes that she has more stability in.  Pt requesting a doctor's note stating what she needs for her broken toe (as her flex card will cover the cost of them with a doctors note).  Pt would like to  at [facility] on Wed afternoon if possible.  Also pt states her foot keeps swelling when she is on her feet - wondering if she is doing something wrong.    Phone Number Patient can be reached at: Home number on file [phone] (home)    Best Time: any    Can we leave a detailed message on this number? YES    Call taken on 1/22/2018 at 4:31 PM by Jesenia Mccann

## 2018-01-24 ENCOUNTER — HOSPITAL ENCOUNTER (OUTPATIENT)
Dept: CT IMAGING | Facility: CLINIC | Age: 53
Discharge: HOME | End: 2018-01-24
Attending: OTOLARYNGOLOGY | Admitting: OTOLARYNGOLOGY
Payer: COMMERCIAL

## 2018-01-24 ENCOUNTER — OFFICE VISIT (OUTPATIENT)
Dept: OTOLARYNGOLOGY | Facility: CLINIC | Age: 53
End: 2018-01-24
Payer: COMMERCIAL

## 2018-01-24 VITALS
HEART RATE: 84 BPM | DIASTOLIC BLOOD PRESSURE: 78 MMHG | TEMPERATURE: 98.2 F | WEIGHT: 212 LBS | BODY MASS INDEX: 37.24 KG/M2 | SYSTOLIC BLOOD PRESSURE: 135 MMHG

## 2018-01-24 DIAGNOSIS — J01.01 ACUTE RECURRENT MAXILLARY SINUSITIS: Primary | ICD-10-CM

## 2018-01-24 DIAGNOSIS — H69.93 DYSFUNCTION OF BOTH EUSTACHIAN TUBES: ICD-10-CM

## 2018-01-24 DIAGNOSIS — J01.01 ACUTE RECURRENT MAXILLARY SINUSITIS: ICD-10-CM

## 2018-01-24 PROCEDURE — 31231 NASAL ENDOSCOPY DX: CPT | Performed by: OTOLARYNGOLOGY

## 2018-01-24 PROCEDURE — 99244 OFF/OP CNSLTJ NEW/EST MOD 40: CPT | Mod: 25 | Performed by: OTOLARYNGOLOGY

## 2018-01-24 PROCEDURE — 70486 CT MAXILLOFACIAL W/O DYE: CPT

## 2018-01-24 ASSESSMENT — PAIN SCALES - GENERAL: PAINLEVEL: EXTREME PAIN (8)

## 2018-01-24 NOTE — LETTER
1/24/2018         RE: Dilia Solomon  [address] [facility] [address]        Dear Colleague,    Thank you for referring your patient, Dilia Solomon, to the [facility]. Please see a copy of my visit note below.        History of Present Illness - Dilia Solomon is a 52 year old female seen in consultation at the request of Dr. Horowitz for recurrent sinusitis. She has known environmental allergies and is on immunotherapy. She has history of sinus surgery by Dr. More in 2013. She feels congested most of the time. She also feels her ears are full, but can hear well enough to use the phone on both ears.    Past Medical History -   Patient Active Problem List   Diagnosis     Need for prophylactic vaccination and inoculation against influenza     Sprain of interphalangeal (joint) of hand     Radial styloid tenosynovitis     Carpal tunnel syndrome     Synovial cyst of popliteal space     Pain in joint, lower leg     Hyperlipidemia LDL goal <130     Advanced directives, counseling/discussion     Moderate persistent asthma     Allergic rhinitis due to dust mite     Food allergy     FH: diabetes mellitus     Chronic allergic rhinitis due to animal hair and dander       Current Medications -   Current Outpatient Prescriptions:      azelastine (OPTIVAR) 0.05 % SOLN ophthalmic solution, Apply 1 drop to eye 2 times daily, Disp: 1 Bottle, Rfl: 5     EPINEPHrine (EPIPEN/ADRENACLICK/OR ANY BX GENERIC EQUIV) 0.3 MG/0.3ML injection 2-pack, Inject 0.3 mLs (0.3 mg) into the muscle once as needed for anaphylaxis (Patient not taking: Reported on 1/17/2018), Disp: 0.6 mL, Rfl: 3     order for DME, Equipment being ordered: Dynaflex insert, Disp: 1 Units, Rfl: 0     fluticasone (FLONASE) 50 MCG/ACT spray, Spray 2 sprays into both nostrils daily, Disp: 1 Bottle, Rfl: 11     triamcinolone (KENALOG) 0.1 % cream, Apply  topically 2 times daily as needed., Disp: 80 g, Rfl: 2     montelukast (SINGULAIR) 10 MG tablet,  Take 1 tablet (10 mg) by mouth At Bedtime, Disp: 30 tablet, Rfl: 11     fluticasone-salmeterol (ADVAIR) 500-50 MCG/DOSE diskus inhaler, Inhale 1 puff into the lungs 2 times daily, Disp: 3 Inhaler, Rfl: 1     loratadine (CLARITIN) 10 MG tablet, Take 1 tablet (10 mg) by mouth daily, Disp: 30 tablet, Rfl: 11     cetirizine (ZYRTEC) 10 MG tablet, Take 1 tablet (10 mg) by mouth At Bedtime, Disp: 30 tablet, Rfl: 11     albuterol (PROAIR HFA/PROVENTIL HFA/VENTOLIN HFA) 108 (90 BASE) MCG/ACT Inhaler, Inhale 2 puffs into the lungs every 4 hours as needed, Disp: 1 Inhaler, Rfl: 3     ORDER FOR ALLERGEN IMMUNOTHERAPY, Name of Mix: Mix #1  Mold Alternaria Tenuis 1:10 w/v, HS  0.5 ml Aspergillus Fumigatus 1:10 w/v, HS  0.5 ml Epicoccum Nigrum 1:10 w/v, HS 0.5 ml Hormodendrum Cladosporioides 1:10 w/v, HS 0.5 ml Penicillium Mix 1:10 w/v, HS  0.5 ml Diluent: HSA qs to 5ml, Disp: 5 mL, Rfl: PRN     ORDER FOR ALLERGEN IMMUNOTHERAPY, Name of Mix: Mix #2  Dust Mite, Cat, Dog Cat Hair, Standardized 10,000 BAU/mL, ALK  2.0 ml Dog Hair Dander, A. P.  1:100 w/v, HS  1.0 ml Dust Mites F 30,000AU/mL, HS  0.3 ml Dust Mites P. 30,000 AU/mL, HS  0.3 ml  Diluent: HSA qs to 5ml, Disp: 5 mL, Rfl: PRN     ORDER FOR ALLERGEN IMMUNOTHERAPY, Name of Mix: Mix #3 Grass,Tree  Dmitry,White 1:20w/v, HS 0.5ml Birch Mix PRW 1:20w/v, HS 0.5ml Boxelder-Maple Mix BHR (Boxelder Hard Red) 1:20w/v, HS 0.5ml [place],Common 1:20w/v, HS 0.5ml Elm,American 1:20w/v, HS 0.5ml [place] Mix 1:20w/v, HS 0.5ml Oak Mix RVW 1:20w/v, HS 0.5ml [place] Tree,Black 1:20w/v, HS 0.5ml Grass Mix #7 100,000 BAU/mL, HS 0.4ml Jonathan Grass 1:20w/v, HS 0.5ml Diluent: HSA qs to 5ml, Disp: 5 mL, Rfl: PRN     ORDER FOR ALLERGEN IMMUNOTHERAPY, Name of Mix: Mix #4  Weeds Kochia 1:20 w/v, HS 0.5 ml Lamb's Quarters 1:20 w/v, HS 0.5 ml Nettle 1:20 w/v, HS 0.5 ml Plantain, English 1:20 w/v, HS 0.5 ml Ragweed Mixed 1:20 w/v ALK  0.5 ml Russian Thistle 1:20 w/v, HS 0.5 ml Sagebrush, Mugwort 1:20  w/v, HS 0.5 ml Sorrel, Sheep 1:20 w/v, HS 0.5 ml Diluent: HSA qs to 5ml, Disp: 5 mL, Rfl: PRN     triamcinolone (KENALOG) 0.1 % cream, APPLY SPARINGLY TO AFFECTED AREA THREE TIMES A DAY AS NEEDED, Disp: 80 g, Rfl: 0     Emollient (CERAVE) CREA, Externally apply 1 dose * topically 2 times daily, Disp: 2 Bottle, Rfl: 11     albuterol (2.5 MG/3ML) 0.083% neb solution, Take 1 vial (2.5 mg) by nebulization every 4 hours as needed (Patient not taking: Reported on 1/17/2018), Disp: 1 Box, Rfl: 3     MULTIVITAMIN OR, 1 tab daily, Disp: , Rfl:     Allergies -   Allergies   Allergen Reactions     Clinoril [Nsaids] Hives     Tolerates ibuprofen and aspirin without hives     Pineapple Hives       Social History -   Social History     Social History     Marital status: Single     Spouse name: N/A     Number of children: N/A     Years of education: N/A     Social History Main Topics     Smoking status: Never Smoker     Smokeless tobacco: Never Used     Alcohol use No     Drug use: No     Sexual activity: No     Other Topics Concern     Parent/Sibling W/ Cabg, Mi Or Angioplasty Before 65f 55m? Yes     mother     Social History Narrative       Family History -   Family History   Problem Relation Age of Onset     C.A.D. Mother      DIABETES Mother      CANCER Father      brain     Breast Cancer Maternal Aunt      Cancer - colorectal No family hx of        Review of Systems - As per HPI and PMHx, otherwise 7 system review of the head and neck negative. 10+ system review negative.    Physical Exam  LMP 07/18/2015 (Approximate)  General - The patient is well nourished and well developed, and appears to have good nutritional status.  Alert and oriented to person and place, answers questions and cooperates with examination appropriately.   Head and Face - Normocephalic and atraumatic, with no gross asymmetry noted of the contour of the facial features.  The facial nerve is intact, with strong symmetric movements.  Voice and Breathing -  The patient was breathing comfortably without the use of accessory muscles. There was no wheezing, stridor, or stertor.  The patients voice was clear and strong, and had appropriate pitch and quality.  Ears - Bilateral pinna and EACs with normal appearing overlying skin. Tympanic membrane intact with good mobility on pneumatic otoscopy bilaterally. Bony landmarks of the ossicular chain are normal. The tympanic membranes are normal in appearance. No retraction, perforation, or masses.  No fluid or purulence was seen in the external canal or the middle ear.   Eyes - Extraocular movements intact.  Sclera were not icteric or injected, conjunctiva were pink and moist.  Mouth - Examination of the oral cavity showed pink, healthy oral mucosa. No lesions or ulcerations noted.  The tongue was mobile and midline, and the dentition were in good condition.    Throat - The walls of the oropharynx were smooth, pink, moist, symmetric, and had no lesions or ulcerations.  The tonsillar pillars and soft palate were symmetric.  The uvula was midline on elevation.  Neck - Normal midline excursion of the laryngotracheal complex during swallowing.  Full range of motion on passive movement.  Palpation of the occipital, submental, submandibular, internal jugular chain, and supraclavicular nodes did not demonstrate any abnormal lymph nodes or masses.  The carotid pulse was palpable bilaterally.  Palpation of the thyroid was soft and smooth, with no nodules or goiter appreciated.  The trachea was mobile and midline.  Nose - External contour is symmetric, no gross deflection or scars.  Nasal mucosa is pink and moist with no abnormal mucus.  The septum was midline and non-obstructive, turbinates of normal size and position.  No polyps, masses, or purulence noted on examination.  Heart:  Regular rate and rhythm, no murmurs.  Lungs:  Chest clear to auscultation bilaterally    To further evaluate the nasal cavity, I performed rigid nasal  endoscopy.  I first sprayed the nasal cavity bilaterally with a mix of lidocaine and neosynephrine.  I then began on the left side using a 2.7mm, 30 degree rigid nasal endoscope.  The septum was fairly straight and the nasal airway was open.  No abnormal secretions, purulence, or polyps were noted. The left middle turbinate and middle meatus were clearly visualized and normal in appearance.  Looking up, the olfactory cleft was unobstructed.  Going further back, the sphenoethmoid recess was normal in appearance, with healthy appearing mucosa on the face of the sphenoid.  The nasopharynx was unremarkable, and the eustachian tube opening on this side was unobstructed.    I then turned my attention to the right side.  Once again, the septum was fairly straight, and the airway was open.  No abnormal secretions, purulence, polyps were noted.  The right middle turbinate and middle meatus were clearly visualized and normal in appearance.  Looking up, the olfactory cleft was unobstructed.  Going further back the right sphenoethmoid recess was normal in appearance, and eustachian tube opening was unobstructed.        Assessment - Dilia Solomon is a 52 year old female with concerns for sinusitis. I think it is fairly likely that she simply has acute viral URIs. I recommended she wait 2 weeks before seeking antibiotics for similar symptoms in the future. Nasal exam demonstrates no clear evidence of sinusitis. I do not see any of the expected evidence of prior sinus surgery although it is documented in her chart. Ethmoid bulla and uncinate remain in place. I recommended a CT sinus to fully evaluate her sinuses, and if there is surgical disease I will have her return to discuss.    Ear exam is normal today. I advised her to get an audiogram if she feels her hearing is changed.       Dr. Santhosh Tierney MD  Otolaryngology  [facility]        Again, thank you for allowing me to participate in the care of your patient.         Sincerely,        Santhosh Tierney MD

## 2018-01-24 NOTE — MR AVS SNAPSHOT
After Visit Summary   1/24/2018    Dilia Solomon    MRN: [id number]           Patient Information     Date Of Birth          1965        Visit Information        Provider Department      1/24/2018 12:00 PM Santhosh Tierney MD [facility]        Today's Diagnoses     Acute recurrent maxillary sinusitis    -  1    Dysfunction of both eustachian tubes          Care Instructions    Per physician's instructions            Follow-ups after your visit        Additional Services     AUDIOLOGY ADULT REFERRAL       Your provider has referred you to: [facility] [phone]   [email or web address]    Specialty Testing:  Audiogram w/Tymps and Reflexes (Comprehensive Audiology Evaluation)                  Your next 10 appointments already scheduled     Jan 26, 2018  7:00 AM CST   Nurse Only with ALLERGY Formerly named [facility] ([facility])    [address] [id number] [phone]           Every allergy patient MUST wait 30 minutes after their allergy shot. No exceptions.  Xolair shots #1-3 should plan to wait 2 hours in clinic Xolair shots after #4 should plan 30 minute wait in clinic            Feb 07, 2018  1:40 PM CST   New Visit with Carlos Vallejo DPM   [facility] ([facility])    [address] [place] [id number] [phone]            Jun 06, 2018  8:20 AM CDT   Return Visit with Butch Horowitz MD   [facility] ([facility]    [address] [address] [phone]              Future tests that were ordered for you today     Open Future Orders        Priority Expected Expires Ordered    CT Maxillofacial w/o Contrast Routine  1/24/2019 1/24/2018            Who to contact     If you have questions or need follow up information about today's clinic visit or your schedule please contact  [facility] directly at [phone].  Normal or non-critical lab and imaging results will be communicated to you by MyChart, letter or phone within 4 business days after the clinic has received the results. If you do not hear from us within 7 days, please contact the clinic through MyChart or phone. If you have a critical or abnormal lab result, we will notify you by phone as soon as possible.  Submit refill requests through [facility] or call your pharmacy and they will forward the refill request to us. Please allow 3 business days for your refill to be completed.          Additional Information About Your Visit        Care EveryWhere ID     This is your Care EveryWhere ID. This could be used by other organizations to access your [facility] medical records  [id number]        Your Vitals Were     Pulse Temperature Last Period BMI (Body Mass Index)          84 98.2  F (36.8  C) (Oral) 07/18/2015 (Approximate) 37.24 kg/m2         Blood Pressure from Last 3 Encounters:   01/24/18 135/78   01/17/18 120/81   12/13/17 126/88    Weight from Last 3 Encounters:   01/24/18 96.2 kg (212 lb)   01/17/18 96.2 kg (212 lb 1.3 oz)   01/10/18 96.2 kg (212 lb)              We Performed the Following     AUDIOLOGY ADULT REFERRAL     NASAL ENDOSCOPY, DIAGNOSTIC          Today's Medication Changes          These changes are accurate as of 1/24/18 12:41 PM.  If you have any questions, ask your nurse or doctor.               These medicines have changed or have updated prescriptions.        Dose/Directions    triamcinolone 0.1 % cream   Commonly known as:  KENALOG   This may have changed:  Another medication with the same name was removed. Continue taking this medication, and follow the directions you see here.   Used for:  Eczema, unspecified type   Changed by:  Santhosh Tierney MD        Apply  topically 2 times daily as needed.   Quantity:  80 g   Refills:  2                Primary Care Provider Office Phone # Fax #    Cookie  Cheyenne Conklin [phone]       [address] [place] [id number]        Equal Access to Services     ARAVINDGABBY APRIL : Hadii aad ku matti Anguiano, waaxda luqadaha, qaybta kaalmada anna, hieu howard. So [facility] [phone].    ATENCIÓN: Si habla español, tiene a pruitt disposición servicios gratuitos de asistencia lingüística. Jenniferame al [phone].    We comply with applicable federal civil rights laws and [place] laws. We do not discriminate on the basis of race, color, national origin, age, disability, sex, sexual orientation, or gender identity.            Thank you!     Thank you for choosing [facility]  for your care. Our goal is always to provide you with excellent care. Hearing back from our patients is one way we can continue to improve our services. Please take a few minutes to complete the written survey that you may receive in the mail after your visit with us. Thank you!             Your Updated Medication List - Protect others around you: Learn how to safely use, store and throw away your medicines at www.disposemymeds.org.          This list is accurate as of 1/24/18 12:41 PM.  Always use your most recent med list.                   Brand Name Dispense Instructions for use Diagnosis    * albuterol (2.5 MG/3ML) 0.083% neb solution     1 Box    Take 1 vial (2.5 mg) by nebulization every 4 hours as needed    Moderate persistent asthma, uncomplicated       * albuterol 108 (90 BASE) MCG/ACT Inhaler    PROAIR HFA/PROVENTIL HFA/VENTOLIN HFA    1 Inhaler    Inhale 2 puffs into the lungs every 4 hours as needed    Moderate persistent asthma without complication       * ALLERGEN IMMUNOTHERAPY PRESCRIPTION     5 mL    Name of Mix: Mix #1  Mold Alternaria Tenuis 1:10 w/v, HS  0.5 ml Aspergillus Fumigatus 1:10 w/v, HS  0.5 ml Epicoccum Nigrum 1:10 w/v, HS 0.5 ml Hormodendrum Cladosporioides 1:10 w/v, HS 0.5 ml Penicillium Mix 1:10 w/v, HS  0.5 ml Diluent:  HSA qs to 5ml    Chronic allergic rhinitis due to animal hair and dander, Allergic rhinitis due to dust mite, Allergic rhinitis due to mold, Chronic seasonal allergic rhinitis due to pollen       * ALLERGEN IMMUNOTHERAPY PRESCRIPTION     5 mL    Name of Mix: Mix #2  Dust Mite, Cat, Dog Cat Hair, Standardized 10,000 BAU/mL, ALK  2.0 ml Dog Hair Dander, A. P.  1:100 w/v, HS  1.0 ml Dust Mites F 30,000AU/mL, HS  0.3 ml Dust Mites P. 30,000 AU/mL, HS  0.3 ml  Diluent: HSA qs to 5ml    Chronic allergic rhinitis due to animal hair and dander, Allergic rhinitis due to dust mite, Allergic rhinitis due to mold, Chronic seasonal allergic rhinitis due to pollen       * ALLERGEN IMMUNOTHERAPY PRESCRIPTION     5 mL    Name of Mix: Mix #3 Grass,Tree  Dmitry,White 1:20w/v, HS 0.5ml Birch Mix PRW 1:20w/v, HS 0.5ml Boxelder-Maple Mix BHR (Boxelder Hard Red) 1:20w/v, HS 0.5ml [place],Common 1:20w/v, HS 0.5ml Elm,American 1:20w/v, HS 0.5ml [place] Mix 1:20w/v, HS 0.5ml Oak Mix RVW 1:20w/v, HS 0.5ml [place] Tree,Black 1:20w/v, HS 0.5ml Grass Mix #7 100,000 BAU/mL, HS 0.4ml Jonathan Grass 1:20w/v, HS 0.5ml Diluent: HSA qs to 5ml    Chronic allergic rhinitis due to animal hair and dander, Allergic rhinitis due to dust mite, Allergic rhinitis due to mold, Chronic seasonal allergic rhinitis due to pollen       * ALLERGEN IMMUNOTHERAPY PRESCRIPTION     5 mL    Name of Mix: Mix #4  Weeds Kochia 1:20 w/v, HS 0.5 ml Lamb's Quarters 1:20 w/v, HS 0.5 ml Nettle 1:20 w/v, HS 0.5 ml Plantain, English 1:20 w/v, HS 0.5 ml Ragweed Mixed 1:20 w/v ALK  0.5 ml Russian Thistle 1:20 w/v, HS 0.5 ml Sagebrush, Mugwort 1:20 w/v, HS 0.5 ml Sorrel, Sheep 1:20 w/v, HS 0.5 ml Diluent: HSA qs to 5ml    Chronic allergic rhinitis due to animal hair and dander, Allergic rhinitis due to dust mite, Allergic rhinitis due to mold, Chronic seasonal allergic rhinitis due to pollen       azelastine 0.05 % Soln ophthalmic solution    OPTIVAR    1 Bottle    Apply 1 drop to eye  2 times daily    Conjunctivitis, allergic, unspecified laterality       CERAVE Crea     2 Bottle    Externally apply 1 dose * topically 2 times daily    Eczema, unspecified type       cetirizine 10 MG tablet    zyrTEC    30 tablet    Take 1 tablet (10 mg) by mouth At Bedtime    Allergic conjunctivitis of both eyes, Chronic allergic rhinitis due to animal hair and dander, Allergic rhinitis due to dust mite, Allergic rhinitis due to mold, Chronic seasonal allergic rhinitis due to pollen       EPINEPHrine 0.3 MG/0.3ML injection 2-pack    EPIPEN/ADRENACLICK/or ANY BX GENERIC EQUIV    0.6 mL    Inject 0.3 mLs (0.3 mg) into the muscle once as needed for anaphylaxis    Food allergy, Need for desensitization to allergens       fluticasone 50 MCG/ACT spray    FLONASE    1 Bottle    Spray 2 sprays into both nostrils daily    Chronic allergic rhinitis due to animal hair and dander, Allergic rhinitis due to dust mite, Allergic rhinitis due to mold, Chronic seasonal allergic rhinitis due to pollen       fluticasone-salmeterol 500-50 MCG/DOSE diskus inhaler    ADVAIR    3 Inhaler    Inhale 1 puff into the lungs 2 times daily    Moderate persistent asthma without complication       loratadine 10 MG tablet    CLARITIN    30 tablet    Take 1 tablet (10 mg) by mouth daily    Allergic conjunctivitis of both eyes, Chronic allergic rhinitis due to animal hair and dander, Allergic rhinitis due to dust mite, Allergic rhinitis due to mold, Chronic seasonal allergic rhinitis due to pollen       montelukast 10 MG tablet    SINGULAIR    30 tablet    Take 1 tablet (10 mg) by mouth At Bedtime    Moderate persistent asthma without complication, Chronic allergic rhinitis due to animal hair and dander, Allergic rhinitis due to dust mite, Allergic rhinitis due to mold, Chronic seasonal allergic rhinitis due to pollen       MULTIVITAMIN PO      1 tab daily        order for DME     1 Units    Equipment being ordered: Dynaflex insert    Closed  displaced fracture of proximal phalanx of right great toe, initial encounter       triamcinolone 0.1 % cream    KENALOG    80 g    Apply  topically 2 times daily as needed.    Eczema, unspecified type       * Notice:  This list has 6 medication(s) that are the same as other medications prescribed for you. Read the directions carefully, and ask your doctor or other care provider to review them with you.

## 2018-01-24 NOTE — NURSING NOTE
This laryngoscopy scope was  used on this patient.    Rigid    Scope Number: Cole Storz Rigid    # [id number]   Pediatric/Adult/Post-op sinus

## 2018-01-24 NOTE — PROGRESS NOTES
History of Present Illness - Dilia Solomon is a 52 year old female seen in consultation at the request of Dr. Horowitz for recurrent sinusitis. She has known environmental allergies and is on immunotherapy. She has history of sinus surgery by Dr. More in 2013. She feels congested most of the time. She also feels her ears are full, but can hear well enough to use the phone on both ears.    Past Medical History -   Patient Active Problem List   Diagnosis     Need for prophylactic vaccination and inoculation against influenza     Sprain of interphalangeal (joint) of hand     Radial styloid tenosynovitis     Carpal tunnel syndrome     Synovial cyst of popliteal space     Pain in joint, lower leg     Hyperlipidemia LDL goal <130     Advanced directives, counseling/discussion     Moderate persistent asthma     Allergic rhinitis due to dust mite     Food allergy     FH: diabetes mellitus     Chronic allergic rhinitis due to animal hair and dander       Current Medications -   Current Outpatient Prescriptions:      azelastine (OPTIVAR) 0.05 % SOLN ophthalmic solution, Apply 1 drop to eye 2 times daily, Disp: 1 Bottle, Rfl: 5     EPINEPHrine (EPIPEN/ADRENACLICK/OR ANY BX GENERIC EQUIV) 0.3 MG/0.3ML injection 2-pack, Inject 0.3 mLs (0.3 mg) into the muscle once as needed for anaphylaxis (Patient not taking: Reported on 1/17/2018), Disp: 0.6 mL, Rfl: 3     order for DME, Equipment being ordered: Dynaflex insert, Disp: 1 Units, Rfl: 0     fluticasone (FLONASE) 50 MCG/ACT spray, Spray 2 sprays into both nostrils daily, Disp: 1 Bottle, Rfl: 11     triamcinolone (KENALOG) 0.1 % cream, Apply  topically 2 times daily as needed., Disp: 80 g, Rfl: 2     montelukast (SINGULAIR) 10 MG tablet, Take 1 tablet (10 mg) by mouth At Bedtime, Disp: 30 tablet, Rfl: 11     fluticasone-salmeterol (ADVAIR) 500-50 MCG/DOSE diskus inhaler, Inhale 1 puff into the lungs 2 times daily, Disp: 3 Inhaler, Rfl: 1     loratadine (CLARITIN) 10 MG  tablet, Take 1 tablet (10 mg) by mouth daily, Disp: 30 tablet, Rfl: 11     cetirizine (ZYRTEC) 10 MG tablet, Take 1 tablet (10 mg) by mouth At Bedtime, Disp: 30 tablet, Rfl: 11     albuterol (PROAIR HFA/PROVENTIL HFA/VENTOLIN HFA) 108 (90 BASE) MCG/ACT Inhaler, Inhale 2 puffs into the lungs every 4 hours as needed, Disp: 1 Inhaler, Rfl: 3     ORDER FOR ALLERGEN IMMUNOTHERAPY, Name of Mix: Mix #1  Mold Alternaria Tenuis 1:10 w/v, HS  0.5 ml Aspergillus Fumigatus 1:10 w/v, HS  0.5 ml Epicoccum Nigrum 1:10 w/v, HS 0.5 ml Hormodendrum Cladosporioides 1:10 w/v, HS 0.5 ml Penicillium Mix 1:10 w/v, HS  0.5 ml Diluent: HSA qs to 5ml, Disp: 5 mL, Rfl: PRN     ORDER FOR ALLERGEN IMMUNOTHERAPY, Name of Mix: Mix #2  Dust Mite, Cat, Dog Cat Hair, Standardized 10,000 BAU/mL, ALK  2.0 ml Dog Hair Dander, A. P.  1:100 w/v, HS  1.0 ml Dust Mites F 30,000AU/mL, HS  0.3 ml Dust Mites P. 30,000 AU/mL, HS  0.3 ml  Diluent: HSA qs to 5ml, Disp: 5 mL, Rfl: PRN     ORDER FOR ALLERGEN IMMUNOTHERAPY, Name of Mix: Mix #3 Grass,Tree  Dmitry,White 1:20w/v, HS 0.5ml Birch Mix PRW 1:20w/v, HS 0.5ml Boxelder-Maple Mix BHR (Boxelder Hard Red) 1:20w/v, HS 0.5ml [place],Common 1:20w/v, HS 0.5ml Elm,American 1:20w/v, HS 0.5ml [place] Mix 1:20w/v, HS 0.5ml Oak Mix RVW 1:20w/v, HS 0.5ml [place] Tree,Black 1:20w/v, HS 0.5ml Grass Mix #7 100,000 BAU/mL, HS 0.4ml Jonathan Grass 1:20w/v, HS 0.5ml Diluent: HSA qs to 5ml, Disp: 5 mL, Rfl: PRN     ORDER FOR ALLERGEN IMMUNOTHERAPY, Name of Mix: Mix #4  Weeds Kochia 1:20 w/v, HS 0.5 ml Lamb's Quarters 1:20 w/v, HS 0.5 ml Nettle 1:20 w/v, HS 0.5 ml Plantain, English 1:20 w/v, HS 0.5 ml Ragweed Mixed 1:20 w/v ALK  0.5 ml Russian Thistle 1:20 w/v, HS 0.5 ml Sagebrush, Mugwort 1:20 w/v, HS 0.5 ml Sorrel, Sheep 1:20 w/v, HS 0.5 ml Diluent: HSA qs to 5ml, Disp: 5 mL, Rfl: PRN     triamcinolone (KENALOG) 0.1 % cream, APPLY SPARINGLY TO AFFECTED AREA THREE TIMES A DAY AS NEEDED, Disp: 80 g, Rfl: 0     Emollient (CERAVE)  CREA, Externally apply 1 dose * topically 2 times daily, Disp: 2 Bottle, Rfl: 11     albuterol (2.5 MG/3ML) 0.083% neb solution, Take 1 vial (2.5 mg) by nebulization every 4 hours as needed (Patient not taking: Reported on 1/17/2018), Disp: 1 Box, Rfl: 3     MULTIVITAMIN OR, 1 tab daily, Disp: , Rfl:     Allergies -   Allergies   Allergen Reactions     Clinoril [Nsaids] Hives     Tolerates ibuprofen and aspirin without hives     Pineapple Hives       Social History -   Social History     Social History     Marital status: Single     Spouse name: N/A     Number of children: N/A     Years of education: N/A     Social History Main Topics     Smoking status: Never Smoker     Smokeless tobacco: Never Used     Alcohol use No     Drug use: No     Sexual activity: No     Other Topics Concern     Parent/Sibling W/ Cabg, Mi Or Angioplasty Before 65f 55m? Yes     mother     Social History Narrative       Family History -   Family History   Problem Relation Age of Onset     C.A.D. Mother      DIABETES Mother      CANCER Father      brain     Breast Cancer Maternal Aunt      Cancer - colorectal No family hx of        Review of Systems - As per HPI and PMHx, otherwise 7 system review of the head and neck negative. 10+ system review negative.    Physical Exam  LMP 07/18/2015 (Approximate)  General - The patient is well nourished and well developed, and appears to have good nutritional status.  Alert and oriented to person and place, answers questions and cooperates with examination appropriately.   Head and Face - Normocephalic and atraumatic, with no gross asymmetry noted of the contour of the facial features.  The facial nerve is intact, with strong symmetric movements.  Voice and Breathing - The patient was breathing comfortably without the use of accessory muscles. There was no wheezing, stridor, or stertor.  The patients voice was clear and strong, and had appropriate pitch and quality.  Ears - Bilateral pinna and EACs with  normal appearing overlying skin. Tympanic membrane intact with good mobility on pneumatic otoscopy bilaterally. Bony landmarks of the ossicular chain are normal. The tympanic membranes are normal in appearance. No retraction, perforation, or masses.  No fluid or purulence was seen in the external canal or the middle ear.   Eyes - Extraocular movements intact.  Sclera were not icteric or injected, conjunctiva were pink and moist.  Mouth - Examination of the oral cavity showed pink, healthy oral mucosa. No lesions or ulcerations noted.  The tongue was mobile and midline, and the dentition were in good condition.    Throat - The walls of the oropharynx were smooth, pink, moist, symmetric, and had no lesions or ulcerations.  The tonsillar pillars and soft palate were symmetric.  The uvula was midline on elevation.  Neck - Normal midline excursion of the laryngotracheal complex during swallowing.  Full range of motion on passive movement.  Palpation of the occipital, submental, submandibular, internal jugular chain, and supraclavicular nodes did not demonstrate any abnormal lymph nodes or masses.  The carotid pulse was palpable bilaterally.  Palpation of the thyroid was soft and smooth, with no nodules or goiter appreciated.  The trachea was mobile and midline.  Nose - External contour is symmetric, no gross deflection or scars.  Nasal mucosa is pink and moist with no abnormal mucus.  The septum was midline and non-obstructive, turbinates of normal size and position.  No polyps, masses, or purulence noted on examination.  Heart:  Regular rate and rhythm, no murmurs.  Lungs:  Chest clear to auscultation bilaterally    To further evaluate the nasal cavity, I performed rigid nasal endoscopy.  I first sprayed the nasal cavity bilaterally with a mix of lidocaine and neosynephrine.  I then began on the left side using a 2.7mm, 30 degree rigid nasal endoscope.  The septum was fairly straight and the nasal airway was open.  No  abnormal secretions, purulence, or polyps were noted. The left middle turbinate and middle meatus were clearly visualized and normal in appearance.  Looking up, the olfactory cleft was unobstructed.  Going further back, the sphenoethmoid recess was normal in appearance, with healthy appearing mucosa on the face of the sphenoid.  The nasopharynx was unremarkable, and the eustachian tube opening on this side was unobstructed.    I then turned my attention to the right side.  Once again, the septum was fairly straight, and the airway was open.  No abnormal secretions, purulence, polyps were noted.  The right middle turbinate and middle meatus were clearly visualized and normal in appearance.  Looking up, the olfactory cleft was unobstructed.  Going further back the right sphenoethmoid recess was normal in appearance, and eustachian tube opening was unobstructed.        Assessment - Dilia Solomon is a 52 year old female with concerns for sinusitis. I think it is fairly likely that she simply has acute viral URIs. I recommended she wait 2 weeks before seeking antibiotics for similar symptoms in the future. Nasal exam demonstrates no clear evidence of sinusitis. I do not see any of the expected evidence of prior sinus surgery although it is documented in her chart. Ethmoid bulla and uncinate remain in place. I recommended a CT sinus to fully evaluate her sinuses, and if there is surgical disease I will have her return to discuss.    Ear exam is normal today. I advised her to get an audiogram if she feels her hearing is changed.       Dr. Santhosh Tierney MD  Otolaryngology  [facility]

## 2018-01-24 NOTE — NURSING NOTE
"Initial /78 (BP Location: Left arm, Patient Position: Chair, Cuff Size: Adult Regular)  Pulse 84  Temp 98.2  F (36.8  C) (Oral)  Wt 96.2 kg (212 lb)  LMP 07/18/2015 (Approximate)  BMI 37.24 kg/m2 Estimated body mass index is 37.24 kg/(m^2) as calculated from the following:    Height as of 1/17/18: 1.607 m (5' 3.27\").    Weight as of this encounter: 96.2 kg (212 lb). .    aMrla Lo LPN    "

## 2018-01-25 ENCOUNTER — OFFICE VISIT (OUTPATIENT)
Dept: OTOLARYNGOLOGY | Facility: CLINIC | Age: 53
End: 2018-01-25
Payer: COMMERCIAL

## 2018-01-25 VITALS
TEMPERATURE: 97.8 F | HEIGHT: 63 IN | WEIGHT: 212 LBS | SYSTOLIC BLOOD PRESSURE: 112 MMHG | BODY MASS INDEX: 37.56 KG/M2 | DIASTOLIC BLOOD PRESSURE: 75 MMHG | HEART RATE: 83 BPM

## 2018-01-25 DIAGNOSIS — J32.8 OTHER CHRONIC SINUSITIS: Primary | ICD-10-CM

## 2018-01-25 PROCEDURE — 99214 OFFICE O/P EST MOD 30 MIN: CPT | Performed by: OTOLARYNGOLOGY

## 2018-01-25 ASSESSMENT — PAIN SCALES - GENERAL: PAINLEVEL: MILD PAIN (3)

## 2018-01-25 NOTE — MR AVS SNAPSHOT
After Visit Summary   1/25/2018    Dilia Solomon    MRN: [id number]           Patient Information     Date Of Birth          1965        Visit Information        Provider Department      1/25/2018 11:45 AM Santhosh Tierney MD [facility]        Today's Diagnoses     Other chronic sinusitis    -  1      Care Instructions    Per Physician's instructions            Follow-ups after your visit        Your next 10 appointments already scheduled     Jan 26, 2018  7:00 AM CST   Nurse Only with ALLERGY [facility] ([facility])    [address] [address]   [phone]           Every allergy patient MUST wait 30 minutes after their allergy shot. No exceptions.  Xolair shots #1-3 should plan to wait 2 hours in clinic Xolair shots after #4 should plan 30 minute wait in clinic            Feb 07, 2018  1:40 PM CST   New Visit with Carlos Vallejo DPM   [facility] ([facility])    [address]  [address]   [phone]            Feb 14, 2018   Procedure with Santhosh Tierney MD   [facility] (--)    [address] [address]   [phone]           The medical center is located at [address] (between [address] in [place], [place] miles north of [place]).            Jun 06, 2018  8:20 AM CDT   Return Visit with Butch Horowitz MD   [facility] ([facility])    [address] [address]   [phone]              Future tests that were ordered for you today     Open Future Orders        Priority Expected Expires Ordered    CT Maxillofacial w/o Contrast Routine  1/24/2019 1/24/2018            Who to contact     If you have questions or need follow up information about today's clinic visit or your schedule please contact [facility] directly at [phone].  Normal or  "non-critical lab and imaging results will be communicated to you by MyChart, letter or phone within 4 business days after the clinic has received the results. If you do not hear from us within 7 days, please contact the clinic through MyChart or phone. If you have a critical or abnormal lab result, we will notify you by phone as soon as possible.  Submit refill requests through [employer] or call your pharmacy and they will forward the refill request to us. Please allow 3 business days for your refill to be completed.          Additional Information About Your Visit        Care EveryWhere ID     This is your Care EveryWhere ID. This could be used by other organizations to access your [facility] medical records  [id number]        Your Vitals Were     Pulse Temperature Height Last Period BMI (Body Mass Index)       83 97.8  F (36.6  C) (Oral) 1.6 m (5' 3\") 07/18/2015 (Approximate) 37.55 kg/m2        Blood Pressure from Last 3 Encounters:   01/25/18 112/75   01/24/18 135/78   01/17/18 120/81    Weight from Last 3 Encounters:   01/25/18 96.2 kg (212 lb)   01/24/18 96.2 kg (212 lb)   01/17/18 96.2 kg (212 lb 1.3 oz)              Today, you had the following     No orders found for display       Primary Care Provider Office Phone # Fax #    Cookie Conklin [phone]       [address] [place] [address]        Equal Access to Services     [facility] : Hadii ulices muñozo Silvio, waaxda luolgaadaha, qaybta kaalmada anna, hieu howard. So [facility] [phone].    ATENCIÓN: Si habla español, tiene a pruitt disposición servicios gratuitos de asistencia lingüística. Llame al [phone].    We comply with applicable federal civil rights laws and [place] laws. We do not discriminate on the basis of race, color, national origin, age, disability, sex, sexual orientation, or gender identity.            Thank you!     Thank you for choosing [facility]  for your care. " Our goal is always to provide you with excellent care. Hearing back from our patients is one way we can continue to improve our services. Please take a few minutes to complete the written survey that you may receive in the mail after your visit with us. Thank you!             Your Updated Medication List - Protect others around you: Learn how to safely use, store and throw away your medicines at www.disposemymeds.org.          This list is accurate as of 1/25/18  3:11 PM.  Always use your most recent med list.                   Brand Name Dispense Instructions for use Diagnosis    * albuterol (2.5 MG/3ML) 0.083% neb solution     1 Box    Take 1 vial (2.5 mg) by nebulization every 4 hours as needed    Moderate persistent asthma, uncomplicated       * albuterol 108 (90 BASE) MCG/ACT Inhaler    PROAIR HFA/PROVENTIL HFA/VENTOLIN HFA    1 Inhaler    Inhale 2 puffs into the lungs every 4 hours as needed    Moderate persistent asthma without complication       * ALLERGEN IMMUNOTHERAPY PRESCRIPTION     5 mL    Name of Mix: Mix #1  Mold Alternaria Tenuis 1:10 w/v, HS  0.5 ml Aspergillus Fumigatus 1:10 w/v, HS  0.5 ml Epicoccum Nigrum 1:10 w/v, HS 0.5 ml Hormodendrum Cladosporioides 1:10 w/v, HS 0.5 ml Penicillium Mix 1:10 w/v, HS  0.5 ml Diluent: HSA qs to 5ml    Chronic allergic rhinitis due to animal hair and dander, Allergic rhinitis due to dust mite, Allergic rhinitis due to mold, Chronic seasonal allergic rhinitis due to pollen       * ALLERGEN IMMUNOTHERAPY PRESCRIPTION     5 mL    Name of Mix: Mix #2  Dust Mite, Cat, Dog Cat Hair, Standardized 10,000 BAU/mL, ALK  2.0 ml Dog Hair Dander, A. P.  1:100 w/v, HS  1.0 ml Dust Mites F 30,000AU/mL, HS  0.3 ml Dust Mites P. 30,000 AU/mL, HS  0.3 ml  Diluent: HSA qs to 5ml    Chronic allergic rhinitis due to animal hair and dander, Allergic rhinitis due to dust mite, Allergic rhinitis due to mold, Chronic seasonal allergic rhinitis due to pollen       * ALLERGEN IMMUNOTHERAPY  PRESCRIPTION     5 mL    Name of Mix: Mix #3 Grass,Tree  Dmitry,White 1:20w/v, HS 0.5ml Birch Mix PRW 1:20w/v, HS 0.5ml Boxelder-Maple Mix BHR (Boxelder Hard Red) 1:20w/v, HS 0.5ml [place],Common 1:20w/v, HS 0.5ml Elm,American 1:20w/v, HS 0.5ml [place] Mix 1:20w/v, HS 0.5ml Oak Mix RVW 1:20w/v, HS 0.5ml [place] Tree,Black 1:20w/v, HS 0.5ml Grass Mix #7 100,000 BAU/mL, HS 0.4ml Jonathan Grass 1:20w/v, HS 0.5ml Diluent: HSA qs to 5ml    Chronic allergic rhinitis due to animal hair and dander, Allergic rhinitis due to dust mite, Allergic rhinitis due to mold, Chronic seasonal allergic rhinitis due to pollen       * ALLERGEN IMMUNOTHERAPY PRESCRIPTION     5 mL    Name of Mix: Mix #4  Weeds Kochia 1:20 w/v, HS 0.5 ml Lamb's Quarters 1:20 w/v, HS 0.5 ml Nettle 1:20 w/v, HS 0.5 ml Plantain, English 1:20 w/v, HS 0.5 ml Ragweed Mixed 1:20 w/v ALK  0.5 ml Russian Thistle 1:20 w/v, HS 0.5 ml Sagebrush, Mugwort 1:20 w/v, HS 0.5 ml Sorrel, Sheep 1:20 w/v, HS 0.5 ml Diluent: HSA qs to 5ml    Chronic allergic rhinitis due to animal hair and dander, Allergic rhinitis due to dust mite, Allergic rhinitis due to mold, Chronic seasonal allergic rhinitis due to pollen       azelastine 0.05 % Soln ophthalmic solution    OPTIVAR    1 Bottle    Apply 1 drop to eye 2 times daily    Conjunctivitis, allergic, unspecified laterality       CERAVE Crea     2 Bottle    Externally apply 1 dose * topically 2 times daily    Eczema, unspecified type       cetirizine 10 MG tablet    zyrTEC    30 tablet    Take 1 tablet (10 mg) by mouth At Bedtime    Allergic conjunctivitis of both eyes, Chronic allergic rhinitis due to animal hair and dander, Allergic rhinitis due to dust mite, Allergic rhinitis due to mold, Chronic seasonal allergic rhinitis due to pollen       EPINEPHrine 0.3 MG/0.3ML injection 2-pack    EPIPEN/ADRENACLICK/or ANY BX GENERIC EQUIV    0.6 mL    Inject 0.3 mLs (0.3 mg) into the muscle once as needed for anaphylaxis    Food allergy, Need  for desensitization to allergens       fluticasone 50 MCG/ACT spray    FLONASE    1 Bottle    Spray 2 sprays into both nostrils daily    Chronic allergic rhinitis due to animal hair and dander, Allergic rhinitis due to dust mite, Allergic rhinitis due to mold, Chronic seasonal allergic rhinitis due to pollen       fluticasone-salmeterol 500-50 MCG/DOSE diskus inhaler    ADVAIR    3 Inhaler    Inhale 1 puff into the lungs 2 times daily    Moderate persistent asthma without complication       loratadine 10 MG tablet    CLARITIN    30 tablet    Take 1 tablet (10 mg) by mouth daily    Allergic conjunctivitis of both eyes, Chronic allergic rhinitis due to animal hair and dander, Allergic rhinitis due to dust mite, Allergic rhinitis due to mold, Chronic seasonal allergic rhinitis due to pollen       montelukast 10 MG tablet    SINGULAIR    30 tablet    Take 1 tablet (10 mg) by mouth At Bedtime    Moderate persistent asthma without complication, Chronic allergic rhinitis due to animal hair and dander, Allergic rhinitis due to dust mite, Allergic rhinitis due to mold, Chronic seasonal allergic rhinitis due to pollen       MULTIVITAMIN PO      1 tab daily        order for DME     1 Units    Equipment being ordered: Dynaflex insert    Closed displaced fracture of proximal phalanx of right great toe, initial encounter       triamcinolone 0.1 % cream    KENALOG    80 g    Apply  topically 2 times daily as needed.    Eczema, unspecified type       * Notice:  This list has 6 medication(s) that are the same as other medications prescribed for you. Read the directions carefully, and ask your doctor or other care provider to review them with you.

## 2018-01-25 NOTE — LETTER
[facility]  [address] [facility] [address] [address]  Phone: [phone]      January 25, 2018      RE: Dilia Solomon  [address] [facility] [address]        To whom it may concern:    Dilia Solomon is under my professional care.   She is having surgery on 2-14-18. She can return to work on Friday 2-16-18 with no lifting until 2-22-18.  Please call if any question at [phone].          Sincerely,        Santhosh Tierney MD/ENT

## 2018-01-25 NOTE — PROGRESS NOTES
History of Present Illness - Dilia Solomon is a 52 year old female who follows up to review her recent CT sinus. She reports chronic nasal congestion for many months, despite medical therapy and prior sinus surgery. She denies any changes since last visit. This problem has been an issue for her for more than 5 years.    Past Medical History -   Patient Active Problem List   Diagnosis     Need for prophylactic vaccination and inoculation against influenza     Sprain of interphalangeal (joint) of hand     Radial styloid tenosynovitis     Carpal tunnel syndrome     Synovial cyst of popliteal space     Pain in joint, lower leg     Hyperlipidemia LDL goal <130     Advanced directives, counseling/discussion     Moderate persistent asthma     Allergic rhinitis due to dust mite     Food allergy     FH: diabetes mellitus     Chronic allergic rhinitis due to animal hair and dander       Current Medications -   Current Outpatient Prescriptions:      azelastine (OPTIVAR) 0.05 % SOLN ophthalmic solution, Apply 1 drop to eye 2 times daily, Disp: 1 Bottle, Rfl: 5     EPINEPHrine (EPIPEN/ADRENACLICK/OR ANY BX GENERIC EQUIV) 0.3 MG/0.3ML injection 2-pack, Inject 0.3 mLs (0.3 mg) into the muscle once as needed for anaphylaxis, Disp: 0.6 mL, Rfl: 3     order for DME, Equipment being ordered: Dynaflex insert, Disp: 1 Units, Rfl: 0     fluticasone (FLONASE) 50 MCG/ACT spray, Spray 2 sprays into both nostrils daily, Disp: 1 Bottle, Rfl: 11     triamcinolone (KENALOG) 0.1 % cream, Apply  topically 2 times daily as needed., Disp: 80 g, Rfl: 2     montelukast (SINGULAIR) 10 MG tablet, Take 1 tablet (10 mg) by mouth At Bedtime, Disp: 30 tablet, Rfl: 11     fluticasone-salmeterol (ADVAIR) 500-50 MCG/DOSE diskus inhaler, Inhale 1 puff into the lungs 2 times daily, Disp: 3 Inhaler, Rfl: 1     loratadine (CLARITIN) 10 MG tablet, Take 1 tablet (10 mg) by mouth daily, Disp: 30 tablet, Rfl: 11     cetirizine (ZYRTEC) 10 MG tablet, Take 1 tablet  (10 mg) by mouth At Bedtime, Disp: 30 tablet, Rfl: 11     albuterol (PROAIR HFA/PROVENTIL HFA/VENTOLIN HFA) 108 (90 BASE) MCG/ACT Inhaler, Inhale 2 puffs into the lungs every 4 hours as needed, Disp: 1 Inhaler, Rfl: 3     ORDER FOR ALLERGEN IMMUNOTHERAPY, Name of Mix: Mix #1  Mold Alternaria Tenuis 1:10 w/v, HS  0.5 ml Aspergillus Fumigatus 1:10 w/v, HS  0.5 ml Epicoccum Nigrum 1:10 w/v, HS 0.5 ml Hormodendrum Cladosporioides 1:10 w/v, HS 0.5 ml Penicillium Mix 1:10 w/v, HS  0.5 ml Diluent: HSA qs to 5ml, Disp: 5 mL, Rfl: PRN     ORDER FOR ALLERGEN IMMUNOTHERAPY, Name of Mix: Mix #2  Dust Mite, Cat, Dog Cat Hair, Standardized 10,000 BAU/mL, ALK  2.0 ml Dog Hair Dander, A. P.  1:100 w/v, HS  1.0 ml Dust Mites F 30,000AU/mL, HS  0.3 ml Dust Mites P. 30,000 AU/mL, HS  0.3 ml  Diluent: HSA qs to 5ml, Disp: 5 mL, Rfl: PRN     ORDER FOR ALLERGEN IMMUNOTHERAPY, Name of Mix: Mix #3 Grass,Tree  Dmitry,White 1:20w/v, HS 0.5ml Birch Mix PRW 1:20w/v, HS 0.5ml Boxelder-Maple Mix BHR (Boxelder Hard Red) 1:20w/v, HS 0.5ml [place],Common 1:20w/v, HS 0.5ml Elm,American 1:20w/v, HS 0.5ml [place] Mix 1:20w/v, HS 0.5ml Oak Mix RVW 1:20w/v, HS 0.5ml [place] Tree,Black 1:20w/v, HS 0.5ml Grass Mix #7 100,000 BAU/mL, HS 0.4ml Jonathan Grass 1:20w/v, HS 0.5ml Diluent: HSA qs to 5ml, Disp: 5 mL, Rfl: PRN     ORDER FOR ALLERGEN IMMUNOTHERAPY, Name of Mix: Mix #4  Weeds Kochia 1:20 w/v, HS 0.5 ml Lamb's Quarters 1:20 w/v, HS 0.5 ml Nettle 1:20 w/v, HS 0.5 ml Plantain, English 1:20 w/v, HS 0.5 ml Ragweed Mixed 1:20 w/v ALK  0.5 ml Russian Thistle 1:20 w/v, HS 0.5 ml Sagebrush, Mugwort 1:20 w/v, HS 0.5 ml Sorrel, Sheep 1:20 w/v, HS 0.5 ml Diluent: HSA qs to 5ml, Disp: 5 mL, Rfl: PRN     Emollient (CERAVE) CREA, Externally apply 1 dose * topically 2 times daily, Disp: 2 Bottle, Rfl: 11     albuterol (2.5 MG/3ML) 0.083% neb solution, Take 1 vial (2.5 mg) by nebulization every 4 hours as needed, Disp: 1 Box, Rfl: 3     MULTIVITAMIN OR, 1 tab daily,  "Disp: , Rfl:     Allergies -   Allergies   Allergen Reactions     Clinoril [Nsaids] Hives     Tolerates ibuprofen and aspirin without hives     Pineapple Hives       Social History -   Social History     Social History     Marital status: Single     Spouse name: N/A     Number of children: N/A     Years of education: N/A     Social History Main Topics     Smoking status: Never Smoker     Smokeless tobacco: Never Used     Alcohol use No     Drug use: No     Sexual activity: No     Other Topics Concern     Parent/Sibling W/ Cabg, Mi Or Angioplasty Before 65f 55m? Yes     mother     Social History Narrative       Family History -   Family History   Problem Relation Age of Onset     C.A.D. Mother      DIABETES Mother      CANCER Father      brain     Breast Cancer Maternal Aunt      Cancer - colorectal No family hx of        Review of Systems - As per HPI and PMHx, otherwise 7 system review of the head and neck negative. 10+ system review negative.    Exam:  /75 (BP Location: Right arm, Patient Position: Sitting, Cuff Size: Adult Regular)  Pulse 83  Temp 97.8  F (36.6  C) (Oral)  Ht 1.6 m (5' 3\")  Wt 96.2 kg (212 lb)  LMP 07/18/2015 (Approximate)  BMI 37.55 kg/m2  General - The patient is well nourished and well developed, and appears to have good nutritional status.  Alert and oriented to person and place, answers questions and cooperates with examination appropriately.   Head and Face - Normocephalic and atraumatic, with no gross asymmetry noted of the contour of the facial features.  The facial nerve is intact, with strong symmetric movements.  Eyes - Extraocular movements intact.  Sclera were not icteric or injected, conjunctiva were pink and moist.      CT Sinus 1/24/18:  IMPRESSION:    1. Mild-to-moderate mucosal thickening in both maxillary sinuses.  2. Minimal mucosal thickening in the ethmoid air cells and sphenoid  sinuses.  3. No evidence for any nasal cavity mass or any air-fluid levels.    A/P - " Dilia Solomon is a 52 year old female with possible chronic sinusitis, as recent CT Sinus demonstrates continued OMU obstruction and anterior ethmoid inflammation. Based on the failure of medical management which has included multiple courses of antibiotics, antihistamines, nasal steroid sprays, saline lavage, and decongestants, my recommendation is for functional endoscopic sinus surgery.    The basic premise of functional endoscopic sinus surgery was discussed at length.  This included the concept behind restoration of the natural drainage pathways for the paranasal sinuses using the latest minimally invasive, minimally traumatic techniques and instruments.    Risks discussed included the risks on infection, bleeding, the risks of general anesthesia.  Also specific to functional endoscopic sinus surgery, the risks of permanent damage to the eyes/vision, tear ducts, sense of smell, base of skull/brain, and CSF leak were described.  And finally, the possibility that functional endoscopic sinus surgery may not relieve sinus disease, and that there might be continued need for medical management was also discussed.  The patient understood and wished to proceed with scheduling.        Dr. Santhosh Tierney MD  Otolaryngology  [facility]

## 2018-01-25 NOTE — NURSING NOTE
"Initial /75 (BP Location: Right arm, Patient Position: Sitting, Cuff Size: Adult Regular)  Pulse 83  Temp 97.8  F (36.6  C) (Oral)  Ht 1.6 m (5' 3\")  Wt 96.2 kg (212 lb)  LMP 07/18/2015 (Approximate)  BMI 37.55 kg/m2 Estimated body mass index is 37.55 kg/(m^2) as calculated from the following:    Height as of this encounter: 1.6 m (5' 3\").    Weight as of this encounter: 96.2 kg (212 lb). .    Elsi Mcpherson CMA    "

## 2018-01-25 NOTE — LETTER
SURGERYPLANNING/SCHEDULING WORKSHEET                              [facility] [place]  [address] [address] [phone]                          Dilia Solomon                :  1965  MRN:  [id number]  Phone: [phone] (home)     Same Day Surgery   Surgeon: Santhosh Tierney MD  Diagnosis:   Chronic Sinusitis  Allergies:  Clinoril [nsaids] and Pineapple   A preoperative evaluation by the primary care provider has been requested to summarize and modify, where possible, medical risks to the proposed surgery.  Specific risks requiring evaluation include   Patient Active Problem List    Diagnosis     Allergic rhinitis due to dust mite     Chronic allergic rhinitis due to animal hair and dander     FH: diabetes mellitus     Food allergy     Moderate persistent asthma     Advanced directives, counseling/discussion     Hyperlipidemia LDL goal <130     Synovial cyst of popliteal space     Pain in joint, lower leg     Carpal tunnel syndrome     Radial styloid tenosynovitis     Need for prophylactic vaccination and inoculation against influenza     Sprain of interphalangeal (joint) of hand     ====================================================  Surgical Procedure:  ENT bilateral Other:  Bilateral Total Ethmoidectomy, Bilateral Maxillary Antrostomy  Length of Procedure:  40 minutes  Type of anesthesia:  General  The proposed surgical procedure is considered LOW risk.  Date of Procedure:___8-06-82_____________    Time:  Will call to confirm _____________________       Special Equipment: None  Informed Consent Obtained and Signed:  NO  ====================================================  Instructions to Same Day Surgery Staff  none  Preop Antibiotic:  none  Preop Pain Meds:  None  Preop Orders:  Routine Standing Orders.  ====================================================  Instructions to the patient:  Preop physical exam  scheduled (within 30 days or 7 days prior) with:   __Primary MD__________________  Clinic:  ____________________                                         Date__to be scheduled  ____________Time_________________________  Come to the hospital at: ___ONE HOUR PRIOR  _____________________________  HOME PREPARATION:   Take medications with a sip of water the morning of surgery:   Check with  if taking insulin.  May have  a light meal, toast and clear liquids, up to 8 hrs before surgery  May have clear liquids (liquids one can read through) up to 4 hrs before surgery  NOTHING after 4 hrs before surgery  Stop aspirin 7-10 days before surgery  Stop NSAIDS (Ibuproven, Naproxen, etc) 5 days before surgery      Santhosh Tierney MD    1/25/2018  This form was electronically signed at chart closure                                                                        Chart Copy

## 2018-01-25 NOTE — LETTER
1/25/2018         RE: Dilia Solomon  [address] [facility] [address]        Dear Colleague,    Thank you for referring your patient, Dilia Solomon, to the [facility]. Please see a copy of my visit note below.    History of Present Illness - Dilia Solomon is a 52 year old female who follows up to review her recent CT sinus. She reports chronic nasal congestion for many months, despite medical therapy and prior sinus surgery. She denies any changes since last visit. This problem has been an issue for her for more than 5 years.    Past Medical History -   Patient Active Problem List   Diagnosis     Need for prophylactic vaccination and inoculation against influenza     Sprain of interphalangeal (joint) of hand     Radial styloid tenosynovitis     Carpal tunnel syndrome     Synovial cyst of popliteal space     Pain in joint, lower leg     Hyperlipidemia LDL goal <130     Advanced directives, counseling/discussion     Moderate persistent asthma     Allergic rhinitis due to dust mite     Food allergy     FH: diabetes mellitus     Chronic allergic rhinitis due to animal hair and dander       Current Medications -   Current Outpatient Prescriptions:      azelastine (OPTIVAR) 0.05 % SOLN ophthalmic solution, Apply 1 drop to eye 2 times daily, Disp: 1 Bottle, Rfl: 5     EPINEPHrine (EPIPEN/ADRENACLICK/OR ANY BX GENERIC EQUIV) 0.3 MG/0.3ML injection 2-pack, Inject 0.3 mLs (0.3 mg) into the muscle once as needed for anaphylaxis, Disp: 0.6 mL, Rfl: 3     order for DME, Equipment being ordered: Dynaflex insert, Disp: 1 Units, Rfl: 0     fluticasone (FLONASE) 50 MCG/ACT spray, Spray 2 sprays into both nostrils daily, Disp: 1 Bottle, Rfl: 11     triamcinolone (KENALOG) 0.1 % cream, Apply  topically 2 times daily as needed., Disp: 80 g, Rfl: 2     montelukast (SINGULAIR) 10 MG tablet, Take 1 tablet (10 mg) by mouth At Bedtime, Disp: 30 tablet, Rfl: 11     fluticasone-salmeterol (ADVAIR) 500-50 MCG/DOSE  diskus inhaler, Inhale 1 puff into the lungs 2 times daily, Disp: 3 Inhaler, Rfl: 1     loratadine (CLARITIN) 10 MG tablet, Take 1 tablet (10 mg) by mouth daily, Disp: 30 tablet, Rfl: 11     cetirizine (ZYRTEC) 10 MG tablet, Take 1 tablet (10 mg) by mouth At Bedtime, Disp: 30 tablet, Rfl: 11     albuterol (PROAIR HFA/PROVENTIL HFA/VENTOLIN HFA) 108 (90 BASE) MCG/ACT Inhaler, Inhale 2 puffs into the lungs every 4 hours as needed, Disp: 1 Inhaler, Rfl: 3     ORDER FOR ALLERGEN IMMUNOTHERAPY, Name of Mix: Mix #1  Mold Alternaria Tenuis 1:10 w/v, HS  0.5 ml Aspergillus Fumigatus 1:10 w/v, HS  0.5 ml Epicoccum Nigrum 1:10 w/v, HS 0.5 ml Hormodendrum Cladosporioides 1:10 w/v, HS 0.5 ml Penicillium Mix 1:10 w/v, HS  0.5 ml Diluent: HSA qs to 5ml, Disp: 5 mL, Rfl: PRN     ORDER FOR ALLERGEN IMMUNOTHERAPY, Name of Mix: Mix #2  Dust Mite, Cat, Dog Cat Hair, Standardized 10,000 BAU/mL, ALK  2.0 ml Dog Hair Dander, A. P.  1:100 w/v, HS  1.0 ml Dust Mites F 30,000AU/mL, HS  0.3 ml Dust Mites P. 30,000 AU/mL, HS  0.3 ml  Diluent: HSA qs to 5ml, Disp: 5 mL, Rfl: PRN     ORDER FOR ALLERGEN IMMUNOTHERAPY, Name of Mix: Mix #3 Grass,Tree  Dmitry,White 1:20w/v, HS 0.5ml Birch Mix PRW 1:20w/v, HS 0.5ml Boxelder-Maple Mix BHR (Boxelder Hard Red) 1:20w/v, HS 0.5ml [place],Common 1:20w/v, HS 0.5ml Elm,American 1:20w/v, HS 0.5ml [place] Mix 1:20w/v, HS 0.5ml Oak Mix RVW 1:20w/v, HS 0.5ml [place] Tree,Black 1:20w/v, HS 0.5ml Grass Mix #7 100,000 BAU/mL, HS 0.4ml Jonathan Grass 1:20w/v, HS 0.5ml Diluent: HSA qs to 5ml, Disp: 5 mL, Rfl: PRN     ORDER FOR ALLERGEN IMMUNOTHERAPY, Name of Mix: Mix #4  Weeds Kochia 1:20 w/v, HS 0.5 ml Lamb's Quarters 1:20 w/v, HS 0.5 ml Nettle 1:20 w/v, HS 0.5 ml Plantain, English 1:20 w/v, HS 0.5 ml Ragweed Mixed 1:20 w/v ALK  0.5 ml Russian Thistle 1:20 w/v, HS 0.5 ml Sagebrush, Mugwort 1:20 w/v, HS 0.5 ml Sorrel, Sheep 1:20 w/v, HS 0.5 ml Diluent: HSA qs to 5ml, Disp: 5 mL, Rfl: PRN     Emollient (CERAVE) CREA,  "Externally apply 1 dose * topically 2 times daily, Disp: 2 Bottle, Rfl: 11     albuterol (2.5 MG/3ML) 0.083% neb solution, Take 1 vial (2.5 mg) by nebulization every 4 hours as needed, Disp: 1 Box, Rfl: 3     MULTIVITAMIN OR, 1 tab daily, Disp: , Rfl:     Allergies -   Allergies   Allergen Reactions     Clinoril [Nsaids] Hives     Tolerates ibuprofen and aspirin without hives     Pineapple Hives       Social History -   Social History     Social History     Marital status: Single     Spouse name: N/A     Number of children: N/A     Years of education: N/A     Social History Main Topics     Smoking status: Never Smoker     Smokeless tobacco: Never Used     Alcohol use No     Drug use: No     Sexual activity: No     Other Topics Concern     Parent/Sibling W/ Cabg, Mi Or Angioplasty Before 65f 55m? Yes     mother     Social History Narrative       Family History -   Family History   Problem Relation Age of Onset     C.A.D. Mother      DIABETES Mother      CANCER Father      brain     Breast Cancer Maternal Aunt      Cancer - colorectal No family hx of        Review of Systems - As per HPI and PMHx, otherwise 7 system review of the head and neck negative. 10+ system review negative.    Exam:  /75 (BP Location: Right arm, Patient Position: Sitting, Cuff Size: Adult Regular)  Pulse 83  Temp 97.8  F (36.6  C) (Oral)  Ht 1.6 m (5' 3\")  Wt 96.2 kg (212 lb)  LMP 07/18/2015 (Approximate)  BMI 37.55 kg/m2  General - The patient is well nourished and well developed, and appears to have good nutritional status.  Alert and oriented to person and place, answers questions and cooperates with examination appropriately.   Head and Face - Normocephalic and atraumatic, with no gross asymmetry noted of the contour of the facial features.  The facial nerve is intact, with strong symmetric movements.  Eyes - Extraocular movements intact.  Sclera were not icteric or injected, conjunctiva were pink and moist.      CT Sinus " 1/24/18:  IMPRESSION:    1. Mild-to-moderate mucosal thickening in both maxillary sinuses.  2. Minimal mucosal thickening in the ethmoid air cells and sphenoid  sinuses.  3. No evidence for any nasal cavity mass or any air-fluid levels.    A/P - Dilia Solomon is a 52 year old female with possible chronic sinusitis, as recent CT Sinus demonstrates continued OMU obstruction and anterior ethmoid inflammation. Based on the failure of medical management which has included multiple courses of antibiotics, antihistamines, nasal steroid sprays, saline lavage, and decongestants, my recommendation is for functional endoscopic sinus surgery.    The basic premise of functional endoscopic sinus surgery was discussed at length.  This included the concept behind restoration of the natural drainage pathways for the paranasal sinuses using the latest minimally invasive, minimally traumatic techniques and instruments.    Risks discussed included the risks on infection, bleeding, the risks of general anesthesia.  Also specific to functional endoscopic sinus surgery, the risks of permanent damage to the eyes/vision, tear ducts, sense of smell, base of skull/brain, and CSF leak were described.  And finally, the possibility that functional endoscopic sinus surgery may not relieve sinus disease, and that there might be continued need for medical management was also discussed.  The patient understood and wished to proceed with scheduling.        Dr. Santhosh Tierney MD  Otolaryngology  [facility]      Again, thank you for allowing me to participate in the care of your patient.        Sincerely,        Santhosh Tierney MD

## 2018-01-26 ENCOUNTER — ALLIED HEALTH/NURSE VISIT (OUTPATIENT)
Dept: ALLERGY | Facility: CLINIC | Age: 53
End: 2018-01-26
Payer: COMMERCIAL

## 2018-01-26 DIAGNOSIS — J30.2 CHRONIC SEASONAL ALLERGIC RHINITIS DUE TO OTHER ALLERGEN: Primary | ICD-10-CM

## 2018-01-26 PROCEDURE — 99207 ZZC DROP WITH A PROCEDURE: CPT

## 2018-01-26 PROCEDURE — 95117 IMMUNOTHERAPY INJECTIONS: CPT

## 2018-01-26 NOTE — MR AVS SNAPSHOT
After Visit Summary   1/26/2018    Dilia Solomon    MRN: [id number]           Patient Information     Date Of Birth          1965        Visit Information        Provider Department      1/26/2018 7:00 AM ALLERGY [place] - [facility]        Today's Diagnoses     Chronic seasonal allergic rhinitis due to other allergen    -  1       Follow-ups after your visit        Your next 10 appointments already scheduled     Feb 07, 2018  1:40 PM CST   New Visit with Carlos Vallejo DPM   [facility] ([facility])    [address] [facility] [address]   [phone]            Feb 14, 2018   Procedure with Santhosh Tierney MD   [facility] (--)    [address] [facility] [address] [phone]           The medical center is located at [address] [facility] [facility] (between [address] in [place], [place] miles north of [place]).            Jun 06, 2018  8:20 AM CDT   Return Visit with Butch Horowitz MD   [facility] ([facility])    [address] [facility] [address] [phone]              Who to contact     If you have questions or need follow up information about today's clinic visit or your schedule please contact [facility] directly at [phone].  Normal or non-critical lab and imaging results will be communicated to you by MyChart, letter or phone within 4 business days after the clinic has received the results. If you do not hear from us within 7 days, please contact the clinic through MyChart or phone. If you have a critical or abnormal lab result, we will notify you by phone as soon as possible.  Submit refill requests through Friend[employer] or call your pharmacy and they will forward the refill request to us. Please allow 3 business days for your refill to be completed.          Additional Information About Your Visit        Care EveryWhere ID     This is your Care  EveryWhere ID. This could be used by other organizations to access your [place] medical records  [id number]        Your Vitals Were     Last Period                   07/18/2015 (Approximate)            Blood Pressure from Last 3 Encounters:   01/25/18 112/75   01/24/18 135/78   01/17/18 120/81    Weight from Last 3 Encounters:   01/25/18 212 lb (96.2 kg)   01/24/18 212 lb (96.2 kg)   01/17/18 212 lb 1.3 oz (96.2 kg)              We Performed the Following     Allergy Shot: Two or more injections        Primary Care Provider Office Phone # Fax #    Cookie Conklin [phone] [address] [place] [address]        Equal Access to Services     KODY CHEN : Hadii aad ku hadasho Sofaith, waaxda luqadaha, qaybta kaalmada adeegyada, waxmarjorie shen . So [facility] [phone].    ATENCIÓN: Si habla español, tiene a pruitt disposición servicios gratuitos de asistencia lingüística. [facility] [phone].    We comply with applicable federal civil rights laws and [place] laws. We do not discriminate on the basis of race, color, national origin, age, disability, sex, sexual orientation, or gender identity.            Thank you!     Thank you for choosing [facility]  for your care. Our goal is always to provide you with excellent care. Hearing back from our patients is one way we can continue to improve our services. Please take a few minutes to complete the written survey that you may receive in the mail after your visit with us. Thank you!             Your Updated Medication List - Protect others around you: Learn how to safely use, store and throw away your medicines at www.disposemymeds.org.          This list is accurate as of 1/26/18  7:46 AM.  Always use your most recent med list.                   Brand Name Dispense Instructions for use Diagnosis    * albuterol (2.5 MG/3ML) 0.083% neb solution     1 Box    Take 1 vial (2.5 mg) by nebulization every 4  hours as needed    Moderate persistent asthma, uncomplicated       * albuterol 108 (90 BASE) MCG/ACT Inhaler    PROAIR HFA/PROVENTIL HFA/VENTOLIN HFA    1 Inhaler    Inhale 2 puffs into the lungs every 4 hours as needed    Moderate persistent asthma without complication       * ALLERGEN IMMUNOTHERAPY PRESCRIPTION     5 mL    Name of Mix: Mix #1  Mold Alternaria Tenuis 1:10 w/v, HS  0.5 ml Aspergillus Fumigatus 1:10 w/v, HS  0.5 ml Epicoccum Nigrum 1:10 w/v, HS 0.5 ml Hormodendrum Cladosporioides 1:10 w/v, HS 0.5 ml Penicillium Mix 1:10 w/v, HS  0.5 ml Diluent: HSA qs to 5ml    Chronic allergic rhinitis due to animal hair and dander, Allergic rhinitis due to dust mite, Allergic rhinitis due to mold, Chronic seasonal allergic rhinitis due to pollen       * ALLERGEN IMMUNOTHERAPY PRESCRIPTION     5 mL    Name of Mix: Mix #2  Dust Mite, Cat, Dog Cat Hair, Standardized 10,000 BAU/mL, ALK  2.0 ml Dog Hair Dander, A. P.  1:100 w/v, HS  1.0 ml Dust Mites F 30,000AU/mL, HS  0.3 ml Dust Mites P. 30,000 AU/mL, HS  0.3 ml  Diluent: HSA qs to 5ml    Chronic allergic rhinitis due to animal hair and dander, Allergic rhinitis due to dust mite, Allergic rhinitis due to mold, Chronic seasonal allergic rhinitis due to pollen       * ALLERGEN IMMUNOTHERAPY PRESCRIPTION     5 mL    Name of Mix: Mix #3 Grass,Tree  Dmitry,White 1:20w/v, HS 0.5ml Birch Mix PRW 1:20w/v, HS 0.5ml Boxelder-Maple Mix BHR (Boxelder Hard Red) 1:20w/v, HS 0.5ml [place],Common 1:20w/v, HS 0.5ml Elm,American 1:20w/v, HS 0.5ml [place] Mix 1:20w/v, HS 0.5ml Oak Mix RVW 1:20w/v, HS 0.5ml [place] Tree,Black 1:20w/v, HS 0.5ml Grass Mix #7 100,000 BAU/mL, HS 0.4ml Jonathan Grass 1:20w/v, HS 0.5ml Diluent: HSA qs to 5ml    Chronic allergic rhinitis due to animal hair and dander, Allergic rhinitis due to dust mite, Allergic rhinitis due to mold, Chronic seasonal allergic rhinitis due to pollen       * ALLERGEN IMMUNOTHERAPY PRESCRIPTION     5 mL    Name of Mix: Mix #4   Weeds Kochia 1:20 w/v, HS 0.5 ml Lamb's Quarters 1:20 w/v, HS 0.5 ml Nettle 1:20 w/v, HS 0.5 ml Plantain, English 1:20 w/v, HS 0.5 ml Ragweed Mixed 1:20 w/v ALK  0.5 ml Russian Thistle 1:20 w/v, HS 0.5 ml Sagebrush, Mugwort 1:20 w/v, HS 0.5 ml Sorrel, Sheep 1:20 w/v, HS 0.5 ml Diluent: HSA qs to 5ml    Chronic allergic rhinitis due to animal hair and dander, Allergic rhinitis due to dust mite, Allergic rhinitis due to mold, Chronic seasonal allergic rhinitis due to pollen       azelastine 0.05 % Soln ophthalmic solution    OPTIVAR    1 Bottle    Apply 1 drop to eye 2 times daily    Conjunctivitis, allergic, unspecified laterality       CERAVE Crea     2 Bottle    Externally apply 1 dose * topically 2 times daily    Eczema, unspecified type       cetirizine 10 MG tablet    zyrTEC    30 tablet    Take 1 tablet (10 mg) by mouth At Bedtime    Allergic conjunctivitis of both eyes, Chronic allergic rhinitis due to animal hair and dander, Allergic rhinitis due to dust mite, Allergic rhinitis due to mold, Chronic seasonal allergic rhinitis due to pollen       EPINEPHrine 0.3 MG/0.3ML injection 2-pack    EPIPEN/ADRENACLICK/or ANY BX GENERIC EQUIV    0.6 mL    Inject 0.3 mLs (0.3 mg) into the muscle once as needed for anaphylaxis    Food allergy, Need for desensitization to allergens       fluticasone 50 MCG/ACT spray    FLONASE    1 Bottle    Spray 2 sprays into both nostrils daily    Chronic allergic rhinitis due to animal hair and dander, Allergic rhinitis due to dust mite, Allergic rhinitis due to mold, Chronic seasonal allergic rhinitis due to pollen       fluticasone-salmeterol 500-50 MCG/DOSE diskus inhaler    ADVAIR    3 Inhaler    Inhale 1 puff into the lungs 2 times daily    Moderate persistent asthma without complication       loratadine 10 MG tablet    CLARITIN    30 tablet    Take 1 tablet (10 mg) by mouth daily    Allergic conjunctivitis of both eyes, Chronic allergic rhinitis due to animal hair and dander,  Allergic rhinitis due to dust mite, Allergic rhinitis due to mold, Chronic seasonal allergic rhinitis due to pollen       montelukast 10 MG tablet    SINGULAIR    30 tablet    Take 1 tablet (10 mg) by mouth At Bedtime    Moderate persistent asthma without complication, Chronic allergic rhinitis due to animal hair and dander, Allergic rhinitis due to dust mite, Allergic rhinitis due to mold, Chronic seasonal allergic rhinitis due to pollen       MULTIVITAMIN PO      1 tab daily        order for DME     1 Units    Equipment being ordered: Dynaflex insert    Closed displaced fracture of proximal phalanx of right great toe, initial encounter       triamcinolone 0.1 % cream    KENALOG    80 g    Apply  topically 2 times daily as needed.    Eczema, unspecified type       * Notice:  This list has 6 medication(s) that are the same as other medications prescribed for you. Read the directions carefully, and ask your doctor or other care provider to review them with you.

## 2018-01-26 NOTE — PROGRESS NOTES
Patient presented after waiting 30 minutes with no reaction to  injections. Discharged from clinic.    Wallace Senior RN

## 2018-01-29 ENCOUNTER — TELEPHONE (OUTPATIENT)
Dept: OTOLARYNGOLOGY | Facility: CLINIC | Age: 53
End: 2018-01-29

## 2018-02-07 ENCOUNTER — OFFICE VISIT (OUTPATIENT)
Dept: PODIATRY | Facility: CLINIC | Age: 53
End: 2018-02-07
Payer: OTHER MISCELLANEOUS

## 2018-02-07 ENCOUNTER — OFFICE VISIT (OUTPATIENT)
Dept: FAMILY MEDICINE | Facility: CLINIC | Age: 53
End: 2018-02-07
Payer: COMMERCIAL

## 2018-02-07 ENCOUNTER — RADIANT APPOINTMENT (OUTPATIENT)
Dept: GENERAL RADIOLOGY | Facility: CLINIC | Age: 53
End: 2018-02-07
Attending: PODIATRIST
Payer: COMMERCIAL

## 2018-02-07 VITALS
HEIGHT: 63 IN | TEMPERATURE: 98 F | BODY MASS INDEX: 37.63 KG/M2 | DIASTOLIC BLOOD PRESSURE: 80 MMHG | SYSTOLIC BLOOD PRESSURE: 110 MMHG | OXYGEN SATURATION: 98 % | HEART RATE: 85 BPM | WEIGHT: 212.4 LBS

## 2018-02-07 VITALS — HEIGHT: 63 IN | WEIGHT: 212 LBS | BODY MASS INDEX: 37.56 KG/M2 | HEART RATE: 80 BPM

## 2018-02-07 DIAGNOSIS — S92.411D CLOSED DISPLACED FRACTURE OF PROXIMAL PHALANX OF RIGHT GREAT TOE WITH ROUTINE HEALING, SUBSEQUENT ENCOUNTER: Primary | ICD-10-CM

## 2018-02-07 DIAGNOSIS — S92.411D CLOSED DISPLACED FRACTURE OF PROXIMAL PHALANX OF RIGHT GREAT TOE WITH ROUTINE HEALING, SUBSEQUENT ENCOUNTER: ICD-10-CM

## 2018-02-07 DIAGNOSIS — J01.21 RECURRENT ETHMOIDAL SINUSITIS: ICD-10-CM

## 2018-02-07 DIAGNOSIS — Z01.818 PREOP GENERAL PHYSICAL EXAM: Primary | ICD-10-CM

## 2018-02-07 LAB
ERYTHROCYTE [DISTWIDTH] IN BLOOD BY AUTOMATED COUNT: 13 % (ref 10–15)
HCT VFR BLD AUTO: 42.3 % (ref 35–47)
HGB BLD-MCNC: 13.9 G/DL (ref 11.7–15.7)
MCH RBC QN AUTO: 30 PG (ref 26.5–33)
MCHC RBC AUTO-ENTMCNC: 32.9 G/DL (ref 31.5–36.5)
MCV RBC AUTO: 91 FL (ref 78–100)
PLATELET # BLD AUTO: 230 10E9/L (ref 150–450)
RBC # BLD AUTO: 4.64 10E12/L (ref 3.8–5.2)
WBC # BLD AUTO: 7.2 10E9/L (ref 4–11)

## 2018-02-07 PROCEDURE — 36415 COLL VENOUS BLD VENIPUNCTURE: CPT | Performed by: NURSE PRACTITIONER

## 2018-02-07 PROCEDURE — 85027 COMPLETE CBC AUTOMATED: CPT | Performed by: NURSE PRACTITIONER

## 2018-02-07 PROCEDURE — 99213 OFFICE O/P EST LOW 20 MIN: CPT | Performed by: PODIATRIST

## 2018-02-07 PROCEDURE — 99214 OFFICE O/P EST MOD 30 MIN: CPT | Performed by: NURSE PRACTITIONER

## 2018-02-07 PROCEDURE — 73660 X-RAY EXAM OF TOE(S): CPT | Mod: RT

## 2018-02-07 PROCEDURE — 80048 BASIC METABOLIC PNL TOTAL CA: CPT | Performed by: NURSE PRACTITIONER

## 2018-02-07 NOTE — MR AVS SNAPSHOT
After Visit Summary   2/7/2018    Dilia Solomon    MRN: [id number]           Patient Information     Date Of Birth          1965        Visit Information        Provider Department      2/7/2018 2:20 PM Rachana Frausto APRN [facility]        Today's Diagnoses     Preop general physical exam    -  1    Recurrent ethmoidal sinusitis          Care Instructions      Before Your Surgery      Call your surgeon if there is any change in your health. This includes signs of a cold or flu (such as a sore throat, runny nose, cough, rash or fever).    Do not smoke, drink alcohol or take over the counter medicine (unless your surgeon or primary care doctor tells you to) for the 24 hours before and after surgery.    If you take prescribed drugs: Follow your doctor s orders about which medicines to take and which to stop until after surgery.    Eating and drinking prior to surgery: follow the instructions from your surgeon    Take a shower or bath the night before surgery. Use the soap your surgeon gave you to gently clean your skin. If you do not have soap from your surgeon, use your regular soap. Do not shave or scrub the surgery site.  Wear clean pajamas and have clean sheets on your bed.           Follow-ups after your visit        Your next 10 appointments already scheduled     Feb 13, 2018  7:00 AM CST   Nurse Only with ALLERGY [facility] ([facility])    [address] [facility] [address] [phone]           Every allergy patient MUST wait 30 minutes after their allergy shot. No exceptions.  Xolair shots #1-3 should plan to wait 2 hours in clinic Xolair shots after #4 should plan 30 minute wait in clinic            Feb 14, 2018   Procedure with Santhosh Tierney MD   [facility] (--)    [address] [facility] [address] [phone]           The medical center is located at [address] [place] [address]  "[address] (between [address] in [place], four miles north of [place]).            Jun 06, 2018  8:20 AM CDT   Return Visit with Butch Horowitz MD   [facility]    [address] [place]  [facility] [address] [phone]              Who to contact     If you have questions or need follow up information about today's clinic visit or your schedule please contact [facility] directly at [phone].  Normal or non-critical lab and imaging results will be communicated to you by MyChart, letter or phone within 4 business days after the clinic has received the results. If you do not hear from us within 7 days, please contact the clinic through MyChart or phone. If you have a critical or abnormal lab result, we will notify you by phone as soon as possible.  Submit refill requests through [employer] or call your pharmacy and they will forward the refill request to us. Please allow 3 business days for your refill to be completed.          Additional Information About Your Visit        Care EveryWhere ID     This is your Care EveryWhere ID. This could be used by other organizations to access your [place] medical records  [id number]        Your Vitals Were     Pulse Temperature Height Last Period Pulse Oximetry BMI (Body Mass Index)    85 98  F (36.7  C) (Tympanic) 5' 3\" (1.6 m) 07/18/2015 (Approximate) 98% 37.62 kg/m2       Blood Pressure from Last 3 Encounters:   02/07/18 110/80   01/25/18 112/75   01/24/18 135/78    Weight from Last 3 Encounters:   02/07/18 212 lb 6.4 oz (96.3 kg)   02/07/18 212 lb (96.2 kg)   01/25/18 212 lb (96.2 kg)              We Performed the Following     Basic metabolic panel  (Ca, Cl, CO2, Creat, Gluc, K, Na, BUN)     CBC with platelets        Primary Care Provider Office Phone # Fax #    Cookie Conklin [phone]       [address] [facility] [address]        Equal Access to Services     KODY CHEN AH: Hadii " ulices chino Sokashmirali, waaxda luqadaha, qaybta kaalmada anna, hieu jeffersonjoy lee. So [facility] [phone].    ATENCIÓN: Si maricarmenla hansa, tiene a pruitt disposición servicios gratuitos de asistencia lingüística. Lucas al [phone].    We comply with applicable federal civil rights laws and [place] laws. We do not discriminate on the basis of race, color, national origin, age, disability, sex, sexual orientation, or gender identity.            Thank you!     Thank you for choosing [facility]  for your care. Our goal is always to provide you with excellent care. Hearing back from our patients is one way we can continue to improve our services. Please take a few minutes to complete the written survey that you may receive in the mail after your visit with us. Thank you!             Your Updated Medication List - Protect others around you: Learn how to safely use, store and throw away your medicines at www.disposemymeds.org.          This list is accurate as of 2/7/18  2:32 PM.  Always use your most recent med list.                   Brand Name Dispense Instructions for use Diagnosis    * albuterol (2.5 MG/3ML) 0.083% neb solution     1 Box    Take 1 vial (2.5 mg) by nebulization every 4 hours as needed    Moderate persistent asthma, uncomplicated       * albuterol 108 (90 BASE) MCG/ACT Inhaler    PROAIR HFA/PROVENTIL HFA/VENTOLIN HFA    1 Inhaler    Inhale 2 puffs into the lungs every 4 hours as needed    Moderate persistent asthma without complication       * ALLERGEN IMMUNOTHERAPY PRESCRIPTION     5 mL    Name of Mix: Mix #1  Mold Alternaria Tenuis 1:10 w/v, HS  0.5 ml Aspergillus Fumigatus 1:10 w/v, HS  0.5 ml Epicoccum Nigrum 1:10 w/v, HS 0.5 ml Hormodendrum Cladosporioides 1:10 w/v, HS 0.5 ml Penicillium Mix 1:10 w/v, HS  0.5 ml Diluent: HSA qs to 5ml    Chronic allergic rhinitis due to animal hair and dander, Allergic rhinitis due to dust mite, Allergic rhinitis due to  mold, Chronic seasonal allergic rhinitis due to pollen       * ALLERGEN IMMUNOTHERAPY PRESCRIPTION     5 mL    Name of Mix: Mix #2  Dust Mite, Cat, Dog Cat Hair, Standardized 10,000 BAU/mL, ALK  2.0 ml Dog Hair Dander, A. P.  1:100 w/v, HS  1.0 ml Dust Mites F 30,000AU/mL, HS  0.3 ml Dust Mites P. 30,000 AU/mL, HS  0.3 ml  Diluent: HSA qs to 5ml    Chronic allergic rhinitis due to animal hair and dander, Allergic rhinitis due to dust mite, Allergic rhinitis due to mold, Chronic seasonal allergic rhinitis due to pollen       * ALLERGEN IMMUNOTHERAPY PRESCRIPTION     5 mL    Name of Mix: Mix #3 Grass,Tree  Dmitry,White 1:20w/v, HS 0.5ml Birch Mix PRW 1:20w/v, HS 0.5ml Boxelder-Maple Mix BHR (Boxelder Hard Red) 1:20w/v, HS 0.5ml [place],Common 1:20w/v, HS 0.5ml Elm,American 1:20w/v, HS 0.5ml [place] Mix 1:20w/v, HS 0.5ml Oak Mix RVW 1:20w/v, HS 0.5ml [place] Tree,Black 1:20w/v, HS 0.5ml Grass Mix #7 100,000 BAU/mL, HS 0.4ml Jonathan Grass 1:20w/v, HS 0.5ml Diluent: HSA qs to 5ml    Chronic allergic rhinitis due to animal hair and dander, Allergic rhinitis due to dust mite, Allergic rhinitis due to mold, Chronic seasonal allergic rhinitis due to pollen       * ALLERGEN IMMUNOTHERAPY PRESCRIPTION     5 mL    Name of Mix: Mix #4  Weeds Kochia 1:20 w/v, HS 0.5 ml Lamb's Quarters 1:20 w/v, HS 0.5 ml Nettle 1:20 w/v, HS 0.5 ml Plantain, English 1:20 w/v, HS 0.5 ml Ragweed Mixed 1:20 w/v ALK  0.5 ml Russian Thistle 1:20 w/v, HS 0.5 ml Sagebrush, Mugwort 1:20 w/v, HS 0.5 ml Sorrel, Sheep 1:20 w/v, HS 0.5 ml Diluent: HSA qs to 5ml    Chronic allergic rhinitis due to animal hair and dander, Allergic rhinitis due to dust mite, Allergic rhinitis due to mold, Chronic seasonal allergic rhinitis due to pollen       azelastine 0.05 % Soln ophthalmic solution    OPTIVAR    1 Bottle    Apply 1 drop to eye 2 times daily    Conjunctivitis, allergic, unspecified laterality       CERAVE Crea     2 Bottle    Externally apply 1 dose *  topically 2 times daily    Eczema, unspecified type       cetirizine 10 MG tablet    zyrTEC    30 tablet    Take 1 tablet (10 mg) by mouth At Bedtime    Allergic conjunctivitis of both eyes, Chronic allergic rhinitis due to animal hair and dander, Allergic rhinitis due to dust mite, Allergic rhinitis due to mold, Chronic seasonal allergic rhinitis due to pollen       EPINEPHrine 0.3 MG/0.3ML injection 2-pack    EPIPEN/ADRENACLICK/or ANY BX GENERIC EQUIV    0.6 mL    Inject 0.3 mLs (0.3 mg) into the muscle once as needed for anaphylaxis    Food allergy, Need for desensitization to allergens       fluticasone 50 MCG/ACT spray    FLONASE    1 Bottle    Spray 2 sprays into both nostrils daily    Chronic allergic rhinitis due to animal hair and dander, Allergic rhinitis due to dust mite, Allergic rhinitis due to mold, Chronic seasonal allergic rhinitis due to pollen       fluticasone-salmeterol 500-50 MCG/DOSE diskus inhaler    ADVAIR    3 Inhaler    Inhale 1 puff into the lungs 2 times daily    Moderate persistent asthma without complication       loratadine 10 MG tablet    CLARITIN    30 tablet    Take 1 tablet (10 mg) by mouth daily    Allergic conjunctivitis of both eyes, Chronic allergic rhinitis due to animal hair and dander, Allergic rhinitis due to dust mite, Allergic rhinitis due to mold, Chronic seasonal allergic rhinitis due to pollen       montelukast 10 MG tablet    SINGULAIR    30 tablet    Take 1 tablet (10 mg) by mouth At Bedtime    Moderate persistent asthma without complication, Chronic allergic rhinitis due to animal hair and dander, Allergic rhinitis due to dust mite, Allergic rhinitis due to mold, Chronic seasonal allergic rhinitis due to pollen       MULTIVITAMIN PO      1 tab daily        order for DME     1 Units    Equipment being ordered: Dynaflex insert    Closed displaced fracture of proximal phalanx of right great toe, initial encounter       triamcinolone 0.1 % cream    KENALOG    80 g     Apply  topically 2 times daily as needed.    Eczema, unspecified type       * Notice:  This list has 6 medication(s) that are the same as other medications prescribed for you. Read the directions carefully, and ask your doctor or other care provider to review them with you.

## 2018-02-07 NOTE — LETTER
2/7/2018         RE: Dilia Solomon  [address] [facility] [address]        Dear Colleague,    Thank you for referring your patient, Dilia Solomon, to the [facility]. Please see a copy of my visit note below.    Dilia returns to the office for reevaluation of the right foot.  The patient relates following the instructions given at the last visit with noted less pain.  The patient relates overall more  improvement in pain and function of the right foot.  The patient relates no other problems.    PAST MEDICAL HISTORY:   Past Medical History:   Diagnosis Date     Injury, other and unspecified, shoulder and upper arm 9/03       BMI= There is no height or weight on file to calculate BMI.        Physical Exam:    General: The patient appears to have a pleasant mental affect.    Lower extremity physical exam:  Neurovascular status is intact with palpable pedal pulses and intact epicritic sensations.  Muscular exam is within normal limits to major muscle groups.  Integument is intact.      One notes decreased edema.  One notes decreased pain on palpation over the right great toe.    Radiograph evaluation including weightbearing AP, lateral and medial oblique views of the right foot reveals interval healing with increased trabeculation of the phalangeal fracture of the right great toe    Assessment:      ICD-10-CM    1. Closed displaced fracture of proximal phalanx of right great toe with routine healing, subsequent encounter S92.411D XR Toe Right G/E 2 Views       Plan:  I have explained to Dilia about the conditions.  At this time, the patient may advance weightbearing as tolerated in supportive shoes..  The patient will return in 1 month for reevaluation and repeat x-rays.    Disclaimer: This note consists of symbols derived from keyboarding, dictation and/or voice recognition software. As a result, there may be errors in the script that have gone undetected. Please consider this when  interpreting information found in this chart.       CAROLE Vallejo D.P.M., FSHENG.F.A.S.      Again, thank you for allowing me to participate in the care of your patient.        Sincerely,        Carlos Vallejo DPM

## 2018-02-07 NOTE — PROGRESS NOTES
Dilia returns to the office for reevaluation of the right foot.  The patient relates following the instructions given at the last visit with noted less pain.  The patient relates overall more  improvement in pain and function of the right foot.  The patient relates no other problems.    PAST MEDICAL HISTORY:   Past Medical History:   Diagnosis Date     Injury, other and unspecified, shoulder and upper arm 9/03       BMI= There is no height or weight on file to calculate BMI.        Physical Exam:    General: The patient appears to have a pleasant mental affect.    Lower extremity physical exam:  Neurovascular status is intact with palpable pedal pulses and intact epicritic sensations.  Muscular exam is within normal limits to major muscle groups.  Integument is intact.      One notes decreased edema.  One notes decreased pain on palpation over the right great toe.    Radiograph evaluation including weightbearing AP, lateral and medial oblique views of the right foot reveals interval healing with increased trabeculation of the phalangeal fracture of the right great toe    Assessment:      ICD-10-CM    1. Closed displaced fracture of proximal phalanx of right great toe with routine healing, subsequent encounter S92.411D XR Toe Right G/E 2 Views       Plan:  I have explained to Dilia about the conditions.  At this time, the patient may advance weightbearing as tolerated in supportive shoes..  The patient will return in 1 month for reevaluation and repeat x-rays.    Disclaimer: This note consists of symbols derived from keyboarding, dictation and/or voice recognition software. As a result, there may be errors in the script that have gone undetected. Please consider this when interpreting information found in this chart.       CAROLE Vallejo D.P.M., FSHENG.F.A.S.

## 2018-02-07 NOTE — MR AVS SNAPSHOT
After Visit Summary   2/7/2018    Dilia Solomon    MRN: [id number]           Patient Information     Date Of Birth          1965        Visit Information        Provider Department      2/7/2018 1:40 PM Carlos Vallejo DPM [facility]        Today's Diagnoses     Closed displaced fracture of proximal phalanx of right great toe with routine healing, subsequent encounter    -  1      Care Instructions    Advance weightbearing as tolerated in supportive shoes          Follow-ups after your visit        Follow-up notes from your care team     Return in about 4 weeks (around 3/7/2018).      Your next 10 appointments already scheduled     Feb 20, 2018  7:00 AM CST   Nurse Only with ALLERGY [facility] ([facility])    [address] [facility] [address] [phone]           Every allergy patient MUST wait 30 minutes after their allergy shot. No exceptions.  Xolair shots #1-3 should plan to wait 2 hours in clinic Xolair shots after #4 should plan 30 minute wait in clinic            Mar 07, 2018  1:40 PM CST   Return Visit with Carlos Vallejo DPM   [facility] ([facility])    [address] [facility] [id number] [phone]            Jun 06, 2018  8:20 AM CDT   Return Visit with Butch Horowitz MD   [facility] ([facility])    [address] [facility] [address] [phone]              Who to contact     If you have questions or need follow up information about today's clinic visit or your schedule please contact [facility] directly at [phone].  Normal or non-critical lab and imaging results will be communicated to you by MyChart, letter or phone within 4 business days after the clinic has received the results. If you do not hear from us within 7 days, please contact the clinic through MyChart or phone. If you have a critical or  "abnormal lab result, we will notify you by phone as soon as possible.  Submit refill requests through [employer] or call your pharmacy and they will forward the refill request to us. Please allow 3 business days for your refill to be completed.          Additional Information About Your Visit        Care EveryWhere ID     This is your Care EveryWhere ID. This could be used by other organizations to access your [place] medical records  [id number]        Your Vitals Were     Pulse Height Last Period BMI (Body Mass Index)          80 5' 3\" (1.6 m) 07/18/2015 (Approximate) 37.55 kg/m2         Blood Pressure from Last 3 Encounters:   02/14/18 133/83   02/07/18 110/80   01/25/18 112/75    Weight from Last 3 Encounters:   02/14/18 212 lb (96.2 kg)   02/07/18 212 lb 6.4 oz (96.3 kg)   02/07/18 212 lb (96.2 kg)               Primary Care Provider Office Phone # Fax #    Cookie Conklin [phone]       [address] [address]        Equal Access to Services     [facility]: Hadii aad ku hadasho Soomaali, waaxda luqadaha, qaybta kaalmada adeegyada, hieu shen . So [facility] [phone].    ATENCIÓN: Si habla español, tiene a pruitt disposición servicios gratuitos de asistencia lingüística. Lucas al [phone].    We comply with applicable federal civil rights laws and [place] laws. We do not discriminate on the basis of race, color, national origin, age, disability, sex, sexual orientation, or gender identity.            Thank you!     Thank you for choosing [facility]  for your care. Our goal is always to provide you with excellent care. Hearing back from our patients is one way we can continue to improve our services. Please take a few minutes to complete the written survey that you may receive in the mail after your visit with us. Thank you!             Your Updated Medication List - Protect others around you: Learn how to safely use, store and throw " away your medicines at www.disposemymeds.org.          This list is accurate as of 2/7/18 11:59 PM.  Always use your most recent med list.                   Brand Name Dispense Instructions for use Diagnosis    * albuterol (2.5 MG/3ML) 0.083% neb solution     1 Box    Take 1 vial (2.5 mg) by nebulization every 4 hours as needed    Moderate persistent asthma, uncomplicated       * albuterol 108 (90 BASE) MCG/ACT Inhaler    PROAIR HFA/PROVENTIL HFA/VENTOLIN HFA    1 Inhaler    Inhale 2 puffs into the lungs every 4 hours as needed    Moderate persistent asthma without complication       * ALLERGEN IMMUNOTHERAPY PRESCRIPTION     5 mL    Name of Mix: Mix #1  Mold Alternaria Tenuis 1:10 w/v, HS  0.5 ml Aspergillus Fumigatus 1:10 w/v, HS  0.5 ml Epicoccum Nigrum 1:10 w/v, HS 0.5 ml Hormodendrum Cladosporioides 1:10 w/v, HS 0.5 ml Penicillium Mix 1:10 w/v, HS  0.5 ml Diluent: HSA qs to 5ml    Chronic allergic rhinitis due to animal hair and dander, Allergic rhinitis due to dust mite, Allergic rhinitis due to mold, Chronic seasonal allergic rhinitis due to pollen       * ALLERGEN IMMUNOTHERAPY PRESCRIPTION     5 mL    Name of Mix: Mix #2  Dust Mite, Cat, Dog Cat Hair, Standardized 10,000 BAU/mL, ALK  2.0 ml Dog Hair Dander, A. P.  1:100 w/v, HS  1.0 ml Dust Mites F 30,000AU/mL, HS  0.3 ml Dust Mites P. 30,000 AU/mL, HS  0.3 ml  Diluent: HSA qs to 5ml    Chronic allergic rhinitis due to animal hair and dander, Allergic rhinitis due to dust mite, Allergic rhinitis due to mold, Chronic seasonal allergic rhinitis due to pollen       * ALLERGEN IMMUNOTHERAPY PRESCRIPTION     5 mL    Name of Mix: Mix #3 Grass,Tree  Dmitry,White 1:20w/v, HS 0.5ml Birch Mix PRW 1:20w/v, HS 0.5ml Boxelder-Maple Mix BHR (Boxelder Hard Red) 1:20w/v, HS 0.5ml [place],Common 1:20w/v, HS 0.5ml Elm,American 1:20w/v, HS 0.5ml [place] Mix 1:20w/v, HS 0.5ml Oak Mix RVW 1:20w/v, HS 0.5ml [place] Tree,Black 1:20w/v, HS 0.5ml Grass Mix #7 100,000 BAU/mL, HS  0.4ml [place] Grass 1:20w/v, HS 0.5ml Diluent: HSA qs to 5ml    Chronic allergic rhinitis due to animal hair and dander, Allergic rhinitis due to dust mite, Allergic rhinitis due to mold, Chronic seasonal allergic rhinitis due to pollen       * ALLERGEN IMMUNOTHERAPY PRESCRIPTION     5 mL    Name of Mix: Mix #4  Weeds Kochia 1:20 w/v, HS 0.5 ml Lamb's Quarters 1:20 w/v, HS 0.5 ml Nettle 1:20 w/v, HS 0.5 ml Plantain, English 1:20 w/v, HS 0.5 ml Ragweed Mixed 1:20 w/v ALK  0.5 ml Russian Thistle 1:20 w/v, HS 0.5 ml Sagebrush, Mugwort 1:20 w/v, HS 0.5 ml Sorrel, Sheep 1:20 w/v, HS 0.5 ml Diluent: HSA qs to 5ml    Chronic allergic rhinitis due to animal hair and dander, Allergic rhinitis due to dust mite, Allergic rhinitis due to mold, Chronic seasonal allergic rhinitis due to pollen       azelastine 0.05 % Soln ophthalmic solution    OPTIVAR    1 Bottle    Apply 1 drop to eye 2 times daily    Conjunctivitis, allergic, unspecified laterality       CERAVE Crea     2 Bottle    Externally apply 1 dose * topically 2 times daily    Eczema, unspecified type       cetirizine 10 MG tablet    zyrTEC    30 tablet    Take 1 tablet (10 mg) by mouth At Bedtime    Allergic conjunctivitis of both eyes, Chronic allergic rhinitis due to animal hair and dander, Allergic rhinitis due to dust mite, Allergic rhinitis due to mold, Chronic seasonal allergic rhinitis due to pollen       EPINEPHrine 0.3 MG/0.3ML injection 2-pack    EPIPEN/ADRENACLICK/or ANY BX GENERIC EQUIV    0.6 mL    Inject 0.3 mLs (0.3 mg) into the muscle once as needed for anaphylaxis    Food allergy, Need for desensitization to allergens       fluticasone 50 MCG/ACT spray    FLONASE    1 Bottle    Spray 2 sprays into both nostrils daily    Chronic allergic rhinitis due to animal hair and dander, Allergic rhinitis due to dust mite, Allergic rhinitis due to mold, Chronic seasonal allergic rhinitis due to pollen       fluticasone-salmeterol 500-50 MCG/DOSE diskus inhaler     ADVAIR    3 Inhaler    Inhale 1 puff into the lungs 2 times daily    Moderate persistent asthma without complication       HYDROcodone-acetaminophen 5-325 MG per tablet    NORCO    10 tablet    Take 1-2 tablets by mouth every 4 hours as needed for other (Moderate to Severe Pain)    Other chronic sinusitis       loratadine 10 MG tablet    CLARITIN    30 tablet    Take 1 tablet (10 mg) by mouth daily    Allergic conjunctivitis of both eyes, Chronic allergic rhinitis due to animal hair and dander, Allergic rhinitis due to dust mite, Allergic rhinitis due to mold, Chronic seasonal allergic rhinitis due to pollen       montelukast 10 MG tablet    SINGULAIR    30 tablet    Take 1 tablet (10 mg) by mouth At Bedtime    Moderate persistent asthma without complication, Chronic allergic rhinitis due to animal hair and dander, Allergic rhinitis due to dust mite, Allergic rhinitis due to mold, Chronic seasonal allergic rhinitis due to pollen       MULTIVITAMIN PO      1 tab daily        order for DME     1 Units    Equipment being ordered: Dynaflex insert    Closed displaced fracture of proximal phalanx of right great toe, initial encounter       triamcinolone 0.1 % cream    KENALOG    80 g    Apply  topically 2 times daily as needed.    Eczema, unspecified type       * Notice:  This list has 6 medication(s) that are the same as other medications prescribed for you. Read the directions carefully, and ask your doctor or other care provider to review them with you.

## 2018-02-07 NOTE — PROGRESS NOTES
[facility]  [address] [facility] [address]  [phone]  Dept: [phone]    PRE-OP EVALUATION:  Today's date: 2018    Dilia Solomon (: 1965) presents for pre-operative evaluation assessment as requested by Dr. Santhosh Tierney.  She requires evaluation and anesthesia risk assessment prior to undergoing surgery/procedure for treatment of Sinus Issues .  Proposed procedure: Ethmoidectomy    Date of Surgery/ Procedure: 2018    Time of Surgery/ Procedure: 9:30  Hospital/Surgical Facility: [facility]  Primary Physician: Cookie Conklin  Type of Anesthesia Anticipated: General    Patient has a Health Care Directive or Living Will:  YES not on file     1. NO - Do you have a history of heart attack, stroke, stent, bypass or surgery on an artery in the head, neck, heart or legs?  2. NO - Do you ever have any pain or discomfort in your chest?  3. NO - Do you have a history of  Heart Failure?  4. NO - Are you troubled by shortness of breath when: walking on the level, up a slight hill or at night?  5. NO - Do you currently have a cold, bronchitis or other respiratory infection?  6. NO - Do you have a cough, shortness of breath or wheezing?  7. NO - Do you sometimes get pains in the calves of your legs when you walk?  8. NO - Do you or anyone in your family have previous history of blood clots?  9. NO - Do you or does anyone in your family have a serious bleeding problem such as prolonged bleeding following surgeries or cuts?  10. NO - Have you ever had problems with anemia or been told to take iron pills?  11. NO - Have you had any abnormal blood loss such as black, tarry or bloody stools, or abnormal vaginal bleeding?  12. NO - Have you ever had a blood transfusion?  13. NO - Have you or any of your relatives ever had problems with anesthesia?  14. NO - Do you have sleep apnea, excessive snoring or daytime drowsiness?  15. NO - Do you have any prosthetic heart valves?  16. NO -  "Do you have prosthetic joints?  17. NO - Is there any chance that you may be pregnant?      HPI:                                                      Brief HPI related to upcoming procedure: Dilia Solomon is a 52 year old female who follows up to review her recent CT sinus. She reports chronic nasal congestion for many months, despite medical therapy and prior sinus surgery. She denies any changes since last visit. This problem has been an issue for her for more than 5 years.      See problem list for active medical problems.  Problems all longstanding and stable, except as noted/documented.  See ROS for pertinent symptoms related to these conditions.          H/o TMJ                                                                                          .    MEDICAL HISTORY:                                                      Patient Active Problem List    Diagnosis Date Noted     Allergic rhinitis due to dust mite 07/13/2017     Priority: Medium     Chronic allergic rhinitis due to animal hair and dander 07/13/2017     Priority: Medium     FH: diabetes mellitus 09/01/2015     Priority: Medium     Food allergy 02/17/2015     Priority: Medium     Moderate persistent asthma 06/28/2011     Priority: Medium     Spirometry results not clear.  Some FVC down with URI, improved over time.  Never had decreased fev/fvc ratio.  Consider trial off advair to see how she responds.         Advanced directives, counseling/discussion 05/27/2011     Priority: Medium     Patient does not have an Advance/Health Care Directive (HCD), given \"What is Advance Care Planning?\" flyer.    Ruma Romero  May 27, 2011         Hyperlipidemia LDL goal <130 01/12/2011     Priority: Medium     Synovial cyst of popliteal space 07/28/2007     Priority: Medium     Pain in joint, lower leg 07/28/2007     Priority: Medium     Carpal tunnel syndrome 07/04/2007     Priority: Medium     S/p surgery 04-05/2004 bilat       Radial styloid tenosynovitis " 04/24/2007     Priority: Medium     Need for prophylactic vaccination and inoculation against influenza 10/24/2006     Priority: Medium     Sprain of interphalangeal (joint) of hand 10/24/2006     Priority: Medium      Past Medical History:   Diagnosis Date     Injury, other and unspecified, shoulder and upper arm 9/03     Past Surgical History:   Procedure Laterality Date     COLONOSCOPY N/A 10/6/2016    Procedure: COLONOSCOPY;  Surgeon: Shiva Johns MD;  Location: [facility]     ETHMOIDECTOMY  12/30/2013    Procedure: ETHMOIDECTOMY;  Bilateral Submucousal Reduction of InferiorTurbinates and Total Ethmoidectomy with Multiple Sinusotomies;  Surgeon: Lio More MD;  Location: [facility]     SURGICAL HISTORY OF -   5/04    carpal tunnel release, bilateral     Current Outpatient Prescriptions   Medication Sig Dispense Refill     azelastine (OPTIVAR) 0.05 % SOLN ophthalmic solution Apply 1 drop to eye 2 times daily 1 Bottle 5     EPINEPHrine (EPIPEN/ADRENACLICK/OR ANY BX GENERIC EQUIV) 0.3 MG/0.3ML injection 2-pack Inject 0.3 mLs (0.3 mg) into the muscle once as needed for anaphylaxis 0.6 mL 3     order for DME Equipment being ordered: Dynaflex insert 1 Units 0     fluticasone (FLONASE) 50 MCG/ACT spray Spray 2 sprays into both nostrils daily 1 Bottle 11     triamcinolone (KENALOG) 0.1 % cream Apply  topically 2 times daily as needed. 80 g 2     montelukast (SINGULAIR) 10 MG tablet Take 1 tablet (10 mg) by mouth At Bedtime 30 tablet 11     fluticasone-salmeterol (ADVAIR) 500-50 MCG/DOSE diskus inhaler Inhale 1 puff into the lungs 2 times daily 3 Inhaler 1     loratadine (CLARITIN) 10 MG tablet Take 1 tablet (10 mg) by mouth daily 30 tablet 11     cetirizine (ZYRTEC) 10 MG tablet Take 1 tablet (10 mg) by mouth At Bedtime 30 tablet 11     albuterol (PROAIR HFA/PROVENTIL HFA/VENTOLIN HFA) 108 (90 BASE) MCG/ACT Inhaler Inhale 2 puffs into the lungs every 4 hours as needed 1 Inhaler 3     ORDER FOR ALLERGEN IMMUNOTHERAPY  Name of Mix: Mix #1  Mold  Alternaria Tenuis 1:10 w/v, HS  0.5 ml  Aspergillus Fumigatus 1:10 w/v, HS  0.5 ml  Epicoccum Nigrum 1:10 w/v, HS 0.5 ml  Hormodendrum Cladosporioides 1:10 w/v, HS 0.5 ml  Penicillium Mix 1:10 w/v, HS  0.5 ml  Diluent: HSA qs to 5ml 5 mL PRN     ORDER FOR ALLERGEN IMMUNOTHERAPY Name of Mix: Mix #2  Dust Mite, Cat, Dog  Cat Hair, Standardized 10,000 BAU/mL, ALK  2.0 ml  Dog Hair Dander, A. P.  1:100 w/v, HS  1.0 ml  Dust Mites F 30,000AU/mL, HS  0.3 ml  Dust Mites P. 30,000 AU/mL, HS  0.3 ml   Diluent: HSA qs to 5ml 5 mL PRN     ORDER FOR ALLERGEN IMMUNOTHERAPY Name of Mix: Mix #3 Grass,Tree   Dmitry,White 1:20w/v, HS 0.5ml  Birch Mix PRW 1:20w/v, HS 0.5ml  Boxelder-Maple Mix BHR (Boxelder Hard Red) 1:20w/v, HS 0.5ml  [place],Common 1:20w/v, HS 0.5ml  Elm,American 1:20w/v, HS 0.5ml  [place] Mix 1:20w/v, HS 0.5ml  Oak Mix RVW 1:20w/v, HS 0.5ml  [place] Tree,Black 1:20w/v, HS 0.5ml  Grass Mix #7 100,000 BAU/mL, HS 0.4ml  Jonathan Grass 1:20w/v, HS 0.5ml  Diluent: HSA qs to 5ml 5 mL PRN     ORDER FOR ALLERGEN IMMUNOTHERAPY Name of Mix: Mix #4  Weeds  Kochia 1:20 w/v, HS 0.5 ml  Lamb's Quarters 1:20 w/v, HS 0.5 ml  Nettle 1:20 w/v, HS 0.5 ml  Plantain, English 1:20 w/v, HS 0.5 ml  Ragweed Mixed 1:20 w/v ALK  0.5 ml  Russian Thistle 1:20 w/v, HS 0.5 ml  Sagebrush, Mugwort 1:20 w/v, HS 0.5 ml  Sorrel, Sheep 1:20 w/v, HS 0.5 ml  Diluent: HSA qs to 5ml 5 mL PRN     Emollient (CERAVE) CREA Externally apply 1 dose * topically 2 times daily 2 Bottle 11     albuterol (2.5 MG/3ML) 0.083% neb solution Take 1 vial (2.5 mg) by nebulization every 4 hours as needed 1 Box 3     MULTIVITAMIN OR 1 tab daily       OTC products: none    Allergies   Allergen Reactions     Clinoril [Nsaids] Hives     Tolerates ibuprofen and aspirin without hives     Pineapple Hives      Latex Allergy: NO    Social History   Substance Use Topics     Smoking status: Never Smoker     Smokeless tobacco: Never Used     Alcohol use No  "    History   Drug Use No       REVIEW OF SYSTEMS:                                                     ROS: 10 point ROS neg other than the symptoms noted above in the HPI.      EXAM:                                                    /80  Pulse 85  Temp 98  F (36.7  C) (Tympanic)  Ht 5' 3\" (1.6 m)  Wt 212 lb 6.4 oz (96.3 kg)  LMP 07/18/2015 (Approximate)  SpO2 98%  BMI 37.62 kg/m2    GENERAL APPEARANCE: healthy, alert and no distress     EYES: EOMI, PERRL     HENT: ear canals and TM's retracted on the left and nasal turbinates pale boggy and engorged.  and mouth without ulcers or lesions     NECK: no adenopathy, no asymmetry, masses, or scars and thyroid normal to palpation     RESP: lungs clear to auscultation - no rales, rhonchi or wheezes     CV: regular rates and rhythm, normal S1 S2, no S3 or S4 and no murmur, click or rub     ABDOMEN:  soft, nontender, no HSM or masses and bowel sounds normal     MS: extremities normal- no gross deformities noted, no evidence of inflammation in joints, FROM in all extremities.     SKIN: no suspicious lesions or rashes     NEURO: Normal strength and tone, sensory exam grossly normal, mentation intact and speech normal     PSYCH: mentation appears normal. and affect normal/bright     LYMPHATICS: No axillary, cervical, or supraclavicular nodes    DIAGNOSTICS:                                                      Labs Drawn and in Process  BMP pending   Results for orders placed or performed in visit on 02/07/18   CBC with platelets   Result Value Ref Range    WBC 7.2 4.0 - 11.0 10e9/L    RBC Count 4.64 3.8 - 5.2 10e12/L    Hemoglobin 13.9 11.7 - 15.7 g/dL    Hematocrit 42.3 35.0 - 47.0 %    MCV 91 78 - 100 fl    MCH 30.0 26.5 - 33.0 pg    MCHC 32.9 31.5 - 36.5 g/dL    RDW 13.0 10.0 - 15.0 %    Platelet Count 230 150 - 450 10e9/L         IMPRESSION:                                                    Reason for surgery/procedure: RECURRENT ETHMOID SINUSITIS "   Diagnosis/reason for consult: PRE OP EVALUATION    The proposed surgical procedure is considered INTERMEDIATE risk.    REVISED CARDIAC RISK INDEX  The patient has the following serious cardiovascular risks for perioperative complications such as (MI, PE, VFib and 3  AV Block):  No serious cardiac risks  INTERPRETATION: 0 risks: Class I (very low risk - 0.4% complication rate)    The patient has the following additional risks for perioperative complications:  No identified additional risks      ICD-10-CM    1. Preop general physical exam Z01.818 CBC with platelets     Basic metabolic panel  (Ca, Cl, CO2, Creat, Gluc, K, Na, BUN)   2. Recurrent ethmoidal sinusitis J01.21 CBC with platelets     Basic metabolic panel  (Ca, Cl, CO2, Creat, Gluc, K, Na, BUN)       RECOMMENDATIONS:                                                          --Patient is to take all scheduled medications on the day of surgery EXCEPT for modifications listed below.    APPROVAL GIVEN to proceed with proposed procedure, without further diagnostic evaluation       Signed Electronically by: MONSE Cee CNP    Copy of this evaluation report is provided to requesting physician.    [facility] Preop Guidelines

## 2018-02-07 NOTE — NURSING NOTE
"Chief Complaint   Patient presents with     RECHECK     Right foot great toe fracture, xrays done today       Initial Pulse 80  Ht 5' 3\" (1.6 m)  Wt 212 lb (96.2 kg)  LMP 07/18/2015 (Approximate)  BMI 37.55 kg/m2 Estimated body mass index is 37.55 kg/(m^2) as calculated from the following:    Height as of this encounter: 5' 3\" (1.6 m).    Weight as of this encounter: 212 lb (96.2 kg).  Medication Reconciliation: complete     Bella Altamirano MA        "

## 2018-02-08 LAB
ANION GAP SERPL CALCULATED.3IONS-SCNC: 4 MMOL/L (ref 3–14)
BUN SERPL-MCNC: 21 MG/DL (ref 7–30)
CALCIUM SERPL-MCNC: 9.2 MG/DL (ref 8.5–10.1)
CHLORIDE SERPL-SCNC: 105 MMOL/L (ref 94–109)
CO2 SERPL-SCNC: 29 MMOL/L (ref 20–32)
CREAT SERPL-MCNC: 0.8 MG/DL (ref 0.52–1.04)
GFR SERPL CREATININE-BSD FRML MDRD: 75 ML/MIN/1.7M2
GLUCOSE SERPL-MCNC: 81 MG/DL (ref 70–99)
POTASSIUM SERPL-SCNC: 4.3 MMOL/L (ref 3.4–5.3)
SODIUM SERPL-SCNC: 138 MMOL/L (ref 133–144)

## 2018-02-13 ENCOUNTER — ANESTHESIA EVENT (OUTPATIENT)
Dept: SURGERY | Facility: CLINIC | Age: 53
End: 2018-02-13
Payer: COMMERCIAL

## 2018-02-14 ENCOUNTER — ANESTHESIA (OUTPATIENT)
Dept: SURGERY | Facility: CLINIC | Age: 53
End: 2018-02-14
Payer: COMMERCIAL

## 2018-02-14 ENCOUNTER — SURGERY (OUTPATIENT)
Age: 53
End: 2018-02-14

## 2018-02-14 ENCOUNTER — HOSPITAL ENCOUNTER (OUTPATIENT)
Facility: CLINIC | Age: 53
Discharge: HOME | End: 2018-02-14
Attending: OTOLARYNGOLOGY | Admitting: OTOLARYNGOLOGY
Payer: COMMERCIAL

## 2018-02-14 VITALS
OXYGEN SATURATION: 95 % | WEIGHT: 212 LBS | RESPIRATION RATE: 16 BRPM | HEART RATE: 85 BPM | BODY MASS INDEX: 36.19 KG/M2 | SYSTOLIC BLOOD PRESSURE: 133 MMHG | TEMPERATURE: 97.9 F | DIASTOLIC BLOOD PRESSURE: 83 MMHG | HEIGHT: 64 IN

## 2018-02-14 DIAGNOSIS — J32.8 OTHER CHRONIC SINUSITIS: Primary | ICD-10-CM

## 2018-02-14 PROCEDURE — 25000128 H RX IP 250 OP 636: Performed by: NURSE ANESTHETIST, CERTIFIED REGISTERED

## 2018-02-14 PROCEDURE — 25000566 ZZH SEVOFLURANE, EA 15 MIN: Performed by: OTOLARYNGOLOGY

## 2018-02-14 PROCEDURE — 25000132 ZZH RX MED GY IP 250 OP 250 PS 637: Performed by: OTOLARYNGOLOGY

## 2018-02-14 PROCEDURE — 40000305 ZZH STATISTIC PRE PROC ASSESS I: Performed by: OTOLARYNGOLOGY

## 2018-02-14 PROCEDURE — 37000008 ZZH ANESTHESIA TECHNICAL FEE, 1ST 30 MIN: Performed by: OTOLARYNGOLOGY

## 2018-02-14 PROCEDURE — 27210794 ZZH OR GENERAL SUPPLY STERILE: Performed by: OTOLARYNGOLOGY

## 2018-02-14 PROCEDURE — 71000027 ZZH RECOVERY PHASE 2 EACH 15 MINS: Performed by: OTOLARYNGOLOGY

## 2018-02-14 PROCEDURE — 36000063 ZZH SURGERY LEVEL 4 EA 15 ADDTL MIN: Performed by: OTOLARYNGOLOGY

## 2018-02-14 PROCEDURE — 31254 NSL/SINS NDSC W/PRTL ETHMDCT: CPT | Mod: 50 | Performed by: OTOLARYNGOLOGY

## 2018-02-14 PROCEDURE — 25000125 ZZHC RX 250: Performed by: NURSE ANESTHETIST, CERTIFIED REGISTERED

## 2018-02-14 PROCEDURE — 31256 EXPLORATION MAXILLARY SINUS: CPT | Mod: 50 | Performed by: OTOLARYNGOLOGY

## 2018-02-14 PROCEDURE — 25000125 ZZHC RX 250: Performed by: OTOLARYNGOLOGY

## 2018-02-14 PROCEDURE — 30140 RESECT INFERIOR TURBINATE: CPT | Mod: 50 | Performed by: OTOLARYNGOLOGY

## 2018-02-14 PROCEDURE — 36000093 ZZH SURGERY LEVEL 4 1ST 30 MIN: Performed by: OTOLARYNGOLOGY

## 2018-02-14 PROCEDURE — 37000009 ZZH ANESTHESIA TECHNICAL FEE, EACH ADDTL 15 MIN: Performed by: OTOLARYNGOLOGY

## 2018-02-14 PROCEDURE — 71000012 ZZH RECOVERY PHASE 1 LEVEL 1 FIRST HR: Performed by: OTOLARYNGOLOGY

## 2018-02-14 RX ORDER — FENTANYL CITRATE 50 UG/ML
25-50 INJECTION, SOLUTION INTRAMUSCULAR; INTRAVENOUS
Status: DISCONTINUED | OUTPATIENT
Start: 2018-02-14 | End: 2018-02-14 | Stop reason: HOSPADM

## 2018-02-14 RX ORDER — OXYMETAZOLINE HYDROCHLORIDE 0.05 G/100ML
SPRAY NASAL PRN
Status: DISCONTINUED | OUTPATIENT
Start: 2018-02-14 | End: 2018-02-14 | Stop reason: HOSPADM

## 2018-02-14 RX ORDER — DEXAMETHASONE SODIUM PHOSPHATE 4 MG/ML
INJECTION, SOLUTION INTRA-ARTICULAR; INTRALESIONAL; INTRAMUSCULAR; INTRAVENOUS; SOFT TISSUE PRN
Status: DISCONTINUED | OUTPATIENT
Start: 2018-02-14 | End: 2018-02-14

## 2018-02-14 RX ORDER — FENTANYL CITRATE 50 UG/ML
INJECTION, SOLUTION INTRAMUSCULAR; INTRAVENOUS PRN
Status: DISCONTINUED | OUTPATIENT
Start: 2018-02-14 | End: 2018-02-14

## 2018-02-14 RX ORDER — NALOXONE HYDROCHLORIDE 0.4 MG/ML
.1-.4 INJECTION, SOLUTION INTRAMUSCULAR; INTRAVENOUS; SUBCUTANEOUS
Status: DISCONTINUED | OUTPATIENT
Start: 2018-02-14 | End: 2018-02-14 | Stop reason: HOSPADM

## 2018-02-14 RX ORDER — HYDROMORPHONE HYDROCHLORIDE 1 MG/ML
.3-.5 INJECTION, SOLUTION INTRAMUSCULAR; INTRAVENOUS; SUBCUTANEOUS EVERY 10 MIN PRN
Status: DISCONTINUED | OUTPATIENT
Start: 2018-02-14 | End: 2018-02-14 | Stop reason: HOSPADM

## 2018-02-14 RX ORDER — SODIUM CHLORIDE, SODIUM LACTATE, POTASSIUM CHLORIDE, CALCIUM CHLORIDE 600; 310; 30; 20 MG/100ML; MG/100ML; MG/100ML; MG/100ML
INJECTION, SOLUTION INTRAVENOUS CONTINUOUS
Status: DISCONTINUED | OUTPATIENT
Start: 2018-02-14 | End: 2018-02-14 | Stop reason: HOSPADM

## 2018-02-14 RX ORDER — ONDANSETRON 4 MG/1
4 TABLET, ORALLY DISINTEGRATING ORAL EVERY 30 MIN PRN
Status: DISCONTINUED | OUTPATIENT
Start: 2018-02-14 | End: 2018-02-14 | Stop reason: HOSPADM

## 2018-02-14 RX ORDER — LIDOCAINE 40 MG/G
CREAM TOPICAL
Status: DISCONTINUED | OUTPATIENT
Start: 2018-02-14 | End: 2018-02-14 | Stop reason: HOSPADM

## 2018-02-14 RX ORDER — MEPERIDINE HYDROCHLORIDE 25 MG/ML
12.5 INJECTION INTRAMUSCULAR; INTRAVENOUS; SUBCUTANEOUS
Status: DISCONTINUED | OUTPATIENT
Start: 2018-02-14 | End: 2018-02-14 | Stop reason: HOSPADM

## 2018-02-14 RX ORDER — ONDANSETRON 2 MG/ML
INJECTION INTRAMUSCULAR; INTRAVENOUS PRN
Status: DISCONTINUED | OUTPATIENT
Start: 2018-02-14 | End: 2018-02-14

## 2018-02-14 RX ORDER — ONDANSETRON 2 MG/ML
4 INJECTION INTRAMUSCULAR; INTRAVENOUS EVERY 30 MIN PRN
Status: DISCONTINUED | OUTPATIENT
Start: 2018-02-14 | End: 2018-02-14 | Stop reason: HOSPADM

## 2018-02-14 RX ORDER — ALBUTEROL SULFATE 0.83 MG/ML
2.5 SOLUTION RESPIRATORY (INHALATION) EVERY 4 HOURS PRN
Status: DISCONTINUED | OUTPATIENT
Start: 2018-02-14 | End: 2018-02-14 | Stop reason: HOSPADM

## 2018-02-14 RX ORDER — SCOLOPAMINE TRANSDERMAL SYSTEM 1 MG/1
1 PATCH, EXTENDED RELEASE TRANSDERMAL ONCE
Status: COMPLETED | OUTPATIENT
Start: 2018-02-14 | End: 2018-02-14

## 2018-02-14 RX ORDER — LIDOCAINE HYDROCHLORIDE AND EPINEPHRINE 10; 10 MG/ML; UG/ML
INJECTION, SOLUTION INFILTRATION; PERINEURAL PRN
Status: DISCONTINUED | OUTPATIENT
Start: 2018-02-14 | End: 2018-02-14 | Stop reason: HOSPADM

## 2018-02-14 RX ORDER — PROPOFOL 10 MG/ML
INJECTION, EMULSION INTRAVENOUS PRN
Status: DISCONTINUED | OUTPATIENT
Start: 2018-02-14 | End: 2018-02-14

## 2018-02-14 RX ORDER — EPHEDRINE SULFATE 50 MG/ML
INJECTION, SOLUTION INTRAMUSCULAR; INTRAVENOUS; SUBCUTANEOUS PRN
Status: DISCONTINUED | OUTPATIENT
Start: 2018-02-14 | End: 2018-02-14

## 2018-02-14 RX ORDER — HYDROCODONE BITARTRATE AND ACETAMINOPHEN 5; 325 MG/1; MG/1
1-2 TABLET ORAL ONCE
Status: COMPLETED | OUTPATIENT
Start: 2018-02-14 | End: 2018-02-14

## 2018-02-14 RX ORDER — HYDROCODONE BITARTRATE AND ACETAMINOPHEN 5; 325 MG/1; MG/1
1-2 TABLET ORAL EVERY 4 HOURS PRN
Qty: 10 TABLET | Refills: 0 | Status: SHIPPED | OUTPATIENT
Start: 2018-02-14 | End: 2018-02-26

## 2018-02-14 RX ADMIN — LIDOCAINE HYDROCHLORIDE 0.5 ML: 10 INJECTION, SOLUTION EPIDURAL; INFILTRATION; INTRACAUDAL; PERINEURAL at 09:28

## 2018-02-14 RX ADMIN — DEXAMETHASONE SODIUM PHOSPHATE 4 MG: 4 INJECTION, SOLUTION INTRA-ARTICULAR; INTRALESIONAL; INTRAMUSCULAR; INTRAVENOUS; SOFT TISSUE at 10:08

## 2018-02-14 RX ADMIN — ONDANSETRON 4 MG: 2 INJECTION INTRAMUSCULAR; INTRAVENOUS at 10:08

## 2018-02-14 RX ADMIN — EPHEDRINE SULFATE 5 MG: 50 INJECTION INTRAVENOUS at 10:12

## 2018-02-14 RX ADMIN — SODIUM CHLORIDE, POTASSIUM CHLORIDE, SODIUM LACTATE AND CALCIUM CHLORIDE: 600; 310; 30; 20 INJECTION, SOLUTION INTRAVENOUS at 10:45

## 2018-02-14 RX ADMIN — LIDOCAINE HYDROCHLORIDE AND EPINEPHRINE 6 ML: 10; 10 INJECTION, SOLUTION INFILTRATION; PERINEURAL at 10:51

## 2018-02-14 RX ADMIN — PROPOFOL 200 MG: 10 INJECTION, EMULSION INTRAVENOUS at 10:08

## 2018-02-14 RX ADMIN — Medication 0.5 MG: at 11:17

## 2018-02-14 RX ADMIN — SODIUM CHLORIDE, POTASSIUM CHLORIDE, SODIUM LACTATE AND CALCIUM CHLORIDE: 600; 310; 30; 20 INJECTION, SOLUTION INTRAVENOUS at 09:28

## 2018-02-14 RX ADMIN — SCOPALAMINE 1 PATCH: 1 PATCH, EXTENDED RELEASE TRANSDERMAL at 09:35

## 2018-02-14 RX ADMIN — EPHEDRINE SULFATE 5 MG: 50 INJECTION INTRAVENOUS at 10:16

## 2018-02-14 RX ADMIN — MIDAZOLAM 2 MG: 1 INJECTION INTRAMUSCULAR; INTRAVENOUS at 10:02

## 2018-02-14 RX ADMIN — HYDROCODONE BITARTRATE AND ACETAMINOPHEN 2 TABLET: 5; 325 TABLET ORAL at 11:28

## 2018-02-14 RX ADMIN — FENTANYL CITRATE 100 MCG: 50 INJECTION, SOLUTION INTRAMUSCULAR; INTRAVENOUS at 10:04

## 2018-02-14 RX ADMIN — OXYMETAZOLINE HYDROCHLORIDE 3 SPRAY: 5 SPRAY NASAL at 10:52

## 2018-02-14 RX ADMIN — FENTANYL CITRATE 150 MCG: 50 INJECTION, SOLUTION INTRAMUSCULAR; INTRAVENOUS at 10:07

## 2018-02-14 NOTE — ANESTHESIA PREPROCEDURE EVALUATION
Anesthesia Evaluation     . Pt has had prior anesthetic. Type: General and MAC    No history of anesthetic complications          ROS/MED HX    ENT/Pulmonary:     (+)asthma , . .    Neurologic:       Cardiovascular:         METS/Exercise Tolerance:     Hematologic:         Musculoskeletal:   (+) , , other musculoskeletal-       GI/Hepatic:         Renal/Genitourinary:         Endo:         Psychiatric:         Infectious Disease:         Malignancy:         Other:                     Physical Exam  Normal systems: cardiovascular, pulmonary and dental    Airway   Mallampati: I  TM distance: >3 FB  Neck ROM: full    Dental     Cardiovascular   Rhythm and rate: regular and normal      Pulmonary    breath sounds clear to auscultation                    Anesthesia Plan      History & Physical Review  History and physical reviewed and following examination; no interval change.    ASA Status:  2 .    NPO Status:  > 6 hours    Plan for General and ETT with Intravenous induction. Maintenance will be Balanced.    PONV prophylaxis:  Ondansetron (or other 5HT-3) and Dexamethasone or Solumedrol       Postoperative Care      Consents  Anesthetic plan, risks, benefits and alternatives discussed with:  Patient..                          .

## 2018-02-14 NOTE — OP NOTE
PREOPERATIVE DIAGNOSES:   1. Chronic sinusitis.   POSTOPERATIVE DIAGNOSES:   1. Chronic sinusitis.   PROCEDURES PERFORMED:   1. Bilateral endoscopic maxillary antrostomy  2. Bilateral endoscopic anterior ethmoidectomy.   4. Bilateral endoscopic submucous resection of inferior turbinates  SURGEON: Santhosh Tierney MD   ASSISTANT: None  BLOOD LOSS: 20 mL.   COMPLICATIONS: None.   SPECIMENS: None.   ANESTHESIA: GETA.   INDICATIONS: Dilia Solomon  presented to me with a lifelong history of chronic nasal disease and chronic sinusitis.  CT scan showed some inflammation obstructing the ostiomeatal units bilaterally.  Therefore, my recommendation was for the above-named procedures. Preoperatively, risks discussed included the risks of infection, bleeding, the risks of general anesthesia, possible recurrence of nasal disease, possible injury to the eyes, base of skull and tear duct system, and possible alteration of sense of smell, although the patient has not had a sense of smell for many years. The patient understood these risks and possible outcomes and wished to proceed.   I began by examining the nasal cavity bilaterally with an endoscope.  There was a leftward deviation of the septum but access to the left middle meatus was still possible.  The middle turbinates were medialized bilaterally.  The uncinates were then visualized and injected on the left and right side with 1% lidocaine with epinephrine.  I began with the left side.  Retracted the left uncinated anteriorly to allow view of the left maxillary sinus.  There is no pus or inflammation of the maxillary sinus lining.  The shaver was used to remove the entirety of the uncinate.  This opened the ager nasi cell which had some inflamed polypoid mucosa.  I then dissected through the anterior face of the ethmoid bulla and sequentially into subsequent ethmoid cells.  The basal lamella was identified and preserved.  There was no thick mucus secretions or matter that  would be suitable for culture.  I then turned to the right side.  The uncinate was also anteriorly retracted and resected.  I could view the maxillary sinus mucosa which appeared to be healthy.  There is no significant purulent debris or drainage suitable for culture.  I then identified the ethmoid bulla which seemed to have been injured on a prior procedure.  I removed the walls of the ethmoid bulla and sequentially entered subsequent ethmoid cells.  Basal lamella was identified and cleared away.  A small hole was made inferiorly into the posterior ethmoid system.  Hemostasis was obtained bilaterally with Afrin-soaked Pledgets.  I then packed each middle meatus with Nasopore.  This completed the procedure.  The patient was emerged from anesthesia without complication.    Dr. Santhosh Tierney  I removed the cottonoids from the right side of the nose and entered the right nasal cavity.  I then entered with the spinal needle and 0 degree endoscope and injected the posterior lateral wall of the nasal cavity as well as the body of the right middle turbinate and the anterior insertion of the right middle turbinate. After I did this field block, I continued back into the right middle meatus. I used the rotating backbiter and trimmed away at the uncinate process to expose the natural ostium of the right maxillary sinus. I entered the right maxillary sinus with a 60 degree curved shaver blade and removed extensive amounts of polypoid mucosa and mucopurulence from the right maxillary sinus. After this was done, I switched back to the 12 degree shaver and a 0 degree endoscope and took down the face of the ethmoid bulla. Immediately, copious amounts of thick yellow mucopurulent oozed out and I suctioned this away. I skeletonized the horizontal lamella and proceeded directly posteriorly through several layers of posterior ethmoid air cells. After reaching the face of the sphenoid sinus on the right side, I dissected upward and,  using the image guidance system, confirmed that I had found the posterior extent of the roof of the ethmoid. I then was able to dissect in a posterior to anterior direction, removing polyps and bony septations along the way. Eventually I crossed the ethmoid infundibulum into the anterior ethmoid system. At this point, I used the rotating backbiter to trim away at the anterior insertion of the right middle turbinate to expose the agger nasi cell. I used the 12 degree shaver to remove several more anterior ethmoid air cells.   I made a septoplasty incision in the right nasal vestibule in a traditional open fashion. I then dissected down onto the left side of the cartilaginous septum through the right-sided incision. I then was able to start a mucoperichondrial pocket directly on the left side of the cartilaginous septum and inserted 0-degree endoscope to form my pocket and under direct endoscopic visualization. After I completely elevated the mucoperichondrium off the left side of the septum, I then made a hemitransfixion incision through the cartilage approximately 1.5 cm back from the anterior edge. I broke over to the right side and raised a submucoperiosteal flap on the entire right side of the nasal septum under direct endoscopic visualization. I was able to carefully tease the mucoperichondrium off the large rightward-pointing spur. After this was done, I used a suitable knife to remove the entire rhomboid portion of the cartilaginous septum, and I removed it in one large piece. It was brought to the back table and crushed in 2 pieces and then reinserted back into the mucoperichondrial pocket. I laid the flaps back together and this significantly improved the left nasal airway. I then closed my septoplasty incision with 3 simple interrupted 4-0 chromic gut sutures.   Now that I had completed my septoplasty, I moved on to the left nasal cavity. I used a spinal needle with local anesthetic to inject the posterior  lateral wall as well as the full length of the left inferior turbinate. I used the rotating backbiter to trim away at the uncinate bone to expose the natural ostium of the left maxillary sinus. Once I identified it, I was able to use the sinus shaver to trim away at the remainder of the uncinate and open the left maxillary sinus. It was completely overgrown with polypoid mucosa.  After this was completely cleaned out, I switched back to the 12 degree shaver and a 0 degree endoscope. I took down the face of the ethmoid bulla and skeletonized the horizontal lamella and proceeded directly posteriorly through several layers of posterior ethmoid cells which were completely filled with polyps. Eventually I reached the posterior most left ethmoid cell which surprisingly was pneumatization. I identified and confirmed that I had located the roof of the posterior ethmoid system. I then dissected in a posterior to anterior direction with the 12 degree shaver, trimming away diseased mucosa and polyps as well as bony septations. I crossed the ethmoid infundibulum into the anterior ethmoid system and, using the rotating backbiter, trimmed away at the superiormost remnant of the uncinate bone to identify the agger nasi cell. Using a shaver, I trimmed away at the remainder of the floor of the agger nasi, and it was also completely filled with polyps.   I proceeded to the final components of the operation, which was submucous resection of inferior turbinates. I switched to a 2 inferior turbinate blade and started on the left side. I made a stab incision at the anterior insertion of the left inferior turbinate and raised a submucoperiosteal tunnel along the medial surface of the left inferior turbinate bone. I then slowly withdrew the shaver blade as I ran the shaver to perform my submucous resection.   I then performed the same procedure on the right side, once again using the 2 mm turbinate blade to make a stab incision at the  anterior insertion of the right inferior turbinate blade. I raised a submucoperiosteal tunnel along the entire medial surface of the right inferior turbinate bone and slowly withdrew the shaver as I ran the shaver function to perform submucous resection.   At this point, the entire procedure was now complete. I reinspected both sides of the nose and there was good hemostasis. I applied Leonela hemostatic powder to the roof of the ethmoid bilaterally. I then placed small pieces of Nasopore dressing in between the middle turbinates and the lateral walls to prevent lateralization and scarring, and used [place] septoplasty splints fixed in place with a single 2-0 prolene, I took a second piece of Nasopore cut lengthwise and placed it in between the inferior turbinates and the nasal septum bilaterally for tamponade. All instruments were accounted for and all counts were correct. The patient's bed was rotated 90 degrees back to the care of anesthesia. He was awakened, extubated and sent to the recovery room in good condition.

## 2018-02-14 NOTE — IP AVS SNAPSHOT
[facility] PreOP/Phase II    [address] [facility] [address]    Phone:  [phone]    Fax:  [phone]                                       After Visit Summary   2/14/2018    Dilia Solomon    MRN: [id number]           After Visit Summary Signature Page     I have received my discharge instructions, and my questions have been answered. I have discussed any challenges I see with this plan with the nurse or doctor.    ..........................................................................................................................................  Patient/Patient Representative Signature      ..........................................................................................................................................  Patient Representative Print Name and Relationship to Patient    ..................................................               ................................................  Date                                            Time    ..........................................................................................................................................  Reviewed by Signature/Title    ...................................................              ..............................................  Date                                                            Time

## 2018-02-14 NOTE — DISCHARGE INSTRUCTIONS
Instructions for Sinus Surgery    Recovery - Everyone recovers differently from a general anesthetic.  Symptoms such as fatigue, nausea, light-headedness, and sometimes a low grade fever (up to 100 degrees) are not unusual.  As your body removes the anesthetic drugs from circulation, these symptoms will resolve.  Your nose will be sore after surgery, and you may even have symptoms similar to a sinus infection with headache, congestion, and pressure.  These will resolve with healing.  For several days you may experience bloody drainage from the nose, please use the drip pad as necessary for this.  If you are soaking a drip pad more frequently than once every 20 minutes, please call the office during business hours or the on call ENT physician after hours.  There are no diet restrictions after sinus surgery, and you can resume your home medications.  Please blow your nose very gently for two weeks after surgery.  Limit your activity to no strenuous activities until I see you for the first follow-up visit.      Medications - You were sent home with narcotic pain medication.  If you can tolerate the discomfort during your recovery by using just plain Tylenol or ibuprofen (advil), please do so.  However, do not hesitate to use the stronger pain medication if needed.  If you were sent home with an antibiotic, it is primarily used to help the healing process.  If it causes loose bowel movements or other signs of intolerance, it is appropriate to discontinue it.  By far the most important measure you can take to speed recovery, and maximize the chances of long term success of sinus surgery is using the sinus rinses at least three time per day for the first month after surgery. I recommend using a [employer] Sinus Rinse irrigation kit which is available at most drugstores.  Please start these:     Tomorrow    Complications - Problems related to sinus surgery almost always are detected during the operation, and special  instruction will be given in that situation.  However, unexpected things can happen, and are all related to the structures around the sinus cavities.  Symptoms that should alert you to a possible problem include: severe eye pain or eye swelling, persistent heavy bleeding from the nose, and high fevers with headache and neck pain.  Any of these symptoms should be called into my office or to the on call ENT if after hours.  The most common non-emergency complication of sinus surgery is the formation of scar tissue which can re-block the sinuses.  This is addressed below.    Follow-up -  As you have noted, there are quite a few follow-up visits after sinus surgery.  This is done to aggressively manage the most common complication of this technique, which is scar tissue blocking the sinuses.  These visits will require the examination of your nose and possibly removal of crusts of dry mucous and blood, with possible removal of early scar tissue.  Please prepare for these visits by using your sinus rinses.    If there are any questions or issues with the above, or if there are other issues that concern you, always feel free to call the clinic and I am happy to speak with you as soon as I can.    Santhosh Tierney MD  Otolaryngology  [facility]  [phone] After hours, [facility] option      Discharge instructions for Patient with Scopolamine Transdermal Patch    1.  You may leave the patch on behind your ear for three days-But NO LONGER.  May have withdrawal symptoms (nausea, vomiting, headache,dizziness) if used longer.  2.  When you remove the patch, you must wash and dry your hands thoroughly and before touching your eyes, as pupils may dilate.  3.  Discard patch (away from children and pets).  4.  May develop some urinary hesitancy or urine retention.  5.  Patch should be removed by:_Saturday at the latest                      Same Day Surgery Discharge Instructions  Special Precautions  After Surgery - Adult    1. It is not unusual to feel lightheaded or faint, up to 24 hours after surgery or while taking pain medication.  If you have these symptoms; sit for a few minutes before standing and have someone assist you when getting up.  2. You should rest and relax for the next 24 hours and must have someone stay with you for at least 24 hours after your discharge.  3. DO NOT DRIVE any vehicle or operate mechanical equipment for 24 hours following the end of your surgery.  DO NOT DRIVE while taking narcotic pain medications that have been prescribed by your physician.  If you had a limb operated on, you must be able to use it fully to drive.  4. DO NOT drink alcoholic beverages for 24 hours following surgery or while taking prescription pain medication.  5. Drink clear liquids (apple juice, ginger ale, broth, 7-Up, etc.).  Progress to your regular diet as you feel able.  6. Any questions call your physician and do not make important decisions for 24 hours.    Same Day Surgery [phone], Monday thru Friday 6am-9pm.

## 2018-02-14 NOTE — IP AVS SNAPSHOT
MRN:[id number]                      After Visit Summary   2/14/2018    Dilia Solomon    MRN: [id number]           Thank you!     Thank you for choosing [facility] for your care. Our goal is always to provide you with excellent care. Hearing back from our patients is one way we can continue to improve our services. Please take a few minutes to complete the written survey that you may receive in the mail after you visit with us. Thank you!        Patient Information     Date Of Birth          1965        About your hospital stay     You were admitted on:  February 14, 2018 You last received care in the:  [facility] PreOP/Phase II    You were discharged on:  February 14, 2018       Who to Call     For medical emergencies, please call 911.  For non-urgent questions about your medical care, please call your primary care provider or clinic, [phone]  For questions related to your surgery, please call your surgery clinic        Attending Provider     Provider Specialty    Santhosh Tierney MD Otolaryngology       Primary Care Provider Office Phone # Fax #    Cookie Conklin [phone]      After Care Instructions     Diet Instructions       Resume pre-procedure diet            Discharge Instructions       Do not use CPAP for 4 days.            Discharge Instructions       Patient to follow up with appointment in 2 weeks            NO Lifting       No lifting over 15 lbs and no strenuous physical activity for 4 days.            No driving or operating machinery        until the day after procedure                  Your next 10 appointments already scheduled     Feb 20, 2018  7:00 AM CST   Nurse Only with ALLERGY [facility] ([facility])    [address] [address]   [phone]           Every allergy patient MUST wait 30 minutes after their allergy shot. No exceptions.  Xolair shots #1-3 should plan to wait 2  hours in clinic Xolair shots after #4 should plan 30 minute wait in clinic            Mar 07, 2018  1:40 PM CST   [facility] D[facility]    [address] [facility] [address]   [phone]            Jun 06, 2018  8:20 AM CDT   Return Visit with Butch Horowitz MD   [facility]    [address] [address]   [phone]              Further instructions from your care team       Instructions for Sinus Surgery    Recovery - Everyone recovers differently from a general anesthetic.  Symptoms such as fatigue, nausea, light-headedness, and sometimes a low grade fever (up to 100 degrees) are not unusual.  As your body removes the anesthetic drugs from circulation, these symptoms will resolve.  Your nose will be sore after surgery, and you may even have symptoms similar to a sinus infection with headache, congestion, and pressure.  These will resolve with healing.  For several days you may experience bloody drainage from the nose, please use the drip pad as necessary for this.  If you are soaking a drip pad more frequently than once every 20 minutes, please call the office during business hours or the on call ENT physician after hours.  There are no diet restrictions after sinus surgery, and you can resume your home medications.  Please blow your nose very gently for two weeks after surgery.  Limit your activity to no strenuous activities until I see you for the first follow-up visit.      Medications - You were sent home with narcotic pain medication.  If you can tolerate the discomfort during your recovery by using just plain Tylenol or ibuprofen (advil), please do so.  However, do not hesitate to use the stronger pain medication if needed.  If you were sent home with an antibiotic, it is primarily used to help the healing process.  If it causes loose bowel movements or other signs  of intolerance, it is appropriate to discontinue it.  By far the most important measure you can take to speed recovery, and maximize the chances of long term success of sinus surgery is using the sinus rinses at least three time per day for the first month after surgery. I recommend using a [employer] Sinus Rinse irrigation kit which is available [facility].  Please start these:     Tomorrow    Complications - Problems related to sinus surgery almost always are detected during the operation, and special instruction will be given in that situation.  However, unexpected things can happen, and are all related to the structures around the sinus cavities.  Symptoms that should alert you to a possible problem include: severe eye pain or eye swelling, persistent heavy bleeding from the nose, and high fevers with headache and neck pain.  Any of these symptoms should be called into my office or to the on call ENT if after hours.  The most common non-emergency complication of sinus surgery is the formation of scar tissue which can re-block the sinuses.  This is addressed below.    Follow-up -  As you have noted, there are quite a few follow-up visits after sinus surgery.  This is done to aggressively manage the most common complication of this technique, which is scar tissue blocking the sinuses.  These visits will require the examination of your nose and possibly removal of crusts of dry mucous and blood, with possible removal of early scar tissue.  Please prepare for these visits by using your sinus rinses.    If there are any questions or issues with the above, or if there are other issues that concern you, always feel free to call the clinic and I am happy to speak with you as soon as I can.    Santhosh Tierney MD  Otolaryngology  [facility]  [phone] After hours, [facility] option      Discharge instructions for Patient with Scopolamine Transdermal Patch    1.  You may leave the patch  "on behind your ear for three days-But NO LONGER.  May have withdrawal symptoms (nausea, vomiting, headache,dizziness) if used longer.  2.  When you remove the patch, you must wash and dry your hands thoroughly and before touching your eyes, as pupils may dilate.  3.  Discard patch (away from children and pets).  4.  May develop some urinary hesitancy or urine retention.  5.  Patch should be removed by:_Saturday at the latest                      Same Day Surgery Discharge Instructions  Special Precautions After Surgery - Adult    1. It is not unusual to feel lightheaded or faint, up to 24 hours after surgery or while taking pain medication.  If you have these symptoms; sit for a few minutes before standing and have someone assist you when getting up.  2. You should rest and relax for the next 24 hours and must have someone stay with you for at least 24 hours after your discharge.  3. DO NOT DRIVE any vehicle or operate mechanical equipment for 24 hours following the end of your surgery.  DO NOT DRIVE while taking narcotic pain medications that have been prescribed by your physician.  If you had a limb operated on, you must be able to use it fully to drive.  4. DO NOT drink alcoholic beverages for 24 hours following surgery or while taking prescription pain medication.  5. Drink clear liquids (apple juice, ginger ale, broth, 7-Up, etc.).  Progress to your regular diet as you feel able.  6. Any questions call your physician and do not make important decisions for 24 hours.    Same Day Surgery [phone], Monday thru Friday 6am-9pm.        Pending Results     No orders found from 2/12/2018 to 2/15/2018.            Admission Information     Date & Time Provider Department Dept. Phone    2/14/2018 Santhosh Tierney MD [facility] PreOP/Phase -[phone]      Your Vitals Were     Blood Pressure Pulse Temperature Respirations Height Weight    133/83 85 97.9  F (36.6  C) (Oral) 16 1.626 m (5' 4\") 96.2 kg (212 lb)    " Last Period Pulse Oximetry BMI (Body Mass Index)             07/18/2015 (Approximate) 95% 36.39 kg/m2         Care EveryWhere ID     This is your Care EveryWhere ID. This could be used by other organizations to access your [facility] medical records  [id number]        Equal Access to Services     ARAVINDGABBY APRIL : Hadii aad ku hadneymargina Sofaith, waaxda luqadaha, qaybta kaalmada kanwalguillerminamariaa, hieu macedwardmark howard. So [facility] [phone].    ATENCIÓN: Si habla español, tiene a pruitt disposición servicios gratuitos de asistencia lingüística. Llame al [phone].    We comply with applicable federal civil rights laws and [place] laws. We do not discriminate on the basis of race, color, national origin, age, disability, sex, sexual orientation, or gender identity.               Review of your medicines      START taking        Dose / Directions    HYDROcodone-acetaminophen 5-325 MG per tablet   Commonly known as:  NORCO   Used for:  Other chronic sinusitis   Notes to Patient:  You had 2 pain pills at 11:30 am.        Dose:  1-2 tablet   Take 1-2 tablets by mouth every 4 hours as needed for other (Moderate to Severe Pain)   Quantity:  10 tablet   Refills:  0         CONTINUE these medicines which have NOT CHANGED        Dose / Directions    * albuterol (2.5 MG/3ML) 0.083% neb solution   Used for:  Moderate persistent asthma, uncomplicated        Dose:  1 vial   Take 1 vial (2.5 mg) by nebulization every 4 hours as needed   Quantity:  1 Box   Refills:  3       * albuterol 108 (90 BASE) MCG/ACT Inhaler   Commonly known as:  PROAIR HFA/PROVENTIL HFA/VENTOLIN HFA   Used for:  Moderate persistent asthma without complication        Dose:  2 puff   Inhale 2 puffs into the lungs every 4 hours as needed   Quantity:  1 Inhaler   Refills:  3       * ALLERGEN IMMUNOTHERAPY PRESCRIPTION   Used for:  Chronic allergic rhinitis due to animal hair and dander, Allergic rhinitis due to dust mite, Allergic rhinitis due to mold,  Chronic seasonal allergic rhinitis due to pollen        Name of Mix: Mix #1  Mold Alternaria Tenuis 1:10 w/v, HS  0.5 ml Aspergillus Fumigatus 1:10 w/v, HS  0.5 ml Epicoccum Nigrum 1:10 w/v, HS 0.5 ml Hormodendrum Cladosporioides 1:10 w/v, HS 0.5 ml Penicillium Mix 1:10 w/v, HS  0.5 ml Diluent: HSA qs to 5ml   Quantity:  5 mL   Refills:  PRN       * ALLERGEN IMMUNOTHERAPY PRESCRIPTION   Used for:  Chronic allergic rhinitis due to animal hair and dander, Allergic rhinitis due to dust mite, Allergic rhinitis due to mold, Chronic seasonal allergic rhinitis due to pollen        Name of Mix: Mix #2  Dust Mite, Cat, Dog Cat Hair, Standardized 10,000 BAU/mL, ALK  2.0 ml Dog Hair Dander, A. P.  1:100 w/v, HS  1.0 ml Dust Mites F 30,000AU/mL, HS  0.3 ml Dust Mites P. 30,000 AU/mL, HS  0.3 ml  Diluent: HSA qs to 5ml   Quantity:  5 mL   Refills:  PRN       * ALLERGEN IMMUNOTHERAPY PRESCRIPTION   Used for:  Chronic allergic rhinitis due to animal hair and dander, Allergic rhinitis due to dust mite, Allergic rhinitis due to mold, Chronic seasonal allergic rhinitis due to pollen        Name of Mix: Mix #3 Grass,Tree  Dmitry,White 1:20w/v, HS 0.5ml Birch Mix PRW 1:20w/v, HS 0.5ml Boxelder-Maple Mix BHR (Boxelder Hard Red) 1:20w/v, HS 0.5ml [place],Common 1:20w/v, HS 0.5ml Elm,American 1:20w/v, HS 0.5ml [place] Mix 1:20w/v, HS 0.5ml Oak Mix RVW 1:20w/v, HS 0.5ml [place] Tree,Black 1:20w/v, HS 0.5ml Grass Mix #7 100,000 BAU/mL, HS 0.4ml [place] Grass 1:20w/v, HS 0.5ml Diluent: HSA qs to 5ml   Quantity:  5 mL   Refills:  PRN       * ALLERGEN IMMUNOTHERAPY PRESCRIPTION   Used for:  Chronic allergic rhinitis due to animal hair and dander, Allergic rhinitis due to dust mite, Allergic rhinitis due to mold, Chronic seasonal allergic rhinitis due to pollen        Name of Mix: Mix #4  Weeds Kochia 1:20 w/v, HS 0.5 ml Lamb's Quarters 1:20 w/v, HS 0.5 ml Nettle 1:20 w/v, HS 0.5 ml Plantain, English 1:20 w/v, HS 0.5 ml Ragweed Mixed 1:20 w/v  ALK  0.5 ml Russian Thistle 1:20 w/v, HS 0.5 ml Sagebrush, Mugwort 1:20 w/v, HS 0.5 ml Sorrel, Sheep 1:20 w/v, HS 0.5 ml Diluent: HSA qs to 5ml   Quantity:  5 mL   Refills:  PRN       azelastine 0.05 % Soln ophthalmic solution   Commonly known as:  OPTIVAR   Used for:  Conjunctivitis, allergic, unspecified laterality        Dose:  1 drop   Apply 1 drop to eye 2 times daily   Quantity:  1 Bottle   Refills:  5       CERAVE Crea   Used for:  Eczema, unspecified type        Dose:  1 dose.   Externally apply 1 dose * topically 2 times daily   Quantity:  2 Bottle   Refills:  11       cetirizine 10 MG tablet   Commonly known as:  zyrTEC   Used for:  Allergic conjunctivitis of both eyes, Chronic allergic rhinitis due to animal hair and dander, Allergic rhinitis due to dust mite, Allergic rhinitis due to mold, Chronic seasonal allergic rhinitis due to pollen        Dose:  10 mg   Take 1 tablet (10 mg) by mouth At Bedtime   Quantity:  30 tablet   Refills:  11       EPINEPHrine 0.3 MG/0.3ML injection 2-pack   Commonly known as:  EPIPEN/ADRENACLICK/or ANY BX GENERIC EQUIV   Used for:  Food allergy, Need for desensitization to allergens        Dose:  0.3 mg   Inject 0.3 mLs (0.3 mg) into the muscle once as needed for anaphylaxis   Quantity:  0.6 mL   Refills:  3       fluticasone 50 MCG/ACT spray   Commonly known as:  FLONASE   Used for:  Chronic allergic rhinitis due to animal hair and dander, Allergic rhinitis due to dust mite, Allergic rhinitis due to mold, Chronic seasonal allergic rhinitis due to pollen        Dose:  2 spray   Spray 2 sprays into both nostrils daily   Quantity:  1 Bottle   Refills:  11       fluticasone-salmeterol 500-50 MCG/DOSE diskus inhaler   Commonly known as:  ADVAIR   Used for:  Moderate persistent asthma without complication        Dose:  1 puff   Inhale 1 puff into the lungs 2 times daily   Quantity:  3 Inhaler   Refills:  1       loratadine 10 MG tablet   Commonly known as:  CLARITIN   Used for:   Allergic conjunctivitis of both eyes, Chronic allergic rhinitis due to animal hair and dander, Allergic rhinitis due to dust mite, Allergic rhinitis due to mold, Chronic seasonal allergic rhinitis due to pollen        Dose:  10 mg   Take 1 tablet (10 mg) by mouth daily   Quantity:  30 tablet   Refills:  11       montelukast 10 MG tablet   Commonly known as:  SINGULAIR   Used for:  Moderate persistent asthma without complication, Chronic allergic rhinitis due to animal hair and dander, Allergic rhinitis due to dust mite, Allergic rhinitis due to mold, Chronic seasonal allergic rhinitis due to pollen        Dose:  10 mg   Take 1 tablet (10 mg) by mouth At Bedtime   Quantity:  30 tablet   Refills:  11       MULTIVITAMIN PO        1 tab daily   Refills:  0       order for DME   Used for:  Closed displaced fracture of proximal phalanx of right great toe, initial encounter        Equipment being ordered: Dynaflex insert   Quantity:  1 Units   Refills:  0       triamcinolone 0.1 % cream   Commonly known as:  KENALOG   Used for:  Eczema, unspecified type        Apply  topically 2 times daily as needed.   Quantity:  80 g   Refills:  2       * Notice:  This list has 6 medication(s) that are the same as other medications prescribed for you. Read the directions carefully, and ask your doctor or other care provider to review them with you.         Where to get your medicines      Some of these will need a paper prescription and others can be bought over the counter. Ask your nurse if you have questions.     Bring a paper prescription for each of these medications     HYDROcodone-acetaminophen 5-325 MG per tablet                Protect others around you: Learn how to safely use, store and throw away your medicines at www.disposemymeds.org.        Information about OPIOIDS     PRESCRIPTION OPIOIDS: WHAT YOU NEED TO KNOW    Prescription opioids can be used to help relieve moderate to severe pain and are often prescribed following a  surgery or injury, or for certain health conditions. These medications can be an important part of treatment but also come with serious risks. It is important to work with your health care provider to make sure you are getting the safest, most effective care.    WHAT ARE THE RISKS AND SIDE EFFECTS OF OPIOID USE?  Prescription opioids carry serious risks of addiction and overdose, especially with prolonged use. An opioid overdose, often marked by slowed breathing can cause sudden death. The use of prescription opioids can have a number of side effects as well, even when taken as directed:      Tolerance - meaning you might need to take more of a medication for the same pain relief    Physical dependence - meaning you have symptoms of withdrawal when a medication is stopped    Increased sensitivity to pain    Constipation    Nausea, vomiting, and dry mouth    Sleepiness and dizziness    Confusion    Depression    Low levels of testosterone that can result in lower sex drive, energy, and strength    Itching and sweating    RISKS ARE GREATER WITH:    History of drug misuse, substance use disorder, or overdose    Mental health conditions (such as depression or anxiety)    Sleep apnea    Older age (65 years or older)    Pregnancy    Avoid alcohol while taking prescription opioids.   Also, unless specifically advised by your health care provider, medications to avoid include:    Benzodiazepines (such as Xanax or Valium)    Muscle relaxants (such as Soma or Flexeril)    Hypnotics (such as Ambien or Lunesta)    Other prescription opioids    KNOW YOUR OPTIONS:  Talk to your health care provider about ways to manage your pain that do not involve prescription opioids. Some of these options may actually work better and have fewer risks and side effects:    Pain relievers such as acetaminophen, ibuprofen, and naproxen    Some medications that are also used for depression or seizures    Physical therapy and exercise    Cognitive  behavioral therapy, a psychological, goal-directed approach, in which patients learn how to modify physical, behavioral, and emotional triggers of pain and stress    IF YOU ARE PRESCRIBED OPIOIDS FOR PAIN:    Never take opioids in greater amounts or more often than prescribed    Follow up with your primary health care provider and work together to create a plan on how to manage your pain.    Talk about ways to help manage your pain that do not involve prescription opioids    Talk about all concerns and side effects    Help prevent misuse and abuse    Never sell or share prescription opioids    Never use another person's prescription opioids    Store prescription opioids in a secure place and out of reach of others (this may include visitors, children, friends, and family)    Visit www.cdc.gov/drugoverdose to learn about risks of opioid abuse and overdose    If you believe you may be struggling with addiction, tell your health care provider and ask for guidance or call [facility]'s National Helpline at 3-284-067-HELP    LEARN MORE / www.cdc.gov/drugoverdose/prescribing/guideline.html    Safely dispose of unused prescription opioids: Find your local drug take-back programs and more information about the importance of safe disposal at www.doseofreality.mn.gov             Medication List: This is a list of all your medications and when to take them. Check marks below indicate your daily home schedule. Keep this list as a reference.      Medications           Morning Afternoon Evening Bedtime As Needed    * albuterol (2.5 MG/3ML) 0.083% neb solution   Take 1 vial (2.5 mg) by nebulization every 4 hours as needed                                * albuterol 108 (90 BASE) MCG/ACT Inhaler   Commonly known as:  PROAIR HFA/PROVENTIL HFA/VENTOLIN HFA   Inhale 2 puffs into the lungs every 4 hours as needed                                * ALLERGEN IMMUNOTHERAPY PRESCRIPTION   Name of Mix: Mix #1  Mold Alternaria Tenuis 1:10 w/v, HS   0.5 ml Aspergillus Fumigatus 1:10 w/v, HS  0.5 ml Epicoccum Nigrum 1:10 w/v, HS 0.5 ml Hormodendrum Cladosporioides 1:10 w/v, HS 0.5 ml Penicillium Mix 1:10 w/v, HS  0.5 ml Diluent: HSA qs to 5ml                                * ALLERGEN IMMUNOTHERAPY PRESCRIPTION   Name of Mix: Mix #2  Dust Mite, Cat, Dog Cat Hair, Standardized 10,000 BAU/mL, ALK  2.0 ml Dog Hair Dander, A. P.  1:100 w/v, HS  1.0 ml Dust Mites F 30,000AU/mL, HS  0.3 ml Dust Mites P. 30,000 AU/mL, HS  0.3 ml  Diluent: HSA qs to 5ml                                * ALLERGEN IMMUNOTHERAPY PRESCRIPTION   Name of Mix: Mix #3 Grass,Tree  Dmitry,White 1:20w/v, HS 0.5ml Birch Mix PRW 1:20w/v, HS 0.5ml Boxelder-Maple Mix BHR (Boxelder Hard Red) 1:20w/v, HS 0.5ml [place],Common 1:20w/v, HS 0.5ml Elm,American 1:20w/v, HS 0.5ml [place] Mix 1:20w/v, HS 0.5ml Oak Mix RVW 1:20w/v, HS 0.5ml [place] Tree,Black 1:20w/v, HS 0.5ml Grass Mix #7 100,000 BAU/mL, HS 0.4ml [place] Grass 1:20w/v, HS 0.5ml Diluent: HSA qs to 5ml                                * ALLERGEN IMMUNOTHERAPY PRESCRIPTION   Name of Mix: Mix #4  Weeds Kochia 1:20 w/v, HS 0.5 ml Lamb's Quarters 1:20 w/v, HS 0.5 ml Nettle 1:20 w/v, HS 0.5 ml Plantain, English 1:20 w/v, HS 0.5 ml Ragweed Mixed 1:20 w/v ALK  0.5 ml Russian Thistle 1:20 w/v, HS 0.5 ml Sagebrush, Mugwort 1:20 w/v, HS 0.5 ml Sorrel, Sheep 1:20 w/v, HS 0.5 ml Diluent: HSA qs to 5ml                                azelastine 0.05 % Soln ophthalmic solution   Commonly known as:  OPTIVAR   Apply 1 drop to eye 2 times daily                                CERAVE Crea   Externally apply 1 dose * topically 2 times daily                                cetirizine 10 MG tablet   Commonly known as:  zyrTEC   Take 1 tablet (10 mg) by mouth At Bedtime                                EPINEPHrine 0.3 MG/0.3ML injection 2-pack   Commonly known as:  EPIPEN/ADRENACLICK/or ANY BX GENERIC EQUIV   Inject 0.3 mLs (0.3 mg) into the muscle once as needed for  anaphylaxis                                fluticasone 50 MCG/ACT spray   Commonly known as:  FLONASE   Spray 2 sprays into both nostrils daily                                fluticasone-salmeterol 500-50 MCG/DOSE diskus inhaler   Commonly known as:  ADVAIR   Inhale 1 puff into the lungs 2 times daily                                HYDROcodone-acetaminophen 5-325 MG per tablet   Commonly known as:  NORCO   Take 1-2 tablets by mouth every 4 hours as needed for other (Moderate to Severe Pain)   Last time this was given:  2 tablets on 2/14/2018 11:28 AM   Notes to Patient:  You had 2 pain pills at 11:30 am.                                loratadine 10 MG tablet   Commonly known as:  CLARITIN   Take 1 tablet (10 mg) by mouth daily                                montelukast 10 MG tablet   Commonly known as:  SINGULAIR   Take 1 tablet (10 mg) by mouth At Bedtime                                MULTIVITAMIN PO   1 tab daily                                order for DME   Equipment being ordered: Dynaflex insert                                triamcinolone 0.1 % cream   Commonly known as:  KENALOG   Apply  topically 2 times daily as needed.                                * Notice:  This list has 6 medication(s) that are the same as other medications prescribed for you. Read the directions carefully, and ask your doctor or other care provider to review them with you.

## 2018-02-14 NOTE — ANESTHESIA POSTPROCEDURE EVALUATION
Patient: Dilia Solomon    Procedure(s):  Bilateral anterior ethmoidectomy, bilateral maxillary antrostomy - Wound Class: II-Clean Contaminated    Diagnosis:chronic sinusitis  Diagnosis Additional Information: No value filed.    Anesthesia Type:  General, ETT    Note:  Anesthesia Post Evaluation    Patient location during evaluation: Bedside  Patient participation: Able to fully participate in evaluation  Level of consciousness: awake and alert  Pain management: adequate  Airway patency: patent  Cardiovascular status: acceptable  Respiratory status: acceptable  Hydration status: acceptable  PONV: none     Anesthetic complications: None          Last vitals:  Vitals:    02/14/18 0848 02/14/18 1105 02/14/18 1119   BP: 135/79 (!) 145/92 (!) 137/95   Pulse: 82 92 86   Resp: 18 12 14   Temp: 36.7  C (98.1  F) 36.6  C (97.9  F)    SpO2: 100% 98% 98%         Electronically Signed By: MONSE Montes De Oca CRNA  February 14, 2018  11:29 AM

## 2018-02-19 ENCOUNTER — TELEPHONE (OUTPATIENT)
Dept: OTOLARYNGOLOGY | Facility: CLINIC | Age: 53
End: 2018-02-19

## 2018-02-19 NOTE — TELEPHONE ENCOUNTER
Reason for Call:  Other call back    Detailed comments: Patient had sinus surgery on 02/14/2018 and is having eye pain and headaches that she can not get rid of. Please call and advise Thank you    Phone Number Patient can be reached at: Home number on file [phone] (home)    Best Time: any    Can we leave a detailed message on this number? YES    Call taken on 2/19/2018 at 8:39 AM by Rachana Beltran

## 2018-02-19 NOTE — TELEPHONE ENCOUNTER
Some eye pain or headache sensations are common after sinus surgery, especially in the first week. However, if she is having double vision or if the headaches are the worst she has experienced in her life, then I definitely need to have her come in. Otherwise, encourage ibuprofen, tylenol, fluids, and prescription pain medication as needed.    Dr. Tierney

## 2018-02-22 ENCOUNTER — TELEPHONE (OUTPATIENT)
Dept: OTOLARYNGOLOGY | Facility: CLINIC | Age: 53
End: 2018-02-22

## 2018-02-22 ENCOUNTER — ALLIED HEALTH/NURSE VISIT (OUTPATIENT)
Dept: ALLERGY | Facility: CLINIC | Age: 53
End: 2018-02-22
Payer: COMMERCIAL

## 2018-02-22 DIAGNOSIS — J30.9 ALLERGIC RHINITIS: Primary | ICD-10-CM

## 2018-02-22 PROCEDURE — 95117 IMMUNOTHERAPY INJECTIONS: CPT

## 2018-02-22 PROCEDURE — 99207 ZZC DROP WITH A PROCEDURE: CPT

## 2018-02-22 NOTE — TELEPHONE ENCOUNTER
Reason for Call:  Form, our goal is to have forms completed with 72 hours, however, some forms may require a visit or additional information.    Type of letter, form or note:  workability    Who is the form from?: Patient    Where did the form come from: Patient or family brought in       What clinic location was the form placed at?: [facility] (ENT, Neurology, Rheumatology, Surgery, Urology, Vascular Surgery)    Where the form was placed: Given to MA/RN    What number is listed as a contact on the form?: [phone]       Additional comments: please complete form and fax back to Romeo at [phone]    Call taken on 2/22/2018 at 8:37 AM by Trina Mccarty

## 2018-02-22 NOTE — MR AVS SNAPSHOT
After Visit Summary   2/22/2018    Dilia Solomon    MRN: [id number]           Patient Information     Date Of Birth          1965        Visit Information        Provider Department      2/22/2018 8:30 AM ALLERGY [facility]        Today's Diagnoses     Allergic rhinitis    -  1       Follow-ups after your visit        Your next 10 appointments already scheduled     Feb 26, 2018 12:15 PM CST   [facility]osh [facility]    [address] [address]   [phone]            Mar 07, 2018  1:40 PM CST   Return Visit with Carlos Vallejo DPM   [facility] ([facility])    [address] [facility] [address]   [phone]            Mar 20, 2018  7:00 AM CDT   Nurse Only with ALLERGY [facility] ([facility])    [address] [address]   [phone]           Every allergy patient MUST wait 30 minutes after their allergy shot. No exceptions.  Xolair shots #1-3 should plan to wait 2 hours in clinic Xolair shots after #4 should plan 30 minute wait in clinic            Jun 06, 2018  8:20 AM CDT   Return Visit with Butch Horowitz MD   [facility]    [address] [address]   [phone]              Who to contact     If you have questions or need follow up information about today's clinic visit or your schedule please contact [facility] directly at [phone].  Normal or non-critical lab and imaging results will be communicated to you by MyChart, letter or phone within 4 business days after the clinic has received the results. If you do not hear from us within 7 days, please contact the clinic through MyChart or phone. If you have a critical or abnormal lab result, we will notify you by phone as soon as  possible.  Submit refill requests through [employer] or call your pharmacy and they will forward the refill request to us. Please allow 3 business days for your refill to be completed.          Additional Information About Your Visit        Care EveryWhere ID     This is your Care EveryWhere ID. This could be used by other organizations to access your [place] medical records  [id number]        Your Vitals Were     Last Period                   07/18/2015 (Approximate)            Blood Pressure from Last 3 Encounters:   02/14/18 133/83   02/07/18 110/80   01/25/18 112/75    Weight from Last 3 Encounters:   02/14/18 212 lb (96.2 kg)   02/07/18 212 lb 6.4 oz (96.3 kg)   02/07/18 212 lb (96.2 kg)              We Performed the Following     Allergy Shot: Two or more injections        Primary Care Provider Office Phone # Fax #    Cookie Conklin [phone]       [address] [place] [address]        Equal Access to Services     GABBY CHEN : Hadii aad ku hadasho Soomaali, waaxda luqadaha, qaybta kaalmada adeegyada, waxay idiin hayaan kanwal shen . So [facility] [phone].    ATENCIÓN: Si habla español, tiene a pruitt disposición servicios gratuitos de asistencia lingüística. [facility] [phone].    We comply with applicable federal civil rights laws and [place] laws. We do not discriminate on the basis of race, color, national origin, age, disability, sex, sexual orientation, or gender identity.            Thank you!     Thank you for choosing [facility]  for your care. Our goal is always to provide you with excellent care. Hearing back from our patients is one way we can continue to improve our services. Please take a few minutes to complete the written survey that you may receive in the mail after your visit with us. Thank you!             Your Updated Medication List - Protect others around you: Learn how to safely use, store and throw away your medicines at  www.disposemymeds.org.          This list is accurate as of 2/22/18  9:22 AM.  Always use your most recent med list.                   Brand Name Dispense Instructions for use Diagnosis    * albuterol (2.5 MG/3ML) 0.083% neb solution     1 Box    Take 1 vial (2.5 mg) by nebulization every 4 hours as needed    Moderate persistent asthma, uncomplicated       * albuterol 108 (90 BASE) MCG/ACT Inhaler    PROAIR HFA/PROVENTIL HFA/VENTOLIN HFA    1 Inhaler    Inhale 2 puffs into the lungs every 4 hours as needed    Moderate persistent asthma without complication       * ALLERGEN IMMUNOTHERAPY PRESCRIPTION     5 mL    Name of Mix: Mix #1  Mold Alternaria Tenuis 1:10 w/v, HS  0.5 ml Aspergillus Fumigatus 1:10 w/v, HS  0.5 ml Epicoccum Nigrum 1:10 w/v, HS 0.5 ml Hormodendrum Cladosporioides 1:10 w/v, HS 0.5 ml Penicillium Mix 1:10 w/v, HS  0.5 ml Diluent: HSA qs to 5ml    Chronic allergic rhinitis due to animal hair and dander, Allergic rhinitis due to dust mite, Allergic rhinitis due to mold, Chronic seasonal allergic rhinitis due to pollen       * ALLERGEN IMMUNOTHERAPY PRESCRIPTION     5 mL    Name of Mix: Mix #2  Dust Mite, Cat, Dog Cat Hair, Standardized 10,000 BAU/mL, ALK  2.0 ml Dog Hair Dander, A. P.  1:100 w/v, HS  1.0 ml Dust Mites F 30,000AU/mL, HS  0.3 ml Dust Mites P. 30,000 AU/mL, HS  0.3 ml  Diluent: HSA qs to 5ml    Chronic allergic rhinitis due to animal hair and dander, Allergic rhinitis due to dust mite, Allergic rhinitis due to mold, Chronic seasonal allergic rhinitis due to pollen       * ALLERGEN IMMUNOTHERAPY PRESCRIPTION     5 mL    Name of Mix: Mix #3 Grass,Tree  Dmitry,White 1:20w/v, HS 0.5ml Birch Mix PRW 1:20w/v, HS 0.5ml Boxelder-Maple Mix BHR (Boxelder Hard Red) 1:20w/v, HS 0.5ml Seaside,Common 1:20w/v, HS 0.5ml Elm,American 1:20w/v, HS 0.5ml [place] Mix 1:20w/v, HS 0.5ml Oak Mix RVW 1:20w/v, HS 0.5ml [place] Tree,Black 1:20w/v, HS 0.5ml Grass Mix #7 100,000 BAU/mL, HS 0.4ml Jonathan Grass  1:20w/v, HS 0.5ml Diluent: HSA qs to 5ml    Chronic allergic rhinitis due to animal hair and dander, Allergic rhinitis due to dust mite, Allergic rhinitis due to mold, Chronic seasonal allergic rhinitis due to pollen       * ALLERGEN IMMUNOTHERAPY PRESCRIPTION     5 mL    Name of Mix: Mix #4  Weeds Kochia 1:20 w/v, HS 0.5 ml Lamb's Quarters 1:20 w/v, HS 0.5 ml Nettle 1:20 w/v, HS 0.5 ml Plantain, English 1:20 w/v, HS 0.5 ml Ragweed Mixed 1:20 w/v ALK  0.5 ml Russian Thistle 1:20 w/v, HS 0.5 ml Sagebrush, Mugwort 1:20 w/v, HS 0.5 ml Sorrel, Sheep 1:20 w/v, HS 0.5 ml Diluent: HSA qs to 5ml    Chronic allergic rhinitis due to animal hair and dander, Allergic rhinitis due to dust mite, Allergic rhinitis due to mold, Chronic seasonal allergic rhinitis due to pollen       azelastine 0.05 % Soln ophthalmic solution    OPTIVAR    1 Bottle    Apply 1 drop to eye 2 times daily    Conjunctivitis, allergic, unspecified laterality       CERAVE Crea     2 Bottle    Externally apply 1 dose * topically 2 times daily    Eczema, unspecified type       cetirizine 10 MG tablet    zyrTEC    30 tablet    Take 1 tablet (10 mg) by mouth At Bedtime    Allergic conjunctivitis of both eyes, Chronic allergic rhinitis due to animal hair and dander, Allergic rhinitis due to dust mite, Allergic rhinitis due to mold, Chronic seasonal allergic rhinitis due to pollen       EPINEPHrine 0.3 MG/0.3ML injection 2-pack    EPIPEN/ADRENACLICK/or ANY BX GENERIC EQUIV    0.6 mL    Inject 0.3 mLs (0.3 mg) into the muscle once as needed for anaphylaxis    Food allergy, Need for desensitization to allergens       fluticasone 50 MCG/ACT spray    FLONASE    1 Bottle    Spray 2 sprays into both nostrils daily    Chronic allergic rhinitis due to animal hair and dander, Allergic rhinitis due to dust mite, Allergic rhinitis due to mold, Chronic seasonal allergic rhinitis due to pollen       fluticasone-salmeterol 500-50 MCG/DOSE diskus inhaler    ADVAIR    3 Inhaler     Inhale 1 puff into the lungs 2 times daily    Moderate persistent asthma without complication       HYDROcodone-acetaminophen 5-325 MG per tablet    NORCO    10 tablet    Take 1-2 tablets by mouth every 4 hours as needed for other (Moderate to Severe Pain)    Other chronic sinusitis       loratadine 10 MG tablet    CLARITIN    30 tablet    Take 1 tablet (10 mg) by mouth daily    Allergic conjunctivitis of both eyes, Chronic allergic rhinitis due to animal hair and dander, Allergic rhinitis due to dust mite, Allergic rhinitis due to mold, Chronic seasonal allergic rhinitis due to pollen       montelukast 10 MG tablet    SINGULAIR    30 tablet    Take 1 tablet (10 mg) by mouth At Bedtime    Moderate persistent asthma without complication, Chronic allergic rhinitis due to animal hair and dander, Allergic rhinitis due to dust mite, Allergic rhinitis due to mold, Chronic seasonal allergic rhinitis due to pollen       MULTIVITAMIN PO      1 tab daily        order for DME     1 Units    Equipment being ordered: Dynaflex insert    Closed displaced fracture of proximal phalanx of right great toe, initial encounter       triamcinolone 0.1 % cream    KENALOG    80 g    Apply  topically 2 times daily as needed.    Eczema, unspecified type       * Notice:  This list has 6 medication(s) that are the same as other medications prescribed for you. Read the directions carefully, and ask your doctor or other care provider to review them with you.

## 2018-02-26 ENCOUNTER — OFFICE VISIT (OUTPATIENT)
Dept: OTOLARYNGOLOGY | Facility: CLINIC | Age: 53
End: 2018-02-26
Payer: COMMERCIAL

## 2018-02-26 VITALS
WEIGHT: 212 LBS | RESPIRATION RATE: 14 BRPM | DIASTOLIC BLOOD PRESSURE: 88 MMHG | BODY MASS INDEX: 36.19 KG/M2 | TEMPERATURE: 97.9 F | SYSTOLIC BLOOD PRESSURE: 146 MMHG | HEART RATE: 91 BPM | HEIGHT: 64 IN | OXYGEN SATURATION: 96 %

## 2018-02-26 DIAGNOSIS — J32.0 CHRONIC MAXILLARY SINUSITIS: Primary | ICD-10-CM

## 2018-02-26 PROCEDURE — 99214 OFFICE O/P EST MOD 30 MIN: CPT | Mod: 24 | Performed by: OTOLARYNGOLOGY

## 2018-02-26 PROCEDURE — 31231 NASAL ENDOSCOPY DX: CPT | Mod: 58 | Performed by: OTOLARYNGOLOGY

## 2018-02-26 NOTE — NURSING NOTE
"Chief Complaint   Patient presents with     Surgical Followup     Ethmoidectomy DOS 2/14/18       Initial /88 (BP Location: Left arm, Patient Position: Chair, Cuff Size: Adult Large)  Pulse 91  Temp 97.9  F (36.6  C) (Oral)  Resp 14  Ht 1.626 m (5' 4\")  Wt 96.2 kg (212 lb)  LMP 07/18/2015 (Approximate)  SpO2 96%  BMI 36.39 kg/m2 Estimated body mass index is 36.39 kg/(m^2) as calculated from the following:    Height as of this encounter: 1.626 m (5' 4\").    Weight as of this encounter: 96.2 kg (212 lb).  Medication Reconciliation: complete     Josefa Sellers MA      "

## 2018-02-26 NOTE — NURSING NOTE
This laryngoscopy scope was  used on this patient.    Rigid    Scope Number: Cole Storz Rigid    # [id number]   Pediatric/Adult/Post-op sinus

## 2018-02-26 NOTE — PROGRESS NOTES
"Post Op Sinus 1    HPI - Dilia Solomon is here for their second postoperative visit, status post endoscopic sinus surgery (with turbinate reduction) performed on 2/14/2018.  There was the expected amount of congestion which has now mostly resolved, and no bleeding has occurred.      She had bilateral midfacial pain and nasal bridge pain. She feels congested since a few days after surgery. She is irrigating TID. She is not using nasal steroids.    PROCEDURES PERFORMED:   1. Bilateral endoscopic maxillary antrostomy  2. Bilateral endoscopic anterior ethmoidectomy.   4. Bilateral endoscopic submucous resection of inferior turbinates    Physical Exam -   /88 (BP Location: Left arm, Patient Position: Chair, Cuff Size: Adult Large)  Pulse 91  Temp 97.9  F (36.6  C) (Oral)  Resp 14  Ht 1.626 m (5' 4\")  Wt 96.2 kg (212 lb)  LMP 07/18/2015 (Approximate)  SpO2 96%  BMI 36.39 kg/m2    General - The patient is awake and alert, and answers questions appropriately during the history and physical.  The vocal quality is hypernasal, but there is no dyspnea or stridor noted.  Eyes - The EOMI, there is no conjuncitval or scleral injection.  Pupils are equally round and reactive to light.  Oral - The oral mucosa is pink and moist.  The tongue is mobile and midline on protrusion, no edema noted.  Nasal - The nasal examination was done with a rigid nasal endoscope today for the purposes of bilateral endoscopically assisted debridement of the sinuses.  I began by spraying both sides with lidocaine and neosynephrine.    I began on the left side.  The middle meatus had thick mucus, which was suctioned away and the ethmoid cavity was found to be inflamed but no polyps.    The roof was then visualized and is healing well.  I turned my attention to the right side.  Once again there was thick mucus in the middle meatus that was suctioned away.  I was then able to pass the scope into the right middle meatus.  The maxillary " sinusotomy could not be clearly seen due to inflammation.  Ethmoid cells were open but inflamed.    A/P - Dilia M Loreleibecky is status post endoscopic nasal surgery. She is congested today, and looks like she has a viral sinusitis right now. I asked her to restart nasal steroids, and return in 3 weeks.

## 2018-02-26 NOTE — MR AVS SNAPSHOT
After Visit Summary   2/26/2018    Dilia Solomon    MRN: [id number]           Patient Information     Date Of Birth          1965        Visit Information        Provider Department      2/26/2018 12:15 PM Santhosh Tierney MD [facility]        Today's Diagnoses     Chronic maxillary sinusitis    -  1      Care Instructions    If you have questions or concerns on any instructions given to you by your provider today, you can reach us at [phone] or if you need to schedule an appointment.                        Follow-ups after your visit        Follow-up notes from your care team     Return in about 4 weeks (around 3/26/2018).      Your next 10 appointments already scheduled     Mar 07, 2018  1:40 PM CST   Return Visit with Carlos Vallejo DPM   [facility] ([facility]    [address] [place] [id number]   [phone]            Mar 19, 2018 12:45 PM CDT   Return Visit with Santhosh Tierney MD   [facility] ([facility])    [address] [facility] [id number]   [phone]            Mar 20, 2018  7:00 AM CDT   Nurse Only with ALLERGY MA - [facility] ([facility])    [address] [facility] [id number]   [phone]           Every allergy patient MUST wait 30 minutes after their allergy shot. No exceptions.  Xolair shots #1-3 should plan to wait 2 hours in clinic Xolair shots after #4 should plan 30 minute wait in clinic            Jun 06, 2018  8:20 AM CDT   Return Visit with Butch Horowitz MD   [facility] ([facility])    [address] [facility] [phone]   [phone]              Who to contact     If you have questions or need follow up information about today's clinic visit or your schedule please contact [facility] directly at [phone].  Normal or non-critical lab and imaging results will be  "communicated to you by MyChart, letter or phone within 4 business days after the clinic has received the results. If you do not hear from us within 7 days, please contact the clinic through MyChart or phone. If you have a critical or abnormal lab result, we will notify you by phone as soon as possible.  Submit refill requests through [facility] or call your pharmacy and they will forward the refill request to us. Please allow 3 business days for your refill to be completed.          Additional Information About Your Visit        Care EveryWhere ID     This is your Care EveryWhere ID. This could be used by other organizations to access your [facility] medical records  [id number]        Your Vitals Were     Pulse Temperature Respirations Height Last Period Pulse Oximetry    91 97.9  F (36.6  C) (Oral) 14 1.626 m (5' 4\") 07/18/2015 (Approximate) 96%    BMI (Body Mass Index)                   36.39 kg/m2            Blood Pressure from Last 3 Encounters:   02/26/18 146/88   02/14/18 133/83   02/07/18 110/80    Weight from Last 3 Encounters:   02/26/18 96.2 kg (212 lb)   02/14/18 96.2 kg (212 lb)   02/07/18 96.3 kg (212 lb 6.4 oz)              We Performed the Following     NASAL ENDOSCOPY, DIAGNOSTIC        Primary Care Provider Office Phone # Fax #    Cookie Conklin [phone]       [address] [place] [address]        Equal Access to Services     [facility]: Hadii aad ku hadasho Soomaali, waaxda luqadaha, qaybta kaalmada adeegyada, hieu howard. So [facility] [phone].    ATENCIÓN: Si habla español, tiene a pruitt disposición servicios gratuitos de asistencia lingüística. Llame al [phone].    We comply with applicable federal civil rights laws and [place] laws. We do not discriminate on the basis of race, color, national origin, age, disability, sex, sexual orientation, or gender identity.            Thank you!     Thank you for choosing [facility]" for your care. Our goal is always to provide you with excellent care. Hearing back from our patients is one way we can continue to improve our services. Please take a few minutes to complete the written survey that you may receive in the mail after your visit with us. Thank you!             Your Updated Medication List - Protect others around you: Learn how to safely use, store and throw away your medicines at www.disposemymeds.org.          This list is accurate as of 2/26/18  1:41 PM.  Always use your most recent med list.                   Brand Name Dispense Instructions for use Diagnosis    * albuterol (2.5 MG/3ML) 0.083% neb solution     1 Box    Take 1 vial (2.5 mg) by nebulization every 4 hours as needed    Moderate persistent asthma, uncomplicated       * albuterol 108 (90 BASE) MCG/ACT Inhaler    PROAIR HFA/PROVENTIL HFA/VENTOLIN HFA    1 Inhaler    Inhale 2 puffs into the lungs every 4 hours as needed    Moderate persistent asthma without complication       * ALLERGEN IMMUNOTHERAPY PRESCRIPTION     5 mL    Name of Mix: Mix #1  Mold Alternaria Tenuis 1:10 w/v, HS  0.5 ml Aspergillus Fumigatus 1:10 w/v, HS  0.5 ml Epicoccum Nigrum 1:10 w/v, HS 0.5 ml Hormodendrum Cladosporioides 1:10 w/v, HS 0.5 ml Penicillium Mix 1:10 w/v, HS  0.5 ml Diluent: HSA qs to 5ml    Chronic allergic rhinitis due to animal hair and dander, Allergic rhinitis due to dust mite, Allergic rhinitis due to mold, Chronic seasonal allergic rhinitis due to pollen       * ALLERGEN IMMUNOTHERAPY PRESCRIPTION     5 mL    Name of Mix: Mix #2  Dust Mite, Cat, Dog Cat Hair, Standardized 10,000 BAU/mL, ALK  2.0 ml Dog Hair Dander, A. P.  1:100 w/v, HS  1.0 ml Dust Mites F 30,000AU/mL, HS  0.3 ml Dust Mites P. 30,000 AU/mL, HS  0.3 ml  Diluent: HSA qs to 5ml    Chronic allergic rhinitis due to animal hair and dander, Allergic rhinitis due to dust mite, Allergic rhinitis due to mold, Chronic seasonal allergic rhinitis due to pollen       * ALLERGEN  IMMUNOTHERAPY PRESCRIPTION     5 mL    Name of Mix: Mix #3 Grass,Tree  Dmitry,White 1:20w/v, HS 0.5ml Birch Mix PRW 1:20w/v, HS 0.5ml Boxelder-Maple Mix BHR (Boxelder Hard Red) 1:20w/v, HS 0.5ml Haywood,Common 1:20w/v, HS 0.5ml Elm,American 1:20w/v, HS 0.5ml [place]bury Mix 1:20w/v, HS 0.5ml Oak Mix RVW 1:20w/v, HS 0.5ml [place] Tree,Black 1:20w/v, HS 0.5ml Grass Mix #7 100,000 BAU/mL, HS 0.4ml Jonathan Grass 1:20w/v, HS 0.5ml Diluent: HSA qs to 5ml    Chronic allergic rhinitis due to animal hair and dander, Allergic rhinitis due to dust mite, Allergic rhinitis due to mold, Chronic seasonal allergic rhinitis due to pollen       * ALLERGEN IMMUNOTHERAPY PRESCRIPTION     5 mL    Name of Mix: Mix #4  Weeds Kochia 1:20 w/v, HS 0.5 ml Lamb's Quarters 1:20 w/v, HS 0.5 ml Nettle 1:20 w/v, HS 0.5 ml Plantain, [place] 1:20 w/v, HS 0.5 ml Ragweed Mixed 1:20 w/v ALK  0.5 ml Russian Thistle 1:20 w/v, HS 0.5 ml Sagebrush, Mugwort 1:20 w/v, HS 0.5 ml Sorrel, Sheep 1:20 w/v, HS 0.5 ml Diluent: HSA qs to 5ml    Chronic allergic rhinitis due to animal hair and dander, Allergic rhinitis due to dust mite, Allergic rhinitis due to mold, Chronic seasonal allergic rhinitis due to pollen       azelastine 0.05 % Soln ophthalmic solution    OPTIVAR    1 Bottle    Apply 1 drop to eye 2 times daily    Conjunctivitis, allergic, unspecified laterality       CERAVE Crea     2 Bottle    Externally apply 1 dose * topically 2 times daily    Eczema, unspecified type       cetirizine 10 MG tablet    zyrTEC    30 tablet    Take 1 tablet (10 mg) by mouth At Bedtime    Allergic conjunctivitis of both eyes, Chronic allergic rhinitis due to animal hair and dander, Allergic rhinitis due to dust mite, Allergic rhinitis due to mold, Chronic seasonal allergic rhinitis due to pollen       EPINEPHrine 0.3 MG/0.3ML injection 2-pack    EPIPEN/ADRENACLICK/or ANY BX GENERIC EQUIV    0.6 mL    Inject 0.3 mLs (0.3 mg) into the muscle once as needed for anaphylaxis    Food  allergy, Need for desensitization to allergens       fluticasone 50 MCG/ACT spray    FLONASE    1 Bottle    Spray 2 sprays into both nostrils daily    Chronic allergic rhinitis due to animal hair and dander, Allergic rhinitis due to dust mite, Allergic rhinitis due to mold, Chronic seasonal allergic rhinitis due to pollen       fluticasone-salmeterol 500-50 MCG/DOSE diskus inhaler    ADVAIR    3 Inhaler    Inhale 1 puff into the lungs 2 times daily    Moderate persistent asthma without complication       loratadine 10 MG tablet    CLARITIN    30 tablet    Take 1 tablet (10 mg) by mouth daily    Allergic conjunctivitis of both eyes, Chronic allergic rhinitis due to animal hair and dander, Allergic rhinitis due to dust mite, Allergic rhinitis due to mold, Chronic seasonal allergic rhinitis due to pollen       montelukast 10 MG tablet    SINGULAIR    30 tablet    Take 1 tablet (10 mg) by mouth At Bedtime    Moderate persistent asthma without complication, Chronic allergic rhinitis due to animal hair and dander, Allergic rhinitis due to dust mite, Allergic rhinitis due to mold, Chronic seasonal allergic rhinitis due to pollen       MULTIVITAMIN PO      1 tab daily        order for DME     1 Units    Equipment being ordered: Dynaflex insert    Closed displaced fracture of proximal phalanx of right great toe, initial encounter       triamcinolone 0.1 % cream    KENALOG    80 g    Apply  topically 2 times daily as needed.    Eczema, unspecified type       * Notice:  This list has 6 medication(s) that are the same as other medications prescribed for you. Read the directions carefully, and ask your doctor or other care provider to review them with you.

## 2018-02-26 NOTE — LETTER
"    2/26/2018         RE: Dilia Solomon  [address] [facility] [address]        Dear Colleague,    Thank you for referring your patient, Dilia Solomon, to the [facility]. Please see a copy of my visit note below.    Post Op Sinus 1    HPI - Dilia Solomon is here for their second postoperative visit, status post endoscopic sinus surgery (with turbinate reduction) performed on 2/14/2018.  There was the expected amount of congestion which has now mostly resolved, and no bleeding has occurred.      She had bilateral midfacial pain and nasal bridge pain. She feels congested since a few days after surgery. She is irrigating TID. She is not using nasal steroids.    PROCEDURES PERFORMED:   1. Bilateral endoscopic maxillary antrostomy  2. Bilateral endoscopic anterior ethmoidectomy.   4. Bilateral endoscopic submucous resection of inferior turbinates    Physical Exam -   /88 (BP Location: Left arm, Patient Position: Chair, Cuff Size: Adult Large)  Pulse 91  Temp 97.9  F (36.6  C) (Oral)  Resp 14  Ht 1.626 m (5' 4\")  Wt 96.2 kg (212 lb)  LMP 07/18/2015 (Approximate)  SpO2 96%  BMI 36.39 kg/m2    General - The patient is awake and alert, and answers questions appropriately during the history and physical.  The vocal quality is hypernasal, but there is no dyspnea or stridor noted.  Eyes - The EOMI, there is no conjuncitval or scleral injection.  Pupils are equally round and reactive to light.  Oral - The oral mucosa is pink and moist.  The tongue is mobile and midline on protrusion, no edema noted.  Nasal - The nasal examination was done with a rigid nasal endoscope today for the purposes of bilateral endoscopically assisted debridement of the sinuses.  I began by spraying both sides with lidocaine and neosynephrine.    I began on the left side.  The middle meatus had thick mucus, which was suctioned away and the ethmoid cavity was found to be inflamed but no polyps.    The roof was then " visualized and is healing well.  I turned my attention to the right side.  Once again there was thick mucus in the middle meatus that was suctioned away.  I was then able to pass the scope into the right middle meatus.  The maxillary sinusotomy could not be clearly seen due to inflammation.  Ethmoid cells were open but inflamed.    A/P - Dilia Solomon is status post endoscopic nasal surgery. She is congested today, and looks like she has a viral sinusitis right now. I asked her to restart nasal steroids, and return in 3 weeks.    Again, thank you for allowing me to participate in the care of your patient.        Sincerely,        Santhosh Tierney MD

## 2018-03-07 ENCOUNTER — OFFICE VISIT (OUTPATIENT)
Dept: PODIATRY | Facility: CLINIC | Age: 53
End: 2018-03-07
Payer: OTHER MISCELLANEOUS

## 2018-03-07 ENCOUNTER — RADIANT APPOINTMENT (OUTPATIENT)
Dept: GENERAL RADIOLOGY | Facility: CLINIC | Age: 53
End: 2018-03-07
Attending: PODIATRIST
Payer: COMMERCIAL

## 2018-03-07 VITALS — WEIGHT: 212 LBS | HEART RATE: 80 BPM | BODY MASS INDEX: 36.19 KG/M2 | HEIGHT: 64 IN

## 2018-03-07 DIAGNOSIS — S92.411D CLOSED DISPLACED FRACTURE OF PROXIMAL PHALANX OF RIGHT GREAT TOE WITH ROUTINE HEALING, SUBSEQUENT ENCOUNTER: Primary | ICD-10-CM

## 2018-03-07 DIAGNOSIS — S92.411D CLOSED DISPLACED FRACTURE OF PROXIMAL PHALANX OF RIGHT GREAT TOE WITH ROUTINE HEALING, SUBSEQUENT ENCOUNTER: ICD-10-CM

## 2018-03-07 PROCEDURE — 73660 X-RAY EXAM OF TOE(S): CPT | Mod: RT

## 2018-03-07 PROCEDURE — 99213 OFFICE O/P EST LOW 20 MIN: CPT | Performed by: PODIATRIST

## 2018-03-07 NOTE — LETTER
3/7/2018         RE: Dilia Solomon  [address] [facility] [address]        Dear Colleague,    Thank you for referring your patient, Dilia Solomon, to the [facility]. Please see a copy of my visit note below.    Dilia returns to the office for reevaluation of the right foot.  The patient relates following the instructions given at the last visit with noted less pain.  The patient relates overall more  improvement in pain and function of the right foot.  The patient relates no other problems.    PAST MEDICAL HISTORY:   Past Medical History:   Diagnosis Date     Injury, other and unspecified, shoulder and upper arm 9/03       BMI= Body mass index is 36.39 kg/(m^2).    Weight management plan: Patient was referred to their PCP to discuss a diet and exercise plan.    Physical Exam:    General: The patient appears to have a pleasant mental affect.    Lower extremity physical exam:  Neurovascular status is intact with palpable pedal pulses and intact epicritic sensations.  Muscular exam is within normal limits to major muscle groups.  Integument is intact.      One notes decreased edema.  One notes     Radiograph evaluation including weightbearing AP, lateral and medial oblique views of the right foot reveals interval healing with increased trabeculation of the great toe fracture of the proximal phalanx.    Assessment:      ICD-10-CM    1. Closed displaced fracture of proximal phalanx of right great toe with routine healing, subsequent encounter S92.411D XR Toe Right G/E 2 Views       Plan:  I have explained to Dilia about the conditions.  At this time, the patient may continue with normal activity in supportive shoes.    Disclaimer: This note consists of symbols derived from keyboarding, dictation and/or voice recognition software. As a result, there may be errors in the script that have gone undetected. Please consider this when interpreting information found in this chart.       CAROLE Lyman  JAYDEN Vallejo., F.A.C.F.A.S.      Again, thank you for allowing me to participate in the care of your patient.        Sincerely,        Carlos Vallejo DPM

## 2018-03-07 NOTE — NURSING NOTE
"Chief Complaint   Patient presents with     RECHECK     Right great toe fracture recheck, xrays done today       Initial Pulse 80  Ht 5' 4\" (1.626 m)  Wt 212 lb (96.2 kg)  LMP 07/18/2015 (Approximate)  BMI 36.39 kg/m2 Estimated body mass index is 36.39 kg/(m^2) as calculated from the following:    Height as of this encounter: 5' 4\" (1.626 m).    Weight as of this encounter: 212 lb (96.2 kg).  Medication Reconciliation: complete     Bella Altamirano MA        "

## 2018-03-07 NOTE — MR AVS SNAPSHOT
After Visit Summary   3/7/2018    Dilia Solomon    MRN: [id number]           Patient Information     Date Of Birth          1965        Visit Information        Provider Department      3/7/2018 1:40 PM Carlos Vallejo DPM [facility]        Today's Diagnoses     Closed displaced fracture of proximal phalanx of right great toe with routine healing, subsequent encounter    -  1       Follow-ups after your visit        Follow-up notes from your care team     Return if symptoms worsen or fail to improve.      Your next 10 appointments already scheduled     Mar 19, 2018 12:45 PM CDT   [facility] Santhosh Tierney MD   [facility]    [address] [address]   [phone]            Mar 20, 2018  7:00 AM CDT   Nurse Only with ALLERGY [facility] ([facility])    [address] [address]   [phone]           Every allergy patient MUST wait 30 minutes after their allergy shot. No exceptions.  Xolair shots #1-3 should plan to wait 2 hours in clinic Xolair shots after #4 should plan 30 minute wait in clinic            Jun 06, 2018  8:20 AM CDT   Return Visit with Butch Horowitz MD   [facility]    [address] [address]   [phone]              Who to contact     If you have questions or need follow up information about today's clinic visit or your schedule please contact [facility] directly at [phone].  Normal or non-critical lab and imaging results will be communicated to you by MyChart, letter or phone within 4 business days after the clinic has received the results. If you do not hear from us within 7 days, please contact the clinic through MyChart or phone. If you have a critical or abnormal lab result, we will notify you by phone as soon as possible.  Submit  "refill requests through [employer] or call your pharmacy and they will forward the refill request to us. Please allow 3 business days for your refill to be completed.          Additional Information About Your Visit        [employer] Information     [employer] lets you send messages to your doctor, view your test results, renew your prescriptions, schedule appointments and more. To sign up, go to [email or web address] . Click on \"Log in\" on the left side of the screen, which will take you to the Welcome page. Then click on \"Sign up Now\" on the right side of the page.     You will be asked to enter the access code listed below, as well as some personal information. Please follow the directions to create your username and password.     Your access code is: [email or web address]  Expires: 2018  9:25 AM     Your access code will  in 90 days. If you need help or a new code, please call your [place] clinic or [phone].        Care EveryWhere ID     This is your Care EveryWhere ID. This could be used by other organizations to access your [place] medical records  [id number]        Your Vitals Were     Pulse Height Last Period BMI (Body Mass Index)          80 5' 4\" (1.626 m) 2015 (Approximate) 36.39 kg/m2         Blood Pressure from Last 3 Encounters:   18 146/88   18 133/83   18 110/80    Weight from Last 3 Encounters:   18 212 lb (96.2 kg)   18 212 lb (96.2 kg)   18 212 lb (96.2 kg)               Primary Care Provider Office Phone # Fax #    Cookie Conlkin [phone]       [facility] [address] [place] [address]        Equal Access to Services     KODY CHEN : Marcel Anguiano, sruthi mayberry, qajohnnie kaalmada anna, hieu howard. So [facility] [phone].    ATENCIÓN: Si habla español, tiene a pruitt disposición servicios gratuitos de asistencia lingüística. Llmart al [phone].    We comply with applicable federal civil " rights laws and [place] laws. We do not discriminate on the basis of race, color, national origin, age, disability, sex, sexual orientation, or gender identity.            Thank you!     Thank you for choosing [facility]  for your care. Our goal is always to provide you with excellent care. Hearing back from our patients is one way we can continue to improve our services. Please take a few minutes to complete the written survey that you may receive in the mail after your visit with us. Thank you!             Your Updated Medication List - Protect others around you: Learn how to safely use, store and throw away your medicines at www.disposemymeds.org.          This list is accurate as of 3/7/18 11:59 PM.  Always use your most recent med list.                   Brand Name Dispense Instructions for use Diagnosis    * albuterol (2.5 MG/3ML) 0.083% neb solution     1 Box    Take 1 vial (2.5 mg) by nebulization every 4 hours as needed    Moderate persistent asthma, uncomplicated       * albuterol 108 (90 BASE) MCG/ACT Inhaler    PROAIR HFA/PROVENTIL HFA/VENTOLIN HFA    1 Inhaler    Inhale 2 puffs into the lungs every 4 hours as needed    Moderate persistent asthma without complication       * ALLERGEN IMMUNOTHERAPY PRESCRIPTION     5 mL    Name of Mix: Mix #1  Mold Alternaria Tenuis 1:10 w/v, HS  0.5 ml Aspergillus Fumigatus 1:10 w/v, HS  0.5 ml Epicoccum Nigrum 1:10 w/v, HS 0.5 ml Hormodendrum Cladosporioides 1:10 w/v, HS 0.5 ml Penicillium Mix 1:10 w/v, HS  0.5 ml Diluent: HSA qs to 5ml    Chronic allergic rhinitis due to animal hair and dander, Allergic rhinitis due to dust mite, Allergic rhinitis due to mold, Chronic seasonal allergic rhinitis due to pollen       * ALLERGEN IMMUNOTHERAPY PRESCRIPTION     5 mL    Name of Mix: Mix #2  Dust Mite, Cat, Dog Cat Hair, Standardized 10,000 BAU/mL, ALK  2.0 ml Dog Hair Dander, A. P.  1:100 w/v, HS  1.0 ml Dust Mites F 30,000AU/mL, HS  0.3 ml Dust Mites P.  30,000 AU/mL, HS  0.3 ml  Diluent: HSA qs to 5ml    Chronic allergic rhinitis due to animal hair and dander, Allergic rhinitis due to dust mite, Allergic rhinitis due to mold, Chronic seasonal allergic rhinitis due to pollen       * ALLERGEN IMMUNOTHERAPY PRESCRIPTION     5 mL    Name of Mix: Mix #3 Grass,Tree  Dmitry,White 1:20w/v, HS 0.5ml Birch Mix PRW 1:20w/v, HS 0.5ml Boxelder-Maple Mix BHR (Boxelder Hard Red) 1:20w/v, HS 0.5ml Manvel,Common 1:20w/v, HS 0.5ml Elm,American 1:20w/v, HS 0.5ml Flatwoods Mix 1:20w/v, HS 0.5ml Oak Mix RVW 1:20w/v, HS 0.5ml [place] Tree,Black 1:20w/v, HS 0.5ml Grass Mix #7 100,000 BAU/mL, HS 0.4ml Jonathan Grass 1:20w/v, HS 0.5ml Diluent: HSA qs to 5ml    Chronic allergic rhinitis due to animal hair and dander, Allergic rhinitis due to dust mite, Allergic rhinitis due to mold, Chronic seasonal allergic rhinitis due to pollen       * ALLERGEN IMMUNOTHERAPY PRESCRIPTION     5 mL    Name of Mix: Mix #4  Weeds Kochia 1:20 w/v, HS 0.5 ml Lamb's Quarters 1:20 w/v, HS 0.5 ml Nettle 1:20 w/v, HS 0.5 ml Plantain, English 1:20 w/v, HS 0.5 ml Ragweed Mixed 1:20 w/v ALK  0.5 ml Russian Thistle 1:20 w/v, HS 0.5 ml Sagebrush, Mugwort 1:20 w/v, HS 0.5 ml Sorrel, Sheep 1:20 w/v, HS 0.5 ml Diluent: HSA qs to 5ml    Chronic allergic rhinitis due to animal hair and dander, Allergic rhinitis due to dust mite, Allergic rhinitis due to mold, Chronic seasonal allergic rhinitis due to pollen       azelastine 0.05 % Soln ophthalmic solution    OPTIVAR    1 Bottle    Apply 1 drop to eye 2 times daily    Conjunctivitis, allergic, unspecified laterality       CERAVE Crea     2 Bottle    Externally apply 1 dose * topically 2 times daily    Eczema, unspecified type       cetirizine 10 MG tablet    zyrTEC    30 tablet    Take 1 tablet (10 mg) by mouth At Bedtime    Allergic conjunctivitis of both eyes, Chronic allergic rhinitis due to animal hair and dander, Allergic rhinitis due to dust mite, Allergic rhinitis due  to mold, Chronic seasonal allergic rhinitis due to pollen       EPINEPHrine 0.3 MG/0.3ML injection 2-pack    EPIPEN/ADRENACLICK/or ANY BX GENERIC EQUIV    0.6 mL    Inject 0.3 mLs (0.3 mg) into the muscle once as needed for anaphylaxis    Food allergy, Need for desensitization to allergens       fluticasone 50 MCG/ACT spray    FLONASE    1 Bottle    Spray 2 sprays into both nostrils daily    Chronic allergic rhinitis due to animal hair and dander, Allergic rhinitis due to dust mite, Allergic rhinitis due to mold, Chronic seasonal allergic rhinitis due to pollen       fluticasone-salmeterol 500-50 MCG/DOSE diskus inhaler    ADVAIR    3 Inhaler    Inhale 1 puff into the lungs 2 times daily    Moderate persistent asthma without complication       loratadine 10 MG tablet    CLARITIN    30 tablet    Take 1 tablet (10 mg) by mouth daily    Allergic conjunctivitis of both eyes, Chronic allergic rhinitis due to animal hair and dander, Allergic rhinitis due to dust mite, Allergic rhinitis due to mold, Chronic seasonal allergic rhinitis due to pollen       montelukast 10 MG tablet    SINGULAIR    30 tablet    Take 1 tablet (10 mg) by mouth At Bedtime    Moderate persistent asthma without complication, Chronic allergic rhinitis due to animal hair and dander, Allergic rhinitis due to dust mite, Allergic rhinitis due to mold, Chronic seasonal allergic rhinitis due to pollen       MULTIVITAMIN PO      1 tab daily        order for DME     1 Units    Equipment being ordered: Dynaflex insert    Closed displaced fracture of proximal phalanx of right great toe, initial encounter       triamcinolone 0.1 % cream    KENALOG    80 g    Apply  topically 2 times daily as needed.    Eczema, unspecified type       * Notice:  This list has 6 medication(s) that are the same as other medications prescribed for you. Read the directions carefully, and ask your doctor or other care provider to review them with you.

## 2018-03-19 ENCOUNTER — OFFICE VISIT (OUTPATIENT)
Dept: OTOLARYNGOLOGY | Facility: CLINIC | Age: 53
End: 2018-03-19
Payer: COMMERCIAL

## 2018-03-19 VITALS
RESPIRATION RATE: 14 BRPM | WEIGHT: 212 LBS | HEIGHT: 64 IN | HEART RATE: 84 BPM | SYSTOLIC BLOOD PRESSURE: 128 MMHG | DIASTOLIC BLOOD PRESSURE: 71 MMHG | BODY MASS INDEX: 36.19 KG/M2

## 2018-03-19 DIAGNOSIS — J32.0 CHRONIC MAXILLARY SINUSITIS: Primary | ICD-10-CM

## 2018-03-19 PROCEDURE — 31231 NASAL ENDOSCOPY DX: CPT | Mod: 58 | Performed by: OTOLARYNGOLOGY

## 2018-03-19 PROCEDURE — 99214 OFFICE O/P EST MOD 30 MIN: CPT | Mod: 24 | Performed by: OTOLARYNGOLOGY

## 2018-03-19 NOTE — PROGRESS NOTES
"Post Op Sinus 2    HPI - Dilia Solomon is here for their second postoperative visit, status post endoscopic sinus surgery (with turbinate reduction) performed on 2/14/2018.  There was the expected amount of congestion which has now mostly resolved, and no bleeding has occurred.      She continues to report some left midfacial pain and congestion. Overall this is improving and her nasal breathing is much better than preop. She is irrigating BID and using Flonase.    PROCEDURES PERFORMED:   1. Bilateral endoscopic maxillary antrostomy  2. Bilateral endoscopic anterior ethmoidectomy.   4. Bilateral endoscopic submucous resection of inferior turbinates    Physical Exam -   /71 (BP Location: Right arm, Patient Position: Chair, Cuff Size: Adult Regular)  Pulse 84  Resp 14  Ht 1.626 m (5' 4\")  Wt 96.2 kg (212 lb)  LMP 07/18/2015 (Approximate)  BMI 36.39 kg/m2    General - The patient is awake and alert, and answers questions appropriately during the history and physical.  The vocal quality is hypernasal, but there is no dyspnea or stridor noted.  Eyes - The EOMI, there is no conjuncitval or scleral injection.  Pupils are equally round and reactive to light.  Oral - The oral mucosa is pink and moist.  The tongue is mobile and midline on protrusion, no edema noted.  Nasal - The nasal examination was done with a rigid nasal endoscope today for the purposes of bilateral endoscopically assisted debridement of the sinuses.  I began by spraying both sides with lidocaine and neosynephrine.    I began on the left side.  The middle meatus had thick mucus, which was suctioned away and the ethmoid cavity was found to be inflamed but no polyps. The area of the ethmoid bulla seems most affected.  The roof was then visualized and is healing well.  I turned my attention to the right side.  The middle meatus was open. The ethmoid cells were open and without significant inflammation. The maxillary sinus is widely patent.    A/P " - Dilia Solomon is status post endoscopic nasal surgery. The left side remains more congested than the right and there is more evidence of mucosal inflammation on the left. She is breathing much better. I recommended she continue irrigation BID and continue Flonase. Return in 3months.

## 2018-03-19 NOTE — LETTER
"    3/19/2018         RE: Dilia Solomon  [address] [facility] [address]        Dear Colleague,    Thank you for referring your patient, Dilia Solomon, to the [facility]. Please see a copy of my visit note below.    Post Op Sinus 2    HPI - Dilia Solomon is here for their second postoperative visit, status post endoscopic sinus surgery (with turbinate reduction) performed on 2/14/2018.  There was the expected amount of congestion which has now mostly resolved, and no bleeding has occurred.      She continues to report some left midfacial pain and congestion. Overall this is improving and her nasal breathing is much better than preop. She is irrigating BID and using Flonase.    PROCEDURES PERFORMED:   1. Bilateral endoscopic maxillary antrostomy  2. Bilateral endoscopic anterior ethmoidectomy.   4. Bilateral endoscopic submucous resection of inferior turbinates    Physical Exam -   /71 (BP Location: Right arm, Patient Position: Chair, Cuff Size: Adult Regular)  Pulse 84  Resp 14  Ht 1.626 m (5' 4\")  Wt 96.2 kg (212 lb)  LMP 07/18/2015 (Approximate)  BMI 36.39 kg/m2    General - The patient is awake and alert, and answers questions appropriately during the history and physical.  The vocal quality is hypernasal, but there is no dyspnea or stridor noted.  Eyes - The EOMI, there is no conjuncitval or scleral injection.  Pupils are equally round and reactive to light.  Oral - The oral mucosa is pink and moist.  The tongue is mobile and midline on protrusion, no edema noted.  Nasal - The nasal examination was done with a rigid nasal endoscope today for the purposes of bilateral endoscopically assisted debridement of the sinuses.  I began by spraying both sides with lidocaine and neosynephrine.    I began on the left side.  The middle meatus had thick mucus, which was suctioned away and the ethmoid cavity was found to be inflamed but no polyps. The area of the ethmoid bulla seems most " affected.  The roof was then visualized and is healing well.  I turned my attention to the right side.  The middle meatus was open. The ethmoid cells were open and without significant inflammation. The maxillary sinus is widely patent.    A/P - Dilia Solomon is status post endoscopic nasal surgery. The left side remains more congested than the right and there is more evidence of mucosal inflammation on the left. She is breathing much better. I recommended she continue irrigation BID and continue Flonase. Return in 3months.    Again, thank you for allowing me to participate in the care of your patient.        Sincerely,        Santhosh Tierney MD

## 2018-03-19 NOTE — PATIENT INSTRUCTIONS
If you have questions or concerns on any instructions given to you by your provider today, you can reach us at [phone] or if you need to schedule an appointment.

## 2018-03-19 NOTE — NURSING NOTE
This laryngoscopy scope was  used on this patient.    Rigid    Scope Number: Cole Storz Rigid    # [id number]   Pediatric/Adult/Post-op sinus

## 2018-03-19 NOTE — MR AVS SNAPSHOT
After Visit Summary   3/19/2018    Dilia Solomon    MRN: [id number]           Patient Information     Date Of Birth          1965        Visit Information        Provider Department      3/19/2018 12:45 PM Santhosh Tierney MD [facility]        Today's Diagnoses     Chronic maxillary sinusitis    -  1      Care Instructions    If you have questions or concerns on any instructions given to you by your provider today, you can reach us at [phone] or if you need to schedule an appointment.                        Follow-ups after your visit        Your next 10 appointments already scheduled     Mar 20, 2018  7:00 AM CDT   Nurse Only with ALLERGY [facility] ([facility])    [address] [address] [phone]           Every allergy patient MUST wait 30 minutes after their allergy shot. No exceptions.  Xolair shots #1-3 should plan to wait 2 hours in clinic Xolair shots after #4 should plan 30 minute wait in clinic            Jun 06, 2018  8:20 AM CDT   Return Visit with Butch Horowitz MD   [facility] ([facility])    [address] [address] [phone]            Jun 19, 2018  1:00 PM CDT   Return Visit with Santhosh Tierney MD   [facility] ([facility])    [address] [address]   [phone]              Who to contact     If you have questions or need follow up information about today's clinic visit or your schedule please contact [facility] directly at [phone].  Normal or non-critical lab and imaging results will be communicated to you by MyChart, letter or phone within 4 business days after the clinic has received the results. If you do not hear from us within 7 days, please contact the clinic through MyChart or phone. If you have a critical or abnormal lab result, we will notify you by phone as  "soon as possible.  Submit refill requests through Consert or call your pharmacy and they will forward the refill request to us. Please allow 3 business days for your refill to be completed.          Additional Information About Your Visit        TheFriendMailharImpressto Information     Consert lets you send messages to your doctor, view your test results, renew your prescriptions, schedule appointments and more. To sign up, go to [email or web address] . Click on \"Log in\" on the left side of the screen, which will take you to the Welcome page. Then click on \"Sign up Now\" on the right side of the page.     You will be asked to enter the access code listed below, as well as some personal information. Please follow the directions to create your username and password.     Your access code is: [email or web address]  Expires: 2018 10:25 AM     Your access code will  in 90 days. If you need help or a new code, please call your [place] clinic or [phone].        Care EveryWhere ID     This is your Care EveryWhere ID. This could be used by other organizations to access your [place] medical records  [id number]        Your Vitals Were     Pulse Respirations Height Last Period BMI (Body Mass Index)       84 14 1.626 m (5' 4\") 2015 (Approximate) 36.39 kg/m2        Blood Pressure from Last 3 Encounters:   18 128/71   18 146/88   18 133/83    Weight from Last 3 Encounters:   18 96.2 kg (212 lb)   18 96.2 kg (212 lb)   18 96.2 kg (212 lb)              We Performed the Following     NASAL ENDOSCOPY, DIAGNOSTIC        Primary Care Provider Office Phone # Fax #    Cookie Conklin [phone] [address] [place] [address]        Equal Access to Services     GABBY CHEN : Marcel Anguiano, wadell loqsean, qaybta kaalmerari castillo, hieu howard. [facility] [phone].    ATENCIÓN: Si habla español, tiene a pruitt disposición servicios " kina de asistencia lingüística. Lucas kruger [phone].    We comply with applicable federal civil rights laws and [place] laws. We do not discriminate on the basis of race, color, national origin, age, disability, sex, sexual orientation, or gender identity.            Thank you!     Thank you for choosing [facility]  for your care. Our goal is always to provide you with excellent care. Hearing back from our patients is one way we can continue to improve our services. Please take a few minutes to complete the written survey that you may receive in the mail after your visit with us. Thank you!             Your Updated Medication List - Protect others around you: Learn how to safely use, store and throw away your medicines at www.disposemymeds.org.          This list is accurate as of 3/19/18 11:19 PM.  Always use your most recent med list.                   Brand Name Dispense Instructions for use Diagnosis    * albuterol (2.5 MG/3ML) 0.083% neb solution     1 Box    Take 1 vial (2.5 mg) by nebulization every 4 hours as needed    Moderate persistent asthma, uncomplicated       * albuterol 108 (90 BASE) MCG/ACT Inhaler    PROAIR HFA/PROVENTIL HFA/VENTOLIN HFA    1 Inhaler    Inhale 2 puffs into the lungs every 4 hours as needed    Moderate persistent asthma without complication       * ALLERGEN IMMUNOTHERAPY PRESCRIPTION     5 mL    Name of Mix: Mix #1  Mold Alternaria Tenuis 1:10 w/v, HS  0.5 ml Aspergillus Fumigatus 1:10 w/v, HS  0.5 ml Epicoccum Nigrum 1:10 w/v, HS 0.5 ml Hormodendrum Cladosporioides 1:10 w/v, HS 0.5 ml Penicillium Mix 1:10 w/v, HS  0.5 ml Diluent: HSA qs to 5ml    Chronic allergic rhinitis due to animal hair and dander, Allergic rhinitis due to dust mite, Allergic rhinitis due to mold, Chronic seasonal allergic rhinitis due to pollen       * ALLERGEN IMMUNOTHERAPY PRESCRIPTION     5 mL    Name of Mix: Mix #2  Dust Mite, Cat, Dog Cat Hair, Standardized 10,000 BAU/mL, ALK  2.0  ml Dog Hair Dander, A. P.  1:100 w/v, HS  1.0 ml Dust Mites F 30,000AU/mL, HS  0.3 ml Dust Mites P. 30,000 AU/mL, HS  0.3 ml  Diluent: HSA qs to 5ml    Chronic allergic rhinitis due to animal hair and dander, Allergic rhinitis due to dust mite, Allergic rhinitis due to mold, Chronic seasonal allergic rhinitis due to pollen       * ALLERGEN IMMUNOTHERAPY PRESCRIPTION     5 mL    Name of Mix: Mix #3 Grass,Tree  Dmitry,White 1:20w/v, HS 0.5ml Birch Mix PRW 1:20w/v, HS 0.5ml Boxelder-Maple Mix BHR (Boxelder Hard Red) 1:20w/v, HS 0.5ml [place],Common 1:20w/v, HS 0.5ml Elm,American 1:20w/v, HS 0.5ml [place] Mix 1:20w/v, HS 0.5ml Oak Mix RVW 1:20w/v, HS 0.5ml [place] Tree,Black 1:20w/v, HS 0.5ml Grass Mix #7 100,000 BAU/mL, HS 0.4ml Jonathan Grass 1:20w/v, HS 0.5ml Diluent: HSA qs to 5ml    Chronic allergic rhinitis due to animal hair and dander, Allergic rhinitis due to dust mite, Allergic rhinitis due to mold, Chronic seasonal allergic rhinitis due to pollen       * ALLERGEN IMMUNOTHERAPY PRESCRIPTION     5 mL    Name of Mix: Mix #4  Weeds Kochia 1:20 w/v, HS 0.5 ml Lamb's Quarters 1:20 w/v, HS 0.5 ml Nettle 1:20 w/v, HS 0.5 ml Plantain, English 1:20 w/v, HS 0.5 ml Ragweed Mixed 1:20 w/v ALK  0.5 ml Russian Thistle 1:20 w/v, HS 0.5 ml Sagebrush, Mugwort 1:20 w/v, HS 0.5 ml Sorrel, Sheep 1:20 w/v, HS 0.5 ml Diluent: HSA qs to 5ml    Chronic allergic rhinitis due to animal hair and dander, Allergic rhinitis due to dust mite, Allergic rhinitis due to mold, Chronic seasonal allergic rhinitis due to pollen       azelastine 0.05 % Soln ophthalmic solution    OPTIVAR    1 Bottle    Apply 1 drop to eye 2 times daily    Conjunctivitis, allergic, unspecified laterality       CERAVE Crea     2 Bottle    Externally apply 1 dose * topically 2 times daily    Eczema, unspecified type       cetirizine 10 MG tablet    zyrTEC    30 tablet    Take 1 tablet (10 mg) by mouth At Bedtime    Allergic conjunctivitis of both eyes, Chronic allergic  rhinitis due to animal hair and dander, Allergic rhinitis due to dust mite, Allergic rhinitis due to mold, Chronic seasonal allergic rhinitis due to pollen       EPINEPHrine 0.3 MG/0.3ML injection 2-pack    EPIPEN/ADRENACLICK/or ANY BX GENERIC EQUIV    0.6 mL    Inject 0.3 mLs (0.3 mg) into the muscle once as needed for anaphylaxis    Food allergy, Need for desensitization to allergens       fluticasone 50 MCG/ACT spray    FLONASE    1 Bottle    Spray 2 sprays into both nostrils daily    Chronic allergic rhinitis due to animal hair and dander, Allergic rhinitis due to dust mite, Allergic rhinitis due to mold, Chronic seasonal allergic rhinitis due to pollen       fluticasone-salmeterol 500-50 MCG/DOSE diskus inhaler    ADVAIR    3 Inhaler    Inhale 1 puff into the lungs 2 times daily    Moderate persistent asthma without complication       loratadine 10 MG tablet    CLARITIN    30 tablet    Take 1 tablet (10 mg) by mouth daily    Allergic conjunctivitis of both eyes, Chronic allergic rhinitis due to animal hair and dander, Allergic rhinitis due to dust mite, Allergic rhinitis due to mold, Chronic seasonal allergic rhinitis due to pollen       montelukast 10 MG tablet    SINGULAIR    30 tablet    Take 1 tablet (10 mg) by mouth At Bedtime    Moderate persistent asthma without complication, Chronic allergic rhinitis due to animal hair and dander, Allergic rhinitis due to dust mite, Allergic rhinitis due to mold, Chronic seasonal allergic rhinitis due to pollen       MULTIVITAMIN PO      1 tab daily        order for DME     1 Units    Equipment being ordered: Dynaflex insert    Closed displaced fracture of proximal phalanx of right great toe, initial encounter       triamcinolone 0.1 % cream    KENALOG    80 g    Apply  topically 2 times daily as needed.    Eczema, unspecified type       * Notice:  This list has 6 medication(s) that are the same as other medications prescribed for you. Read the directions carefully, and  ask your doctor or other care provider to review them with you.

## 2018-03-19 NOTE — NURSING NOTE
"Chief Complaint   Patient presents with     Surgical Followup     POP 2 Sinus surgery 2/14/18       Initial /71 (BP Location: Right arm, Patient Position: Chair, Cuff Size: Adult Regular)  Pulse 84  Resp 14  Ht 1.626 m (5' 4\")  Wt 96.2 kg (212 lb)  LMP 07/18/2015 (Approximate)  BMI 36.39 kg/m2 Estimated body mass index is 36.39 kg/(m^2) as calculated from the following:    Height as of this encounter: 1.626 m (5' 4\").    Weight as of this encounter: 96.2 kg (212 lb).  Medication Reconciliation: complete     Josefa Sellers MA      "

## 2018-03-20 ENCOUNTER — ALLIED HEALTH/NURSE VISIT (OUTPATIENT)
Dept: ALLERGY | Facility: CLINIC | Age: 53
End: 2018-03-20
Payer: COMMERCIAL

## 2018-03-20 DIAGNOSIS — J30.9 ALLERGIC RHINITIS: Primary | ICD-10-CM

## 2018-03-20 PROCEDURE — 99207 ZZC DROP WITH A PROCEDURE: CPT

## 2018-03-20 PROCEDURE — 95117 IMMUNOTHERAPY INJECTIONS: CPT

## 2018-03-20 NOTE — MR AVS SNAPSHOT
After Visit Summary   3/20/2018    Dilia Solomon    MRN: [id number]           Patient Information     Date Of Birth          1965        Visit Information        Provider Department      3/20/2018 7:00 AM ALLERGY [facility]        Today's Diagnoses     Allergic rhinitis    -  1       Follow-ups after your visit        Your next 10 appointments already scheduled     Apr 10, 2018  7:00 AM CDT   Nurse Only with ALLERGY [facility] ([facility])    [address] [address]   [phone]           Every allergy patient MUST wait 30 minutes after their allergy shot. No exceptions.  Xolair shots #1-3 should plan to wait 2 hours in clinic Xolair shots after #4 should plan 30 minute wait in clinic            Jun 06, 2018  8:20 AM CDT   Return Visit with Butch Horowitz MD   [facility] ([facility])    [address] [address]   [phone]            Jun 19, 2018  1:00 PM CDT   Return Visit with Santhosh Tierney MD   [facility] ([facility])    [address] [address]   [phone]              Who to contact     If you have questions or need follow up information about today's clinic visit or your schedule please contact [facility] directly at [phone].  Normal or non-critical lab and imaging results will be communicated to you by MyChart, letter or phone within 4 business days after the clinic has received the results. If you do not hear from us within 7 days, please contact the clinic through MyChart or phone. If you have a critical or abnormal lab result, we will notify you by phone as soon as possible.  Submit refill requests through USEREADY or call your pharmacy and they will forward the refill request to us. Please allow 3 business days for your refill to be completed.          Additional  "Information About Your Visit        MyChart Information     Liveclubs lets you send messages to your doctor, view your test results, renew your prescriptions, schedule appointments and more. To sign up, go to [email or web address] . Click on \"Log in\" on the left side of the screen, which will take you to the Welcome page. Then click on \"Sign up Now\" on the right side of the page.     You will be asked to enter the access code listed below, as well as some personal information. Please follow the directions to create your username and password.     Your access code is: [email or web address]  Expires: 2018 10:25 AM     Your access code will  in 90 days. If you need help or a new code, please call your [place] clinic or [phone].        Care EveryWhere ID     This is your Care EveryWhere ID. This could be used by other organizations to access your [place] medical records  [id number]        Your Vitals Were     Last Period                   2015 (Approximate)            Blood Pressure from Last 3 Encounters:   18 128/71   18 146/88   18 133/83    Weight from Last 3 Encounters:   18 96.2 kg (212 lb)   18 96.2 kg (212 lb)   18 96.2 kg (212 lb)              We Performed the Following     Allergy Shot: Two or more injections        Primary Care Provider Office Phone # Fax #    Cookie Conklin [phone]       [address] [place] [address]        Equal Access to Services     [facility] APRIL : Hadii ulices muñozo Sofaith, waaxda luqadaha, qaybta kaalmada adeegyamariaa, hieu howard. [facility] [phone].    ATENCIÓN: Si habla español, tiene a pruitt disposición servicios gratuitos de asistencia lingüística. Llame al [phone].    We comply with applicable federal civil rights laws and [place] laws. We do not discriminate on the basis of race, color, national origin, age, disability, sex, sexual orientation, or gender " identity.            Thank you!     Thank you for choosing [facility]  for your care. Our goal is always to provide you with excellent care. Hearing back from our patients is one way we can continue to improve our services. Please take a few minutes to complete the written survey that you may receive in the mail after your visit with us. Thank you!             Your Updated Medication List - Protect others around you: Learn how to safely use, store and throw away your medicines at www.disposemymeds.org.          This list is accurate as of 3/20/18  8:23 AM.  Always use your most recent med list.                   Brand Name Dispense Instructions for use Diagnosis    * albuterol (2.5 MG/3ML) 0.083% neb solution     1 Box    Take 1 vial (2.5 mg) by nebulization every 4 hours as needed    Moderate persistent asthma, uncomplicated       * albuterol 108 (90 BASE) MCG/ACT Inhaler    PROAIR HFA/PROVENTIL HFA/VENTOLIN HFA    1 Inhaler    Inhale 2 puffs into the lungs every 4 hours as needed    Moderate persistent asthma without complication       * ALLERGEN IMMUNOTHERAPY PRESCRIPTION     5 mL    Name of Mix: Mix #1  Mold Alternaria Tenuis 1:10 w/v, HS  0.5 ml Aspergillus Fumigatus 1:10 w/v, HS  0.5 ml Epicoccum Nigrum 1:10 w/v, HS 0.5 ml Hormodendrum Cladosporioides 1:10 w/v, HS 0.5 ml Penicillium Mix 1:10 w/v, HS  0.5 ml Diluent: HSA qs to 5ml    Chronic allergic rhinitis due to animal hair and dander, Allergic rhinitis due to dust mite, Allergic rhinitis due to mold, Chronic seasonal allergic rhinitis due to pollen       * ALLERGEN IMMUNOTHERAPY PRESCRIPTION     5 mL    Name of Mix: Mix #2  Dust Mite, Cat, Dog Cat Hair, Standardized 10,000 BAU/mL, ALK  2.0 ml Dog Hair Dander, A. P.  1:100 w/v, HS  1.0 ml Dust Mites F 30,000AU/mL, HS  0.3 ml Dust Mites P. 30,000 AU/mL, HS  0.3 ml  Diluent: HSA qs to 5ml    Chronic allergic rhinitis due to animal hair and dander, Allergic rhinitis due to dust mite, Allergic  rhinitis due to mold, Chronic seasonal allergic rhinitis due to pollen       * ALLERGEN IMMUNOTHERAPY PRESCRIPTION     5 mL    Name of Mix: Mix #3 Grass,Tree  Dmitry,White 1:20w/v, HS 0.5ml Birch Mix PRW 1:20w/v, HS 0.5ml Boxelder-Maple Mix BHR (Boxelder Hard Red) 1:20w/v, HS 0.5ml [place],Common 1:20w/v, HS 0.5ml Elm,American 1:20w/v, HS 0.5ml [place] Mix 1:20w/v, HS 0.5ml Oak Mix RVW 1:20w/v, HS 0.5ml [place] Tree,Black 1:20w/v, HS 0.5ml Grass Mix #7 100,000 BAU/mL, HS 0.4ml Jonathan Grass 1:20w/v, HS 0.5ml Diluent: HSA qs to 5ml    Chronic allergic rhinitis due to animal hair and dander, Allergic rhinitis due to dust mite, Allergic rhinitis due to mold, Chronic seasonal allergic rhinitis due to pollen       * ALLERGEN IMMUNOTHERAPY PRESCRIPTION     5 mL    Name of Mix: Mix #4  Weeds Kochia 1:20 w/v, HS 0.5 ml Lamb's Quarters 1:20 w/v, HS 0.5 ml Nettle 1:20 w/v, HS 0.5 ml Plantain, English 1:20 w/v, HS 0.5 ml Ragweed Mixed 1:20 w/v ALK  0.5 ml Russian Thistle 1:20 w/v, HS 0.5 ml Sagebrush, Mugwort 1:20 w/v, HS 0.5 ml Sorrel, Sheep 1:20 w/v, HS 0.5 ml Diluent: HSA qs to 5ml    Chronic allergic rhinitis due to animal hair and dander, Allergic rhinitis due to dust mite, Allergic rhinitis due to mold, Chronic seasonal allergic rhinitis due to pollen       azelastine 0.05 % Soln ophthalmic solution    OPTIVAR    1 Bottle    Apply 1 drop to eye 2 times daily    Conjunctivitis, allergic, unspecified laterality       CERAVE Crea     2 Bottle    Externally apply 1 dose * topically 2 times daily    Eczema, unspecified type       cetirizine 10 MG tablet    zyrTEC    30 tablet    Take 1 tablet (10 mg) by mouth At Bedtime    Allergic conjunctivitis of both eyes, Chronic allergic rhinitis due to animal hair and dander, Allergic rhinitis due to dust mite, Allergic rhinitis due to mold, Chronic seasonal allergic rhinitis due to pollen       EPINEPHrine 0.3 MG/0.3ML injection 2-pack    EPIPEN/ADRENACLICK/or ANY BX GENERIC EQUIV     0.6 mL    Inject 0.3 mLs (0.3 mg) into the muscle once as needed for anaphylaxis    Food allergy, Need for desensitization to allergens       fluticasone 50 MCG/ACT spray    FLONASE    1 Bottle    Spray 2 sprays into both nostrils daily    Chronic allergic rhinitis due to animal hair and dander, Allergic rhinitis due to dust mite, Allergic rhinitis due to mold, Chronic seasonal allergic rhinitis due to pollen       fluticasone-salmeterol 500-50 MCG/DOSE diskus inhaler    ADVAIR    3 Inhaler    Inhale 1 puff into the lungs 2 times daily    Moderate persistent asthma without complication       loratadine 10 MG tablet    CLARITIN    30 tablet    Take 1 tablet (10 mg) by mouth daily    Allergic conjunctivitis of both eyes, Chronic allergic rhinitis due to animal hair and dander, Allergic rhinitis due to dust mite, Allergic rhinitis due to mold, Chronic seasonal allergic rhinitis due to pollen       montelukast 10 MG tablet    SINGULAIR    30 tablet    Take 1 tablet (10 mg) by mouth At Bedtime    Moderate persistent asthma without complication, Chronic allergic rhinitis due to animal hair and dander, Allergic rhinitis due to dust mite, Allergic rhinitis due to mold, Chronic seasonal allergic rhinitis due to pollen       MULTIVITAMIN PO      1 tab daily        order for DME     1 Units    Equipment being ordered: Dynaflex insert    Closed displaced fracture of proximal phalanx of right great toe, initial encounter       triamcinolone 0.1 % cream    KENALOG    80 g    Apply  topically 2 times daily as needed.    Eczema, unspecified type       * Notice:  This list has 6 medication(s) that are the same as other medications prescribed for you. Read the directions carefully, and ask your doctor or other care provider to review them with you.

## 2018-03-20 NOTE — NURSING NOTE
Patient presented after waiting 30 minutes with no reaction to  injections. Discharged from clinic.    Kalen BRIONES   [facility] RN

## 2018-04-10 ENCOUNTER — ALLIED HEALTH/NURSE VISIT (OUTPATIENT)
Dept: ALLERGY | Facility: CLINIC | Age: 53
End: 2018-04-10
Payer: COMMERCIAL

## 2018-04-10 DIAGNOSIS — J30.9 ALLERGIC RHINITIS: Primary | ICD-10-CM

## 2018-04-10 PROCEDURE — 95117 IMMUNOTHERAPY INJECTIONS: CPT

## 2018-04-10 PROCEDURE — 99207 ZZC DROP WITH A PROCEDURE: CPT

## 2018-04-10 NOTE — MR AVS SNAPSHOT
After Visit Summary   4/10/2018    Dilia Solomon    MRN: [id number]           Patient Information     Date Of Birth          1965        Visit Information        Provider Department      4/10/2018 7:00 AM [facility]        Today's Diagnoses     Allergic rhinitis    -  1       Follow-ups after your visit        Your next 10 appointments already scheduled     May 01, 2018  7:30 AM CDT   Nurse Only with [facility] ([facility])    [address] [address]   [phone]           Every allergy patient MUST wait 30 minutes after their allergy shot. No exceptions.  Xolair shots #1-3 should plan to wait 2 hours in clinic Xolair shots after #4 should plan 30 minute wait in clinic            Jun 06, 2018  8:20 AM CDT   [facility]owitz [facility]   [facility]    [address] [address]   [phone]            Jun 19, 2018  1:00 PM CDT   Return Visit with Santhosh Tierney MD   [facility]    [address] [address]   [phone]              Who to contact     If you have questions or need follow up information about today's clinic visit or your schedule please contact [facility] directly at [phone].  Normal or non-critical lab and imaging results will be communicated to you by MyChart, letter or phone within 4 business days after the clinic has received the results. If you do not hear from us within 7 days, please contact the clinic through MyChart or phone. If you have a critical or abnormal lab result, we will notify you by phone as soon as possible.  Submit refill requests through [facility] or call your pharmacy and they will forward the refill request to us. Please allow 3 business days for your refill to be completed.          Additional  "Information About Your Visit        MyChart Information     [employer] lets you send messages to your doctor, view your test results, renew your prescriptions, schedule appointments and more. To sign up, go to [email or web address] . Click on \"Log in\" on the left side of the screen, which will take you to the Welcome page. Then click on \"Sign up Now\" on the right side of the page.     You will be asked to enter the access code listed below, as well as some personal information. Please follow the directions to create your username and password.     Your access code is: [email or web address]  Expires: 2018 10:25 AM     Your access code will  in 90 days. If you need help or a new code, please call your [place] clinic or [phone].        Care EveryWhere ID     This is your Care EveryWhere ID. This could be used by other organizations to access your [place] medical records  [id number]        Your Vitals Were     Last Period                   2015 (Approximate)            Blood Pressure from Last 3 Encounters:   18 128/71   18 146/88   18 133/83    Weight from Last 3 Encounters:   18 96.2 kg (212 lb)   18 96.2 kg (212 lb)   18 96.2 kg (212 lb)              We Performed the Following     Allergy Shot: Two or more injections        Primary Care Provider Office Phone # Fax #    Cookie Conklin [phone]       [address] [place] [id number]        Equal Access to Services     [facility] APRIL : Hadii ulices muñozo Sofaith, waaxda luqadaha, qaybta kaalmada adeegyamariaa, hieu howard. So [facility] [phone].    ATENCIÓN: Si habla español, tiene a pruitt disposición servicios gratuitos de asistencia lingüística. Llame al [phone].    We comply with applicable federal civil rights laws and [place] laws. We do not discriminate on the basis of race, color, national origin, age, disability, sex, sexual orientation, or gender " identity.            Thank you!     Thank you for choosing [facility]  for your care. Our goal is always to provide you with excellent care. Hearing back from our patients is one way we can continue to improve our services. Please take a few minutes to complete the written survey that you may receive in the mail after your visit with us. Thank you!             Your Updated Medication List - Protect others around you: Learn how to safely use, store and throw away your medicines at www.disposemymeds.org.          This list is accurate as of 4/10/18  7:55 AM.  Always use your most recent med list.                   Brand Name Dispense Instructions for use Diagnosis    * albuterol (2.5 MG/3ML) 0.083% neb solution     1 Box    Take 1 vial (2.5 mg) by nebulization every 4 hours as needed    Moderate persistent asthma, uncomplicated       * albuterol 108 (90 BASE) MCG/ACT Inhaler    PROAIR HFA/PROVENTIL HFA/VENTOLIN HFA    1 Inhaler    Inhale 2 puffs into the lungs every 4 hours as needed    Moderate persistent asthma without complication       * ALLERGEN IMMUNOTHERAPY PRESCRIPTION     5 mL    Name of Mix: Mix #1  Mold Alternaria Tenuis 1:10 w/v, HS  0.5 ml Aspergillus Fumigatus 1:10 w/v, HS  0.5 ml Epicoccum Nigrum 1:10 w/v, HS 0.5 ml Hormodendrum Cladosporioides 1:10 w/v, HS 0.5 ml Penicillium Mix 1:10 w/v, HS  0.5 ml Diluent: HSA qs to 5ml    Chronic allergic rhinitis due to animal hair and dander, Allergic rhinitis due to dust mite, Allergic rhinitis due to mold, Chronic seasonal allergic rhinitis due to pollen       * ALLERGEN IMMUNOTHERAPY PRESCRIPTION     5 mL    Name of Mix: Mix #2  Dust Mite, Cat, Dog Cat Hair, Standardized 10,000 BAU/mL, ALK  2.0 ml Dog Hair Dander, A. P.  1:100 w/v, HS  1.0 ml Dust Mites F 30,000AU/mL, HS  0.3 ml Dust Mites P. 30,000 AU/mL, HS  0.3 ml  Diluent: HSA qs to 5ml    Chronic allergic rhinitis due to animal hair and dander, Allergic rhinitis due to dust mite, Allergic  rhinitis due to mold, Chronic seasonal allergic rhinitis due to pollen       * ALLERGEN IMMUNOTHERAPY PRESCRIPTION     5 mL    Name of Mix: Mix #3 Grass,Tree  Dmitry,White 1:20w/v, HS 0.5ml Birch Mix PRW 1:20w/v, HS 0.5ml Boxelder-Maple Mix BHR (Boxelder Hard Red) 1:20w/v, HS 0.5ml [place],Common 1:20w/v, HS 0.5ml Elm,American 1:20w/v, HS 0.5ml Guion Mix 1:20w/v, HS 0.5ml Oak Mix RVW 1:20w/v, HS 0.5ml [place] Tree,Black 1:20w/v, HS 0.5ml Grass Mix #7 100,000 BAU/mL, HS 0.4ml Jonathan Grass 1:20w/v, HS 0.5ml Diluent: HSA qs to 5ml    Chronic allergic rhinitis due to animal hair and dander, Allergic rhinitis due to dust mite, Allergic rhinitis due to mold, Chronic seasonal allergic rhinitis due to pollen       * ALLERGEN IMMUNOTHERAPY PRESCRIPTION     5 mL    Name of Mix: Mix #4  Weeds Kochia 1:20 w/v, HS 0.5 ml Lamb's Quarters 1:20 w/v, HS 0.5 ml Nettle 1:20 w/v, HS 0.5 ml Plantain, English 1:20 w/v, HS 0.5 ml Ragweed Mixed 1:20 w/v ALK  0.5 ml Russian Thistle 1:20 w/v, HS 0.5 ml Sagebrush, Mugwort 1:20 w/v, HS 0.5 ml Sorrel, Sheep 1:20 w/v, HS 0.5 ml Diluent: HSA qs to 5ml    Chronic allergic rhinitis due to animal hair and dander, Allergic rhinitis due to dust mite, Allergic rhinitis due to mold, Chronic seasonal allergic rhinitis due to pollen       azelastine 0.05 % Soln ophthalmic solution    OPTIVAR    1 Bottle    Apply 1 drop to eye 2 times daily    Conjunctivitis, allergic, unspecified laterality       CERAVE Crea     2 Bottle    Externally apply 1 dose * topically 2 times daily    Eczema, unspecified type       cetirizine 10 MG tablet    zyrTEC    30 tablet    Take 1 tablet (10 mg) by mouth At Bedtime    Allergic conjunctivitis of both eyes, Chronic allergic rhinitis due to animal hair and dander, Allergic rhinitis due to dust mite, Allergic rhinitis due to mold, Chronic seasonal allergic rhinitis due to pollen       EPINEPHrine 0.3 MG/0.3ML injection 2-pack    EPIPEN/ADRENACLICK/or ANY BX GENERIC EQUIV     0.6 mL    Inject 0.3 mLs (0.3 mg) into the muscle once as needed for anaphylaxis    Food allergy, Need for desensitization to allergens       fluticasone 50 MCG/ACT spray    FLONASE    1 Bottle    Spray 2 sprays into both nostrils daily    Chronic allergic rhinitis due to animal hair and dander, Allergic rhinitis due to dust mite, Allergic rhinitis due to mold, Chronic seasonal allergic rhinitis due to pollen       fluticasone-salmeterol 500-50 MCG/DOSE diskus inhaler    ADVAIR    3 Inhaler    Inhale 1 puff into the lungs 2 times daily    Moderate persistent asthma without complication       loratadine 10 MG tablet    CLARITIN    30 tablet    Take 1 tablet (10 mg) by mouth daily    Allergic conjunctivitis of both eyes, Chronic allergic rhinitis due to animal hair and dander, Allergic rhinitis due to dust mite, Allergic rhinitis due to mold, Chronic seasonal allergic rhinitis due to pollen       montelukast 10 MG tablet    SINGULAIR    30 tablet    Take 1 tablet (10 mg) by mouth At Bedtime    Moderate persistent asthma without complication, Chronic allergic rhinitis due to animal hair and dander, Allergic rhinitis due to dust mite, Allergic rhinitis due to mold, Chronic seasonal allergic rhinitis due to pollen       MULTIVITAMIN PO      1 tab daily        order for DME     1 Units    Equipment being ordered: Dynaflex insert    Closed displaced fracture of proximal phalanx of right great toe, initial encounter       triamcinolone 0.1 % cream    KENALOG    80 g    Apply  topically 2 times daily as needed.    Eczema, unspecified type       * Notice:  This list has 6 medication(s) that are the same as other medications prescribed for you. Read the directions carefully, and ask your doctor or other care provider to review them with you.

## 2018-04-25 ENCOUNTER — OFFICE VISIT (OUTPATIENT)
Dept: PODIATRY | Facility: CLINIC | Age: 53
End: 2018-04-25
Payer: COMMERCIAL

## 2018-04-25 VITALS
HEIGHT: 64 IN | DIASTOLIC BLOOD PRESSURE: 71 MMHG | BODY MASS INDEX: 36.19 KG/M2 | HEART RATE: 84 BPM | SYSTOLIC BLOOD PRESSURE: 114 MMHG | WEIGHT: 212 LBS

## 2018-04-25 DIAGNOSIS — S92.411D CLOSED DISPLACED FRACTURE OF PROXIMAL PHALANX OF RIGHT GREAT TOE WITH ROUTINE HEALING, SUBSEQUENT ENCOUNTER: Primary | ICD-10-CM

## 2018-04-25 DIAGNOSIS — G89.29 HEEL PAIN, CHRONIC, LEFT: ICD-10-CM

## 2018-04-25 DIAGNOSIS — M79.672 HEEL PAIN, CHRONIC, LEFT: ICD-10-CM

## 2018-04-25 PROCEDURE — 99213 OFFICE O/P EST LOW 20 MIN: CPT | Performed by: PODIATRIST

## 2018-04-25 NOTE — PROGRESS NOTES
Dilia returns to the office for reevaluation of the left foot.  The patient relates following the instructions given at the last visit with noted more pain.  The patient relates overall more  improvement in pain and function of the left foot.  The patient relates no other problems.    PAST MEDICAL HISTORY:   Past Medical History:   Diagnosis Date     Injury, other and unspecified, shoulder and upper arm 9/03       BMI= Body mass index is 36.39 kg/(m^2).    Weight management plan: Patient was referred to their PCP to discuss a diet and exercise plan.    Physical Exam:    General: The patient appears to have a pleasant mental affect.    Lower extremity physical exam:  Neurovascular status is intact with palpable pedal pulses and intact epicritic sensations.  Muscular exam is within normal limits to major muscle groups.  Integument is intact.      One notes decreased edema.  One notes pain on palpation on the plantar central aspect of the left heel. No surrounding erythema noted.       Assessment:      ICD-10-CM    1. Closed displaced fracture of proximal phalanx of right great toe with routine healing, subsequent encounter S92.411D        Plan:  I have explained to Dilia about the conditions.  At this time, the patient was fitted with a silicone heel cup to better offload pressure from under the left heel.    Disclaimer: This note consists of symbols derived from keyboarding, dictation and/or voice recognition software. As a result, there may be errors in the script that have gone undetected. Please consider this when interpreting information found in this chart.       CAROLE Vallejo D.P.M., TJ.WANDY.

## 2018-04-25 NOTE — NURSING NOTE
"Chief Complaint   Patient presents with     RECHECK     Left heel pain, pain for a while.       Initial /71 (BP Location: Left arm, Patient Position: Sitting, Cuff Size: Adult Regular)  Pulse 84  Ht 5' 4\" (1.626 m)  Wt 212 lb (96.2 kg)  LMP 07/18/2015 (Approximate)  BMI 36.39 kg/m2 Estimated body mass index is 36.39 kg/(m^2) as calculated from the following:    Height as of this encounter: 5' 4\" (1.626 m).    Weight as of this encounter: 212 lb (96.2 kg).  Medication Reconciliation: complete     Bella Altamirano MA        "

## 2018-04-25 NOTE — LETTER
4/25/2018         RE: Dilia Solomon  [address] [facility] [address]        Dear Colleague,    Thank you for referring your patient, Dilia Solomon, to the [facility]. Please see a copy of my visit note below.    Dilia returns to the office for reevaluation of the left foot.  The patient relates following the instructions given at the last visit with noted more pain.  The patient relates overall more  improvement in pain and function of the left foot.  The patient relates no other problems.    PAST MEDICAL HISTORY:   Past Medical History:   Diagnosis Date     Injury, other and unspecified, shoulder and upper arm 9/03       BMI= Body mass index is 36.39 kg/(m^2).    Weight management plan: Patient was referred to their PCP to discuss a diet and exercise plan.    Physical Exam:    General: The patient appears to have a pleasant mental affect.    Lower extremity physical exam:  Neurovascular status is intact with palpable pedal pulses and intact epicritic sensations.  Muscular exam is within normal limits to major muscle groups.  Integument is intact.      One notes decreased edema.  One notes pain on palpation on the plantar central aspect of the left heel. No surrounding erythema noted.       Assessment:      ICD-10-CM    1. Closed displaced fracture of proximal phalanx of right great toe with routine healing, subsequent encounter S92.411D        Plan:  I have explained to Dilia about the conditions.  At this time, the patient was fitted with a silicone heel cup to better offload pressure from under the left heel.    Disclaimer: This note consists of symbols derived from keyboarding, dictation and/or voice recognition software. As a result, there may be errors in the script that have gone undetected. Please consider this when interpreting information found in this chart.       CAROLE Vallejo D.P.M., F.ZEUS.C.F.A.S.      Again, thank you for allowing me to participate in the care of your  patient.        Sincerely,        Carlos Vallejo DPM

## 2018-04-25 NOTE — MR AVS SNAPSHOT
After Visit Summary   4/25/2018    Dilia Solomon    MRN: [id number]           Patient Information     Date Of Birth          1965        Visit Information        Provider Department      4/25/2018 2:40 PM Carlos Vallejo DPM [facility]        Today's Diagnoses     Closed displaced fracture of proximal phalanx of right great toe with routine healing, subsequent encounter    -  1    Heel pain, chronic, left          Care Instructions    Initial musculoskeletal treatment recommendation:    1.  Stretch the calf muscles as instructed once an hour.  2.  Ice the injured area in the evening; 20 min on/off.  3.  Take antiinflammatory medication as directed.  4.  Massage the soft tissues around the injured area in the morning to loosen the tissue  5.  Wear supportive foot wear and/or arch supports (rigid not cushion).      If no improvement in symptoms within four to six weeks, return to clinic for reevaluation.            Follow-ups after your visit        Follow-up notes from your care team     Return in about 4 weeks (around 5/23/2018), or if symptoms worsen or fail to improve.      Your next 10 appointments already scheduled     May 01, 2018  7:30 AM CDT   Nurse Only with ALLERGY [facility])    [address] [address]   [phone]           Every allergy patient MUST wait 30 minutes after their allergy shot. No exceptions.  Xolair shots #1-3 should plan to wait 2 hours in clinic Xolair shots after #4 should plan 30 minute wait in clinic            Jun 06, 2018  8:20 AM CDT   Return Visit with Butch Horowitz MD   [facility])    [address] [address]   [phone]            Jun 19, 2018  1:00 PM CDT   Return Visit with Santhosh Tierney MD   [facility])    [address] [address]  "  [phone]              Who to contact     If you have questions or need follow up information about today's clinic visit or your schedule please contact [facility] directly at [phone].  Normal or non-critical lab and imaging results will be communicated to you by MyChart, letter or phone within 4 business days after the clinic has received the results. If you do not hear from us within 7 days, please contact the clinic through MyChart or phone. If you have a critical or abnormal lab result, we will notify you by phone as soon as possible.  Submit refill requests through [employer] or call your pharmacy and they will forward the refill request to us. Please allow 3 business days for your refill to be completed.          Additional Information About Your Visit        [facility] Information     Mevvy lets you send messages to your doctor, view your test results, renew your prescriptions, schedule appointments and more. To sign up, go to [email or web address] . Click on \"Log in\" on the left side of the screen, which will take you to the Welcome page. Then click on \"Sign up Now\" on the right side of the page.     You will be asked to enter the access code listed below, as well as some personal information. Please follow the directions to create your username and password.     Your access code is: [email or web address]  Expires: 2018 10:25 AM     Your access code will  in 90 days. If you need help or a new code, please call your [place] clinic or [phone].        Care EveryWhere ID     This is your Care EveryWhere ID. This could be used by other organizations to access your [place] medical records  [id number]        Your Vitals Were     Pulse Height Last Period BMI (Body Mass Index)          84 5' 4\" (1.626 m) 2015 (Approximate) 36.39 kg/m2         Blood Pressure from Last 3 Encounters:   18 114/71   18 128/71   18 146/88    Weight from Last 3 Encounters:   18 " 212 lb (96.2 kg)   03/19/18 212 lb (96.2 kg)   03/07/18 212 lb (96.2 kg)              Today, you had the following     No orders found for display         Today's Medication Changes          These changes are accurate as of 4/25/18  3:35 PM.  If you have any questions, ask your nurse or doctor.               Start taking these medicines.        Dose/Directions    order for DME   Used for:  Heel pain, chronic, left   Started by:  Carlos Vallejo DPM        Equipment being ordered: Silicone heel cup   Quantity:  1 Units   Refills:  0            Where to get your medicines      Some of these will need a paper prescription and others can be bought over the counter.  Ask your nurse if you have questions.     Bring a paper prescription for each of these medications     order for DME                Primary Care Provider Office Phone # Fax #    Cookie Conklin [phone] [address] [place] [address]        Equal Access to Services     [facility]: Hadii ulices amaral hadasho Sofaith, waaxda luqadaha, qaybta kaalmada adeegyada, waxay america hayfrandy shen . So [facility] [phone].    ATENCIÓN: Si habla español, tiene a pruitt disposición servicios gratuitos de asistencia lingüística. Llame al [phone].    We comply with applicable federal civil rights laws and [place] laws. We do not discriminate on the basis of race, color, national origin, age, disability, sex, sexual orientation, or gender identity.            Thank you!     Thank you for choosing [facility]  for your care. Our goal is always to provide you with excellent care. Hearing back from our patients is one way we can continue to improve our services. Please take a few minutes to complete the written survey that you may receive in the mail after your visit with us. Thank you!             Your Updated Medication List - Protect others around you: Learn how to safely use, store and throw away your  medicines at www.disposemymeds.org.          This list is accurate as of 4/25/18  3:35 PM.  Always use your most recent med list.                   Brand Name Dispense Instructions for use Diagnosis    * albuterol (2.5 MG/3ML) 0.083% neb solution     1 Box    Take 1 vial (2.5 mg) by nebulization every 4 hours as needed    Moderate persistent asthma, uncomplicated       * albuterol 108 (90 Base) MCG/ACT Inhaler    PROAIR HFA/PROVENTIL HFA/VENTOLIN HFA    1 Inhaler    Inhale 2 puffs into the lungs every 4 hours as needed    Moderate persistent asthma without complication       * ALLERGEN IMMUNOTHERAPY PRESCRIPTION     5 mL    Name of Mix: Mix #1  Mold Alternaria Tenuis 1:10 w/v, HS  0.5 ml Aspergillus Fumigatus 1:10 w/v, HS  0.5 ml Epicoccum Nigrum 1:10 w/v, HS 0.5 ml Hormodendrum Cladosporioides 1:10 w/v, HS 0.5 ml Penicillium Mix 1:10 w/v, HS  0.5 ml Diluent: HSA qs to 5ml    Chronic allergic rhinitis due to animal hair and dander, Allergic rhinitis due to dust mite, Allergic rhinitis due to mold, Chronic seasonal allergic rhinitis due to pollen       * ALLERGEN IMMUNOTHERAPY PRESCRIPTION     5 mL    Name of Mix: Mix #2  Dust Mite, Cat, Dog Cat Hair, Standardized 10,000 BAU/mL, ALK  2.0 ml Dog Hair Dander, A. P.  1:100 w/v, HS  1.0 ml Dust Mites F 30,000AU/mL, HS  0.3 ml Dust Mites P. 30,000 AU/mL, HS  0.3 ml  Diluent: HSA qs to 5ml    Chronic allergic rhinitis due to animal hair and dander, Allergic rhinitis due to dust mite, Allergic rhinitis due to mold, Chronic seasonal allergic rhinitis due to pollen       * ALLERGEN IMMUNOTHERAPY PRESCRIPTION     5 mL    Name of Mix: Mix #3 Grass,Tree  Dmitry,White 1:20w/v, HS 0.5ml Birch Mix PRW 1:20w/v, HS 0.5ml Boxelder-Maple Mix BHR (Boxelder Hard Red) 1:20w/v, HS 0.5ml [place],Common 1:20w/v, HS 0.5ml Elm,American 1:20w/v, HS 0.5ml [place] Mix 1:20w/v, HS 0.5ml Oak Mix RVW 1:20w/v, HS 0.5ml [place] Tree,Black 1:20w/v, HS 0.5ml Grass Mix #7 100,000 BAU/mL, HS 0.4ml Jonathan  Grass 1:20w/v, HS 0.5ml Diluent: HSA qs to 5ml    Chronic allergic rhinitis due to animal hair and dander, Allergic rhinitis due to dust mite, Allergic rhinitis due to mold, Chronic seasonal allergic rhinitis due to pollen       * ALLERGEN IMMUNOTHERAPY PRESCRIPTION     5 mL    Name of Mix: Mix #4  Weeds Kochia 1:20 w/v, HS 0.5 ml Lamb's Quarters 1:20 w/v, HS 0.5 ml Nettle 1:20 w/v, HS 0.5 ml Plantain, English 1:20 w/v, HS 0.5 ml Ragweed Mixed 1:20 w/v ALK  0.5 ml Russian Thistle 1:20 w/v, HS 0.5 ml Sagebrush, Mugwort 1:20 w/v, HS 0.5 ml Sorrel, Sheep 1:20 w/v, HS 0.5 ml Diluent: HSA qs to 5ml    Chronic allergic rhinitis due to animal hair and dander, Allergic rhinitis due to dust mite, Allergic rhinitis due to mold, Chronic seasonal allergic rhinitis due to pollen       azelastine 0.05 % Soln ophthalmic solution    OPTIVAR    1 Bottle    Apply 1 drop to eye 2 times daily    Conjunctivitis, allergic, unspecified laterality       CERAVE Crea     2 Bottle    Externally apply 1 dose * topically 2 times daily    Eczema, unspecified type       cetirizine 10 MG tablet    zyrTEC    30 tablet    Take 1 tablet (10 mg) by mouth At Bedtime    Allergic conjunctivitis of both eyes, Chronic allergic rhinitis due to animal hair and dander, Allergic rhinitis due to dust mite, Allergic rhinitis due to mold, Chronic seasonal allergic rhinitis due to pollen       EPINEPHrine 0.3 MG/0.3ML injection 2-pack    EPIPEN/ADRENACLICK/or ANY BX GENERIC EQUIV    0.6 mL    Inject 0.3 mLs (0.3 mg) into the muscle once as needed for anaphylaxis    Food allergy, Need for desensitization to allergens       fluticasone 50 MCG/ACT spray    FLONASE    1 Bottle    Spray 2 sprays into both nostrils daily    Chronic allergic rhinitis due to animal hair and dander, Allergic rhinitis due to dust mite, Allergic rhinitis due to mold, Chronic seasonal allergic rhinitis due to pollen       fluticasone-salmeterol 500-50 MCG/DOSE diskus inhaler    ADVAIR    3  Inhaler    Inhale 1 puff into the lungs 2 times daily    Moderate persistent asthma without complication       loratadine 10 MG tablet    CLARITIN    30 tablet    Take 1 tablet (10 mg) by mouth daily    Allergic conjunctivitis of both eyes, Chronic allergic rhinitis due to animal hair and dander, Allergic rhinitis due to dust mite, Allergic rhinitis due to mold, Chronic seasonal allergic rhinitis due to pollen       montelukast 10 MG tablet    SINGULAIR    30 tablet    Take 1 tablet (10 mg) by mouth At Bedtime    Moderate persistent asthma without complication, Chronic allergic rhinitis due to animal hair and dander, Allergic rhinitis due to dust mite, Allergic rhinitis due to mold, Chronic seasonal allergic rhinitis due to pollen       MULTIVITAMIN PO      1 tab daily        order for DME     1 Units    Equipment being ordered: Dynaflex insert    Closed displaced fracture of proximal phalanx of right great toe, initial encounter       order for DME     1 Units    Equipment being ordered: Silicone heel cup    Heel pain, chronic, left       triamcinolone 0.1 % cream    KENALOG    80 g    Apply  topically 2 times daily as needed.    Eczema, unspecified type       * Notice:  This list has 6 medication(s) that are the same as other medications prescribed for you. Read the directions carefully, and ask your doctor or other care provider to review them with you.

## 2018-05-02 ENCOUNTER — ALLIED HEALTH/NURSE VISIT (OUTPATIENT)
Dept: ALLERGY | Facility: CLINIC | Age: 53
End: 2018-05-02
Payer: COMMERCIAL

## 2018-05-02 DIAGNOSIS — J30.9 ALLERGIC RHINITIS: Primary | ICD-10-CM

## 2018-05-02 DIAGNOSIS — J30.89 ALLERGIC RHINITIS DUE TO MOLD: ICD-10-CM

## 2018-05-02 DIAGNOSIS — J30.1 CHRONIC SEASONAL ALLERGIC RHINITIS DUE TO POLLEN: ICD-10-CM

## 2018-05-02 DIAGNOSIS — J30.81 CHRONIC ALLERGIC RHINITIS DUE TO ANIMAL HAIR AND DANDER: ICD-10-CM

## 2018-05-02 DIAGNOSIS — J30.89 ALLERGIC RHINITIS DUE TO DUST MITE: ICD-10-CM

## 2018-05-02 PROCEDURE — 99207 ZZC DROP WITH A PROCEDURE: CPT

## 2018-05-02 PROCEDURE — 95117 IMMUNOTHERAPY INJECTIONS: CPT

## 2018-05-02 NOTE — MR AVS SNAPSHOT
After Visit Summary   5/2/2018    Dilia Solomon    MRN: [id number]           Patient Information     Date Of Birth          1965        Visit Information        Provider Department      5/2/2018 7:15 AM ALLERGY [facility]        Today's Diagnoses     Allergic rhinitis    -  1       Follow-ups after your visit        Your next 10 appointments already scheduled     May 22, 2018  5:45 PM CDT   Nurse Only with ALLERGY [facility]    [address] [address]   [phone]           Every allergy patient MUST wait 30 minutes after their allergy shot. No exceptions.  Xolair shots #1-3 should plan to wait 2 hours in clinic Xolair shots after #4 should plan 30 minute wait in clinic            Jun 06, 2018  8:20 AM CDT   Return Visit [facility] Butch Horowitz [facility]    [address] [facility] [address]   [phone]            Jun 19, 2018  1:00 PM CDT   [facility] Visit with Santhosh Tierney MD   [facility]    [address] [address]   [phone]              Who to contact     If you have questions or need follow up information about today's clinic visit or your schedule please contact [facility] directly at [phone].  Normal or non-critical lab and imaging results will be communicated to you by MyChart, letter or phone within 4 business days after the clinic has received the results. If you do not hear from us within 7 days, please contact the clinic through MyChart or phone. If you have a critical or abnormal lab result, we will notify you by phone as soon as possible.  Submit refill requests through [employer] or call your pharmacy and they will forward the refill request to us. Please allow 3 business days for your refill to be completed.          Additional  "Information About Your Visit        MyChart Information     Vigo lets you send messages to your doctor, view your test results, renew your prescriptions, schedule appointments and more. To sign up, go to [email or web address] . Click on \"Log in\" on the left side of the screen, which will take you to the Welcome page. Then click on \"Sign up Now\" on the right side of the page.     You will be asked to enter the access code listed below, as well as some personal information. Please follow the directions to create your username and password.     Your access code is: [email or web address]  Expires: 2018 10:25 AM     Your access code will  in 90 days. If you need help or a new code, please call your [place] clinic or [phone].        Care EveryWhere ID     This is your Care EveryWhere ID. This could be used by other organizations to access your [place] medical records  [id number]        Your Vitals Were     Last Period                   2015 (Approximate)            Blood Pressure from Last 3 Encounters:   18 114/71   18 128/71   18 146/88    Weight from Last 3 Encounters:   18 96.2 kg (212 lb)   18 96.2 kg (212 lb)   18 96.2 kg (212 lb)              We Performed the Following     Allergy Shot: Two or more injections        Primary Care Provider Office Phone # Fax #    Cookie Conklin [phone]       [address] [place] [address]        Equal Access to Services     [facility] APRIL : Hadii aad ku hadasho Sofaith, waaxda luqadaha, qaybta kaalmada adeegyamariaa, hieu howard. So [facility] [phone].    ATENCIÓN: Si habla español, tiene a pruitt disposición servicios gratuitos de asistencia lingüística. Llame al [phone].    We comply with applicable federal civil rights laws and [place] laws. We do not discriminate on the basis of race, color, national origin, age, disability, sex, sexual orientation, or gender " identity.            Thank you!     Thank you for choosing [facility]  for your care. Our goal is always to provide you with excellent care. Hearing back from our patients is one way we can continue to improve our services. Please take a few minutes to complete the written survey that you may receive in the mail after your visit with us. Thank you!             Your Updated Medication List - Protect others around you: Learn how to safely use, store and throw away your medicines at www.disposemymeds.org.          This list is accurate as of 5/2/18  8:06 AM.  Always use your most recent med list.                   Brand Name Dispense Instructions for use Diagnosis    * albuterol (2.5 MG/3ML) 0.083% neb solution     1 Box    Take 1 vial (2.5 mg) by nebulization every 4 hours as needed    Moderate persistent asthma, uncomplicated       * albuterol 108 (90 Base) MCG/ACT Inhaler    PROAIR HFA/PROVENTIL HFA/VENTOLIN HFA    1 Inhaler    Inhale 2 puffs into the lungs every 4 hours as needed    Moderate persistent asthma without complication       * ALLERGEN IMMUNOTHERAPY PRESCRIPTION     5 mL    Name of Mix: Mix #1  Mold Alternaria Tenuis 1:10 w/v, HS  0.5 ml Aspergillus Fumigatus 1:10 w/v, HS  0.5 ml Epicoccum Nigrum 1:10 w/v, HS 0.5 ml Hormodendrum Cladosporioides 1:10 w/v, HS 0.5 ml Penicillium Mix 1:10 w/v, HS  0.5 ml Diluent: HSA qs to 5ml    Chronic allergic rhinitis due to animal hair and dander, Allergic rhinitis due to dust mite, Allergic rhinitis due to mold, Chronic seasonal allergic rhinitis due to pollen       * ALLERGEN IMMUNOTHERAPY PRESCRIPTION     5 mL    Name of Mix: Mix #2  Dust Mite, Cat, Dog Cat Hair, Standardized 10,000 BAU/mL, ALK  2.0 ml Dog Hair Dander, A. P.  1:100 w/v, HS  1.0 ml Dust Mites F 30,000AU/mL, HS  0.3 ml Dust Mites P. 30,000 AU/mL, HS  0.3 ml  Diluent: HSA qs to 5ml    Chronic allergic rhinitis due to animal hair and dander, Allergic rhinitis due to dust mite, Allergic  rhinitis due to mold, Chronic seasonal allergic rhinitis due to pollen       * ALLERGEN IMMUNOTHERAPY PRESCRIPTION     5 mL    Name of Mix: Mix #3 Grass,Tree  Dmitry,White 1:20w/v, HS 0.5ml Birch Mix PRW 1:20w/v, HS 0.5ml Boxelder-Maple Mix BHR (Boxelder Hard Red) 1:20w/v, HS 0.5ml [place],Common 1:20w/v, HS 0.5ml Elm,American 1:20w/v, HS 0.5ml [place] Mix 1:20w/v, HS 0.5ml Oak Mix RVW 1:20w/v, HS 0.5ml [place] Tree,Black 1:20w/v, HS 0.5ml Grass Mix #7 100,000 BAU/mL, HS 0.4ml Jonathan Grass 1:20w/v, HS 0.5ml Diluent: HSA qs to 5ml    Chronic allergic rhinitis due to animal hair and dander, Allergic rhinitis due to dust mite, Allergic rhinitis due to mold, Chronic seasonal allergic rhinitis due to pollen       * ALLERGEN IMMUNOTHERAPY PRESCRIPTION     5 mL    Name of Mix: Mix #4  Weeds Kochia 1:20 w/v, HS 0.5 ml Lamb's Quarters 1:20 w/v, HS 0.5 ml Nettle 1:20 w/v, HS 0.5 ml Plantain, English 1:20 w/v, HS 0.5 ml Ragweed Mixed 1:20 w/v ALK  0.5 ml Russian Thistle 1:20 w/v, HS 0.5 ml Sagebrush, Mugwort 1:20 w/v, HS 0.5 ml Sorrel, Sheep 1:20 w/v, HS 0.5 ml Diluent: HSA qs to 5ml    Chronic allergic rhinitis due to animal hair and dander, Allergic rhinitis due to dust mite, Allergic rhinitis due to mold, Chronic seasonal allergic rhinitis due to pollen       azelastine 0.05 % Soln ophthalmic solution    OPTIVAR    1 Bottle    Apply 1 drop to eye 2 times daily    Conjunctivitis, allergic, unspecified laterality       CERAVE Crea     2 Bottle    Externally apply 1 dose * topically 2 times daily    Eczema, unspecified type       cetirizine 10 MG tablet    zyrTEC    30 tablet    Take 1 tablet (10 mg) by mouth At Bedtime    Allergic conjunctivitis of both eyes, Chronic allergic rhinitis due to animal hair and dander, Allergic rhinitis due to dust mite, Allergic rhinitis due to mold, Chronic seasonal allergic rhinitis due to pollen       EPINEPHrine 0.3 MG/0.3ML injection 2-pack    EPIPEN/ADRENACLICK/or ANY BX GENERIC EQUIV     0.6 mL    Inject 0.3 mLs (0.3 mg) into the muscle once as needed for anaphylaxis    Food allergy, Need for desensitization to allergens       fluticasone 50 MCG/ACT spray    FLONASE    1 Bottle    Spray 2 sprays into both nostrils daily    Chronic allergic rhinitis due to animal hair and dander, Allergic rhinitis due to dust mite, Allergic rhinitis due to mold, Chronic seasonal allergic rhinitis due to pollen       fluticasone-salmeterol 500-50 MCG/DOSE diskus inhaler    ADVAIR    3 Inhaler    Inhale 1 puff into the lungs 2 times daily    Moderate persistent asthma without complication       loratadine 10 MG tablet    CLARITIN    30 tablet    Take 1 tablet (10 mg) by mouth daily    Allergic conjunctivitis of both eyes, Chronic allergic rhinitis due to animal hair and dander, Allergic rhinitis due to dust mite, Allergic rhinitis due to mold, Chronic seasonal allergic rhinitis due to pollen       montelukast 10 MG tablet    SINGULAIR    30 tablet    Take 1 tablet (10 mg) by mouth At Bedtime    Moderate persistent asthma without complication, Chronic allergic rhinitis due to animal hair and dander, Allergic rhinitis due to dust mite, Allergic rhinitis due to mold, Chronic seasonal allergic rhinitis due to pollen       MULTIVITAMIN PO      1 tab daily        order for DME     1 Units    Equipment being ordered: Dynaflex insert    Closed displaced fracture of proximal phalanx of right great toe, initial encounter       order for DME     1 Units    Equipment being ordered: Silicone heel cup    Heel pain, chronic, left       triamcinolone 0.1 % cream    KENALOG    80 g    Apply  topically 2 times daily as needed.    Eczema, unspecified type       * Notice:  This list has 6 medication(s) that are the same as other medications prescribed for you. Read the directions carefully, and ask your doctor or other care provider to review them with you.

## 2018-05-02 NOTE — TELEPHONE ENCOUNTER
ALLERGY SOLUTION RE-ORDER REQUEST    Dilia Solomon 1965 MRN: [id number]    DATE NEEDED:  05/16/18  Vial Color Content   Top Dose   Last Dose Vial Size  Red 1:1 Weeds   Red 1:1 0.5   Red 1:10.5 5mL  Red 1:1 Grass, Trees   Red 1:1 0.5   Red 1:10.5 5mL  Red 1:1 Molds   Red 1:1 0.5   Red 1:10.5 5mL  Red 1:1 Cat, Dog, Dust Mite   Red 1:1 0.5   Red 1:10.5 5mL      Shot Clinic Location:  [place]  Ship to Location: [place]  Special Instructions:      Pt informed that her insurance company will be billed for serum.    Updated Prescription Needed: No      Requester Signature  Silver Araujo

## 2018-05-04 DIAGNOSIS — J30.1 ALLERGIC RHINITIS DUE TO POLLEN: ICD-10-CM

## 2018-05-04 DIAGNOSIS — J30.81 CHRONIC ALLERGIC RHINITIS DUE TO ANIMAL HAIR AND DANDER: ICD-10-CM

## 2018-05-04 DIAGNOSIS — J30.89 ALLERGIC RHINITIS DUE TO DUST MITE: Primary | ICD-10-CM

## 2018-05-04 PROCEDURE — 95165 ANTIGEN THERAPY SERVICES: CPT | Performed by: ALLERGY & IMMUNOLOGY

## 2018-05-04 NOTE — PROGRESS NOTES
Allergy serums billed at [place].     Vials received below:    Vial Color Content                      Vial Size Expiration Date  Red 1:1 Weeds 5 mL  5/3/19  Red 1:1 Grass, Trees 5 mL  5/3/19  Red 1:1 Molds 5 mL  5/3/19  Red 1:1 Cat, Dog, Dust Mite 5 mL  5/3/19    Original Refill encounter date: 5/2/18      Signature  Dee Martínez

## 2018-05-04 NOTE — TELEPHONE ENCOUNTER
Allergy serums received at [place].     Vials received below:    Vial Color Content                      Vial Size Expiration Date  Red 1:1 Weeds 5 mL  5/3/19  Red 1:1 Grass, Trees 5 mL  5/3/19  Red 1:1 Molds 5 mL  5/3/19  Red 1:1 Cat, Dog, Dust Mite 5 mL  5/3/19      Signature  Dee Martínez

## 2018-05-07 ENCOUNTER — TELEPHONE (OUTPATIENT)
Dept: PODIATRY | Facility: CLINIC | Age: 53
End: 2018-05-07

## 2018-05-07 NOTE — TELEPHONE ENCOUNTER
Reason for Call:  Other foot pain    Detailed comments: Pt states heel still hurts , should she be seen? Please call to advise    Phone Number Patient can be reached at: Home number on file [phone] (home)    Best Time: today    Can we leave a detailed message on this number? YES    Call taken on 5/7/2018 at 8:12 AM by Josephine Hanna

## 2018-05-09 ENCOUNTER — OFFICE VISIT (OUTPATIENT)
Dept: PODIATRY | Facility: CLINIC | Age: 53
End: 2018-05-09
Payer: COMMERCIAL

## 2018-05-09 VITALS
BODY MASS INDEX: 36.19 KG/M2 | HEIGHT: 64 IN | DIASTOLIC BLOOD PRESSURE: 71 MMHG | WEIGHT: 212 LBS | SYSTOLIC BLOOD PRESSURE: 118 MMHG | HEART RATE: 75 BPM

## 2018-05-09 DIAGNOSIS — M72.2 PLANTAR FASCIITIS, LEFT: Primary | ICD-10-CM

## 2018-05-09 PROCEDURE — 99213 OFFICE O/P EST LOW 20 MIN: CPT | Performed by: PODIATRIST

## 2018-05-09 NOTE — LETTER
5/9/2018         RE: Dilia Solomon  [address] [facility] [address]        Dear Colleague,    Thank you for referring your patient, Dilia Solomon, to the [facility]. Please see a copy of my visit note below.    Dilia returns to the office for reevaluation of the left foot.  The patient relates following the instructions given at the last visit with noted more pain.  The patient relates overall less  improvement in pain and function of the left foot.  The patient relates no other problems.    PAST MEDICAL HISTORY:   Past Medical History:   Diagnosis Date     Injury, other and unspecified, shoulder and upper arm 9/03       BMI= Body mass index is 36.39 kg/(m^2).    Weight management plan: Patient was referred to their PCP to discuss a diet and exercise plan.    Physical Exam:    General: The patient appears to have a pleasant mental affect.    Lower extremity physical exam:  Neurovascular status is intact with palpable pedal pulses and intact epicritic sensations.  Muscular exam is within normal limits to major muscle groups.  Integument is intact.      One notes decreased edema.  One notes pain on palpation under the left heel at the insertion point of the plantar fascia on the left.  No surrounding erythema noted.       Assessment:      ICD-10-CM    1. Plantar fasciitis, left M72.2        Plan:  I have explained to Dilia about the conditions.  At this time, the patient was instructed on icing, stretching, tissue massage and support.  The patient was fitted with a Dynaflex insert that will aid in offloading the tension forces to the soft tissues and prevent further inflammation.   The patient will return in four weeks for reevaluation if the symptoms do not resolve.        Disclaimer: This note consists of symbols derived from keyboarding, dictation and/or voice recognition software. As a result, there may be errors in the script that have gone undetected. Please consider this when  interpreting information found in this chart.       CAROLE Vallejo D.P.M., FSHENG.F.A.S.      Again, thank you for allowing me to participate in the care of your patient.        Sincerely,        Carlos Vallejo DPM

## 2018-05-09 NOTE — PROGRESS NOTES
Dilia returns to the office for reevaluation of the left foot.  The patient relates following the instructions given at the last visit with noted more pain.  The patient relates overall less  improvement in pain and function of the left foot.  The patient relates no other problems.    PAST MEDICAL HISTORY:   Past Medical History:   Diagnosis Date     Injury, other and unspecified, shoulder and upper arm 9/03       BMI= Body mass index is 36.39 kg/(m^2).    Weight management plan: Patient was referred to their PCP to discuss a diet and exercise plan.    Physical Exam:    General: The patient appears to have a pleasant mental affect.    Lower extremity physical exam:  Neurovascular status is intact with palpable pedal pulses and intact epicritic sensations.  Muscular exam is within normal limits to major muscle groups.  Integument is intact.      One notes decreased edema.  One notes pain on palpation under the left heel at the insertion point of the plantar fascia on the left.  No surrounding erythema noted.       Assessment:      ICD-10-CM    1. Plantar fasciitis, left M72.2        Plan:  I have explained to Dilia about the conditions.  At this time, the patient was instructed on icing, stretching, tissue massage and support.  The patient was fitted with a Dynaflex insert that will aid in offloading the tension forces to the soft tissues and prevent further inflammation.   The patient will return in four weeks for reevaluation if the symptoms do not resolve.        Disclaimer: This note consists of symbols derived from keyboarding, dictation and/or voice recognition software. As a result, there may be errors in the script that have gone undetected. Please consider this when interpreting information found in this chart.       CAROLE Vallejo D.P.M., FSHENG.F.A.S.

## 2018-05-09 NOTE — MR AVS SNAPSHOT
After Visit Summary   5/9/2018    Dilia Solomon    MRN: [id number]           Patient Information     Date Of Birth          1965        Visit Information        Provider Department      5/9/2018 9:20 AM Carlos Vallejo DPM [facility]        Today's Diagnoses     Plantar fasciitis, left    -  1       Follow-ups after your visit        Your next 10 appointments already scheduled     May 22, 2018  5:45 PM CDT   Nurse Only with ALLERGY [facility] ([facility])    [address] [address]   [phone]           Every allergy patient MUST wait 30 minutes after their allergy shot. No exceptions.  Xolair shots #1-3 should plan to wait 2 hours in clinic Xolair shots after #4 should plan 30 minute wait in clinic            Jun 06, 2018  8:20 AM CDT   [facility] Visit with Butch Horowitz MD   [facility]    [address] [address]   [phone]            Jun 19, 2018  1:00 PM CDT   Return Visit with Santhosh Tierney MD   [facility]    [address] [address]   [phone]              Who to contact     If you have questions or need follow up information about today's clinic visit or your schedule please contact [facility] directly at [phone].  Normal or non-critical lab and imaging results will be communicated to you by MyChart, letter or phone within 4 business days after the clinic has received the results. If you do not hear from us within 7 days, please contact the clinic through MyChart or phone. If you have a critical or abnormal lab result, we will notify you by phone as soon as possible.  Submit refill requests through [employer] or call your pharmacy and they will forward the refill request to us. Please allow 3 business days for your refill to be completed.           "Additional Information About Your Visit        MyChart Information     [employer] lets you send messages to your doctor, view your test results, renew your prescriptions, schedule appointments and more. To sign up, go to [email or web address] . Click on \"Log in\" on the left side of the screen, which will take you to the Welcome page. Then click on \"Sign up Now\" on the right side of the page.     You will be asked to enter the access code listed below, as well as some personal information. Please follow the directions to create your username and password.     Your access code is: [email or web address]  Expires: 2018 10:25 AM     Your access code will  in 90 days. If you need help or a new code, please call your [facility] clinic or [phone].        Care EveryWhere ID     This is your Care EveryWhere ID. This could be used by other organizations to access your [facility] medical records  [id number]        Your Vitals Were     Pulse Height Last Period BMI (Body Mass Index)          75 5' 4\" (1.626 m) 2015 (Approximate) 36.39 kg/m2         Blood Pressure from Last 3 Encounters:   18 118/71   18 114/71   18 128/71    Weight from Last 3 Encounters:   18 212 lb (96.2 kg)   18 212 lb (96.2 kg)   18 212 lb (96.2 kg)              Today, you had the following     No orders found for display         Today's Medication Changes          These changes are accurate as of 18 11:59 PM.  If you have any questions, ask your nurse or doctor.               Start taking these medicines.        Dose/Directions    order for DME   Used for:  Plantar fasciitis, left   Started by:  Carlos Vallejo DPM        Equipment being ordered: Dynaflex insert   Quantity:  1 Units   Refills:  0            Where to get your medicines      Some of these will need a paper prescription and others can be bought over the counter.  Ask your nurse if you have questions.     Bring a paper prescription for each " of these medications     order for DME                Primary Care Provider Office Phone # Fax #    Cookie Conklin [phone]       [address] [place] [address]        Equal Access to Services     ARAVINDGABBY APRIL : Hadii aad ku hadneymaro Soomaali, waaxda luqadaha, qaybta kaalmada adeegyada, hieu jeffersonjoy lee. So [facility] [phone].    ATENCIÓN: Si habla español, tiene a pruitt disposición servicios gratuitos de asistencia lingüística. Llame al [phone].    We comply with applicable federal civil rights laws and [place] laws. We do not discriminate on the basis of race, color, national origin, age, disability, sex, sexual orientation, or gender identity.            Thank you!     Thank you for choosing [facility]  for your care. Our goal is always to provide you with excellent care. Hearing back from our patients is one way we can continue to improve our services. Please take a few minutes to complete the written survey that you may receive in the mail after your visit with us. Thank you!             Your Updated Medication List - Protect others around you: Learn how to safely use, store and throw away your medicines at www.disposemymeds.org.          This list is accurate as of 5/9/18 11:59 PM.  Always use your most recent med list.                   Brand Name Dispense Instructions for use Diagnosis    * albuterol (2.5 MG/3ML) 0.083% neb solution     1 Box    Take 1 vial (2.5 mg) by nebulization every 4 hours as needed    Moderate persistent asthma, uncomplicated       * albuterol 108 (90 Base) MCG/ACT Inhaler    PROAIR HFA/PROVENTIL HFA/VENTOLIN HFA    1 Inhaler    Inhale 2 puffs into the lungs every 4 hours as needed    Moderate persistent asthma without complication       ALLERGEN IMMUNOTHERAPY PRESCRIPTION     5 mL    Name of Mix: Mix #1  Mold Alternaria Tenuis 1:10 w/v, HS  0.5 ml Aspergillus Fumigatus 1:10 w/v, HS  0.5 ml Epicoccum Nigrum 1:10  w/v, HS 0.5 ml Hormodendrum Cladosporioides 1:10 w/v, HS 0.5 ml Penicillium Mix 1:10 w/v, HS  0.5 ml Diluent: HSA qs to 5ml    Chronic allergic rhinitis due to animal hair and dander, Allergic rhinitis due to dust mite, Allergic rhinitis due to mold, Chronic seasonal allergic rhinitis due to pollen       ALLERGEN IMMUNOTHERAPY PRESCRIPTION     5 mL    Name of Mix: Mix #2  Dust Mite, Cat, Dog Cat Hair, Standardized 10,000 BAU/mL, ALK  2.0 ml Dog Hair Dander, A. P.  1:100 w/v, HS  1.0 ml Dust Mites F 30,000AU/mL, HS  0.3 ml Dust Mites P. 30,000 AU/mL, HS  0.3 ml  Diluent: HSA qs to 5ml    Chronic allergic rhinitis due to animal hair and dander, Allergic rhinitis due to dust mite, Allergic rhinitis due to mold, Chronic seasonal allergic rhinitis due to pollen       ALLERGEN IMMUNOTHERAPY PRESCRIPTION     5 mL    Name of Mix: Mix #3 Grass,Tree  Dmitry,White 1:20w/v, HS 0.5ml Birch Mix PRW 1:20w/v, HS 0.5ml Boxelder-Maple Mix BHR (Boxelder Hard Red) 1:20w/v, HS 0.5ml Cheyenne,Common 1:20w/v, HS 0.5ml Elm,American 1:20w/v, HS 0.5ml Fairfield Mix RW 1:20w/v, HS 0.5ml Oak Mix RVW 1:20w/v, HS 0.5ml Boling Tree,Black 1:20w/v, HS 0.5ml Grass Mix #7 100,000 BAU/mL, HS 0.4ml Jonathan Grass 1:20w/v, HS 0.5ml Diluent: HSA qs to 5ml    Chronic allergic rhinitis due to animal hair and dander, Allergic rhinitis due to dust mite, Allergic rhinitis due to mold, Chronic seasonal allergic rhinitis due to pollen       ALLERGEN IMMUNOTHERAPY PRESCRIPTION     5 mL    Name of Mix: Mix #4  Weeds Kochia 1:20 w/v, HS 0.5 ml Lamb's Quarters 1:20 w/v, HS 0.5 ml Nettle 1:20 w/v, HS 0.5 ml Plantain, English 1:20 w/v, HS 0.5 ml Ragweed Mixed 1:20 w/v ALK  0.5 ml Russian Thistle 1:20 w/v, HS 0.5 ml Sagebrush, Mugwort 1:20 w/v, HS 0.5 ml Sorrel, Sheep 1:20 w/v, HS 0.5 ml Diluent: HSA qs to 5ml    Chronic allergic rhinitis due to animal hair and dander, Allergic rhinitis due to dust mite, Allergic rhinitis due to mold, Chronic seasonal allergic rhinitis  due to pollen       azelastine 0.05 % Soln ophthalmic solution    OPTIVAR    1 Bottle    Apply 1 drop to eye 2 times daily    Conjunctivitis, allergic, unspecified laterality       CERAVE Crea     2 Bottle    Externally apply 1 dose * topically 2 times daily    Eczema, unspecified type       cetirizine 10 MG tablet    zyrTEC    30 tablet    Take 1 tablet (10 mg) by mouth At Bedtime    Allergic conjunctivitis of both eyes, Chronic allergic rhinitis due to animal hair and dander, Allergic rhinitis due to dust mite, Allergic rhinitis due to mold, Chronic seasonal allergic rhinitis due to pollen       EPINEPHrine 0.3 MG/0.3ML injection 2-pack    EPIPEN/ADRENACLICK/or ANY BX GENERIC EQUIV    0.6 mL    Inject 0.3 mLs (0.3 mg) into the muscle once as needed for anaphylaxis    Food allergy, Need for desensitization to allergens       fluticasone 50 MCG/ACT spray    FLONASE    1 Bottle    Spray 2 sprays into both nostrils daily    Chronic allergic rhinitis due to animal hair and dander, Allergic rhinitis due to dust mite, Allergic rhinitis due to mold, Chronic seasonal allergic rhinitis due to pollen       fluticasone-salmeterol 500-50 MCG/DOSE diskus inhaler    ADVAIR    3 Inhaler    Inhale 1 puff into the lungs 2 times daily    Moderate persistent asthma without complication       loratadine 10 MG tablet    CLARITIN    30 tablet    Take 1 tablet (10 mg) by mouth daily    Allergic conjunctivitis of both eyes, Chronic allergic rhinitis due to animal hair and dander, Allergic rhinitis due to dust mite, Allergic rhinitis due to mold, Chronic seasonal allergic rhinitis due to pollen       montelukast 10 MG tablet    SINGULAIR    30 tablet    Take 1 tablet (10 mg) by mouth At Bedtime    Moderate persistent asthma without complication, Chronic allergic rhinitis due to animal hair and dander, Allergic rhinitis due to dust mite, Allergic rhinitis due to mold, Chronic seasonal allergic rhinitis due to pollen       MULTIVITAMIN PO       1 tab daily        order for DME     1 Units    Equipment being ordered: Dynaflex insert    Closed displaced fracture of proximal phalanx of right great toe, initial encounter       order for DME     1 Units    Equipment being ordered: Silicone heel cup    Heel pain, chronic, left       order for DME     1 Units    Equipment being ordered: Dynaflex insert    Plantar fasciitis, left       triamcinolone 0.1 % cream    KENALOG    80 g    Apply  topically 2 times daily as needed.    Eczema, unspecified type       * Notice:  This list has 2 medication(s) that are the same as other medications prescribed for you. Read the directions carefully, and ask your doctor or other care provider to review them with you.

## 2018-05-16 ENCOUNTER — TELEPHONE (OUTPATIENT)
Dept: FAMILY MEDICINE | Facility: CLINIC | Age: 53
End: 2018-05-16

## 2018-05-16 DIAGNOSIS — M72.2 PLANTAR FASCIITIS, LEFT: Primary | ICD-10-CM

## 2018-05-16 NOTE — TELEPHONE ENCOUNTER
Reason for Call: Request for an order or referral:    Order or referral being requested: Pt says she saw Dr Vallejo for Plantar Fascitis, left. She says he gave her an insert for her shoe but it's not getting better. She would like a referral to Physical Therapy in [place].     Date needed: as soon as possible    Has the patient been seen by the PCP for this problem? YES    Phone number Patient can be reached at:  Home number on file [phone] (home)    Best Time:  anytime    Can we leave a detailed message on this number?  YES    Call taken on 5/16/2018 at 11:32 AM by Dalia Benjamin

## 2018-05-17 ENCOUNTER — HOSPITAL ENCOUNTER (OUTPATIENT)
Dept: PHYSICAL THERAPY | Facility: CLINIC | Age: 53
Setting detail: THERAPIES SERIES
End: 2018-05-17
Attending: PODIATRIST
Payer: COMMERCIAL

## 2018-05-17 PROCEDURE — 97161 PT EVAL LOW COMPLEX 20 MIN: CPT | Mod: GP | Performed by: PHYSICAL THERAPIST

## 2018-05-17 PROCEDURE — 40000718 ZZHC STATISTIC PT DEPARTMENT ORTHO VISIT: Performed by: PHYSICAL THERAPIST

## 2018-05-17 PROCEDURE — 97535 SELF CARE MNGMENT TRAINING: CPT | Mod: GP | Performed by: PHYSICAL THERAPIST

## 2018-05-17 PROCEDURE — 97110 THERAPEUTIC EXERCISES: CPT | Mod: GP | Performed by: PHYSICAL THERAPIST

## 2018-05-21 NOTE — PROGRESS NOTES
Physical Therapy Evaluation  05/17/18 1100   General Information   Type of Visit Initial OP Ortho PT Evaluation   Start of Care Date 05/17/18   Referring Physician Dr. Vallejo   Patient/Family Goals Statement to be able to walk and stand without pain in her left heel   Orders Evaluate and Treat   Date of Order 05/16/18   Insurance Type Private   Insurance Comments/Visits Authorized [employer]   Medical Diagnosis Plantar faciitis, left   Surgical/Medical history reviewed Yes   Precautions/Limitations no known precautions/limitations       Present No   Body Part(s)   Body Part(s) Ankle/Foot   Presentation and Etiology   Pertinent history of current problem (include personal factors and/or comorbidities that impact the POC) Reports that her left foot has started to hurt while she was healing a R foot toe fx.  Reports that she has been seeing Dr. Vallejo and that he gave her a steal plate and inserts for her shoes and has been wearing them for a week and that they haven't helped at all.  Reports that she has had a ankle brace that she had at home is the only way she can walk.  Reports pain with working and standing.  Patient reports that she has pain with standing and has to stand for long period of time.   Impairments A. Pain;C. Swelling;E. Decreased flexibility;H. Impaired gait;K. Numbness   Functional Limitations perform activities of daily living;perform required work activities;perform desired leisure / sports activities   Symptom Location Left heel   How/Where did it occur At work;With repetition/overuse;Other  (Overuse from healing from R foot injury)   Onset date of current episode/exacerbation 12/01/17   Chronicity Recurrent   Pain rating (0-10 point scale) Best (/10);Worst (/10)   Best (/10) 5   Worst (/10) 10   Pain quality A. Sharp;C. Aching;G. Cramping;E. Shooting;F. Stabbing   Frequency of pain/symptoms A. Constant   Pain/symptoms are: The same all the time   Pain/symptoms exacerbated by B.  Walking;G. Certain positions;M. Other   Pain exacerbation comment standing   Pain/symptoms eased by H. Cold;J. Braces/supports   Progression of symptoms since onset: Unchanged   Current Level of Function   Patient role/employment history A. Employed   Fall Risk Screen   Fall screen completed by PT   Have you fallen 2 or more times in the past year? No   Have you fallen and had an injury in the past year? No   Is patient a fall risk? No   Functional Scales   Other Scales  LEFS: 17/80   Ankle/Foot Objective Findings   Side (if bilateral, select both right and left) Left   Observation Patient wearing figure 8 ankle brace on at session   Gait/Locomotion decreased heel/toe on L foot; wide base of support   Balance/ Proprioception (Single Leg Stance) not tested due to pain   Ankle/Foot ROM Comment L: 8*   Palpation TTP plantar fasica origin and achilles tendon insertion   Accessory Motion/Joint Mobility decreased talocrural joint mobililty L   Left DF (Knee Ext) AROM L: 5*; R: 10*   Left PF AROM WNL B   Additional Left Ankle/Foot ROM Left DF (Knee Flexed) AROM   Left Gastroc (in WB) Flexibility restricted L   Left Soleus (in WB) Flexibility restricted L   Planned Therapy Interventions   Planned Therapy Interventions balance training;gait training;manual therapy;joint mobilization;neuromuscular re-education;ROM;strengthening;stretching   Planned Modality Interventions   Planned Modality Interventions Cryotherapy;Electrical stimulation;Hot packs;Iontophoresis;TENS;Ultrasound   Planned Modality Interventions Comments only as needed   Clinical Impression   Criteria for Skilled Therapeutic Interventions Met yes, treatment indicated   PT Diagnosis L foot pain consistent with plantar fasciitis   Influenced by the following impairments pain, decreased ROM, weakness, joint mobility, decreased flexibility   Functional limitations due to impairments difficulty with walking, standing, working, house hold tasks   Clinical Presentation  Evolving/Changing   Clinical Presentation Rationale motivated to work with PT   Clinical Decision Making (Complexity) Low complexity   Therapy Frequency 2 times/Week   Predicted Duration of Therapy Intervention (days/wks) 10 weeks   Risk & Benefits of therapy have been explained Yes   Patient, Family & other staff in agreement with plan of care Yes   Education Assessment   Preferred Learning Style Listening;Demonstration;Pictures/video   Barriers to Learning No barriers   ORTHO GOALS   PT Ortho Eval Goals 1;2;3   Ortho Goal 1   Goal Identifier Walking   Goal Description Patient will tolerate walking for 15 minutes wihotut an increase in symptoms in order to be able to walk around her house to perform house hold taks.   Target Date 07/16/18   Ortho Goal 2   Goal Identifier Standing   Goal Description Patient will tolerate standing 1 hour without an increaes in symptoms in order to tolerate working.   Target Date 07/16/18   Ortho Goal 3   Goal Identifier Standing 2   Goal Description Patient will tolerate standing 4 hours in order to tolerate working until a break at work.   Target Date 07/30/18   Total Evaluation Time   Total Evaluation Time 25 mins   Please contact me with any questions or concerns.    Thank you for your referral,     Chelsea Alonzo, PT, DPT, CLT  Physical Therapist & Certified Lymphedema Therapist  [address] [address]  [phone]

## 2018-05-22 ENCOUNTER — ALLIED HEALTH/NURSE VISIT (OUTPATIENT)
Dept: ALLERGY | Facility: CLINIC | Age: 53
End: 2018-05-22
Payer: COMMERCIAL

## 2018-05-22 ENCOUNTER — HOSPITAL ENCOUNTER (OUTPATIENT)
Dept: PHYSICAL THERAPY | Facility: CLINIC | Age: 53
Setting detail: THERAPIES SERIES
End: 2018-05-22
Attending: PODIATRIST
Payer: COMMERCIAL

## 2018-05-22 DIAGNOSIS — J30.9 ALLERGIC RHINITIS: Primary | ICD-10-CM

## 2018-05-22 PROCEDURE — 40000718 ZZHC STATISTIC PT DEPARTMENT ORTHO VISIT: Performed by: PHYSICAL THERAPIST

## 2018-05-22 PROCEDURE — 95117 IMMUNOTHERAPY INJECTIONS: CPT

## 2018-05-22 PROCEDURE — 97140 MANUAL THERAPY 1/> REGIONS: CPT | Mod: GP | Performed by: PHYSICAL THERAPIST

## 2018-05-22 PROCEDURE — 99207 ZZC DROP WITH A PROCEDURE: CPT

## 2018-05-22 NOTE — MR AVS SNAPSHOT
After Visit Summary   5/22/2018    Dilia Solomon    MRN: [id number]           Patient Information     Date Of Birth          1965        Visit Information        Provider Department      5/22/2018 9:00 AM ALLERGY [place] - [facility]        Today's Diagnoses     Allergic rhinitis    -  1       Follow-ups after your visit        Your next 10 appointments already scheduled     May 30, 2018 11:45 AM CDT   Ortho Treatment with Chelsea Alonzo PT   [facility] ([facility])    [address] [address]   [phone]            Jun 06, 2018  8:20 AM CDT   Return Visit with Butch Horowitz MD   [facility] ([facility]    [address] [place] [id number] [phone]            Jun 19, 2018  1:00 PM CDT   Return Visit with Santhosh Tierney MD   [facility] ([facility])    [address] [id number] [phone]              Who to contact     If you have questions or need follow up information about today's clinic visit or your schedule please contact [facility] directly at [phone].  Normal or non-critical lab and imaging results will be communicated to you by Mandaehart, letter or phone within 4 business days after the clinic has received the results. If you do not hear from us within 7 days, please contact the clinic through Mandaehart or phone. If you have a critical or abnormal lab result, we will notify you by phone as soon as possible.  Submit refill requests through [email or web address] or call your pharmacy and they will forward the refill request to us. Please allow 3 business days for your refill to be completed.          Additional Information About Your Visit        [email or web address] Information     [email or web address] lets you send messages to your doctor, view your test results, renew your prescriptions, schedule appointments and more. To sign up, go to  [email or web address] . Click on \"Log in\" on the left side of the screen, which will take you to the Welcome page. Then click on \"Sign up Now\" on the right side of the page.     You will be asked to enter the access code listed below, as well as some personal information. Please follow the directions to create your username and password.     Your access code is: [email or web address]  Expires: 2018 10:25 AM     Your access code will  in 90 days. If you need help or a new code, please call your [place] clinic or [phone].        Care EveryWhere ID     This is your Care EveryWhere ID. This could be used by other organizations to access your [place] medical records  [id number]        Your Vitals Were     Last Period                   2015 (Approximate)            Blood Pressure from Last 3 Encounters:   18 118/71   18 114/71   18 128/71    Weight from Last 3 Encounters:   18 96.2 kg (212 lb)   18 96.2 kg (212 lb)   18 96.2 kg (212 lb)              We Performed the Following     Allergy Shot: Two or more injections        Primary Care Provider Office Phone # Fax #    Cookie Conklin [phone]       [address] [place] [address]        Equal Access to Services     [facility]: Hadii ulices amaral hadasho Sofaith, waaxda luqadaha, qaybta kaalmada adetanikayada, hieu shen . So [facility] [phone].    ATENCIÓN: Si habla español, tiene a pruitt disposición servicios gratuitos de asistencia lingüística. Lucas al [phone].    We comply with applicable federal civil rights laws and [place] laws. We do not discriminate on the basis of race, color, national origin, age, disability, sex, sexual orientation, or gender identity.            Thank you!     Thank you for choosing [facility]  for your care. Our goal is always to provide you with excellent care. Hearing back from our patients is one way we can continue to " improve our services. Please take a few minutes to complete the written survey that you may receive in the mail after your visit with us. Thank you!             Your Updated Medication List - Protect others around you: Learn how to safely use, store and throw away your medicines at www.disposemymeds.org.          This list is accurate as of 5/22/18  9:51 AM.  Always use your most recent med list.                   Brand Name Dispense Instructions for use Diagnosis    * albuterol (2.5 MG/3ML) 0.083% neb solution     1 Box    Take 1 vial (2.5 mg) by nebulization every 4 hours as needed    Moderate persistent asthma, uncomplicated       * albuterol 108 (90 Base) MCG/ACT Inhaler    PROAIR HFA/PROVENTIL HFA/VENTOLIN HFA    1 Inhaler    Inhale 2 puffs into the lungs every 4 hours as needed    Moderate persistent asthma without complication       ALLERGEN IMMUNOTHERAPY PRESCRIPTION     5 mL    Name of Mix: Mix #1  Mold Alternaria Tenuis 1:10 w/v, HS  0.5 ml Aspergillus Fumigatus 1:10 w/v, HS  0.5 ml Epicoccum Nigrum 1:10 w/v, HS 0.5 ml Hormodendrum Cladosporioides 1:10 w/v, HS 0.5 ml Penicillium Mix 1:10 w/v, HS  0.5 ml Diluent: HSA qs to 5ml    Chronic allergic rhinitis due to animal hair and dander, Allergic rhinitis due to dust mite, Allergic rhinitis due to mold, Chronic seasonal allergic rhinitis due to pollen       ALLERGEN IMMUNOTHERAPY PRESCRIPTION     5 mL    Name of Mix: Mix #2  Dust Mite, Cat, Dog Cat Hair, Standardized 10,000 BAU/mL, ALK  2.0 ml Dog Hair Dander, A. P.  1:100 w/v, HS  1.0 ml Dust Mites F 30,000AU/mL, HS  0.3 ml Dust Mites P. 30,000 AU/mL, HS  0.3 ml  Diluent: HSA qs to 5ml    Chronic allergic rhinitis due to animal hair and dander, Allergic rhinitis due to dust mite, Allergic rhinitis due to mold, Chronic seasonal allergic rhinitis due to pollen       ALLERGEN IMMUNOTHERAPY PRESCRIPTION     5 mL    Name of Mix: Mix #3 Grass,Tree  Dmitry,White 1:20w/v, HS 0.5ml Birch Mix PRW 1:20w/v, HS 0.5ml  Boxelder-Maple Mix BHR (Boxelder Hard Red) 1:20w/v, HS 0.5ml [place],Common 1:20w/v, HS 0.5ml Elm,American 1:20w/v, HS 0.5ml [place] Mix RW 1:20w/v, HS 0.5ml Oak Mix RVW 1:20w/v, HS 0.5ml [place] Tree,Black 1:20w/v, HS 0.5ml Grass Mix #7 100,000 BAU/mL, HS 0.4ml Jonathan Grass 1:20w/v, HS 0.5ml Diluent: HSA qs to 5ml    Chronic allergic rhinitis due to animal hair and dander, Allergic rhinitis due to dust mite, Allergic rhinitis due to mold, Chronic seasonal allergic rhinitis due to pollen       ALLERGEN IMMUNOTHERAPY PRESCRIPTION     5 mL    Name of Mix: Mix #4  Weeds Kochia 1:20 w/v, HS 0.5 ml Lamb's Quarters 1:20 w/v, HS 0.5 ml Nettle 1:20 w/v, HS 0.5 ml Plantain, English 1:20 w/v, HS 0.5 ml Ragweed Mixed 1:20 w/v ALK  0.5 ml Russian Thistle 1:20 w/v, HS 0.5 ml Sagebrush, Mugwort 1:20 w/v, HS 0.5 ml Sorrel, Sheep 1:20 w/v, HS 0.5 ml Diluent: HSA qs to 5ml    Chronic allergic rhinitis due to animal hair and dander, Allergic rhinitis due to dust mite, Allergic rhinitis due to mold, Chronic seasonal allergic rhinitis due to pollen       azelastine 0.05 % Soln ophthalmic solution    OPTIVAR    1 Bottle    Apply 1 drop to eye 2 times daily    Conjunctivitis, allergic, unspecified laterality       CERAVE Crea     2 Bottle    Externally apply 1 dose * topically 2 times daily    Eczema, unspecified type       cetirizine 10 MG tablet    zyrTEC    30 tablet    Take 1 tablet (10 mg) by mouth At Bedtime    Allergic conjunctivitis of both eyes, Chronic allergic rhinitis due to animal hair and dander, Allergic rhinitis due to dust mite, Allergic rhinitis due to mold, Chronic seasonal allergic rhinitis due to pollen       EPINEPHrine 0.3 MG/0.3ML injection 2-pack    EPIPEN/ADRENACLICK/or ANY BX GENERIC EQUIV    0.6 mL    Inject 0.3 mLs (0.3 mg) into the muscle once as needed for anaphylaxis    Food allergy, Need for desensitization to allergens       fluticasone 50 MCG/ACT spray    FLONASE    1 Bottle    Spray 2 sprays into  both nostrils daily    Chronic allergic rhinitis due to animal hair and dander, Allergic rhinitis due to dust mite, Allergic rhinitis due to mold, Chronic seasonal allergic rhinitis due to pollen       fluticasone-salmeterol 500-50 MCG/DOSE diskus inhaler    ADVAIR    3 Inhaler    Inhale 1 puff into the lungs 2 times daily    Moderate persistent asthma without complication       loratadine 10 MG tablet    CLARITIN    30 tablet    Take 1 tablet (10 mg) by mouth daily    Allergic conjunctivitis of both eyes, Chronic allergic rhinitis due to animal hair and dander, Allergic rhinitis due to dust mite, Allergic rhinitis due to mold, Chronic seasonal allergic rhinitis due to pollen       montelukast 10 MG tablet    SINGULAIR    30 tablet    Take 1 tablet (10 mg) by mouth At Bedtime    Moderate persistent asthma without complication, Chronic allergic rhinitis due to animal hair and dander, Allergic rhinitis due to dust mite, Allergic rhinitis due to mold, Chronic seasonal allergic rhinitis due to pollen       MULTIVITAMIN PO      1 tab daily        order for DME     1 Units    Equipment being ordered: Dynaflex insert    Closed displaced fracture of proximal phalanx of right great toe, initial encounter       order for DME     1 Units    Equipment being ordered: Silicone heel cup    Heel pain, chronic, left       order for DME     1 Units    Equipment being ordered: Dynaflex insert    Plantar fasciitis, left       triamcinolone 0.1 % cream    KENALOG    80 g    Apply  topically 2 times daily as needed.    Eczema, unspecified type       * Notice:  This list has 2 medication(s) that are the same as other medications prescribed for you. Read the directions carefully, and ask your doctor or other care provider to review them with you.

## 2018-05-29 ENCOUNTER — ALLIED HEALTH/NURSE VISIT (OUTPATIENT)
Dept: ALLERGY | Facility: CLINIC | Age: 53
End: 2018-05-29
Payer: COMMERCIAL

## 2018-05-29 DIAGNOSIS — J30.9 ALLERGIC RHINITIS: Primary | ICD-10-CM

## 2018-05-29 PROCEDURE — 99207 ZZC DROP WITH A PROCEDURE: CPT

## 2018-05-29 PROCEDURE — 95117 IMMUNOTHERAPY INJECTIONS: CPT

## 2018-05-29 NOTE — MR AVS SNAPSHOT
After Visit Summary   5/29/2018    Dilia Solomon    MRN: [id number]           Patient Information     Date Of Birth          1965        Visit Information        Provider Department      5/29/2018 3:30 PM ALLERGY [facility]        Today's Diagnoses     Allergic rhinitis    -  1       Follow-ups after your visit        Your next 10 appointments already scheduled     May 30, 2018 11:45 AM CDT   Ortho Treatment with Chelsea Alonzo PT   [facility]    [address] [facility] [address]   [phone]            Jun 05, 2018  3:30 PM CDT   Nurse Only with ALLERGY [facility] ([facility])    [address] [id number] [phone]           Every allergy patient MUST wait 30 minutes after their allergy shot. No exceptions.  Xolair shots #1-3 should plan to wait 2 hours in clinic Xolair shots after #4 should plan 30 minute wait in clinic            Jun 06, 2018  8:20 AM CDT   Return Visit with Butch Horowitz MD   [facility]    [address] [id number] [phone]            Jun 19, 2018  1:00 PM CDT   Return Visit with Santhosh Tierney MD   [facility]    [address] [address] [phone]              Who to contact     If you have questions or need follow up information about today's clinic visit or your schedule please contact [facility] directly at [phone].  Normal or non-critical lab and imaging results will be communicated to you by MyChart, letter or phone within 4 business days after the clinic has received the results. If you do not hear from us within 7 days, please contact the clinic through MyChart or phone. If you have a critical or abnormal lab result, we will notify you by phone as soon as  possible.  Submit refill requests through Arbovax or call your pharmacy and they will forward the refill request to us. Please allow 3 business days for your refill to be completed.          Additional Information About Your Visit        Care EveryWhere ID     This is your Care EveryWhere ID. This could be used by other organizations to access your [place] medical records  [id number]        Your Vitals Were     Last Period                   07/18/2015 (Approximate)            Blood Pressure from Last 3 Encounters:   05/09/18 118/71   04/25/18 114/71   03/19/18 128/71    Weight from Last 3 Encounters:   05/09/18 96.2 kg (212 lb)   04/25/18 96.2 kg (212 lb)   03/19/18 96.2 kg (212 lb)              We Performed the Following     Allergy Shot: Two or more injections        Primary Care Provider Office Phone # Fax #    Cookie Conklin [phone]       [address] [place] [address]        Equal Access to Services     KODY CHEN : Hadii aad ku hadasho Soomaali, waaxda luqadaha, qaybta kaalmada adeegyada, waxay idiin hayaan kanwal shen . So [facility] [phone].    ATENCIÓN: Si habla español, tiene a pruitt disposición servicios gratuitos de asistencia lingüística. Lucas al [phone].    We comply with applicable federal civil rights laws and [place] laws. We do not discriminate on the basis of race, color, national origin, age, disability, sex, sexual orientation, or gender identity.            Thank you!     Thank you for choosing [facility]  for your care. Our goal is always to provide you with excellent care. Hearing back from our patients is one way we can continue to improve our services. Please take a few minutes to complete the written survey that you may receive in the mail after your visit with us. Thank you!             Your Updated Medication List - Protect others around you: Learn how to safely use, store and throw away your medicines at www.disposemymeds.org.           This list is accurate as of 5/29/18  4:37 PM.  Always use your most recent med list.                   Brand Name Dispense Instructions for use Diagnosis    * albuterol (2.5 MG/3ML) 0.083% neb solution     1 Box    Take 1 vial (2.5 mg) by nebulization every 4 hours as needed    Moderate persistent asthma, uncomplicated       * albuterol 108 (90 Base) MCG/ACT Inhaler    PROAIR HFA/PROVENTIL HFA/VENTOLIN HFA    1 Inhaler    Inhale 2 puffs into the lungs every 4 hours as needed    Moderate persistent asthma without complication       ALLERGEN IMMUNOTHERAPY PRESCRIPTION     5 mL    Name of Mix: Mix #1  Mold Alternaria Tenuis 1:10 w/v, HS  0.5 ml Aspergillus Fumigatus 1:10 w/v, HS  0.5 ml Epicoccum Nigrum 1:10 w/v, HS 0.5 ml Hormodendrum Cladosporioides 1:10 w/v, HS 0.5 ml Penicillium Mix 1:10 w/v, HS  0.5 ml Diluent: HSA qs to 5ml    Chronic allergic rhinitis due to animal hair and dander, Allergic rhinitis due to dust mite, Allergic rhinitis due to mold, Chronic seasonal allergic rhinitis due to pollen       ALLERGEN IMMUNOTHERAPY PRESCRIPTION     5 mL    Name of Mix: Mix #2  Dust Mite, Cat, Dog Cat Hair, Standardized 10,000 BAU/mL, ALK  2.0 ml Dog Hair Dander, A. P.  1:100 w/v, HS  1.0 ml Dust Mites F 30,000AU/mL, HS  0.3 ml Dust Mites P. 30,000 AU/mL, HS  0.3 ml  Diluent: HSA qs to 5ml    Chronic allergic rhinitis due to animal hair and dander, Allergic rhinitis due to dust mite, Allergic rhinitis due to mold, Chronic seasonal allergic rhinitis due to pollen       ALLERGEN IMMUNOTHERAPY PRESCRIPTION     5 mL    Name of Mix: Mix #3 Grass,Tree  Dmitry,White 1:20w/v, HS 0.5ml Birch Mix PRW 1:20w/v, HS 0.5ml Boxelder-Maple Mix BHR (Boxelder Hard Red) 1:20w/v, HS 0.5ml [place],Common 1:20w/v, HS 0.5ml Elm,American 1:20w/v, HS 0.5ml [place] Mix RW 1:20w/v, HS 0.5ml Oak Mix RVW 1:20w/v, HS 0.5ml [place] Tree,Black 1:20w/v, HS 0.5ml Grass Mix #7 100,000 BAU/mL, HS 0.4ml Jonathan Grass 1:20w/v, HS 0.5ml Diluent: HSA  qs to 5ml    Chronic allergic rhinitis due to animal hair and dander, Allergic rhinitis due to dust mite, Allergic rhinitis due to mold, Chronic seasonal allergic rhinitis due to pollen       ALLERGEN IMMUNOTHERAPY PRESCRIPTION     5 mL    Name of Mix: Mix #4  Weeds Kochia 1:20 w/v, HS 0.5 ml Lamb's Quarters 1:20 w/v, HS 0.5 ml Nettle 1:20 w/v, HS 0.5 ml Plantain, English 1:20 w/v, HS 0.5 ml Ragweed Mixed 1:20 w/v ALK  0.5 ml Russian Thistle 1:20 w/v, HS 0.5 ml Sagebrush, Mugwort 1:20 w/v, HS 0.5 ml Sorrel, Sheep 1:20 w/v, HS 0.5 ml Diluent: HSA qs to 5ml    Chronic allergic rhinitis due to animal hair and dander, Allergic rhinitis due to dust mite, Allergic rhinitis due to mold, Chronic seasonal allergic rhinitis due to pollen       azelastine 0.05 % Soln ophthalmic solution    OPTIVAR    1 Bottle    Apply 1 drop to eye 2 times daily    Conjunctivitis, allergic, unspecified laterality       CERAVE Crea     2 Bottle    Externally apply 1 dose * topically 2 times daily    Eczema, unspecified type       cetirizine 10 MG tablet    zyrTEC    30 tablet    Take 1 tablet (10 mg) by mouth At Bedtime    Allergic conjunctivitis of both eyes, Chronic allergic rhinitis due to animal hair and dander, Allergic rhinitis due to dust mite, Allergic rhinitis due to mold, Chronic seasonal allergic rhinitis due to pollen       EPINEPHrine 0.3 MG/0.3ML injection 2-pack    EPIPEN/ADRENACLICK/or ANY BX GENERIC EQUIV    0.6 mL    Inject 0.3 mLs (0.3 mg) into the muscle once as needed for anaphylaxis    Food allergy, Need for desensitization to allergens       fluticasone 50 MCG/ACT spray    FLONASE    1 Bottle    Spray 2 sprays into both nostrils daily    Chronic allergic rhinitis due to animal hair and dander, Allergic rhinitis due to dust mite, Allergic rhinitis due to mold, Chronic seasonal allergic rhinitis due to pollen       fluticasone-salmeterol 500-50 MCG/DOSE diskus inhaler    ADVAIR    3 Inhaler    Inhale 1 puff into the lungs 2  times daily    Moderate persistent asthma without complication       loratadine 10 MG tablet    CLARITIN    30 tablet    Take 1 tablet (10 mg) by mouth daily    Allergic conjunctivitis of both eyes, Chronic allergic rhinitis due to animal hair and dander, Allergic rhinitis due to dust mite, Allergic rhinitis due to mold, Chronic seasonal allergic rhinitis due to pollen       montelukast 10 MG tablet    SINGULAIR    30 tablet    Take 1 tablet (10 mg) by mouth At Bedtime    Moderate persistent asthma without complication, Chronic allergic rhinitis due to animal hair and dander, Allergic rhinitis due to dust mite, Allergic rhinitis due to mold, Chronic seasonal allergic rhinitis due to pollen       MULTIVITAMIN PO      1 tab daily        order for DME     1 Units    Equipment being ordered: Dynaflex insert    Closed displaced fracture of proximal phalanx of right great toe, initial encounter       order for DME     1 Units    Equipment being ordered: Silicone heel cup    Heel pain, chronic, left       order for DME     1 Units    Equipment being ordered: Dynaflex insert    Plantar fasciitis, left       triamcinolone 0.1 % cream    KENALOG    80 g    Apply  topically 2 times daily as needed.    Eczema, unspecified type       * Notice:  This list has 2 medication(s) that are the same as other medications prescribed for you. Read the directions carefully, and ask your doctor or other care provider to review them with you.

## 2018-05-30 ENCOUNTER — HOSPITAL ENCOUNTER (OUTPATIENT)
Dept: PHYSICAL THERAPY | Facility: CLINIC | Age: 53
Setting detail: THERAPIES SERIES
End: 2018-05-30
Attending: PODIATRIST
Payer: COMMERCIAL

## 2018-05-30 PROCEDURE — 40000718 ZZHC STATISTIC PT DEPARTMENT ORTHO VISIT: Performed by: PHYSICAL THERAPIST

## 2018-05-30 PROCEDURE — 97140 MANUAL THERAPY 1/> REGIONS: CPT | Mod: GP | Performed by: PHYSICAL THERAPIST

## 2018-06-06 ENCOUNTER — OFFICE VISIT (OUTPATIENT)
Dept: ALLERGY | Facility: CLINIC | Age: 53
End: 2018-06-06
Payer: COMMERCIAL

## 2018-06-06 ENCOUNTER — ALLIED HEALTH/NURSE VISIT (OUTPATIENT)
Dept: ALLERGY | Facility: CLINIC | Age: 53
End: 2018-06-06
Payer: COMMERCIAL

## 2018-06-06 ENCOUNTER — HOSPITAL ENCOUNTER (OUTPATIENT)
Dept: PHYSICAL THERAPY | Facility: CLINIC | Age: 53
Setting detail: THERAPIES SERIES
End: 2018-06-06
Attending: PODIATRIST
Payer: COMMERCIAL

## 2018-06-06 VITALS
TEMPERATURE: 97 F | BODY MASS INDEX: 36.1 KG/M2 | SYSTOLIC BLOOD PRESSURE: 113 MMHG | HEART RATE: 74 BPM | DIASTOLIC BLOOD PRESSURE: 74 MMHG | OXYGEN SATURATION: 100 % | WEIGHT: 210.32 LBS | RESPIRATION RATE: 16 BRPM

## 2018-06-06 DIAGNOSIS — J30.89 ALLERGIC RHINITIS DUE TO DUST MITE: ICD-10-CM

## 2018-06-06 DIAGNOSIS — J45.40 MODERATE PERSISTENT ASTHMA WITHOUT COMPLICATION: Primary | ICD-10-CM

## 2018-06-06 DIAGNOSIS — J30.81 CHRONIC ALLERGIC RHINITIS DUE TO ANIMAL HAIR AND DANDER: ICD-10-CM

## 2018-06-06 DIAGNOSIS — J30.9 ALLERGIC RHINITIS: Primary | ICD-10-CM

## 2018-06-06 DIAGNOSIS — J30.1 CHRONIC SEASONAL ALLERGIC RHINITIS DUE TO POLLEN: ICD-10-CM

## 2018-06-06 DIAGNOSIS — J30.89 ALLERGIC RHINITIS DUE TO MOLD: ICD-10-CM

## 2018-06-06 PROCEDURE — 99213 OFFICE O/P EST LOW 20 MIN: CPT | Performed by: ALLERGY & IMMUNOLOGY

## 2018-06-06 PROCEDURE — 95117 IMMUNOTHERAPY INJECTIONS: CPT

## 2018-06-06 PROCEDURE — 40000718 ZZHC STATISTIC PT DEPARTMENT ORTHO VISIT: Performed by: PHYSICAL THERAPIST

## 2018-06-06 PROCEDURE — 97110 THERAPEUTIC EXERCISES: CPT | Mod: GP | Performed by: PHYSICAL THERAPIST

## 2018-06-06 PROCEDURE — 99207 ZZC DROP WITH A PROCEDURE: CPT

## 2018-06-06 PROCEDURE — 97140 MANUAL THERAPY 1/> REGIONS: CPT | Mod: GP | Performed by: PHYSICAL THERAPIST

## 2018-06-06 NOTE — LETTER
6/6/2018         RE: Dilia Solomon  [address] [facility] [address]        Dear Colleague,    Thank you for referring your patient, Dilia Solomon, to the [facility]. Please see a copy of my visit note below.    Dilia Solomon was seen in the Allergy Clinic at [facility]. The following are my recommendations regarding her Moderate Persistent Asthma, Allergic Rhinitis Due to Animals, Allergic Rhinitis Due to Pollen, Allergic Rhinitis Due to Dust Mites and Allergic Rhinitis Due to Mold    1. Decrease advair to 250/50mcg, 1 puff twice daily - will resume higher dose if asthma symptoms return  2. Continue montelukast 10mg daily  3. Continue albuterol HFA, 2-4 puffs every 4 hours as needed  4. Continue loratadine 10mg daily  5. Continue cetirizine 10mg daily  6. Continue fluticasone nasal spray, 2 sprays in each nostril daily  7. Follow-up in 6 months, sooner if needed      Dilia Solomon is a 53 year old American female who is seen today for follow-up of asthma and allergic rhinitis. She reports that her asthma has been well controlled. In the last month she needed to use her albuterol once while at work. The air conditioning went out and the air became smoky and she had some difficulty breathing. Dilia used her albuterol inhaler and left work early and had no further symptoms. She denies nocturnal asthma symptoms or limitations in her activity.    Dilia resumed immunotherapy treatment in 7/2017. She has done well with immunotherapy with no adverse reactions. She feels her allergy symptoms have improved and did well this spring though she continues to take zyrtec, claritin, and flonase daily. Dilia underwent sinus surgery last February including bilateral maxillary antrostomy, bilateral anterior ethmoidectomy, and resection of inferior turbinates. She is hopeful that this surgery will reduce the frequency of her chronic sinus symptoms and recurrent sinus infections.      REVIEW  OF SYSTEMS:  General: negative for weight gain. negative for weight loss. negative for changes in sleep.   Eyes: negative for itching. negative for redness. negative for tearing/watering.  Ears: negative for fullness. negative for hearing loss. negative for dizziness.   Nose: negative for snoring.negative for changes in smell. negative for drainage.   Throat: negative for hoarseness. negative for sore throat. negative for trouble swallowing.   Lungs: negative for shortness of breath.negative for wheezing. negative for sputum production.   Cardiovascular: negative for chest pain. negative for swelling of ankles. negative for fast or irregular heartbeat.   Gastrointestinal: negative for nausea. negative for heartburn. negative for acid reflux.   Musculoskeletal: negative for joint pain. negative for joint stiffness. negative for joint swelling.   Neurologic: negative for seizures. negative for fainting. negative for weakness.   Psychiatric: negative for changes in mood. negative for anxiety.   Endocrine: negative for cold intolerance. negative for heat intolerance. negative for tremors.   Hematologic: negative for easy bruising. negative for easy bleeding.  Integumentary: negative for rash. negative for scaling. negative for nail changes.       Current Outpatient Prescriptions:      albuterol (2.5 MG/3ML) 0.083% neb solution, Take 1 vial (2.5 mg) by nebulization every 4 hours as needed, Disp: 1 Box, Rfl: 3     albuterol (PROAIR HFA/PROVENTIL HFA/VENTOLIN HFA) 108 (90 BASE) MCG/ACT Inhaler, Inhale 2 puffs into the lungs every 4 hours as needed, Disp: 1 Inhaler, Rfl: 3     azelastine (OPTIVAR) 0.05 % SOLN ophthalmic solution, Apply 1 drop to eye 2 times daily, Disp: 1 Bottle, Rfl: 5     cetirizine (ZYRTEC) 10 MG tablet, Take 1 tablet (10 mg) by mouth At Bedtime, Disp: 30 tablet, Rfl: 11     Emollient (CERAVE) CREA, Externally apply 1 dose * topically 2 times daily, Disp: 2 Bottle, Rfl: 11     EPINEPHrine  (EPIPEN/ADRENACLICK/OR ANY BX GENERIC EQUIV) 0.3 MG/0.3ML injection 2-pack, Inject 0.3 mLs (0.3 mg) into the muscle once as needed for anaphylaxis, Disp: 0.6 mL, Rfl: 3     fluticasone (FLONASE) 50 MCG/ACT spray, Spray 2 sprays into both nostrils daily, Disp: 1 Bottle, Rfl: 11     fluticasone-salmeterol (ADVAIR) 500-50 MCG/DOSE diskus inhaler, Inhale 1 puff into the lungs 2 times daily, Disp: 3 Inhaler, Rfl: 1     loratadine (CLARITIN) 10 MG tablet, Take 1 tablet (10 mg) by mouth daily, Disp: 30 tablet, Rfl: 11     montelukast (SINGULAIR) 10 MG tablet, Take 1 tablet (10 mg) by mouth At Bedtime, Disp: 30 tablet, Rfl: 11     MULTIVITAMIN OR, 1 tab daily, Disp: , Rfl:      ORDER FOR ALLERGEN IMMUNOTHERAPY, Name of Mix: Mix #1  Mold Alternaria Tenuis 1:10 w/v, HS  0.5 ml Aspergillus Fumigatus 1:10 w/v, HS  0.5 ml Epicoccum Nigrum 1:10 w/v, HS 0.5 ml Hormodendrum Cladosporioides 1:10 w/v, HS 0.5 ml Penicillium Mix 1:10 w/v, HS  0.5 ml Diluent: HSA qs to 5ml, Disp: 5 mL, Rfl: PRN     ORDER FOR ALLERGEN IMMUNOTHERAPY, Name of Mix: Mix #2  Dust Mite, Cat, Dog Cat Hair, Standardized 10,000 BAU/mL, ALK  2.0 ml Dog Hair Dander, A. P.  1:100 w/v, HS  1.0 ml Dust Mites F 30,000AU/mL, HS  0.3 ml Dust Mites P. 30,000 AU/mL, HS  0.3 ml  Diluent: HSA qs to 5ml, Disp: 5 mL, Rfl: PRN     ORDER FOR ALLERGEN IMMUNOTHERAPY, Name of Mix: Mix #3 Grass,Tree  Dmitry,White 1:20w/v, HS 0.5ml Birch Mix PRW 1:20w/v, HS 0.5ml Boxelder-Maple Mix BHR (Boxelder Hard Red) 1:20w/v, HS 0.5ml Mason,Common 1:20w/v, HS 0.5ml Elm,American 1:20w/v, HS 0.5ml [place] Mix RW 1:20w/v, HS 0.5ml Oak Mix RVW 1:20w/v, HS 0.5ml [place] Tree,Black 1:20w/v, HS 0.5ml Grass Mix #7 100,000 BAU/mL, HS 0.4ml Jonathan Grass 1:20w/v, HS 0.5ml Diluent: HSA qs to 5ml, Disp: 5 mL, Rfl: PRN     ORDER FOR ALLERGEN IMMUNOTHERAPY, Name of Mix: Mix #4  Weeds Kochia 1:20 w/v, HS 0.5 ml Lamb's Quarters 1:20 w/v, HS 0.5 ml Nettle 1:20 w/v, HS 0.5 ml Plantain, English 1:20 w/v, HS 0.5  ml Ragweed Mixed 1:20 w/v ALK  0.5 ml Russian Thistle 1:20 w/v, HS 0.5 ml Sagebrush, Mugwort 1:20 w/v, HS 0.5 ml Sorrel, Sheep 1:20 w/v, HS 0.5 ml Diluent: HSA qs to 5ml, Disp: 5 mL, Rfl: PRN     order for DME, Equipment being ordered: Dynaflex insert, Disp: 1 Units, Rfl: 0     order for DME, Equipment being ordered: Silicone heel cup, Disp: 1 Units, Rfl: 0     order for DME, Equipment being ordered: Dynaflex insert, Disp: 1 Units, Rfl: 0     triamcinolone (KENALOG) 0.1 % cream, Apply  topically 2 times daily as needed., Disp: 80 g, Rfl: 2    EXAM:   /74 (BP Location: Left arm, Patient Position: Sitting, Cuff Size: Adult Large)  Pulse 74  Temp 97  F (36.1  C) (Tympanic)  Resp 16  Wt 95.4 kg (210 lb 5.1 oz)  LMP 07/18/2015 (Approximate)  SpO2 100%  BMI 36.1 kg/m2  GENERAL APPEARANCE: alert, cooperative and not in distress  SKIN: no rashes, no lesions  HEAD: atraumatic, normocephalic  ENT: no scars or lesions, nasal exam showed no discharge, swelling or lesions noted, tongue midline and normal, soft palate, uvula, and tonsils normal  NECK: no asymmetry, masses, or scars, supple without significant adenopathy  LUNGS: unlabored respirations, no intercostal retractions or accessory muscle use, clear to auscultation without rales or wheezes  HEART: regular rate and rhythm without murmurs and normal S1 and S2  MUSCULOSKELETAL: no musculoskeletal defects are noted  NEURO: no focal deficits noted  PSYCH: does not appear depressed or anxious      WORKUP:  None    ASSESSMENT/PLAN:  Dilia Soloomn is a 53 year old female here for follow-up of asthma and allergic rhinitis. Her asthma has remained well controlled with no exacerbations or need for prednisone in the last 1 to 2 years. We discussed step-down therapy and she is agreeable with this. Dilia resumed immunotherapy nearly 1 year ago and has been doing well. She feels her symptoms were decreased this spring compared to last year.    1. Decrease advair to  250/50mcg, 1 puff twice daily - will resume higher dose if asthma symptoms return  2. Continue montelukast 10mg daily  3. Continue albuterol HFA, 2-4 puffs every 4 hours as needed  4. Continue loratadine 10mg daily  5. Continue cetirizine 10mg daily  6. Continue fluticasone nasal spray, 2 sprays in each nostril daily  7. Follow-up in 6 months, sooner if needed      Butch Horowitz MD  Allergy/Immunology  [place], [place]      Chart documentation done in part with Dragon Voice Recognition Software. Although reviewed after completion, some word and grammatical errors may remain.    Again, thank you for allowing me to participate in the care of your patient.        Sincerely,        Butch Horowitz MD

## 2018-06-06 NOTE — MR AVS SNAPSHOT
After Visit Summary   6/6/2018    Dilia Solomon    MRN: [id number]           Patient Information     Date Of Birth          1965        Visit Information        Provider Department      6/6/2018 8:20 AM Butch Horowitz MD [facility]        Today's Diagnoses     Moderate persistent asthma without complication    -  1      Care Instructions    If you have any questions regarding your allergies, asthma, or what we discussed during your visit today please call the allergy clinic or contact us via [email or web address].    [facility] ([place], [place]): [phone]      Decrease your advair dose. The new inhaler is 250/50mcg. Take 1 puff twice a day. If your asthma symptoms get worse call the clinic so we can refill the higher dose of advair.    Follow-up in 6 months            Follow-ups after your visit        Follow-up notes from your care team     Return in about 6 months (around 12/6/2018).      Your next 10 appointments already scheduled     Jun 06, 2018 10:45 AM CDT   Ortho Treatment with Chelsea Alonzo PT   [facility] ([facility])    [address] [id number] [phone]            Jun 19, 2018  1:00 PM CDT   Return Visit with Santhosh Tierney MD   [facility]    [address] [facility] [address] [phone]              Who to contact     If you have questions or need follow up information about today's clinic visit or your schedule please contact [facility] directly at [phone].  Normal or non-critical lab and imaging results will be communicated to you by MyChart, letter or phone within 4 business days after the clinic has received the results. If you do not hear from us within 7 days, please contact the clinic through MyChart or phone. If you have a critical or abnormal lab result, we will notify you by phone as soon as possible.  Submit refill requests  through [employer] or call your pharmacy and they will forward the refill request to us. Please allow 3 business days for your refill to be completed.          Additional Information About Your Visit        Care EveryWhere ID     This is your Care EveryWhere ID. This could be used by other organizations to access your [facility] medical records  [id number]        Your Vitals Were     Pulse Temperature Respirations Last Period Pulse Oximetry BMI (Body Mass Index)    74 97  F (36.1  C) (Tympanic) 16 07/18/2015 (Approximate) 100% 36.1 kg/m2       Blood Pressure from Last 3 Encounters:   06/06/18 113/74   05/09/18 118/71   04/25/18 114/71    Weight from Last 3 Encounters:   06/06/18 95.4 kg (210 lb 5.1 oz)   05/09/18 96.2 kg (212 lb)   04/25/18 96.2 kg (212 lb)              Today, you had the following     No orders found for display         Today's Medication Changes          These changes are accurate as of 6/6/18  8:26 AM.  If you have any questions, ask your nurse or doctor.               Start taking these medicines.        Dose/Directions    fluticasone-salmeterol 250-50 MCG/DOSE diskus inhaler   Commonly known as:  ADVAIR   Used for:  Moderate persistent asthma without complication   Replaces:  fluticasone-salmeterol 500-50 MCG/DOSE diskus inhaler   Started by:  Butch Horowitz MD        Dose:  1 puff   Inhale 1 puff into the lungs 2 times daily   Quantity:  3 Inhaler   Refills:  1         Stop taking these medicines if you haven't already. Please contact your care team if you have questions.     fluticasone-salmeterol 500-50 MCG/DOSE diskus inhaler   Commonly known as:  ADVAIR   Replaced by:  fluticasone-salmeterol 250-50 MCG/DOSE diskus inhaler   Stopped by:  Butch Horowitz MD                Where to get your medicines      These medications were sent to [facility] - [address] [facility] [address]     Phone:  [phone]     fluticasone-salmeterol 250-50  MCG/DOSE diskus inhaler                Primary Care Provider Office Phone # Fax #    Cookie Conklin [phone]       [address] [place] [address]        Equal Access to Services     ARAVINDGABBY APRIL : Hadii aad ku hadasho Soomaali, waaxda luqadaha, qaybta kaalmada adeegyada, waxmarjorie grantn kanwal jeffersonjoy lee. So [facility] [phone].    ATENCIÓN: Si habla español, tiene a pruitt disposición servicios gratuitos de asistencia lingüística. Llame al [phone].    We comply with applicable federal civil rights laws and [place] laws. We do not discriminate on the basis of race, color, national origin, age, disability, sex, sexual orientation, or gender identity.            Thank you!     Thank you for choosing [facility]  for your care. Our goal is always to provide you with excellent care. Hearing back from our patients is one way we can continue to improve our services. Please take a few minutes to complete the written survey that you may receive in the mail after your visit with us. Thank you!             Your Updated Medication List - Protect others around you: Learn how to safely use, store and throw away your medicines at www.disposemymeds.org.          This list is accurate as of 6/6/18  8:26 AM.  Always use your most recent med list.                   Brand Name Dispense Instructions for use Diagnosis    * albuterol (2.5 MG/3ML) 0.083% neb solution     1 Box    Take 1 vial (2.5 mg) by nebulization every 4 hours as needed    Moderate persistent asthma, uncomplicated       * albuterol 108 (90 Base) MCG/ACT Inhaler    PROAIR HFA/PROVENTIL HFA/VENTOLIN HFA    1 Inhaler    Inhale 2 puffs into the lungs every 4 hours as needed    Moderate persistent asthma without complication       ALLERGEN IMMUNOTHERAPY PRESCRIPTION     5 mL    Name of Mix: Mix #1  Mold Alternaria Tenuis 1:10 w/v, HS  0.5 ml Aspergillus Fumigatus 1:10 w/v, HS  0.5 ml Epicoccum Nigrum 1:10 w/v, HS 0.5 ml  Hormodendrum Cladosporioides 1:10 w/v, HS 0.5 ml Penicillium Mix 1:10 w/v, HS  0.5 ml Diluent: HSA qs to 5ml    Chronic allergic rhinitis due to animal hair and dander, Allergic rhinitis due to dust mite, Allergic rhinitis due to mold, Chronic seasonal allergic rhinitis due to pollen       ALLERGEN IMMUNOTHERAPY PRESCRIPTION     5 mL    Name of Mix: Mix #2  Dust Mite, Cat, Dog Cat Hair, Standardized 10,000 BAU/mL, ALK  2.0 ml Dog Hair Dander, A. P.  1:100 w/v, HS  1.0 ml Dust Mites F 30,000AU/mL, HS  0.3 ml Dust Mites P. 30,000 AU/mL, HS  0.3 ml  Diluent: HSA qs to 5ml    Chronic allergic rhinitis due to animal hair and dander, Allergic rhinitis due to dust mite, Allergic rhinitis due to mold, Chronic seasonal allergic rhinitis due to pollen       ALLERGEN IMMUNOTHERAPY PRESCRIPTION     5 mL    Name of Mix: Mix #3 Grass,Tree  Dmitry,White 1:20w/v, HS 0.5ml Birch Mix PRW 1:20w/v, HS 0.5ml Boxelder-Maple Mix BHR (Boxelder Hard Red) 1:20w/v, HS 0.5ml Jackson,Common 1:20w/v, HS 0.5ml Elm,American 1:20w/v, HS 0.5ml [place] Mix RW 1:20w/v, HS 0.5ml Oak Mix RVW 1:20w/v, HS 0.5ml [place] Tree,Black 1:20w/v, HS 0.5ml Grass Mix #7 100,000 BAU/mL, HS 0.4ml Jonathan Grass 1:20w/v, HS 0.5ml Diluent: HSA qs to 5ml    Chronic allergic rhinitis due to animal hair and dander, Allergic rhinitis due to dust mite, Allergic rhinitis due to mold, Chronic seasonal allergic rhinitis due to pollen       ALLERGEN IMMUNOTHERAPY PRESCRIPTION     5 mL    Name of Mix: Mix #4  Weeds Kochia 1:20 w/v, HS 0.5 ml Lamb's Quarters 1:20 w/v, HS 0.5 ml Nettle 1:20 w/v, HS 0.5 ml Plantain, English 1:20 w/v, HS 0.5 ml Ragweed Mixed 1:20 w/v ALK  0.5 ml Russian Thistle 1:20 w/v, HS 0.5 ml Sagebrush, Mugwort 1:20 w/v, HS 0.5 ml Sorrel, Sheep 1:20 w/v, HS 0.5 ml Diluent: HSA qs to 5ml    Chronic allergic rhinitis due to animal hair and dander, Allergic rhinitis due to dust mite, Allergic rhinitis due to mold, Chronic seasonal allergic rhinitis due to pollen        azelastine 0.05 % Soln ophthalmic solution    OPTIVAR    1 Bottle    Apply 1 drop to eye 2 times daily    Conjunctivitis, allergic, unspecified laterality       CERAVE Crea     2 Bottle    Externally apply 1 dose * topically 2 times daily    Eczema, unspecified type       cetirizine 10 MG tablet    zyrTEC    30 tablet    Take 1 tablet (10 mg) by mouth At Bedtime    Allergic conjunctivitis of both eyes, Chronic allergic rhinitis due to animal hair and dander, Allergic rhinitis due to dust mite, Allergic rhinitis due to mold, Chronic seasonal allergic rhinitis due to pollen       EPINEPHrine 0.3 MG/0.3ML injection 2-pack    EPIPEN/ADRENACLICK/or ANY BX GENERIC EQUIV    0.6 mL    Inject 0.3 mLs (0.3 mg) into the muscle once as needed for anaphylaxis    Food allergy, Need for desensitization to allergens       fluticasone 50 MCG/ACT spray    FLONASE    1 Bottle    Spray 2 sprays into both nostrils daily    Chronic allergic rhinitis due to animal hair and dander, Allergic rhinitis due to dust mite, Allergic rhinitis due to mold, Chronic seasonal allergic rhinitis due to pollen       fluticasone-salmeterol 250-50 MCG/DOSE diskus inhaler    ADVAIR    3 Inhaler    Inhale 1 puff into the lungs 2 times daily    Moderate persistent asthma without complication       loratadine 10 MG tablet    CLARITIN    30 tablet    Take 1 tablet (10 mg) by mouth daily    Allergic conjunctivitis of both eyes, Chronic allergic rhinitis due to animal hair and dander, Allergic rhinitis due to dust mite, Allergic rhinitis due to mold, Chronic seasonal allergic rhinitis due to pollen       montelukast 10 MG tablet    SINGULAIR    30 tablet    Take 1 tablet (10 mg) by mouth At Bedtime    Moderate persistent asthma without complication, Chronic allergic rhinitis due to animal hair and dander, Allergic rhinitis due to dust mite, Allergic rhinitis due to mold, Chronic seasonal allergic rhinitis due to pollen       MULTIVITAMIN PO      1 tab daily         order for DME     1 Units    Equipment being ordered: Dynaflex insert    Closed displaced fracture of proximal phalanx of right great toe, initial encounter       order for DME     1 Units    Equipment being ordered: Silicone heel cup    Heel pain, chronic, left       order for DME     1 Units    Equipment being ordered: Dynaflex insert    Plantar fasciitis, left       triamcinolone 0.1 % cream    KENALOG    80 g    Apply  topically 2 times daily as needed.    Eczema, unspecified type       * Notice:  This list has 2 medication(s) that are the same as other medications prescribed for you. Read the directions carefully, and ask your doctor or other care provider to review them with you.

## 2018-06-06 NOTE — PATIENT INSTRUCTIONS
If you have any questions regarding your allergies, asthma, or what we discussed during your visit today please call the allergy clinic or contact us via [employer].    [facility] ([place], [place]): [phone]      Decrease your advair dose. The new inhaler is 250/50mcg. Take 1 puff twice a day. If your asthma symptoms get worse call the clinic so we can refill the higher dose of advair.    Follow-up in 6 months

## 2018-06-06 NOTE — LETTER
My Asthma Action Plan  Name: Dilia Solomon   YOB: 1965  Date: 6/6/2018   My doctor: Butch Horowitz MD   My clinic: [facility]        My Control Medicine: Fluticasone + salmeterol (Advair) -  Diskus 250/50 mcg Take 1 puff twice daily  Montelukast (Singulair) -  10 mg Take 1 tablet daily  My Rescue Medicine: Albuterol (Proair/Ventolin/Proventil) inhaler Take 2 to 4 puffs every 4 hours as needed   My Asthma Severity: moderate persistent  Avoid your asthma triggers: smoke and upper respiratory infections               GREEN ZONE   Good Control    I feel good    No cough or wheeze    Can work, sleep and play without asthma symptoms       Take your asthma control medicine every day.     1. If exercise triggers your asthma, take your rescue medication    15 minutes before exercise or sports, and    During exercise if you have asthma symptoms  2. Spacer to use with inhaler: If you have a spacer, make sure to use it with your inhaler             YELLOW ZONE Getting Worse  I have ANY of these:    I do not feel good    Cough or wheeze    Chest feels tight    Wake up at night   1. Keep taking your Green Zone medications  2. Start taking your rescue medicine:    every 20 minutes for up to 1 hour. Then every 4 hours for 24-48 hours.  3. If you stay in the Yellow Zone for more than 12-24 hours, contact your doctor.           RED ZONE Medical Alert - Get Help  I have ANY of these:    I feel awful    Medicine is not helping    Breathing getting harder    Trouble walking or talking    Nose opens wide to breathe       1. Take your rescue medicine NOW  2. If your provider has prescribed an oral steroid medicine, start taking it NOW  3. Call your doctor NOW  4. If you are still in the Red Zone after 20 minutes and you have not reached your doctor:    Take your rescue medicine again and    Call 911 or go to the emergency room right away    See your regular doctor within 2 weeks of an Emergency Room or Urgent  Care visit for follow-up treatment.          Annual Reminders:  Meet with Asthma Educator,  Flu Shot in the Fall, consider Pneumonia Vaccination for patients with asthma (aged 19 and older).    Pharmacy[place]    [facility] [place] [place] [address] [facility] [place] [address] [facility] - [place], [place] - [address] [facility]                      Asthma Triggers  How To Control Things That Make Your Asthma Worse    Triggers are things that make your asthma worse.  Look at the list below to help you find your triggers and what you can do about them.  You can help prevent asthma flare-ups by staying away from your triggers.      Trigger                                                          What you can do   Cigarette Smoke  Tobacco smoke can make asthma worse. Do not allow smoking in your home, car or around you.  Be sure no one smokes at a child s day care or school.  If you smoke, ask your health care provider for ways to help you quit.  Ask family members to quit too.  Ask your health care provider for a referral to Quit Plan to help you quit smoking, or call 4-280-136-PLAN.     Colds, Flu, Bronchitis  These are common triggers of asthma. Wash your hands often.  Don t touch your eyes, nose or mouth.  Get a flu shot every year.     Dust Mites  These are tiny bugs that live in cloth or carpet. They are too small to see. Wash sheets and blankets in hot water every week.   Encase pillows and mattress in dust mite proof covers.  Avoid having carpet if you can. If you have carpet, vacuum weekly.   Use a dust mask and HEPA vacuum.   Pollen and Outdoor Mold  Some people are allergic to trees, grass, or weed pollen, or molds. Try to keep your windows closed.  Limit time out doors when pollen count is high.   Ask you health care provider about taking medicine during allergy season.     Animal Dander  Some people are allergic to skin  flakes, urine or saliva from pets with fur or feathers. Keep pets with fur or feathers out of your home.    If you can t keep the pet outdoors, then keep the pet out of your bedroom.  Keep the bedroom door closed.  Keep pets off cloth furniture and away from stuffed toys.     Mice, Rats, and Cockroaches  Some people are allergic to the waste from these pests.   Cover food and garbage.  Clean up spills and food crumbs.  Store grease in the refrigerator.   Keep food out of the bedroom.   Indoor Mold  This can be a trigger if your home has high moisture. Fix leaking faucets, pipes, or other sources of water.   Clean moldy surfaces.  Dehumidify basement if it is damp and smelly.   Smoke, Strong Odors, and Sprays  These can reduce air quality. Stay away from strong odors and sprays, such as perfume, powder, hair spray, paints, smoke incense, paint, cleaning products, candles and new carpet.   Exercise or Sports  Some people with asthma have this trigger. Be active!  Ask your doctor about taking medicine before sports or exercise to prevent symptoms.    Warm up for 5-10 minutes before and after sports or exercise.     Other Triggers of Asthma  Cold air:  Cover your nose and mouth with a scarf.  Sometimes laughing or crying can be a trigger.  Some medicines and food can trigger asthma.

## 2018-06-06 NOTE — MR AVS SNAPSHOT
After Visit Summary   6/6/2018    Dilia Solomon    MRN: [id number]           Patient Information     Date Of Birth          1965        Visit Information        Provider Department      6/6/2018 8:00 AM ALLERGY MA - [facility]        Today's Diagnoses     Allergic rhinitis    -  1       Follow-ups after your visit        Your next 10 appointments already scheduled     Jun 06, 2018 10:45 AM CDT   Ortho Treatment with Chelsea Alonzo PT   [facility] ([facility])    [address]  [facility] [address]   [phone]            Jun 19, 2018  1:00 PM CDT   [facility]    [address] [facility] [address] [phone]              Who to contact     If you have questions or need follow up information about today's clinic visit or your schedule please contact [facility] directly at [phone].  Normal or non-critical lab and imaging results will be communicated to you by MyChart, letter or phone within 4 business days after the clinic has received the results. If you do not hear from us within 7 days, please contact the clinic through MyChart or phone. If you have a critical or abnormal lab result, we will notify you by phone as soon as possible.  Submit refill requests through [employer] or call your pharmacy and they will forward the refill request to us. Please allow 3 business days for your refill to be completed.          Additional Information About Your Visit        Care EveryWhere ID     This is your Care EveryWhere ID. This could be used by other organizations to access your [facility] medical records  [id number]        Your Vitals Were     Last Period                   07/18/2015 (Approximate)            Blood Pressure from Last 3 Encounters:   06/06/18 113/74   05/09/18 118/71   04/25/18 114/71    Weight from Last 3  Encounters:   06/06/18 95.4 kg (210 lb 5.1 oz)   05/09/18 96.2 kg (212 lb)   04/25/18 96.2 kg (212 lb)              We Performed the Following     Allergy Shot: Two or more injections          Today's Medication Changes          These changes are accurate as of 6/6/18  8:47 AM.  If you have any questions, ask your nurse or doctor.               Start taking these medicines.        Dose/Directions    fluticasone-salmeterol 250-50 MCG/DOSE diskus inhaler   Commonly known as:  ADVAIR   Used for:  Moderate persistent asthma without complication   Replaces:  fluticasone-salmeterol 500-50 MCG/DOSE diskus inhaler   Started by:  Butch Horowitz MD        Dose:  1 puff   Inhale 1 puff into the lungs 2 times daily   Quantity:  3 Inhaler   Refills:  1         Stop taking these medicines if you haven't already. Please contact your care team if you have questions.     fluticasone-salmeterol 500-50 MCG/DOSE diskus inhaler   Commonly known as:  ADVAIR   Replaced by:  fluticasone-salmeterol 250-50 MCG/DOSE diskus inhaler   Stopped by:  Butch Horowitz MD                Where to get your medicines      These medications were sent to [facility] [address] [address]     Phone:  [phone]     fluticasone-salmeterol 250-50 MCG/DOSE diskus inhaler                Primary Care Provider Office Phone # Fax #    Cookie Conklin [phone]       [address] [place] [address]        Equal Access to Services     [facility] : Hadii ulices chino Sofaith, waaxda luqadaha, qaybta kaalmada anna, hieu howard. So [facility] [phone].    ATENCIÓN: Si habla español, tiene a pruitt disposición servicios gratuitos de asistencia lingüística. Llame al [phone].    We comply with applicable federal civil rights laws and [place] laws. We do not discriminate on the basis of race, color, national origin, age, disability, sex, sexual  orientation, or gender identity.            Thank you!     Thank you for choosing [facility]  for your care. Our goal is always to provide you with excellent care. Hearing back from our patients is one way we can continue to improve our services. Please take a few minutes to complete the written survey that you may receive in the mail after your visit with us. Thank you!             Your Updated Medication List - Protect others around you: Learn how to safely use, store and throw away your medicines at www.disposemymeds.org.          This list is accurate as of 6/6/18  8:47 AM.  Always use your most recent med list.                   Brand Name Dispense Instructions for use Diagnosis    * albuterol (2.5 MG/3ML) 0.083% neb solution     1 Box    Take 1 vial (2.5 mg) by nebulization every 4 hours as needed    Moderate persistent asthma, uncomplicated       * albuterol 108 (90 Base) MCG/ACT Inhaler    PROAIR HFA/PROVENTIL HFA/VENTOLIN HFA    1 Inhaler    Inhale 2 puffs into the lungs every 4 hours as needed    Moderate persistent asthma without complication       ALLERGEN IMMUNOTHERAPY PRESCRIPTION     5 mL    Name of Mix: Mix #1  Mold Alternaria Tenuis 1:10 w/v, HS  0.5 ml Aspergillus Fumigatus 1:10 w/v, HS  0.5 ml Epicoccum Nigrum 1:10 w/v, HS 0.5 ml Hormodendrum Cladosporioides 1:10 w/v, HS 0.5 ml Penicillium Mix 1:10 w/v, HS  0.5 ml Diluent: HSA qs to 5ml    Chronic allergic rhinitis due to animal hair and dander, Allergic rhinitis due to dust mite, Allergic rhinitis due to mold, Chronic seasonal allergic rhinitis due to pollen       ALLERGEN IMMUNOTHERAPY PRESCRIPTION     5 mL    Name of Mix: Mix #2  Dust Mite, Cat, Dog Cat Hair, Standardized 10,000 BAU/mL, ALK  2.0 ml Dog Hair Dander, A. P.  1:100 w/v, HS  1.0 ml Dust Mites F 30,000AU/mL, HS  0.3 ml Dust Mites P. 30,000 AU/mL, HS  0.3 ml  Diluent: HSA qs to 5ml    Chronic allergic rhinitis due to animal hair and dander, Allergic rhinitis due to dust  mite, Allergic rhinitis due to mold, Chronic seasonal allergic rhinitis due to pollen       ALLERGEN IMMUNOTHERAPY PRESCRIPTION     5 mL    Name of Mix: Mix #3 Grass,Tree  Dmitry,White 1:20w/v, HS 0.5ml Birch Mix PRW 1:20w/v, HS 0.5ml Boxelder-Maple Mix BHR (Boxelder Hard Red) 1:20w/v, HS 0.5ml [place],Common 1:20w/v, HS 0.5ml Elm,American 1:20w/v, HS 0.5ml [place] Mix RW 1:20w/v, HS 0.5ml Oak Mix RVW 1:20w/v, HS 0.5ml [place] Tree,Black 1:20w/v, HS 0.5ml Grass Mix #7 100,000 BAU/mL, HS 0.4ml Jonathan Grass 1:20w/v, HS 0.5ml Diluent: HSA qs to 5ml    Chronic allergic rhinitis due to animal hair and dander, Allergic rhinitis due to dust mite, Allergic rhinitis due to mold, Chronic seasonal allergic rhinitis due to pollen       ALLERGEN IMMUNOTHERAPY PRESCRIPTION     5 mL    Name of Mix: Mix #4  Weeds Kochia 1:20 w/v, HS 0.5 ml Lamb's Quarters 1:20 w/v, HS 0.5 ml Nettle 1:20 w/v, HS 0.5 ml Plantain, English 1:20 w/v, HS 0.5 ml Ragweed Mixed 1:20 w/v ALK  0.5 ml Russian Thistle 1:20 w/v, HS 0.5 ml Sagebrush, Mugwort 1:20 w/v, HS 0.5 ml Sorrel, Sheep 1:20 w/v, HS 0.5 ml Diluent: HSA qs to 5ml    Chronic allergic rhinitis due to animal hair and dander, Allergic rhinitis due to dust mite, Allergic rhinitis due to mold, Chronic seasonal allergic rhinitis due to pollen       azelastine 0.05 % Soln ophthalmic solution    OPTIVAR    1 Bottle    Apply 1 drop to eye 2 times daily    Conjunctivitis, allergic, unspecified laterality       CERAVE Crea     2 Bottle    Externally apply 1 dose * topically 2 times daily    Eczema, unspecified type       cetirizine 10 MG tablet    zyrTEC    30 tablet    Take 1 tablet (10 mg) by mouth At Bedtime    Allergic conjunctivitis of both eyes, Chronic allergic rhinitis due to animal hair and dander, Allergic rhinitis due to dust mite, Allergic rhinitis due to mold, Chronic seasonal allergic rhinitis due to pollen       EPINEPHrine 0.3 MG/0.3ML injection 2-pack    EPIPEN/ADRENACLICK/or ANY BX  GENERIC EQUIV    0.6 mL    Inject 0.3 mLs (0.3 mg) into the muscle once as needed for anaphylaxis    Food allergy, Need for desensitization to allergens       fluticasone 50 MCG/ACT spray    FLONASE    1 Bottle    Spray 2 sprays into both nostrils daily    Chronic allergic rhinitis due to animal hair and dander, Allergic rhinitis due to dust mite, Allergic rhinitis due to mold, Chronic seasonal allergic rhinitis due to pollen       fluticasone-salmeterol 250-50 MCG/DOSE diskus inhaler    ADVAIR    3 Inhaler    Inhale 1 puff into the lungs 2 times daily    Moderate persistent asthma without complication       loratadine 10 MG tablet    CLARITIN    30 tablet    Take 1 tablet (10 mg) by mouth daily    Allergic conjunctivitis of both eyes, Chronic allergic rhinitis due to animal hair and dander, Allergic rhinitis due to dust mite, Allergic rhinitis due to mold, Chronic seasonal allergic rhinitis due to pollen       montelukast 10 MG tablet    SINGULAIR    30 tablet    Take 1 tablet (10 mg) by mouth At Bedtime    Moderate persistent asthma without complication, Chronic allergic rhinitis due to animal hair and dander, Allergic rhinitis due to dust mite, Allergic rhinitis due to mold, Chronic seasonal allergic rhinitis due to pollen       MULTIVITAMIN PO      1 tab daily        order for DME     1 Units    Equipment being ordered: Dynaflex insert    Closed displaced fracture of proximal phalanx of right great toe, initial encounter       order for DME     1 Units    Equipment being ordered: Silicone heel cup    Heel pain, chronic, left       order for DME     1 Units    Equipment being ordered: Dynaflex insert    Plantar fasciitis, left       triamcinolone 0.1 % cream    KENALOG    80 g    Apply  topically 2 times daily as needed.    Eczema, unspecified type       * Notice:  This list has 2 medication(s) that are the same as other medications prescribed for you. Read the directions carefully, and ask your doctor or other  care provider to review them with you.

## 2018-06-06 NOTE — PROGRESS NOTES
Dilia Solomon was seen in the Allergy Clinic at [facility]. The following are my recommendations regarding her Moderate Persistent Asthma, Allergic Rhinitis Due to Animals, Allergic Rhinitis Due to Pollen, Allergic Rhinitis Due to Dust Mites and Allergic Rhinitis Due to Mold    1. Decrease advair to 250/50mcg, 1 puff twice daily - will resume higher dose if asthma symptoms return  2. Continue montelukast 10mg daily  3. Continue albuterol HFA, 2-4 puffs every 4 hours as needed  4. Continue loratadine 10mg daily  5. Continue cetirizine 10mg daily  6. Continue fluticasone nasal spray, 2 sprays in each nostril daily  7. Follow-up in 6 months, sooner if needed      Dilia Solomon is a 53 year old American female who is seen today for follow-up of asthma and allergic rhinitis. She reports that her asthma has been well controlled. In the last month she needed to use her albuterol once while at work. The air conditioning went out and the air became smoky and she had some difficulty breathing. Dilia used her albuterol inhaler and left work early and had no further symptoms. She denies nocturnal asthma symptoms or limitations in her activity.    Dilia resumed immunotherapy treatment in 7/2017. She has done well with immunotherapy with no adverse reactions. She feels her allergy symptoms have improved and did well this spring though she continues to take zyrtec, claritin, and flonase daily. Dilia underwent sinus surgery last February including bilateral maxillary antrostomy, bilateral anterior ethmoidectomy, and resection of inferior turbinates. She is hopeful that this surgery will reduce the frequency of her chronic sinus symptoms and recurrent sinus infections.      REVIEW OF SYSTEMS:  General: negative for weight gain. negative for weight loss. negative for changes in sleep.   Eyes: negative for itching. negative for redness. negative for tearing/watering.  Ears: negative for fullness. negative for hearing  loss. negative for dizziness.   Nose: negative for snoring.negative for changes in smell. negative for drainage.   Throat: negative for hoarseness. negative for sore throat. negative for trouble swallowing.   Lungs: negative for shortness of breath.negative for wheezing. negative for sputum production.   Cardiovascular: negative for chest pain. negative for swelling of ankles. negative for fast or irregular heartbeat.   Gastrointestinal: negative for nausea. negative for heartburn. negative for acid reflux.   Musculoskeletal: negative for joint pain. negative for joint stiffness. negative for joint swelling.   Neurologic: negative for seizures. negative for fainting. negative for weakness.   Psychiatric: negative for changes in mood. negative for anxiety.   Endocrine: negative for cold intolerance. negative for heat intolerance. negative for tremors.   Hematologic: negative for easy bruising. negative for easy bleeding.  Integumentary: negative for rash. negative for scaling. negative for nail changes.       Current Outpatient Prescriptions:      albuterol (2.5 MG/3ML) 0.083% neb solution, Take 1 vial (2.5 mg) by nebulization every 4 hours as needed, Disp: 1 Box, Rfl: 3     albuterol (PROAIR HFA/PROVENTIL HFA/VENTOLIN HFA) 108 (90 BASE) MCG/ACT Inhaler, Inhale 2 puffs into the lungs every 4 hours as needed, Disp: 1 Inhaler, Rfl: 3     azelastine (OPTIVAR) 0.05 % SOLN ophthalmic solution, Apply 1 drop to eye 2 times daily, Disp: 1 Bottle, Rfl: 5     cetirizine (ZYRTEC) 10 MG tablet, Take 1 tablet (10 mg) by mouth At Bedtime, Disp: 30 tablet, Rfl: 11     Emollient (CERAVE) CREA, Externally apply 1 dose * topically 2 times daily, Disp: 2 Bottle, Rfl: 11     EPINEPHrine (EPIPEN/ADRENACLICK/OR ANY BX GENERIC EQUIV) 0.3 MG/0.3ML injection 2-pack, Inject 0.3 mLs (0.3 mg) into the muscle once as needed for anaphylaxis, Disp: 0.6 mL, Rfl: 3     fluticasone (FLONASE) 50 MCG/ACT spray, Spray 2 sprays into both nostrils daily,  Disp: 1 Bottle, Rfl: 11     fluticasone-salmeterol (ADVAIR) 500-50 MCG/DOSE diskus inhaler, Inhale 1 puff into the lungs 2 times daily, Disp: 3 Inhaler, Rfl: 1     loratadine (CLARITIN) 10 MG tablet, Take 1 tablet (10 mg) by mouth daily, Disp: 30 tablet, Rfl: 11     montelukast (SINGULAIR) 10 MG tablet, Take 1 tablet (10 mg) by mouth At Bedtime, Disp: 30 tablet, Rfl: 11     MULTIVITAMIN OR, 1 tab daily, Disp: , Rfl:      ORDER FOR ALLERGEN IMMUNOTHERAPY, Name of Mix: Mix #1  Mold Alternaria Tenuis 1:10 w/v, HS  0.5 ml Aspergillus Fumigatus 1:10 w/v, HS  0.5 ml Epicoccum Nigrum 1:10 w/v, HS 0.5 ml Hormodendrum Cladosporioides 1:10 w/v, HS 0.5 ml Penicillium Mix 1:10 w/v, HS  0.5 ml Diluent: HSA qs to 5ml, Disp: 5 mL, Rfl: PRN     ORDER FOR ALLERGEN IMMUNOTHERAPY, Name of Mix: Mix #2  Dust Mite, Cat, Dog Cat Hair, Standardized 10,000 BAU/mL, ALK  2.0 ml Dog Hair Dander, A. P.  1:100 w/v, HS  1.0 ml Dust Mites F 30,000AU/mL, HS  0.3 ml Dust Mites P. 30,000 AU/mL, HS  0.3 ml  Diluent: HSA qs to 5ml, Disp: 5 mL, Rfl: PRN     ORDER FOR ALLERGEN IMMUNOTHERAPY, Name of Mix: Mix #3 Grass,Tree  Dmitry,White 1:20w/v, HS 0.5ml Birch Mix PRW 1:20w/v, HS 0.5ml Boxelder-Maple Mix BHR (Boxelder Hard Red) 1:20w/v, HS 0.5ml [place],Common 1:20w/v, HS 0.5ml Elm,American 1:20w/v, HS 0.5ml [place] Mix RW 1:20w/v, HS 0.5ml Oak Mix RVW 1:20w/v, HS 0.5ml [place] Tree,Black 1:20w/v, HS 0.5ml Grass Mix #7 100,000 BAU/mL, HS 0.4ml Jonathan Grass 1:20w/v, HS 0.5ml Diluent: HSA qs to 5ml, Disp: 5 mL, Rfl: PRN     ORDER FOR ALLERGEN IMMUNOTHERAPY, Name of Mix: Mix #4  Weeds Kochia 1:20 w/v, HS 0.5 ml Lamb's Quarters 1:20 w/v, HS 0.5 ml Nettle 1:20 w/v, HS 0.5 ml Plantain, English 1:20 w/v, HS 0.5 ml Ragweed Mixed 1:20 w/v ALK  0.5 ml Russian Thistle 1:20 w/v, HS 0.5 ml Sagebrush, Mugwort 1:20 w/v, HS 0.5 ml Sorrel, Sheep 1:20 w/v, HS 0.5 ml Diluent: HSA qs to 5ml, Disp: 5 mL, Rfl: PRN     order for DME, Equipment being ordered: Shopping Buddyaflex insert,  Disp: 1 Units, Rfl: 0     order for DME, Equipment being ordered: Silicone heel cup, Disp: 1 Units, Rfl: 0     order for DME, Equipment being ordered: Dynaflex insert, Disp: 1 Units, Rfl: 0     triamcinolone (KENALOG) 0.1 % cream, Apply  topically 2 times daily as needed., Disp: 80 g, Rfl: 2    EXAM:   /74 (BP Location: Left arm, Patient Position: Sitting, Cuff Size: Adult Large)  Pulse 74  Temp 97  F (36.1  C) (Tympanic)  Resp 16  Wt 95.4 kg (210 lb 5.1 oz)  LMP 07/18/2015 (Approximate)  SpO2 100%  BMI 36.1 kg/m2  GENERAL APPEARANCE: alert, cooperative and not in distress  SKIN: no rashes, no lesions  HEAD: atraumatic, normocephalic  ENT: no scars or lesions, nasal exam showed no discharge, swelling or lesions noted, tongue midline and normal, soft palate, uvula, and tonsils normal  NECK: no asymmetry, masses, or scars, supple without significant adenopathy  LUNGS: unlabored respirations, no intercostal retractions or accessory muscle use, clear to auscultation without rales or wheezes  HEART: regular rate and rhythm without murmurs and normal S1 and S2  MUSCULOSKELETAL: no musculoskeletal defects are noted  NEURO: no focal deficits noted  PSYCH: does not appear depressed or anxious      WORKUP:  None    ASSESSMENT/PLAN:  Dilia Solomon is a 53 year old female here for follow-up of asthma and allergic rhinitis. Her asthma has remained well controlled with no exacerbations or need for prednisone in the last 1 to 2 years. We discussed step-down therapy and she is agreeable with this. Dilia resumed immunotherapy nearly 1 year ago and has been doing well. She feels her symptoms were decreased this spring compared to last year.    1. Decrease advair to 250/50mcg, 1 puff twice daily - will resume higher dose if asthma symptoms return  2. Continue montelukast 10mg daily  3. Continue albuterol HFA, 2-4 puffs every 4 hours as needed  4. Continue loratadine 10mg daily  5. Continue cetirizine 10mg daily  6.  Continue fluticasone nasal spray, 2 sprays in each nostril daily  7. Follow-up in 6 months, sooner if needed      Butch Horowitz MD  Allergy/Immunology  [facility] and [place], [place]      Chart documentation done in part with Dragon Voice Recognition Software. Although reviewed after completion, some word and grammatical errors may remain.

## 2018-06-07 ASSESSMENT — ASTHMA QUESTIONNAIRES: ACT_TOTALSCORE: 21

## 2018-06-19 ENCOUNTER — HOSPITAL ENCOUNTER (OUTPATIENT)
Dept: PHYSICAL THERAPY | Facility: CLINIC | Age: 53
Setting detail: THERAPIES SERIES
End: 2018-06-19
Attending: PODIATRIST
Payer: COMMERCIAL

## 2018-06-19 ENCOUNTER — OFFICE VISIT (OUTPATIENT)
Dept: OTOLARYNGOLOGY | Facility: CLINIC | Age: 53
End: 2018-06-19
Payer: COMMERCIAL

## 2018-06-19 VITALS
HEART RATE: 78 BPM | TEMPERATURE: 98.4 F | BODY MASS INDEX: 35.85 KG/M2 | DIASTOLIC BLOOD PRESSURE: 70 MMHG | SYSTOLIC BLOOD PRESSURE: 122 MMHG | HEIGHT: 64 IN | WEIGHT: 210 LBS

## 2018-06-19 DIAGNOSIS — J32.0 CHRONIC MAXILLARY SINUSITIS: Primary | ICD-10-CM

## 2018-06-19 PROCEDURE — 97110 THERAPEUTIC EXERCISES: CPT | Mod: GP | Performed by: PHYSICAL THERAPIST

## 2018-06-19 PROCEDURE — 40000718 ZZHC STATISTIC PT DEPARTMENT ORTHO VISIT: Performed by: PHYSICAL THERAPIST

## 2018-06-19 PROCEDURE — 97140 MANUAL THERAPY 1/> REGIONS: CPT | Mod: GP | Performed by: PHYSICAL THERAPIST

## 2018-06-19 PROCEDURE — 31231 NASAL ENDOSCOPY DX: CPT | Performed by: OTOLARYNGOLOGY

## 2018-06-19 PROCEDURE — 99214 OFFICE O/P EST MOD 30 MIN: CPT | Mod: 25 | Performed by: OTOLARYNGOLOGY

## 2018-06-19 ASSESSMENT — PAIN SCALES - GENERAL: PAINLEVEL: NO PAIN (0)

## 2018-06-19 NOTE — PROGRESS NOTES
"Post Op Sinus 3    HPI - Dliia Solomon is here for their second postoperative visit, status post endoscopic sinus surgery (with turbinate reduction) performed on 2/14/2018.  There was the expected amount of congestion which has now mostly resolved, and no bleeding has occurred.      She has no further facial pain. Overall she is improving and her nasal breathing is much better than preop. She is irrigating BID and using Flonase.     PROCEDURES PERFORMED:   1. Bilateral endoscopic maxillary antrostomy  2. Bilateral endoscopic anterior ethmoidectomy.   4. Bilateral endoscopic submucous resection of inferior turbinates    Physical Exam -   /70 (BP Location: Right arm, Patient Position: Sitting, Cuff Size: Adult Regular)  Pulse 78  Temp 98.4  F (36.9  C) (Oral)  Ht 1.626 m (5' 4\")  Wt 95.3 kg (210 lb)  LMP 07/18/2015 (Approximate)  BMI 36.05 kg/m2    General - The patient is awake and alert, and answers questions appropriately during the history and physical.  The vocal quality is hypernasal, but there is no dyspnea or stridor noted.  Eyes - The EOMI, there is no conjuncitval or scleral injection.  Pupils are equally round and reactive to light.  Oral - The oral mucosa is pink and moist.  The tongue is mobile and midline on protrusion, no edema noted.  Nasal - The nasal examination was done with a rigid nasal endoscope today for the purposes of bilateral endoscopically assisted debridement of the sinuses.  I began by spraying both sides with lidocaine and neosynephrine.    I began on the left side.  The opened ethmoid cells are no longer inflamed, and are widely patent without drainage. The roof was then visualized and is healing well.  I turned my attention to the right side.  The middle meatus was open. The ethmoid cells were open and without significant inflammation. The maxillary sinus is widely patent.    A/P - Dilia Solomon is status post endoscopic nasal surgery. She looks great. No sign of ongoing " sinus disease. Recommend rinses BID and f/u in 5-6 months.

## 2018-06-19 NOTE — LETTER
"    6/19/2018         RE: Dilia Solomon  [address] [facility] [address]        Dear Colleague,    Thank you for referring your patient, Dilia Solomon, to the [facility]. Please see a copy of my visit note below.    Post Op Sinus 3    HPI - Dilia Solomon is here for their second postoperative visit, status post endoscopic sinus surgery (with turbinate reduction) performed on 2/14/2018.  There was the expected amount of congestion which has now mostly resolved, and no bleeding has occurred.      She has no further facial pain. Overall she is improving and her nasal breathing is much better than preop. She is irrigating BID and using Flonase.     PROCEDURES PERFORMED:   1. Bilateral endoscopic maxillary antrostomy  2. Bilateral endoscopic anterior ethmoidectomy.   4. Bilateral endoscopic submucous resection of inferior turbinates    Physical Exam -   /70 (BP Location: Right arm, Patient Position: Sitting, Cuff Size: Adult Regular)  Pulse 78  Temp 98.4  F (36.9  C) (Oral)  Ht 1.626 m (5' 4\")  Wt 95.3 kg (210 lb)  LMP 07/18/2015 (Approximate)  BMI 36.05 kg/m2    General - The patient is awake and alert, and answers questions appropriately during the history and physical.  The vocal quality is hypernasal, but there is no dyspnea or stridor noted.  Eyes - The EOMI, there is no conjuncitval or scleral injection.  Pupils are equally round and reactive to light.  Oral - The oral mucosa is pink and moist.  The tongue is mobile and midline on protrusion, no edema noted.  Nasal - The nasal examination was done with a rigid nasal endoscope today for the purposes of bilateral endoscopically assisted debridement of the sinuses.  I began by spraying both sides with lidocaine and neosynephrine.    I began on the left side.  The opened ethmoid cells are no longer inflamed, and are widely patent without drainage. The roof was then visualized and is healing well.  I turned my attention to the right " side.  The middle meatus was open. The ethmoid cells were open and without significant inflammation. The maxillary sinus is widely patent.    A/P - Dilia Solomon is status post endoscopic nasal surgery. She looks great. No sign of ongoing sinus disease. Recommend rinses BID and f/u in 5-6 months.    Again, thank you for allowing me to participate in the care of your patient.        Sincerely,        Santhosh Tierney MD

## 2018-06-19 NOTE — MR AVS SNAPSHOT
After Visit Summary   6/19/2018    Dilia Solomon    MRN: [id number]           Patient Information     Date Of Birth          1965        Visit Information        Provider Department      6/19/2018 1:00 PM Santhosh Tierney MD [facility]        Today's Diagnoses     Chronic maxillary sinusitis    -  1      Care Instructions    Per Physician's instructions            Follow-ups after your visit        Your next 10 appointments already scheduled     Jun 19, 2018  3:45 PM CDT   [facility]   [facility]    [address] [facility] [address]   [phone]            Jun 26, 2018  3:30 PM CDT   Nurse Only with ALLERGY [facility] ([facility])    [address] [id number] [phone]           Every allergy patient MUST wait 30 minutes after their allergy shot. No exceptions.  Xolair shots #1-3 should plan to wait 2 hours in clinic Xolair shots after #4 should plan 30 minute wait in clinic            Nov 27, 2018  1:15 PM CST   Return Visit with Santhosh Tierney MD   [facility]    [address] [id number] [phone]              Who to contact     If you have questions or need follow up information about today's clinic visit or your schedule please contact [facility] directly at [phone].  Normal or non-critical lab and imaging results will be communicated to you by MyChart, letter or phone within 4 business days after the clinic has received the results. If you do not hear from us within 7 days, please contact the clinic through [employer] or phone. If you have a critical or abnormal lab result, we will notify you by phone as soon as possible.  Submit refill requests through [employer] or call your pharmacy and they will forward the refill request to us. Please  "allow 3 business days for your refill to be completed.          Additional Information About Your Visit        Care EveryWhere ID     This is your Care EveryWhere ID. This could be used by other organizations to access your [facility] medical records  [id number]        Your Vitals Were     Pulse Temperature Height Last Period BMI (Body Mass Index)       78 98.4  F (36.9  C) (Oral) 1.626 m (5' 4\") 07/18/2015 (Approximate) 36.05 kg/m2        Blood Pressure from Last 3 Encounters:   06/19/18 122/70   06/06/18 113/74   05/09/18 118/71    Weight from Last 3 Encounters:   06/19/18 95.3 kg (210 lb)   06/06/18 95.4 kg (210 lb 5.1 oz)   05/09/18 96.2 kg (212 lb)              We Performed the Following     NASAL ENDOSCOPY, DIAGNOSTIC        Primary Care Provider Office Phone # Fax #    Cookie Conklin [phone]       [address] [place] [address]        Equal Access to Services     GABBY CHEN : Hadii aad ku hadasho Soomaali, waaxda luqadaha, qaybta kaalmada adeegyada, waxay ellain jovani shen . So [facility] [phone].    ATENCIÓN: Si habla español, tiene a pruitt disposición servicios gratuitos de asistencia lingüística. Llame al [phone].    We comply with applicable federal civil rights laws and [place] laws. We do not discriminate on the basis of race, color, national origin, age, disability, sex, sexual orientation, or gender identity.            Thank you!     Thank you for choosing [facility]  for your care. Our goal is always to provide you with excellent care. Hearing back from our patients is one way we can continue to improve our services. Please take a few minutes to complete the written survey that you may receive in the mail after your visit with us. Thank you!             Your Updated Medication List - Protect others around you: Learn how to safely use, store and throw away your medicines at www.disposemymeds.org.          This list is accurate as of " 6/19/18  1:30 PM.  Always use your most recent med list.                   Brand Name Dispense Instructions for use Diagnosis    * albuterol (2.5 MG/3ML) 0.083% neb solution     1 Box    Take 1 vial (2.5 mg) by nebulization every 4 hours as needed    Moderate persistent asthma, uncomplicated       * albuterol 108 (90 Base) MCG/ACT Inhaler    PROAIR HFA/PROVENTIL HFA/VENTOLIN HFA    1 Inhaler    Inhale 2 puffs into the lungs every 4 hours as needed    Moderate persistent asthma without complication       ALLERGEN IMMUNOTHERAPY PRESCRIPTION     5 mL    Name of Mix: Mix #1  Mold Alternaria Tenuis 1:10 w/v, HS  0.5 ml Aspergillus Fumigatus 1:10 w/v, HS  0.5 ml Epicoccum Nigrum 1:10 w/v, HS 0.5 ml Hormodendrum Cladosporioides 1:10 w/v, HS 0.5 ml Penicillium Mix 1:10 w/v, HS  0.5 ml Diluent: HSA qs to 5ml    Chronic allergic rhinitis due to animal hair and dander, Allergic rhinitis due to dust mite, Allergic rhinitis due to mold, Chronic seasonal allergic rhinitis due to pollen       ALLERGEN IMMUNOTHERAPY PRESCRIPTION     5 mL    Name of Mix: Mix #2  Dust Mite, Cat, Dog Cat Hair, Standardized 10,000 BAU/mL, ALK  2.0 ml Dog Hair Dander, A. P.  1:100 w/v, HS  1.0 ml Dust Mites F 30,000AU/mL, HS  0.3 ml Dust Mites P. 30,000 AU/mL, HS  0.3 ml  Diluent: HSA qs to 5ml    Chronic allergic rhinitis due to animal hair and dander, Allergic rhinitis due to dust mite, Allergic rhinitis due to mold, Chronic seasonal allergic rhinitis due to pollen       ALLERGEN IMMUNOTHERAPY PRESCRIPTION     5 mL    Name of Mix: Mix #3 Grass,Tree  Dmitry,White 1:20w/v, HS 0.5ml Birch Mix PRW 1:20w/v, HS 0.5ml Boxelder-Maple Mix BHR (Boxelder Hard Red) 1:20w/v, HS 0.5ml [place],Common 1:20w/v, HS 0.5ml Elm,American 1:20w/v, HS 0.5ml [place] Mix RW 1:20w/v, HS 0.5ml Oak Mix RVW 1:20w/v, HS 0.5ml [place] Tree,Black 1:20w/v, HS 0.5ml Grass Mix #7 100,000 BAU/mL, HS 0.4ml Jonathan Grass 1:20w/v, HS 0.5ml Diluent: HSA qs to 5ml    Chronic allergic rhinitis  due to animal hair and dander, Allergic rhinitis due to dust mite, Allergic rhinitis due to mold, Chronic seasonal allergic rhinitis due to pollen       ALLERGEN IMMUNOTHERAPY PRESCRIPTION     5 mL    Name of Mix: Mix #4  Weeds Kochia 1:20 w/v, HS 0.5 ml Lamb's Quarters 1:20 w/v, HS 0.5 ml Nettle 1:20 w/v, HS 0.5 ml Plantain, English 1:20 w/v, HS 0.5 ml Ragweed Mixed 1:20 w/v ALK  0.5 ml Russian Thistle 1:20 w/v, HS 0.5 ml Sagebrush, Mugwort 1:20 w/v, HS 0.5 ml Sorrel, Sheep 1:20 w/v, HS 0.5 ml Diluent: HSA qs to 5ml    Chronic allergic rhinitis due to animal hair and dander, Allergic rhinitis due to dust mite, Allergic rhinitis due to mold, Chronic seasonal allergic rhinitis due to pollen       azelastine 0.05 % Soln ophthalmic solution    OPTIVAR    1 Bottle    Apply 1 drop to eye 2 times daily    Conjunctivitis, allergic, unspecified laterality       CERAVE Crea     2 Bottle    Externally apply 1 dose * topically 2 times daily    Eczema, unspecified type       cetirizine 10 MG tablet    zyrTEC    30 tablet    Take 1 tablet (10 mg) by mouth At Bedtime    Allergic conjunctivitis of both eyes, Chronic allergic rhinitis due to animal hair and dander, Allergic rhinitis due to dust mite, Allergic rhinitis due to mold, Chronic seasonal allergic rhinitis due to pollen       EPINEPHrine 0.3 MG/0.3ML injection 2-pack    EPIPEN/ADRENACLICK/or ANY BX GENERIC EQUIV    0.6 mL    Inject 0.3 mLs (0.3 mg) into the muscle once as needed for anaphylaxis    Food allergy, Need for desensitization to allergens       fluticasone 50 MCG/ACT spray    FLONASE    1 Bottle    Spray 2 sprays into both nostrils daily    Chronic allergic rhinitis due to animal hair and dander, Allergic rhinitis due to dust mite, Allergic rhinitis due to mold, Chronic seasonal allergic rhinitis due to pollen       fluticasone-salmeterol 250-50 MCG/DOSE diskus inhaler    ADVAIR    3 Inhaler    Inhale 1 puff into the lungs 2 times daily    Moderate persistent  asthma without complication       loratadine 10 MG tablet    CLARITIN    30 tablet    Take 1 tablet (10 mg) by mouth daily    Allergic conjunctivitis of both eyes, Chronic allergic rhinitis due to animal hair and dander, Allergic rhinitis due to dust mite, Allergic rhinitis due to mold, Chronic seasonal allergic rhinitis due to pollen       montelukast 10 MG tablet    SINGULAIR    30 tablet    Take 1 tablet (10 mg) by mouth At Bedtime    Moderate persistent asthma without complication, Chronic allergic rhinitis due to animal hair and dander, Allergic rhinitis due to dust mite, Allergic rhinitis due to mold, Chronic seasonal allergic rhinitis due to pollen       MULTIVITAMIN PO      1 tab daily        order for DME     1 Units    Equipment being ordered: Dynaflex insert    Closed displaced fracture of proximal phalanx of right great toe, initial encounter       order for DME     1 Units    Equipment being ordered: Silicone heel cup    Heel pain, chronic, left       order for DME     1 Units    Equipment being ordered: Dynaflex insert    Plantar fasciitis, left       triamcinolone 0.1 % cream    KENALOG    80 g    Apply  topically 2 times daily as needed.    Eczema, unspecified type       * Notice:  This list has 2 medication(s) that are the same as other medications prescribed for you. Read the directions carefully, and ask your doctor or other care provider to review them with you.

## 2018-06-19 NOTE — PROGRESS NOTES
This laryngoscopy scope was  used on this patient.    Rigid    Scope Number: Cole Storz Rigid    #[id number]      Pediatric/Adult/Post-op sinus

## 2018-06-19 NOTE — NURSING NOTE
"Initial /70 (BP Location: Right arm, Patient Position: Sitting, Cuff Size: Adult Regular)  Pulse 78  Temp 98.4  F (36.9  C) (Oral)  Ht 1.626 m (5' 4\")  Wt 95.3 kg (210 lb)  LMP 07/18/2015 (Approximate)  BMI 36.05 kg/m2 Estimated body mass index is 36.05 kg/(m^2) as calculated from the following:    Height as of this encounter: 1.626 m (5' 4\").    Weight as of this encounter: 95.3 kg (210 lb). .    Elsi Mcpherson CMA    "

## 2018-06-25 ENCOUNTER — TELEPHONE (OUTPATIENT)
Dept: ALLERGY | Facility: CLINIC | Age: 53
End: 2018-06-25

## 2018-06-25 NOTE — TELEPHONE ENCOUNTER
OK for patient to receive maintenance dose injections on 6/26/18. Please remind patient at this visit that maintenance dose injections should be given every 21-35 days.

## 2018-06-25 NOTE — TELEPHONE ENCOUNTER
Patient last injection Red 0.5mL (maintenance dose) all vials received 6/6/18.  Patient has injection appointment for 6/26/18 (day 20).  Please clarify:  Can patient receive injections on day 20?  Routed to Dr Horowitz for consideration.  Wallace Senior RN

## 2018-06-26 ENCOUNTER — ALLIED HEALTH/NURSE VISIT (OUTPATIENT)
Dept: ALLERGY | Facility: CLINIC | Age: 53
End: 2018-06-26
Payer: COMMERCIAL

## 2018-06-26 ENCOUNTER — HOSPITAL ENCOUNTER (OUTPATIENT)
Dept: PHYSICAL THERAPY | Facility: CLINIC | Age: 53
Setting detail: THERAPIES SERIES
End: 2018-06-26
Attending: PODIATRIST
Payer: COMMERCIAL

## 2018-06-26 DIAGNOSIS — J30.9 ALLERGIC RHINITIS: Primary | ICD-10-CM

## 2018-06-26 PROCEDURE — 95117 IMMUNOTHERAPY INJECTIONS: CPT

## 2018-06-26 PROCEDURE — 40000718 ZZHC STATISTIC PT DEPARTMENT ORTHO VISIT: Performed by: PHYSICAL THERAPIST

## 2018-06-26 PROCEDURE — 99207 ZZC DROP WITH A PROCEDURE: CPT

## 2018-06-26 PROCEDURE — 97110 THERAPEUTIC EXERCISES: CPT | Mod: GP | Performed by: PHYSICAL THERAPIST

## 2018-06-26 PROCEDURE — 97140 MANUAL THERAPY 1/> REGIONS: CPT | Mod: GP | Performed by: PHYSICAL THERAPIST

## 2018-06-26 NOTE — MR AVS SNAPSHOT
After Visit Summary   6/26/2018    Dilia Solomon    MRN: [id number]           Patient Information     Date Of Birth          1965        Visit Information        Provider Department      6/26/2018 3:30 PM ALLERGY [facility]        Today's Diagnoses     Allergic rhinitis    -  1       Follow-ups after your visit        Your next 10 appointments already scheduled     Jul 10, 2018  9:15 AM CDT   [facility]tho [facility] with Chelsea Alonzo PT   [facility]    [address] [facility] [address]   [phone]            Jul 17, 2018  3:30 PM CDT   Nurse Only with ALLERGY [facility] ([facility])    [address] [address] [phone]           Every allergy patient MUST wait 30 minutes after their allergy shot. No exceptions.  Xolair shots #1-3 should plan to wait 2 hours in clinic Xolair shots after #4 should plan 30 minute wait in clinic            Nov 27, 2018  1:15 PM CST   Return Visit with Santhosh Tierney MD   [facility] ([facility])    [address] [address] [phone]              Who to contact     If you have questions or need follow up information about today's clinic visit or your schedule please contact [facility] directly at [phone].  Normal or non-critical lab and imaging results will be communicated to you by MyChart, letter or phone within 4 business days after the clinic has received the results. If you do not hear from us within 7 days, please contact the clinic through MyChart or phone. If you have a critical or abnormal lab result, we will notify you by phone as soon as possible.  Submit refill requests through [facility] or call your pharmacy and they will forward the refill request to us. Please allow 3 business days for your refill to be completed.           Additional Information About Your Visit        Care EveryWhere ID     This is your Care EveryWhere ID. This could be used by other organizations to access your [place] medical records  [id number]        Your Vitals Were     Last Period                   07/18/2015 (Approximate)            Blood Pressure from Last 3 Encounters:   06/19/18 122/70   06/06/18 113/74   05/09/18 118/71    Weight from Last 3 Encounters:   06/19/18 95.3 kg (210 lb)   06/06/18 95.4 kg (210 lb 5.1 oz)   05/09/18 96.2 kg (212 lb)              We Performed the Following     Allergy Shot: Two or more injections        Primary Care Provider Office Phone # Fax #    Cookie Conklin [phone] [address] [place] [address]        Equal Access to Services     [facility] APRIL : Hadii ulices amaral hadasho Soomaali, waaxda luqadaha, qaybta kaalmada adeegyada, hieu cross hayfrandy shen . So [facility] [phone].    ATENCIÓN: Si habla español, tiene a pruitt disposición servicios gratuitos de asistencia lingüística. Lucas al [phone].    We comply with applicable federal civil rights laws and [place] laws. We do not discriminate on the basis of race, color, national origin, age, disability, sex, sexual orientation, or gender identity.            Thank you!     Thank you for choosing [facility]  for your care. Our goal is always to provide you with excellent care. Hearing back from our patients is one way we can continue to improve our services. Please take a few minutes to complete the written survey that you may receive in the mail after your visit with us. Thank you!             Your Updated Medication List - Protect others around you: Learn how to safely use, store and throw away your medicines at www.disposemymeds.org.          This list is accurate as of 6/26/18  4:29 PM.  Always use your most recent med list.                   Brand Name Dispense Instructions for use Diagnosis    * albuterol (2.5  MG/3ML) 0.083% neb solution     1 Box    Take 1 vial (2.5 mg) by nebulization every 4 hours as needed    Moderate persistent asthma, uncomplicated       * albuterol 108 (90 Base) MCG/ACT Inhaler    PROAIR HFA/PROVENTIL HFA/VENTOLIN HFA    1 Inhaler    Inhale 2 puffs into the lungs every 4 hours as needed    Moderate persistent asthma without complication       ALLERGEN IMMUNOTHERAPY PRESCRIPTION     5 mL    Name of Mix: Mix #1  Mold Alternaria Tenuis 1:10 w/v, HS  0.5 ml Aspergillus Fumigatus 1:10 w/v, HS  0.5 ml Epicoccum Nigrum 1:10 w/v, HS 0.5 ml Hormodendrum Cladosporioides 1:10 w/v, HS 0.5 ml Penicillium Mix 1:10 w/v, HS  0.5 ml Diluent: HSA qs to 5ml    Chronic allergic rhinitis due to animal hair and dander, Allergic rhinitis due to dust mite, Allergic rhinitis due to mold, Chronic seasonal allergic rhinitis due to pollen       ALLERGEN IMMUNOTHERAPY PRESCRIPTION     5 mL    Name of Mix: Mix #2  Dust Mite, Cat, Dog Cat Hair, Standardized 10,000 BAU/mL, ALK  2.0 ml Dog Hair Dander, A. P.  1:100 w/v, HS  1.0 ml Dust Mites F 30,000AU/mL, HS  0.3 ml Dust Mites P. 30,000 AU/mL, HS  0.3 ml  Diluent: HSA qs to 5ml    Chronic allergic rhinitis due to animal hair and dander, Allergic rhinitis due to dust mite, Allergic rhinitis due to mold, Chronic seasonal allergic rhinitis due to pollen       ALLERGEN IMMUNOTHERAPY PRESCRIPTION     5 mL    Name of Mix: Mix #3 Grass,Tree  Dmitry,White 1:20w/v, HS 0.5ml Birch Mix PRW 1:20w/v, HS 0.5ml Boxelder-Maple Mix BHR (Boxelder Hard Red) 1:20w/v, HS 0.5ml [place],Common 1:20w/v, HS 0.5ml Elm,American 1:20w/v, HS 0.5ml [place] Mix RW 1:20w/v, HS 0.5ml Oak Mix RVW 1:20w/v, HS 0.5ml Jbphh Tree,Black 1:20w/v, HS 0.5ml Grass Mix #7 100,000 BAU/mL, HS 0.4ml Jonathan Grass 1:20w/v, HS 0.5ml Diluent: HSA qs to 5ml    Chronic allergic rhinitis due to animal hair and dander, Allergic rhinitis due to dust mite, Allergic rhinitis due to mold, Chronic seasonal allergic rhinitis due to  pollen       ALLERGEN IMMUNOTHERAPY PRESCRIPTION     5 mL    Name of Mix: Mix #4  Weeds Kochia 1:20 w/v, HS 0.5 ml Lamb's Quarters 1:20 w/v, HS 0.5 ml Nettle 1:20 w/v, HS 0.5 ml Plantain, English 1:20 w/v, HS 0.5 ml Ragweed Mixed 1:20 w/v ALK  0.5 ml Russian Thistle 1:20 w/v, HS 0.5 ml Sagebrush, Mugwort 1:20 w/v, HS 0.5 ml Sorrel, Sheep 1:20 w/v, HS 0.5 ml Diluent: HSA qs to 5ml    Chronic allergic rhinitis due to animal hair and dander, Allergic rhinitis due to dust mite, Allergic rhinitis due to mold, Chronic seasonal allergic rhinitis due to pollen       azelastine 0.05 % Soln ophthalmic solution    OPTIVAR    1 Bottle    Apply 1 drop to eye 2 times daily    Conjunctivitis, allergic, unspecified laterality       CERAVE Crea     2 Bottle    Externally apply 1 dose * topically 2 times daily    Eczema, unspecified type       cetirizine 10 MG tablet    zyrTEC    30 tablet    Take 1 tablet (10 mg) by mouth At Bedtime    Allergic conjunctivitis of both eyes, Chronic allergic rhinitis due to animal hair and dander, Allergic rhinitis due to dust mite, Allergic rhinitis due to mold, Chronic seasonal allergic rhinitis due to pollen       EPINEPHrine 0.3 MG/0.3ML injection 2-pack    EPIPEN/ADRENACLICK/or ANY BX GENERIC EQUIV    0.6 mL    Inject 0.3 mLs (0.3 mg) into the muscle once as needed for anaphylaxis    Food allergy, Need for desensitization to allergens       fluticasone 50 MCG/ACT spray    FLONASE    1 Bottle    Spray 2 sprays into both nostrils daily    Chronic allergic rhinitis due to animal hair and dander, Allergic rhinitis due to dust mite, Allergic rhinitis due to mold, Chronic seasonal allergic rhinitis due to pollen       fluticasone-salmeterol 250-50 MCG/DOSE diskus inhaler    ADVAIR    3 Inhaler    Inhale 1 puff into the lungs 2 times daily    Moderate persistent asthma without complication       loratadine 10 MG tablet    CLARITIN    30 tablet    Take 1 tablet (10 mg) by mouth daily    Allergic  conjunctivitis of both eyes, Chronic allergic rhinitis due to animal hair and dander, Allergic rhinitis due to dust mite, Allergic rhinitis due to mold, Chronic seasonal allergic rhinitis due to pollen       montelukast 10 MG tablet    SINGULAIR    30 tablet    Take 1 tablet (10 mg) by mouth At Bedtime    Moderate persistent asthma without complication, Chronic allergic rhinitis due to animal hair and dander, Allergic rhinitis due to dust mite, Allergic rhinitis due to mold, Chronic seasonal allergic rhinitis due to pollen       MULTIVITAMIN PO      1 tab daily        order for DME     1 Units    Equipment being ordered: Dynaflex insert    Closed displaced fracture of proximal phalanx of right great toe, initial encounter       order for DME     1 Units    Equipment being ordered: Silicone heel cup    Heel pain, chronic, left       order for DME     1 Units    Equipment being ordered: Dynaflex insert    Plantar fasciitis, left       triamcinolone 0.1 % cream    KENALOG    80 g    Apply  topically 2 times daily as needed.    Eczema, unspecified type       * Notice:  This list has 2 medication(s) that are the same as other medications prescribed for you. Read the directions carefully, and ask your doctor or other care provider to review them with you.

## 2018-07-10 ENCOUNTER — HOSPITAL ENCOUNTER (OUTPATIENT)
Dept: PHYSICAL THERAPY | Facility: CLINIC | Age: 53
Setting detail: THERAPIES SERIES
End: 2018-07-10
Attending: PODIATRIST
Payer: COMMERCIAL

## 2018-07-10 PROCEDURE — 97110 THERAPEUTIC EXERCISES: CPT | Mod: GP | Performed by: PHYSICAL THERAPIST

## 2018-07-10 PROCEDURE — 40000718 ZZHC STATISTIC PT DEPARTMENT ORTHO VISIT: Performed by: PHYSICAL THERAPIST

## 2018-07-10 PROCEDURE — 97140 MANUAL THERAPY 1/> REGIONS: CPT | Mod: GP | Performed by: PHYSICAL THERAPIST

## 2018-07-10 NOTE — PROGRESS NOTES
Outpatient Physical Therapy Discharge Note     Patient: Dilia oSlomon  : 1965    Beginning/End Dates of Reporting Period:  18 to 7/10/2018    Referring Provider: Dr. Vallejo    Therapy Diagnosis: Left foot pain consistent with plantar fasciitis     Client Self Report: Patient reports that she hasn't had any problems with her foot at work.  Reports she is able to stand for 8 hours at work without an increase in symptoms.    Objective Measurements:  Objective Measure: Palpation  Details: No TTP achilles tendon insertion and lateral gastroc  Objective Measure: L ankle ROM in long sitting  Details: 7-8* DF  Objective Measure: LEFS  Details: 60/80    Goals:  Goal Identifier Walking   Goal Description Patient will tolerate walking for 15 minutes wihotut an increase in symptoms in order to be able to walk around her house to perform house hold taks.   Target Date 18   Date Met  18   Progress:     Goal Identifier Standing   Goal Description Patient will tolerate standing 1 hour without an increaes in symptoms in order to tolerate working.   Target Date 18   Date Met  18   Progress:     Goal Identifier Standing 2   Goal Description Patient will tolerate standing 4 hours in order to tolerate working until a break at work.   Target Date 18   Date Met  18   Progress:     Goal Identifier Standing 3   Goal Description Patient will tolerate standing for 8 hours at work without an increase in symptoms.   Target Date 18   Date Met  07/10/18   Progress:     Progress Toward Goals:   Progress this reporting period: Patient has met all of her goals and reports that she is able to work an entire shift without an increase in symptoms.  The patient also demonstrates improved ROM.  The patient will continue with her HEP and call with questions.    Plan:  Discharge from therapy.    Discharge:    Reason for Discharge: Patient has met all goals.  No further expectation of progress.  Patient  chooses to discontinue therapy.    Equipment Issued: none    Discharge Plan: Patient to continue home program.

## 2018-07-16 ENCOUNTER — TELEPHONE (OUTPATIENT)
Dept: FAMILY MEDICINE | Facility: CLINIC | Age: 53
End: 2018-07-16

## 2018-07-16 NOTE — TELEPHONE ENCOUNTER
Reason for call:  Patient reporting a symptom    Symptom or request: Rt Leg Pain. Started 7/15/18 when she bends the knee or moves the ankle there is pain in between the knee and ankle. No other symptoms. Pt is wondering if she should be seen? Please Advise    Duration (how long have symptoms been present): 7/15/18    Have you been treated for this before? No    Phone Number patient can be reached at:  Home number on file [phone] (home)- If does not answer please Call work number [phone]    Best Time:  Any Time      Can we leave a detailed message on this number:  YES    Call taken on 7/16/2018 at 2:01 PM by Lizeth Pedraza

## 2018-07-17 ENCOUNTER — TELEPHONE (OUTPATIENT)
Dept: OTHER | Facility: CLINIC | Age: 53
End: 2018-07-17

## 2018-07-17 ENCOUNTER — ALLIED HEALTH/NURSE VISIT (OUTPATIENT)
Dept: ALLERGY | Facility: CLINIC | Age: 53
End: 2018-07-17
Payer: COMMERCIAL

## 2018-07-17 ENCOUNTER — HOSPITAL ENCOUNTER (OUTPATIENT)
Dept: ULTRASOUND IMAGING | Facility: CLINIC | Age: 53
Discharge: HOME | End: 2018-07-17
Attending: NURSE PRACTITIONER | Admitting: NURSE PRACTITIONER
Payer: COMMERCIAL

## 2018-07-17 ENCOUNTER — OFFICE VISIT (OUTPATIENT)
Dept: FAMILY MEDICINE | Facility: CLINIC | Age: 53
End: 2018-07-17
Payer: COMMERCIAL

## 2018-07-17 VITALS
HEART RATE: 90 BPM | BODY MASS INDEX: 36.19 KG/M2 | RESPIRATION RATE: 16 BRPM | SYSTOLIC BLOOD PRESSURE: 124 MMHG | OXYGEN SATURATION: 96 % | TEMPERATURE: 98.5 F | DIASTOLIC BLOOD PRESSURE: 68 MMHG | HEIGHT: 64 IN | WEIGHT: 212 LBS

## 2018-07-17 DIAGNOSIS — J30.9 ALLERGIC RHINITIS: Primary | ICD-10-CM

## 2018-07-17 DIAGNOSIS — M79.661 PAIN OF RIGHT LOWER LEG: ICD-10-CM

## 2018-07-17 DIAGNOSIS — I83.90 VARICOSE VEIN OF LEG: Primary | ICD-10-CM

## 2018-07-17 DIAGNOSIS — L30.9 ECZEMA, UNSPECIFIED TYPE: ICD-10-CM

## 2018-07-17 PROCEDURE — 93971 EXTREMITY STUDY: CPT | Mod: RT

## 2018-07-17 PROCEDURE — 99207 ZZC DROP WITH A PROCEDURE: CPT

## 2018-07-17 PROCEDURE — 99214 OFFICE O/P EST MOD 30 MIN: CPT | Performed by: NURSE PRACTITIONER

## 2018-07-17 PROCEDURE — 95117 IMMUNOTHERAPY INJECTIONS: CPT

## 2018-07-17 RX ORDER — TRIAMCINOLONE ACETONIDE 1 MG/G
CREAM TOPICAL
Qty: 80 G | Refills: 0 | Status: SHIPPED | OUTPATIENT
Start: 2018-07-17 | End: 2018-08-28

## 2018-07-17 NOTE — TELEPHONE ENCOUNTER
Reason for Call:  Medication or medication refill:    Do you use a [facility]?  Name of the pharmacy and phone number for the current request:  [facility] -     Name of the medication requested: Triamcinolone    Other request:   LAST REFILL: 05/23/2018  LOV: 06/06/2018    Can we leave a detailed message on this number? Not Applicable    Phone number patient can be reached at: Home number on file [phone] (home)    Best Time: NA    Call taken on 7/17/2018 at 8:56 AM by Denise Behrendt

## 2018-07-17 NOTE — MR AVS SNAPSHOT
After Visit Summary   7/17/2018    Dilia Solomon    MRN: [id number]           Patient Information     Date Of Birth          1965        Visit Information        Provider Department      7/17/2018 3:30 PM ALLERGY [facility]        Today's Diagnoses     Allergic rhinitis    -  1       Follow-ups after your visit        Your next 10 appointments already scheduled     Aug 07, 2018  3:30 PM CDT   Nurse Only with ALLERGY [facility] ([facility])    [address] [facility] [address]   [phone]           Every allergy patient MUST wait 30 minutes after their allergy shot. No exceptions.  Xolair shots #1-3 should plan to wait 2 hours in clinic Xolair shots after #4 should plan 30 minute wait in clinic            Nov 27, 2018  1:15 PM CST   Return Visit with Santhosh Tierney MD   [facility]    [address] [facility] [address] [address]   [phone]              Future tests that were ordered for you today     Open Future Orders        Priority Expected Expires Ordered    US Lower Extremity Venous Duplex Right STAT  7/17/2019 7/17/2018            Who to contact     If you have questions or need follow up information about today's clinic visit or your schedule please contact [facility] directly at [phone].  Normal or non-critical lab and imaging results will be communicated to you by MyChart, letter or phone within 4 business days after the clinic has received the results. If you do not hear from us within 7 days, please contact the clinic through MyChart or phone. If you have a critical or abnormal lab result, we will notify you by phone as soon as possible.  Submit refill requests through [facility] or call your pharmacy and they will forward the refill request to us. Please allow 3 business days for your refill to be completed.          Additional Information  About Your Visit        Care EveryWhere ID     This is your Care EveryWhere ID. This could be used by other organizations to access your [place] medical records  [id number]        Your Vitals Were     Last Period                   07/18/2015 (Approximate)            Blood Pressure from Last 3 Encounters:   07/17/18 124/68   06/19/18 122/70   06/06/18 113/74    Weight from Last 3 Encounters:   07/17/18 96.2 kg (212 lb)   06/19/18 95.3 kg (210 lb)   06/06/18 95.4 kg (210 lb 5.1 oz)              We Performed the Following     Allergy Shot: Two or more injections          Where to get your medicines      These medications were sent to [facility] [address] [address]     Phone:  [phone]     triamcinolone 0.1 % cream          Primary Care Provider Office Phone # Fax #    Cookie Conklin [phone]       [address] [place] [address]        Equal Access to Services     GABBY CHEN : Hadii aad ku hadasho Soomaali, waaxda luqadaha, qaybta kaalmada adeegyada, waxay idiin hayjulio cesarn kanwal shen . So [facility] [phone].    ATENCIÓN: Si habla español, tiene a pruitt disposición servicios gratuitos de asistencia lingüística. Llame al [phone].    We comply with applicable federal civil rights laws and [place] laws. We do not discriminate on the basis of race, color, national origin, age, disability, sex, sexual orientation, or gender identity.            Thank you!     Thank you for choosing [facility]  for your care. Our goal is always to provide you with excellent care. Hearing back from our patients is one way we can continue to improve our services. Please take a few minutes to complete the written survey that you may receive in the mail after your visit with us. Thank you!             Your Updated Medication List - Protect others around you: Learn how to safely use, store and throw away your medicines at  www.disposemymeds.org.          This list is accurate as of 7/17/18  4:51 PM.  Always use your most recent med list.                   Brand Name Dispense Instructions for use Diagnosis    * albuterol (2.5 MG/3ML) 0.083% neb solution     1 Box    Take 1 vial (2.5 mg) by nebulization every 4 hours as needed    Moderate persistent asthma, uncomplicated       * albuterol 108 (90 Base) MCG/ACT Inhaler    PROAIR HFA/PROVENTIL HFA/VENTOLIN HFA    1 Inhaler    Inhale 2 puffs into the lungs every 4 hours as needed    Moderate persistent asthma without complication       ALLERGEN IMMUNOTHERAPY PRESCRIPTION     5 mL    Name of Mix: Mix #1  Mold Alternaria Tenuis 1:10 w/v, HS  0.5 ml Aspergillus Fumigatus 1:10 w/v, HS  0.5 ml Epicoccum Nigrum 1:10 w/v, HS 0.5 ml Hormodendrum Cladosporioides 1:10 w/v, HS 0.5 ml Penicillium Mix 1:10 w/v, HS  0.5 ml Diluent: HSA qs to 5ml    Chronic allergic rhinitis due to animal hair and dander, Allergic rhinitis due to dust mite, Allergic rhinitis due to mold, Chronic seasonal allergic rhinitis due to pollen       ALLERGEN IMMUNOTHERAPY PRESCRIPTION     5 mL    Name of Mix: Mix #2  Dust Mite, Cat, Dog Cat Hair, Standardized 10,000 BAU/mL, ALK  2.0 ml Dog Hair Dander, A. P.  1:100 w/v, HS  1.0 ml Dust Mites F 30,000AU/mL, HS  0.3 ml Dust Mites P. 30,000 AU/mL, HS  0.3 ml  Diluent: HSA qs to 5ml    Chronic allergic rhinitis due to animal hair and dander, Allergic rhinitis due to dust mite, Allergic rhinitis due to mold, Chronic seasonal allergic rhinitis due to pollen       ALLERGEN IMMUNOTHERAPY PRESCRIPTION     5 mL    Name of Mix: Mix #3 Grass,Tree  Dmitry,White 1:20w/v, HS 0.5ml Birch Mix PRW 1:20w/v, HS 0.5ml Boxelder-Maple Mix BHR (Boxelder Hard Red) 1:20w/v, HS 0.5ml [place],Common 1:20w/v, HS 0.5ml Elm,American 1:20w/v, HS 0.5ml [place] Mix RW 1:20w/v, HS 0.5ml Oak Mix RVW 1:20w/v, HS 0.5ml [place] Tree,Black 1:20w/v, HS 0.5ml Grass Mix #7 100,000 BAU/mL, HS 0.4ml Jonathan Grass 1:20w/v,  HS 0.5ml Diluent: HSA qs to 5ml    Chronic allergic rhinitis due to animal hair and dander, Allergic rhinitis due to dust mite, Allergic rhinitis due to mold, Chronic seasonal allergic rhinitis due to pollen       ALLERGEN IMMUNOTHERAPY PRESCRIPTION     5 mL    Name of Mix: Mix #4  Weeds Kochia 1:20 w/v, HS 0.5 ml Lamb's Quarters 1:20 w/v, HS 0.5 ml Nettle 1:20 w/v, HS 0.5 ml Plantain, English 1:20 w/v, HS 0.5 ml Ragweed Mixed 1:20 w/v ALK  0.5 ml Russian Thistle 1:20 w/v, HS 0.5 ml Sagebrush, Mugwort 1:20 w/v, HS 0.5 ml Sorrel, Sheep 1:20 w/v, HS 0.5 ml Diluent: HSA qs to 5ml    Chronic allergic rhinitis due to animal hair and dander, Allergic rhinitis due to dust mite, Allergic rhinitis due to mold, Chronic seasonal allergic rhinitis due to pollen       azelastine 0.05 % Soln ophthalmic solution    OPTIVAR    1 Bottle    Apply 1 drop to eye 2 times daily    Conjunctivitis, allergic, unspecified laterality       CERAVE Crea     2 Bottle    Externally apply 1 dose * topically 2 times daily    Eczema, unspecified type       cetirizine 10 MG tablet    zyrTEC    30 tablet    Take 1 tablet (10 mg) by mouth At Bedtime    Allergic conjunctivitis of both eyes, Chronic allergic rhinitis due to animal hair and dander, Allergic rhinitis due to dust mite, Allergic rhinitis due to mold, Chronic seasonal allergic rhinitis due to pollen       EPINEPHrine 0.3 MG/0.3ML injection 2-pack    EPIPEN/ADRENACLICK/or ANY BX GENERIC EQUIV    0.6 mL    Inject 0.3 mLs (0.3 mg) into the muscle once as needed for anaphylaxis    Food allergy, Need for desensitization to allergens       fluticasone 50 MCG/ACT spray    FLONASE    1 Bottle    Spray 2 sprays into both nostrils daily    Chronic allergic rhinitis due to animal hair and dander, Allergic rhinitis due to dust mite, Allergic rhinitis due to mold, Chronic seasonal allergic rhinitis due to pollen       fluticasone-salmeterol 250-50 MCG/DOSE diskus inhaler    ADVAIR    3 Inhaler    Inhale 1  puff into the lungs 2 times daily    Moderate persistent asthma without complication       loratadine 10 MG tablet    CLARITIN    30 tablet    Take 1 tablet (10 mg) by mouth daily    Allergic conjunctivitis of both eyes, Chronic allergic rhinitis due to animal hair and dander, Allergic rhinitis due to dust mite, Allergic rhinitis due to mold, Chronic seasonal allergic rhinitis due to pollen       montelukast 10 MG tablet    SINGULAIR    30 tablet    Take 1 tablet (10 mg) by mouth At Bedtime    Moderate persistent asthma without complication, Chronic allergic rhinitis due to animal hair and dander, Allergic rhinitis due to dust mite, Allergic rhinitis due to mold, Chronic seasonal allergic rhinitis due to pollen       MULTIVITAMIN PO      1 tab daily        order for DME     1 Units    Equipment being ordered: Silicone heel cup    Heel pain, chronic, left       order for DME     1 Units    Equipment being ordered: Dynaflex insert    Plantar fasciitis, left       triamcinolone 0.1 % cream    KENALOG    80 g    Apply  topically 2 times daily as needed.    Eczema, unspecified type       * Notice:  This list has 2 medication(s) that are the same as other medications prescribed for you. Read the directions carefully, and ask your doctor or other care provider to review them with you.

## 2018-07-17 NOTE — PROGRESS NOTES
SUBJECTIVE:   Dilia Solomon is a 53 year old female who presents to clinic today for the following health issues:      Joint Pain    Onset: 7/15/18    Description: Pt states that she started having right pain in her shin x 2 days ago. She does recall missing a step while she was walking down the stairs the day before the pain started but it didn't cause any pain at the time so she is not sure if this is the cause. Pt states that it is feeling better today.  Location: Right lower leg  Character: Dull ache    Intensity: 6/10    Progression of Symptoms: better    Accompanying Signs & Symptoms:  Other symptoms: none (No swelling, bruising or tingling/numbness noted.)    History:   Previous similar pain: no       Precipitating factors:   Trauma or overuse: Pt is not sure the exact cause of the pain. She states it hurts worse when she lifts her leg.    Alleviating factors:  Improved by: nothing    Therapies Tried and outcome: NOne    No injury that she is aware of.  Started yesterday morning and made it very difficult to work.  It is a little better today but still painful when she straightens her leg.  No history of bleeding or blood clots in the past.             Problem list and histories reviewed & adjusted, as indicated.  Additional history: as documented    Patient Active Problem List   Diagnosis     Need for prophylactic vaccination and inoculation against influenza     Sprain of interphalangeal (joint) of hand     Radial styloid tenosynovitis     Carpal tunnel syndrome     Synovial cyst of popliteal space     Pain in joint, lower leg     Hyperlipidemia LDL goal <130     Advanced directives, counseling/discussion     Moderate persistent asthma     Allergic rhinitis due to dust mite     Food allergy     FH: diabetes mellitus     Chronic allergic rhinitis due to animal hair and dander     Allergic rhinitis due to mold     Chronic seasonal allergic rhinitis due to pollen     Past Surgical History:   Procedure  Laterality Date     COLONOSCOPY N/A 10/6/2016    Procedure: COLONOSCOPY;  Surgeon: Shiva Johns MD;  Location: [facility]     ETHMOIDECTOMY  12/30/2013    Procedure: ETHMOIDECTOMY;  Bilateral Submucousal Reduction of InferiorTurbinates and Total Ethmoidectomy with Multiple Sinusotomies;  Surgeon: Lio More MD;  Location: [facility]     ETHMOIDECTOMY Bilateral 2/14/2018    Procedure: ETHMOIDECTOMY;  Bilateral anterior ethmoidectomy, bilateral maxillary antrostomy;  Surgeon: Santhosh Tierney MD;  Location: [facility]     SURGICAL HISTORY OF -   5/04    carpal tunnel release, bilateral       Social History   Substance Use Topics     Smoking status: Never Smoker     Smokeless tobacco: Never Used     Alcohol use No     Family History   Problem Relation Age of Onset     C.A.D. Mother      Diabetes Mother      Cancer Father      brain     Breast Cancer Maternal Aunt      Cancer - colorectal No family hx of          Current Outpatient Prescriptions   Medication Sig Dispense Refill     albuterol (2.5 MG/3ML) 0.083% neb solution Take 1 vial (2.5 mg) by nebulization every 4 hours as needed 1 Box 3     albuterol (PROAIR HFA/PROVENTIL HFA/VENTOLIN HFA) 108 (90 BASE) MCG/ACT Inhaler Inhale 2 puffs into the lungs every 4 hours as needed 1 Inhaler 3     azelastine (OPTIVAR) 0.05 % SOLN ophthalmic solution Apply 1 drop to eye 2 times daily 1 Bottle 5     cetirizine (ZYRTEC) 10 MG tablet Take 1 tablet (10 mg) by mouth At Bedtime 30 tablet 11     Emollient (CERAVE) CREA Externally apply 1 dose * topically 2 times daily 2 Bottle 11     EPINEPHrine (EPIPEN/ADRENACLICK/OR ANY BX GENERIC EQUIV) 0.3 MG/0.3ML injection 2-pack Inject 0.3 mLs (0.3 mg) into the muscle once as needed for anaphylaxis 0.6 mL 3     fluticasone (FLONASE) 50 MCG/ACT spray Spray 2 sprays into both nostrils daily 1 Bottle 11     fluticasone-salmeterol (ADVAIR) 250-50 MCG/DOSE diskus inhaler Inhale 1 puff into the lungs 2 times daily 3 Inhaler 1     loratadine  (CLARITIN) 10 MG tablet Take 1 tablet (10 mg) by mouth daily 30 tablet 11     montelukast (SINGULAIR) 10 MG tablet Take 1 tablet (10 mg) by mouth At Bedtime 30 tablet 11     MULTIVITAMIN OR 1 tab daily       ORDER FOR ALLERGEN IMMUNOTHERAPY Name of Mix: Mix #1  Mold  Alternaria Tenuis 1:10 w/v, HS  0.5 ml  Aspergillus Fumigatus 1:10 w/v, HS  0.5 ml  Epicoccum Nigrum 1:10 w/v, HS 0.5 ml  Hormodendrum Cladosporioides 1:10 w/v, HS 0.5 ml  Penicillium Mix 1:10 w/v, HS  0.5 ml  Diluent: HSA qs to 5ml 5 mL PRN     ORDER FOR ALLERGEN IMMUNOTHERAPY Name of Mix: Mix #2  Dust Mite, Cat, Dog  Cat Hair, Standardized 10,000 BAU/mL, ALK  2.0 ml  Dog Hair Dander, A. P.  1:100 w/v, HS  1.0 ml  Dust Mites F 30,000AU/mL, HS  0.3 ml  Dust Mites P. 30,000 AU/mL, HS  0.3 ml   Diluent: HSA qs to 5ml 5 mL PRN     ORDER FOR ALLERGEN IMMUNOTHERAPY Name of Mix: Mix #3 Grass,Tree   Dmitry,White 1:20w/v, HS 0.5ml  Birch Mix PRW 1:20w/v, HS 0.5ml  Boxelder-Maple Mix BHR (Boxelder Hard Red) 1:20w/v, HS 0.5ml  Angelina,Common 1:20w/v, HS 0.5ml  Elm,American 1:20w/v, HS 0.5ml  Turkey Mix RW 1:20w/v, HS 0.5ml  Oak Mix RVW 1:20w/v, HS 0.5ml  [place] Tree,Black 1:20w/v, HS 0.5ml  Grass Mix #7 100,000 BAU/mL, HS 0.4ml  Jonathan Grass 1:20w/v, HS 0.5ml  Diluent: HSA qs to 5ml 5 mL PRN     ORDER FOR ALLERGEN IMMUNOTHERAPY Name of Mix: Mix #4  Weeds  Kochia 1:20 w/v, HS 0.5 ml  Lamb's Quarters 1:20 w/v, HS 0.5 ml  Nettle 1:20 w/v, HS 0.5 ml  Plantain, English 1:20 w/v, HS 0.5 ml  Ragweed Mixed 1:20 w/v ALK  0.5 ml  Russian Thistle 1:20 w/v, HS 0.5 ml  Sagebrush, Mugwort 1:20 w/v, HS 0.5 ml  Sorrel, Sheep 1:20 w/v, HS 0.5 ml  Diluent: HSA qs to 5ml 5 mL PRN     order for DME Equipment being ordered: Dynaflex insert 1 Units 0     order for DME Equipment being ordered: Silicone heel cup 1 Units 0     triamcinolone (KENALOG) 0.1 % cream Apply  topically 2 times daily as needed. 80 g 0     Allergies   Allergen Reactions     Clinoril [Nsaids] Hives      "Tolerates ibuprofen and aspirin without hives     Pineapple Hives       Reviewed and updated as needed this visit by clinical staff  Tobacco  Allergies  Meds  Problems  Med Hx  Surg Hx  Fam Hx  Soc Hx        Reviewed and updated as needed this visit by Provider  Allergies  Meds  Problems         ROS:  CONSTITUTIONAL: NEGATIVE for fever, chills, change in weight  RESP: NEGATIVE for significant cough or SOB  CV: NEGATIVE for chest pain, palpitations or peripheral edema  MUSCULOSKELETAL: lump on medial shin that is tender with some redness around it and warm to the touch    OBJECTIVE:     /68  Pulse 90  Temp 98.5  F (36.9  C)  Resp 16  Ht 5' 4\" (1.626 m)  Wt 212 lb (96.2 kg)  LMP 07/18/2015 (Approximate)  SpO2 96%  BMI 36.39 kg/m2  Body mass index is 36.39 kg/(m^2).  GENERAL: healthy, alert and no distress  RESP: lungs clear to auscultation - no rales, rhonchi or wheezes  CV: regular rate and rhythm, normal S1 S2, no S3 or S4, no murmur, click or rub, no peripheral edema and peripheral pulses strong  MS: Patient has 3 cm varicose vein with tenderness and redness around the area.  There is mild warmth to the are on palpation.      Diagnostic Test Results:  Sent for STAT US doppler of right lower leg.    ASSESSMENT/PLAN:     1. Pain of right lower leg  DDx is symptomatic varicose veins superficial thrombophlebitis vs DVT.  Ordered STAT ultrasound Doppler on the leg to evaluate.  Patient sent to [place] for testing and they will call me with results.  If normal, [place] will send her home and if not they will hold her for treatment.  - US Lower Extremity Venous Duplex Right; Future    Rachana Lee NP  [facility]    Addendum:  US Doppler shows no clots and only a varicose vein.  With this ruled out, I spoke with patient and recommended picking up some compression stockings to wear when at work since she is up on her feet all day.  I have ordered a vascular surgery consult " on request from the patient and she was given the number to call to schedule.

## 2018-07-17 NOTE — MR AVS SNAPSHOT
After Visit Summary   7/17/2018    Dilia Solomon    MRN: [id number]           Patient Information     Date Of Birth          1965        Visit Information        Provider Department      7/17/2018 1:40 PM Rachana Lee NP [facility]        Today's Diagnoses     Pain of right lower leg    -  1       Follow-ups after your visit        Your next 10 appointments already scheduled     Jul 17, 2018  3:30 PM CDT   Nurse Only with ALLERGY [facility] ([facility])    [address] [address]   [phone]           Every allergy patient MUST wait 30 minutes after their allergy shot. No exceptions.  Xolair shots #1-3 should plan to wait 2 hours in clinic Xolair shots after #4 should plan 30 minute wait in clinic            Jul 17, 2018  4:00 PM CDT   US LOWER EXTREMITY VENOUS DUPLEX RIGHT with [facility] [facility] ([facility])    [address] [address]   [phone]           Please bring a list of your medicines (including vitamins, minerals and over-the-counter drugs). Also, tell your doctor about any allergies you may have. Wear comfortable clothes and leave your valuables at home.  You do not need to do anything special to prepare for your exam.  Please call the Imaging Department at your exam site with any questions.            Nov 27, 2018  1:15 PM CST   Return Visit with Santhosh Tierney MD   [facility] ([facility]    [address] [address]   [phone]              Future tests that were ordered for you today     Open Future Orders        Priority Expected Expires Ordered    US Lower Extremity Venous Duplex Right STAT  7/17/2019 7/17/2018            Who to contact     If you have questions or need follow up information about today's clinic visit or your schedule please contact [facility] ([facility]  "[facility] directly at [phone].  Normal or non-critical lab and imaging results will be communicated to you by MyChart, letter or phone within 4 business days after the clinic has received the results. If you do not hear from us within 7 days, please contact the clinic through MyChart or phone. If you have a critical or abnormal lab result, we will notify you by phone as soon as possible.  Submit refill requests through [employer] or call your pharmacy and they will forward the refill request to us. Please allow 3 business days for your refill to be completed.          Additional Information About Your Visit        Care EveryWhere ID     This is your Care EveryWhere ID. This could be used by other organizations to access your [facility] medical records  [id number]        Your Vitals Were     Pulse Temperature Respirations Height Last Period Pulse Oximetry    90 98.5  F (36.9  C) 16 5' 4\" (1.626 m) 07/18/2015 (Approximate) 96%    BMI (Body Mass Index)                   36.39 kg/m2            Blood Pressure from Last 3 Encounters:   07/17/18 124/68   06/19/18 122/70   06/06/18 113/74    Weight from Last 3 Encounters:   07/17/18 212 lb (96.2 kg)   06/19/18 210 lb (95.3 kg)   06/06/18 210 lb 5.1 oz (95.4 kg)                 Where to get your medicines      These medications were sent to [facility] [address] [address]     Phone:  [phone]     triamcinolone 0.1 % cream          Primary Care Provider Office Phone # Fax #    Cookie Conklin [phone]       [address] [place] [address]        Equal Access to Services     KODY CHEN AH: Hadii ulices Anguiano, wajavida luqadaha, qaybta kaalmada kanwalyamariaa, hieu howard. So [facility] [phone].    ATENCIÓN: Si habla español, tiene a pruitt disposición servicios gratuitos de asistencia lingüística. Llame al [phone].    We comply with applicable federal " civil rights laws and [place] laws. We do not discriminate on the basis of race, color, national origin, age, disability, sex, sexual orientation, or gender identity.            Thank you!     Thank you for choosing [facility]  for your care. Our goal is always to provide you with excellent care. Hearing back from our patients is one way we can continue to improve our services. Please take a few minutes to complete the written survey that you may receive in the mail after your visit with us. Thank you!             Your Updated Medication List - Protect others around you: Learn how to safely use, store and throw away your medicines at www.disposemymeds.org.          This list is accurate as of 7/17/18  3:01 PM.  Always use your most recent med list.                   Brand Name Dispense Instructions for use Diagnosis    * albuterol (2.5 MG/3ML) 0.083% neb solution     1 Box    Take 1 vial (2.5 mg) by nebulization every 4 hours as needed    Moderate persistent asthma, uncomplicated       * albuterol 108 (90 Base) MCG/ACT Inhaler    PROAIR HFA/PROVENTIL HFA/VENTOLIN HFA    1 Inhaler    Inhale 2 puffs into the lungs every 4 hours as needed    Moderate persistent asthma without complication       ALLERGEN IMMUNOTHERAPY PRESCRIPTION     5 mL    Name of Mix: Mix #1  Mold Alternaria Tenuis 1:10 w/v, HS  0.5 ml Aspergillus Fumigatus 1:10 w/v, HS  0.5 ml Epicoccum Nigrum 1:10 w/v, HS 0.5 ml Hormodendrum Cladosporioides 1:10 w/v, HS 0.5 ml Penicillium Mix 1:10 w/v, HS  0.5 ml Diluent: HSA qs to 5ml    Chronic allergic rhinitis due to animal hair and dander, Allergic rhinitis due to dust mite, Allergic rhinitis due to mold, Chronic seasonal allergic rhinitis due to pollen       ALLERGEN IMMUNOTHERAPY PRESCRIPTION     5 mL    Name of Mix: Mix #2  Dust Mite, Cat, Dog Cat Hair, Standardized 10,000 BAU/mL, ALK  2.0 ml Dog Hair Dander, A. P.  1:100 w/v, HS  1.0 ml Dust Mites F 30,000AU/mL, HS  0.3 ml Dust Mites  P. 30,000 AU/mL, HS  0.3 ml  Diluent: HSA qs to 5ml    Chronic allergic rhinitis due to animal hair and dander, Allergic rhinitis due to dust mite, Allergic rhinitis due to mold, Chronic seasonal allergic rhinitis due to pollen       ALLERGEN IMMUNOTHERAPY PRESCRIPTION     5 mL    Name of Mix: Mix #3 Grass,Tree  Dmitry,White 1:20w/v, HS 0.5ml Birch Mix PRW 1:20w/v, HS 0.5ml Boxelder-Maple Mix BHR (Boxelder Hard Red) 1:20w/v, HS 0.5ml [place],Common 1:20w/v, HS 0.5ml Elm,American 1:20w/v, HS 0.5ml [place] Mix RW 1:20w/v, HS 0.5ml Oak Mix RVW 1:20w/v, HS 0.5ml [place] Tree,Black 1:20w/v, HS 0.5ml Grass Mix #7 100,000 BAU/mL, HS 0.4ml Jonathan Grass 1:20w/v, HS 0.5ml Diluent: HSA qs to 5ml    Chronic allergic rhinitis due to animal hair and dander, Allergic rhinitis due to dust mite, Allergic rhinitis due to mold, Chronic seasonal allergic rhinitis due to pollen       ALLERGEN IMMUNOTHERAPY PRESCRIPTION     5 mL    Name of Mix: Mix #4  Weeds Kochia 1:20 w/v, HS 0.5 ml Lamb's Quarters 1:20 w/v, HS 0.5 ml Nettle 1:20 w/v, HS 0.5 ml Plantain, English 1:20 w/v, HS 0.5 ml Ragweed Mixed 1:20 w/v ALK  0.5 ml Russian Thistle 1:20 w/v, HS 0.5 ml Sagebrush, Mugwort 1:20 w/v, HS 0.5 ml Sorrel, Sheep 1:20 w/v, HS 0.5 ml Diluent: HSA qs to 5ml    Chronic allergic rhinitis due to animal hair and dander, Allergic rhinitis due to dust mite, Allergic rhinitis due to mold, Chronic seasonal allergic rhinitis due to pollen       azelastine 0.05 % Soln ophthalmic solution    OPTIVAR    1 Bottle    Apply 1 drop to eye 2 times daily    Conjunctivitis, allergic, unspecified laterality       CERAVE Crea     2 Bottle    Externally apply 1 dose * topically 2 times daily    Eczema, unspecified type       cetirizine 10 MG tablet    zyrTEC    30 tablet    Take 1 tablet (10 mg) by mouth At Bedtime    Allergic conjunctivitis of both eyes, Chronic allergic rhinitis due to animal hair and dander, Allergic rhinitis due to dust mite, Allergic rhinitis due  to mold, Chronic seasonal allergic rhinitis due to pollen       EPINEPHrine 0.3 MG/0.3ML injection 2-pack    EPIPEN/ADRENACLICK/or ANY BX GENERIC EQUIV    0.6 mL    Inject 0.3 mLs (0.3 mg) into the muscle once as needed for anaphylaxis    Food allergy, Need for desensitization to allergens       fluticasone 50 MCG/ACT spray    FLONASE    1 Bottle    Spray 2 sprays into both nostrils daily    Chronic allergic rhinitis due to animal hair and dander, Allergic rhinitis due to dust mite, Allergic rhinitis due to mold, Chronic seasonal allergic rhinitis due to pollen       fluticasone-salmeterol 250-50 MCG/DOSE diskus inhaler    ADVAIR    3 Inhaler    Inhale 1 puff into the lungs 2 times daily    Moderate persistent asthma without complication       loratadine 10 MG tablet    CLARITIN    30 tablet    Take 1 tablet (10 mg) by mouth daily    Allergic conjunctivitis of both eyes, Chronic allergic rhinitis due to animal hair and dander, Allergic rhinitis due to dust mite, Allergic rhinitis due to mold, Chronic seasonal allergic rhinitis due to pollen       montelukast 10 MG tablet    SINGULAIR    30 tablet    Take 1 tablet (10 mg) by mouth At Bedtime    Moderate persistent asthma without complication, Chronic allergic rhinitis due to animal hair and dander, Allergic rhinitis due to dust mite, Allergic rhinitis due to mold, Chronic seasonal allergic rhinitis due to pollen       MULTIVITAMIN PO      1 tab daily        order for DME     1 Units    Equipment being ordered: Silicone heel cup    Heel pain, chronic, left       order for DME     1 Units    Equipment being ordered: Dynaflex insert    Plantar fasciitis, left       triamcinolone 0.1 % cream    KENALOG    80 g    Apply  topically 2 times daily as needed.    Eczema, unspecified type       * Notice:  This list has 2 medication(s) that are the same as other medications prescribed for you. Read the directions carefully, and ask your doctor or other care provider to review  them with you.

## 2018-07-17 NOTE — TELEPHONE ENCOUNTER
"Referral received via EPIC \"in box\", per  guidelines referral forwarded to [facility].     Danielle Mcpherson, SRINIVASAN, RN    "

## 2018-07-17 NOTE — TELEPHONE ENCOUNTER
triamcinolone (KENALOG) 0.1 % cream    Prescription approved per [facility] Refill Protocol.    Gia Bae RN

## 2018-07-24 ENCOUNTER — OFFICE VISIT (OUTPATIENT)
Dept: VASCULAR SURGERY | Facility: CLINIC | Age: 53
End: 2018-07-24
Payer: COMMERCIAL

## 2018-07-24 DIAGNOSIS — Z53.9 ERRONEOUS ENCOUNTER--DISREGARD: Primary | ICD-10-CM

## 2018-07-24 PROCEDURE — 99243 OFF/OP CNSLTJ NEW/EST LOW 30: CPT | Performed by: SURGERY

## 2018-07-24 NOTE — PROGRESS NOTES
[facility] Consultation Note    HPI: I saw Ms. Solomon in consultation regarding bilateral lower extremity swelling and right lower extremity varicose vein with pain.  Patient is a 53-year-old female who has an unknown duration of bilateral varicose veins and swelling.  She works at the [employer] spends a lot of time on her feet throughout the day and has noticed that she gets bilateral lower extremity swelling with the right leg worse than the left which improves with elevation.  She also has varicose veins in both lower extremities and she is unsure of how long she has had these.  The right medial calf varicose vein she noted is significantly tender within the last month.  She finally was instructed by her primary care doctor to come here for evaluation.  Ultrasound performed at [facility] revealed that there was no DVT in the lower extremity that there was a varicose vein underlying the area of pain.  There is no phlebitis noted at that time.  At this time she says it is not debilitating for her work and she has not worn compression hose to help alleviate the symptoms.  She also does not take any sort of pain medication currently.  I asked the patient whether her family history is significant for varicose veins she says she is unsure and denies a history of DVT as well.    Review of systems: Is negative.    Past Medical History:   Diagnosis Date     Injury, other and unspecified, shoulder and upper arm 9/03     Past Surgical History:   Procedure Laterality Date     COLONOSCOPY N/A 10/6/2016    Procedure: COLONOSCOPY;  Surgeon: Shiva Johns MD;  Location: [facility]     ETHMOIDECTOMY  12/30/2013    Procedure: ETHMOIDECTOMY;  Bilateral Submucousal Reduction of InferiorTurbinates and Total Ethmoidectomy with Multiple Sinusotomies;  Surgeon: Lio More MD;  Location: [facility]     ETHMOIDECTOMY Bilateral 2/14/2018    Procedure: ETHMOIDECTOMY;  Bilateral anterior ethmoidectomy, bilateral maxillary  antrostomy;  Surgeon: Santhosh Tierney MD;  Location: [facility]     SURGICAL HISTORY OF -   5/04    carpal tunnel release, bilateral     Social History     Social History     Marital status: Single     Spouse name: N/A     Number of children: N/A     Years of education: N/A     Occupational History     Not on file.     Social History Main Topics     Smoking status: Never Smoker     Smokeless tobacco: Never Used     Alcohol use No     Drug use: No     Sexual activity: No     Other Topics Concern     Parent/Sibling W/ Cabg, Mi Or Angioplasty Before 65f 55m? Yes     mother     Social History Narrative     Family History   Problem Relation Age of Onset     C.A.D. Mother      Diabetes Mother      Cancer Father      brain     Breast Cancer Maternal Aunt      Cancer - colorectal No family hx of      Physical examination:  Constitutional well-developed overweight female.  She has a service dog who helps to detect asthma attacks.  HEENT: PERRLA, mucous membranes moist.  Lungs: Nonlabored breathing.  Skin: She has a right medial calf varicose vein is approximately 8 mm in diameter.  There is no evidence of phlebitis in this location.  She has swelling in the mid calf on the right lower extremity.  No skin changes consistent with chronic venous insufficiency.  In the left leg she has multiple varicose veins in the medial left calf.  She also has evidence of smaller varicose veins behind the left knee.  Neurologic: She has normal gait, motor and sensory function both lower extremities.  Psych: Normal mood, affect, judgment.  Pulse: She has palpable pedal pulses bilaterally.    Assessment: Is a 53-year-old female with bilateral lower extremity varicose veins with pain on the right and significant swelling and edema, C3 disease.    Plan: At this point she has not done any conservative therapy and I have fitted her for 15-20 mmHg compression knee-high stockings.  My plan is to see her back in the 3 month visit to evaluate her progress  of the stockings and obtain a bilateral lower extremity ultrasound that time to review results and treatment options.  The patient is in agreement with this plan will follow-up in 3 month timeframe.    Wyatt Dunn MD  Vascular Surgery

## 2018-07-24 NOTE — MR AVS SNAPSHOT
After Visit Summary   7/24/2018    Dilia Solomon    MRN: [id number]           Patient Information     Date Of Birth          1965        Visit Information        Provider Department      7/24/2018 2:00 PM Wyatt Dunn MD [facility]      Today's Diagnoses     ERRONEOUS ENCOUNTER--DISREGARD    -  1       Follow-ups after your visit        Your next 10 appointments already scheduled     Sep 25, 2018  3:30 PM CDT   Nurse Only with ALLERGY [facility] ([facility])    [address] [address]   [phone]           Every allergy patient MUST wait 30 minutes after their allergy shot. No exceptions.  Xolair shots #1-3 should plan to wait 2 hours in clinic Xolair shots after #4 should plan 30 minute wait in clinic            Oct 30, 2018 12:30 PM CDT   Ultrasound Bilaterally with [facility]   [facility] ([facility])    [address] [facility] [address] [facility] [id number] [phone]            Oct 30, 2018  2:00 PM CDT   Ultrasound Results with Wyatt Dunn MD   [facility] ([facility])    [address] [facility] [id number] [phone]            Nov 27, 2018  1:15 PM CST   Return Visit with Santhosh Tierney MD   [facility] ([facility])    [address] [address]   [phone]              Who to contact     If you have questions or need follow up information about today's clinic visit or your schedule please contact [facility] directly at [phone].  Normal or non-critical lab and imaging results will be communicated to you by MyChart, letter or phone within 4 business days after the clinic has received the results. If you do not hear from us within 7 days, please contact the clinic through  [employer] or phone. If you have a critical or abnormal lab result, we will notify you by phone as soon as possible.  Submit refill requests through [employer] or call your pharmacy and they will forward the refill request to us. Please allow 3 business days for your refill to be completed.          Additional Information About Your Visit        Care EveryWhere ID     This is your Care EveryWhere ID. This could be used by other organizations to access your [place] medical records  [id number]        Your Vitals Were     Last Period                   07/18/2015 (Approximate)            Blood Pressure from Last 3 Encounters:   07/17/18 124/68   06/19/18 122/70   06/06/18 113/74    Weight from Last 3 Encounters:   07/17/18 96.2 kg (212 lb)   06/19/18 95.3 kg (210 lb)   06/06/18 95.4 kg (210 lb 5.1 oz)              Today, you had the following     No orders found for display       Primary Care Provider Office Phone # Fax #    Cookie Conklin [phone]       [address] [place] [address]        Equal Access to Services     [facility]: Hadii aad ku hadasho Sofaith, waaxda luqadaha, qaybta kaalmada ademelida, hieu shen . So [facility] [phone].    ATENCIÓN: Si habla español, tiene a pruitt disposición servicios gratuitos de asistencia lingüística. [facility] [phone].    We comply with applicable federal civil rights laws and [place] laws. We do not discriminate on the basis of race, color, national origin, age, disability, sex, sexual orientation, or gender identity.            Thank you!     Thank you for choosing [facility]  for your care. Our goal is always to provide you with excellent care. Hearing back from our patients is one way we can continue to improve our services. Please take a few minutes to complete the written survey that you may receive in the mail after your visit with us. Thank you!             Your Updated Medication List -  Protect others around you: Learn how to safely use, store and throw away your medicines at www.disposemymeds.org.          This list is accurate as of 7/24/18 11:59 PM.  Always use your most recent med list.                   Brand Name Dispense Instructions for use Diagnosis    * albuterol (2.5 MG/3ML) 0.083% neb solution     1 Box    Take 1 vial (2.5 mg) by nebulization every 4 hours as needed    Moderate persistent asthma, uncomplicated       * albuterol 108 (90 Base) MCG/ACT inhaler    PROAIR HFA/PROVENTIL HFA/VENTOLIN HFA    1 Inhaler    Inhale 2 puffs into the lungs every 4 hours as needed    Moderate persistent asthma without complication       azelastine 0.05 % ophthalmic solution    OPTIVAR    1 Bottle    Apply 1 drop to eye 2 times daily    Conjunctivitis, allergic, unspecified laterality       CERAVE Crea     2 Bottle    Externally apply 1 dose * topically 2 times daily    Eczema, unspecified type       cetirizine 10 MG tablet    zyrTEC    30 tablet    Take 1 tablet (10 mg) by mouth At Bedtime    Allergic conjunctivitis of both eyes, Chronic allergic rhinitis due to animal hair and dander, Allergic rhinitis due to dust mite, Allergic rhinitis due to mold, Chronic seasonal allergic rhinitis due to pollen       EPINEPHrine 0.3 MG/0.3ML injection 2-pack    EPIPEN/ADRENACLICK/or ANY BX GENERIC EQUIV    0.6 mL    Inject 0.3 mLs (0.3 mg) into the muscle once as needed for anaphylaxis    Food allergy, Need for desensitization to allergens       fluticasone 50 MCG/ACT spray    FLONASE    1 Bottle    Spray 2 sprays into both nostrils daily    Chronic allergic rhinitis due to animal hair and dander, Allergic rhinitis due to dust mite, Allergic rhinitis due to mold, Chronic seasonal allergic rhinitis due to pollen       fluticasone-salmeterol 250-50 MCG/DOSE diskus inhaler    ADVAIR    3 Inhaler    Inhale 1 puff into the lungs 2 times daily    Moderate persistent asthma without complication       loratadine 10 MG  tablet    CLARITIN    30 tablet    Take 1 tablet (10 mg) by mouth daily    Allergic conjunctivitis of both eyes, Chronic allergic rhinitis due to animal hair and dander, Allergic rhinitis due to dust mite, Allergic rhinitis due to mold, Chronic seasonal allergic rhinitis due to pollen       montelukast 10 MG tablet    SINGULAIR    30 tablet    Take 1 tablet (10 mg) by mouth At Bedtime    Moderate persistent asthma without complication, Chronic allergic rhinitis due to animal hair and dander, Allergic rhinitis due to dust mite, Allergic rhinitis due to mold, Chronic seasonal allergic rhinitis due to pollen       MULTIVITAMIN PO      1 tab daily        ORDER FOR ALLERGEN IMMUNOTHERAPY 5 mL vial     5 mL    Name of Mix: Mix #1  Mold Alternaria Tenuis 1:10 w/v, HS  0.5 ml Aspergillus Fumigatus 1:10 w/v, HS  0.5 ml Epicoccum Nigrum 1:10 w/v, HS 0.5 ml Hormodendrum Cladosporioides 1:10 w/v, HS 0.5 ml Penicillium Mix 1:10 w/v, HS  0.5 ml Diluent: HSA qs to 5ml    Chronic allergic rhinitis due to animal hair and dander, Allergic rhinitis due to dust mite, Allergic rhinitis due to mold, Chronic seasonal allergic rhinitis due to pollen       ORDER FOR ALLERGEN IMMUNOTHERAPY 5 mL vial     5 mL    Name of Mix: Mix #2  Dust Mite, Cat, Dog Cat Hair, Standardized 10,000 BAU/mL, ALK  2.0 ml Dog Hair Dander, A. P.  1:100 w/v, HS  1.0 ml Dust Mites F 30,000AU/mL, HS  0.3 ml Dust Mites P. 30,000 AU/mL, HS  0.3 ml  Diluent: HSA qs to 5ml    Chronic allergic rhinitis due to animal hair and dander, Allergic rhinitis due to dust mite, Allergic rhinitis due to mold, Chronic seasonal allergic rhinitis due to pollen       ORDER FOR ALLERGEN IMMUNOTHERAPY 5 mL vial     5 mL    Name of Mix: Mix #3 Grass,Tree  Dmitry,White 1:20w/v, HS 0.5ml Birch Mix PRW 1:20w/v, HS 0.5ml Boxelder-Maple Mix BHR (Boxelder Hard Red) 1:20w/v, HS 0.5ml [place],Common 1:20w/v, HS 0.5ml Elm,American 1:20w/v, HS 0.5ml Green Mix RW 1:20w/v, HS 0.5ml Oak Mix RVW  1:20w/v, HS 0.5ml [place] Tree,Black 1:20w/v, HS 0.5ml Grass Mix #7 100,000 BAU/mL, HS 0.4ml Jonathan Grass 1:20w/v, HS 0.5ml Diluent: HSA qs to 5ml    Chronic allergic rhinitis due to animal hair and dander, Allergic rhinitis due to dust mite, Allergic rhinitis due to mold, Chronic seasonal allergic rhinitis due to pollen       ORDER FOR ALLERGEN IMMUNOTHERAPY 5 mL vial     5 mL    Name of Mix: Mix #4  Weeds Kochia 1:20 w/v, HS 0.5 ml Lamb's Quarters 1:20 w/v, HS 0.5 ml Nettle 1:20 w/v, HS 0.5 ml Plantain, English 1:20 w/v, HS 0.5 ml Ragweed Mixed 1:20 w/v ALK  0.5 ml Russian Thistle 1:20 w/v, HS 0.5 ml Sagebrush, Mugwort 1:20 w/v, HS 0.5 ml Sorrel, Sheep 1:20 w/v, HS 0.5 ml Diluent: HSA qs to 5ml    Chronic allergic rhinitis due to animal hair and dander, Allergic rhinitis due to dust mite, Allergic rhinitis due to mold, Chronic seasonal allergic rhinitis due to pollen       order for DME     1 Units    Equipment being ordered: Silicone heel cup    Heel pain, chronic, left       order for DME     1 Units    Equipment being ordered: Dynaflex insert    Plantar fasciitis, left       * Notice:  This list has 2 medication(s) that are the same as other medications prescribed for you. Read the directions carefully, and ask your doctor or other care provider to review them with you.

## 2018-08-07 ENCOUNTER — ALLIED HEALTH/NURSE VISIT (OUTPATIENT)
Dept: ALLERGY | Facility: CLINIC | Age: 53
End: 2018-08-07
Payer: COMMERCIAL

## 2018-08-07 DIAGNOSIS — J30.9 ALLERGIC RHINITIS: Primary | ICD-10-CM

## 2018-08-07 PROCEDURE — 99207 ZZC DROP WITH A PROCEDURE: CPT

## 2018-08-07 PROCEDURE — 95117 IMMUNOTHERAPY INJECTIONS: CPT

## 2018-08-07 NOTE — MR AVS SNAPSHOT
After Visit Summary   8/7/2018    Dilia Solomon    MRN: [id number]           Patient Information     Date Of Birth          1965        Visit Information        Provider Department      8/7/2018 8:45 AM ALLERGY [facility]        Today's Diagnoses     Allergic rhinitis    -  1       Follow-ups after your visit        Your next 10 appointments already scheduled     Aug 28, 2018  3:30 PM CDT   Nurse Only with ALLERGY [facility] ([facility])    [address] [address]   [phone]           Every allergy patient MUST wait 30 minutes after their allergy shot. No exceptions.  Xolair shots #1-3 should plan to wait 2 hours in clinic Xolair shots after #4 should plan 30 minute wait in clinic            Oct 30, 2018 12:30 PM CDT   Ultrasound Bilaterally with [facility]   [facility] ([facility]    [address] [address]   [phone]            Oct 30, 2018  2:00 PM CDT   Ultrasound Results with Wyatt Dunn MD   [facility] ([facility])    [address] [address]   [phone]            Nov 27, 2018  1:15 PM CST   Return Visit with Santhosh Tierney MD   [facility] ([facility])    [address] [address]   [phone]              Who to contact     If you have questions or need follow up information about today's clinic visit or your schedule please contact [facility] directly at [phone].  Normal or non-critical lab and imaging results will be communicated to you by MyChart, letter or phone within 4 business days after the clinic has received the results. If you do not hear from us within 7 days, please contact the clinic through MyChart or phone. If you have a critical or abnormal lab result, we will  notify you by phone as soon as possible.  Submit refill requests through [email or web address] or call your pharmacy and they will forward the refill request to us. Please allow 3 business days for your refill to be completed.          Additional Information About Your Visit        Care EveryWhere ID     This is your Care EveryWhere ID. This could be used by other organizations to access your [place] medical records  [id number]        Your Vitals Were     Last Period                   07/18/2015 (Approximate)            Blood Pressure from Last 3 Encounters:   07/17/18 124/68   06/19/18 122/70   06/06/18 113/74    Weight from Last 3 Encounters:   07/17/18 96.2 kg (212 lb)   06/19/18 95.3 kg (210 lb)   06/06/18 95.4 kg (210 lb 5.1 oz)              We Performed the Following     Allergy Shot: Two or more injections        Primary Care Provider Office Phone # Fax #    Cookie Conklin [phone]       [address] [place] [address]        Equal Access to Services     [facility] : Hadii aad ku hadasho Soomaali, waaxda luqadaha, qaybta kaalmada adeegyada, waxay idiin hayaan moiseseg willem shen . So [facility] [phone].    ATENCIÓN: Si habla español, tiene a pruitt disposición servicios gratuitos de asistencia lingüística. [facility] [phone].    We comply with applicable federal civil rights laws and [place] laws. We do not discriminate on the basis of race, color, national origin, age, disability, sex, sexual orientation, or gender identity.            Thank you!     Thank you for choosing [facility]  for your care. Our goal is always to provide you with excellent care. Hearing back from our patients is one way we can continue to improve our services. Please take a few minutes to complete the written survey that you may receive in the mail after your visit with us. Thank you!             Your Updated Medication List - Protect others around you: Learn how to safely use, store and throw away  your medicines at www.disposemymeds.org.          This list is accurate as of 8/7/18  9:28 AM.  Always use your most recent med list.                   Brand Name Dispense Instructions for use Diagnosis    * albuterol (2.5 MG/3ML) 0.083% neb solution     1 Box    Take 1 vial (2.5 mg) by nebulization every 4 hours as needed    Moderate persistent asthma, uncomplicated       * albuterol 108 (90 Base) MCG/ACT Inhaler    PROAIR HFA/PROVENTIL HFA/VENTOLIN HFA    1 Inhaler    Inhale 2 puffs into the lungs every 4 hours as needed    Moderate persistent asthma without complication       ALLERGEN IMMUNOTHERAPY PRESCRIPTION     5 mL    Name of Mix: Mix #1  Mold Alternaria Tenuis 1:10 w/v, HS  0.5 ml Aspergillus Fumigatus 1:10 w/v, HS  0.5 ml Epicoccum Nigrum 1:10 w/v, HS 0.5 ml Hormodendrum Cladosporioides 1:10 w/v, HS 0.5 ml Penicillium Mix 1:10 w/v, HS  0.5 ml Diluent: HSA qs to 5ml    Chronic allergic rhinitis due to animal hair and dander, Allergic rhinitis due to dust mite, Allergic rhinitis due to mold, Chronic seasonal allergic rhinitis due to pollen       ALLERGEN IMMUNOTHERAPY PRESCRIPTION     5 mL    Name of Mix: Mix #2  Dust Mite, Cat, Dog Cat Hair, Standardized 10,000 BAU/mL, ALK  2.0 ml Dog Hair Dander, A. P.  1:100 w/v, HS  1.0 ml Dust Mites F 30,000AU/mL, HS  0.3 ml Dust Mites P. 30,000 AU/mL, HS  0.3 ml  Diluent: HSA qs to 5ml    Chronic allergic rhinitis due to animal hair and dander, Allergic rhinitis due to dust mite, Allergic rhinitis due to mold, Chronic seasonal allergic rhinitis due to pollen       ALLERGEN IMMUNOTHERAPY PRESCRIPTION     5 mL    Name of Mix: Mix #3 Grass,Tree  Dmitry,White 1:20w/v, HS 0.5ml Birch Mix PRW 1:20w/v, HS 0.5ml Boxelder-Maple Mix BHR (Boxelder Hard Red) 1:20w/v, HS 0.5ml [place],Common 1:20w/v, HS 0.5ml Elm,American 1:20w/v, HS 0.5ml [place] Mix RW 1:20w/v, HS 0.5ml Oak Mix RVW 1:20w/v, HS 0.5ml [place] Tree,Black 1:20w/v, HS 0.5ml Grass Mix #7 100,000 BAU/mL, HS 0.4ml  Jonathan Grass 1:20w/v, HS 0.5ml Diluent: HSA qs to 5ml    Chronic allergic rhinitis due to animal hair and dander, Allergic rhinitis due to dust mite, Allergic rhinitis due to mold, Chronic seasonal allergic rhinitis due to pollen       ALLERGEN IMMUNOTHERAPY PRESCRIPTION     5 mL    Name of Mix: Mix #4  Weeds Kochia 1:20 w/v, HS 0.5 ml Lamb's Quarters 1:20 w/v, HS 0.5 ml Nettle 1:20 w/v, HS 0.5 ml Plantain, English 1:20 w/v, HS 0.5 ml Ragweed Mixed 1:20 w/v ALK  0.5 ml Russian Thistle 1:20 w/v, HS 0.5 ml Sagebrush, Mugwort 1:20 w/v, HS 0.5 ml Sorrel, Sheep 1:20 w/v, HS 0.5 ml Diluent: HSA qs to 5ml    Chronic allergic rhinitis due to animal hair and dander, Allergic rhinitis due to dust mite, Allergic rhinitis due to mold, Chronic seasonal allergic rhinitis due to pollen       azelastine 0.05 % Soln ophthalmic solution    OPTIVAR    1 Bottle    Apply 1 drop to eye 2 times daily    Conjunctivitis, allergic, unspecified laterality       CERAVE Crea     2 Bottle    Externally apply 1 dose * topically 2 times daily    Eczema, unspecified type       cetirizine 10 MG tablet    zyrTEC    30 tablet    Take 1 tablet (10 mg) by mouth At Bedtime    Allergic conjunctivitis of both eyes, Chronic allergic rhinitis due to animal hair and dander, Allergic rhinitis due to dust mite, Allergic rhinitis due to mold, Chronic seasonal allergic rhinitis due to pollen       EPINEPHrine 0.3 MG/0.3ML injection 2-pack    EPIPEN/ADRENACLICK/or ANY BX GENERIC EQUIV    0.6 mL    Inject 0.3 mLs (0.3 mg) into the muscle once as needed for anaphylaxis    Food allergy, Need for desensitization to allergens       fluticasone 50 MCG/ACT spray    FLONASE    1 Bottle    Spray 2 sprays into both nostrils daily    Chronic allergic rhinitis due to animal hair and dander, Allergic rhinitis due to dust mite, Allergic rhinitis due to mold, Chronic seasonal allergic rhinitis due to pollen       fluticasone-salmeterol 250-50 MCG/DOSE diskus inhaler    ADVAIR     3 Inhaler    Inhale 1 puff into the lungs 2 times daily    Moderate persistent asthma without complication       loratadine 10 MG tablet    CLARITIN    30 tablet    Take 1 tablet (10 mg) by mouth daily    Allergic conjunctivitis of both eyes, Chronic allergic rhinitis due to animal hair and dander, Allergic rhinitis due to dust mite, Allergic rhinitis due to mold, Chronic seasonal allergic rhinitis due to pollen       montelukast 10 MG tablet    SINGULAIR    30 tablet    Take 1 tablet (10 mg) by mouth At Bedtime    Moderate persistent asthma without complication, Chronic allergic rhinitis due to animal hair and dander, Allergic rhinitis due to dust mite, Allergic rhinitis due to mold, Chronic seasonal allergic rhinitis due to pollen       MULTIVITAMIN PO      1 tab daily        order for DME     1 Units    Equipment being ordered: Silicone heel cup    Heel pain, chronic, left       order for DME     1 Units    Equipment being ordered: Dynaflex insert    Plantar fasciitis, left       triamcinolone 0.1 % cream    KENALOG    80 g    Apply  topically 2 times daily as needed.    Eczema, unspecified type       * Notice:  This list has 2 medication(s) that are the same as other medications prescribed for you. Read the directions carefully, and ask your doctor or other care provider to review them with you.

## 2018-08-28 DIAGNOSIS — L30.9 ECZEMA, UNSPECIFIED TYPE: ICD-10-CM

## 2018-08-28 NOTE — TELEPHONE ENCOUNTER
triamcinolone (KENALOG) 0.1 % cream    Date Last Filled: 07/1718  QTY: 80    [facility]

## 2018-08-29 RX ORDER — TRIAMCINOLONE ACETONIDE 1 MG/G
CREAM TOPICAL
Qty: 80 G | Refills: 1 | Status: SHIPPED | OUTPATIENT
Start: 2018-08-29 | End: 2019-04-12

## 2018-08-29 NOTE — TELEPHONE ENCOUNTER
Triamcinolone (Kenalog) cream    Last Written Prescription Date:  7/17/18  Last Fill Quantity: 80g,   # refills: 0  Last Office Visit: 6/6/18    Per 12/6/17 office visit   8. Apply triamcinolone 0.1% cream to affected areas twice daily as needed    Please advise on refill    Gia Bae RN

## 2018-08-30 ENCOUNTER — ALLIED HEALTH/NURSE VISIT (OUTPATIENT)
Dept: ALLERGY | Facility: CLINIC | Age: 53
End: 2018-08-30
Payer: COMMERCIAL

## 2018-08-30 DIAGNOSIS — J30.9 ALLERGIC RHINITIS: Primary | ICD-10-CM

## 2018-08-30 PROCEDURE — 95117 IMMUNOTHERAPY INJECTIONS: CPT

## 2018-08-30 PROCEDURE — 99207 ZZC DROP WITH A PROCEDURE: CPT

## 2018-08-30 NOTE — MR AVS SNAPSHOT
After Visit Summary   8/30/2018    Dilia Solomon    MRN: [id number]           Patient Information     Date Of Birth          1965        Visit Information        Provider Department      8/30/2018 7:30 AM ALLERGY [facility]        Today's Diagnoses     Allergic rhinitis    -  1       Follow-ups after your visit        Your next 10 appointments already scheduled     Sep 25, 2018  3:30 PM CDT   Nurse Only with ALLERGY [facility] ([facility])    [address] [address]   [phone]           Every allergy patient MUST wait 30 minutes after their allergy shot. No exceptions.  Xolair shots #1-3 should plan to wait 2 hours in clinic Xolair shots after #4 should plan 30 minute wait in clinic            Oct 30, 2018 12:30 PM CDT   Ultrasound Bilaterally with [facility]   [facility] ([facility]    [address] [address]   [phone]            Oct 30, 2018  2:00 PM CDT   Ultrasound Results with Wyatt Dunn MD   [facility] ([facility]    [address] [address]   [phone]            Nov 27, 2018  1:15 PM CST   Return Visit with Santhosh Tierney MD   [facility] ([facility])    [address] [address]   [phone]              Who to contact     If you have questions or need follow up information about today's clinic visit or your schedule please contact [facility] directly at [phone].  Normal or non-critical lab and imaging results will be communicated to you by MyChart, letter or phone within 4 business days after the clinic has received the results. If you do not hear from us within 7 days, please contact the clinic through MyChart or phone. If you have a critical or abnormal lab result, we will  notify you by phone as soon as possible.  Submit refill requests through When You Wish or call your pharmacy and they will forward the refill request to us. Please allow 3 business days for your refill to be completed.          Additional Information About Your Visit        Care EveryWhere ID     This is your Care EveryWhere ID. This could be used by other organizations to access your [place] medical records  [id number]        Your Vitals Were     Last Period                   07/18/2015 (Approximate)            Blood Pressure from Last 3 Encounters:   07/17/18 124/68   06/19/18 122/70   06/06/18 113/74    Weight from Last 3 Encounters:   07/17/18 96.2 kg (212 lb)   06/19/18 95.3 kg (210 lb)   06/06/18 95.4 kg (210 lb 5.1 oz)              We Performed the Following     Allergy Shot: Two or more injections        Primary Care Provider Office Phone # Fax #    Cookie Conklin [phone]       [address] [place] [address]        Equal Access to Services     GABBY CHEN : Hadii aad ku hadasho Soomaali, waaxda luqadaha, qaybta kaalmada adeegyada, waxay idiin hayaan moiseseg willem shen . So [facility] [phone].    ATENCIÓN: Si habla español, tiene a pruitt disposición servicios gratuitos de asistencia lingüística. [facility] [phone].    We comply with applicable federal civil rights laws and [place] laws. We do not discriminate on the basis of race, color, national origin, age, disability, sex, sexual orientation, or gender identity.            Thank you!     Thank you for choosing [facility]  for your care. Our goal is always to provide you with excellent care. Hearing back from our patients is one way we can continue to improve our services. Please take a few minutes to complete the written survey that you may receive in the mail after your visit with us. Thank you!             Your Updated Medication List - Protect others around you: Learn how to safely use, store and throw away  your medicines at www.disposemymeds.org.          This list is accurate as of 8/30/18  8:53 AM.  Always use your most recent med list.                   Brand Name Dispense Instructions for use Diagnosis    * albuterol (2.5 MG/3ML) 0.083% neb solution     1 Box    Take 1 vial (2.5 mg) by nebulization every 4 hours as needed    Moderate persistent asthma, uncomplicated       * albuterol 108 (90 Base) MCG/ACT inhaler    PROAIR HFA/PROVENTIL HFA/VENTOLIN HFA    1 Inhaler    Inhale 2 puffs into the lungs every 4 hours as needed    Moderate persistent asthma without complication       azelastine 0.05 % ophthalmic solution    OPTIVAR    1 Bottle    Apply 1 drop to eye 2 times daily    Conjunctivitis, allergic, unspecified laterality       CERAVE Crea     2 Bottle    Externally apply 1 dose * topically 2 times daily    Eczema, unspecified type       cetirizine 10 MG tablet    zyrTEC    30 tablet    Take 1 tablet (10 mg) by mouth At Bedtime    Allergic conjunctivitis of both eyes, Chronic allergic rhinitis due to animal hair and dander, Allergic rhinitis due to dust mite, Allergic rhinitis due to mold, Chronic seasonal allergic rhinitis due to pollen       EPINEPHrine 0.3 MG/0.3ML injection 2-pack    EPIPEN/ADRENACLICK/or ANY BX GENERIC EQUIV    0.6 mL    Inject 0.3 mLs (0.3 mg) into the muscle once as needed for anaphylaxis    Food allergy, Need for desensitization to allergens       fluticasone 50 MCG/ACT spray    FLONASE    1 Bottle    Spray 2 sprays into both nostrils daily    Chronic allergic rhinitis due to animal hair and dander, Allergic rhinitis due to dust mite, Allergic rhinitis due to mold, Chronic seasonal allergic rhinitis due to pollen       fluticasone-salmeterol 250-50 MCG/DOSE diskus inhaler    ADVAIR    3 Inhaler    Inhale 1 puff into the lungs 2 times daily    Moderate persistent asthma without complication       loratadine 10 MG tablet    CLARITIN    30 tablet    Take 1 tablet (10 mg) by mouth daily     Allergic conjunctivitis of both eyes, Chronic allergic rhinitis due to animal hair and dander, Allergic rhinitis due to dust mite, Allergic rhinitis due to mold, Chronic seasonal allergic rhinitis due to pollen       montelukast 10 MG tablet    SINGULAIR    30 tablet    Take 1 tablet (10 mg) by mouth At Bedtime    Moderate persistent asthma without complication, Chronic allergic rhinitis due to animal hair and dander, Allergic rhinitis due to dust mite, Allergic rhinitis due to mold, Chronic seasonal allergic rhinitis due to pollen       MULTIVITAMIN PO      1 tab daily        ORDER FOR ALLERGEN IMMUNOTHERAPY 5 mL vial     5 mL    Name of Mix: Mix #1  Mold Alternaria Tenuis 1:10 w/v, HS  0.5 ml Aspergillus Fumigatus 1:10 w/v, HS  0.5 ml Epicoccum Nigrum 1:10 w/v, HS 0.5 ml Hormodendrum Cladosporioides 1:10 w/v, HS 0.5 ml Penicillium Mix 1:10 w/v, HS  0.5 ml Diluent: HSA qs to 5ml    Chronic allergic rhinitis due to animal hair and dander, Allergic rhinitis due to dust mite, Allergic rhinitis due to mold, Chronic seasonal allergic rhinitis due to pollen       ORDER FOR ALLERGEN IMMUNOTHERAPY 5 mL vial     5 mL    Name of Mix: Mix #2  Dust Mite, Cat, Dog Cat Hair, Standardized 10,000 BAU/mL, ALK  2.0 ml Dog Hair Dander, A. P.  1:100 w/v, HS  1.0 ml Dust Mites F 30,000AU/mL, HS  0.3 ml Dust Mites P. 30,000 AU/mL, HS  0.3 ml  Diluent: HSA qs to 5ml    Chronic allergic rhinitis due to animal hair and dander, Allergic rhinitis due to dust mite, Allergic rhinitis due to mold, Chronic seasonal allergic rhinitis due to pollen       ORDER FOR ALLERGEN IMMUNOTHERAPY 5 mL vial     5 mL    Name of Mix: Mix #3 Grass,Tree  Dmitry,White 1:20w/v, HS 0.5ml Birch Mix PRW 1:20w/v, HS 0.5ml Boxelder-Maple Mix BHR (Boxelder Hard Red) 1:20w/v, HS 0.5ml [place],Common 1:20w/v, HS 0.5ml Elm,American 1:20w/v, HS 0.5ml [place] Mix RW 1:20w/v, HS 0.5ml Oak Mix RVW 1:20w/v, HS 0.5ml Melissa Tree,Black 1:20w/v, HS 0.5ml Grass Mix #7 100,000  BAU/mL, HS 0.4ml Jonathan Grass 1:20w/v, HS 0.5ml Diluent: HSA qs to 5ml    Chronic allergic rhinitis due to animal hair and dander, Allergic rhinitis due to dust mite, Allergic rhinitis due to mold, Chronic seasonal allergic rhinitis due to pollen       ORDER FOR ALLERGEN IMMUNOTHERAPY 5 mL vial     5 mL    Name of Mix: Mix #4  Weeds Kochia 1:20 w/v, HS 0.5 ml Lamb's Quarters 1:20 w/v, HS 0.5 ml Nettle 1:20 w/v, HS 0.5 ml Plantain, English 1:20 w/v, HS 0.5 ml Ragweed Mixed 1:20 w/v ALK  0.5 ml Russian Thistle 1:20 w/v, HS 0.5 ml Sagebrush, Mugwort 1:20 w/v, HS 0.5 ml Sorrel, Sheep 1:20 w/v, HS 0.5 ml Diluent: HSA qs to 5ml    Chronic allergic rhinitis due to animal hair and dander, Allergic rhinitis due to dust mite, Allergic rhinitis due to mold, Chronic seasonal allergic rhinitis due to pollen       order for DME     1 Units    Equipment being ordered: Silicone heel cup    Heel pain, chronic, left       order for DME     1 Units    Equipment being ordered: Dynaflex insert    Plantar fasciitis, left       triamcinolone 0.1 % cream    KENALOG    80 g    Apply  topically 2 times daily as needed. Do not use on face.    Eczema, unspecified type       * Notice:  This list has 2 medication(s) that are the same as other medications prescribed for you. Read the directions carefully, and ask your doctor or other care provider to review them with you.

## 2018-09-25 ENCOUNTER — ALLIED HEALTH/NURSE VISIT (OUTPATIENT)
Dept: ALLERGY | Facility: CLINIC | Age: 53
End: 2018-09-25
Payer: COMMERCIAL

## 2018-09-25 DIAGNOSIS — J30.9 ALLERGIC RHINITIS: Primary | ICD-10-CM

## 2018-09-25 PROCEDURE — 95117 IMMUNOTHERAPY INJECTIONS: CPT

## 2018-09-25 PROCEDURE — 99207 ZZC DROP WITH A PROCEDURE: CPT

## 2018-09-25 NOTE — MR AVS SNAPSHOT
After Visit Summary   9/25/2018    Dilia Solomon    MRN: [id number]           Patient Information     Date Of Birth          1965        Visit Information        Provider Department      9/25/2018 3:30 PM ALLERGY [facility]        Today's Diagnoses     Allergic rhinitis    -  1       Follow-ups after your visit        Your next 10 appointments already scheduled     Oct 16, 2018  3:30 PM CDT   Nurse Only with ALLERGY [facility] ([facility])    [address] [address]   [phone]           Every allergy patient MUST wait 30 minutes after their allergy shot. No exceptions.  Xolair shots #1-3 should plan to wait 2 hours in clinic Xolair shots after #4 should plan 30 minute wait in clinic            Oct 30, 2018 12:30 PM CDT   Ultrasound Bilaterally with [facility]   [facility]    [address] [address]   [phone]            Oct 30, 2018  2:00 PM CDT   Ultrasound Results with Wyatt Dunn MD   [facility] ([facility])    [address] [address]   [phone]            Nov 27, 2018  1:15 PM CST   Return Visit with Santhosh Tierney MD   [facility] ([facility])    [address] [address]   [phone]              Who to contact     If you have questions or need follow up information about today's clinic visit or your schedule please contact [facility] directly at [phone].  Normal or non-critical lab and imaging results will be communicated to you by MyChart, letter or phone within 4 business days after the clinic has received the results. If you do not hear from us within 7 days, please contact the clinic through MyChart or phone. If you have a critical or abnormal lab result, we will  notify you by phone as soon as possible.  Submit refill requests through Gem or call your pharmacy and they will forward the refill request to us. Please allow 3 business days for your refill to be completed.          Additional Information About Your Visit        Care EveryWhere ID     This is your Care EveryWhere ID. This could be used by other organizations to access your [place] medical records  [id number]        Your Vitals Were     Last Period                   07/18/2015 (Approximate)            Blood Pressure from Last 3 Encounters:   07/17/18 124/68   06/19/18 122/70   06/06/18 113/74    Weight from Last 3 Encounters:   07/17/18 96.2 kg (212 lb)   06/19/18 95.3 kg (210 lb)   06/06/18 95.4 kg (210 lb 5.1 oz)              We Performed the Following     Allergy Shot: Two or more injections        Primary Care Provider Office Phone # Fax #    Cookie Conklin [phone]       [address] [place] [address]        Equal Access to Services     GABBY CHEN : Hadii aad ku hadasho Soomaali, waaxda luqadaha, qaybta kaalmada adeegyada, waxay idiin hayaan moiseseg willem shen . So [facility] [place] [phone].    ATENCIÓN: Si habla español, tiene a pruitt disposición servicios gratuitos de asistencia lingüística. [facility] [phone].    We comply with applicable federal civil rights laws and [place] laws. We do not discriminate on the basis of race, color, national origin, age, disability, sex, sexual orientation, or gender identity.            Thank you!     Thank you for choosing [facility]  for your care. Our goal is always to provide you with excellent care. Hearing back from our patients is one way we can continue to improve our services. Please take a few minutes to complete the written survey that you may receive in the mail after your visit with us. Thank you!             Your Updated Medication List - Protect others around you: Learn how to safely use, store and throw away  your medicines at www.disposemymeds.org.          This list is accurate as of 9/25/18  4:23 PM.  Always use your most recent med list.                   Brand Name Dispense Instructions for use Diagnosis    * albuterol (2.5 MG/3ML) 0.083% neb solution     1 Box    Take 1 vial (2.5 mg) by nebulization every 4 hours as needed    Moderate persistent asthma, uncomplicated       * albuterol 108 (90 Base) MCG/ACT inhaler    PROAIR HFA/PROVENTIL HFA/VENTOLIN HFA    1 Inhaler    Inhale 2 puffs into the lungs every 4 hours as needed    Moderate persistent asthma without complication       azelastine 0.05 % ophthalmic solution    OPTIVAR    1 Bottle    Apply 1 drop to eye 2 times daily    Conjunctivitis, allergic, unspecified laterality       CERAVE Crea     2 Bottle    Externally apply 1 dose * topically 2 times daily    Eczema, unspecified type       cetirizine 10 MG tablet    zyrTEC    30 tablet    Take 1 tablet (10 mg) by mouth At Bedtime    Allergic conjunctivitis of both eyes, Chronic allergic rhinitis due to animal hair and dander, Allergic rhinitis due to dust mite, Allergic rhinitis due to mold, Chronic seasonal allergic rhinitis due to pollen       EPINEPHrine 0.3 MG/0.3ML injection 2-pack    EPIPEN/ADRENACLICK/or ANY BX GENERIC EQUIV    0.6 mL    Inject 0.3 mLs (0.3 mg) into the muscle once as needed for anaphylaxis    Food allergy, Need for desensitization to allergens       fluticasone 50 MCG/ACT spray    FLONASE    1 Bottle    Spray 2 sprays into both nostrils daily    Chronic allergic rhinitis due to animal hair and dander, Allergic rhinitis due to dust mite, Allergic rhinitis due to mold, Chronic seasonal allergic rhinitis due to pollen       fluticasone-salmeterol 250-50 MCG/DOSE diskus inhaler    ADVAIR    3 Inhaler    Inhale 1 puff into the lungs 2 times daily    Moderate persistent asthma without complication       loratadine 10 MG tablet    CLARITIN    30 tablet    Take 1 tablet (10 mg) by mouth daily     Allergic conjunctivitis of both eyes, Chronic allergic rhinitis due to animal hair and dander, Allergic rhinitis due to dust mite, Allergic rhinitis due to mold, Chronic seasonal allergic rhinitis due to pollen       montelukast 10 MG tablet    SINGULAIR    30 tablet    Take 1 tablet (10 mg) by mouth At Bedtime    Moderate persistent asthma without complication, Chronic allergic rhinitis due to animal hair and dander, Allergic rhinitis due to dust mite, Allergic rhinitis due to mold, Chronic seasonal allergic rhinitis due to pollen       MULTIVITAMIN PO      1 tab daily        ORDER FOR ALLERGEN IMMUNOTHERAPY 5 mL vial     5 mL    Name of Mix: Mix #1  Mold Alternaria Tenuis 1:10 w/v, HS  0.5 ml Aspergillus Fumigatus 1:10 w/v, HS  0.5 ml Epicoccum Nigrum 1:10 w/v, HS 0.5 ml Hormodendrum Cladosporioides 1:10 w/v, HS 0.5 ml Penicillium Mix 1:10 w/v, HS  0.5 ml Diluent: HSA qs to 5ml    Chronic allergic rhinitis due to animal hair and dander, Allergic rhinitis due to dust mite, Allergic rhinitis due to mold, Chronic seasonal allergic rhinitis due to pollen       ORDER FOR ALLERGEN IMMUNOTHERAPY 5 mL vial     5 mL    Name of Mix: Mix #2  Dust Mite, Cat, Dog Cat Hair, Standardized 10,000 BAU/mL, ALK  2.0 ml Dog Hair Dander, A. P.  1:100 w/v, HS  1.0 ml Dust Mites F 30,000AU/mL, HS  0.3 ml Dust Mites P. 30,000 AU/mL, HS  0.3 ml  Diluent: HSA qs to 5ml    Chronic allergic rhinitis due to animal hair and dander, Allergic rhinitis due to dust mite, Allergic rhinitis due to mold, Chronic seasonal allergic rhinitis due to pollen       ORDER FOR ALLERGEN IMMUNOTHERAPY 5 mL vial     5 mL    Name of Mix: Mix #3 Grass,Tree  Dmitry,White 1:20w/v, HS 0.5ml Birch Mix PRW 1:20w/v, HS 0.5ml Boxelder-Maple Mix BHR (Boxelder Hard Red) 1:20w/v, HS 0.5ml [place],Common 1:20w/v, HS 0.5ml Elm,American 1:20w/v, HS 0.5ml [place] Mix RW 1:20w/v, HS 0.5ml Oak Mix RVW 1:20w/v, HS 0.5ml [place] Tree,Black 1:20w/v, HS 0.5ml Grass Mix #7 100,000  BAU/mL, HS 0.4ml Jonathan Grass 1:20w/v, HS 0.5ml Diluent: HSA qs to 5ml    Chronic allergic rhinitis due to animal hair and dander, Allergic rhinitis due to dust mite, Allergic rhinitis due to mold, Chronic seasonal allergic rhinitis due to pollen       ORDER FOR ALLERGEN IMMUNOTHERAPY 5 mL vial     5 mL    Name of Mix: Mix #4  Weeds Kochia 1:20 w/v, HS 0.5 ml Lamb's Quarters 1:20 w/v, HS 0.5 ml Nettle 1:20 w/v, HS 0.5 ml Plantain, English 1:20 w/v, HS 0.5 ml Ragweed Mixed 1:20 w/v ALK  0.5 ml Russian Thistle 1:20 w/v, HS 0.5 ml Sagebrush, Mugwort 1:20 w/v, HS 0.5 ml Sorrel, Sheep 1:20 w/v, HS 0.5 ml Diluent: HSA qs to 5ml    Chronic allergic rhinitis due to animal hair and dander, Allergic rhinitis due to dust mite, Allergic rhinitis due to mold, Chronic seasonal allergic rhinitis due to pollen       order for DME     1 Units    Equipment being ordered: Silicone heel cup    Heel pain, chronic, left       order for DME     1 Units    Equipment being ordered: Dynaflex insert    Plantar fasciitis, left       triamcinolone 0.1 % cream    KENALOG    80 g    Apply  topically 2 times daily as needed. Do not use on face.    Eczema, unspecified type       * Notice:  This list has 2 medication(s) that are the same as other medications prescribed for you. Read the directions carefully, and ask your doctor or other care provider to review them with you.

## 2018-10-02 ENCOUNTER — ALLIED HEALTH/NURSE VISIT (OUTPATIENT)
Dept: FAMILY MEDICINE | Facility: CLINIC | Age: 53
End: 2018-10-02
Payer: COMMERCIAL

## 2018-10-02 DIAGNOSIS — Z23 NEED FOR PROPHYLACTIC VACCINATION AND INOCULATION AGAINST INFLUENZA: Primary | ICD-10-CM

## 2018-10-02 PROCEDURE — 90471 IMMUNIZATION ADMIN: CPT

## 2018-10-02 PROCEDURE — 90682 RIV4 VACC RECOMBINANT DNA IM: CPT

## 2018-10-02 PROCEDURE — 99207 ZZC NO CHARGE NURSE ONLY: CPT

## 2018-10-02 NOTE — MR AVS SNAPSHOT
After Visit Summary   10/2/2018    Dilia Solomon    MRN: [id number]           Patient Information     Date Of Birth          1965        Visit Information        Provider Department      10/2/2018 2:00 PM [place] NB CMA/LPN [facility]        Today's Diagnoses     Need for prophylactic vaccination and inoculation against influenza    -  1       Follow-ups after your visit        Your next 10 appointments already scheduled     Oct 16, 2018  3:30 PM CDT   Nurse Only with ALLERGY [facility] ([facility])    [address] [facility] [address]   [phone]           Every allergy patient MUST wait 30 minutes after their allergy shot. No exceptions.  Xolair shots #1-3 should plan to wait 2 hours in clinic Xolair shots after #4 should plan 30 minute wait in clinic            Oct 30, 2018 12:30 PM CDT   Ultrasound Bilaterally with [facility]   [facility] ([facility]    [address] [facility] [address]   [phone]            Oct 30, 2018  2:00 PM CDT   Ultrasound Results with Wyatt Dunn MD   [facility] ([facility])    [address] [facility] [address]   [phone]            Nov 27, 2018  1:15 PM CST   Return Visit with Santhosh Tierney MD   [facility] ([facility])    [address] [facility] [address]   [phone]              Who to contact     If you have questions or need follow up information about today's clinic visit or your schedule please contact [facility] directly at [phone].  Normal or non-critical lab and imaging results will be communicated to you by MyChart, letter or phone within 4 business days after the clinic has received the results. If you do not hear from us within 7 days, please contact the clinic through MyChart or phone. If  you have a critical or abnormal lab result, we will notify you by phone as soon as possible.  Submit refill requests through [employer] or call your pharmacy and they will forward the refill request to us. Please allow 3 business days for your refill to be completed.          Additional Information About Your Visit        Care EveryWhere ID     This is your Care EveryWhere ID. This could be used by other organizations to access your [place] medical records  [id number]        Your Vitals Were     Last Period                   07/18/2015 (Approximate)            Blood Pressure from Last 3 Encounters:   07/17/18 124/68   06/19/18 122/70   06/06/18 113/74    Weight from Last 3 Encounters:   07/17/18 212 lb (96.2 kg)   06/19/18 210 lb (95.3 kg)   06/06/18 210 lb 5.1 oz (95.4 kg)              We Performed the Following     FLU VACCINE, (RIV4) RECOMBINANT HA  , IM (FluBlok, egg free) [74637]- >18 YRS ([facility] recommended  50-64 YRS)     Vaccine Administration, Initial [29646]        Primary Care Provider Office Phone # Fax #    Cookie Conklin [phone]       [address] [place] [address]        Equal Access to Services     KODY CHEN : Hadii ulices muñozo Sofaith, waaxda luqadaha, qaybta kaalmada ashishda, hieu howard. So [facility] [phone].    ATENCIÓN: Si habla español, tiene a pruitt disposición servicios gratuitos de asistencia lingüística. Llame al [phone].    We comply with applicable federal civil rights laws and [place] laws. We do not discriminate on the basis of race, color, national origin, age, disability, sex, sexual orientation, or gender identity.            Thank you!     Thank you for choosing [facility]  for your care. Our goal is always to provide you with excellent care. Hearing back from our patients is one way we can continue to improve our services. Please take a few minutes to complete the written survey that you may  receive in the mail after your visit with us. Thank you!             Your Updated Medication List - Protect others around you: Learn how to safely use, store and throw away your medicines at www.disposemymeds.org.          This list is accurate as of 10/2/18  2:21 PM.  Always use your most recent med list.                   Brand Name Dispense Instructions for use Diagnosis    * albuterol (2.5 MG/3ML) 0.083% neb solution     1 Box    Take 1 vial (2.5 mg) by nebulization every 4 hours as needed    Moderate persistent asthma, uncomplicated       * albuterol 108 (90 Base) MCG/ACT inhaler    PROAIR HFA/PROVENTIL HFA/VENTOLIN HFA    1 Inhaler    Inhale 2 puffs into the lungs every 4 hours as needed    Moderate persistent asthma without complication       azelastine 0.05 % ophthalmic solution    OPTIVAR    1 Bottle    Apply 1 drop to eye 2 times daily    Conjunctivitis, allergic, unspecified laterality       CERAVE Crea     2 Bottle    Externally apply 1 dose * topically 2 times daily    Eczema, unspecified type       cetirizine 10 MG tablet    zyrTEC    30 tablet    Take 1 tablet (10 mg) by mouth At Bedtime    Allergic conjunctivitis of both eyes, Chronic allergic rhinitis due to animal hair and dander, Allergic rhinitis due to dust mite, Allergic rhinitis due to mold, Chronic seasonal allergic rhinitis due to pollen       EPINEPHrine 0.3 MG/0.3ML injection 2-pack    EPIPEN/ADRENACLICK/or ANY BX GENERIC EQUIV    0.6 mL    Inject 0.3 mLs (0.3 mg) into the muscle once as needed for anaphylaxis    Food allergy, Need for desensitization to allergens       fluticasone 50 MCG/ACT spray    FLONASE    1 Bottle    Spray 2 sprays into both nostrils daily    Chronic allergic rhinitis due to animal hair and dander, Allergic rhinitis due to dust mite, Allergic rhinitis due to mold, Chronic seasonal allergic rhinitis due to pollen       fluticasone-salmeterol 250-50 MCG/DOSE diskus inhaler    ADVAIR    3 Inhaler    Inhale 1 puff into  the lungs 2 times daily    Moderate persistent asthma without complication       loratadine 10 MG tablet    CLARITIN    30 tablet    Take 1 tablet (10 mg) by mouth daily    Allergic conjunctivitis of both eyes, Chronic allergic rhinitis due to animal hair and dander, Allergic rhinitis due to dust mite, Allergic rhinitis due to mold, Chronic seasonal allergic rhinitis due to pollen       montelukast 10 MG tablet    SINGULAIR    30 tablet    Take 1 tablet (10 mg) by mouth At Bedtime    Moderate persistent asthma without complication, Chronic allergic rhinitis due to animal hair and dander, Allergic rhinitis due to dust mite, Allergic rhinitis due to mold, Chronic seasonal allergic rhinitis due to pollen       MULTIVITAMIN PO      1 tab daily        ORDER FOR ALLERGEN IMMUNOTHERAPY 5 mL vial     5 mL    Name of Mix: Mix #1  Mold Alternaria Tenuis 1:10 w/v, HS  0.5 ml Aspergillus Fumigatus 1:10 w/v, HS  0.5 ml Epicoccum Nigrum 1:10 w/v, HS 0.5 ml Hormodendrum Cladosporioides 1:10 w/v, HS 0.5 ml Penicillium Mix 1:10 w/v, HS  0.5 ml Diluent: HSA qs to 5ml    Chronic allergic rhinitis due to animal hair and dander, Allergic rhinitis due to dust mite, Allergic rhinitis due to mold, Chronic seasonal allergic rhinitis due to pollen       ORDER FOR ALLERGEN IMMUNOTHERAPY 5 mL vial     5 mL    Name of Mix: Mix #2  Dust Mite, Cat, Dog Cat Hair, Standardized 10,000 BAU/mL, ALK  2.0 ml Dog Hair Dander, A. P.  1:100 w/v, HS  1.0 ml Dust Mites F 30,000AU/mL, HS  0.3 ml Dust Mites P. 30,000 AU/mL, HS  0.3 ml  Diluent: HSA qs to 5ml    Chronic allergic rhinitis due to animal hair and dander, Allergic rhinitis due to dust mite, Allergic rhinitis due to mold, Chronic seasonal allergic rhinitis due to pollen       ORDER FOR ALLERGEN IMMUNOTHERAPY 5 mL vial     5 mL    Name of Mix: Mix #3 Grass,Tree  Dmitry,White 1:20w/v, HS 0.5ml Birch Mix PRW 1:20w/v, HS 0.5ml Boxelder-Maple Mix BHR (Boxelder Hard Red) 1:20w/v, HS 0.5ml [place],Common  1:20w/v, HS 0.5ml Elm,American 1:20w/v, HS 0.5ml [place] Mix RW 1:20w/v, HS 0.5ml Oak Mix RVW 1:20w/v, HS 0.5ml [place] Tree,Black 1:20w/v, HS 0.5ml Grass Mix #7 100,000 BAU/mL, HS 0.4ml Jonathan Grass 1:20w/v, HS 0.5ml Diluent: HSA qs to 5ml    Chronic allergic rhinitis due to animal hair and dander, Allergic rhinitis due to dust mite, Allergic rhinitis due to mold, Chronic seasonal allergic rhinitis due to pollen       ORDER FOR ALLERGEN IMMUNOTHERAPY 5 mL vial     5 mL    Name of Mix: Mix #4  Weeds Kochia 1:20 w/v, HS 0.5 ml Lamb's Quarters 1:20 w/v, HS 0.5 ml Nettle 1:20 w/v, HS 0.5 ml Plantain, English 1:20 w/v, HS 0.5 ml Ragweed Mixed 1:20 w/v ALK  0.5 ml Russian Thistle 1:20 w/v, HS 0.5 ml Sagebrush, Mugwort 1:20 w/v, HS 0.5 ml Sorrel, Sheep 1:20 w/v, HS 0.5 ml Diluent: HSA qs to 5ml    Chronic allergic rhinitis due to animal hair and dander, Allergic rhinitis due to dust mite, Allergic rhinitis due to mold, Chronic seasonal allergic rhinitis due to pollen       order for DME     1 Units    Equipment being ordered: Silicone heel cup    Heel pain, chronic, left       order for DME     1 Units    Equipment being ordered: Dynaflex insert    Plantar fasciitis, left       triamcinolone 0.1 % cream    KENALOG    80 g    Apply  topically 2 times daily as needed. Do not use on face.    Eczema, unspecified type       * Notice:  This list has 2 medication(s) that are the same as other medications prescribed for you. Read the directions carefully, and ask your doctor or other care provider to review them with you.

## 2018-10-02 NOTE — PROGRESS NOTES

## 2018-10-02 NOTE — NURSING NOTE
Prior to injection verified patient identity using patient's name and date of birth.  Due to injection administration, patient instructed to remain in clinic for 15 minutes  afterwards, and to report any adverse reaction to me immediately.    Victorina Johnson MA Student

## 2018-10-15 DIAGNOSIS — L30.9 ECZEMA, UNSPECIFIED TYPE: ICD-10-CM

## 2018-10-15 NOTE — TELEPHONE ENCOUNTER
Reason for Call:  Medication or medication refill:    Do you use a [facility]?  Name of the pharmacy and phone number for the current request:  [facility] -     Name of the medication requested: Cerave    Other request:   LAST REFILL: ??  LOV: 06/06/2018    Can we leave a detailed message on this number? Not Applicable    Phone number patient can be reached at: Home number on file [phone] (home)    Best Time: NA    Call taken on 10/15/2018 at 8:13 AM by Denise Behrendt

## 2018-10-16 ENCOUNTER — ALLIED HEALTH/NURSE VISIT (OUTPATIENT)
Dept: ALLERGY | Facility: CLINIC | Age: 53
End: 2018-10-16
Payer: COMMERCIAL

## 2018-10-16 DIAGNOSIS — J30.81 CHRONIC ALLERGIC RHINITIS DUE TO ANIMAL HAIR AND DANDER: ICD-10-CM

## 2018-10-16 DIAGNOSIS — J30.89 ALLERGIC RHINITIS DUE TO DUST MITE: ICD-10-CM

## 2018-10-16 DIAGNOSIS — J30.9 ALLERGIC RHINITIS: Primary | ICD-10-CM

## 2018-10-16 DIAGNOSIS — J30.1 CHRONIC SEASONAL ALLERGIC RHINITIS DUE TO POLLEN: ICD-10-CM

## 2018-10-16 DIAGNOSIS — J30.89 ALLERGIC RHINITIS DUE TO MOLD: ICD-10-CM

## 2018-10-16 PROCEDURE — 95117 IMMUNOTHERAPY INJECTIONS: CPT

## 2018-10-16 PROCEDURE — 99207 ZZC DROP WITH A PROCEDURE: CPT

## 2018-10-16 NOTE — MR AVS SNAPSHOT
After Visit Summary   10/16/2018    Dilia Solomon    MRN: [id number]           Patient Information     Date Of Birth          1965        Visit Information        Provider Department      10/16/2018 3:30 PM ALLERGY [facility]        Today's Diagnoses     Allergic rhinitis    -  1       Follow-ups after your visit        Your next 10 appointments already scheduled     Oct 30, 2018 12:30 PM CDT   Ultrasound Bilaterally with [facility]   [facility]    [address] [facility] [address]   [phone]            Oct 30, 2018  2:00 PM CDT   Ultrasound Results with Wyatt Dunn MD   [facility]    [address] [facility] [address]   [phone]            Nov 06, 2018  3:30 PM CST   Nurse Only with ALLERGY [facility] ([facility])    [address] [facility] [id number] [phone]           Every allergy patient MUST wait 30 minutes after their allergy shot. No exceptions.  Xolair shots #1-3 should plan to wait 2 hours in clinic Xolair shots after #4 should plan 30 minute wait in clinic            Nov 27, 2018  1:15 PM CST   Return Visit with Snathosh Tierney MD   [facility] ([facility]    [address] [facility] [address] [phone]              Who to contact     If you have questions or need follow up information about today's clinic visit or your schedule please contact [facility] directly at [phone].  Normal or non-critical lab and imaging results will be communicated to you by MyChart, letter or phone within 4 business days after the clinic has received the results. If you do not hear from us within 7 days, please contact the clinic through MyChart or phone. If you have a critical or abnormal lab result, we will  notify you by phone as soon as possible.  Submit refill requests through [employer] or call your pharmacy and they will forward the refill request to us. Please allow 3 business days for your refill to be completed.          Additional Information About Your Visit        Care EveryWhere ID     This is your Care EveryWhere ID. This could be used by other organizations to access your [place] medical records  [id number]        Your Vitals Were     Last Period                   07/18/2015 (Approximate)            Blood Pressure from Last 3 Encounters:   07/17/18 124/68   06/19/18 122/70   06/06/18 113/74    Weight from Last 3 Encounters:   07/17/18 96.2 kg (212 lb)   06/19/18 95.3 kg (210 lb)   06/06/18 95.4 kg (210 lb 5.1 oz)              We Performed the Following     Allergy Shot: Two or more injections        Primary Care Provider Office Phone # Fax #    Cookie Conklin [phone]       [address] [place] [address]        Equal Access to Services     GABBY CHEN : Hadii aad ku hadasho Soomaali, waaxda luqadaha, qaybta kaalmada adeegyada, waxay idiin hayaan moiseseg willem shen . So [facility] [phone].    ATENCIÓN: Si habla español, tiene a pruitt disposición servicios gratuitos de asistencia lingüística. [facility] [phone].    We comply with applicable federal civil rights laws and [place] laws. We do not discriminate on the basis of race, color, national origin, age, disability, sex, sexual orientation, or gender identity.            Thank you!     Thank you for choosing [facility]  for your care. Our goal is always to provide you with excellent care. Hearing back from our patients is one way we can continue to improve our services. Please take a few minutes to complete the written survey that you may receive in the mail after your visit with us. Thank you!             Your Updated Medication List - Protect others around you: Learn how to safely use, store and throw away  your medicines at www.disposemymeds.org.          This list is accurate as of 10/16/18  4:44 PM.  Always use your most recent med list.                   Brand Name Dispense Instructions for use Diagnosis    * albuterol (2.5 MG/3ML) 0.083% neb solution     1 Box    Take 1 vial (2.5 mg) by nebulization every 4 hours as needed    Moderate persistent asthma, uncomplicated       * albuterol 108 (90 Base) MCG/ACT inhaler    PROAIR HFA/PROVENTIL HFA/VENTOLIN HFA    1 Inhaler    Inhale 2 puffs into the lungs every 4 hours as needed    Moderate persistent asthma without complication       azelastine 0.05 % ophthalmic solution    OPTIVAR    1 Bottle    Apply 1 drop to eye 2 times daily    Conjunctivitis, allergic, unspecified laterality       CERAVE Crea     2 Bottle    Externally apply 1 dose. topically 2 times daily    Eczema, unspecified type       cetirizine 10 MG tablet    zyrTEC    30 tablet    Take 1 tablet (10 mg) by mouth At Bedtime    Allergic conjunctivitis of both eyes, Chronic allergic rhinitis due to animal hair and dander, Allergic rhinitis due to dust mite, Allergic rhinitis due to mold, Chronic seasonal allergic rhinitis due to pollen       EPINEPHrine 0.3 MG/0.3ML injection 2-pack    EPIPEN/ADRENACLICK/or ANY BX GENERIC EQUIV    0.6 mL    Inject 0.3 mLs (0.3 mg) into the muscle once as needed for anaphylaxis    Food allergy, Need for desensitization to allergens       fluticasone 50 MCG/ACT spray    FLONASE    1 Bottle    Spray 2 sprays into both nostrils daily    Chronic allergic rhinitis due to animal hair and dander, Allergic rhinitis due to dust mite, Allergic rhinitis due to mold, Chronic seasonal allergic rhinitis due to pollen       fluticasone-salmeterol 250-50 MCG/DOSE diskus inhaler    ADVAIR    3 Inhaler    Inhale 1 puff into the lungs 2 times daily    Moderate persistent asthma without complication       loratadine 10 MG tablet    CLARITIN    30 tablet    Take 1 tablet (10 mg) by mouth daily     Allergic conjunctivitis of both eyes, Chronic allergic rhinitis due to animal hair and dander, Allergic rhinitis due to dust mite, Allergic rhinitis due to mold, Chronic seasonal allergic rhinitis due to pollen       montelukast 10 MG tablet    SINGULAIR    30 tablet    Take 1 tablet (10 mg) by mouth At Bedtime    Moderate persistent asthma without complication, Chronic allergic rhinitis due to animal hair and dander, Allergic rhinitis due to dust mite, Allergic rhinitis due to mold, Chronic seasonal allergic rhinitis due to pollen       MULTIVITAMIN PO      1 tab daily        ORDER FOR ALLERGEN IMMUNOTHERAPY 5 mL vial     5 mL    Name of Mix: Mix #1  Mold Alternaria Tenuis 1:10 w/v, HS  0.5 ml Aspergillus Fumigatus 1:10 w/v, HS  0.5 ml Epicoccum Nigrum 1:10 w/v, HS 0.5 ml Hormodendrum Cladosporioides 1:10 w/v, HS 0.5 ml Penicillium Mix 1:10 w/v, HS  0.5 ml Diluent: HSA qs to 5ml    Chronic allergic rhinitis due to animal hair and dander, Allergic rhinitis due to dust mite, Allergic rhinitis due to mold, Chronic seasonal allergic rhinitis due to pollen       ORDER FOR ALLERGEN IMMUNOTHERAPY 5 mL vial     5 mL    Name of Mix: Mix #2  Dust Mite, Cat, Dog Cat Hair, Standardized 10,000 BAU/mL, ALK  2.0 ml Dog Hair Dander, A. P.  1:100 w/v, HS  1.0 ml Dust Mites F 30,000AU/mL, HS  0.3 ml Dust Mites P. 30,000 AU/mL, HS  0.3 ml  Diluent: HSA qs to 5ml    Chronic allergic rhinitis due to animal hair and dander, Allergic rhinitis due to dust mite, Allergic rhinitis due to mold, Chronic seasonal allergic rhinitis due to pollen       ORDER FOR ALLERGEN IMMUNOTHERAPY 5 mL vial     5 mL    Name of Mix: Mix #3 Grass,Tree  Dmitry,White 1:20w/v, HS 0.5ml Birch Mix PRW 1:20w/v, HS 0.5ml Boxelder-Maple Mix BHR (Boxelder Hard Red) 1:20w/v, HS 0.5ml [place],Common 1:20w/v, HS 0.5ml Elm,American 1:20w/v, HS 0.5ml [place] Mix RW 1:20w/v, HS 0.5ml Oak Mix RVW 1:20w/v, HS 0.5ml [place] Tree,Black 1:20w/v, HS 0.5ml Grass Mix #7 100,000  BAU/mL, HS 0.4ml Jonathan Grass 1:20w/v, HS 0.5ml Diluent: HSA qs to 5ml    Chronic allergic rhinitis due to animal hair and dander, Allergic rhinitis due to dust mite, Allergic rhinitis due to mold, Chronic seasonal allergic rhinitis due to pollen       ORDER FOR ALLERGEN IMMUNOTHERAPY 5 mL vial     5 mL    Name of Mix: Mix #4  Weeds Kochia 1:20 w/v, HS 0.5 ml Lamb's Quarters 1:20 w/v, HS 0.5 ml Nettle 1:20 w/v, HS 0.5 ml Plantain, English 1:20 w/v, HS 0.5 ml Ragweed Mixed 1:20 w/v ALK  0.5 ml Russian Thistle 1:20 w/v, HS 0.5 ml Sagebrush, Mugwort 1:20 w/v, HS 0.5 ml Sorrel, Sheep 1:20 w/v, HS 0.5 ml Diluent: HSA qs to 5ml    Chronic allergic rhinitis due to animal hair and dander, Allergic rhinitis due to dust mite, Allergic rhinitis due to mold, Chronic seasonal allergic rhinitis due to pollen       order for DME     1 Units    Equipment being ordered: Silicone heel cup    Heel pain, chronic, left       order for DME     1 Units    Equipment being ordered: Dynaflex insert    Plantar fasciitis, left       triamcinolone 0.1 % cream    KENALOG    80 g    Apply  topically 2 times daily as needed. Do not use on face.    Eczema, unspecified type       * Notice:  This list has 2 medication(s) that are the same as other medications prescribed for you. Read the directions carefully, and ask your doctor or other care provider to review them with you.

## 2018-10-16 NOTE — TELEPHONE ENCOUNTER
ALLERGY SOLUTION RE-ORDER REQUEST    Dilia Solomon 1965 MRN: [id number]    DATE NEEDED:  10/30/18  Vial Color Content   Top Dose       Last Dose     Vial Size  Red 1:1 Weeds   Red 1:1 0.5       Red 1:10.5     5ml  Red 1:1 Grass, Trees   Red 1:1 0.5       Red 1:10.5     5ml  Red 1:1 Molds   Red 1:1 0.5       Red 1:10.5     5ml  Red 1:1 Cat, Dog, Dust Mite   Red 1:1 0.5       Red 1:10.5     5ml      Serum reorder consent signed and patient/parent was advised that new serums would be ordered through the pharmacy and billed to their insurance company when they arrive in clinic. Yes    Shot Clinic Location:  [place]  Ship to Location: [place]  Serum billed to:  [place]    Special Instructions:  no      Updated Prescription Needed: No      Requester Signature  Gia Bae RN

## 2018-10-19 NOTE — TELEPHONE ENCOUNTER
Dear [place] Allergy Care Team: Allergy orders have been reviewed by pharmacy & can be routed to the prescriber to sign to get a new prescription to fax to the pharmacy.   Thanks, [facility]

## 2018-10-23 ENCOUNTER — TELEPHONE (OUTPATIENT)
Dept: PODIATRY | Facility: CLINIC | Age: 53
End: 2018-10-23

## 2018-10-30 ENCOUNTER — APPOINTMENT (OUTPATIENT)
Dept: VASCULAR SURGERY | Facility: CLINIC | Age: 53
End: 2018-10-30
Payer: COMMERCIAL

## 2018-10-30 ENCOUNTER — OFFICE VISIT (OUTPATIENT)
Dept: VASCULAR SURGERY | Facility: CLINIC | Age: 53
End: 2018-10-30
Payer: COMMERCIAL

## 2018-10-30 DIAGNOSIS — Z53.9 ERRONEOUS ENCOUNTER--DISREGARD: Primary | ICD-10-CM

## 2018-10-30 DIAGNOSIS — J30.2 CHRONIC SEASONAL ALLERGIC RHINITIS: Primary | ICD-10-CM

## 2018-10-30 PROCEDURE — 93970 EXTREMITY STUDY: CPT | Performed by: SURGERY

## 2018-10-30 PROCEDURE — 99214 OFFICE O/P EST MOD 30 MIN: CPT | Performed by: SURGERY

## 2018-10-30 PROCEDURE — 95165 ANTIGEN THERAPY SERVICES: CPT | Performed by: ALLERGY & IMMUNOLOGY

## 2018-10-30 NOTE — PROGRESS NOTES
Allergy serums billed at [place].     Allergy serums received at [place].     Vials received below:    Vial Color Content                      Vial Size Expiration Date  Red 1:1 Weeds 5mL  10/25/2019  Red 1:1 Grass, Trees 5mL  10/25/2019  Red 1:1 Molds 5mL  10/25/2019  Red 1:1 Cat, Dog, Dust Mite 5mL  10/25/2019    Original Refill encounter date: 10/16/2018      Signature  Kalen Beltrán

## 2018-10-30 NOTE — PROGRESS NOTES
[facility] Note    HPI: As you may remember I previously saw Dilia regarding her bilateral lower extremity swelling and right lower extremity varicose vein with pain.  She has completed 3 months of conservative therapy with little to no relief of her symptoms of pain in her lower extremities with swelling.  Her history is as follows: Patient is a 53-year-old female who has an unknown duration of bilateral varicose veins and swelling.  She works at the [employer] spends a lot of time on her feet throughout the day and has noticed that she gets bilateral lower extremity swelling with the right leg worse than the left which improves with elevation.  She also has varicose veins in both lower extremities and she is unsure of how long she has had these.  The right medial calf varicose vein she noted is significantly tender within the last month.  She finally was instructed by her primary care doctor to come here for evaluation.  Ultrasound performed at [facility] revealed that there was no DVT in the lower extremity that there was a varicose vein underlying the area of pain.  There is no phlebitis noted at that time.  At this time she says it is not debilitating for her work and she has not worn compression hose to help alleviate the symptoms.  She also does not take any sort of pain medication currently.  I asked the patient whether her family history is significant for varicose veins she says she is unsure and denies a history of DVT as well.    Past Medical History:   Diagnosis Date     Injury, other and unspecified, shoulder and upper arm 9/03     Past Surgical History:   Procedure Laterality Date     COLONOSCOPY N/A 10/6/2016    Procedure: COLONOSCOPY;  Surgeon: Shiva Johns MD;  Location: [facility]     ETHMOIDECTOMY  12/30/2013    Procedure: ETHMOIDECTOMY;  Bilateral Submucousal Reduction of InferiorTurbinates and Total Ethmoidectomy with Multiple Sinusotomies;  Surgeon: Lio More MD;   Location: [facility]     ETHMOIDECTOMY Bilateral 2/14/2018    Procedure: ETHMOIDECTOMY;  Bilateral anterior ethmoidectomy, bilateral maxillary antrostomy;  Surgeon: Santhosh Tierney MD;  Location: [facility]     SURGICAL HISTORY OF -   5/04    carpal tunnel release, bilateral     Social History     Social History     Marital status: Single     Spouse name: N/A     Number of children: N/A     Years of education: N/A     Occupational History     Not on file.     Social History Main Topics     Smoking status: Never Smoker     Smokeless tobacco: Never Used     Alcohol use No     Drug use: No     Sexual activity: No     Other Topics Concern     Parent/Sibling W/ Cabg, Mi Or Angioplasty Before 65f 55m? Yes     mother     Social History Narrative       Physical examination:  Constitutional well-developed overweight female.  She has a service dog who helps to detect asthma attacks.  HEENT: PERRLA, mucous membranes moist.  Lungs: Nonlabored breathing.  Skin: She has a right medial calf varicose vein is approximately 8 mm in diameter.  There is no evidence of phlebitis in this location.  She has swelling in the mid calf on the right lower extremity.  No skin changes consistent with chronic venous insufficiency.  In the left leg she has multiple varicose veins in the medial left calf.  She also has evidence of smaller varicose veins behind the left knee.  Neurologic: She has normal gait, motor and sensory function both lower extremities.  Psych: Normal mood, affect, judgment.  Pulse: She has palpable pedal pulses bilaterally.    Imaging:   Venous incompetency was defined as greater than 500 ms reflux for superficial venous system and greater than 1000 ms reflux for the deep venous system.    Right lower extremity:  There is no evidence of DVT in the right lower extremity in the deep venous system is competent.    The right greater saphenous vein is incompetent from the saphenofemoral junction to the distal calf.  It is superficial in the  proximal and mid calf.  Greater saphenous vein gives off several large varicose vein branches in the proximal catheter about 9 mm in size and incompetent.    There is one incompetent  21 cm below the knee crease that communicates with the small saphenous vein.  Additionally there is a incompetent varicose vein that comes off the small saphenous vein in the mid calf.  The small saphenous vein is otherwise competent throughout.    Left lower extremity:  There is no evidence of DVT in the left lower extremity in the deep venous system is competent.    The greater saphenous vein is incompetent from the mid thigh through the mid calf.  It gives rise to several incompetent varicose vein branch is greater than 6 mm in size.  The accessory saphenous vein is also incompetent just past saphenofemoral junction is straight for a 10 cm course but does not give rise to any large varicose vein branches.    There is a Baker's cyst noted in the left popliteal fossa.    While saphenous vein is competent throughout its course.  There are no incompetent perforators noted in the left lower extremity.        Assessment: Is a 53-year-old female with bilateral lower extremity varicose veins with pain on the right and significant swelling and edema, C3 disease.     Plan:   I discussed the ultrasound results in detail with Dilia today and I recommended treatment of the right lower extremity greater saphenous vein with radiofrequency ablation followed by medically necessary stab phlebectomy of the large 9 mm painful varicose veins in the right medial calf that are accompanied by significant itching intermittently.  The second procedure will be radio frequency ablation of the left greater saphenous vein only.  Consent was obtained today in the clinic and risks and benefits were discussed in detail.  Risks included 5% chance of re-cannulation, injury to greater saphenous nerve, DVT, and pigmentation change of the skin.  The patient  signed consent for each of the planned procedures today in clinic.    Wyatt Dunn MD  Vascular Surgery

## 2018-10-30 NOTE — TELEPHONE ENCOUNTER
Allergy serums received at [place].     Vials received below:    Vial Color Content                      Vial Size Expiration Date  Red 1:1 Weeds 5mL  10/25/2019  Red 1:1 Grass, Trees 5mL  10/25/2019  Red 1:1 Molds 5mL  10/25/2019  Red 1:1 Cat, Dog, Dust Mite 5mL  10/25/2019      Signature  Kalen Beltrán

## 2018-11-06 ENCOUNTER — ALLIED HEALTH/NURSE VISIT (OUTPATIENT)
Dept: ALLERGY | Facility: CLINIC | Age: 53
End: 2018-11-06
Payer: COMMERCIAL

## 2018-11-06 DIAGNOSIS — J30.9 ALLERGIC RHINITIS: Primary | ICD-10-CM

## 2018-11-06 PROCEDURE — 95117 IMMUNOTHERAPY INJECTIONS: CPT

## 2018-11-06 NOTE — MR AVS SNAPSHOT
After Visit Summary   11/6/2018    Dilia Solomon    MRN: [id number]           Patient Information     Date Of Birth          1965        Visit Information        Provider Department      11/6/2018 3:30 PM ALLERGY [facility]        Today's Diagnoses     Allergic rhinitis    -  1       Follow-ups after your visit        Your next 10 appointments already scheduled     Nov 07, 2018  9:20 AM CST   New Visit with Carlos Vallejo DPM   [facility]    [address] [facility] [address]   [phone]            Nov 27, 2018  1:15 PM CST   Return Visit with Santhosh Tierney MD   [facility]    [address] [facility] [address]   [phone]            Nov 27, 2018  2:15 PM CST   Nurse Only with ALLERGY [facility] ([facility])    [address] [facility] [address]   [phone]           Every allergy patient MUST wait 30 minutes after their allergy shot. No exceptions.  Xolair shots #1-3 should plan to wait 2 hours in clinic Xolair shots after #4 should plan 30 minute wait in clinic            Dec 05, 2018  3:30 PM CST   Nurse Only with ALLERGY [facility] ([facility])    [address] [facility] [address]   [phone]           Every allergy patient MUST wait 30 minutes after their allergy shot. No exceptions.  Xolair shots #1-3 should plan to wait 2 hours in clinic Xolair shots after #4 should plan 30 minute wait in clinic            Dec 05, 2018  4:00 PM CST   Return Visit with Butch Horowitz MD   [facility])    [address] [facility] [address]   [phone]            Dec 11, 2018  3:30 PM CST   Nurse Only with ALLERGY [facility] ([facility])    [address]  [facility] [address]   [phone]           Every allergy patient MUST wait 30 minutes after their allergy shot. No exceptions.  Xolair shots #1-3 should plan to wait 2 hours in clinic Xolair shots after #4 should plan 30 minute wait in clinic            Dec 18, 2018  3:30 PM CST   Nurse Only with ALLERGY [facility] ([facility])    [address] [facility] [address]   [phone]           Every allergy patient MUST wait 30 minutes after their allergy shot. No exceptions.  Xolair shots #1-3 should plan to wait 2 hours in clinic Xolair shots after #4 should plan 30 minute wait in clinic              Who to contact     If you have questions or need follow up information about today's clinic visit or your schedule please contact [facility] directly at [phone].  Normal or non-critical lab and imaging results will be communicated to you by MyChart, letter or phone within 4 business days after the clinic has received the results. If you do not hear from us within 7 days, please contact the clinic through [email or web address] or phone. If you have a critical or abnormal lab result, we will notify you by phone as soon as possible.  Submit refill requests through [employer] or call your pharmacy and they will forward the refill request to us. Please allow 3 business days for your refill to be completed.          Additional Information About Your Visit        Care EveryWhere ID     This is your Care EveryWhere ID. This could be used by other organizations to access your [place] medical records  [id number]        Your Vitals Were     Last Period                   07/18/2015 (Approximate)            Blood Pressure from Last 3 Encounters:   07/17/18 124/68   06/19/18 122/70   06/06/18 113/74    Weight from Last 3 Encounters:   07/17/18 96.2 kg (212 lb)   06/19/18 95.3 kg (210 lb)   06/06/18 95.4 kg (210 lb 5.1 oz)              We Performed the Following      Allergy Shot: Two or more injections        Primary Care Provider Office Phone # Fax #    Cookie Conklin [phone]       [address] [place] [address]        Equal Access to Services     KODY CHEN : Hadii aad ku tonio Sokashmirali, waaxda luqadaha, qaybta kaalmada ademelida, hieu christiansonfrandy howard. So [facility] [phone].    ATENCIÓN: Si habla español, tiene a pruitt disposición servicios gratuitos de asistencia lingüística. Llame al [phone].    We comply with applicable federal civil rights laws and [place] laws. We do not discriminate on the basis of race, color, national origin, age, disability, sex, sexual orientation, or gender identity.            Thank you!     Thank you for choosing [facility]  for your care. Our goal is always to provide you with excellent care. Hearing back from our patients is one way we can continue to improve our services. Please take a few minutes to complete the written survey that you may receive in the mail after your visit with us. Thank you!             Your Updated Medication List - Protect others around you: Learn how to safely use, store and throw away your medicines at www.disposemymeds.org.          This list is accurate as of 11/6/18  4:14 PM.  Always use your most recent med list.                   Brand Name Dispense Instructions for use Diagnosis    * albuterol (2.5 MG/3ML) 0.083% neb solution     1 Box    Take 1 vial (2.5 mg) by nebulization every 4 hours as needed    Moderate persistent asthma, uncomplicated       * albuterol 108 (90 Base) MCG/ACT inhaler    PROAIR HFA/PROVENTIL HFA/VENTOLIN HFA    1 Inhaler    Inhale 2 puffs into the lungs every 4 hours as needed    Moderate persistent asthma without complication       azelastine 0.05 % ophthalmic solution    OPTIVAR    1 Bottle    Apply 1 drop to eye 2 times daily    Conjunctivitis, allergic, unspecified laterality       CERAVE Crea     2 Bottle     Externally apply 1 dose. topically 2 times daily    Eczema, unspecified type       cetirizine 10 MG tablet    zyrTEC    30 tablet    Take 1 tablet (10 mg) by mouth At Bedtime    Allergic conjunctivitis of both eyes, Chronic allergic rhinitis due to animal hair and dander, Allergic rhinitis due to dust mite, Allergic rhinitis due to mold, Chronic seasonal allergic rhinitis due to pollen       EPINEPHrine 0.3 MG/0.3ML injection 2-pack    EPIPEN/ADRENACLICK/or ANY BX GENERIC EQUIV    0.6 mL    Inject 0.3 mLs (0.3 mg) into the muscle once as needed for anaphylaxis    Food allergy, Need for desensitization to allergens       fluticasone 50 MCG/ACT spray    FLONASE    1 Bottle    Spray 2 sprays into both nostrils daily    Chronic allergic rhinitis due to animal hair and dander, Allergic rhinitis due to dust mite, Allergic rhinitis due to mold, Chronic seasonal allergic rhinitis due to pollen       fluticasone-salmeterol 250-50 MCG/DOSE diskus inhaler    ADVAIR    3 Inhaler    Inhale 1 puff into the lungs 2 times daily    Moderate persistent asthma without complication       loratadine 10 MG tablet    CLARITIN    30 tablet    Take 1 tablet (10 mg) by mouth daily    Allergic conjunctivitis of both eyes, Chronic allergic rhinitis due to animal hair and dander, Allergic rhinitis due to dust mite, Allergic rhinitis due to mold, Chronic seasonal allergic rhinitis due to pollen       montelukast 10 MG tablet    SINGULAIR    30 tablet    Take 1 tablet (10 mg) by mouth At Bedtime    Moderate persistent asthma without complication, Chronic allergic rhinitis due to animal hair and dander, Allergic rhinitis due to dust mite, Allergic rhinitis due to mold, Chronic seasonal allergic rhinitis due to pollen       MULTIVITAMIN PO      1 tab daily        ORDER FOR ALLERGEN IMMUNOTHERAPY 5 mL vial     5 mL    Name of Mix: Mix #1  Mold Alternaria Tenuis 1:10 w/v, HS  0.5 ml Aspergillus Fumigatus 1:10 w/v, HS  0.5 ml Epicoccum Nigrum 1:10  w/v, HS 0.5 ml Hormodendrum Cladosporioides 1:10 w/v, HS 0.5 ml Penicillium Mix 1:10 w/v, HS  0.5 ml Diluent: HSA qs to 5ml    Chronic allergic rhinitis due to animal hair and dander, Allergic rhinitis due to dust mite, Allergic rhinitis due to mold, Chronic seasonal allergic rhinitis due to pollen       ORDER FOR ALLERGEN IMMUNOTHERAPY 5 mL vial     5 mL    Name of Mix: Mix #2  Dust Mite, Cat, Dog Cat Hair, Standardized 10,000 BAU/mL, ALK  2.0 ml Dog Hair Dander, A. P.  1:100 w/v, HS  1.0 ml Dust Mites F 30,000AU/mL, HS  0.3 ml Dust Mites P. 30,000 AU/mL, HS  0.3 ml  Diluent: HSA qs to 5ml    Chronic allergic rhinitis due to animal hair and dander, Allergic rhinitis due to dust mite, Allergic rhinitis due to mold, Chronic seasonal allergic rhinitis due to pollen       ORDER FOR ALLERGEN IMMUNOTHERAPY 5 mL vial     5 mL    Name of Mix: Mix #3 Grass,Tree  Dmitry,White 1:20w/v, HS 0.5ml Birch Mix PRW 1:20w/v, HS 0.5ml Boxelder-Maple Mix BHR (Boxelder Hard Red) 1:20w/v, HS 0.5ml [place],Common 1:20w/v, HS 0.5ml Elm,American 1:20w/v, HS 0.5ml [place] Mix RW 1:20w/v, HS 0.5ml Oak Mix RVW 1:20w/v, HS 0.5ml [place] Tree,Black 1:20w/v, HS 0.5ml Grass Mix #7 100,000 BAU/mL, HS 0.4ml Jonathan Grass 1:20w/v, HS 0.5ml Diluent: HSA qs to 5ml    Chronic allergic rhinitis due to animal hair and dander, Allergic rhinitis due to dust mite, Allergic rhinitis due to mold, Chronic seasonal allergic rhinitis due to pollen       ORDER FOR ALLERGEN IMMUNOTHERAPY 5 mL vial     5 mL    Name of Mix: Mix #4  Weeds Kochia 1:20 w/v, HS 0.5 ml Lamb's Quarters 1:20 w/v, HS 0.5 ml Nettle 1:20 w/v, HS 0.5 ml Plantain, English 1:20 w/v, HS 0.5 ml Ragweed Mixed 1:20 w/v ALK  0.5 ml Russian Thistle 1:20 w/v, HS 0.5 ml Sagebrush, Mugwort 1:20 w/v, HS 0.5 ml Sorrel, Sheep 1:20 w/v, HS 0.5 ml Diluent: HSA qs to 5ml    Chronic allergic rhinitis due to animal hair and dander, Allergic rhinitis due to dust mite, Allergic rhinitis due to mold, Chronic  seasonal allergic rhinitis due to pollen       order for DME     1 Units    Equipment being ordered: Silicone heel cup    Heel pain, chronic, left       order for DME     1 Units    Equipment being ordered: Dynaflex insert    Plantar fasciitis, left       triamcinolone 0.1 % cream    KENALOG    80 g    Apply  topically 2 times daily as needed. Do not use on face.    Eczema, unspecified type       * Notice:  This list has 2 medication(s) that are the same as other medications prescribed for you. Read the directions carefully, and ask your doctor or other care provider to review them with you.

## 2018-11-07 ENCOUNTER — OFFICE VISIT (OUTPATIENT)
Dept: PODIATRY | Facility: CLINIC | Age: 53
End: 2018-11-07
Payer: COMMERCIAL

## 2018-11-07 VITALS
HEART RATE: 87 BPM | WEIGHT: 190 LBS | SYSTOLIC BLOOD PRESSURE: 129 MMHG | TEMPERATURE: 98 F | BODY MASS INDEX: 32.44 KG/M2 | DIASTOLIC BLOOD PRESSURE: 77 MMHG | HEIGHT: 64 IN

## 2018-11-07 DIAGNOSIS — M72.2 PLANTAR FASCIITIS, LEFT: Primary | ICD-10-CM

## 2018-11-07 PROCEDURE — 99212 OFFICE O/P EST SF 10 MIN: CPT | Performed by: PODIATRIST

## 2018-11-07 NOTE — MR AVS SNAPSHOT
After Visit Summary   11/7/2018    Dilia Solomon    MRN: [id number]           Patient Information     Date Of Birth          1965        Visit Information        Provider Department      11/7/2018 9:20 AM Carlos Vallejo DPM [facility]        Today's Diagnoses     Plantar fasciitis, left    -  1      Care Instructions    Initial musculoskeletal treatment recommendation:    1.  Wear supportive foot wear (stiff soles) and/or arch supports (rigid not cushion).  2.  Stretch the calf muscles as instructed once an hour.  3.  Massage the soft tissues around the injured area in the morning to loosen the tissue with an over the counter product like Icy Hot with Lidocaine  4.  Ice the injured area in the evening; 20 min on/off.  5. Take antiinflammatory medication as indicated.    If no improvement in symptoms within four to six weeks, return to clinic for reevaluation.            Follow-ups after your visit        Follow-up notes from your care team     Return in about 4 weeks (around 12/5/2018), or if symptoms worsen or fail to improve.      Your next 10 appointments already scheduled     Nov 27, 2018  1:15 PM CST   Return Visit with Santhosh Tierney MD   [facility])    [address] [address]   [phone]            Nov 27, 2018  2:15 PM CST   Nurse Only with ALLERGY [facility])    [address] [address]   [phone]           Every allergy patient MUST wait 30 minutes after their allergy shot. No exceptions.  Xolair shots #1-3 should plan to wait 2 hours in clinic Xolair shots after #4 should plan 30 minute wait in clinic            Dec 05, 2018  3:30 PM CST   Nurse Only with ALLERGY [facility])    [address] [address]   [phone]           Every allergy  patient MUST wait 30 minutes after their allergy shot. No exceptions.  Xolair shots #1-3 should plan to wait 2 hours in clinic Xolair shots after #4 should plan 30 minute wait in clinic            Dec 05, 2018  4:00 PM CST   [facility]owitz [facility]   [facility]    [address] [address]   [phone]            Dec 11, 2018  3:30 PM CST   Nurse Only with ALLERGY [facility] ([facility])    [address] [address]   [phone]           Every allergy patient MUST wait 30 minutes after their allergy shot. No exceptions.  Xolair shots #1-3 should plan to wait 2 hours in clinic Xolair shots after #4 should plan 30 minute wait in clinic            Dec 18, 2018  3:30 PM CST   Nurse Only with ALLERGY [facility] ([facility])    [address] [address]   [phone]           Every allergy patient MUST wait 30 minutes after their allergy shot. No exceptions.  Xolair shots #1-3 should plan to wait 2 hours in clinic Xolair shots after #4 should plan 30 minute wait in clinic              Who to contact     If you have questions or need follow up information about today's clinic visit or your schedule please contact [facility] directly at [phone].  Normal or non-critical lab and imaging results will be communicated to you by [email or web address], letter or phone within 4 business days after the clinic has received the results. If you do not hear from us within 7 days, please contact the clinic through [email or web address] or phone. If you have a critical or abnormal lab result, we will notify you by phone as soon as possible.  Submit refill requests through V[employer]cherlink or call your pharmacy and they will forward the refill request to us. Please allow 3 business days for your refill to be completed.          Additional Information  "About Your Visit        Care EveryWhere ID     This is your Care EveryWhere ID. This could be used by other organizations to access your [facility] medical records  [id number]        Your Vitals Were     Pulse Temperature Height Last Period BMI (Body Mass Index)       87 98  F (36.7  C) (Tympanic) 5' 4\" (1.626 m) 07/18/2015 (Approximate) 32.61 kg/m2        Blood Pressure from Last 3 Encounters:   11/07/18 129/77   07/17/18 124/68   06/19/18 122/70    Weight from Last 3 Encounters:   11/07/18 190 lb (86.2 kg)   07/17/18 212 lb (96.2 kg)   06/19/18 210 lb (95.3 kg)              Today, you had the following     No orders found for display         Today's Medication Changes          These changes are accurate as of 11/7/18  9:36 AM.  If you have any questions, ask your nurse or doctor.               Start taking these medicines.        Dose/Directions    order for DME   Used for:  Plantar fasciitis, left   Started by:  Carlos Vallejo DPM        Equipment being ordered: Silicone heel cup   Quantity:  1 Units   Refills:  0            Where to get your medicines      Some of these will need a paper prescription and others can be bought over the counter.  Ask your nurse if you have questions.     Bring a paper prescription for each of these medications     order for DME                Primary Care Provider Office Phone # Fax #    Cookie Conklin [phone]       [address] [place] [address]        Equal Access to Services     KODY CHEN AH: Hadii ulices Anguiano, waaxda luqadaha, qaybta kaalmada kanwalyada, waxmarjorie america hahn adetanika howard. So [facility] [phone].    ATENCIÓN: Si habla español, tiene a pruitt disposición servicios gratuitos de asistencia lingüística. Llame al [phone].    We comply with applicable federal civil rights laws and [place] laws. We do not discriminate on the basis of race, color, national origin, age, disability, sex, sexual orientation, or gender " identity.            Thank you!     Thank you for choosing [facility]  for your care. Our goal is always to provide you with excellent care. Hearing back from our patients is one way we can continue to improve our services. Please take a few minutes to complete the written survey that you may receive in the mail after your visit with us. Thank you!             Your Updated Medication List - Protect others around you: Learn how to safely use, store and throw away your medicines at www.disposemymeds.org.          This list is accurate as of 11/7/18  9:36 AM.  Always use your most recent med list.                   Brand Name Dispense Instructions for use Diagnosis    * albuterol (2.5 MG/3ML) 0.083% neb solution     1 Box    Take 1 vial (2.5 mg) by nebulization every 4 hours as needed    Moderate persistent asthma, uncomplicated       * albuterol 108 (90 Base) MCG/ACT inhaler    PROAIR HFA/PROVENTIL HFA/VENTOLIN HFA    1 Inhaler    Inhale 2 puffs into the lungs every 4 hours as needed    Moderate persistent asthma without complication       azelastine 0.05 % ophthalmic solution    OPTIVAR    1 Bottle    Apply 1 drop to eye 2 times daily    Conjunctivitis, allergic, unspecified laterality       CERAVE Crea     2 Bottle    Externally apply 1 dose. topically 2 times daily    Eczema, unspecified type       cetirizine 10 MG tablet    zyrTEC    30 tablet    Take 1 tablet (10 mg) by mouth At Bedtime    Allergic conjunctivitis of both eyes, Chronic allergic rhinitis due to animal hair and dander, Allergic rhinitis due to dust mite, Allergic rhinitis due to mold, Chronic seasonal allergic rhinitis due to pollen       EPINEPHrine 0.3 MG/0.3ML injection 2-pack    EPIPEN/ADRENACLICK/or ANY BX GENERIC EQUIV    0.6 mL    Inject 0.3 mLs (0.3 mg) into the muscle once as needed for anaphylaxis    Food allergy, Need for desensitization to allergens       fluticasone 50 MCG/ACT spray    FLONASE    1 Bottle    Spray 2  sprays into both nostrils daily    Chronic allergic rhinitis due to animal hair and dander, Allergic rhinitis due to dust mite, Allergic rhinitis due to mold, Chronic seasonal allergic rhinitis due to pollen       fluticasone-salmeterol 250-50 MCG/DOSE diskus inhaler    ADVAIR    3 Inhaler    Inhale 1 puff into the lungs 2 times daily    Moderate persistent asthma without complication       loratadine 10 MG tablet    CLARITIN    30 tablet    Take 1 tablet (10 mg) by mouth daily    Allergic conjunctivitis of both eyes, Chronic allergic rhinitis due to animal hair and dander, Allergic rhinitis due to dust mite, Allergic rhinitis due to mold, Chronic seasonal allergic rhinitis due to pollen       montelukast 10 MG tablet    SINGULAIR    30 tablet    Take 1 tablet (10 mg) by mouth At Bedtime    Moderate persistent asthma without complication, Chronic allergic rhinitis due to animal hair and dander, Allergic rhinitis due to dust mite, Allergic rhinitis due to mold, Chronic seasonal allergic rhinitis due to pollen       MULTIVITAMIN PO      1 tab daily        ORDER FOR ALLERGEN IMMUNOTHERAPY 5 mL vial     5 mL    Name of Mix: Mix #1  Mold Alternaria Tenuis 1:10 w/v, HS  0.5 ml Aspergillus Fumigatus 1:10 w/v, HS  0.5 ml Epicoccum Nigrum 1:10 w/v, HS 0.5 ml Hormodendrum Cladosporioides 1:10 w/v, HS 0.5 ml Penicillium Mix 1:10 w/v, HS  0.5 ml Diluent: HSA qs to 5ml    Chronic allergic rhinitis due to animal hair and dander, Allergic rhinitis due to dust mite, Allergic rhinitis due to mold, Chronic seasonal allergic rhinitis due to pollen       ORDER FOR ALLERGEN IMMUNOTHERAPY 5 mL vial     5 mL    Name of Mix: Mix #2  Dust Mite, Cat, Dog Cat Hair, Standardized 10,000 BAU/mL, ALK  2.0 ml Dog Hair Dander, A. P.  1:100 w/v, HS  1.0 ml Dust Mites F 30,000AU/mL, HS  0.3 ml Dust Mites P. 30,000 AU/mL, HS  0.3 ml  Diluent: HSA qs to 5ml    Chronic allergic rhinitis due to animal hair and dander, Allergic rhinitis due to dust mite,  Allergic rhinitis due to mold, Chronic seasonal allergic rhinitis due to pollen       ORDER FOR ALLERGEN IMMUNOTHERAPY 5 mL vial     5 mL    Name of Mix: Mix #3 Grass,Tree  Dmitry,White 1:20w/v, HS 0.5ml Birch Mix PRW 1:20w/v, HS 0.5ml Boxelder-Maple Mix BHR (Boxelder Hard Red) 1:20w/v, HS 0.5ml Benzie,Common 1:20w/v, HS 0.5ml Elm,American 1:20w/v, HS 0.5ml [place] Mix RW 1:20w/v, HS 0.5ml Oak Mix RVW 1:20w/v, HS 0.5ml Tipton Tree,Black 1:20w/v, HS 0.5ml Grass Mix #7 100,000 BAU/mL, HS 0.4ml Jonathan Grass 1:20w/v, HS 0.5ml Diluent: HSA qs to 5ml    Chronic allergic rhinitis due to animal hair and dander, Allergic rhinitis due to dust mite, Allergic rhinitis due to mold, Chronic seasonal allergic rhinitis due to pollen       ORDER FOR ALLERGEN IMMUNOTHERAPY 5 mL vial     5 mL    Name of Mix: Mix #4  Weeds Kochia 1:20 w/v, HS 0.5 ml Lamb's Quarters 1:20 w/v, HS 0.5 ml Nettle 1:20 w/v, HS 0.5 ml Plantain, English 1:20 w/v, HS 0.5 ml Ragweed Mixed 1:20 w/v ALK  0.5 ml Russian Thistle 1:20 w/v, HS 0.5 ml Sagebrush, Mugwort 1:20 w/v, HS 0.5 ml Sorrel, Sheep 1:20 w/v, HS 0.5 ml Diluent: HSA qs to 5ml    Chronic allergic rhinitis due to animal hair and dander, Allergic rhinitis due to dust mite, Allergic rhinitis due to mold, Chronic seasonal allergic rhinitis due to pollen       order for DME     1 Units    Equipment being ordered: Silicone heel cup    Heel pain, chronic, left       order for DME     1 Units    Equipment being ordered: Dynaflex insert    Plantar fasciitis, left       order for DME     1 Units    Equipment being ordered: Silicone heel cup    Plantar fasciitis, left       triamcinolone 0.1 % cream    KENALOG    80 g    Apply  topically 2 times daily as needed. Do not use on face.    Eczema, unspecified type       * Notice:  This list has 2 medication(s) that are the same as other medications prescribed for you. Read the directions carefully, and ask your doctor or other care provider to review them with  you.

## 2018-11-07 NOTE — PROGRESS NOTES
Dilia returns to the office for reevaluation of the left foot.  The patient relates following the instructions given at the last visit with noted less pain.  The patient relates overall more  improvement in pain and function of the left foot.  The patient relates her dog chewed up the heel cup that was previously dispensed to her.  The patient would like another pair.    PAST MEDICAL HISTORY:   Past Medical History:   Diagnosis Date     Injury, other and unspecified, shoulder and upper arm 9/03       BMI= Body mass index is 32.61 kg/(m^2).    Weight management plan: Patient was referred to their PCP to discuss a diet and exercise plan.    Physical Exam:    General: The patient appears to have a pleasant mental affect.    Lower extremity physical exam:  Neurovascular status is intact with palpable pedal pulses and intact epicritic sensations.  Muscular exam is within normal limits to major muscle groups.  Integument is intact.      One notes decreased edema.  One notes pain on palpation on the plantar aspect of the left heel at the insertion point of the plantar fascia.  No surrounding erythema noted.       Assessment:      ICD-10-CM    1. Plantar fasciitis, left M72.2 order for DME       Plan:  I have explained to Dilia about the conditions.  At this time, the patient was dispensed another pair of silicone heel cups to better offload the pressure on the plantar aspect of the left heel.  The patient was instructed to return to the office if problems persist.    Disclaimer: This note consists of symbols derived from keyboarding, dictation and/or voice recognition software. As a result, there may be errors in the script that have gone undetected. Please consider this when interpreting information found in this chart.       CAROLE Vallejo D.P.M., FSHENG.F.A.S.

## 2018-11-07 NOTE — NURSING NOTE
"Initial /77 (BP Location: Right arm, Patient Position: Sitting, Cuff Size: Adult Large)  Pulse 87  Temp 98  F (36.7  C) (Tympanic)  Ht 5' 4\" (1.626 m)  Wt 190 lb (86.2 kg)  LMP 07/18/2015 (Approximate)  BMI 32.61 kg/m2 Estimated body mass index is 32.61 kg/(m^2) as calculated from the following:    Height as of this encounter: 5' 4\" (1.626 m).    Weight as of this encounter: 190 lb (86.2 kg). .    Ron BARRETT, CMA    "

## 2018-11-07 NOTE — LETTER
11/7/2018         RE: Dilia Solomon  [address] [facility] [address]        Dear Colleague,    Thank you for referring your patient, Dilia Solomon, to the [facility]. Please see a copy of my visit note below.    Dilia returns to the office for reevaluation of the left foot.  The patient relates following the instructions given at the last visit with noted less pain.  The patient relates overall more  improvement in pain and function of the left foot.  The patient relates her dog chewed up the heel cup that was previously dispensed to her.  The patient would like another pair.    PAST MEDICAL HISTORY:   Past Medical History:   Diagnosis Date     Injury, other and unspecified, shoulder and upper arm 9/03       BMI= Body mass index is 32.61 kg/(m^2).    Weight management plan: Patient was referred to their PCP to discuss a diet and exercise plan.    Physical Exam:    General: The patient appears to have a pleasant mental affect.    Lower extremity physical exam:  Neurovascular status is intact with palpable pedal pulses and intact epicritic sensations.  Muscular exam is within normal limits to major muscle groups.  Integument is intact.      One notes decreased edema.  One notes pain on palpation on the plantar aspect of the left heel at the insertion point of the plantar fascia.  No surrounding erythema noted.       Assessment:      ICD-10-CM    1. Plantar fasciitis, left M72.2 order for DME       Plan:  I have explained to Dilia about the conditions.  At this time, the patient was dispensed another pair of silicone heel cups to better offload the pressure on the plantar aspect of the left heel.  The patient was instructed to return to the office if problems persist.    Disclaimer: This note consists of symbols derived from keyboarding, dictation and/or voice recognition software. As a result, there may be errors in the script that have gone undetected. Please consider this when  interpreting information found in this chart.       CAROLE Vallejo D.P.M., FSHENG.F.A.S.      Again, thank you for allowing me to participate in the care of your patient.        Sincerely,        Carlos Vallejo DPM

## 2018-11-27 ENCOUNTER — ALLIED HEALTH/NURSE VISIT (OUTPATIENT)
Dept: ALLERGY | Facility: CLINIC | Age: 53
End: 2018-11-27
Payer: COMMERCIAL

## 2018-11-27 ENCOUNTER — OFFICE VISIT (OUTPATIENT)
Dept: OTOLARYNGOLOGY | Facility: CLINIC | Age: 53
End: 2018-11-27
Payer: COMMERCIAL

## 2018-11-27 VITALS
HEART RATE: 95 BPM | SYSTOLIC BLOOD PRESSURE: 124 MMHG | BODY MASS INDEX: 32.44 KG/M2 | HEIGHT: 64 IN | DIASTOLIC BLOOD PRESSURE: 82 MMHG | WEIGHT: 190 LBS | TEMPERATURE: 97.7 F

## 2018-11-27 DIAGNOSIS — J30.9 ALLERGIC RHINITIS: Primary | ICD-10-CM

## 2018-11-27 DIAGNOSIS — J32.0 CHRONIC MAXILLARY SINUSITIS: Primary | ICD-10-CM

## 2018-11-27 PROCEDURE — 31231 NASAL ENDOSCOPY DX: CPT | Performed by: OTOLARYNGOLOGY

## 2018-11-27 PROCEDURE — 99213 OFFICE O/P EST LOW 20 MIN: CPT | Mod: 25 | Performed by: OTOLARYNGOLOGY

## 2018-11-27 PROCEDURE — 95117 IMMUNOTHERAPY INJECTIONS: CPT

## 2018-11-27 PROCEDURE — 99207 ZZC DROP WITH A PROCEDURE: CPT

## 2018-11-27 ASSESSMENT — PAIN SCALES - GENERAL: PAINLEVEL: NO PAIN (0)

## 2018-11-27 NOTE — MR AVS SNAPSHOT
After Visit Summary   11/27/2018    Dilia Solomon    MRN: [id number]           Patient Information     Date Of Birth          1965        Visit Information        Provider Department      11/27/2018 1:15 PM Santhosh Tierney MD [facility]        Today's Diagnoses     Chronic maxillary sinusitis    -  1      Care Instructions    Per Physician's instructions            Follow-ups after your visit        Your next 10 appointments already scheduled     Dec 05, 2018  3:30 PM CST   Nurse Only with ALLERGY [facility] ([facility])    [address] [facility] [address]   [phone]           Every allergy patient MUST wait 30 minutes after their allergy shot. No exceptions.  Xolair shots #1-3 should plan to wait 2 hours in clinic Xolair shots after #4 should plan 30 minute wait in clinic            Dec 05, 2018  4:00 PM CST   [place] Butch Horowitz [place]   [facility] [place]    [address] [facility] [address]   [phone]            Dec 11, 2018  3:30 PM CST   Nurse Only with ALLERGY [facility] ([facility])    [address] [facility] [address]   [phone]           Every allergy patient MUST wait 30 minutes after their allergy shot. No exceptions.  Xolair shots #1-3 should plan to wait 2 hours in clinic Xolair shots after #4 should plan 30 minute wait in clinic            Dec 18, 2018  3:30 PM CST   Nurse Only with ALLERGY [facility] ([facility])    [address] [address]   [phone]           Every allergy patient MUST wait 30 minutes after their allergy shot. No exceptions.  Xolair shots #1-3 should plan to wait 2 hours in clinic Xolair shots after #4 should plan 30 minute wait in clinic            Jan 08, 2019  1:30 PM CST   Return Visit with Wyatt  "Moises Dunn MD   [facility] ([facility])    [address] [id number] [phone]            Nov 20, 2019  1:00 PM CST   Return Visit with Santhosh Tierney MD   [facility]    [address] [place]  [facility] [address] [phone]              Who to contact     If you have questions or need follow up information about today's clinic visit or your schedule please contact [facility] directly at [phone].  Normal or non-critical lab and imaging results will be communicated to you by MyChart, letter or phone within 4 business days after the clinic has received the results. If you do not hear from us within 7 days, please contact the clinic through MyChart or phone. If you have a critical or abnormal lab result, we will notify you by phone as soon as possible.  Submit refill requests through [employer] or call your pharmacy and they will forward the refill request to us. Please allow 3 business days for your refill to be completed.          Additional Information About Your Visit        Care EveryWhere ID     This is your Care EveryWhere ID. This could be used by other organizations to access your [place] medical records  [id number]        Your Vitals Were     Pulse Temperature Height Last Period BMI (Body Mass Index)       95 97.7  F (36.5  C) (Tympanic) 1.626 m (5' 4\") 07/18/2015 (Approximate) 32.61 kg/m2        Blood Pressure from Last 3 Encounters:   11/27/18 124/82   11/07/18 129/77   07/17/18 124/68    Weight from Last 3 Encounters:   11/27/18 86.2 kg (190 lb)   11/07/18 86.2 kg (190 lb)   07/17/18 96.2 kg (212 lb)              We Performed the Following     NASAL ENDOSCOPY, DIAGNOSTIC        Primary Care Provider Office Phone # Fax #    Cookie Conklin [phone] [address] [place] [address]        Equal Access to Services     KODY CHEN AH: Hadii aad ku " matti Anguiano, wajavida luqadaha, qaybta kaalmada anna, hieu hahn moisestanika bangura larhinajoy lee. So [facility] [phone].    ATENCIÓN: Si pamela santo, tiene a pruitt disposición servicios gratuitos de asistencia lingüística. Lucas al [phone].    We comply with applicable federal civil rights laws and [place] laws. We do not discriminate on the basis of race, color, national origin, age, disability, sex, sexual orientation, or gender identity.            Thank you!     Thank you for choosing [facility]  for your care. Our goal is always to provide you with excellent care. Hearing back from our patients is one way we can continue to improve our services. Please take a few minutes to complete the written survey that you may receive in the mail after your visit with us. Thank you!             Your Updated Medication List - Protect others around you: Learn how to safely use, store and throw away your medicines at www.disposemymeds.org.          This list is accurate as of 11/27/18  4:48 PM.  Always use your most recent med list.                   Brand Name Dispense Instructions for use Diagnosis    * albuterol (2.5 MG/3ML) 0.083% neb solution    PROVENTIL    1 Box    Take 1 vial (2.5 mg) by nebulization every 4 hours as needed    Moderate persistent asthma, uncomplicated       * albuterol 108 (90 Base) MCG/ACT inhaler    PROAIR HFA/PROVENTIL HFA/VENTOLIN HFA    1 Inhaler    Inhale 2 puffs into the lungs every 4 hours as needed    Moderate persistent asthma without complication       azelastine 0.05 % ophthalmic solution    OPTIVAR    1 Bottle    Apply 1 drop to eye 2 times daily    Conjunctivitis, allergic, unspecified laterality       CERAVE Crea     2 Bottle    Externally apply 1 dose. topically 2 times daily    Eczema, unspecified type       cetirizine 10 MG tablet    zyrTEC    30 tablet    Take 1 tablet (10 mg) by mouth At Bedtime    Allergic conjunctivitis of both eyes, Chronic allergic  rhinitis due to animal hair and dander, Allergic rhinitis due to dust mite, Allergic rhinitis due to mold, Chronic seasonal allergic rhinitis due to pollen       EPINEPHrine 0.3 MG/0.3ML injection 2-pack    EPIPEN/ADRENACLICK/or ANY BX GENERIC EQUIV    0.6 mL    Inject 0.3 mLs (0.3 mg) into the muscle once as needed for anaphylaxis    Food allergy, Need for desensitization to allergens       fluticasone 50 MCG/ACT nasal spray    FLONASE    1 Bottle    Spray 2 sprays into both nostrils daily    Chronic allergic rhinitis due to animal hair and dander, Allergic rhinitis due to dust mite, Allergic rhinitis due to mold, Chronic seasonal allergic rhinitis due to pollen       fluticasone-salmeterol 250-50 MCG/DOSE inhaler    ADVAIR    3 Inhaler    Inhale 1 puff into the lungs 2 times daily    Moderate persistent asthma without complication       loratadine 10 MG tablet    CLARITIN    30 tablet    Take 1 tablet (10 mg) by mouth daily    Allergic conjunctivitis of both eyes, Chronic allergic rhinitis due to animal hair and dander, Allergic rhinitis due to dust mite, Allergic rhinitis due to mold, Chronic seasonal allergic rhinitis due to pollen       montelukast 10 MG tablet    SINGULAIR    30 tablet    Take 1 tablet (10 mg) by mouth At Bedtime    Moderate persistent asthma without complication, Chronic allergic rhinitis due to animal hair and dander, Allergic rhinitis due to dust mite, Allergic rhinitis due to mold, Chronic seasonal allergic rhinitis due to pollen       MULTIVITAMIN PO      1 tab daily        ORDER FOR ALLERGEN IMMUNOTHERAPY 5 mL vial     5 mL    Name of Mix: Mix #1  Mold Alternaria Tenuis 1:10 w/v, HS  0.5 ml Aspergillus Fumigatus 1:10 w/v, HS  0.5 ml Epicoccum Nigrum 1:10 w/v, HS 0.5 ml Hormodendrum Cladosporioides 1:10 w/v, HS 0.5 ml Penicillium Mix 1:10 w/v, HS  0.5 ml Diluent: HSA qs to 5ml    Chronic allergic rhinitis due to animal hair and dander, Allergic rhinitis due to dust mite, Allergic rhinitis  due to mold, Chronic seasonal allergic rhinitis due to pollen       ORDER FOR ALLERGEN IMMUNOTHERAPY 5 mL vial     5 mL    Name of Mix: Mix #2  Dust Mite, Cat, Dog Cat Hair, Standardized 10,000 BAU/mL, ALK  2.0 ml Dog Hair Dander, A. P.  1:100 w/v, HS  1.0 ml Dust Mites F 30,000AU/mL, HS  0.3 ml Dust Mites P. 30,000 AU/mL, HS  0.3 ml  Diluent: HSA qs to 5ml    Chronic allergic rhinitis due to animal hair and dander, Allergic rhinitis due to dust mite, Allergic rhinitis due to mold, Chronic seasonal allergic rhinitis due to pollen       ORDER FOR ALLERGEN IMMUNOTHERAPY 5 mL vial     5 mL    Name of Mix: Mix #3 Grass,Tree  Dmitry,White 1:20w/v, HS 0.5ml Birch Mix PRW 1:20w/v, HS 0.5ml Boxelder-Maple Mix BHR (Boxelder Hard Red) 1:20w/v, HS 0.5ml [place]o,Common 1:20w/v, HS 0.5ml Elm,American 1:20w/v, HS 0.5ml [place] Mix RW 1:20w/v, HS 0.5ml Oak Mix RVW 1:20w/v, HS 0.5ml [place] Tree,Black 1:20w/v, HS 0.5ml Grass Mix #7 100,000 BAU/mL, HS 0.4ml Jonathan Grass 1:20w/v, HS 0.5ml Diluent: HSA qs to 5ml    Chronic allergic rhinitis due to animal hair and dander, Allergic rhinitis due to dust mite, Allergic rhinitis due to mold, Chronic seasonal allergic rhinitis due to pollen       ORDER FOR ALLERGEN IMMUNOTHERAPY 5 mL vial     5 mL    Name of Mix: Mix #4  Weeds Kochia 1:20 w/v, HS 0.5 ml Lamb's Quarters 1:20 w/v, HS 0.5 ml Nettle 1:20 w/v, HS 0.5 ml Plantain, English 1:20 w/v, HS 0.5 ml Ragweed Mixed 1:20 w/v ALK  0.5 ml Russian Thistle 1:20 w/v, HS 0.5 ml Sagebrush, Mugwort 1:20 w/v, HS 0.5 ml Sorrel, Sheep 1:20 w/v, HS 0.5 ml Diluent: HSA qs to 5ml    Chronic allergic rhinitis due to animal hair and dander, Allergic rhinitis due to dust mite, Allergic rhinitis due to mold, Chronic seasonal allergic rhinitis due to pollen       order for DME     1 Units    Equipment being ordered: Silicone heel cup    Heel pain, chronic, left       order for DME     1 Units    Equipment being ordered: Dynaflex insert    Plantar fasciitis,  left       order for DME     1 Units    Equipment being ordered: Silicone heel cup    Plantar fasciitis, left       triamcinolone 0.1 % external cream    KENALOG    80 g    Apply  topically 2 times daily as needed. Do not use on face.    Eczema, unspecified type       * Notice:  This list has 2 medication(s) that are the same as other medications prescribed for you. Read the directions carefully, and ask your doctor or other care provider to review them with you.

## 2018-11-27 NOTE — LETTER
"    11/27/2018         RE: Dilia Solomon  [address] [facility] [address]        Dear Colleague,    Thank you for referring your patient, Dilia Solomon, to the [facility]. Please see a copy of my visit note below.    Post Op Sinus 3    HPI - Dilia Solomon is here for their second postoperative visit, status post endoscopic sinus surgery (with turbinate reduction) performed on 2/14/2018.  There was the expected amount of congestion which has now mostly resolved, and no bleeding has occurred. Patient reports no issues since procedure. Patient is now using air purifiers and got her duct work cleaned in her house.     Patient also expresses concern of some pain or fullness in her left ear. This happened when rinsing her nose, but feels better now.    She has no further facial pain. Overall she is improving and her nasal breathing is much better than preop. She is irrigating BID and using Flonase. She recently had a cold, but did not get headaches from it.     PROCEDURES PERFORMED:   1. Bilateral endoscopic maxillary antrostomy  2. Bilateral endoscopic anterior ethmoidectomy.   4. Bilateral endoscopic submucous resection of inferior turbinates    Physical Exam -   /82 (BP Location: Left arm, Patient Position: Sitting, Cuff Size: Adult Regular)  Pulse 95  Temp 97.7  F (36.5  C) (Tympanic)  Ht 1.626 m (5' 4\")  Wt 86.2 kg (190 lb)  LMP 07/18/2015 (Approximate)  BMI 32.61 kg/m2    General - The patient is awake and alert, and answers questions appropriately during the history and physical.  The vocal quality is hypernasal, but there is no dyspnea or stridor noted.  Eyes - The EOMI, there is no conjuncitval or scleral injection.  Pupils are equally round and reactive to light.  Oral - The oral mucosa is pink and moist.  The tongue is mobile and midline on protrusion, no edema noted.  Nasal - Nasal airway widely patent. No visible polyps.    To evaluate the nose in the postoperative state I " performed rigid nasal endoscopy.  I first sprayed with lidocaine and neosynephrine.  I began with the LEFT side using a 2.7mm 30 degree rigid nasal endoscope, and color photographs were taken for the medical record.    The middle meatus was open, and I was able to pass the scope through.  The LEFT maxillary antrostomy is open, and looking down and into the LEFT maxillary sinus, the mucosa looks healthy, no abnormal secretions.  Going further back, the ethmoid roof is nicely re-mucosalized, and there is no abnormal secretions or polypoid degeneration.    The right side was then examined.  The middle meatus was open and I visualized the RIGHT antrostomy was open.  Looking down into the RIGHT maxillary sinus, the mucosa was flat and healthy in appearance.  Going further back the ethmoid system looks good.  The mucosa is healthy, and there were no polyps or polypoid degenerations.       A/P - Dilia Solomon is status post endoscopic nasal surgery. Her sinuses are healed very well and her symptoms well controlled. No sign of ongoing sinus disease. Recommend rinses BID and f/u every 12 months or as needed.      Dr. Santhosh Tierney    This document serves as a record of the services and decisions personally performed and made by Dr. Santhosh Tierney MD. It was created on his behalf by Dee Layne, a trained medical scribe. The creation of this document is based the provider's statements to the medical scribe.    Lary Layne 1:19 PM 11/27/2018     The information in this document, created by a scribe for me, accurately reflects the services I personally performed and the decisions made by me. I have reviewed and approved this document for accuracy.              Again, thank you for allowing me to participate in the care of your patient.        Sincerely,        Santhosh Tierney MD

## 2018-11-27 NOTE — MR AVS SNAPSHOT
After Visit Summary   11/27/2018    Dilia Solomon    MRN: [id number]           Patient Information     Date Of Birth          1965        Visit Information        Provider Department      11/27/2018 2:15 PM ALLERGY [facility]        Today's Diagnoses     Allergic rhinitis    -  1       Follow-ups after your visit        Your next 10 appointments already scheduled     Dec 05, 2018  3:30 PM CST   Nurse Only with ALLERGY [facility] ([facility])    [address] [address]   [phone]           Every allergy patient MUST wait 30 minutes after their allergy shot. No exceptions.  Xolair shots #1-3 should plan to wait 2 hours in clinic Xolair shots after #4 should plan 30 minute wait in clinic            Dec 05, 2018  4:00 PM CST   Return Visit with Butch Horowitz MD   [facility])    [address] [address]   [phone]            Dec 11, 2018  3:30 PM CST   Nurse Only with ALLERGY [facility] ([facility])    [address] [address]   [phone]           Every allergy patient MUST wait 30 minutes after their allergy shot. No exceptions.  Xolair shots #1-3 should plan to wait 2 hours in clinic Xolair shots after #4 should plan 30 minute wait in clinic            Dec 18, 2018  3:30 PM CST   Nurse Only with ALLERGY [facility] ([facility])    [address] [address]   [phone]           Every allergy patient MUST wait 30 minutes after their allergy shot. No exceptions.  Xolair shots #1-3 should plan to wait 2 hours in clinic Xolair shots after #4 should plan 30 minute wait in clinic            Jan 08, 2019  1:30 PM CST   Return Visit with Wyatt Dunn MD   [facility]    [address] [facility] [address]   [phone]            Nov 20, 2019  1:00 PM CST   [facility] S[facility]osh Tierney MD   [facility]    [address] [facility] [address] [phone]              Who to contact     If you have questions or need follow up information about today's clinic visit or your schedule please contact [facility] directly at [phone].  Normal or non-critical lab and imaging results will be communicated to you by MyChart, letter or phone within 4 business days after the clinic has received the results. If you do not hear from us within 7 days, please contact the clinic through MyChart or phone. If you have a critical or abnormal lab result, we will notify you by phone as soon as possible.  Submit refill requests through [employer] or call your pharmacy and they will forward the refill request to us. Please allow 3 business days for your refill to be completed.          Additional Information About Your Visit        Care EveryWhere ID     This is your Care EveryWhere ID. This could be used by other organizations to access your [facility] medical records  [id number]        Your Vitals Were     Last Period                   07/18/2015 (Approximate)            Blood Pressure from Last 3 Encounters:   11/27/18 124/82   11/07/18 129/77   07/17/18 124/68    Weight from Last 3 Encounters:   11/27/18 86.2 kg (190 lb)   11/07/18 86.2 kg (190 lb)   07/17/18 96.2 kg (212 lb)              We Performed the Following     Allergy Shot: Two or more injections        Primary Care Provider Office Phone # Fax #    Cookie Conklin [phone]       [address] [place] [address]        Equal Access to Services     KODY CHEN : Marcel Anguiano, sruthi mayberry, janette castillo, hieu howard. So [facility] [phone].    ATENCIÓN: Amy santiago  español, tiene a pruitt disposición servicios gratuitos de asistencia lingüística. Lucas kruger [phone].    We comply with applicable federal civil rights laws and [place] laws. We do not discriminate on the basis of race, color, national origin, age, disability, sex, sexual orientation, or gender identity.            Thank you!     Thank you for choosing [facility]  for your care. Our goal is always to provide you with excellent care. Hearing back from our patients is one way we can continue to improve our services. Please take a few minutes to complete the written survey that you may receive in the mail after your visit with us. Thank you!             Your Updated Medication List - Protect others around you: Learn how to safely use, store and throw away your medicines at www.disposemymeds.org.          This list is accurate as of 11/27/18  3:52 PM.  Always use your most recent med list.                   Brand Name Dispense Instructions for use Diagnosis    * albuterol (2.5 MG/3ML) 0.083% neb solution    PROVENTIL    1 Box    Take 1 vial (2.5 mg) by nebulization every 4 hours as needed    Moderate persistent asthma, uncomplicated       * albuterol 108 (90 Base) MCG/ACT inhaler    PROAIR HFA/PROVENTIL HFA/VENTOLIN HFA    1 Inhaler    Inhale 2 puffs into the lungs every 4 hours as needed    Moderate persistent asthma without complication       azelastine 0.05 % ophthalmic solution    OPTIVAR    1 Bottle    Apply 1 drop to eye 2 times daily    Conjunctivitis, allergic, unspecified laterality       CERAVE Crea     2 Bottle    Externally apply 1 dose. topically 2 times daily    Eczema, unspecified type       cetirizine 10 MG tablet    zyrTEC    30 tablet    Take 1 tablet (10 mg) by mouth At Bedtime    Allergic conjunctivitis of both eyes, Chronic allergic rhinitis due to animal hair and dander, Allergic rhinitis due to dust mite, Allergic rhinitis due to mold, Chronic seasonal allergic rhinitis due to  pollen       EPINEPHrine 0.3 MG/0.3ML injection 2-pack    EPIPEN/ADRENACLICK/or ANY BX GENERIC EQUIV    0.6 mL    Inject 0.3 mLs (0.3 mg) into the muscle once as needed for anaphylaxis    Food allergy, Need for desensitization to allergens       fluticasone 50 MCG/ACT nasal spray    FLONASE    1 Bottle    Spray 2 sprays into both nostrils daily    Chronic allergic rhinitis due to animal hair and dander, Allergic rhinitis due to dust mite, Allergic rhinitis due to mold, Chronic seasonal allergic rhinitis due to pollen       fluticasone-salmeterol 250-50 MCG/DOSE inhaler    ADVAIR    3 Inhaler    Inhale 1 puff into the lungs 2 times daily    Moderate persistent asthma without complication       loratadine 10 MG tablet    CLARITIN    30 tablet    Take 1 tablet (10 mg) by mouth daily    Allergic conjunctivitis of both eyes, Chronic allergic rhinitis due to animal hair and dander, Allergic rhinitis due to dust mite, Allergic rhinitis due to mold, Chronic seasonal allergic rhinitis due to pollen       montelukast 10 MG tablet    SINGULAIR    30 tablet    Take 1 tablet (10 mg) by mouth At Bedtime    Moderate persistent asthma without complication, Chronic allergic rhinitis due to animal hair and dander, Allergic rhinitis due to dust mite, Allergic rhinitis due to mold, Chronic seasonal allergic rhinitis due to pollen       MULTIVITAMIN PO      1 tab daily        ORDER FOR ALLERGEN IMMUNOTHERAPY 5 mL vial     5 mL    Name of Mix: Mix #1  Mold Alternaria Tenuis 1:10 w/v, HS  0.5 ml Aspergillus Fumigatus 1:10 w/v, HS  0.5 ml Epicoccum Nigrum 1:10 w/v, HS 0.5 ml Hormodendrum Cladosporioides 1:10 w/v, HS 0.5 ml Penicillium Mix 1:10 w/v, HS  0.5 ml Diluent: HSA qs to 5ml    Chronic allergic rhinitis due to animal hair and dander, Allergic rhinitis due to dust mite, Allergic rhinitis due to mold, Chronic seasonal allergic rhinitis due to pollen       ORDER FOR ALLERGEN IMMUNOTHERAPY 5 mL vial     5 mL    Name of Mix: Mix #2  Dust  Mite, Cat, Dog Cat Hair, Standardized 10,000 BAU/mL, ALK  2.0 ml Dog Hair Dander, A. P.  1:100 w/v, HS  1.0 ml Dust Mites F 30,000AU/mL, HS  0.3 ml Dust Mites P. 30,000 AU/mL, HS  0.3 ml  Diluent: HSA qs to 5ml    Chronic allergic rhinitis due to animal hair and dander, Allergic rhinitis due to dust mite, Allergic rhinitis due to mold, Chronic seasonal allergic rhinitis due to pollen       ORDER FOR ALLERGEN IMMUNOTHERAPY 5 mL vial     5 mL    Name of Mix: Mix #3 Grass,Tree  Dmitry,White 1:20w/v, HS 0.5ml Birch Mix PRW 1:20w/v, HS 0.5ml Boxelder-Maple Mix BHR (Boxelder Hard Red) 1:20w/v, HS 0.5ml [place],Common 1:20w/v, HS 0.5ml Elm,American 1:20w/v, HS 0.5ml [place] Mix RW 1:20w/v, HS 0.5ml Oak Mix RVW 1:20w/v, HS 0.5ml [place] Tree,Black 1:20w/v, HS 0.5ml Grass Mix #7 100,000 BAU/mL, HS 0.4ml Jonathan Grass 1:20w/v, HS 0.5ml Diluent: HSA qs to 5ml    Chronic allergic rhinitis due to animal hair and dander, Allergic rhinitis due to dust mite, Allergic rhinitis due to mold, Chronic seasonal allergic rhinitis due to pollen       ORDER FOR ALLERGEN IMMUNOTHERAPY 5 mL vial     5 mL    Name of Mix: Mix #4  Weeds Kochia 1:20 w/v, HS 0.5 ml Lamb's Quarters 1:20 w/v, HS 0.5 ml Nettle 1:20 w/v, HS 0.5 ml Plantain, English 1:20 w/v, HS 0.5 ml Ragweed Mixed 1:20 w/v ALK  0.5 ml Russian Thistle 1:20 w/v, HS 0.5 ml Sagebrush, Mugwort 1:20 w/v, HS 0.5 ml Sorrel, Sheep 1:20 w/v, HS 0.5 ml Diluent: HSA qs to 5ml    Chronic allergic rhinitis due to animal hair and dander, Allergic rhinitis due to dust mite, Allergic rhinitis due to mold, Chronic seasonal allergic rhinitis due to pollen       order for DME     1 Units    Equipment being ordered: Silicone heel cup    Heel pain, chronic, left       order for DME     1 Units    Equipment being ordered: Dynaflex insert    Plantar fasciitis, left       order for DME     1 Units    Equipment being ordered: Silicone heel cup    Plantar fasciitis, left       triamcinolone 0.1 % external  cream    KENALOG    80 g    Apply  topically 2 times daily as needed. Do not use on face.    Eczema, unspecified type       * Notice:  This list has 2 medication(s) that are the same as other medications prescribed for you. Read the directions carefully, and ask your doctor or other care provider to review them with you.

## 2018-11-27 NOTE — PROGRESS NOTES
"Post Op Sinus 3    HPI - Dilia Solomon is here for their second postoperative visit, status post endoscopic sinus surgery (with turbinate reduction) performed on 2/14/2018.  There was the expected amount of congestion which has now mostly resolved, and no bleeding has occurred. Patient reports no issues since procedure. Patient is now using air purifiers and got her duct work cleaned in her house.     Patient also expresses concern of some pain or fullness in her left ear. This happened when rinsing her nose, but feels better now.    She has no further facial pain. Overall she is improving and her nasal breathing is much better than preop. She is irrigating BID and using Flonase. She recently had a cold, but did not get headaches from it.     PROCEDURES PERFORMED:   1. Bilateral endoscopic maxillary antrostomy  2. Bilateral endoscopic anterior ethmoidectomy.   4. Bilateral endoscopic submucous resection of inferior turbinates    Physical Exam -   /82 (BP Location: Left arm, Patient Position: Sitting, Cuff Size: Adult Regular)  Pulse 95  Temp 97.7  F (36.5  C) (Tympanic)  Ht 1.626 m (5' 4\")  Wt 86.2 kg (190 lb)  LMP 07/18/2015 (Approximate)  BMI 32.61 kg/m2    General - The patient is awake and alert, and answers questions appropriately during the history and physical.  The vocal quality is hypernasal, but there is no dyspnea or stridor noted.  Eyes - The EOMI, there is no conjuncitval or scleral injection.  Pupils are equally round and reactive to light.  Oral - The oral mucosa is pink and moist.  The tongue is mobile and midline on protrusion, no edema noted.  Nasal - Nasal airway widely patent. No visible polyps.    To evaluate the nose in the postoperative state I performed rigid nasal endoscopy.  I first sprayed with lidocaine and neosynephrine.  I began with the LEFT side using a 2.7mm 30 degree rigid nasal endoscope, and color photographs were taken for the medical record.    The middle meatus was " open, and I was able to pass the scope through.  The LEFT maxillary antrostomy is open, and looking down and into the LEFT maxillary sinus, the mucosa looks healthy, no abnormal secretions.  Going further back, the ethmoid roof is nicely re-mucosalized, and there is no abnormal secretions or polypoid degeneration.    The right side was then examined.  The middle meatus was open and I visualized the RIGHT antrostomy was open.  Looking down into the RIGHT maxillary sinus, the mucosa was flat and healthy in appearance.  Going further back the ethmoid system looks good.  The mucosa is healthy, and there were no polyps or polypoid degenerations.       A/P - Dilia Solomon is status post endoscopic nasal surgery. Her sinuses are healed very well and her symptoms well controlled. No sign of ongoing sinus disease. Recommend rinses BID and f/u every 12 months or as needed.      Dr. Santhosh Tierney    This document serves as a record of the services and decisions personally performed and made by Dr. Santhosh Tierney MD. It was created on his behalf by Dee Layne, a trained medical scribe. The creation of this document is based the provider's statements to the medical scribe.    Lary Layne 1:19 PM 11/27/2018     The information in this document, created by a scribe for me, accurately reflects the services I personally performed and the decisions made by me. I have reviewed and approved this document for accuracy.

## 2018-12-05 ENCOUNTER — OFFICE VISIT (OUTPATIENT)
Dept: ALLERGY | Facility: CLINIC | Age: 53
End: 2018-12-05
Payer: COMMERCIAL

## 2018-12-05 ENCOUNTER — ALLIED HEALTH/NURSE VISIT (OUTPATIENT)
Dept: ALLERGY | Facility: CLINIC | Age: 53
End: 2018-12-05
Payer: COMMERCIAL

## 2018-12-05 VITALS
WEIGHT: 216.27 LBS | DIASTOLIC BLOOD PRESSURE: 90 MMHG | HEART RATE: 99 BPM | BODY MASS INDEX: 37.12 KG/M2 | OXYGEN SATURATION: 98 % | SYSTOLIC BLOOD PRESSURE: 137 MMHG

## 2018-12-05 DIAGNOSIS — J45.40 MODERATE PERSISTENT ASTHMA WITHOUT COMPLICATION: Primary | ICD-10-CM

## 2018-12-05 DIAGNOSIS — J30.1 CHRONIC SEASONAL ALLERGIC RHINITIS DUE TO POLLEN: ICD-10-CM

## 2018-12-05 DIAGNOSIS — H10.13 ALLERGIC CONJUNCTIVITIS OF BOTH EYES: ICD-10-CM

## 2018-12-05 DIAGNOSIS — J30.89 ALLERGIC RHINITIS DUE TO MOLD: ICD-10-CM

## 2018-12-05 DIAGNOSIS — J30.81 CHRONIC ALLERGIC RHINITIS DUE TO ANIMAL HAIR AND DANDER: ICD-10-CM

## 2018-12-05 DIAGNOSIS — J30.89 ALLERGIC RHINITIS DUE TO DUST MITE: ICD-10-CM

## 2018-12-05 DIAGNOSIS — J30.9 ALLERGIC RHINITIS: Primary | ICD-10-CM

## 2018-12-05 LAB
FEF 25/75: NORMAL
FEV-1: NORMAL
FEV1/FVC: NORMAL
FVC: NORMAL

## 2018-12-05 PROCEDURE — 94010 BREATHING CAPACITY TEST: CPT | Performed by: ALLERGY & IMMUNOLOGY

## 2018-12-05 PROCEDURE — 99213 OFFICE O/P EST LOW 20 MIN: CPT | Mod: 25 | Performed by: ALLERGY & IMMUNOLOGY

## 2018-12-05 PROCEDURE — 95117 IMMUNOTHERAPY INJECTIONS: CPT

## 2018-12-05 PROCEDURE — 99207 ZZC DROP WITH A PROCEDURE: CPT

## 2018-12-05 RX ORDER — MONTELUKAST SODIUM 10 MG/1
10 TABLET ORAL AT BEDTIME
Qty: 90 TABLET | Refills: 1 | Status: SHIPPED | OUTPATIENT
Start: 2018-12-05 | End: 2019-06-04

## 2018-12-05 RX ORDER — ALBUTEROL SULFATE 90 UG/1
2 AEROSOL, METERED RESPIRATORY (INHALATION) EVERY 4 HOURS PRN
Qty: 1 INHALER | Refills: 3 | Status: SHIPPED | OUTPATIENT
Start: 2018-12-05 | End: 2019-12-06

## 2018-12-05 RX ORDER — AZELASTINE HYDROCHLORIDE 0.5 MG/ML
1 SOLUTION/ DROPS OPHTHALMIC 2 TIMES DAILY
Qty: 1 BOTTLE | Refills: 5 | Status: SHIPPED | OUTPATIENT
Start: 2018-12-05 | End: 2019-12-06

## 2018-12-05 RX ORDER — FLUTICASONE PROPIONATE 50 MCG
2 SPRAY, SUSPENSION (ML) NASAL DAILY
Qty: 3 BOTTLE | Refills: 3 | Status: SHIPPED | OUTPATIENT
Start: 2018-12-05 | End: 2019-06-04

## 2018-12-05 RX ORDER — LORATADINE 10 MG/1
10 TABLET ORAL DAILY
Qty: 90 TABLET | Refills: 3 | Status: SHIPPED | OUTPATIENT
Start: 2018-12-05 | End: 2019-07-26

## 2018-12-05 RX ORDER — CETIRIZINE HYDROCHLORIDE 10 MG/1
10 TABLET ORAL AT BEDTIME
Qty: 90 TABLET | Refills: 3 | Status: SHIPPED | OUTPATIENT
Start: 2018-12-05 | End: 2019-07-26

## 2018-12-05 NOTE — LETTER
"    12/5/2018         RE: Dilia Solomon  [address] [facility] [address]        Dear Colleague,    Thank you for referring your patient, Dilia Solomon, to the [facility]. Please see a copy of my visit note below.    Dilia Solomon was seen in the Allergy Clinic at [facility]. The following are my recommendations regarding her Moderate Persistent Asthma, Allergic Rhinitis Due to Animals, Allergic Rhinitis Due to Pollen, Allergic Rhinitis Due to Dust Mites, Allergic Rhinitis Due to Mold and Allergic Conjunctivitis    1. Continue allergen immunotherapy per protocol  2. Continue cetirizine 10mg daily  3. Continue loratadine 10mg daily  4. Continue fluticasone nasal spray, 2 sprays in each nostril daily  5. Use sinus irritation rinse once to twice daily as needed  6. Use azelastine eye drops, 1 drop in each eye twice daily as needed  7. Continue advair 250/50mcg, 1 puff twice daily  8. Continue montelukast 10mg daily  9. Use albuterol HFA, 2-4 puffs every 4 hours as needed  10. Follow-up in 6 months      Dliia Solomon is a 53 year old American female who is seen today for follow-up of asthma and allergic rhinitis. She resumed immunotherapy treatment in 7/2017 and has been doing well. She has not had any significant large local or systemic reactions to immunotherapy treatment. Dilia feels her symptoms have improved since resuming treatment. She continues to take claritin every morning, zyrtec every evening, singulair, and flonase.    Dilia reports that her asthma has been well controlled. She has done well with the reduced dose of advair. She had a mild cold with a cough in October but didn't feel \"unwell.\" She did not need to use albuterol and her symptoms fully resolved within 1-2 weeks.    Dilia had a recent post-op visit with Dr. Tierney. She continues to use sinus irrigation rinses twice daily and has not had any sinus infections or nasal congestion since her surgery last " February.      Past Medical History:   Diagnosis Date     Injury, other and unspecified, shoulder and upper arm 9/03       REVIEW OF SYSTEMS:  General: negative for weight gain. negative for weight loss. negative for changes in sleep.   Eyes: negative for itching. negative for redness. negative for tearing/watering. negative for vision changes  Ears: negative for fullness. negative for hearing loss. negative for dizziness.   Nose: negative for snoring.negative for changes in smell. negative for drainage.   Throat: negative for hoarseness. negative for sore throat. negative for trouble swallowing.   Lungs: negative for cough. negative for shortness of breath.negative for wheezing. negative for sputum production.   Cardiovascular: negative for chest pain. negative for swelling of ankles. negative for fast or irregular heartbeat.   Gastrointestinal: negative for nausea. negative for heartburn. negative for acid reflux.   Musculoskeletal: negative for joint pain. negative for joint stiffness. negative for joint swelling.   Neurologic: negative for seizures. negative for fainting. negative for weakness.   Psychiatric: negative for changes in mood. negative for anxiety.   Endocrine: negative for cold intolerance. negative for heat intolerance. negative for tremors.   Hematologic: negative for easy bruising. negative for easy bleeding.  Integumentary: negative for rash. negative for scaling. negative for nail changes.       Current Outpatient Medications:      albuterol (PROAIR HFA/PROVENTIL HFA/VENTOLIN HFA) 108 (90 Base) MCG/ACT inhaler, Inhale 2 puffs into the lungs every 4 hours as needed for shortness of breath / dyspnea or wheezing, Disp: 1 Inhaler, Rfl: 3     azelastine (OPTIVAR) 0.05 % ophthalmic solution, Apply 1 drop to eye 2 times daily, Disp: 1 Bottle, Rfl: 5     cetirizine (ZYRTEC) 10 MG tablet, Take 1 tablet (10 mg) by mouth At Bedtime, Disp: 90 tablet, Rfl: 3     Emollient (CERAVE) CREA, Externally apply 1  dose. topically 2 times daily, Disp: 2 Bottle, Rfl: 11     fluticasone (FLONASE) 50 MCG/ACT nasal spray, Spray 2 sprays into both nostrils daily, Disp: 3 Bottle, Rfl: 3     fluticasone-salmeterol (ADVAIR) 250-50 MCG/DOSE inhaler, Inhale 1 puff into the lungs 2 times daily, Disp: 3 Inhaler, Rfl: 1     loratadine (CLARITIN) 10 MG tablet, Take 1 tablet (10 mg) by mouth daily, Disp: 90 tablet, Rfl: 3     montelukast (SINGULAIR) 10 MG tablet, Take 1 tablet (10 mg) by mouth At Bedtime, Disp: 90 tablet, Rfl: 1     MULTIVITAMIN OR, 1 tab daily, Disp: , Rfl:      ORDER FOR ALLERGEN IMMUNOTHERAPY, Name of Mix: Mix #1  Mold Alternaria Tenuis 1:10 w/v, HS  0.5 ml Aspergillus Fumigatus 1:10 w/v, HS  0.5 ml Epicoccum Nigrum 1:10 w/v, HS 0.5 ml Hormodendrum Cladosporioides 1:10 w/v, HS 0.5 ml Penicillium Mix 1:10 w/v, HS  0.5 ml Diluent: HSA qs to 5ml, Disp: 5 mL, Rfl: PRN     ORDER FOR ALLERGEN IMMUNOTHERAPY, Name of Mix: Mix #2  Dust Mite, Cat, Dog Cat Hair, Standardized 10,000 BAU/mL, ALK  2.0 ml Dog Hair Dander, A. P.  1:100 w/v, HS  1.0 ml Dust Mites F 30,000AU/mL, HS  0.3 ml Dust Mites P. 30,000 AU/mL, HS  0.3 ml  Diluent: HSA qs to 5ml, Disp: 5 mL, Rfl: PRN     ORDER FOR ALLERGEN IMMUNOTHERAPY, Name of Mix: Mix #3 Grass,Tree  Dmitry,White 1:20w/v, HS 0.5ml Birch Mix PRW 1:20w/v, HS 0.5ml Boxelder-Maple Mix BHR (Boxelder Hard Red) 1:20w/v, HS 0.5ml [place],Common 1:20w/v, HS 0.5ml Elm,American 1:20w/v, HS 0.5ml [place] Mix RW 1:20w/v, HS 0.5ml Oak Mix RVW 1:20w/v, HS 0.5ml [place] Tree,Black 1:20w/v, HS 0.5ml Grass Mix #7 100,000 BAU/mL, HS 0.4ml Jonathan Grass 1:20w/v, HS 0.5ml Diluent: HSA qs to 5ml, Disp: 5 mL, Rfl: PRN     ORDER FOR ALLERGEN IMMUNOTHERAPY, Name of Mix: Mix #4  Weeds Kochia 1:20 w/v, HS 0.5 ml Lamb's Quarters 1:20 w/v, HS 0.5 ml Nettle 1:20 w/v, HS 0.5 ml Plantain, English 1:20 w/v, HS 0.5 ml Ragweed Mixed 1:20 w/v ALK  0.5 ml Russian Thistle 1:20 w/v, HS 0.5 ml Sagebrush, Mugwort 1:20 w/v, HS 0.5 ml  Sorrel, Sheep 1:20 w/v, HS 0.5 ml Diluent: HSA qs to 5ml, Disp: 5 mL, Rfl: PRN     order for DME, Equipment being ordered: Silicone heel cup, Disp: 1 Units, Rfl: 0     order for DME, Equipment being ordered: Dynaflex insert, Disp: 1 Units, Rfl: 0     order for DME, Equipment being ordered: Silicone heel cup, Disp: 1 Units, Rfl: 0     triamcinolone (KENALOG) 0.1 % cream, Apply  topically 2 times daily as needed. Do not use on face., Disp: 80 g, Rfl: 1     albuterol (2.5 MG/3ML) 0.083% neb solution, Take 1 vial (2.5 mg) by nebulization every 4 hours as needed (Patient not taking: Reported on 12/5/2018), Disp: 1 Box, Rfl: 3     EPINEPHrine (EPIPEN/ADRENACLICK/OR ANY BX GENERIC EQUIV) 0.3 MG/0.3ML injection 2-pack, Inject 0.3 mLs (0.3 mg) into the muscle once as needed for anaphylaxis (Patient not taking: Reported on 12/5/2018), Disp: 0.6 mL, Rfl: 3    EXAM:   /90 (BP Location: Left arm, Patient Position: Sitting, Cuff Size: Adult Large)   Pulse 99   Wt 98.1 kg (216 lb 4.3 oz)   LMP 07/18/2015 (Approximate)   SpO2 98%   BMI 37.12 kg/m     GENERAL APPEARANCE: alert, cooperative and not in distress  SKIN: no rashes, no lesions  HEAD: atraumatic, normocephalic  ENT: no scars or lesions, nasal exam showed no discharge, swelling or lesions noted, tongue midline and normal, soft palate, uvula, and tonsils normal  NECK: no asymmetry, masses, or scars, supple without significant adenopathy  LUNGS: unlabored respirations, no intercostal retractions or accessory muscle use, clear to auscultation without rales or wheezes  HEART: regular rate and rhythm without murmurs and normal S1 and S2  MUSCULOSKELETAL: no musculoskeletal defects are noted  NEURO: no focal deficits noted  PSYCH: age appropriate mood/affect      WORKUP:  Spirometry    SPIROMETRY       FVC 2.43L (70% of predicted).     FEV1 2.02L (73% of predicted).     FEV1/FVC 83%     FEF 25%-75%  2.70L/s (101% of predicted).    These values are consistent with mild  restriction. Values are stable when compared to those obtained on 12/6/17.    Asthma Control Test (ACT) total score: 22       ASSESSMENT/PLAN:  Dilia Solomon is a 53 year old female here for follow-up of asthma and allergic rhinitis. She has completed over a year of immunotherapy and reports improvement in her symptoms. She continues with daily antihistamines and nasal steroids. Dilia reports that her asthma has also been well controlled even with decreasing her advair dose. She has not had any exacerbations or need for prednisone.    1. Continue allergen immunotherapy per protocol  2. Continue cetirizine 10mg daily  3. Continue loratadine 10mg daily  4. Continue fluticasone nasal spray, 2 sprays in each nostril daily  5. Use sinus irritation rinse once to twice daily as needed  6. Use azelastine eye drops, 1 drop in each eye twice daily as needed  7. Continue advair 250/50mcg, 1 puff twice daily  8. Continue montelukast 10mg daily  9. Use albuterol HFA, 2-4 puffs every 4 hours as needed  10. Follow-up in 6 months      Butch Horowitz MD  Allergy/Immunology  [place], [place]      Chart documentation done in part with Dragon Voice Recognition Software. Although reviewed after completion, some word and grammatical errors may remain.    Again, thank you for allowing me to participate in the care of your patient.        Sincerely,        Butch Horowitz MD

## 2018-12-05 NOTE — MR AVS SNAPSHOT
After Visit Summary   12/5/2018    Dilia Solomon    MRN: [id number]           Patient Information     Date Of Birth          1965        Visit Information        Provider Department      12/5/2018 3:30 PM ALLERGY [facility]        Today's Diagnoses     Allergic rhinitis    -  1       Follow-ups after your visit        Your next 10 appointments already scheduled     Dec 11, 2018  3:30 PM CST   Nurse Only with ALLERGY [facility] ([facility])    [address] [address]   [phone]           Every allergy patient MUST wait 30 minutes after their allergy shot. No exceptions.  Xolair shots #1-3 should plan to wait 2 hours in clinic Xolair shots after #4 should plan 30 minute wait in clinic            Jan 08, 2019  1:30 PM CST   Return Visit with Wyatt Dunn MD   [facility]    [address] [facility] [address]   [phone]            Jun 05, 2019  4:00 PM CDT   Return Visit with Butch Horowitz MD   [facility]    [address] [address] [phone]            Nov 20, 2019  1:00 PM CST   Return Visit with Santhosh Tierney MD   [facility] ([facility]    [address] [address]   [phone]              Who to contact     If you have questions or need follow up information about today's clinic visit or your schedule please contact [facility] directly at [phone].  Normal or non-critical lab and imaging results will be communicated to you by MyChart, letter or phone within 4 business days after the clinic has received the results. If you do not hear from us within 7 days, please contact the clinic through MyChart or phone. If you have a critical or abnormal lab result, we will notify you by phone as soon as  possible.  Submit refill requests through "[employer]" or call your pharmacy and they will forward the refill request to us. Please allow 3 business days for your refill to be completed.          Additional Information About Your Visit        Care EveryWhere ID     This is your Care EveryWhere ID. This could be used by other organizations to access your [facility] medical records  [id number]        Your Vitals Were     Last Period                   07/18/2015 (Approximate)            Blood Pressure from Last 3 Encounters:   12/05/18 137/90   11/27/18 124/82   11/07/18 129/77    Weight from Last 3 Encounters:   12/05/18 98.1 kg (216 lb 4.3 oz)   11/27/18 86.2 kg (190 lb)   11/07/18 86.2 kg (190 lb)              We Performed the Following     Allergy Shot: Two or more injections          Today's Medication Changes          These changes are accurate as of 12/5/18  4:30 PM.  If you have any questions, ask your nurse or doctor.               These medicines have changed or have updated prescriptions.        Dose/Directions    * albuterol (2.5 MG/3ML) 0.083% neb solution   Commonly known as:  PROVENTIL   This may have changed:  Another medication with the same name was changed. Make sure you understand how and when to take each.   Used for:  Moderate persistent asthma, uncomplicated   Changed by:  Butch Horowitz MD        Dose:  1 vial   Take 1 vial (2.5 mg) by nebulization every 4 hours as needed   Quantity:  1 Box   Refills:  3       * albuterol 108 (90 Base) MCG/ACT inhaler   Commonly known as:  PROAIR HFA/PROVENTIL HFA/VENTOLIN HFA   This may have changed:  reasons to take this   Used for:  Moderate persistent asthma without complication   Changed by:  Butch Horowitz MD        Dose:  2 puff   Inhale 2 puffs into the lungs every 4 hours as needed for shortness of breath / dyspnea or wheezing   Quantity:  1 Inhaler   Refills:  3       * Notice:  This list has 2 medication(s) that are the same as other medications prescribed  for you. Read the directions carefully, and ask your doctor or other care provider to review them with you.         Where to get your medicines      These medications were sent to [facility] - [place], [place] - [address] [place] [id number]     Phone:  [phone]     albuterol 108 (90 Base) MCG/ACT inhaler    azelastine 0.05 % ophthalmic solution    cetirizine 10 MG tablet    fluticasone 50 MCG/ACT nasal spray    fluticasone-salmeterol 250-50 MCG/DOSE inhaler    loratadine 10 MG tablet    montelukast 10 MG tablet                Primary Care Provider Office Phone # Fax #    Cookie Conklin [phone]       [address] [place] [address]        Equal Access to Services     KODY CHEN : Hadii aad ku hadasho Sofaith, waaxda luqadaha, qaybta kaalmada adetanikayamariaa, hieu howard. So [facility] [phone].    ATENCIÓN: Si habla español, tiene a pruitt disposición servicios gratuitos de asistencia lingüística. [facility] [phone].    We comply with applicable federal civil rights laws and [place] laws. We do not discriminate on the basis of race, color, national origin, age, disability, sex, sexual orientation, or gender identity.            Thank you!     Thank you for choosing [facility]  for your care. Our goal is always to provide you with excellent care. Hearing back from our patients is one way we can continue to improve our services. Please take a few minutes to complete the written survey that you may receive in the mail after your visit with us. Thank you!             Your Updated Medication List - Protect others around you: Learn how to safely use, store and throw away your medicines at www.disposemymeds.org.          This list is accurate as of 12/5/18  4:30 PM.  Always use your most recent med list.                   Brand Name Dispense Instructions for use Diagnosis    * albuterol (2.5 MG/3ML) 0.083% neb  solution    PROVENTIL    1 Box    Take 1 vial (2.5 mg) by nebulization every 4 hours as needed    Moderate persistent asthma, uncomplicated       * albuterol 108 (90 Base) MCG/ACT inhaler    PROAIR HFA/PROVENTIL HFA/VENTOLIN HFA    1 Inhaler    Inhale 2 puffs into the lungs every 4 hours as needed for shortness of breath / dyspnea or wheezing    Moderate persistent asthma without complication       azelastine 0.05 % ophthalmic solution    OPTIVAR    1 Bottle    Apply 1 drop to eye 2 times daily    Allergic conjunctivitis of both eyes       CERAVE Crea     2 Bottle    Externally apply 1 dose. topically 2 times daily    Eczema, unspecified type       cetirizine 10 MG tablet    zyrTEC    90 tablet    Take 1 tablet (10 mg) by mouth At Bedtime    Allergic conjunctivitis of both eyes, Chronic allergic rhinitis due to animal hair and dander, Allergic rhinitis due to dust mite, Allergic rhinitis due to mold, Chronic seasonal allergic rhinitis due to pollen       EPINEPHrine 0.3 MG/0.3ML injection 2-pack    EPIPEN/ADRENACLICK/or ANY BX GENERIC EQUIV    0.6 mL    Inject 0.3 mLs (0.3 mg) into the muscle once as needed for anaphylaxis    Food allergy, Need for desensitization to allergens       fluticasone 50 MCG/ACT nasal spray    FLONASE    3 Bottle    Spray 2 sprays into both nostrils daily    Chronic allergic rhinitis due to animal hair and dander, Allergic rhinitis due to dust mite, Allergic rhinitis due to mold, Chronic seasonal allergic rhinitis due to pollen       fluticasone-salmeterol 250-50 MCG/DOSE inhaler    ADVAIR    3 Inhaler    Inhale 1 puff into the lungs 2 times daily    Moderate persistent asthma without complication       loratadine 10 MG tablet    CLARITIN    90 tablet    Take 1 tablet (10 mg) by mouth daily    Allergic conjunctivitis of both eyes, Chronic allergic rhinitis due to animal hair and dander, Allergic rhinitis due to dust mite, Allergic rhinitis due to mold, Chronic seasonal allergic rhinitis  due to pollen       montelukast 10 MG tablet    SINGULAIR    90 tablet    Take 1 tablet (10 mg) by mouth At Bedtime    Moderate persistent asthma without complication, Chronic allergic rhinitis due to animal hair and dander, Allergic rhinitis due to dust mite, Allergic rhinitis due to mold, Chronic seasonal allergic rhinitis due to pollen       MULTIVITAMIN PO      1 tab daily        ORDER FOR ALLERGEN IMMUNOTHERAPY 5 mL vial     5 mL    Name of Mix: Mix #1  Mold Alternaria Tenuis 1:10 w/v, HS  0.5 ml Aspergillus Fumigatus 1:10 w/v, HS  0.5 ml Epicoccum Nigrum 1:10 w/v, HS 0.5 ml Hormodendrum Cladosporioides 1:10 w/v, HS 0.5 ml Penicillium Mix 1:10 w/v, HS  0.5 ml Diluent: HSA qs to 5ml    Chronic allergic rhinitis due to animal hair and dander, Allergic rhinitis due to dust mite, Allergic rhinitis due to mold, Chronic seasonal allergic rhinitis due to pollen       ORDER FOR ALLERGEN IMMUNOTHERAPY 5 mL vial     5 mL    Name of Mix: Mix #2  Dust Mite, Cat, Dog Cat Hair, Standardized 10,000 BAU/mL, ALK  2.0 ml Dog Hair Dander, A. P.  1:100 w/v, HS  1.0 ml Dust Mites F 30,000AU/mL, HS  0.3 ml Dust Mites P. 30,000 AU/mL, HS  0.3 ml  Diluent: HSA qs to 5ml    Chronic allergic rhinitis due to animal hair and dander, Allergic rhinitis due to dust mite, Allergic rhinitis due to mold, Chronic seasonal allergic rhinitis due to pollen       ORDER FOR ALLERGEN IMMUNOTHERAPY 5 mL vial     5 mL    Name of Mix: Mix #3 Grass,Tree  Dmitry,White 1:20w/v, HS 0.5ml Birch Mix PRW 1:20w/v, HS 0.5ml Boxelder-Maple Mix BHR (Boxelder Hard Red) 1:20w/v, HS 0.5ml Bar[place],Common 1:20w/v, HS 0.5ml Elm,American 1:20w/v, HS 0.5ml [place] Mix RW 1:20w/v, HS 0.5ml Oak Mix RVW 1:20w/v, HS 0.5ml [place] Tree,Black 1:20w/v, HS 0.5ml Grass Mix #7 100,000 BAU/mL, HS 0.4ml Jonathan Grass 1:20w/v, HS 0.5ml Diluent: HSA qs to 5ml    Chronic allergic rhinitis due to animal hair and dander, Allergic rhinitis due to dust mite, Allergic rhinitis due to mold,  Chronic seasonal allergic rhinitis due to pollen       ORDER FOR ALLERGEN IMMUNOTHERAPY 5 mL vial     5 mL    Name of Mix: Mix #4  Weeds Kochia 1:20 w/v, HS 0.5 ml Lamb's Quarters 1:20 w/v, HS 0.5 ml Nettle 1:20 w/v, HS 0.5 ml Plantain, English 1:20 w/v, HS 0.5 ml Ragweed Mixed 1:20 w/v ALK  0.5 ml Russian Thistle 1:20 w/v, HS 0.5 ml Sagebrush, Mugwort 1:20 w/v, HS 0.5 ml Sorrel, Sheep 1:20 w/v, HS 0.5 ml Diluent: HSA qs to 5ml    Chronic allergic rhinitis due to animal hair and dander, Allergic rhinitis due to dust mite, Allergic rhinitis due to mold, Chronic seasonal allergic rhinitis due to pollen       order for DME     1 Units    Equipment being ordered: Silicone heel cup    Heel pain, chronic, left       order for DME     1 Units    Equipment being ordered: Dynaflex insert    Plantar fasciitis, left       order for DME     1 Units    Equipment being ordered: Silicone heel cup    Plantar fasciitis, left       triamcinolone 0.1 % external cream    KENALOG    80 g    Apply  topically 2 times daily as needed. Do not use on face.    Eczema, unspecified type       * Notice:  This list has 2 medication(s) that are the same as other medications prescribed for you. Read the directions carefully, and ask your doctor or other care provider to review them with you.

## 2018-12-05 NOTE — PROGRESS NOTES
"Dilia Solomon was seen in the Allergy Clinic at [facility]. The following are my recommendations regarding her Moderate Persistent Asthma, Allergic Rhinitis Due to Animals, Allergic Rhinitis Due to Pollen, Allergic Rhinitis Due to Dust Mites, Allergic Rhinitis Due to Mold and Allergic Conjunctivitis    1. Continue allergen immunotherapy per protocol  2. Continue cetirizine 10mg daily  3. Continue loratadine 10mg daily  4. Continue fluticasone nasal spray, 2 sprays in each nostril daily  5. Use sinus irritation rinse once to twice daily as needed  6. Use azelastine eye drops, 1 drop in each eye twice daily as needed  7. Continue advair 250/50mcg, 1 puff twice daily  8. Continue montelukast 10mg daily  9. Use albuterol HFA, 2-4 puffs every 4 hours as needed  10. Follow-up in 6 months      Dilia Solomon is a 53 year old American female who is seen today for follow-up of asthma and allergic rhinitis. She resumed immunotherapy treatment in 7/2017 and has been doing well. She has not had any significant large local or systemic reactions to immunotherapy treatment. Dilia feels her symptoms have improved since resuming treatment. She continues to take claritin every morning, zyrtec every evening, singulair, and flonase.    Dilia reports that her asthma has been well controlled. She has done well with the reduced dose of advair. She had a mild cold with a cough in October but didn't feel \"unwell.\" She did not need to use albuterol and her symptoms fully resolved within 1-2 weeks.    Dilia had a recent post-op visit with Dr. Tierney. She continues to use sinus irrigation rinses twice daily and has not had any sinus infections or nasal congestion since her surgery last February.      Past Medical History:   Diagnosis Date     Injury, other and unspecified, shoulder and upper arm 9/03       REVIEW OF SYSTEMS:  General: negative for weight gain. negative for weight loss. negative for changes in sleep.   Eyes: " negative for itching. negative for redness. negative for tearing/watering. negative for vision changes  Ears: negative for fullness. negative for hearing loss. negative for dizziness.   Nose: negative for snoring.negative for changes in smell. negative for drainage.   Throat: negative for hoarseness. negative for sore throat. negative for trouble swallowing.   Lungs: negative for cough. negative for shortness of breath.negative for wheezing. negative for sputum production.   Cardiovascular: negative for chest pain. negative for swelling of ankles. negative for fast or irregular heartbeat.   Gastrointestinal: negative for nausea. negative for heartburn. negative for acid reflux.   Musculoskeletal: negative for joint pain. negative for joint stiffness. negative for joint swelling.   Neurologic: negative for seizures. negative for fainting. negative for weakness.   Psychiatric: negative for changes in mood. negative for anxiety.   Endocrine: negative for cold intolerance. negative for heat intolerance. negative for tremors.   Hematologic: negative for easy bruising. negative for easy bleeding.  Integumentary: negative for rash. negative for scaling. negative for nail changes.       Current Outpatient Medications:      albuterol (PROAIR HFA/PROVENTIL HFA/VENTOLIN HFA) 108 (90 Base) MCG/ACT inhaler, Inhale 2 puffs into the lungs every 4 hours as needed for shortness of breath / dyspnea or wheezing, Disp: 1 Inhaler, Rfl: 3     azelastine (OPTIVAR) 0.05 % ophthalmic solution, Apply 1 drop to eye 2 times daily, Disp: 1 Bottle, Rfl: 5     cetirizine (ZYRTEC) 10 MG tablet, Take 1 tablet (10 mg) by mouth At Bedtime, Disp: 90 tablet, Rfl: 3     Emollient (CERAVE) CREA, Externally apply 1 dose. topically 2 times daily, Disp: 2 Bottle, Rfl: 11     fluticasone (FLONASE) 50 MCG/ACT nasal spray, Spray 2 sprays into both nostrils daily, Disp: 3 Bottle, Rfl: 3     fluticasone-salmeterol (ADVAIR) 250-50 MCG/DOSE inhaler, Inhale 1 puff  into the lungs 2 times daily, Disp: 3 Inhaler, Rfl: 1     loratadine (CLARITIN) 10 MG tablet, Take 1 tablet (10 mg) by mouth daily, Disp: 90 tablet, Rfl: 3     montelukast (SINGULAIR) 10 MG tablet, Take 1 tablet (10 mg) by mouth At Bedtime, Disp: 90 tablet, Rfl: 1     MULTIVITAMIN OR, 1 tab daily, Disp: , Rfl:      ORDER FOR ALLERGEN IMMUNOTHERAPY, Name of Mix: Mix #1  Mold Alternaria Tenuis 1:10 w/v, HS  0.5 ml Aspergillus Fumigatus 1:10 w/v, HS  0.5 ml Epicoccum Nigrum 1:10 w/v, HS 0.5 ml Hormodendrum Cladosporioides 1:10 w/v, HS 0.5 ml Penicillium Mix 1:10 w/v, HS  0.5 ml Diluent: HSA qs to 5ml, Disp: 5 mL, Rfl: PRN     ORDER FOR ALLERGEN IMMUNOTHERAPY, Name of Mix: Mix #2  Dust Mite, Cat, Dog Cat Hair, Standardized 10,000 BAU/mL, ALK  2.0 ml Dog Hair Dander, A. P.  1:100 w/v, HS  1.0 ml Dust Mites F 30,000AU/mL, HS  0.3 ml Dust Mites P. 30,000 AU/mL, HS  0.3 ml  Diluent: HSA qs to 5ml, Disp: 5 mL, Rfl: PRN     ORDER FOR ALLERGEN IMMUNOTHERAPY, Name of Mix: Mix #3 Grass,Tree  Dmitry,White 1:20w/v, HS 0.5ml Birch Mix PRW 1:20w/v, HS 0.5ml Boxelder-Maple Mix BHR (Boxelder Hard Red) 1:20w/v, HS 0.5ml [place],Common 1:20w/v, HS 0.5ml Elm,American 1:20w/v, HS 0.5ml [place] Mix RW 1:20w/v, HS 0.5ml Oak Mix RVW 1:20w/v, HS 0.5ml [place] Tree,Black 1:20w/v, HS 0.5ml Grass Mix #7 100,000 BAU/mL, HS 0.4ml Jonathan Grass 1:20w/v, HS 0.5ml Diluent: HSA qs to 5ml, Disp: 5 mL, Rfl: PRN     ORDER FOR ALLERGEN IMMUNOTHERAPY, Name of Mix: Mix #4  Weeds Kochia 1:20 w/v, HS 0.5 ml Lamb's Quarters 1:20 w/v, HS 0.5 ml Nettle 1:20 w/v, HS 0.5 ml Plantain, English 1:20 w/v, HS 0.5 ml Ragweed Mixed 1:20 w/v ALK  0.5 ml Russian Thistle 1:20 w/v, HS 0.5 ml Sagebrush, Mugwort 1:20 w/v, HS 0.5 ml Sorrel, Sheep 1:20 w/v, HS 0.5 ml Diluent: HSA qs to 5ml, Disp: 5 mL, Rfl: PRN     order for DME, Equipment being ordered: Silicone heel cup, Disp: 1 Units, Rfl: 0     order for DME, Equipment being ordered: Dynaflex insert, Disp: 1 Units, Rfl: 0      order for DME, Equipment being ordered: Silicone heel cup, Disp: 1 Units, Rfl: 0     triamcinolone (KENALOG) 0.1 % cream, Apply  topically 2 times daily as needed. Do not use on face., Disp: 80 g, Rfl: 1     albuterol (2.5 MG/3ML) 0.083% neb solution, Take 1 vial (2.5 mg) by nebulization every 4 hours as needed (Patient not taking: Reported on 12/5/2018), Disp: 1 Box, Rfl: 3     EPINEPHrine (EPIPEN/ADRENACLICK/OR ANY BX GENERIC EQUIV) 0.3 MG/0.3ML injection 2-pack, Inject 0.3 mLs (0.3 mg) into the muscle once as needed for anaphylaxis (Patient not taking: Reported on 12/5/2018), Disp: 0.6 mL, Rfl: 3    EXAM:   /90 (BP Location: Left arm, Patient Position: Sitting, Cuff Size: Adult Large)   Pulse 99   Wt 98.1 kg (216 lb 4.3 oz)   LMP 07/18/2015 (Approximate)   SpO2 98%   BMI 37.12 kg/m    GENERAL APPEARANCE: alert, cooperative and not in distress  SKIN: no rashes, no lesions  HEAD: atraumatic, normocephalic  ENT: no scars or lesions, nasal exam showed no discharge, swelling or lesions noted, tongue midline and normal, soft palate, uvula, and tonsils normal  NECK: no asymmetry, masses, or scars, supple without significant adenopathy  LUNGS: unlabored respirations, no intercostal retractions or accessory muscle use, clear to auscultation without rales or wheezes  HEART: regular rate and rhythm without murmurs and normal S1 and S2  MUSCULOSKELETAL: no musculoskeletal defects are noted  NEURO: no focal deficits noted  PSYCH: age appropriate mood/affect      WORKUP:  Spirometry    SPIROMETRY       FVC 2.43L (70% of predicted).     FEV1 2.02L (73% of predicted).     FEV1/FVC 83%     FEF 25%-75%  2.70L/s (101% of predicted).    These values are consistent with mild restriction. Values are stable when compared to those obtained on 12/6/17.    Asthma Control Test (ACT) total score: 22       ASSESSMENT/PLAN:  Dilia Solomon is a 53 year old female here for follow-up of asthma and allergic rhinitis. She has  completed over a year of immunotherapy and reports improvement in her symptoms. She continues with daily antihistamines and nasal steroids. Dilia reports that her asthma has also been well controlled even with decreasing her advair dose. She has not had any exacerbations or need for prednisone.    1. Continue allergen immunotherapy per protocol  2. Continue cetirizine 10mg daily  3. Continue loratadine 10mg daily  4. Continue fluticasone nasal spray, 2 sprays in each nostril daily  5. Use sinus irritation rinse once to twice daily as needed  6. Use azelastine eye drops, 1 drop in each eye twice daily as needed  7. Continue advair 250/50mcg, 1 puff twice daily  8. Continue montelukast 10mg daily  9. Use albuterol HFA, 2-4 puffs every 4 hours as needed  10. Follow-up in 6 months      Butch Horowitz MD  Allergy/Immunology  [facility] [place], [place]      Chart documentation done in part with Dragon Voice Recognition Software. Although reviewed after completion, some word and grammatical errors may remain.

## 2018-12-07 ASSESSMENT — ASTHMA QUESTIONNAIRES: ACT_TOTALSCORE: 22

## 2018-12-11 ENCOUNTER — ALLIED HEALTH/NURSE VISIT (OUTPATIENT)
Dept: ALLERGY | Facility: CLINIC | Age: 53
End: 2018-12-11
Payer: COMMERCIAL

## 2018-12-11 DIAGNOSIS — J30.9 ALLERGIC RHINITIS: Primary | ICD-10-CM

## 2018-12-11 PROCEDURE — 99207 ZZC DROP WITH A PROCEDURE: CPT

## 2018-12-11 PROCEDURE — 95117 IMMUNOTHERAPY INJECTIONS: CPT

## 2018-12-20 ENCOUNTER — TELEPHONE (OUTPATIENT)
Dept: FAMILY MEDICINE | Facility: CLINIC | Age: 53
End: 2018-12-20

## 2018-12-20 NOTE — TELEPHONE ENCOUNTER
Reason for Call:  Other call back    Detailed comments: Pt smashed her right thumb and it is cracking at the cuticle. Now there is a gap. Does it need to be removed? Painful sometimes. She thinks the nail is dead.    Phone Number Patient can be reached at: Home number on file [phone] (home)    Best Time: any    Can we leave a detailed message on this number?     Call taken on 12/20/2018 at 3:27 PM by Katia Gonsalez

## 2018-12-21 ENCOUNTER — OFFICE VISIT (OUTPATIENT)
Dept: FAMILY MEDICINE | Facility: CLINIC | Age: 53
End: 2018-12-21
Payer: COMMERCIAL

## 2018-12-21 ENCOUNTER — ANCILLARY PROCEDURE (OUTPATIENT)
Dept: GENERAL RADIOLOGY | Facility: CLINIC | Age: 53
End: 2018-12-21
Attending: NURSE PRACTITIONER
Payer: COMMERCIAL

## 2018-12-21 VITALS
HEART RATE: 72 BPM | RESPIRATION RATE: 18 BRPM | SYSTOLIC BLOOD PRESSURE: 118 MMHG | HEIGHT: 64 IN | TEMPERATURE: 98.7 F | DIASTOLIC BLOOD PRESSURE: 68 MMHG | WEIGHT: 216 LBS | BODY MASS INDEX: 36.88 KG/M2

## 2018-12-21 DIAGNOSIS — S69.91XA INJURY OF RIGHT THUMB, INITIAL ENCOUNTER: Primary | ICD-10-CM

## 2018-12-21 DIAGNOSIS — S69.91XA INJURY OF RIGHT THUMB, INITIAL ENCOUNTER: ICD-10-CM

## 2018-12-21 DIAGNOSIS — L60.3 NONTRAUMATIC NAIL SPLITTING: ICD-10-CM

## 2018-12-21 PROCEDURE — 73140 X-RAY EXAM OF FINGER(S): CPT | Mod: RT

## 2018-12-21 PROCEDURE — 99214 OFFICE O/P EST MOD 30 MIN: CPT | Performed by: NURSE PRACTITIONER

## 2018-12-21 ASSESSMENT — MIFFLIN-ST. JEOR: SCORE: 1574.77

## 2018-12-21 NOTE — RESULT ENCOUNTER NOTE
No fractures seen. Some arthritis noted to thumb.   Please notify her of these results and send a hard copy if not on myChart.   Thanks. IRMA Phillips

## 2018-12-21 NOTE — PATIENT INSTRUCTIONS
1. Injury of right thumb, initial encounter  Acute  - XR Finger Right G/E 2 Views; Future: no fracture  Wear thumb splint for another week or two  May use ice for 20 minutes at a time as needed    2. Nontraumatic nail splitting  Acute  Keep nail covered with a BAndAid until nail plate grows out to reduce chance of catching and tearing off  If it does tear- use First Aid practice- apply pressure until bleeding stops, keep covered, and monitor for infection    Schedule your Pap with Dr. Conklin, as you are overdue    Patient Education     Crush Injury of the Hand, No Fracture  You have a crush injury of your hand. This causes local pain, swelling, and sometimes bruising. You don t have any broken bones. This injury may take from a few days to a few weeks to heal.  If a fingernail has been severely injured, it may fall off in 1 to 2 weeks. A new one will usually start to grow back within a month.  Home care  Follow these guidelines when caring for yourself at home:    You may be given a splint to prevent movement of the injured hand.    Keep your hand elevated to reduce pain and swelling. When sitting or lying down keep your arm raised above the level of your heart, if possible. You can do this by placing your arm on a pillow that rests on your chest or on a pillow at your side. This is most important during the first 2 days (48 hours) after the injury.    Put an ice pack on the injured area. Do this for 20 minutes every 1 to 2 hours the first day for pain relief. You can make an ice pack by wrapping a plastic bag of ice cubes in a thin towel. As the ice melts, be careful that the splint doesn t get wet. Continue to use the ice pack 3 to 4 times a day until the pain and swelling go away.    You may use over-the-counter pain medicine such as acetaminophen or ibuprofen to control pain, unless another pain medicine was prescribed. If you have chronic liver or kidney disease, talk with your healthcare provider before using  these medicines. Also talk with your provider if you ve had a stomach ulcer or gastrointestinal bleeding.    If you have a splint, keep it dry at all times. Bathe with your splint well out of the water. Protect it with a large plastic bag, rubber-banded at the top end. If a splint gets wet, you can dry it with a hair dryer on the cool setting.    Don t stick a needle into the wound to drain it.    Bruised skin may change colors over time. It may change from reddish to bluish to yellowish before the skin goes back to normal coloring.  Follow-up care  Follow up with your healthcare provider, or as advised, if you don t start to get better within the next 3 days.  If X-rays were taken, you will be told of any new findings that may affect your care.  When to seek medical advice  Call your healthcare provider right away if any of these occur:    The plaster splint becomes wet or soft    The plaster splint stays wet for more than 24 hours    Tightness or pain under the splint gets worse    Fingers become swollen, cold, blue, numb, or tingly    Redness, warmth, swelling, drainage from the wound, or foul odor from a splint    You can t move your fingers    Fever of 100.4 F (38 C) or higher, or as directed by your healthcare provider   Date Last Reviewed: 6/1/2017 2000-2018 [employer]. [address] [place] [address]. All rights reserved. This information is not intended as a substitute for professional medical care. Always follow your healthcare professional's instructions.

## 2018-12-21 NOTE — PROGRESS NOTES
SUBJECTIVE:   Dilia Solomon is a 53 year old female who presents to clinic today for the following health issues:      Thumb Nail      Duration: Smashed it a couple weeks ago-got it in a car door- cracked part of the nail     Description (location/character/radiation): Thumb on right hand     Intensity:  moderate    Accompanying signs and symptoms: had a bruise and now the crack is getting worse. When she bumps that area or uses it too much gets quite a bit of pain.     History (similar episodes/previous evaluation): None    Precipitating or alleviating factors: None    Therapies tried and outcome: neosporin and bandaid.    Works in a [employer]  Patient is right-handed    PCP:  Cookie Conklin [phone]  Health Maintenance        Health Maintenance Due   Topic Date Due     HIV SCREEN (SYSTEM ASSIGNED)  03/14/1984     PAP Q1 YR  12/05/2014     ZOSTER IMMUNIZATION (1 of 2) 03/14/2016     MAMMO SCREEN Q2 YR (SYSTEM ASSIGNED)  09/21/2018       HPI        Patient Active Problem List   Diagnosis     Need for prophylactic vaccination and inoculation against influenza     Sprain of interphalangeal (joint) of hand     Radial styloid tenosynovitis     Carpal tunnel syndrome     Synovial cyst of popliteal space     Pain in joint, lower leg     Hyperlipidemia LDL goal <130     Advanced directives, counseling/discussion     Moderate persistent asthma     Allergic rhinitis due to dust mite     Food allergy     FH: diabetes mellitus     Chronic allergic rhinitis due to animal hair and dander     Allergic rhinitis due to mold     Chronic seasonal allergic rhinitis due to pollen     Current Outpatient Medications   Medication     albuterol (2.5 MG/3ML) 0.083% neb solution     albuterol (PROAIR HFA/PROVENTIL HFA/VENTOLIN HFA) 108 (90 Base) MCG/ACT inhaler     azelastine (OPTIVAR) 0.05 % ophthalmic solution     cetirizine (ZYRTEC) 10 MG tablet     Emollient (CERAVE) CREA     EPINEPHrine (EPIPEN/ADRENACLICK/OR ANY BX  "GENERIC EQUIV) 0.3 MG/0.3ML injection 2-pack     fluticasone (FLONASE) 50 MCG/ACT nasal spray     fluticasone-salmeterol (ADVAIR) 250-50 MCG/DOSE inhaler     loratadine (CLARITIN) 10 MG tablet     montelukast (SINGULAIR) 10 MG tablet     MULTIVITAMIN OR     ORDER FOR ALLERGEN IMMUNOTHERAPY     ORDER FOR ALLERGEN IMMUNOTHERAPY     ORDER FOR ALLERGEN IMMUNOTHERAPY     ORDER FOR ALLERGEN IMMUNOTHERAPY     order for DME     order for DME     order for DME     triamcinolone (KENALOG) 0.1 % cream     No current facility-administered medications for this visit.        Reviewed and updated:  Tobacco  Allergies  Meds  Med Hx  Surg Hx  Fam Hx  Soc Hx     ROS:  Constitutional, HEENT, cardiovascular, pulmonary, gi and gu systems are negative, except as otherwise noted.    PHYSICAL EXAM   /68 (Cuff Size: Adult Large)   Pulse 72   Temp 98.7  F (37.1  C) (Tympanic)   Resp 18   Ht 1.626 m (5' 4\")   Wt 98 kg (216 lb)   LMP 07/18/2015 (Approximate)   Breastfeeding? No   BMI 37.08 kg/m    Body mass index is 37.08 kg/m .  GENERAL APPEARANCE: healthy, alert and no distress  RESP: lungs clear to auscultation - no rales, rhonchi or wheezes  CV: regular rates and rhythm, normal S1 S2, no S3 or S4 and no murmur, click or rub  MS: extremities normal- no gross deformities noted  RIGHT THUMB: swelling and tenderness to IP. Nail is cracked midline with some heaving, old hematoma underneath nailbed  SKIN: no suspicious lesions or rashes  PSYCH: mentation appears normal and affect normal/bright    X-ray reviewed- no acute fractures seen.          ASSESSMENT & PLAN     1. Injury of right thumb, initial encounter  Acute  - XR Finger Right G/E 2 Views; Future: no fracture  Wear thumb splint for another week or two  May use ice for 20 minutes at a time as needed  - order for DME; Equipment being ordered: thumb spica splint RIGHT  Dispense: 1 Units; Refill: 0    2. Nontraumatic nail splitting  Acute  Keep nail covered with a " BAndAid until nail plate grows out to reduce chance of catching and tearing off  If it does tear- use First Aid practice- apply pressure until bleeding stops, keep covered, and monitor for infection    Schedule your Pap with Dr. Conklin, as you are overdue      Crush Injury of the Hand, No Fracture  You have a crush injury of your hand. This causes local pain, swelling, and sometimes bruising. You don t have any broken bones. This injury may take from a few days to a few weeks to heal.  If a fingernail has been severely injured, it may fall off in 1 to 2 weeks. A new one will usually start to grow back within a month.  Home care  Follow these guidelines when caring for yourself at home:    You may be given a splint to prevent movement of the injured hand.    Keep your hand elevated to reduce pain and swelling. When sitting or lying down keep your arm raised above the level of your heart, if possible. You can do this by placing your arm on a pillow that rests on your chest or on a pillow at your side. This is most important during the first 2 days (48 hours) after the injury.    Put an ice pack on the injured area. Do this for 20 minutes every 1 to 2 hours the first day for pain relief. You can make an ice pack by wrapping a plastic bag of ice cubes in a thin towel. As the ice melts, be careful that the splint doesn t get wet. Continue to use the ice pack 3 to 4 times a day until the pain and swelling go away.    You may use over-the-counter pain medicine such as acetaminophen or ibuprofen to control pain, unless another pain medicine was prescribed. If you have chronic liver or kidney disease, talk with your healthcare provider before using these medicines. Also talk with your provider if you ve had a stomach ulcer or gastrointestinal bleeding.    If you have a splint, keep it dry at all times. Bathe with your splint well out of the water. Protect it with a large plastic bag, rubber-banded at the top end. If a  splint gets wet, you can dry it with a hair dryer on the cool setting.    Don t stick a needle into the wound to drain it.    Bruised skin may change colors over time. It may change from reddish to bluish to yellowish before the skin goes back to normal coloring.  Follow-up care  Follow up with your healthcare provider, or as advised, if you don t start to get better within the next 3 days.  If X-rays were taken, you will be told of any new findings that may affect your care.  When to seek medical advice  Call your healthcare provider right away if any of these occur:    The plaster splint becomes wet or soft    The plaster splint stays wet for more than 24 hours    Tightness or pain under the splint gets worse    Fingers become swollen, cold, blue, numb, or tingly    Redness, warmth, swelling, drainage from the wound, or foul odor from a splint    You can t move your fingers    Fever of 100.4 F (38 C) or higher, or as directed by your healthcare provider   Date Last Reviewed: 6/1/2017 2000-2018 [employer]. [address] [place] [address]. All rights reserved. This information is not intended as a substitute for professional medical care. Always follow your healthcare professional's instructions.             Risks, benefits, side effects and rationale for treatment plan fully discussed with the patient and understanding expressed.    Candis Aguilera, [facility]

## 2018-12-21 NOTE — NURSING NOTE
"Chief Complaint   Patient presents with     Thumb Discomfort       Initial /68 (Cuff Size: Adult Large)   Pulse 72   Temp 98.7  F (37.1  C) (Tympanic)   Resp 18   Ht 1.626 m (5' 4\")   Wt 98 kg (216 lb)   LMP 07/18/2015 (Approximate)   Breastfeeding? No   BMI 37.08 kg/m   Estimated body mass index is 37.08 kg/m  as calculated from the following:    Height as of this encounter: 1.626 m (5' 4\").    Weight as of this encounter: 98 kg (216 lb).    Patient presents to the clinic using No DME    Health Maintenance that is potentially due pending provider review:  Mammogram and Pap Smear    Gave pt phone number/pended order to schedule mammo and/or colonoscopy(or FIT)    Is there anyone who you would like to be able to receive your results? Not Applicable  If yes have patient fill out UVALDO    "

## 2018-12-31 ENCOUNTER — OFFICE VISIT (OUTPATIENT)
Dept: FAMILY MEDICINE | Facility: CLINIC | Age: 53
End: 2018-12-31
Payer: COMMERCIAL

## 2018-12-31 VITALS
DIASTOLIC BLOOD PRESSURE: 62 MMHG | SYSTOLIC BLOOD PRESSURE: 118 MMHG | HEART RATE: 85 BPM | RESPIRATION RATE: 18 BRPM | WEIGHT: 216 LBS | TEMPERATURE: 96.5 F | BODY MASS INDEX: 37.08 KG/M2 | OXYGEN SATURATION: 98 %

## 2018-12-31 DIAGNOSIS — L60.3 NONTRAUMATIC NAIL SPLITTING: Primary | ICD-10-CM

## 2018-12-31 PROCEDURE — 99213 OFFICE O/P EST LOW 20 MIN: CPT | Performed by: NURSE PRACTITIONER

## 2018-12-31 ASSESSMENT — PAIN SCALES - GENERAL: PAINLEVEL: MILD PAIN (2)

## 2018-12-31 NOTE — PATIENT INSTRUCTIONS
Nail is looking good, no signs of infection     I would keep the nail covered with Band-Aid until nail heals, we don't want this catching     Watch for increased swelling, redness, or drainage - return to clinic if having any of these    Okay to stop wearing spica splint     Keep covered while working    Return to clinic as needed       Your clinic record indicates that you are due for:  Mammogram and Pap and physical exam  Please schedule  You are do for a mammogram.       [facility] Mammo Schedule  [facility] ~ [phone]  One Day Weekly- Alternating Days    [place] ~ [phone]  Every other Monday or Wednesday   & one Saturday morning a month    [place] ~ [phone]  Every Other Monday Afternoon    [place] ~ [phone]  Every Other Monday Morning    [place] ~ [phone]  Every Monday morning  Every Tuesday afternoon        Wed, Thurs, Friday morning & afternoon

## 2018-12-31 NOTE — PROGRESS NOTES
SUBJECTIVE:   Dilia Solomon is a 53 year old female who presents to clinic today for the following health issues:      Thumb Nail       Duration: Smashed right thumb 3 weeks ago-got it in a car door- cracked part of the nail     Description (location/character/radiation): Thumb on right hand     Intensity:  mild    Accompanying signs and symptoms: pain and swelling    History (similar episodes/previous evaluation): was seen in clinic on 12/21/2018 negative xray    Precipitating or alleviating factors: None    Therapies tried and outcome: neosporin and bandaid. ice and thumb spica brace    Patient has been keeping nail covered at all times  Wearing spica splint mostly at home, difficult to use at work.  Thumb range of motion improved    Problem list and histories reviewed & adjusted, as indicated.  Additional history: as documented    Patient Active Problem List   Diagnosis     Need for prophylactic vaccination and inoculation against influenza     Sprain of interphalangeal (joint) of hand     Radial styloid tenosynovitis     Carpal tunnel syndrome     Synovial cyst of popliteal space     Pain in joint, lower leg     Hyperlipidemia LDL goal <130     Advanced directives, counseling/discussion     Moderate persistent asthma     Allergic rhinitis due to dust mite     Food allergy     FH: diabetes mellitus     Chronic allergic rhinitis due to animal hair and dander     Allergic rhinitis due to mold     Chronic seasonal allergic rhinitis due to pollen     Past Surgical History:   Procedure Laterality Date     COLONOSCOPY N/A 10/6/2016    Procedure: COLONOSCOPY;  Surgeon: Shiva Johns MD;  Location: [facility]     ETHMOIDECTOMY  12/30/2013    Procedure: ETHMOIDECTOMY;  Bilateral Submucousal Reduction of InferiorTurbinates and Total Ethmoidectomy with Multiple Sinusotomies;  Surgeon: Lio More MD;  Location: [facility]     ETHMOIDECTOMY Bilateral 2/14/2018    Procedure: ETHMOIDECTOMY;  Bilateral anterior  ethmoidectomy, bilateral maxillary antrostomy;  Surgeon: Santhosh Tierney MD;  Location: [facility]     SURGICAL HISTORY OF -   5/04    carpal tunnel release, bilateral       Social History     Tobacco Use     Smoking status: Never Smoker     Smokeless tobacco: Never Used   Substance Use Topics     Alcohol use: No     Family History   Problem Relation Age of Onset     C.A.D. Mother      Diabetes Mother      Cancer Father         brain     Breast Cancer Maternal Aunt      Cancer - colorectal No family hx of          Current Outpatient Medications   Medication Sig Dispense Refill     albuterol (2.5 MG/3ML) 0.083% neb solution Take 1 vial (2.5 mg) by nebulization every 4 hours as needed 1 Box 3     albuterol (PROAIR HFA/PROVENTIL HFA/VENTOLIN HFA) 108 (90 Base) MCG/ACT inhaler Inhale 2 puffs into the lungs every 4 hours as needed for shortness of breath / dyspnea or wheezing 1 Inhaler 3     azelastine (OPTIVAR) 0.05 % ophthalmic solution Apply 1 drop to eye 2 times daily 1 Bottle 5     cetirizine (ZYRTEC) 10 MG tablet Take 1 tablet (10 mg) by mouth At Bedtime 90 tablet 3     Emollient (CERAVE) CREA Externally apply 1 dose. topically 2 times daily 2 Bottle 11     EPINEPHrine (EPIPEN/ADRENACLICK/OR ANY BX GENERIC EQUIV) 0.3 MG/0.3ML injection 2-pack Inject 0.3 mLs (0.3 mg) into the muscle once as needed for anaphylaxis 0.6 mL 3     fluticasone (FLONASE) 50 MCG/ACT nasal spray Spray 2 sprays into both nostrils daily 3 Bottle 3     fluticasone-salmeterol (ADVAIR) 250-50 MCG/DOSE inhaler Inhale 1 puff into the lungs 2 times daily 3 Inhaler 1     loratadine (CLARITIN) 10 MG tablet Take 1 tablet (10 mg) by mouth daily 90 tablet 3     montelukast (SINGULAIR) 10 MG tablet Take 1 tablet (10 mg) by mouth At Bedtime 90 tablet 1     MULTIVITAMIN OR 1 tab daily       ORDER FOR ALLERGEN IMMUNOTHERAPY Name of Mix: Mix #1  Mold  Alternaria Tenuis 1:10 w/v, HS  0.5 ml  Aspergillus Fumigatus 1:10 w/v, HS  0.5 ml  Epicoccum Nigrum 1:10 w/v, HS  0.5 ml  Hormodendrum Cladosporioides 1:10 w/v, HS 0.5 ml  Penicillium Mix 1:10 w/v, HS  0.5 ml  Diluent: HSA qs to 5ml 5 mL PRN     ORDER FOR ALLERGEN IMMUNOTHERAPY Name of Mix: Mix #2  Dust Mite, Cat, Dog  Cat Hair, Standardized 10,000 BAU/mL, ALK  2.0 ml  Dog Hair Dander, A. P.  1:100 w/v, HS  1.0 ml  Dust Mites F 30,000AU/mL, HS  0.3 ml  Dust Mites P. 30,000 AU/mL, HS  0.3 ml   Diluent: HSA qs to 5ml 5 mL PRN     ORDER FOR ALLERGEN IMMUNOTHERAPY Name of Mix: Mix #3 Grass,Tree   Dmitry,White 1:20w/v, HS 0.5ml  Birch Mix PRW 1:20w/v, HS 0.5ml  Boxelder-Maple Mix BHR (Boxelder Hard Red) 1:20w/v, HS 0.5ml  Jerome,Common 1:20w/v, HS 0.5ml  Elm,American 1:20w/v, HS 0.5ml  Chilcoot Mix RW 1:20w/v, HS 0.5ml  Oak Mix RVW 1:20w/v, HS 0.5ml  [place] Tree,Black 1:20w/v, HS 0.5ml  Grass Mix #7 100,000 BAU/mL, HS 0.4ml  Jonathan Grass 1:20w/v, HS 0.5ml  Diluent: HSA qs to 5ml 5 mL PRN     ORDER FOR ALLERGEN IMMUNOTHERAPY Name of Mix: Mix #4  Weeds  Kochia 1:20 w/v, HS 0.5 ml  Lamb's Quarters 1:20 w/v, HS 0.5 ml  Nettle 1:20 w/v, HS 0.5 ml  Plantain, English 1:20 w/v, HS 0.5 ml  Ragweed Mixed 1:20 w/v ALK  0.5 ml  Russian Thistle 1:20 w/v, HS 0.5 ml  Sagebrush, Mugwort 1:20 w/v, HS 0.5 ml  Sorrel, Sheep 1:20 w/v, HS 0.5 ml  Diluent: HSA qs to 5ml 5 mL PRN     order for DME Equipment being ordered: thumb spica splint RIGHT 1 Units 0     order for DME Equipment being ordered: Silicone heel cup 1 Units 0     order for DME Equipment being ordered: Dynaflex insert 1 Units 0     order for DME Equipment being ordered: Silicone heel cup 1 Units 0     triamcinolone (KENALOG) 0.1 % cream Apply  topically 2 times daily as needed. Do not use on face. 80 g 1     Allergies   Allergen Reactions     Clinoril [Nsaids] Hives     Tolerates ibuprofen and aspirin without hives     Pineapple Hives       Reviewed and updated as needed this visit by clinical staff  Tobacco  Allergies  Meds  Problems  Med Hx  Surg Hx  Fam Hx  Soc Hx         Reviewed and updated as needed this visit by Provider  Tobacco  Allergies  Meds  Problems  Med Hx  Surg Hx  Fam Hx  Soc Hx          ROS:  Constitutional, HEENT, cardiovascular, pulmonary, gi and gu systems are negative, except as otherwise noted.    OBJECTIVE:     /62   Pulse 85   Temp 96.5  F (35.8  C)   Resp 18   Wt 98 kg (216 lb)   LMP 07/18/2015 (Approximate)   SpO2 98%   BMI 37.08 kg/m    Body mass index is 37.08 kg/m .  GENERAL APPEARANCE: healthy, alert and no distress  MS: extremities normal- no gross deformities noted  RIGHT THUMB: Vertical crack midline nail with slight heaving, no hematoma present, no swelling with mild tenderness.  Normal range of motion.    Diagnostic Test Results:  none     ASSESSMENT/PLAN:     1. Nontraumatic nail splitting  Patient with right thumb injury approximately 3 weeks ago slammed in car door.  Was seen in office 12/21/2018, at that time had hematoma and pain present.  Patient has been wearing spica splint since on and off, and keeping nail bed covered with Band-Aid.  Today nailbed is free of hematoma or swelling with cracked nail.  No signs or symptoms of infection.  Advised patient to keep the nail bed covered to reduce catching and to watch for signs of infection and to return to clinic if so.  Okay to discontinue use of spica splint.  Patient verbalized understanding.        Patient Instructions   Nail is looking good, no signs of infection     I would keep the nail covered with Band-Aid until nail heals, we don't want this catching     Watch for increased swelling, redness, or drainage - return to clinic if having any of these    Okay to stop wearing spica splint     Keep covered while working    Return to clinic as needed       Your clinic record indicates that you are due for:  Mammogram and Pap and physical exam  Please schedule  You are do for a mammogram.       [facility] Mammo Schedule  [facility] ~ [phone]  One Day Weekly- Alternating  Days    [place] ~ [phone]  Every other Monday or Wednesday   & one Saturday morning a month    [place] ~ [phone]  Every Other Monday Afternoon    [place] ~ [phone]  Every Other Monday Morning    [place] ~ [phone]  Every Monday morning  Every Tuesday afternoon        Wed, Thurs, Friday morning & afternoon            MONSE Moralez [place]

## 2018-12-31 NOTE — NURSING NOTE
"Chief Complaint   Patient presents with     Thumb Discomfort       Initial LMP 07/18/2015 (Approximate)  Estimated body mass index is 37.08 kg/m  as calculated from the following:    Height as of 12/21/18: 1.626 m (5' 4\").    Weight as of 12/21/18: 98 kg (216 lb).    Patient presents to the clinic using No DME    Health Maintenance that is potentially due pending provider review:  Mammogram and Pap Smear    Pt will schedule     Is there anyone who you would like to be able to receive your results? not asked  If yes have patient fill out UVALDO    "

## 2019-01-08 ENCOUNTER — OFFICE VISIT (OUTPATIENT)
Dept: VASCULAR SURGERY | Facility: CLINIC | Age: 54
End: 2019-01-08
Payer: COMMERCIAL

## 2019-01-08 ENCOUNTER — ALLIED HEALTH/NURSE VISIT (OUTPATIENT)
Dept: ALLERGY | Facility: CLINIC | Age: 54
End: 2019-01-08
Payer: COMMERCIAL

## 2019-01-08 DIAGNOSIS — J30.9 ALLERGIC RHINITIS: Primary | ICD-10-CM

## 2019-01-08 DIAGNOSIS — Z53.9 ERRONEOUS ENCOUNTER--DISREGARD: Primary | ICD-10-CM

## 2019-01-08 PROCEDURE — 99207 ZZC DROP WITH A PROCEDURE: CPT

## 2019-01-08 PROCEDURE — 99213 OFFICE O/P EST LOW 20 MIN: CPT | Performed by: SURGERY

## 2019-01-08 PROCEDURE — 95117 IMMUNOTHERAPY INJECTIONS: CPT

## 2019-01-08 NOTE — PROGRESS NOTES
[facility] Note     HPI: Dilia is back today for follow up because her procedure was previously denied for not completing conservative therapy as she had previously reported.  Since Oct 2018 she has completed 3 months of conservative therapy with little to no relief of her symptoms of pain in her lower extremities with swelling.  Dilia is concerned because she does feel her symptoms are worse and she is noticing it's affecting her work and having to take multiple breaks to help control her symptoms.  Today she is here to review her ultrasound and discuss treatment options.      Her history is as follows: Patient is a 53-year-old female who has an unknown duration of bilateral varicose veins and swelling.  She works at the [employer] spends a lot of time on her feet throughout the day and has noticed that she gets bilateral lower extremity swelling with the right leg worse than the left which improves with elevation.  She also has varicose veins in both lower extremities and she is unsure of how long she has had these.  The right medial calf varicose vein she noted is significantly tender within the last month.  She finally was instructed by her primary care doctor to come here for evaluation.  Ultrasound performed at [facility] revealed that there was no DVT in the lower extremity that there was a varicose vein underlying the area of pain.  There is no phlebitis noted at that time.  She also does not take any sort of pain medication currently.  I asked the patient whether her family history is significant for varicose veins she says she is unsure and denies a history of DVT as well.       Past Medical History        Past Medical History:   Diagnosis Date     Injury, other and unspecified, shoulder and upper arm 9/03         Past Surgical History         Past Surgical History:   Procedure Laterality Date     COLONOSCOPY N/A 10/6/2016     Procedure: COLONOSCOPY;  Surgeon: Shiva Johns MD;   Location: [facility]     ETHMOIDECTOMY   12/30/2013     Procedure: ETHMOIDECTOMY;  Bilateral Submucousal Reduction of InferiorTurbinates and Total Ethmoidectomy with Multiple Sinusotomies;  Surgeon: Lio More MD;  Location: [facility]     ETHMOIDECTOMY Bilateral 2/14/2018     Procedure: ETHMOIDECTOMY;  Bilateral anterior ethmoidectomy, bilateral maxillary antrostomy;  Surgeon: Santhosh Tierney MD;  Location: [facility]     SURGICAL HISTORY OF -    5/04     carpal tunnel release, bilateral         Social History   Social History            Social History     Marital status: Single       Spouse name: N/A     Number of children: N/A     Years of education: N/A          Occupational History     Not on file.           Social History Main Topics     Smoking status: Never Smoker     Smokeless tobacco: Never Used     Alcohol use No     Drug use: No     Sexual activity: No            Other Topics Concern     Parent/Sibling W/ Cabg, Mi Or Angioplasty Before 65f 55m? Yes       mother      Social History Narrative            Physical examination:  Constitutional well-developed overweight female.  She has a service dog who helps to detect asthma attacks.  HEENT: PERRLA, mucous membranes moist.  Lungs: Nonlabored breathing.  Skin: She has a right medial calf varicose vein is approximately 8 mm in diameter.  There is no evidence of phlebitis in this location.  She has swelling in the mid calf on the right lower extremity.  No skin changes consistent with chronic venous insufficiency.  In the left leg she has multiple varicose veins in the medial left calf.  She also has evidence of smaller varicose veins behind the left knee.  Neurologic: She has normal gait, motor and sensory function both lower extremities.  Psych: Normal mood, affect, judgment.  Pulse: She has palpable pedal pulses bilaterally.     Imaging:   Venous incompetency was defined as greater than 500 ms reflux for superficial venous system and greater than 1000 ms reflux  for the deep venous system.     Right lower extremity:  There is no evidence of DVT in the right lower extremity in the deep venous system is competent.     The right greater saphenous vein is incompetent from the saphenofemoral junction to the distal calf.  It is superficial in the proximal and mid calf.  Greater saphenous vein gives off several large varicose vein branches in the proximal catheter about 9 mm in size and incompetent.     There is one incompetent  21 cm below the knee crease that communicates with the small saphenous vein.  Additionally there is a incompetent varicose vein that comes off the small saphenous vein in the mid calf.  The small saphenous vein is otherwise competent throughout.     Left lower extremity:  There is no evidence of DVT in the left lower extremity in the deep venous system is competent.     The greater saphenous vein is incompetent from the mid thigh through the mid calf.  It gives rise to several incompetent varicose vein branch is greater than 6 mm in size.  The accessory saphenous vein is also incompetent just past saphenofemoral junction is straight for a 10 cm course but does not give rise to any large varicose vein branches.     There is a Baker's cyst noted in the left popliteal fossa.     While small saphenous vein is competent throughout its course.  There are no incompetent perforators noted in the left lower extremity.           Assessment: Is a 53-year-old female with bilateral lower extremity varicose veins with pain on the right and significant swelling and edema, C3 disease.     Plan:  Today again we discussed the ultrasound results in detail and I recommended treatment of the right lower extremity greater saphenous vein with radiofrequency ablation followed by medically necessary stab phlebectomy of the large 9 mm painful varicose veins in the right medial calf that are accompanied by significant itching intermittently.  The second procedure will be  radio frequency ablation of the left greater saphenous vein only.  Consent was signed again in clinic with discussion regarding the risks and benefits were discussed in detail.  Risks included 5% chance of re-cannulation, injury to greater saphenous nerve, DVT, and pigmentation change of the skin.  The patient signed consent for each of the planned procedures today in clinic.     Wyatt Dunn MD  Vascular Surgery

## 2019-01-19 NOTE — TELEPHONE ENCOUNTER
Reason for Call:  Other call back    Detailed comments: pt calling stating her dog chewed her heel inserts. She would like to know if she needs to be seen for an appt  to get new heel inserts in order for reg PF ins to cover them instead of work comp.     Phone Number Patient can be reached at: Home number on file [phone] (home)    Best Time: any     Can we leave a detailed message on this number? YES    Call taken on 10/23/2018 at 1:31 PM by Trina Mccarty     
Spoke to patient discussed that she is needing new gel cups and now being seen under her general health insurance then she would need to follow up in clinic.  Patient scheduled for appointment on 11/7/18.  Patient is also requesting that copy of letter provided on 1/23/18 be placed at the [facility]  for her to  tomorrow.  Advised I would have Dr Vallejo's clinical assistant address this request when they are in [facility] tomorrow.    Raffaele Taylor, ATC  
stated

## 2019-01-29 ENCOUNTER — OFFICE VISIT (OUTPATIENT)
Dept: FAMILY MEDICINE | Facility: CLINIC | Age: 54
End: 2019-01-29
Payer: COMMERCIAL

## 2019-01-29 VITALS
SYSTOLIC BLOOD PRESSURE: 96 MMHG | RESPIRATION RATE: 20 BRPM | HEART RATE: 77 BPM | TEMPERATURE: 97.3 F | BODY MASS INDEX: 37.39 KG/M2 | DIASTOLIC BLOOD PRESSURE: 60 MMHG | WEIGHT: 219 LBS | HEIGHT: 64 IN | OXYGEN SATURATION: 98 %

## 2019-01-29 DIAGNOSIS — E66.812 CLASS 2 DRUG-INDUCED OBESITY WITH BODY MASS INDEX (BMI) OF 38.0 TO 38.9 IN ADULT, UNSPECIFIED WHETHER SERIOUS COMORBIDITY PRESENT: ICD-10-CM

## 2019-01-29 DIAGNOSIS — E66.1 CLASS 2 DRUG-INDUCED OBESITY WITH BODY MASS INDEX (BMI) OF 38.0 TO 38.9 IN ADULT, UNSPECIFIED WHETHER SERIOUS COMORBIDITY PRESENT: ICD-10-CM

## 2019-01-29 DIAGNOSIS — Z00.01 ENCOUNTER FOR GENERAL ADULT MEDICAL EXAMINATION WITH ABNORMAL FINDINGS: Primary | ICD-10-CM

## 2019-01-29 DIAGNOSIS — J45.40 MODERATE PERSISTENT ASTHMA, UNCOMPLICATED: ICD-10-CM

## 2019-01-29 DIAGNOSIS — Z13.220 SCREENING CHOLESTEROL LEVEL: ICD-10-CM

## 2019-01-29 DIAGNOSIS — J01.00 ACUTE MAXILLARY SINUSITIS, RECURRENCE NOT SPECIFIED: ICD-10-CM

## 2019-01-29 DIAGNOSIS — Z13.1 SCREENING FOR DIABETES MELLITUS: ICD-10-CM

## 2019-01-29 PROBLEM — E66.01 MORBID OBESITY (H): Status: ACTIVE | Noted: 2019-01-29

## 2019-01-29 LAB
ALBUMIN SERPL-MCNC: 3.9 G/DL (ref 3.4–5)
ALP SERPL-CCNC: 86 U/L (ref 40–150)
ALT SERPL W P-5'-P-CCNC: 26 U/L (ref 0–50)
ANION GAP SERPL CALCULATED.3IONS-SCNC: 5 MMOL/L (ref 3–14)
AST SERPL W P-5'-P-CCNC: 21 U/L (ref 0–45)
BILIRUB SERPL-MCNC: 0.4 MG/DL (ref 0.2–1.3)
BUN SERPL-MCNC: 21 MG/DL (ref 7–30)
CALCIUM SERPL-MCNC: 9 MG/DL (ref 8.5–10.1)
CHLORIDE SERPL-SCNC: 104 MMOL/L (ref 94–109)
CHOLEST SERPL-MCNC: 209 MG/DL
CO2 SERPL-SCNC: 28 MMOL/L (ref 20–32)
CREAT SERPL-MCNC: 0.76 MG/DL (ref 0.52–1.04)
GFR SERPL CREATININE-BSD FRML MDRD: 89 ML/MIN/{1.73_M2}
GLUCOSE SERPL-MCNC: 90 MG/DL (ref 70–99)
HDLC SERPL-MCNC: 58 MG/DL
LDLC SERPL CALC-MCNC: 134 MG/DL
NONHDLC SERPL-MCNC: 151 MG/DL
POTASSIUM SERPL-SCNC: 4.1 MMOL/L (ref 3.4–5.3)
PROT SERPL-MCNC: 7.7 G/DL (ref 6.8–8.8)
SODIUM SERPL-SCNC: 137 MMOL/L (ref 133–144)
TRIGL SERPL-MCNC: 83 MG/DL
TSH SERPL DL<=0.005 MIU/L-ACNC: 3.41 MU/L (ref 0.4–4)

## 2019-01-29 PROCEDURE — 36415 COLL VENOUS BLD VENIPUNCTURE: CPT | Performed by: FAMILY MEDICINE

## 2019-01-29 PROCEDURE — 80061 LIPID PANEL: CPT | Performed by: FAMILY MEDICINE

## 2019-01-29 PROCEDURE — 87624 HPV HI-RISK TYP POOLED RSLT: CPT | Performed by: FAMILY MEDICINE

## 2019-01-29 PROCEDURE — 84443 ASSAY THYROID STIM HORMONE: CPT | Performed by: FAMILY MEDICINE

## 2019-01-29 PROCEDURE — 99396 PREV VISIT EST AGE 40-64: CPT | Performed by: FAMILY MEDICINE

## 2019-01-29 PROCEDURE — 80053 COMPREHEN METABOLIC PANEL: CPT | Performed by: FAMILY MEDICINE

## 2019-01-29 PROCEDURE — 99213 OFFICE O/P EST LOW 20 MIN: CPT | Mod: 25 | Performed by: FAMILY MEDICINE

## 2019-01-29 PROCEDURE — G0123 SCREEN CERV/VAG THIN LAYER: HCPCS | Performed by: FAMILY MEDICINE

## 2019-01-29 ASSESSMENT — ENCOUNTER SYMPTOMS
HEMATOCHEZIA: 0
DIZZINESS: 0
CONSTIPATION: 0
CHILLS: 0
DIARRHEA: 0
NERVOUS/ANXIOUS: 0
EYE PAIN: 1
ABDOMINAL PAIN: 0
COUGH: 1
FEVER: 0
HEMATURIA: 0

## 2019-01-29 ASSESSMENT — MIFFLIN-ST. JEOR: SCORE: 1580.44

## 2019-01-29 NOTE — NURSING NOTE
"Chief Complaint   Patient presents with     Physical     yearly     URI     lungs hurt and sinus pressure x 1 month       Initial BP 96/60 (BP Location: Right arm)   Pulse 77   Temp 97.3  F (36.3  C) (Tympanic)   Resp 20   Ht 1.613 m (5' 3.5\")   Wt 99.3 kg (219 lb)   LMP 07/18/2015 (Approximate)   SpO2 98%   BMI 38.19 kg/m   Estimated body mass index is 38.19 kg/m  as calculated from the following:    Height as of this encounter: 1.613 m (5' 3.5\").    Weight as of this encounter: 99.3 kg (219 lb).    Patient presents to the clinic using No DME    Health Maintenance that is potentially due pending provider review:  Pap Smear    Possibly completing today per provider review.    Is there anyone who you would like to be able to receive your results? No  If yes have patient fill out [facility]VAL[facility]    "

## 2019-01-29 NOTE — PROGRESS NOTES
SUBJECTIVE:   CC: Dilia Solomon is an 53 year old woman who presents for preventive health visit.     Physical   Annual:     Getting at least 3 servings of Calcium per day:  Yes    Bi-annual eye exam:  Yes    Dental care twice a year:  Yes    Sleep apnea or symptoms of sleep apnea:  None    Diet:  Regular (no restrictions)    Frequency of exercise:  2-3 days/week    Duration of exercise:  30-45 minutes    Taking medications regularly:  Yes    Medication side effects:  None and Other    Additional concerns today:  No    PHQ-2 Total Score: 0  URI   Associated symptoms include chest pain, congestion and coughing. Pertinent negatives include no abdominal pain, chills or fever.           RESPIRATORY SYMPTOMS      Duration: 1 month    Description  nasal congestion, facial pain/pressure, ear pain left and conjunctival irritation    Severity: mild    Accompanying signs and symptoms: lungs hurt    History (predisposing factors):  none    Precipitating or alleviating factors: None    Therapies tried and outcome:  rest and fluids oral decongestant antihistamine nasal spray/wash -     Has a history of multiple allergies, gets allergy shots. Has had multiple bouts of sinus congestion n the last few months, have gone away now back again and this is not going away. Green mucus. No fever. Pain and pressure in the sinuses. No cough. Left ear a little painful. Has had 2 sinus surgeries, last one a year ago.     Today's PHQ-2 Score:   PHQ-2 ( 1999 Pfizer) 1/29/2019   Q1: Little interest or pleasure in doing things 0   Q2: Feeling down, depressed or hopeless 0   PHQ-2 Score 0   Q1: Little interest or pleasure in doing things Not at all   Q2: Feeling down, depressed or hopeless Not at all   PHQ-2 Score 0       Abuse: Current or Past(Physical, Sexual or Emotional)- No  Do you feel safe in your environment? Yes    Social History     Tobacco Use     Smoking status: Never Smoker     Smokeless tobacco: Never Used   Substance Use Topics      Alcohol use: No     Alcohol Use 1/29/2019   If you drink alcohol do you typically have greater than 3 drinks per day OR greater than 7 drinks per week? No       Reviewed orders with patient.  Reviewed health maintenance and updated orders accordingly - Yes  BP Readings from Last 3 Encounters:   01/29/19 96/60   12/31/18 118/62   12/21/18 118/68    Wt Readings from Last 3 Encounters:   01/29/19 99.3 kg (219 lb)   12/31/18 98 kg (216 lb)   12/21/18 98 kg (216 lb)            Recent Labs   Lab Test 09/01/15  1049 12/05/13  0702   CHOL 195 213*   HDL 52 58    141*   TRIG 92 73   CHOLHDLRATIO 3.8 4.0              Mammogram Screening: Patient over age 50, mutual decision to screen reflected in health maintenance.    Pertinent mammograms are reviewed under the imaging tab.  History of abnormal Pap smear: NO - age 30-65 PAP every 5 years with negative HPV co-testing recommended  PAP / HPV 12/5/2013 2/13/2009   PAP OTHER-NIL, See Result NIL     Reviewed and updated as needed this visit by clinical staff  Tobacco  Allergies  Meds  Problems  Med Hx  Surg Hx  Fam Hx         Reviewed and updated as needed this visit by Provider  Tobacco  Allergies  Meds  Problems  Med Hx  Surg Hx  Fam Hx        Past Medical History:   Diagnosis Date     Injury, other and unspecified, shoulder and upper arm 9/03      Past Surgical History:   Procedure Laterality Date     COLONOSCOPY N/A 10/6/2016    Procedure: COLONOSCOPY;  Surgeon: Shiva Johns MD;  Location: [facility]     ETHMOIDECTOMY  12/30/2013    Procedure: ETHMOIDECTOMY;  Bilateral Submucousal Reduction of InferiorTurbinates and Total Ethmoidectomy with Multiple Sinusotomies;  Surgeon: Lio More MD;  Location: [facility]     ETHMOIDECTOMY Bilateral 2/14/2018    Procedure: ETHMOIDECTOMY;  Bilateral anterior ethmoidectomy, bilateral maxillary antrostomy;  Surgeon: Santhosh Tierney MD;  Location: [facility]     SURGICAL HISTORY OF -   5/04    carpal tunnel release,  "bilateral     Obstetric History       T0      L0     SAB0   TAB0   Ectopic0   Multiple0   Live Births0           Review of Systems   Constitutional: Negative for chills and fever.   HENT: Positive for congestion and ear pain.    Eyes: Positive for pain.   Respiratory: Positive for cough.    Cardiovascular: Positive for chest pain.   Gastrointestinal: Negative for abdominal pain, constipation, diarrhea and hematochezia.   Genitourinary: Negative for hematuria.   Neurological: Negative for dizziness.   Psychiatric/Behavioral: The patient is not nervous/anxious.         OBJECTIVE:   BP 96/60 (BP Location: Right arm)   Pulse 77   Temp 97.3  F (36.3  C) (Tympanic)   Resp 20   Ht 1.613 m (5' 3.5\")   Wt 99.3 kg (219 lb)   LMP 2015 (Approximate)   SpO2 98%   BMI 38.19 kg/m    Physical Exam  GENERAL APPEARANCE: healthy, alert and no distress  EYES: Eyes grossly normal to inspection, PERRL and conjunctivae and sclerae normal  HENT: ear canals and TM's normal except the left is retracted, nose and mouth without ulcers or lesions, oropharynx clear and oral mucous membranes moist, tenderness over maxillary and frontal sinuses  NECK: no adenopathy, no asymmetry, masses, or scars and thyroid normal to palpation  RESP: lungs clear to auscultation - no rales, rhonchi or wheezes  BREAST: normal without masses, tenderness or nipple discharge and no palpable axillary masses or adenopathy  CV: regular rate and rhythm, normal S1 S2, no S3 or S4, no murmur, click or rub, no peripheral edema and peripheral pulses strong  ABDOMEN: soft, nontender, no hepatosplenomegaly, no masses and bowel sounds normal   (female): normal female external genitalia, normal urethral meatus, vaginal mucosal atrophy noted, normal cervix, adnexae, and uterus without masses or abnormal discharge  MS: no musculoskeletal defects are noted and gait is age appropriate without ataxia  SKIN: no suspicious lesions or rashes  Has varicose " "veins  NEURO: Normal strength and tone, sensory exam grossly normal, mentation intact and speech normal  PSYCH: mentation appears normal and affect normal/bright      ASSESSMENT/PLAN:   Dilia was seen today for physical and uri.    Diagnoses and all orders for this visit:    Encounter for general adult medical examination with abnormal findings  -     Pap imaged thin layer screen with HPV - recommended age 30 - 65 years (select HPV order below)  -     HPV High Risk Types DNA Cervical    Acute maxillary sinusitis, recurrence not specified  -     amoxicillin-clavulanate (AUGMENTIN) 875-125 MG tablet; Take 1 tablet by mouth 2 times daily for 10 days  -     OFFICE/OUTPT VISIT,EST,LEVL III    Moderate persistent asthma, uncomplicated  -     Comprehensive metabolic panel    Screening cholesterol level  -     Lipid panel reflex to direct LDL Fasting    Screening for diabetes mellitus  -     Cancel: Glucose    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    Weight as of this encounter: 99.3 kg (219 lb).      Weight management plan: Discussed healthy diet and exercise guidelines     reports that  has never smoked. she has never used smokeless tobacco.      Counseling Resources:  ATP IV Guidelines  Pooled Cohorts Equation Calculator  Breast Cancer Risk Calculator  FRAX Risk Assessment  ICSI Preventive Guidelines  Dietary Guidelines for Americans, 2010  USDA's MyPlate  ASA Prophylaxis  Lung CA Screening    Cookie Conklin MD  [facility]  "

## 2019-01-31 LAB
COPATH REPORT: NORMAL
PAP: NORMAL

## 2019-02-04 LAB
FINAL DIAGNOSIS: NORMAL
HPV HR 12 DNA CVX QL NAA+PROBE: NEGATIVE
HPV16 DNA SPEC QL NAA+PROBE: NEGATIVE
HPV18 DNA SPEC QL NAA+PROBE: NEGATIVE
SPECIMEN DESCRIPTION: NORMAL
SPECIMEN SOURCE CVX/VAG CYTO: NORMAL

## 2019-02-11 ENCOUNTER — ALLIED HEALTH/NURSE VISIT (OUTPATIENT)
Dept: ALLERGY | Facility: CLINIC | Age: 54
End: 2019-02-11
Payer: COMMERCIAL

## 2019-02-11 DIAGNOSIS — J30.9 ALLERGIC RHINITIS: Primary | ICD-10-CM

## 2019-02-11 PROCEDURE — 99207 ZZC DROP WITH A PROCEDURE: CPT

## 2019-02-11 PROCEDURE — 95117 IMMUNOTHERAPY INJECTIONS: CPT

## 2019-02-26 ENCOUNTER — OFFICE VISIT (OUTPATIENT)
Dept: VASCULAR SURGERY | Facility: CLINIC | Age: 54
End: 2019-02-26
Payer: COMMERCIAL

## 2019-02-26 ENCOUNTER — APPOINTMENT (OUTPATIENT)
Dept: VASCULAR SURGERY | Facility: CLINIC | Age: 54
End: 2019-02-26
Payer: COMMERCIAL

## 2019-02-26 DIAGNOSIS — Z53.9 ERRONEOUS ENCOUNTER--DISREGARD: Primary | ICD-10-CM

## 2019-02-26 PROCEDURE — 36475 ENDOVENOUS RF 1ST VEIN: CPT | Mod: RT | Performed by: SURGERY

## 2019-02-26 PROCEDURE — 37765 STAB PHLEB VEINS XTR 10-20: CPT | Mod: RT | Performed by: SURGERY

## 2019-02-26 NOTE — PROGRESS NOTES
Pre procedure diagnosis:  Right leg painful varicose veins and edema, C3 disease.    Post procedure diagnosis:  Right leg painful varicose veins and edema, C3 disease.    Procedure:     Right leg radiofrequency ablation of the greater saphenous vein  Multiple medically necessary stab phlebectomy, x 18    Surgeon:  Dr. Wyatt Dunn MD    Assistant:  GINNY Murphy    Oral analgesia:  0.1mg clonidine and 1 mg ativan taken orally.    Local Anesthesia:  1% lidocaine 8 mL and tumescent anesthesia.      Treatment length:  40 cm of the right GSV  RF cycles: 9 cycles  Treatment time:  3 minutes  Junction measurement:  3.02 cm  Tumescent volume: 500 mL    EBL:  < 5 mL    Indication:  Patient is a 53-year-old female who has an unknown duration of bilateral varicose veins and swelling.  She works at the [employer] spends a lot of time on her feet throughout the day and has noticed that she gets bilateral lower extremity swelling with the right leg worse than the left which improves with elevation.  She also has varicose veins in both lower extremities and she is unsure of how long she has had these.  The right medial calf varicose vein she noted is significantly tender within the last month.  She finally was instructed by her primary care doctor to come here for evaluation.  Ultrasound performed at [facility] revealed that there was no DVT in the lower extremity that there was a varicose vein underlying the area of pain.   Consent was obtained from the patient with risks, benefits discussed in detail including DVT, injury to the greater saphenous nerve, bleeding, infection and skin hyperpigmentation.    Procedure:     The patient was placed on the operating table in the supine position with the bed in reverse Trendelenburg.  The right leg was prepped and draped in the usual sterile fashion.  A time out was performed and the surgical team was in agreement with the planned procedure.  Using an ultrasound we  confirmed the GSV was patent through the saphenofemoral junction. Using a micro-access needle we obtained ultrasound guided access of the GSV in the proximal calf.  The skin was infiltrated with 1% lidocaine and an 11 blade scalpel was used to make a skin incision.  The 7 Divehi tapered sheath was then advanced into the GSV over the micro wire in the Seldinger technique.  We placed the closure catheter within 3.02 cm of the saphenofemoral junction and this was confirmed with ultrasound and measured.   The inferior epigastric vein was easily visualized as well and we were distal to this point.  The patient was placed in Trendelenburg position and using tumescent anesthesia we numbed the entire length of the GSV creating a nice wheel both anterior and posterior to the vein.  The saphenofemoral junction was visualized one more time and the catheter was confirmed to be 3.02 cm from the saphenofemoral junction.      The saphenofemoral junction was then infiltrated with tumescent.  Using ultrasound compression we then sequentially ablated the GSV for a total length of 40 cm.   The catheter and sheath were then removed from the vein without issue and pressure was held at the access site.  The saphenofemoral junction was visualized and the common femoral vein was patent and easily compressible with no thrombus.  The GSV appeared closed distal to the inferior epigastric vein with no clot protruding into the deep venous system.      The right leg varicose veins were marked prior to procedure and the surrounding tissue was infiltrated with tumescent analgesia.  Small stab incision with an opthalmic scalpel blade were made and the veins were avulsed with Gee hooks.  A total of 18 stab phlebectomy were performed on the right leg.      The right leg was then cleaned with saline and the incisions were covered with bacitracin ointment.  The leg was then dressed with ABD pads, cast padding, and with ACE wrap from toes to groin.   The patient was monitored in bed for 30 minutes prior to leaving and then taken to the car in a wheelchair.    Wyatt Dunn MD  Vascular Surgery

## 2019-03-01 ENCOUNTER — APPOINTMENT (OUTPATIENT)
Dept: VASCULAR SURGERY | Facility: CLINIC | Age: 54
End: 2019-03-01
Payer: COMMERCIAL

## 2019-03-01 PROCEDURE — 99207 ZZC VEINSOLUTIONS NO CHARGE VISIT: CPT | Performed by: SURGERY

## 2019-03-01 PROCEDURE — 93971 EXTREMITY STUDY: CPT | Performed by: SURGERY

## 2019-03-05 ENCOUNTER — ALLIED HEALTH/NURSE VISIT (OUTPATIENT)
Dept: ALLERGY | Facility: CLINIC | Age: 54
End: 2019-03-05
Payer: COMMERCIAL

## 2019-03-05 DIAGNOSIS — J30.9 ALLERGIC RHINITIS: Primary | ICD-10-CM

## 2019-03-05 PROCEDURE — 95117 IMMUNOTHERAPY INJECTIONS: CPT

## 2019-03-05 PROCEDURE — 99207 ZZC DROP WITH A PROCEDURE: CPT

## 2019-03-12 ENCOUNTER — OFFICE VISIT (OUTPATIENT)
Dept: VASCULAR SURGERY | Facility: CLINIC | Age: 54
End: 2019-03-12
Payer: COMMERCIAL

## 2019-03-12 DIAGNOSIS — Z53.9 ERRONEOUS ENCOUNTER--DISREGARD: Primary | ICD-10-CM

## 2019-03-12 PROCEDURE — 36475 ENDOVENOUS RF 1ST VEIN: CPT | Performed by: SURGERY

## 2019-03-12 NOTE — PROGRESS NOTES
Pre procedure diagnosis:  Left leg swelling and varicose veins, C3 disease.    Post procedure diagnosis:  Left leg swelling and varicose veins, C3 disease.    Procedure:   Left greater saphenous vein radiofrequency ablation     Surgeon:  Dr. Wyatt Dunn MD    Assistant:  GINNY Murphy    Oral analgesia:  0.1mg clonidine and 1 mg ativan taken orally.    Local Anesthesia:  1% lidocaine 8 mL and tumescent anesthesia.      Treatment length:  49 cm of the right GSV  RF cycles: 10 cycles  Treatment time:  3 minutes and 20 seconds  Junction measurement:  2.67 cm  Tumescent volume: 500 mL    EBL:  < 5 mL    Indication:  Patient is a 53-year-old female who has an unknown duration of bilateral varicose veins and swelling.  She works at the [employer] spends a lot of time on her feet throughout the day and has noticed that she gets bilateral lower extremity swelling with the right leg worse than the left which improves with elevation.  She also has varicose veins in both lower extremities and she is unsure of how long she has had these.  The right medial calf varicose vein she noted is significantly tender within the last month.  She finally was instructed by her primary care doctor to come here for evaluation.  Ultrasound performed at [facility] revealed that there was no DVT in the lower extremity that there was a varicose vein underlying the area of pain.   Consent was obtained from the patient with risks, benefits discussed in detail including DVT, injury to the greater saphenous nerve, bleeding, infection and skin hyperpigmentation.    Procedure:     The patient was placed on the operating table in the supine position with the bed in reverse Trendelenburg.  The left leg was prepped and draped in the usual sterile fashion.  A time out was performed and the surgical team was in agreement with the planned procedure.  Using an ultrasound we confirmed the GSV was patent through the saphenofemoral junction.  Using a micro-access needle we obtained ultrasound guided access of the GSV in the mid-calf.  The skin was infiltrated with 1% lidocaine and an 11 blade scalpel was used to make a skin incision.  The 7 Yoruba tapered sheath was then advanced into the GSV over the micro wire in the Seldinger technique.  We placed the closure catheter within 2.67 cm of the saphenofemoral junction and this was confirmed with ultrasound and measured.   The inferior epigastric vein was easily visualized as well and we were distal to this point.  The patient was placed in Trendelenburg position and using tumescent anesthesia we numbed the entire length of the GSV creating a nice wheel both anterior and posterior to the vein.  The saphenofemoral junction was visualized one more time and the catheter was confirmed to be 2.67 cm from the saphenofemoral junction.      The saphenofemoral junction was then infiltrated with tumescent.  Using ultrasound compression we then sequentially ablated the GSV for a total length of 49 cm.   The catheter and sheath were then removed from the vein without issue and pressure was held at the access site.  The saphenofemoral junction was visualized and the common femoral vein was patent and easily compressible with no thrombus.  The GSV appeared closed distal to the inferior epigastric vein with no clot protruding into the deep venous system.      The left leg was then cleaned with saline and the incisions were covered with bacitracin ointment.  The left leg was then placed back into her compression hose at completion of the procedure.  The patient was monitored in bed for 30 minutes prior to leaving and then taken to the car in a wheelchair.    Wyatt Dunn MD  Vascular Surgery

## 2019-03-15 ENCOUNTER — APPOINTMENT (OUTPATIENT)
Dept: VASCULAR SURGERY | Facility: CLINIC | Age: 54
End: 2019-03-15
Payer: COMMERCIAL

## 2019-03-15 PROCEDURE — 99207 ZZC VEINSOLUTIONS NO CHARGE VISIT: CPT | Performed by: SURGERY

## 2019-03-15 PROCEDURE — 93971 EXTREMITY STUDY: CPT | Performed by: SURGERY

## 2019-03-26 ENCOUNTER — ALLIED HEALTH/NURSE VISIT (OUTPATIENT)
Dept: ALLERGY | Facility: CLINIC | Age: 54
End: 2019-03-26
Payer: COMMERCIAL

## 2019-03-26 DIAGNOSIS — J30.9 ALLERGIC RHINITIS: Primary | ICD-10-CM

## 2019-03-26 PROCEDURE — 95117 IMMUNOTHERAPY INJECTIONS: CPT

## 2019-03-26 PROCEDURE — 99207 ZZC DROP WITH A PROCEDURE: CPT

## 2019-04-05 NOTE — NURSING NOTE
"Initial /82 (BP Location: Left arm, Patient Position: Sitting, Cuff Size: Adult Regular)  Pulse 95  Temp 97.7  F (36.5  C) (Tympanic)  Ht 1.626 m (5' 4\")  Wt 86.2 kg (190 lb)  LMP 07/18/2015 (Approximate)  BMI 32.61 kg/m2 Estimated body mass index is 32.61 kg/(m^2) as calculated from the following:    Height as of this encounter: 1.626 m (5' 4\").    Weight as of this encounter: 86.2 kg (190 lb). .    Elsi Mcpherson CMA    " 97

## 2019-04-11 DIAGNOSIS — L30.9 ECZEMA, UNSPECIFIED TYPE: ICD-10-CM

## 2019-04-11 DIAGNOSIS — Z51.6 NEED FOR DESENSITIZATION TO ALLERGENS: ICD-10-CM

## 2019-04-11 DIAGNOSIS — Z91.018 FOOD ALLERGY: ICD-10-CM

## 2019-04-11 NOTE — TELEPHONE ENCOUNTER
Reason for Call:  Medication or medication refill:    Do you use a [facility]?  Name of the pharmacy and phone number for the current request:  [facility] [phone]    Name of the medication requested: Epinephrine; Triamcinolone    Other request:   LAST REFILL: 11/14/2018  LOV: 12/05/2018    Can we leave a detailed message on this number? Not Applicable    Phone number patient can be reached at: Home number on file [phone] (home)    Best Time: NA    Call taken on 4/11/2019 at 2:01 PM by Denise Behrendt

## 2019-04-12 RX ORDER — TRIAMCINOLONE ACETONIDE 1 MG/G
CREAM TOPICAL
Qty: 80 G | Refills: 1 | Status: SHIPPED | OUTPATIENT
Start: 2019-04-12 | End: 2019-06-04

## 2019-04-12 RX ORDER — EPINEPHRINE 0.3 MG/.3ML
0.3 INJECTION SUBCUTANEOUS
Qty: 0.6 ML | Refills: 3 | Status: SHIPPED | OUTPATIENT
Start: 2019-04-12 | End: 2020-06-02

## 2019-04-16 ENCOUNTER — APPOINTMENT (OUTPATIENT)
Dept: VASCULAR SURGERY | Facility: CLINIC | Age: 54
End: 2019-04-16
Payer: COMMERCIAL

## 2019-04-16 ENCOUNTER — ALLIED HEALTH/NURSE VISIT (OUTPATIENT)
Dept: ALLERGY | Facility: CLINIC | Age: 54
End: 2019-04-16
Payer: COMMERCIAL

## 2019-04-16 DIAGNOSIS — J30.9 ALLERGIC RHINITIS: Primary | ICD-10-CM

## 2019-04-16 PROCEDURE — 99207 ZZC VEINSOLUTIONS POST OPERATIVE VISIT: CPT | Performed by: SURGERY

## 2019-04-16 PROCEDURE — 99207 ZZC DROP WITH A PROCEDURE: CPT

## 2019-04-16 PROCEDURE — 95117 IMMUNOTHERAPY INJECTIONS: CPT

## 2019-05-07 ENCOUNTER — ALLIED HEALTH/NURSE VISIT (OUTPATIENT)
Dept: ALLERGY | Facility: CLINIC | Age: 54
End: 2019-05-07
Payer: COMMERCIAL

## 2019-05-07 DIAGNOSIS — J30.89 ALLERGIC RHINITIS DUE TO MOLD: ICD-10-CM

## 2019-05-07 DIAGNOSIS — J30.89 ALLERGIC RHINITIS DUE TO DUST MITE: ICD-10-CM

## 2019-05-07 DIAGNOSIS — J30.9 ALLERGIC RHINITIS: Primary | ICD-10-CM

## 2019-05-07 DIAGNOSIS — Z53.9 ERRONEOUS ENCOUNTER--DISREGARD: Primary | ICD-10-CM

## 2019-05-07 DIAGNOSIS — J30.81 CHRONIC ALLERGIC RHINITIS DUE TO ANIMAL HAIR AND DANDER: ICD-10-CM

## 2019-05-07 DIAGNOSIS — J30.1 CHRONIC SEASONAL ALLERGIC RHINITIS DUE TO POLLEN: ICD-10-CM

## 2019-05-07 PROCEDURE — 95117 IMMUNOTHERAPY INJECTIONS: CPT

## 2019-05-07 PROCEDURE — 99207 ZZC DROP WITH A PROCEDURE: CPT

## 2019-05-07 NOTE — TELEPHONE ENCOUNTER
ALLERGY SOLUTION RE-ORDER REQUEST    Dilia Solomon 1965 MRN: [id number]    DATE NEEDED:  05/21/19  Vial Color Content   Top Dose   Last Dose Vial Size  Red 1:1 Weeds   Red 1:1 0.5   Red 1:10.5 5mL  Red 1:1 Grass, Trees   Red 1:1 0.5   Red 1:10.5 5mL  Red 1:1 Molds   Red 1:1 0.5   Red 1:10.5 5mL  Red 1:1 Cat, Dog, Dust Mite   Red 1:1 0.5   Red 1:10.5 5mL      Serum reorder consent signed and patient/parent was advised that new serums would be ordered through the pharmacy and billed to their insurance company when they arrive in clinic. Yes    Shot Clinic Location:  [place]  Ship to Location: [place]  Serum billed to:  [facility]    Special Instructions:        Updated Prescription Needed: No      Requester Signature  Silver Araujo

## 2019-05-17 DIAGNOSIS — J30.2 SEASONAL ALLERGIC RHINITIS: Primary | ICD-10-CM

## 2019-05-17 PROCEDURE — 95165 ANTIGEN THERAPY SERVICES: CPT | Performed by: ALLERGY & IMMUNOLOGY

## 2019-05-17 NOTE — PROGRESS NOTES
Allergy serums billed at [place].     Vials received below:    Vial Color Content                      Vial Size Expiration Date  Red 1:1 Grass, Trees 5mL  05/16/20  Red 1:1 Cat, Dog, Dust Mite 5mL  05/16/20  Red 1:1 Weeds 5mL  05/16/20  Red 1:1 Molds 5mL  05/16/20    Original Refill encounter date: 05/07/19      Signature  Silver Araujo

## 2019-05-17 NOTE — TELEPHONE ENCOUNTER
Allergy serums received at [place].     Vials received below:    Vial Color Content                      Vial Size Expiration Date  Red 1:1 Grass, Trees 5mL  05/16/20  Red 1:1 Cat, Dog, Dust Mite 5mL  05/16/20  Red 1:1 Weeds 5mL  05/16/20  Red 1:1 Molds 5mL  05/16/20      Signature  Silver Araujo

## 2019-05-28 ENCOUNTER — ALLIED HEALTH/NURSE VISIT (OUTPATIENT)
Dept: ALLERGY | Facility: CLINIC | Age: 54
End: 2019-05-28
Payer: COMMERCIAL

## 2019-05-28 DIAGNOSIS — J30.9 ALLERGIC RHINITIS: Primary | ICD-10-CM

## 2019-05-28 PROCEDURE — 99207 ZZC DROP WITH A PROCEDURE: CPT

## 2019-05-28 PROCEDURE — 95117 IMMUNOTHERAPY INJECTIONS: CPT

## 2019-06-04 ENCOUNTER — OFFICE VISIT (OUTPATIENT)
Dept: ALLERGY | Facility: CLINIC | Age: 54
End: 2019-06-04
Payer: COMMERCIAL

## 2019-06-04 ENCOUNTER — ALLIED HEALTH/NURSE VISIT (OUTPATIENT)
Dept: ALLERGY | Facility: CLINIC | Age: 54
End: 2019-06-04
Payer: COMMERCIAL

## 2019-06-04 VITALS
OXYGEN SATURATION: 96 % | HEIGHT: 63 IN | TEMPERATURE: 97.1 F | BODY MASS INDEX: 38.67 KG/M2 | SYSTOLIC BLOOD PRESSURE: 119 MMHG | WEIGHT: 218.26 LBS | DIASTOLIC BLOOD PRESSURE: 75 MMHG | HEART RATE: 83 BPM | RESPIRATION RATE: 16 BRPM

## 2019-06-04 DIAGNOSIS — L30.9 ECZEMA, UNSPECIFIED TYPE: ICD-10-CM

## 2019-06-04 DIAGNOSIS — J30.89 ALLERGIC RHINITIS DUE TO MOLD: ICD-10-CM

## 2019-06-04 DIAGNOSIS — Z91.09 OTHER ALLERGY STATUS, OTHER THAN TO DRUGS AND BIOLOGICAL SUBSTANCES: ICD-10-CM

## 2019-06-04 DIAGNOSIS — J30.9 ALLERGIC RHINITIS: Primary | ICD-10-CM

## 2019-06-04 DIAGNOSIS — H10.13 ALLERGIC CONJUNCTIVITIS, BILATERAL: ICD-10-CM

## 2019-06-04 DIAGNOSIS — J30.1 CHRONIC SEASONAL ALLERGIC RHINITIS DUE TO POLLEN: ICD-10-CM

## 2019-06-04 DIAGNOSIS — J45.40 MODERATE PERSISTENT ASTHMA WITHOUT COMPLICATION: Primary | ICD-10-CM

## 2019-06-04 DIAGNOSIS — J30.89 ALLERGIC RHINITIS DUE TO DUST MITE: ICD-10-CM

## 2019-06-04 DIAGNOSIS — J30.81 CHRONIC ALLERGIC RHINITIS DUE TO ANIMAL HAIR AND DANDER: ICD-10-CM

## 2019-06-04 LAB
FEF 25/75: NORMAL
FEV-1: NORMAL
FEV1/FVC: NORMAL
FVC: NORMAL

## 2019-06-04 PROCEDURE — 94010 BREATHING CAPACITY TEST: CPT | Performed by: ALLERGY & IMMUNOLOGY

## 2019-06-04 PROCEDURE — 99214 OFFICE O/P EST MOD 30 MIN: CPT | Mod: 25 | Performed by: ALLERGY & IMMUNOLOGY

## 2019-06-04 PROCEDURE — 95117 IMMUNOTHERAPY INJECTIONS: CPT

## 2019-06-04 PROCEDURE — 99207 ZZC DROP WITH A PROCEDURE: CPT

## 2019-06-04 RX ORDER — FLUTICASONE PROPIONATE 50 MCG
2 SPRAY, SUSPENSION (ML) NASAL DAILY
Qty: 48 G | Refills: 1 | Status: SHIPPED | OUTPATIENT
Start: 2019-06-04 | End: 2020-02-14

## 2019-06-04 RX ORDER — AZELASTINE 1 MG/ML
2 SPRAY, METERED NASAL 2 TIMES DAILY PRN
Qty: 30 ML | Refills: 3 | Status: SHIPPED | OUTPATIENT
Start: 2019-06-04 | End: 2019-12-06

## 2019-06-04 RX ORDER — MONTELUKAST SODIUM 10 MG/1
10 TABLET ORAL AT BEDTIME
Qty: 90 TABLET | Refills: 1 | Status: SHIPPED | OUTPATIENT
Start: 2019-06-04 | End: 2019-12-06

## 2019-06-04 RX ORDER — TRIAMCINOLONE ACETONIDE 1 MG/G
CREAM TOPICAL
Qty: 80 G | Refills: 1 | Status: SHIPPED | OUTPATIENT
Start: 2019-06-04 | End: 2019-11-12

## 2019-06-04 ASSESSMENT — ENCOUNTER SYMPTOMS
WHEEZING: 0
ACTIVITY CHANGE: 0
MYALGIAS: 0
FACIAL SWELLING: 0
NAUSEA: 0
EYE REDNESS: 0
CHEST TIGHTNESS: 0
COUGH: 0
ADENOPATHY: 0
DIARRHEA: 0
FEVER: 0
RHINORRHEA: 0
SINUS PRESSURE: 0
ARTHRALGIAS: 0
VOMITING: 0
JOINT SWELLING: 0
EYE DISCHARGE: 0
EYE ITCHING: 1
CHILLS: 0
HEADACHES: 0
SHORTNESS OF BREATH: 0

## 2019-06-04 ASSESSMENT — ASTHMA QUESTIONNAIRES
QUESTION_5 LAST FOUR WEEKS HOW WOULD YOU RATE YOUR ASTHMA CONTROL: WELL CONTROLLED
QUESTION_4 LAST FOUR WEEKS HOW OFTEN HAVE YOU USED YOUR RESCUE INHALER OR NEBULIZER MEDICATION (SUCH AS ALBUTEROL): NOT AT ALL
ACT_TOTALSCORE: 22
QUESTION_1 LAST FOUR WEEKS HOW MUCH OF THE TIME DID YOUR ASTHMA KEEP YOU FROM GETTING AS MUCH DONE AT WORK, SCHOOL OR AT HOME: A LITTLE OF THE TIME
QUESTION_3 LAST FOUR WEEKS HOW OFTEN DID YOUR ASTHMA SYMPTOMS (WHEEZING, COUGHING, SHORTNESS OF BREATH, CHEST TIGHTNESS OR PAIN) WAKE YOU UP AT NIGHT OR EARLIER THAN USUAL IN THE MORNING: ONCE OR TWICE
QUESTION_2 LAST FOUR WEEKS HOW OFTEN HAVE YOU HAD SHORTNESS OF BREATH: NOT AT ALL

## 2019-06-04 ASSESSMENT — MIFFLIN-ST. JEOR: SCORE: 1564

## 2019-06-04 NOTE — LETTER
6/4/2019         RE: Dilia Solomon  [address] [facility] [address]        Dear Colleague,    Thank you for referring your patient, Dilia Solomon, to the [facility]. Please see a copy of my visit note below.    SUBJECTIVE:                                                                   Dilia Solomon presents today to our Allergy Clinic at [facility] for a follow up visit.  She is a 54-year-old female with allergic rhinitis and asthma.    She was diagnosed with asthma approximately in [id number]. Triggers are environmental allergies and viral respiratory infections.   Serum IgE for regional aeroallergen panel performed in June 2017 with multiple levels of various sensitivities to molds, cat, dog, dust mite, pollen of trees, grasses, and weeds.  She had 2 sinus surgeries in the past. The last one was in 2018.   She started allergen immunotherapy in July 2017. She was on allergen immunotherapy before that with Dr. Haines, and it was helpful at that time; however, symptoms got worse soon after stopping it.    Allergy Immunotherapy  Date/time of injection(s): 6/4/2019    Vial Color Content  Dose  Red 1:1 Weeds  0.4mL  Red 1:1 Grass, Trees  0.4mL   Red 1:1 Molds  0.4mL  Red 1:1 Cat, Dog, Dust Mite  0.4mL    She tolerates injections well without persistent large local reactions or systemic reactions. She has been on the maintenance dose since January'2018. The current dose is decreased due to the new vial change.     Regarding her allergic rhinitis, she has been using sinus rinses once-twice daily, Flonase 2 sprays in each nostril once daily, and taking loratadine in the morning, cetirizine at bedtime, and montelukast daily. She reports a significant improvement in symptoms. She uses azelastine eye drops twice daily. She wasn't aware she could have used them as needed. The patient denies clear rhinorrhea, nasal itch, stuffiness, sneezing, or interval sinusitis symptoms of  fever, facial pain, or purulent rhinorrhea.  Regarding her asthma, she has been using Advair 250/50 mcg 1 puff twice daily and taking montelukast 10 mg by mouth once daily at bedtime.  Dilia is using albuterol HFA  less than twice per week for rescue from chest symptoms. She is waking up less than twice per month due to chest symptoms.  There has been no use of oral steroids since the last visit. No ED/PCP/urgent care/other specialist visits for asthma flare since the last visit. The patient denies current chest tightness, cough, wheeze, or shortness of breath.      She has a history of rash on her palms and soles. Sometimes the skin can get cracked, or she may develop vesicles.  She uses Cerave and triamcinolone daily. She uses triamcinolone daily, even if she has no issues. She uses Sandra soap frequently.      Patient Active Problem List   Diagnosis     Need for prophylactic vaccination and inoculation against influenza     Sprain of interphalangeal (joint) of hand     Radial styloid tenosynovitis     Carpal tunnel syndrome     Synovial cyst of popliteal space     Pain in joint, lower leg     Hyperlipidemia LDL goal <130     Advanced directives, counseling/discussion     Moderate persistent asthma     Allergic rhinitis due to dust mite     Food allergy     FH: diabetes mellitus     Chronic allergic rhinitis due to animal hair and dander     Allergic rhinitis due to mold     Chronic seasonal allergic rhinitis due to pollen     Obesity (BMI 35.0-39.9) with comorbidity (H)       Past Medical History:   Diagnosis Date     Injury, other and unspecified, shoulder and upper arm 9/03      Problem (# of Occurrences) Relation (Name,Age of Onset)    Breast Cancer (1) Maternal Aunt    C.A.D. (1) Mother    Cancer (1) Father: brain    Diabetes (1) Mother       Negative family history of: Cancer - colorectal        Past Surgical History:   Procedure Laterality Date     COLONOSCOPY N/A 10/6/2016    Procedure: COLONOSCOPY;  Surgeon:  Shiva Johns MD;  Location: [place] [facility]     ETHMOIDECTOMY  12/30/2013    Procedure: ETHMOIDECTOMY;  Bilateral Submucousal Reduction of InferiorTurbinates and Total Ethmoidectomy with Multiple Sinusotomies;  Surgeon: Lio More MD;  Location: [facility] [facility]     ETHMOIDECTOMY Bilateral 2/14/2018    Procedure: ETHMOIDECTOMY;  Bilateral anterior ethmoidectomy, bilateral maxillary antrostomy;  Surgeon: Santhosh Tierney MD;  Location: [place] [facility]     SURGICAL HISTORY OF -   5/04    carpal tunnel release, bilateral     Social History     Socioeconomic History     Marital status: Single     Spouse name: None     Number of children: None     Years of education: None     Highest education level: None   Occupational History     None   Social Needs     Financial resource strain: None     Food insecurity:     Worry: None     Inability: None     Transportation needs:     Medical: None     Non-medical: None   Tobacco Use     Smoking status: Never Smoker     Smokeless tobacco: Never Used   Substance and Sexual Activity     Alcohol use: No     Drug use: No     Sexual activity: Never   Lifestyle     Physical activity:     Days per week: None     Minutes per session: None     Stress: None   Relationships     Social connections:     Talks on phone: None     Gets together: None     Attends Taoist service: None     Active member of club or organization: None     Attends meetings of clubs or organizations: None     Relationship status: None     Intimate partner violence:     Fear of current or ex partner: None     Emotionally abused: None     Physically abused: None     Forced sexual activity: None   Other Topics Concern     Parent/sibling w/ CABG, MI or angioplasty before 65F 55M? Yes     Comment: mother   Social History Narrative    June 4, 2019    ENVIRONMENTAL HISTORY: [place] The home is heated with a forced air. They do not have central air conditioning. The patient's bedroom is furnished with  hard pawel in bedroom, allergen mattress cover, allergen pillowcase cover and fabric window coverings.  Pets inside the house include 1 dog. There is not history of cockroach or mice infestation. There are no smokers in the house.  The house does have a damp basement.            Review of Systems   Constitutional: Negative for activity change, chills and fever.   HENT: Negative for congestion, dental problem, ear pain, facial swelling, nosebleeds, postnasal drip, rhinorrhea, sinus pressure and sneezing.    Eyes: Positive for itching. Negative for discharge and redness.   Respiratory: Negative for cough, chest tightness, shortness of breath and wheezing.    Cardiovascular: Negative for chest pain.   Gastrointestinal: Negative for diarrhea, nausea and vomiting.   Musculoskeletal: Negative for arthralgias, joint swelling and myalgias.   Skin: Negative for rash.   Allergic/Immunologic: Positive for environmental allergies and food allergies.   Neurological: Negative for headaches.   Hematological: Negative for adenopathy.   Psychiatric/Behavioral: Negative for behavioral problems and self-injury.           Current Outpatient Medications:      azelastine (ASTELIN) 0.1 % nasal spray, Spray 2 sprays into both nostrils 2 times daily as needed for rhinitis, Disp: 30 mL, Rfl: 3     azelastine (OPTIVAR) 0.05 % ophthalmic solution, Apply 1 drop to eye 2 times daily, Disp: 1 Bottle, Rfl: 5     cetirizine (ZYRTEC) 10 MG tablet, Take 1 tablet (10 mg) by mouth At Bedtime, Disp: 90 tablet, Rfl: 3     Emollient (CERAVE) CREA, Externally apply 1 dose. topically 2 times daily, Disp: 2 Bottle, Rfl: 11     fluticasone (FLONASE) 50 MCG/ACT nasal spray, Spray 2 sprays into both nostrils daily, Disp: 48 g, Rfl: 1     fluticasone-salmeterol (ADVAIR) 250-50 MCG/DOSE inhaler, Inhale 1 puff into the lungs 2 times daily, Disp: 3 Inhaler, Rfl: 1     loratadine (CLARITIN) 10 MG tablet, Take 1 tablet (10 mg) by mouth daily, Disp: 90 tablet, Rfl:  3     montelukast (SINGULAIR) 10 MG tablet, Take 1 tablet (10 mg) by mouth At Bedtime, Disp: 90 tablet, Rfl: 1     MULTIVITAMIN OR, 1 tab daily, Disp: , Rfl:      ORDER FOR ALLERGEN IMMUNOTHERAPY, Name of Mix: Mix #1  Mold Alternaria Tenuis 1:10 w/v, HS  0.5 ml Aspergillus Fumigatus 1:10 w/v, HS  0.5 ml Epicoccum Nigrum 1:10 w/v, HS 0.5 ml Hormodendrum Cladosporioides 1:10 w/v, HS 0.5 ml Penicillium Mix 1:10 w/v, HS  0.5 ml Diluent: HSA qs to 5ml, Disp: 5 mL, Rfl: PRN     ORDER FOR ALLERGEN IMMUNOTHERAPY, Name of Mix: Mix #2  Dust Mite, Cat, Dog Cat Hair, Standardized 10,000 BAU/mL, ALK  2.0 ml Dog Hair Dander, A. P.  1:100 w/v, HS  1.0 ml Dust Mites F 30,000AU/mL, HS  0.3 ml Dust Mites P. 30,000 AU/mL, HS  0.3 ml  Diluent: HSA qs to 5ml, Disp: 5 mL, Rfl: PRN     ORDER FOR ALLERGEN IMMUNOTHERAPY, Name of Mix: Mix #3 Grass,Tree  Dmitry,White 1:20w/v, HS 0.5ml Birch Mix PRW 1:20w/v, HS 0.5ml Boxelder-Maple Mix BHR (Boxelder Hard Red) 1:20w/v, HS 0.5ml [place],Common 1:20w/v, HS 0.5ml Elm,American 1:20w/v, HS 0.5ml Jericho Mix RW 1:20w/v, HS 0.5ml Oak Mix RVW 1:20w/v, HS 0.5ml [place] Tree,Black 1:20w/v, HS 0.5ml Grass Mix #7 100,000 BAU/mL, HS 0.4ml Jonathan Grass 1:20w/v, HS 0.5ml Diluent: HSA qs to 5ml, Disp: 5 mL, Rfl: PRN     ORDER FOR ALLERGEN IMMUNOTHERAPY, Name of Mix: Mix #4  Weeds Kochia 1:20 w/v, HS 0.5 ml Lamb's Quarters 1:20 w/v, HS 0.5 ml Nettle 1:20 w/v, HS 0.5 ml Plantain, English 1:20 w/v, HS 0.5 ml Ragweed Mixed 1:20 w/v ALK  0.5 ml Russian Thistle 1:20 w/v, HS 0.5 ml Sagebrush, Mugwort 1:20 w/v, HS 0.5 ml Sorrel, Sheep 1:20 w/v, HS 0.5 ml Diluent: HSA qs to 5ml, Disp: 5 mL, Rfl: PRN     order for DME, Equipment being ordered: Dynaflex insert, Disp: 1 Units, Rfl: 0     triamcinolone (KENALOG) 0.1 % external cream, Apply sparingly to affected area two times daily as needed but not more than 14 days in a row. Spare face, armpits, neck, and groin., Disp: 80 g, Rfl: 1     albuterol (2.5 MG/3ML) 0.083% neb  "solution, Take 1 vial (2.5 mg) by nebulization every 4 hours as needed (Patient not taking: Reported on 6/4/2019), Disp: 1 Box, Rfl: 3     albuterol (PROAIR HFA/PROVENTIL HFA/VENTOLIN HFA) 108 (90 Base) MCG/ACT inhaler, Inhale 2 puffs into the lungs every 4 hours as needed for shortness of breath / dyspnea or wheezing (Patient not taking: Reported on 6/4/2019), Disp: 1 Inhaler, Rfl: 3     EPINEPHrine (EPIPEN/ADRENACLICK/OR ANY BX GENERIC EQUIV) 0.3 MG/0.3ML injection 2-pack, Inject 0.3 mLs (0.3 mg) into the muscle once as needed for anaphylaxis (Patient not taking: Reported on 6/4/2019), Disp: 0.6 mL, Rfl: 3     order for DME, Equipment being ordered: thumb spica splint RIGHT (Patient not taking: Reported on 6/4/2019), Disp: 1 Units, Rfl: 0     order for DME, Equipment being ordered: Silicone heel cup (Patient not taking: Reported on 6/4/2019), Disp: 1 Units, Rfl: 0     order for DME, Equipment being ordered: Silicone heel cup (Patient not taking: Reported on 6/4/2019), Disp: 1 Units, Rfl: 0  Immunization History   Administered Date(s) Administered     Influenza (IIV3) PF 12/12/2002, 11/28/2003, 10/24/2006, 02/11/2009, 10/05/2011, 11/15/2012, 10/07/2014     Influenza Quad, Recombinant, p-free (RIV4) 10/02/2018     Influenza Vaccine IM 3yrs+ 4 Valent IIV4 10/16/2013, 10/20/2015, 10/26/2016, 11/21/2017     Mantoux Tuberculin Skin Test 02/11/2009     Pneumococcal 23 valent 10/05/2011     TDAP Vaccine (Adacel) 02/11/2009     Allergies   Allergen Reactions     Clinoril [Nsaids] Hives     Tolerates ibuprofen and aspirin without hives     Pineapple Hives     OBJECTIVE:                                                                 /75 (BP Location: Left arm, Patient Position: Sitting, Cuff Size: Adult Regular)   Pulse 83   Temp 97.1  F (36.2  C) (Oral)   Resp 16   Ht 1.6 m (5' 2.99\")   Wt 99 kg (218 lb 4.1 oz)   LMP 07/18/2015 (Approximate)   SpO2 96%   BMI 38.67 kg/m           Physical Exam "   Constitutional: She is oriented to person, place, and time. No distress.   HENT:   Head: Normocephalic and atraumatic.   Right Ear: Tympanic membrane, external ear and ear canal normal.   Left Ear: Tympanic membrane, external ear and ear canal normal.   Nose: No mucosal edema or rhinorrhea.   Mouth/Throat: Oropharynx is clear and moist and mucous membranes are normal.   Eyes: Conjunctivae are normal. Right eye exhibits no discharge. Left eye exhibits no discharge.   Neck: Normal range of motion.   Cardiovascular: Normal rate, regular rhythm and normal heart sounds.   No murmur heard.  Pulmonary/Chest: Effort normal and breath sounds normal. No respiratory distress. She has no wheezes. She has no rales.   Musculoskeletal: Normal range of motion.   Lymphadenopathy:     She has no cervical adenopathy.   Neurological: She is alert and oriented to person, place, and time.   Skin: Skin is warm. She is not diaphoretic.   Moderate xerosis of the skin of palms and dorsal aspect of hands. No active dermatitis.   Psychiatric: She has a normal mood and affect. Her behavior is normal.   Nursing note and vitals reviewed.      WORKUP:   SPIROMETRY       FVC 2.81L (84% of predicted).     FEV1 2.36L (89% of predicted).     FEV1/FVC 84%     FEF 25%-75%  3.12L/s (121% of predicted)    My interpretation: The office spirometry performed today doesn't suggest an obstruction.      Asthma Control Test (ACT) total score: 22      ASSESSMENT/PLAN:    1.  Moderate persistent asthma without complication  (primary encounter diagnosis)    Currently well controlled with Advair 250/50 micrograms 1 puff twice daily, montelukast 10 mg by mouth daily at bedtime and albuterol inhaler as needed.  -Continue as is.     Spirometry, Breathing Capacity,         fluticasone-salmeterol (ADVAIR) 250-50 MCG/DOSE        inhaler, montelukast (SINGULAIR) 10 MG tablet      2. Chronic allergic rhinitis due to animal hair and dander 3. Chronic seasonal allergic  rhinitis due to pollen 4. Allergic rhinitis due to mold  5. Allergic rhinitis due to dust mite 6. Other allergy status, other than to drugs and biological substances 7. Allergic conjunctivitis, bilateral    Currently well controlled with allergen immunotherapy, sinus rinses once-twice daily, intranasal fluticasone 2 sprays in each nostril once daily, loratadine 10 mg by mouth in the morning, cetirizine 10 mg by mouth at bedtime, montelukast 10 mg by mouth at bedtime, and azelastine eyedrops 1 drop in each eye twice daily.    The patient is satisfied with the efficacy of allergen immunotherapy and would like to continue it further.  Considering that her symptoms relapsed within 1 year after completing allergen immunotherapy for 5 years, I anticipate long-term treatment.  -Continue intranasal fluticasone 2 sprays in each nostril once daily.  -Transition cetirizine and loratadine on as-needed basis.  Is [id number], I suggest trying azelastine 2 sprays in each nostril twice daily as needed.  -Continue azelastine antihistamine eyedrops, but I would suggest transitioning them on the basis as well.      azelastine (ASTELIN) 0.1 % nasal spray,         fluticasone (FLONASE) 50 MCG/ACT nasal spray,         montelukast (SINGULAIR) 10 MG tablet                8. Eczema, unspecified type  Explained about the importance of keeping the cycle of xerosis of the skin-pruritus-dermatitis under control.  Skin care regimen reviewed with the patient: Eliminate harsh soaps, i.e., Dial, zest, Chadian spring;  Use mild soaps such as Cetaphil or Dove sensitive skin, avoid hot or cold showers, aggressive use of moisturizers including Vanicream, Cetaphil or Aquaphor.     Emollient (CERAVE) CREA, triamcinolone         (KENALOG) 0.1 % external cream            Return in about 6 months (around 12/4/2019), or if symptoms worsen or fail to improve.    Thank you for allowing us to participate in the care of this patient. Please feel free to  contact us if there are any questions or concerns about the patient.    Disclaimer: This note consists of symbols derived from keyboarding, dictation and/or voice recognition software. As a result, there may be errors in the script that have gone undetected. Please consider this when interpreting information found in this chart.    Michael Lujan MD, FAAAAI, FACAAI  Allergy, Asthma and Immunology  [place], [place] and [place]    Again, thank you for allowing me to participate in the care of your patient.        Sincerely,        Michael Lujan MD

## 2019-06-04 NOTE — LETTER
My Asthma Action Plan  Name: Dilia Solomon   YOB: 1965  Date:6/4/2019     My doctor: Butch Horowitz MD   My clinic: [facility]        My Control Medicine: Fluticasone + salmeterol (Advair) -  Diskus 250/50 mcg Take 1 puff twice daily  Montelukast (Singulair) -  10 mg Take 1 tablet daily  My Rescue Medicine: Albuterol (Proair/Ventolin/Proventil) inhaler Take 2 to 4 puffs every 4 hours as needed   My Asthma Severity: moderate persistent  Avoid your asthma triggers: smoke and upper respiratory infections, pollen               GREEN ZONE   Good Control    I feel good    No cough or wheeze    Can work, sleep and play without asthma symptoms       Take your asthma control medicine every day.     1. If exercise triggers your asthma, take your rescue medication    15 minutes before exercise or sports, and    During exercise if you have asthma symptoms  2. Spacer to use with inhaler: If you have a spacer, make sure to use it with your inhaler             YELLOW ZONE Getting Worse  I have ANY of these:    I do not feel good    Cough or wheeze    Chest feels tight    Wake up at night   1. Keep taking your Green Zone medications  2. Start taking your rescue medicine:    every 20 minutes for up to 1 hour. Then every 4 hours for 24-48 hours.  3. If you stay in the Yellow Zone for more than 12-24 hours, contact your doctor.           RED ZONE Medical Alert - Get Help  I have ANY of these:    I feel awful    Medicine is not helping    Breathing getting harder    Trouble walking or talking    Nose opens wide to breathe       1. Take your rescue medicine NOW  2. If your provider has prescribed an oral steroid medicine, start taking it NOW  3. Call your doctor NOW  4. If you are still in the Red Zone after 20 minutes and you have not reached your doctor:    Take your rescue medicine again and    Call 911 or go to the emergency room right away    See your regular doctor within 2 weeks of an Emergency Room  or Urgent Care visit for follow-up treatment.          Annual Reminders:  Meet with Asthma Educator,  Flu Shot in the Fall, consider Pneumonia Vaccination for patients with asthma (aged 19 and older).    Pharmacy[facility]    [facility] [place] [place] [address] [facility] [place] [address] [facility] - [place], [place] - [address] [facility]                      Asthma Triggers  How To Control Things That Make Your Asthma Worse    Triggers are things that make your asthma worse.  Look at the list below to help you find your triggers and what you can do about them.  You can help prevent asthma flare-ups by staying away from your triggers.      Trigger                                                          What you can do   Cigarette Smoke  Tobacco smoke can make asthma worse. Do not allow smoking in your home, car or around you.  Be sure no one smokes at a child s day care or school.  If you smoke, ask your health care provider for ways to help you quit.  Ask family members to quit too.  Ask your health care provider for a referral to Quit Plan to help you quit smoking, or call 3-130-109-PLAN.     Colds, Flu, Bronchitis  These are common triggers of asthma. Wash your hands often.  Don t touch your eyes, nose or mouth.  Get a flu shot every year.     Dust Mites  These are tiny bugs that live in cloth or carpet. They are too small to see. Wash sheets and blankets in hot water every week.   Encase pillows and mattress in dust mite proof covers.  Avoid having carpet if you can. If you have carpet, vacuum weekly.   Use a dust mask and HEPA vacuum.   Pollen and Outdoor Mold  Some people are allergic to trees, grass, or weed pollen, or molds. Try to keep your windows closed.  Limit time out doors when pollen count is high.   Ask you health care provider about taking medicine during allergy season.     Animal Dander  Some people are  allergic to skin flakes, urine or saliva from pets with fur or feathers. Keep pets with fur or feathers out of your home.    If you can t keep the pet outdoors, then keep the pet out of your bedroom.  Keep the bedroom door closed.  Keep pets off cloth furniture and away from stuffed toys.     Mice, Rats, and Cockroaches  Some people are allergic to the waste from these pests.   Cover food and garbage.  Clean up spills and food crumbs.  Store grease in the refrigerator.   Keep food out of the bedroom.   Indoor Mold  This can be a trigger if your home has high moisture. Fix leaking faucets, pipes, or other sources of water.   Clean moldy surfaces.  Dehumidify basement if it is damp and smelly.   Smoke, Strong Odors, and Sprays  These can reduce air quality. Stay away from strong odors and sprays, such as perfume, powder, hair spray, paints, smoke incense, paint, cleaning products, candles and new carpet.   Exercise or Sports  Some people with asthma have this trigger. Be active!  Ask your doctor about taking medicine before sports or exercise to prevent symptoms.    Warm up for 5-10 minutes before and after sports or exercise.     Other Triggers of Asthma  Cold air:  Cover your nose and mouth with a scarf.  Sometimes laughing or crying can be a trigger.  Some medicines and food can trigger asthma.

## 2019-06-04 NOTE — PROGRESS NOTES
SUBJECTIVE:                                                                   Dilia Solomon presents today to our Allergy Clinic at [facility] for a follow up visit.  She is a 54-year-old female with allergic rhinitis and asthma.    She was diagnosed with asthma approximately in 3587-5820. Triggers are environmental allergies and viral respiratory infections.   Serum IgE for regional aeroallergen panel performed in June 2017 with multiple levels of various sensitivities to molds, cat, dog, dust mite, pollen of trees, grasses, and weeds.  She had 2 sinus surgeries in the past. The last one was in 2018.   She started allergen immunotherapy in July 2017. She was on allergen immunotherapy before that with Dr. Haines, and it was helpful at that time; however, symptoms got worse soon after stopping it.    Allergy Immunotherapy  Date/time of injection(s): 6/4/2019    Vial Color Content  Dose  Red 1:1 Weeds  0.4mL  Red 1:1 Grass, Trees  0.4mL   Red 1:1 Molds  0.4mL  Red 1:1 Cat, Dog, Dust Mite  0.4mL    She tolerates injections well without persistent large local reactions or systemic reactions. She has been on the maintenance dose since January'2018. The current dose is decreased due to the new vial change.     Regarding her allergic rhinitis, she has been using sinus rinses once-twice daily, Flonase 2 sprays in each nostril once daily, and taking loratadine in the morning, cetirizine at bedtime, and montelukast daily. She reports a significant improvement in symptoms. She uses azelastine eye drops twice daily. She wasn't aware she could have used them as needed. The patient denies clear rhinorrhea, nasal itch, stuffiness, sneezing, or interval sinusitis symptoms of fever, facial pain, or purulent rhinorrhea.  Regarding her asthma, she has been using Advair 250/50 mcg 1 puff twice daily and taking montelukast 10 mg by mouth once daily at bedtime.  Dilia is using albuterol HFA  less than twice per week for  rescue from chest symptoms. She is waking up less than twice per month due to chest symptoms.  There has been no use of oral steroids since the last visit. No ED/PCP/urgent care/other specialist visits for asthma flare since the last visit. The patient denies current chest tightness, cough, wheeze, or shortness of breath.      She has a history of rash on her palms and soles. Sometimes the skin can get cracked, or she may develop vesicles.  She uses Cerave and triamcinolone daily. She uses triamcinolone daily, even if she has no issues. She uses Sandra soap frequently.      Patient Active Problem List   Diagnosis     Need for prophylactic vaccination and inoculation against influenza     Sprain of interphalangeal (joint) of hand     Radial styloid tenosynovitis     Carpal tunnel syndrome     Synovial cyst of popliteal space     Pain in joint, lower leg     Hyperlipidemia LDL goal <130     Advanced directives, counseling/discussion     Moderate persistent asthma     Allergic rhinitis due to dust mite     Food allergy     FH: diabetes mellitus     Chronic allergic rhinitis due to animal hair and dander     Allergic rhinitis due to mold     Chronic seasonal allergic rhinitis due to pollen     Obesity (BMI 35.0-39.9) with comorbidity (H)       Past Medical History:   Diagnosis Date     Injury, other and unspecified, shoulder and upper arm 9/03      Problem (# of Occurrences) Relation (Name,Age of Onset)    Breast Cancer (1) Maternal Aunt    C.A.D. (1) Mother    Cancer (1) Father: brain    Diabetes (1) Mother       Negative family history of: Cancer - colorectal        Past Surgical History:   Procedure Laterality Date     COLONOSCOPY N/A 10/6/2016    Procedure: COLONOSCOPY;  Surgeon: Shiva Johns MD;  Location: [facility]     ETHMOIDECTOMY  12/30/2013    Procedure: ETHMOIDECTOMY;  Bilateral Submucousal Reduction of InferiorTurbinates and Total Ethmoidectomy with Multiple Sinusotomies;  Surgeon: Lio More MD;   Location: [facility]     ETHMOIDECTOMY Bilateral 2/14/2018    Procedure: ETHMOIDECTOMY;  Bilateral anterior ethmoidectomy, bilateral maxillary antrostomy;  Surgeon: Santhosh Tierney MD;  Location: [facility]     SURGICAL HISTORY OF -   5/04    carpal tunnel release, bilateral     Social History     Socioeconomic History     Marital status: Single     Spouse name: None     Number of children: None     Years of education: None     Highest education level: None   Occupational History     None   Social Needs     Financial resource strain: None     Food insecurity:     Worry: None     Inability: None     Transportation needs:     Medical: None     Non-medical: None   Tobacco Use     Smoking status: Never Smoker     Smokeless tobacco: Never Used   Substance and Sexual Activity     Alcohol use: No     Drug use: No     Sexual activity: Never   Lifestyle     Physical activity:     Days per week: None     Minutes per session: None     Stress: None   Relationships     Social connections:     Talks on phone: None     Gets together: None     Attends Buddhism service: None     Active member of club or organization: None     Attends meetings of clubs or organizations: None     Relationship status: None     Intimate partner violence:     Fear of current or ex partner: None     Emotionally abused: None     Physically abused: None     Forced sexual activity: None   Other Topics Concern     Parent/sibling w/ CABG, MI or angioplasty before 65F 55M? Yes     Comment: mother   Social History Narrative    June 4, 2019    ENVIRONMENTAL HISTORY: [place] The [place] is heated with a forced air. They do not have central air conditioning. The patient's bedroom is furnished with hard pawel in bedroom, allergen mattress cover, allergen pillowcase cover and fabric window coverings.  Pets inside the house include 1 dog. There is not history of cockroach or mice infestation. There are no smokers in the house.  The  house does have a damp basement.            Review of Systems   Constitutional: Negative for activity change, chills and fever.   HENT: Negative for congestion, dental problem, ear pain, facial swelling, nosebleeds, postnasal drip, rhinorrhea, sinus pressure and sneezing.    Eyes: Positive for itching. Negative for discharge and redness.   Respiratory: Negative for cough, chest tightness, shortness of breath and wheezing.    Cardiovascular: Negative for chest pain.   Gastrointestinal: Negative for diarrhea, nausea and vomiting.   Musculoskeletal: Negative for arthralgias, joint swelling and myalgias.   Skin: Negative for rash.   Allergic/Immunologic: Positive for environmental allergies and food allergies.   Neurological: Negative for headaches.   Hematological: Negative for adenopathy.   Psychiatric/Behavioral: Negative for behavioral problems and self-injury.           Current Outpatient Medications:      azelastine (ASTELIN) 0.1 % nasal spray, Spray 2 sprays into both nostrils 2 times daily as needed for rhinitis, Disp: 30 mL, Rfl: 3     azelastine (OPTIVAR) 0.05 % ophthalmic solution, Apply 1 drop to eye 2 times daily, Disp: 1 Bottle, Rfl: 5     cetirizine (ZYRTEC) 10 MG tablet, Take 1 tablet (10 mg) by mouth At Bedtime, Disp: 90 tablet, Rfl: 3     Emollient (CERAVE) CREA, Externally apply 1 dose. topically 2 times daily, Disp: 2 Bottle, Rfl: 11     fluticasone (FLONASE) 50 MCG/ACT nasal spray, Spray 2 sprays into both nostrils daily, Disp: 48 g, Rfl: 1     fluticasone-salmeterol (ADVAIR) 250-50 MCG/DOSE inhaler, Inhale 1 puff into the lungs 2 times daily, Disp: 3 Inhaler, Rfl: 1     loratadine (CLARITIN) 10 MG tablet, Take 1 tablet (10 mg) by mouth daily, Disp: 90 tablet, Rfl: 3     montelukast (SINGULAIR) 10 MG tablet, Take 1 tablet (10 mg) by mouth At Bedtime, Disp: 90 tablet, Rfl: 1     MULTIVITAMIN OR, 1 tab daily, Disp: , Rfl:      ORDER FOR ALLERGEN IMMUNOTHERAPY, Name of Mix: Mix #1  Mold Alternaria  Tenuis 1:10 w/v, HS  0.5 ml Aspergillus Fumigatus 1:10 w/v, HS  0.5 ml Epicoccum Nigrum 1:10 w/v, HS 0.5 ml Hormodendrum Cladosporioides 1:10 w/v, HS 0.5 ml Penicillium Mix 1:10 w/v, HS  0.5 ml Diluent: HSA qs to 5ml, Disp: 5 mL, Rfl: PRN     ORDER FOR ALLERGEN IMMUNOTHERAPY, Name of Mix: Mix #2  Dust Mite, Cat, Dog Cat Hair, Standardized 10,000 BAU/mL, ALK  2.0 ml Dog Hair Dander, A. P.  1:100 w/v, HS  1.0 ml Dust Mites F 30,000AU/mL, HS  0.3 ml Dust Mites P. 30,000 AU/mL, HS  0.3 ml  Diluent: HSA qs to 5ml, Disp: 5 mL, Rfl: PRN     ORDER FOR ALLERGEN IMMUNOTHERAPY, Name of Mix: Mix #3 Grass,Tree  Dmitry,White 1:20w/v, HS 0.5ml Birch Mix PRW 1:20w/v, HS 0.5ml Boxelder-Maple Mix BHR (Boxelder Hard Red) 1:20w/v, HS 0.5ml [place],Common 1:20w/v, HS 0.5ml Elm,American 1:20w/v, HS 0.5ml [place] Mix RW 1:20w/v, HS 0.5ml Oak Mix RVW 1:20w/v, HS 0.5ml [place] Tree,Black 1:20w/v, HS 0.5ml Grass Mix #7 100,000 BAU/mL, HS 0.4ml Jonathan Grass 1:20w/v, HS 0.5ml Diluent: HSA qs to 5ml, Disp: 5 mL, Rfl: PRN     ORDER FOR ALLERGEN IMMUNOTHERAPY, Name of Mix: Mix #4  Weeds Kochia 1:20 w/v, HS 0.5 ml Lamb's Quarters 1:20 w/v, HS 0.5 ml Nettle 1:20 w/v, HS 0.5 ml Plantain, English 1:20 w/v, HS 0.5 ml Ragweed Mixed 1:20 w/v ALK  0.5 ml Russian Thistle 1:20 w/v, HS 0.5 ml Sagebrush, Mugwort 1:20 w/v, HS 0.5 ml Sorrel, Sheep 1:20 w/v, HS 0.5 ml Diluent: HSA qs to 5ml, Disp: 5 mL, Rfl: PRN     order for DME, Equipment being ordered: Dynaflex insert, Disp: 1 Units, Rfl: 0     triamcinolone (KENALOG) 0.1 % external cream, Apply sparingly to affected area two times daily as needed but not more than 14 days in a row. Spare face, armpits, neck, and groin., Disp: 80 g, Rfl: 1     albuterol (2.5 MG/3ML) 0.083% neb solution, Take 1 vial (2.5 mg) by nebulization every 4 hours as needed (Patient not taking: Reported on 6/4/2019), Disp: 1 Box, Rfl: 3     albuterol (PROAIR HFA/PROVENTIL HFA/VENTOLIN HFA) 108 (90 Base) MCG/ACT inhaler, Inhale 2 puffs  "into the lungs every 4 hours as needed for shortness of breath / dyspnea or wheezing (Patient not taking: Reported on 6/4/2019), Disp: 1 Inhaler, Rfl: 3     EPINEPHrine (EPIPEN/ADRENACLICK/OR ANY BX GENERIC EQUIV) 0.3 MG/0.3ML injection 2-pack, Inject 0.3 mLs (0.3 mg) into the muscle once as needed for anaphylaxis (Patient not taking: Reported on 6/4/2019), Disp: 0.6 mL, Rfl: 3     order for DME, Equipment being ordered: thumb spica splint RIGHT (Patient not taking: Reported on 6/4/2019), Disp: 1 Units, Rfl: 0     order for DME, Equipment being ordered: Silicone heel cup (Patient not taking: Reported on 6/4/2019), Disp: 1 Units, Rfl: 0     order for DME, Equipment being ordered: Silicone heel cup (Patient not taking: Reported on 6/4/2019), Disp: 1 Units, Rfl: 0  Immunization History   Administered Date(s) Administered     Influenza (IIV3) PF 12/12/2002, 11/28/2003, 10/24/2006, 02/11/2009, 10/05/2011, 11/15/2012, 10/07/2014     Influenza Quad, Recombinant, p-free (RIV4) 10/02/2018     Influenza Vaccine IM 3yrs+ 4 Valent IIV4 10/16/2013, 10/20/2015, 10/26/2016, 11/21/2017     Mantoux Tuberculin Skin Test 02/11/2009     Pneumococcal 23 valent 10/05/2011     TDAP Vaccine (Adacel) 02/11/2009     Allergies   Allergen Reactions     Clinoril [Nsaids] Hives     Tolerates ibuprofen and aspirin without hives     Pineapple Hives     OBJECTIVE:                                                                 /75 (BP Location: Left arm, Patient Position: Sitting, Cuff Size: Adult Regular)   Pulse 83   Temp 97.1  F (36.2  C) (Oral)   Resp 16   Ht 1.6 m (5' 2.99\")   Wt 99 kg (218 lb 4.1 oz)   LMP 07/18/2015 (Approximate)   SpO2 96%   BMI 38.67 kg/m          Physical Exam   Constitutional: She is oriented to person, place, and time. No distress.   HENT:   Head: Normocephalic and atraumatic.   Right Ear: Tympanic membrane, external ear and ear canal normal.   Left Ear: Tympanic membrane, external ear and ear canal " normal.   Nose: No mucosal edema or rhinorrhea.   Mouth/Throat: Oropharynx is clear and moist and mucous membranes are normal.   Eyes: Conjunctivae are normal. Right eye exhibits no discharge. Left eye exhibits no discharge.   Neck: Normal range of motion.   Cardiovascular: Normal rate, regular rhythm and normal heart sounds.   No murmur heard.  Pulmonary/Chest: Effort normal and breath sounds normal. No respiratory distress. She has no wheezes. She has no rales.   Musculoskeletal: Normal range of motion.   Lymphadenopathy:     She has no cervical adenopathy.   Neurological: She is alert and oriented to person, place, and time.   Skin: Skin is warm. She is not diaphoretic.   Moderate xerosis of the skin of palms and dorsal aspect of hands. No active dermatitis.   Psychiatric: She has a normal mood and affect. Her behavior is normal.   Nursing note and vitals reviewed.      WORKUP:   SPIROMETRY       FVC 2.81L (84% of predicted).     FEV1 2.36L (89% of predicted).     FEV1/FVC 84%     FEF 25%-75%  3.12L/s (121% of predicted)    My interpretation: The office spirometry performed today doesn't suggest an obstruction.      Asthma Control Test (ACT) total score: 22      ASSESSMENT/PLAN:    1.  Moderate persistent asthma without complication  (primary encounter diagnosis)    Currently well controlled with Advair 250/50 micrograms 1 puff twice daily, montelukast 10 mg by mouth daily at bedtime and albuterol inhaler as needed.  -Continue as is.     Spirometry, Breathing Capacity,         fluticasone-salmeterol (ADVAIR) 250-50 MCG/DOSE        inhaler, montelukast (SINGULAIR) 10 MG tablet      2. Chronic allergic rhinitis due to animal hair and dander 3. Chronic seasonal allergic rhinitis due to pollen 4. Allergic rhinitis due to mold  5. Allergic rhinitis due to dust mite 6. Other allergy status, other than to drugs and biological substances 7. Allergic conjunctivitis, bilateral    Currently well controlled with allergen  immunotherapy, sinus rinses once-twice daily, intranasal fluticasone 2 sprays in each nostril once daily, loratadine 10 mg by mouth in the morning, cetirizine 10 mg by mouth at bedtime, montelukast 10 mg by mouth at bedtime, and azelastine eyedrops 1 drop in each eye twice daily.    The patient is satisfied with the efficacy of allergen immunotherapy and would like to continue it further.  Considering that her symptoms relapsed within 1 year after completing allergen immunotherapy for 5 years, I anticipate long-term treatment.  -Continue intranasal fluticasone 2 sprays in each nostril once daily.  -Transition cetirizine and loratadine on as-needed basis.  Is sngmix-ye-rimy, I suggest trying azelastine 2 sprays in each nostril twice daily as needed.  -Continue azelastine antihistamine eyedrops, but I would suggest transitioning them on the basis as well.      azelastine (ASTELIN) 0.1 % nasal spray,         fluticasone (FLONASE) 50 MCG/ACT nasal spray,         montelukast (SINGULAIR) 10 MG tablet                8. Eczema, unspecified type  Explained about the importance of keeping the cycle of xerosis of the skin-pruritus-dermatitis under control.  Skin care regimen reviewed with the patient: Eliminate harsh soaps, i.e., Dial, zest, Ave spring;  Use mild soaps such as Cetaphil or Dove sensitive skin, avoid hot or cold showers, aggressive use of moisturizers including Vanicream, Cetaphil or Aquaphor.     Emollient (CERAVE) CREA, triamcinolone         (KENALOG) 0.1 % external cream            Return in about 6 months (around 12/4/2019), or if symptoms worsen or fail to improve.    Thank you for allowing us to participate in the care of this patient. Please feel free to contact us if there are any questions or concerns about the patient.    Disclaimer: This note consists of symbols derived from keyboarding, dictation and/or voice recognition software. As a result, there may be errors in the script that have gone  undetected. Please consider this when interpreting information found in this chart.    Michael Lujan MD, FAAAAI, FACAAI  Allergy, Asthma and Immunology  [place], [place] and [facility] [place]

## 2019-06-04 NOTE — PATIENT INSTRUCTIONS
Continue Flonase 2 sprays in each nostril once daily.  Try transitioning Claritin and Zyrtec to just as needed. Another option is try using azelastine 2 sprays in each nostril twice daily as needed. You don't have to stop Flonase to do that.    Asthma management per asthma action plan, the same as it was.    The average pH level (acidity or alkaline) of soap is 9 to 10. The skin s normal pH level is 4 to 5. Because of this difference, soap increases the skin s pH to an undesirable level and can worsen skin dryness.  It is best to use a non-soap cleanser because they are usually free of sodium lauryl sulfate. This chemical creates soap s foaming action and can irritate skin. Examples of non-soap cleansers include Dove  Sensitive Skin Unscented Beauty Bar, Aquaphor  Gentle Wash, AVEENO  Advanced Care Wash, Basis  Sensitive Skin Bar, CeraVe  Hydrating Cleanser, and Cetaphil  Gentle Cleansing Bar.   Eliminate harsh soaps, i.e., Dial, zest, Danish spring      Use Cerave several times a day. Stop using triamcinolone ointment daily, and use it just as needed.

## 2019-06-04 NOTE — PROGRESS NOTES
Patient presented after waiting 30 minutes with no reaction to  injections. Discharged from clinic.    Kalen BRIONES RN   Specialty Clinics

## 2019-06-05 PROBLEM — H10.13 ALLERGIC CONJUNCTIVITIS, BILATERAL: Status: ACTIVE | Noted: 2019-06-05

## 2019-06-05 ASSESSMENT — ASTHMA QUESTIONNAIRES: ACT_TOTALSCORE: 22

## 2019-06-11 ENCOUNTER — ALLIED HEALTH/NURSE VISIT (OUTPATIENT)
Dept: ALLERGY | Facility: CLINIC | Age: 54
End: 2019-06-11
Payer: COMMERCIAL

## 2019-06-11 DIAGNOSIS — J30.9 ALLERGIC RHINITIS: Primary | ICD-10-CM

## 2019-06-11 PROCEDURE — 99207 ZZC DROP WITH A PROCEDURE: CPT

## 2019-06-11 PROCEDURE — 95117 IMMUNOTHERAPY INJECTIONS: CPT

## 2019-07-05 ENCOUNTER — ALLIED HEALTH/NURSE VISIT (OUTPATIENT)
Dept: ALLERGY | Facility: CLINIC | Age: 54
End: 2019-07-05
Payer: COMMERCIAL

## 2019-07-05 DIAGNOSIS — Z51.6 NEED FOR DESENSITIZATION TO ALLERGENS: Primary | ICD-10-CM

## 2019-07-05 PROCEDURE — 99207 ZZC DROP WITH A PROCEDURE: CPT

## 2019-07-05 PROCEDURE — 95117 IMMUNOTHERAPY INJECTIONS: CPT

## 2019-07-26 ENCOUNTER — OFFICE VISIT (OUTPATIENT)
Dept: FAMILY MEDICINE | Facility: CLINIC | Age: 54
End: 2019-07-26
Payer: COMMERCIAL

## 2019-07-26 VITALS
OXYGEN SATURATION: 97 % | BODY MASS INDEX: 37.05 KG/M2 | TEMPERATURE: 97.5 F | WEIGHT: 217 LBS | DIASTOLIC BLOOD PRESSURE: 64 MMHG | HEIGHT: 64 IN | RESPIRATION RATE: 16 BRPM | HEART RATE: 68 BPM | SYSTOLIC BLOOD PRESSURE: 110 MMHG

## 2019-07-26 DIAGNOSIS — H60.391 INFECTIVE OTITIS EXTERNA, RIGHT: Primary | ICD-10-CM

## 2019-07-26 PROCEDURE — 99213 OFFICE O/P EST LOW 20 MIN: CPT | Performed by: NURSE PRACTITIONER

## 2019-07-26 RX ORDER — NEOMYCIN SULFATE, POLYMYXIN B SULFATE AND HYDROCORTISONE 10; 3.5; 1 MG/ML; MG/ML; [USP'U]/ML
3 SUSPENSION/ DROPS AURICULAR (OTIC) 4 TIMES DAILY
Qty: 3 ML | Refills: 0 | Status: SHIPPED | OUTPATIENT
Start: 2019-07-26 | End: 2019-07-31

## 2019-07-26 ASSESSMENT — MIFFLIN-ST. JEOR: SCORE: 1566.37

## 2019-07-26 NOTE — PATIENT INSTRUCTIONS
You have an external ear infection, inner ear is okay at the moment  Start ear drops to ears  Use warm, moist washcloth to ear multiple times through out the day  Tylenol for discomfort  Message me Monday if ear is worsening, will start oral antibiotics     [facility] ~ [phone]  One Day Weekly- Alternating Days    [place] ~ [phone]  Every other Monday or Wednesday   & one Saturday morning a month    [place] ~ [phone]  Every Other Monday Afternoon    [place]   Every Other Monday Morning    [place] ~ [phone]  Every Monday morning  Every Tuesday afternoon  Wed, Thurs, Friday morning & afternoon      Patient Education     External Ear Infection (Adult)    External otitis (also called  swimmer s ear ) is an infection in the ear canal. It is often caused by bacteria or fungus. It can occur a few days after water gets trapped in the ear canal (from swimming or bathing). It can also occur after cleaning too deeply in the ear canal with a cotton swab or other object. Sometimes, hair care products get into the ear canal and cause this problem.  Symptoms can include pain, fever, itching, redness, drainage, or swelling of the ear canal. Temporary hearing loss may also occur.  Home care    Do not try to clean the ear canal. This can push pus and bacteria deeper into the canal.    Use prescribed ear drops as directed. These help reduce swelling and fight the infection. If an ear wick was placed in the ear canal, apply drops right onto the end of the wick. The wick will draw the medicine into the ear canal even if it is swollen closed.    A cotton ball may be loosely placed in the outer ear to absorb any drainage.    You may use acetaminophen or ibuprofen to control pain, unless another medicine was prescribed. Note: If you have chronic liver or kidney disease or ever had a stomach ulcer or GI bleeding, talk to your healthcare provider before taking any of these  medicines.    Do not allow water to get into your ear when bathing. Also, don't swim until the infection has cleared.  Prevention    Keep your ears dry. This helps lower the risk of infection. Dry your ears with a towel or hair dryer after getting wet. Also, use ear plugs when swimming.    Do not stick any objects in the ear to remove wax.    If you feel water trapped in your ear, use ear drops right away. You can get these drops over the counter at most drugstores. They work by removing water from the ear canal.  Follow-up care  Follow up with your healthcare provider in 1 week, or as advised.  When to seek medical advice  Call your healthcare provider right away if any of these occur:    Ear pain becomes worse or doesn t improve after 3 days of treatment    Redness or swelling of the outer ear occurs or gets worse    Headache    Painful or stiff neck    Drowsiness or confusion    Fever of 100.4 F (38 C) or higher, or as directed by your healthcare provider    Seizure  Date Last Reviewed: 10/1/2017    2878-7465 [employer]. [address] [place] [address], [place] [address]. All rights reserved. This information is not intended as a substitute for professional medical care. Always follow your healthcare professional's instructions.

## 2019-07-26 NOTE — PROGRESS NOTES
Subjective     Dilia Solomon is a 54 year old female who presents to clinic today for the following health issues:    HPI   ENT Symptoms             Symptoms: cc Present Absent Comment   Fever/Chills   x    Fatigue   x    Muscle Aches   x    Eye Irritation   x    Sneezing   x    Nasal Gideon/Drg   x    Sinus Pressure/Pain   x    Loss of smell   x    Dental pain   x    Sore Throat   x    Swollen Glands   x    Ear Pain/Fullness x   R> L  (states right is red and swollen)    Cough   x    Wheeze   x    Chest Pain   x    Shortness of breath   x    Rash   x    Other   x      Symptom duration:  2 days    Symptom severity:  mild    Treatments tried:  nasal rinse    Contacts:  none          Patient Active Problem List   Diagnosis     Need for prophylactic vaccination and inoculation against influenza     Sprain of interphalangeal (joint) of hand     Radial styloid tenosynovitis     Carpal tunnel syndrome     Synovial cyst of popliteal space     Pain in joint, lower leg     Hyperlipidemia LDL goal <130     Advanced directives, counseling/discussion     Moderate persistent asthma     Allergic rhinitis due to dust mite     Food allergy     FH: diabetes mellitus     Chronic allergic rhinitis due to animal hair and dander     Allergic rhinitis due to mold     Chronic seasonal allergic rhinitis due to pollen     Obesity (BMI 35.0-39.9) with comorbidity (H)     Allergic conjunctivitis, bilateral     Past Surgical History:   Procedure Laterality Date     COLONOSCOPY N/A 10/6/2016    Procedure: COLONOSCOPY;  Surgeon: Shiva Johns MD;  Location: [facility]     ETHMOIDECTOMY  12/30/2013    Procedure: ETHMOIDECTOMY;  Bilateral Submucousal Reduction of InferiorTurbinates and Total Ethmoidectomy with Multiple Sinusotomies;  Surgeon: Lio More MD;  Location: [facility]     ETHMOIDECTOMY Bilateral 2/14/2018    Procedure: ETHMOIDECTOMY;  Bilateral anterior ethmoidectomy, bilateral maxillary antrostomy;  Surgeon: Santhosh Tierney MD;   Location: [facility]     SURGICAL HISTORY OF -   5/04    carpal tunnel release, bilateral       Social History     Tobacco Use     Smoking status: Never Smoker     Smokeless tobacco: Never Used   Substance Use Topics     Alcohol use: No     Family History   Problem Relation Age of Onset     C.A.D. Mother      Diabetes Mother      Cancer Father         brain     Breast Cancer Maternal Aunt      Cancer - colorectal No family hx of          Current Outpatient Medications   Medication Sig Dispense Refill     albuterol (2.5 MG/3ML) 0.083% neb solution Take 1 vial (2.5 mg) by nebulization every 4 hours as needed 1 Box 3     albuterol (PROAIR HFA/PROVENTIL HFA/VENTOLIN HFA) 108 (90 Base) MCG/ACT inhaler Inhale 2 puffs into the lungs every 4 hours as needed for shortness of breath / dyspnea or wheezing 1 Inhaler 3     azelastine (ASTELIN) 0.1 % nasal spray Spray 2 sprays into both nostrils 2 times daily as needed for rhinitis 30 mL 3     azelastine (OPTIVAR) 0.05 % ophthalmic solution Apply 1 drop to eye 2 times daily 1 Bottle 5     Emollient (CERAVE) CREA Externally apply 1 dose. topically 2 times daily 2 Bottle 11     EPINEPHrine (EPIPEN/ADRENACLICK/OR ANY BX GENERIC EQUIV) 0.3 MG/0.3ML injection 2-pack Inject 0.3 mLs (0.3 mg) into the muscle once as needed for anaphylaxis 0.6 mL 3     fluticasone (FLONASE) 50 MCG/ACT nasal spray Spray 2 sprays into both nostrils daily 48 g 1     fluticasone-salmeterol (ADVAIR) 250-50 MCG/DOSE inhaler Inhale 1 puff into the lungs 2 times daily 3 Inhaler 1     montelukast (SINGULAIR) 10 MG tablet Take 1 tablet (10 mg) by mouth At Bedtime 90 tablet 1     MULTIVITAMIN OR 1 tab daily       neomycin-polymyxin-hydrocortisone (CORTISPORIN) 3.5-11688-2 otic suspension Place 3 drops into both ears 4 times daily for 5 days 3 mL 0     ORDER FOR ALLERGEN IMMUNOTHERAPY Name of Mix: Mix #1  Mold  Alternaria Tenuis 1:10 w/v, HS  0.5 ml  Aspergillus Fumigatus 1:10 w/v, HS  0.5 ml  Epicoccum Nigrum 1:10 w/v,  HS 0.5 ml  Hormodendrum Cladosporioides 1:10 w/v, HS 0.5 ml  Penicillium Mix 1:10 w/v, HS  0.5 ml  Diluent: HSA qs to 5ml 5 mL PRN     ORDER FOR ALLERGEN IMMUNOTHERAPY Name of Mix: Mix #2  Dust Mite, Cat, Dog  Cat Hair, Standardized 10,000 BAU/mL, ALK  2.0 ml  Dog Hair Dander, A. P.  1:100 w/v, HS  1.0 ml  Dust Mites F 30,000AU/mL, HS  0.3 ml  Dust Mites P. 30,000 AU/mL, HS  0.3 ml   Diluent: HSA qs to 5ml 5 mL PRN     ORDER FOR ALLERGEN IMMUNOTHERAPY Name of Mix: Mix #3 Grass,Tree   Dmitry,White 1:20w/v, HS 0.5ml  Birch Mix PRW 1:20w/v, HS 0.5ml  Boxelder-Maple Mix BHR (Boxelder Hard Red) 1:20w/v, HS 0.5ml  Redmond,Common 1:20w/v, HS 0.5ml  Elm,American 1:20w/v, HS 0.5ml  Almont Mix RW 1:20w/v, HS 0.5ml  Oak Mix RVW 1:20w/v, HS 0.5ml  [place] Tree,Black 1:20w/v, HS 0.5ml  Grass Mix #7 100,000 BAU/mL, HS 0.4ml  Jonathan Grass 1:20w/v, HS 0.5ml  Diluent: HSA qs to 5ml 5 mL PRN     ORDER FOR ALLERGEN IMMUNOTHERAPY Name of Mix: Mix #4  Weeds  Kochia 1:20 w/v, HS 0.5 ml  Lamb's Quarters 1:20 w/v, HS 0.5 ml  Nettle 1:20 w/v, HS 0.5 ml  Plantain, English 1:20 w/v, HS 0.5 ml  Ragweed Mixed 1:20 w/v ALK  0.5 ml  Russian Thistle 1:20 w/v, HS 0.5 ml  Sagebrush, Mugwort 1:20 w/v, HS 0.5 ml  Sorrel, Sheep 1:20 w/v, HS 0.5 ml  Diluent: HSA qs to 5ml 5 mL PRN     order for DME Equipment being ordered: thumb spica splint RIGHT 1 Units 0     order for DME Equipment being ordered: Silicone heel cup 1 Units 0     order for DME Equipment being ordered: Dynaflex insert 1 Units 0     order for DME Equipment being ordered: Silicone heel cup 1 Units 0     triamcinolone (KENALOG) 0.1 % external cream Apply sparingly to affected area two times daily as needed but not more than 14 days in a row. Spare face, armpits, neck, and groin. 80 g 1     amoxicillin (AMOXIL) 500 MG capsule Take 1 capsule (500 mg) by mouth 2 times daily for 10 days 20 capsule 0     Allergies   Allergen Reactions     Clinoril [Nsaids] Hives     Tolerates ibuprofen and  "aspirin without hives     Pineapple Hives         Reviewed and updated as needed this visit by Provider  Tobacco  Allergies  Meds  Problems  Med Hx  Surg Hx  Fam Hx  Soc Hx          Review of Systems   ROS COMP: Constitutional, HEENT, cardiovascular, pulmonary, gi and gu systems are negative, except as otherwise noted.      Objective    /64 (Cuff Size: Adult Large)   Pulse 68   Temp 97.5  F (36.4  C) (Tympanic)   Resp 16   Ht 1.613 m (5' 3.5\")   Wt 98.4 kg (217 lb)   LMP 07/18/2015 (Approximate)   SpO2 97%   BMI 37.84 kg/m    Body mass index is 37.84 kg/m .  Physical Exam   GENERAL APPEARANCE: healthy, alert and no distress  EYES: Eyes grossly normal to inspection, PERRL and conjunctivae and sclerae normal  HENT: right ear canal erythematous, boggy without drainage, left ear canal normal and TM's normal and nose and mouth without ulcers or lesions  NECK: no adenopathy, no asymmetry, masses, or scars and thyroid normal to palpation  RESP: lungs clear to auscultation - no rales, rhonchi or wheezes  CV: regular rates and rhythm, normal S1 S2, no S3 or S4 and no murmur, click or rub  ABDOMEN: soft, nontender, without hepatosplenomegaly or masses and bowel sounds normal    Diagnostic Test Results:  none         Assessment & Plan     1. Infective otitis externa, right  Acute. Start drops and conservative treatment. If symptoms worsen may need oral antibiotics.   - neomycin-polymyxin-hydrocortisone (CORTISPORIN) 3.5-03611-4 otic suspension; Place 3 drops into both ears 4 times daily for 5 days  Dispense: 3 mL; Refill: 0     BMI:   Estimated body mass index is 37.84 kg/m  as calculated from the following:    Height as of this encounter: 1.613 m (5' 3.5\").    Weight as of this encounter: 98.4 kg (217 lb).   Weight management plan: Patient was referred to their PCP to discuss a diet and exercise plan.        Patient Instructions   You have an external ear infection, inner ear is okay at the moment  Start " ear drops to ears  Use warm, moist washcloth to ear multiple times through out the day  Tylenol for discomfort  Message me Monday if ear is worsening, will start oral antibiotics     [facility] ~ [phone]  One Day Weekly- Alternating Days    [place] ~ [phone]  Every other Monday or Wednesday   & one Saturday morning a month    [place] ~ [phone]  Every Other Monday Afternoon    [place]   Every Other Monday Morning    [place] ~ [phone]  Every Monday morning  Every Tuesday afternoon  Wed, Thurs, Friday morning & afternoon      Patient Education     External Ear Infection (Adult)    External otitis (also called  swimmer s ear ) is an infection in the ear canal. It is often caused by bacteria or fungus. It can occur a few days after water gets trapped in the ear canal (from swimming or bathing). It can also occur after cleaning too deeply in the ear canal with a cotton swab or other object. Sometimes, hair care products get into the ear canal and cause this problem.  Symptoms can include pain, fever, itching, redness, drainage, or swelling of the ear canal. Temporary hearing loss may also occur.  Home care    Do not try to clean the ear canal. This can push pus and bacteria deeper into the canal.    Use prescribed ear drops as directed. These help reduce swelling and fight the infection. If an ear wick was placed in the ear canal, apply drops right onto the end of the wick. The wick will draw the medicine into the ear canal even if it is swollen closed.    A cotton ball may be loosely placed in the outer ear to absorb any drainage.    You may use acetaminophen or ibuprofen to control pain, unless another medicine was prescribed. Note: If you have chronic liver or kidney disease or ever had a stomach ulcer or GI bleeding, talk to your healthcare provider before taking any of these medicines.    Do not allow water to get into your ear when bathing. Also, don't swim  until the infection has cleared.  Prevention    Keep your ears dry. This helps lower the risk of infection. Dry your ears with a towel or hair dryer after getting wet. Also, use ear plugs when swimming.    Do not stick any objects in the ear to remove wax.    If you feel water trapped in your ear, use ear drops right away. You can get these drops over the counter at most drugstores. They work by removing water from the ear canal.  Follow-up care  Follow up with your healthcare provider in 1 week, or as advised.  When to seek medical advice  Call your healthcare provider right away if any of these occur:    Ear pain becomes worse or doesn t improve after 3 days of treatment    Redness or swelling of the outer ear occurs or gets worse    Headache    Painful or stiff neck    Drowsiness or confusion    Fever of 100.4 F (38 C) or higher, or as directed by your healthcare provider    Seizure  Date Last Reviewed: 10/1/2017    2917-5345 "[employer]. [address] [place] [address]. All rights reserved. This information is not intended as a substitute for professional medical care. Always follow your healthcare professional's instructions.               Return in about 4 days (around 7/30/2019), or if symptoms worsen or fail to improve.    MONSE Moralez [facility]

## 2019-07-29 ENCOUNTER — TELEPHONE (OUTPATIENT)
Dept: FAMILY MEDICINE | Facility: CLINIC | Age: 54
End: 2019-07-29

## 2019-07-29 DIAGNOSIS — H60.391 INFECTIVE OTITIS EXTERNA, RIGHT: Primary | ICD-10-CM

## 2019-07-29 RX ORDER — AMOXICILLIN 500 MG/1
500 CAPSULE ORAL 2 TIMES DAILY
Qty: 20 CAPSULE | Refills: 0 | Status: SHIPPED | OUTPATIENT
Start: 2019-07-29 | End: 2019-08-21

## 2019-07-29 NOTE — TELEPHONE ENCOUNTER
Per 07-26-19 OV-  You have an external ear infection, inner ear is okay at the moment  Start ear drops to ears  Use warm, moist washcloth to ear multiple times through out the day  Tylenol for discomfort  Message me Monday if ear is worsening, will start oral antibiotics     Pt reports persistent rt ear pain, some lt ear pain also.  States sx have not improved with cortisporin ear drops. Reports watery discharge.  Denies fevers, chills.  Please advise oral abx.   JOHNNA Aguilar RN

## 2019-07-29 NOTE — TELEPHONE ENCOUNTER
Pt informed amoxicillin prescribed, reviewed dose/ directions. To also review drug facts with pharm.   Advised OTC probiotic while on abx.  F/U if sx persist/ worsen.  Pt voices understanding/ agreement.  JOHNNA Aguilar RN

## 2019-07-29 NOTE — TELEPHONE ENCOUNTER
Patient was seen on Friday for her ears and was given ear drops but she is calling now requesting something to take orally. She thought Vanessa had ordered something up and just needed to send it if this got worse. She is requesting this now.     Would like this sent to the [facility].    Bethanie [place]

## 2019-08-02 ENCOUNTER — ALLIED HEALTH/NURSE VISIT (OUTPATIENT)
Dept: ALLERGY | Facility: CLINIC | Age: 54
End: 2019-08-02
Payer: COMMERCIAL

## 2019-08-02 DIAGNOSIS — Z51.6 NEED FOR DESENSITIZATION TO ALLERGENS: Primary | ICD-10-CM

## 2019-08-02 PROCEDURE — 95117 IMMUNOTHERAPY INJECTIONS: CPT

## 2019-08-02 PROCEDURE — 99207 ZZC DROP WITH A PROCEDURE: CPT

## 2019-08-21 ENCOUNTER — OFFICE VISIT (OUTPATIENT)
Dept: FAMILY MEDICINE | Facility: CLINIC | Age: 54
End: 2019-08-21
Payer: COMMERCIAL

## 2019-08-21 VITALS
TEMPERATURE: 97.8 F | HEART RATE: 92 BPM | SYSTOLIC BLOOD PRESSURE: 120 MMHG | RESPIRATION RATE: 24 BRPM | BODY MASS INDEX: 37.49 KG/M2 | WEIGHT: 215 LBS | OXYGEN SATURATION: 98 % | DIASTOLIC BLOOD PRESSURE: 60 MMHG

## 2019-08-21 DIAGNOSIS — Z12.31 ENCOUNTER FOR SCREENING MAMMOGRAM FOR BREAST CANCER: ICD-10-CM

## 2019-08-21 DIAGNOSIS — J30.81 CHRONIC ALLERGIC RHINITIS DUE TO ANIMAL HAIR AND DANDER: Primary | ICD-10-CM

## 2019-08-21 PROCEDURE — 99213 OFFICE O/P EST LOW 20 MIN: CPT | Performed by: NURSE PRACTITIONER

## 2019-08-21 ASSESSMENT — PAIN SCALES - GENERAL: PAINLEVEL: NO PAIN (0)

## 2019-08-21 NOTE — PROGRESS NOTES
SUBJECTIVE   Dilia Solomon is a  female who presents to clinic today for the following health issue(s):       ENT Symptoms             Symptoms: cc Present Absent Comment   Fever/Chills   x    Fatigue   x    Muscle Aches   x    Eye Irritation  x     Sneezing  x     Nasal Gideon/Drg  x     Sinus Pressure/Pain   x    Loss of smell  x     Dental pain   x    Sore Throat  x     Swollen Glands   x    Ear Pain/Fullness  x     Cough  x     Wheeze   x    Chest Pain   x    Shortness of breath  x     Rash  x  Better now   Other   x      Symptom duration:  since 8/10 and worse after holding rabbit on 8/16   Symptom severity:  moderate but better now   Treatments tried:  zyrtec, throat lozenges, claritin, sinus rinse and sx are much better   Contacts:  cleaned rabbit cage on 8/10 and 8/11 and then held rabbit on 8/15   Patient is to have allergy shot on 8/23 and patient does not think she can have a shot if she is on medication or having allergy reaction    Was taking Zyrtec and Claritin starting last Thursday until last night    PCP   Cookie Conklin [phone]    Health Maintenance        Health Maintenance Due   Topic Date Due     HIV SCREENING  [birth year]14/1981     ZOSTER IMMUNIZATION (1 of 2) 03/14/2016     MAMMO SCREENING  09/21/2018       HPI        Patient Active Problem List   Diagnosis     Need for prophylactic vaccination and inoculation against influenza     Sprain of interphalangeal (joint) of hand     Radial styloid tenosynovitis     Carpal tunnel syndrome     Synovial cyst of popliteal space     Pain in joint, lower leg     Hyperlipidemia LDL goal <130     Advanced directives, counseling/discussion     Moderate persistent asthma     Allergic rhinitis due to dust mite     Food allergy     FH: diabetes mellitus     Chronic allergic rhinitis due to animal hair and dander     Allergic rhinitis due to mold     Chronic seasonal allergic rhinitis due to pollen     Obesity (BMI 35.0-39.9) with comorbidity (H)      Allergic conjunctivitis, bilateral     Current Outpatient Medications   Medication     albuterol (2.5 MG/3ML) 0.083% neb solution     albuterol (PROAIR HFA/PROVENTIL HFA/VENTOLIN HFA) 108 (90 Base) MCG/ACT inhaler     azelastine (ASTELIN) 0.1 % nasal spray     azelastine (OPTIVAR) 0.05 % ophthalmic solution     Emollient (CERAVE) CREA     EPINEPHrine (EPIPEN/ADRENACLICK/OR ANY BX GENERIC EQUIV) 0.3 MG/0.3ML injection 2-pack     fluticasone (FLONASE) 50 MCG/ACT nasal spray     fluticasone-salmeterol (ADVAIR) 250-50 MCG/DOSE inhaler     montelukast (SINGULAIR) 10 MG tablet     MULTIVITAMIN OR     ORDER FOR ALLERGEN IMMUNOTHERAPY     ORDER FOR ALLERGEN IMMUNOTHERAPY     ORDER FOR ALLERGEN IMMUNOTHERAPY     ORDER FOR ALLERGEN IMMUNOTHERAPY     order for DME     order for DME     order for DME     order for DME     triamcinolone (KENALOG) 0.1 % external cream     No current facility-administered medications for this visit.        Reviewed and updated as needed this visit by Provider:  Tobacco  Allergies  Meds  Med Hx  Surg Hx  Fam Hx  Soc Hx     ROS:  Constitutional, HEENT, cardiovascular, pulmonary, gi and gu systems are negative, except as otherwise noted.    PHYSICAL EXAM   /60   Pulse 92   Temp 97.8  F (36.6  C)   Resp 24   Wt 97.5 kg (215 lb)   LMP 07/18/2015 (Approximate)   SpO2 98%   Breastfeeding? No   BMI 37.49 kg/m    Body mass index is 37.49 kg/m .  GENERAL APPEARANCE: healthy, alert and no distress  EYES: Eyes grossly normal to inspection, PERRL and conjunctivae and sclerae normal  HENT: ear canals and TM's normal and nose and mouth without ulcers or lesions  NECK: no adenopathy, no asymmetry, masses, or scars and thyroid normal to palpation  RESP: lungs clear to auscultation - no rales, rhonchi or wheezes  CV: regular rates and rhythm, normal S1 S2, no S3 or S4 and no murmur, click or rub      Diagnostic Test Results:  none     ASSESSMENT & PLAN   Assessment & Plan     1. Chronic allergic  "rhinitis due to animal hair and dander  Acute on chronic, likely triggered by rabbit. Patient started oral anti-histamine with relief, is feeling better with some residual congestion. Doubt bacterial at this point, no need for antibiotic. Advised patient to continue supportive care, can take anti-histamine for another day then go to an as needed basis. Follow-up with allergy as scheduled.     2. Encounter for screening mammogram for breast cancer    - MA SCREENING DIGITAL BILAT - Future  (s+30); Future     BMI:   Estimated body mass index is 37.49 kg/m  as calculated from the following:    Height as of 7/26/19: 1.613 m (5' 3.5\").    Weight as of this encounter: 97.5 kg (215 lb).   Weight management plan: Patient was referred to their PCP to discuss a diet and exercise plan.          Patient Instructions   You likely had flare of allergies due to the rabbit  Would recommend you continue anti-histamine for one more day as things are improving - then go to using them on an as needed basis  Okay to get your allergy shot on Friday  Would have to check with allergy on further testing.  Return to clinic as needed       [facility] Schedule  [facility] ~ [phone]  One Day Weekly- Alternating Days    [place] ~ [phone]  Every other Monday or Wednesday   & one Saturday morning a month    [place] ~ [phone]  Every Other Monday Afternoon    [place] ~ [phone]  Every Other Monday Morning    [place] ~ [phone]  Every Monday morning  Every Tuesday afternoon  Wed, Thurs, Friday morning & afternoon        Return in about 2 weeks (around 9/4/2019), or if symptoms worsen or fail to improve.    MONSE Moralez [facility]        Risks, benefits, side effects and rationale for treatment plan fully discussed with the patient and understanding expressed.          "

## 2019-08-21 NOTE — NURSING NOTE
"Chief Complaint   Patient presents with     Nasal Congestion       Initial LMP 07/18/2015 (Approximate)   Breastfeeding? No  Estimated body mass index is 37.84 kg/m  as calculated from the following:    Height as of 7/26/19: 1.613 m (5' 3.5\").    Weight as of 7/26/19: 98.4 kg (217 lb).    Patient presents to the clinic using No DME    Health Maintenance that is potentially due pending provider review:  Mammogram    Gave pt phone number/pended order to schedule mammo and/or colonoscopy(or FIT)    Is there anyone who you would like to be able to receive your results? not asked  If yes have patient fill out UVALDO    "

## 2019-08-23 ENCOUNTER — ALLIED HEALTH/NURSE VISIT (OUTPATIENT)
Dept: ALLERGY | Facility: CLINIC | Age: 54
End: 2019-08-23
Payer: COMMERCIAL

## 2019-08-23 DIAGNOSIS — Z51.6 NEED FOR DESENSITIZATION TO ALLERGENS: Primary | ICD-10-CM

## 2019-08-23 PROCEDURE — 99207 ZZC DROP WITH A PROCEDURE: CPT

## 2019-08-23 PROCEDURE — 95117 IMMUNOTHERAPY INJECTIONS: CPT

## 2019-09-13 ENCOUNTER — ALLIED HEALTH/NURSE VISIT (OUTPATIENT)
Dept: ALLERGY | Facility: CLINIC | Age: 54
End: 2019-09-13
Payer: COMMERCIAL

## 2019-09-13 DIAGNOSIS — Z51.6 NEED FOR DESENSITIZATION TO ALLERGENS: Primary | ICD-10-CM

## 2019-09-13 PROCEDURE — 95117 IMMUNOTHERAPY INJECTIONS: CPT

## 2019-09-13 PROCEDURE — 99207 ZZC DROP WITH A PROCEDURE: CPT

## 2019-10-01 NOTE — PROGRESS NOTES
"  SUBJECTIVE:   Dilia Solomon is a 52 year old female who presents to clinic today for the following health issues: the patient has history of multiple allergies, hives, presents to clinic today complaining of very itchy rash on the left side of her neck, right side lower abdomen, left wrist area and few itchy spots on her bilateral legs.   Tried Kenalog with some improvement.    Rash  Onset: one week     Description:   Location: Left side of neck, right hand, right hip,   Character: round, red   Itching (Pruritis): YES    Progression of Symptoms:  worsening    Accompanying Signs & Symptoms:  Fever: no   Body aches or joint pain: no   Sore throat symptoms: no   Recent cold symptoms: no     History:   Previous similar rash: no     Precipitating factors:   Exposure to similar rash: no   New exposures: None   Recent travel: no     Alleviating factors:  None    Therapies Tried and outcome: Kenalog Cream     Problem list and histories reviewed & adjusted, as indicated.  Additional history: as documented    Labs reviewed in EPIC    Reviewed and updated as needed this visit by clinical staffTobacco  Allergies  Med Hx  Surg Hx  Fam Hx  Soc Hx      Reviewed and updated as needed this visit by Provider         ROS:  Constitutional, HEENT, cardiovascular, pulmonary, gi and gu systems are negative, except as otherwise noted.      OBJECTIVE:   /81  Pulse 72  Temp 97.2  F (36.2  C) (Tympanic)  Ht 5' 3.25\" (1.607 m)  Wt 208 lb 4.8 oz (94.5 kg)  LMP 07/18/2015 (Approximate)  BMI 36.61 kg/m2  Body mass index is 36.61 kg/(m^2).  GENERAL: healthy, alert and no distress  SKIN: erythematous rash with raised hives and multiple scratch marks on the right side lower abdomen, left side neck, left and right wrists. There is no evidence of infection, or cellulitis     Diagnostic Test Results:  none     ASSESSMENT/PLAN:     1. Dermatitis  - triamcinolone (KENALOG) 0.1 % cream; Apply sparingly to affected area three times " daily as needed  Dispense: 80 g; Refill: 0  - predniSONE (DELTASONE) 20 MG tablet; Take 1 tablet (20 mg) by mouth 2 times daily  Dispense: 10 tablet; Refill: 0  -Claritin 10 mg daily    See Patient Instructions    MONSE Jimenez [facility]   children

## 2019-10-04 ENCOUNTER — ALLIED HEALTH/NURSE VISIT (OUTPATIENT)
Dept: ALLERGY | Facility: CLINIC | Age: 54
End: 2019-10-04
Payer: COMMERCIAL

## 2019-10-04 DIAGNOSIS — Z51.6 NEED FOR DESENSITIZATION TO ALLERGENS: Primary | ICD-10-CM

## 2019-10-04 PROCEDURE — 95117 IMMUNOTHERAPY INJECTIONS: CPT

## 2019-10-04 PROCEDURE — 99207 ZZC DROP WITH A PROCEDURE: CPT

## 2019-10-25 ENCOUNTER — ALLIED HEALTH/NURSE VISIT (OUTPATIENT)
Dept: ALLERGY | Facility: CLINIC | Age: 54
End: 2019-10-25
Payer: COMMERCIAL

## 2019-10-25 ENCOUNTER — HOSPITAL ENCOUNTER (OUTPATIENT)
Dept: MAMMOGRAPHY | Facility: CLINIC | Age: 54
Discharge: HOME | End: 2019-10-25
Attending: NURSE PRACTITIONER | Admitting: NURSE PRACTITIONER
Payer: COMMERCIAL

## 2019-10-25 ENCOUNTER — OFFICE VISIT (OUTPATIENT)
Dept: OTOLARYNGOLOGY | Facility: CLINIC | Age: 54
End: 2019-10-25
Payer: COMMERCIAL

## 2019-10-25 VITALS
DIASTOLIC BLOOD PRESSURE: 71 MMHG | TEMPERATURE: 97.5 F | RESPIRATION RATE: 18 BRPM | SYSTOLIC BLOOD PRESSURE: 120 MMHG | HEART RATE: 78 BPM

## 2019-10-25 DIAGNOSIS — J32.4 CHRONIC PANSINUSITIS: Primary | ICD-10-CM

## 2019-10-25 DIAGNOSIS — J30.1 CHRONIC SEASONAL ALLERGIC RHINITIS DUE TO POLLEN: ICD-10-CM

## 2019-10-25 DIAGNOSIS — Z98.890 HISTORY OF ENDOSCOPIC SINUS SURGERY: ICD-10-CM

## 2019-10-25 DIAGNOSIS — J30.89 ALLERGIC RHINITIS DUE TO MOLD: ICD-10-CM

## 2019-10-25 DIAGNOSIS — J30.81 CHRONIC ALLERGIC RHINITIS DUE TO ANIMAL HAIR AND DANDER: ICD-10-CM

## 2019-10-25 DIAGNOSIS — Z12.31 ENCOUNTER FOR SCREENING MAMMOGRAM FOR BREAST CANCER: ICD-10-CM

## 2019-10-25 DIAGNOSIS — Z51.6 NEED FOR DESENSITIZATION TO ALLERGENS: Primary | ICD-10-CM

## 2019-10-25 DIAGNOSIS — J30.89 ALLERGIC RHINITIS DUE TO DUST MITE: ICD-10-CM

## 2019-10-25 PROCEDURE — 99214 OFFICE O/P EST MOD 30 MIN: CPT | Performed by: OTOLARYNGOLOGY

## 2019-10-25 PROCEDURE — 95117 IMMUNOTHERAPY INJECTIONS: CPT

## 2019-10-25 PROCEDURE — 99207 ZZC DROP WITH A PROCEDURE: CPT

## 2019-10-25 PROCEDURE — 77063 BREAST TOMOSYNTHESIS BI: CPT

## 2019-10-25 NOTE — LETTER
10/25/2019         RE: Dilia Solomon  [address] [facility] [address]        Dear Colleague,    Thank you for referring your patient, Dilia Solomon, to the [facility]. Please see a copy of my visit note below.    History of Present Illness - Dilia Solomon is a 54 year old female here to see me for the first time, as a transfer of care.  She was previous patient of Dr. Tierney, and is status post endoscopic sinus surgery (with turbinate reduction) performed on 2/14/2018.  She also had sinus surgery prior to that with Dr More.    She has come back due to ear aches primarily.  She tells me that she is getting ear aches about once per month.  She tells me that once she gets an ear ache, she then gets sinus infections.  She has seen Dr. Lujan once, and is scheduled to follow up with him in December.  She has been on immunotherapy for over a year, and she thinks they do help.    She is here to deal with the ear aches, and also for follow up with the surgery.  She does think that the surgery from [facility] did help, but perhaps the benefit is fading a bit over the last few months.  She thinks that because congestion and fullness in the LEFT ear.    Past Medical History -   Patient Active Problem List   Diagnosis     Need for prophylactic vaccination and inoculation against influenza     Sprain of interphalangeal (joint) of hand     Radial styloid tenosynovitis     Carpal tunnel syndrome     Synovial cyst of popliteal space     Pain in joint, lower leg     Hyperlipidemia LDL goal <130     Advanced directives, counseling/discussion     Moderate persistent asthma     Allergic rhinitis due to dust mite     Food allergy     FH: diabetes mellitus     Chronic allergic rhinitis due to animal hair and dander     Allergic rhinitis due to mold     Chronic seasonal allergic rhinitis due to pollen     Obesity (BMI 35.0-39.9) with comorbidity (H)     Allergic conjunctivitis, bilateral       Current  Medications -   Current Outpatient Medications:      albuterol (2.5 MG/3ML) 0.083% neb solution, Take 1 vial (2.5 mg) by nebulization every 4 hours as needed, Disp: 1 Box, Rfl: 3     albuterol (PROAIR HFA/PROVENTIL HFA/VENTOLIN HFA) 108 (90 Base) MCG/ACT inhaler, Inhale 2 puffs into the lungs every 4 hours as needed for shortness of breath / dyspnea or wheezing, Disp: 1 Inhaler, Rfl: 3     azelastine (ASTELIN) 0.1 % nasal spray, Spray 2 sprays into both nostrils 2 times daily as needed for rhinitis, Disp: 30 mL, Rfl: 3     azelastine (OPTIVAR) 0.05 % ophthalmic solution, Apply 1 drop to eye 2 times daily, Disp: 1 Bottle, Rfl: 5     Emollient (CERAVE) CREA, Externally apply 1 dose. topically 2 times daily, Disp: 2 Bottle, Rfl: 11     EPINEPHrine (EPIPEN/ADRENACLICK/OR ANY BX GENERIC EQUIV) 0.3 MG/0.3ML injection 2-pack, Inject 0.3 mLs (0.3 mg) into the muscle once as needed for anaphylaxis, Disp: 0.6 mL, Rfl: 3     fluticasone (FLONASE) 50 MCG/ACT nasal spray, Spray 2 sprays into both nostrils daily, Disp: 48 g, Rfl: 1     fluticasone-salmeterol (ADVAIR) 250-50 MCG/DOSE inhaler, Inhale 1 puff into the lungs 2 times daily, Disp: 3 Inhaler, Rfl: 1     montelukast (SINGULAIR) 10 MG tablet, Take 1 tablet (10 mg) by mouth At Bedtime, Disp: 90 tablet, Rfl: 1     MULTIVITAMIN OR, 1 tab daily, Disp: , Rfl:      ORDER FOR ALLERGEN IMMUNOTHERAPY, Name of Mix: Mix #1  Mold Alternaria Tenuis 1:10 w/v, HS  0.5 ml Aspergillus Fumigatus 1:10 w/v, HS  0.5 ml Epicoccum Nigrum 1:10 w/v, HS 0.5 ml Hormodendrum Cladosporioides 1:10 w/v, HS 0.5 ml Penicillium Mix 1:10 w/v, HS  0.5 ml Diluent: HSA qs to 5ml, Disp: 5 mL, Rfl: PRN     ORDER FOR ALLERGEN IMMUNOTHERAPY, Name of Mix: Mix #2  Dust Mite, Cat, Dog Cat Hair, Standardized 10,000 BAU/mL, ALK  2.0 ml Dog Hair Dander, A. P.  1:100 w/v, HS  1.0 ml Dust Mites F 30,000AU/mL, HS  0.3 ml Dust Mites P. 30,000 AU/mL, HS  0.3 ml  Diluent: HSA qs to 5ml, Disp: 5 mL, Rfl: PRN     ORDER FOR  ALLERGEN IMMUNOTHERAPY, Name of Mix: Mix #3 Grass,Tree  Dmitry,White 1:20w/v, HS 0.5ml Birch Mix PRW 1:20w/v, HS 0.5ml Boxelder-Maple Mix BHR (Boxelder Hard Red) 1:20w/v, HS 0.5ml [place],Common 1:20w/v, HS 0.5ml Elm,American 1:20w/v, HS 0.5ml [place] Mix RW 1:20w/v, HS 0.5ml Oak Mix RVW 1:20w/v, HS 0.5ml [place] Tree,Black 1:20w/v, HS 0.5ml Grass Mix #7 100,000 BAU/mL, HS 0.4ml Jonathan Grass 1:20w/v, HS 0.5ml Diluent: HSA qs to 5ml, Disp: 5 mL, Rfl: PRN     ORDER FOR ALLERGEN IMMUNOTHERAPY, Name of Mix: Mix #4  Weeds Kochia 1:20 w/v, HS 0.5 ml Lamb's Quarters 1:20 w/v, HS 0.5 ml Nettle 1:20 w/v, HS 0.5 ml Plantain, English 1:20 w/v, HS 0.5 ml Ragweed Mixed 1:20 w/v ALK  0.5 ml Russian Thistle 1:20 w/v, HS 0.5 ml Sagebrush, Mugwort 1:20 w/v, HS 0.5 ml Sorrel, Sheep 1:20 w/v, HS 0.5 ml Diluent: HSA qs to 5ml, Disp: 5 mL, Rfl: PRN     order for DME, Equipment being ordered: thumb spica splint RIGHT, Disp: 1 Units, Rfl: 0     order for DME, Equipment being ordered: Silicone heel cup, Disp: 1 Units, Rfl: 0     order for DME, Equipment being ordered: Dynaflex insert, Disp: 1 Units, Rfl: 0     order for DME, Equipment being ordered: Silicone heel cup, Disp: 1 Units, Rfl: 0     triamcinolone (KENALOG) 0.1 % external cream, Apply sparingly to affected area two times daily as needed but not more than 14 days in a row. Spare face, armpits, neck, and groin., Disp: 80 g, Rfl: 1    Allergies -   Allergies   Allergen Reactions     Clinoril [Nsaids] Hives     Tolerates ibuprofen and aspirin without hives     Pineapple Hives       Social History -   Social History     Socioeconomic History     Marital status: Single     Spouse name: Not on file     Number of children: Not on file     Years of education: Not on file     Highest education level: Not on file   Occupational History     Not on file   Social Needs     Financial resource strain: Not on file     Food insecurity:     Worry: Not on file     Inability: Not on file      Transportation needs:     Medical: Not on file     Non-medical: Not on file   Tobacco Use     Smoking status: Never Smoker     Smokeless tobacco: Never Used   Substance and Sexual Activity     Alcohol use: No     Drug use: No     Sexual activity: Never   Lifestyle     Physical activity:     Days per week: Not on file     Minutes per session: Not on file     Stress: Not on file   Relationships     Social connections:     Talks on phone: Not on file     Gets together: Not on file     Attends [employer] service: Not on file     Active member of club or organization: Not on file     Attends meetings of clubs or organizations: Not on file     Relationship status: Not on file     Intimate partner violence:     Fear of current or ex partner: Not on file     Emotionally abused: Not on file     Physically abused: Not on file     Forced sexual activity: Not on file   Other Topics Concern     Parent/sibling w/ CABG, MI or angioplasty before 65F 55M? Yes     Comment: mother   Social History Narrative    June 4, 2019    ENVIRONMENTAL HISTORY: [place] The home is heated with a forced air. They do not have central air conditioning. The patient's bedroom is furnished with hard pawel in bedroom, allergen mattress cover, allergen pillowcase cover and fabric window coverings.  Pets inside the house include 1 dog. There is not history of cockroach or mice infestation. There are no smokers in the house.  The house does have a damp basement.        Family History -   Family History   Problem Relation Age of Onset     C.A.D. Mother      Diabetes Mother      Cancer Father         brain     Breast Cancer Maternal Aunt      Cancer - colorectal No family hx of        Review of Systems - As per HPI and PMHx, otherwise 10+ system review of the head and neck, and general constitution is negative.    Physical Exam  /71 (BP Location: Left arm, Patient Position: Chair, Cuff Size: Adult Large)    Pulse 78   Temp 97.5  F (36.4  C) (Tympanic)   Resp 18   LMP 07/18/2015 (Approximate)     General - The patient is well nourished and well developed, and appears to have good nutritional status.  Alert and oriented to person and place, answers questions and cooperates with examination appropriately.   Head and Face - Normocephalic and atraumatic, with no gross asymmetry noted of the contour of the facial features.  The facial nerve is intact, with strong symmetric movements.  Voice and Breathing - The patient was breathing comfortably without the use of accessory muscles. There was no wheezing, stridor, or stertor.  The patients voice was clear and strong, and had appropriate pitch and quality.  Ears - The tympanic membranes are normal in appearance, bony landmarks are intact.  No retraction, perforation, or masses.  No fluid or purulence was seen in the external canal or the middle ear. No evidence of infection of the middle ear or external canal, cerumen was normal in appearance.  Eyes - Extraocular movements intact, and the pupils were reactive to light.  Sclera were not icteric or injected, conjunctiva were pink and moist.  Mouth - Examination of the oral cavity showed pink, healthy oral mucosa. No lesions or ulcerations noted.  The tongue was mobile and midline, and the dentition were in good condition.    Throat - The walls of the oropharynx were smooth, pink, moist, symmetric, and had no lesions or ulcerations.  The tonsillar pillars and soft palate were symmetric.  The uvula was midline on elevation.    Neck - Normal midline excursion of the laryngotracheal complex during swallowing.  Full range of motion on passive movement.  Palpation of the occipital, submental, submandibular, internal jugular chain, and supraclavicular nodes did not demonstrate any abnormal lymph nodes or masses.  The carotid pulse was palpable bilaterally.  Palpation of the thyroid was soft and smooth, with no nodules or goiter  appreciated.  The trachea was mobile and midline.  Nose - External contour is symmetric, no gross deflection or scars.  Nasal mucosa is pink and moist with no abnormal mucus.  The septum was midline and non-obstructive, turbinates of normal size and position.  No polyps, masses, or purulence noted on examination.      A/P - Dilia Solomon is a 54 year old female  (J32.4) Chronic pansinusitis  (primary encounter diagnosis)  (J30.1) Chronic seasonal allergic rhinitis due to pollen  (Z98.890) History of endoscopic sinus surgery    Given her complex history and multiple surgeries, clearly a new CT scan is indicated to see if her mucosal and sinus disease has returned and surgical therapy is needed.  Medicated irrigations might also be of benefit.    I will call her with the results.      Again, thank you for allowing me to participate in the care of your patient.        Sincerely,        Ronald Coats MD

## 2019-10-25 NOTE — PROGRESS NOTES
History of Present Illness - Dilia Solomon is a 54 year old female here to see me for the first time, as a transfer of care.  She was previous patient of Dr. Tierney, and is status post endoscopic sinus surgery (with turbinate reduction) performed on 2/14/2018.  She also had sinus surgery prior to that with Dr More.    She has come back due to ear aches primarily.  She tells me that she is getting ear aches about once per month.  She tells me that once she gets an ear ache, she then gets sinus infections.  She has seen Dr. Lujan once, and is scheduled to follow up with him in December.  She has been on immunotherapy for over a year, and she thinks they do help.    She is here to deal with the ear aches, and also for follow up with the surgery.  She does think that the surgery from [facility] did help, but perhaps the benefit is fading a bit over the last few months.  She thinks that because congestion and fullness in the LEFT ear.    Past Medical History -   Patient Active Problem List   Diagnosis     Need for prophylactic vaccination and inoculation against influenza     Sprain of interphalangeal (joint) of hand     Radial styloid tenosynovitis     Carpal tunnel syndrome     Synovial cyst of popliteal space     Pain in joint, lower leg     Hyperlipidemia LDL goal <130     Advanced directives, counseling/discussion     Moderate persistent asthma     Allergic rhinitis due to dust mite     Food allergy     FH: diabetes mellitus     Chronic allergic rhinitis due to animal hair and dander     Allergic rhinitis due to mold     Chronic seasonal allergic rhinitis due to pollen     Obesity (BMI 35.0-39.9) with comorbidity (H)     Allergic conjunctivitis, bilateral       Current Medications -   Current Outpatient Medications:      albuterol (2.5 MG/3ML) 0.083% neb solution, Take 1 vial (2.5 mg) by nebulization every 4 hours as needed, Disp: 1 Box, Rfl: 3     albuterol (PROAIR HFA/PROVENTIL HFA/VENTOLIN HFA) 108 (90  Base) MCG/ACT inhaler, Inhale 2 puffs into the lungs every 4 hours as needed for shortness of breath / dyspnea or wheezing, Disp: 1 Inhaler, Rfl: 3     azelastine (ASTELIN) 0.1 % nasal spray, Spray 2 sprays into both nostrils 2 times daily as needed for rhinitis, Disp: 30 mL, Rfl: 3     azelastine (OPTIVAR) 0.05 % ophthalmic solution, Apply 1 drop to eye 2 times daily, Disp: 1 Bottle, Rfl: 5     Emollient (CERAVE) CREA, Externally apply 1 dose. topically 2 times daily, Disp: 2 Bottle, Rfl: 11     EPINEPHrine (EPIPEN/ADRENACLICK/OR ANY BX GENERIC EQUIV) 0.3 MG/0.3ML injection 2-pack, Inject 0.3 mLs (0.3 mg) into the muscle once as needed for anaphylaxis, Disp: 0.6 mL, Rfl: 3     fluticasone (FLONASE) 50 MCG/ACT nasal spray, Spray 2 sprays into both nostrils daily, Disp: 48 g, Rfl: 1     fluticasone-salmeterol (ADVAIR) 250-50 MCG/DOSE inhaler, Inhale 1 puff into the lungs 2 times daily, Disp: 3 Inhaler, Rfl: 1     montelukast (SINGULAIR) 10 MG tablet, Take 1 tablet (10 mg) by mouth At Bedtime, Disp: 90 tablet, Rfl: 1     MULTIVITAMIN OR, 1 tab daily, Disp: , Rfl:      ORDER FOR ALLERGEN IMMUNOTHERAPY, Name of Mix: Mix #1  Mold Alternaria Tenuis 1:10 w/v, HS  0.5 ml Aspergillus Fumigatus 1:10 w/v, HS  0.5 ml Epicoccum Nigrum 1:10 w/v, HS 0.5 ml Hormodendrum Cladosporioides 1:10 w/v, HS 0.5 ml Penicillium Mix 1:10 w/v, HS  0.5 ml Diluent: HSA qs to 5ml, Disp: 5 mL, Rfl: PRN     ORDER FOR ALLERGEN IMMUNOTHERAPY, Name of Mix: Mix #2  Dust Mite, Cat, Dog Cat Hair, Standardized 10,000 BAU/mL, ALK  2.0 ml Dog Hair Dander, A. P.  1:100 w/v, HS  1.0 ml Dust Mites F 30,000AU/mL, HS  0.3 ml Dust Mites P. 30,000 AU/mL, HS  0.3 ml  Diluent: HSA qs to 5ml, Disp: 5 mL, Rfl: PRN     ORDER FOR ALLERGEN IMMUNOTHERAPY, Name of Mix: Mix #3 Grass,Tree  Dmitry,White 1:20w/v, HS 0.5ml Birch Mix PRW 1:20w/v, HS 0.5ml Boxelder-Maple Mix BHR (Boxelder Hard Red) 1:20w/v, HS 0.5ml [place],Common 1:20w/v, HS 0.5ml Elm,American 1:20w/v, HS 0.5ml  [place] Mix RW 1:20w/v, HS 0.5ml Oak Mix RVW 1:20w/v, HS 0.5ml [place] Tree,Black 1:20w/v, HS 0.5ml Grass Mix #7 100,000 BAU/mL, HS 0.4ml Jonathan Grass 1:20w/v, HS 0.5ml Diluent: HSA qs to 5ml, Disp: 5 mL, Rfl: PRN     ORDER FOR ALLERGEN IMMUNOTHERAPY, Name of Mix: Mix #4  Weeds Kochia 1:20 w/v, HS 0.5 ml Lamb's Quarters 1:20 w/v, HS 0.5 ml Nettle 1:20 w/v, HS 0.5 ml Plantain, English 1:20 w/v, HS 0.5 ml Ragweed Mixed 1:20 w/v ALK  0.5 ml Russian Thistle 1:20 w/v, HS 0.5 ml Sagebrush, Mugwort 1:20 w/v, HS 0.5 ml Sorrel, Sheep 1:20 w/v, HS 0.5 ml Diluent: HSA qs to 5ml, Disp: 5 mL, Rfl: PRN     order for DME, Equipment being ordered: thumb spica splint RIGHT, Disp: 1 Units, Rfl: 0     order for DME, Equipment being ordered: Silicone heel cup, Disp: 1 Units, Rfl: 0     order for DME, Equipment being ordered: Dynaflex insert, Disp: 1 Units, Rfl: 0     order for DME, Equipment being ordered: Silicone heel cup, Disp: 1 Units, Rfl: 0     triamcinolone (KENALOG) 0.1 % external cream, Apply sparingly to affected area two times daily as needed but not more than 14 days in a row. Spare face, armpits, neck, and groin., Disp: 80 g, Rfl: 1    Allergies -   Allergies   Allergen Reactions     Clinoril [Nsaids] Hives     Tolerates ibuprofen and aspirin without hives     Pineapple Hives       Social History -   Social History     Socioeconomic History     Marital status: Single     Spouse name: Not on file     Number of children: Not on file     Years of education: Not on file     Highest education level: Not on file   Occupational History     Not on file   Social Needs     Financial resource strain: Not on file     Food insecurity:     Worry: Not on file     Inability: Not on file     Transportation needs:     Medical: Not on file     Non-medical: Not on file   Tobacco Use     Smoking status: Never Smoker     Smokeless tobacco: Never Used   Substance and Sexual Activity     Alcohol use: No     Drug use: No     Sexual activity:  Never   Lifestyle     Physical activity:     Days per week: Not on file     Minutes per session: Not on file     Stress: Not on file   Relationships     Social connections:     Talks on phone: Not on file     Gets together: Not on file     Attends Voodoo service: Not on file     Active member of club or organization: Not on file     Attends meetings of clubs or organizations: Not on file     Relationship status: Not on file     Intimate partner violence:     Fear of current or ex partner: Not on file     Emotionally abused: Not on file     Physically abused: Not on file     Forced sexual activity: Not on file   Other Topics Concern     Parent/sibling w/ CABG, MI or angioplasty before 65F 55M? Yes     Comment: mother   Social History Narrative    June 4, 2019    ENVIRONMENTAL HISTORY: [place] The home is heated with a forced air. They do not have central air conditioning. The patient's bedroom is furnished with hard pawel in bedroom, allergen mattress cover, allergen pillowcase cover and fabric window coverings.  Pets inside the house include 1 dog. There is not history of cockroach or mice infestation. There are no smokers in the house.  The house does have a damp basement.        Family History -   Family History   Problem Relation Age of Onset     C.A.D. Mother      Diabetes Mother      Cancer Father         brain     Breast Cancer Maternal Aunt      Cancer - colorectal No family hx of        Review of Systems - As per HPI and PMHx, otherwise 10+ system review of the head and neck, and general constitution is negative.    Physical Exam  /71 (BP Location: Left arm, Patient Position: Chair, Cuff Size: Adult Large)   Pulse 78   Temp 97.5  F (36.4  C) (Tympanic)   Resp 18   LMP 07/18/2015 (Approximate)     General - The patient is well nourished and well developed, and appears to have good nutritional status.  Alert and oriented to person and place, answers  questions and cooperates with examination appropriately.   Head and Face - Normocephalic and atraumatic, with no gross asymmetry noted of the contour of the facial features.  The facial nerve is intact, with strong symmetric movements.  Voice and Breathing - The patient was breathing comfortably without the use of accessory muscles. There was no wheezing, stridor, or stertor.  The patients voice was clear and strong, and had appropriate pitch and quality.  Ears - The tympanic membranes are normal in appearance, bony landmarks are intact.  No retraction, perforation, or masses.  No fluid or purulence was seen in the external canal or the middle ear. No evidence of infection of the middle ear or external canal, cerumen was normal in appearance.  Eyes - Extraocular movements intact, and the pupils were reactive to light.  Sclera were not icteric or injected, conjunctiva were pink and moist.  Mouth - Examination of the oral cavity showed pink, healthy oral mucosa. No lesions or ulcerations noted.  The tongue was mobile and midline, and the dentition were in good condition.    Throat - The walls of the oropharynx were smooth, pink, moist, symmetric, and had no lesions or ulcerations.  The tonsillar pillars and soft palate were symmetric.  The uvula was midline on elevation.    Neck - Normal midline excursion of the laryngotracheal complex during swallowing.  Full range of motion on passive movement.  Palpation of the occipital, submental, submandibular, internal jugular chain, and supraclavicular nodes did not demonstrate any abnormal lymph nodes or masses.  The carotid pulse was palpable bilaterally.  Palpation of the thyroid was soft and smooth, with no nodules or goiter appreciated.  The trachea was mobile and midline.  Nose - External contour is symmetric, no gross deflection or scars.  Nasal mucosa is pink and moist with no abnormal mucus.  The septum was midline and non-obstructive, turbinates of normal size and  position.  No polyps, masses, or purulence noted on examination.      A/P - Dilia Solomon is a 54 year old female  (J32.4) Chronic pansinusitis  (primary encounter diagnosis)  (J30.1) Chronic seasonal allergic rhinitis due to pollen  (Z98.890) History of endoscopic sinus surgery    Given her complex history and multiple surgeries, clearly a new CT scan is indicated to see if her mucosal and sinus disease has returned and surgical therapy is needed.  Medicated irrigations might also be of benefit.    I will call her with the results.

## 2019-10-25 NOTE — TELEPHONE ENCOUNTER
ALLERGY SOLUTION RE-ORDER REQUEST    Dilia Solomon 1965 MRN: [id number]    DATE NEEDED:  11/08/19  Vial Color Content   Top Dose   Last Dose Vial Size  Red 1:1 Weeds   Red 1:1 0.5   Red 1:10.5 5mL  Red 1:1 Grass, Trees   Red 1:1 0.5   Red 1:10.5 5mL  Red 1:1 Molds   Red 1:1 0.5   Red 1:10.5 5mL  Red 1:1 Cat, Dog, Dust Mite   Red 1:1 0.5   Red 1:10.5 5mL      Serum reorder consent signed and patient/parent was advised that new serums would be ordered through the pharmacy and billed to their insurance company when they arrive in clinic. Yes    [facility] Location:  [place]  Ship to Location: [place]  Serum billed to:  [place]    Special Instructions:        Updated Prescription Needed: No      Requester Signature  Silver Araujo LPN

## 2019-10-25 NOTE — NURSING NOTE
"Chief Complaint   Patient presents with     Consult     Chronic maxillary sinusitis, [facility] did sinus surgery, Pt also requesting to have ears looked at        Initial /71 (BP Location: Left arm, Patient Position: Chair, Cuff Size: Adult Large)   Pulse 78   Temp 97.5  F (36.4  C) (Tympanic)   Resp 18   LMP 07/18/2015 (Approximate)  Estimated body mass index is 37.49 kg/m  as calculated from the following:    Height as of 7/26/19: 1.613 m (5' 3.5\").    Weight as of 8/21/19: 97.5 kg (215 lb).  BP completed using cuff size: large   Medications and allergies reviewed.      Dayanara SALAZAR CMA     "

## 2019-10-25 NOTE — PATIENT INSTRUCTIONS
Per physician instructions.    If you have questions or concerns on any instructions given to you by your provider today or if you need to schedule an appointment, you can reach us at [phone].    Thank you!

## 2019-10-30 ENCOUNTER — IMMUNIZATION (OUTPATIENT)
Dept: FAMILY MEDICINE | Facility: CLINIC | Age: 54
End: 2019-10-30
Payer: COMMERCIAL

## 2019-10-30 ENCOUNTER — HOSPITAL ENCOUNTER (OUTPATIENT)
Dept: CT IMAGING | Facility: CLINIC | Age: 54
Discharge: HOME | End: 2019-10-30
Attending: OTOLARYNGOLOGY | Admitting: OTOLARYNGOLOGY
Payer: COMMERCIAL

## 2019-10-30 DIAGNOSIS — Z98.890 HISTORY OF ENDOSCOPIC SINUS SURGERY: ICD-10-CM

## 2019-10-30 DIAGNOSIS — J32.4 CHRONIC PANSINUSITIS: ICD-10-CM

## 2019-10-30 DIAGNOSIS — J30.1 CHRONIC SEASONAL ALLERGIC RHINITIS DUE TO POLLEN: ICD-10-CM

## 2019-10-30 DIAGNOSIS — Z23 NEED FOR PROPHYLACTIC VACCINATION AND INOCULATION AGAINST INFLUENZA: Primary | ICD-10-CM

## 2019-10-30 PROCEDURE — 99207 ZZC NO CHARGE NURSE ONLY: CPT

## 2019-10-30 PROCEDURE — 90471 IMMUNIZATION ADMIN: CPT

## 2019-10-30 PROCEDURE — 90682 RIV4 VACC RECOMBINANT DNA IM: CPT

## 2019-10-30 PROCEDURE — 70486 CT MAXILLOFACIAL W/O DYE: CPT

## 2019-10-31 ENCOUNTER — TELEPHONE (OUTPATIENT)
Dept: OTOLARYNGOLOGY | Facility: CLINIC | Age: 54
End: 2019-10-31

## 2019-10-31 NOTE — TELEPHONE ENCOUNTER
Reason for call:  Results   Name of test or procedure: Ct Sinus  Date of test or procedure: 10/30/19  Location of test or procedure: [place]    Additional comments: na    Phone number to reach patient:  Home number on file [phone] (home)    Best Time:  any    Can we leave a detailed message on this number?  YES

## 2019-10-31 NOTE — TELEPHONE ENCOUNTER
Called and discussed with patient that her CT scan showed clear and healthy sinuses per Dr. Coats. Advised that he will discuss next steps with her. Patient verbalized understanding.    Clive Torres RN....10/31/2019 1:13 PM

## 2019-11-04 ENCOUNTER — HEALTH MAINTENANCE LETTER (OUTPATIENT)
Age: 54
End: 2019-11-04

## 2019-11-07 NOTE — TELEPHONE ENCOUNTER
Pt calling stating she is returning a call to Dr. Coats to f/u regarding CT  She got pulled over because she was feeling dizzy, they think she might have vertigo wondering if this could be related.     Please call    Trina Mcacrty  Specialty CSS

## 2019-11-08 DIAGNOSIS — J30.89 ALLERGIC RHINITIS DUE TO DUST MITE: ICD-10-CM

## 2019-11-08 DIAGNOSIS — J30.1 CHRONIC SEASONAL ALLERGIC RHINITIS DUE TO POLLEN: ICD-10-CM

## 2019-11-08 DIAGNOSIS — J30.81 CHRONIC ALLERGIC RHINITIS DUE TO ANIMAL HAIR AND DANDER: Primary | ICD-10-CM

## 2019-11-08 DIAGNOSIS — J30.89 ALLERGIC RHINITIS DUE TO MOLD: ICD-10-CM

## 2019-11-08 PROCEDURE — 95165 ANTIGEN THERAPY SERVICES: CPT | Performed by: ALLERGY & IMMUNOLOGY

## 2019-11-08 NOTE — TELEPHONE ENCOUNTER
Called a third time, left another voicemail.  Leaving clinic for the week, will try again on Monday.

## 2019-11-08 NOTE — PROGRESS NOTES
Allergy serums billed at [place].     Vials billed below:    Vial Color Content                      Vial Size Expiration Date  Red 1:1 Cat, Dog, Dust Mite 5mL  11/05/20  Red 1:1 Grass, Trees 5mL  11/05/20  Red 1:1 Weeds 5mL  11/05/20  Red 1:1 Molds 5mL  11/05/20    Original Refill encounter date: 10/25/19      Signature  Silver Araujo LPN

## 2019-11-08 NOTE — TELEPHONE ENCOUNTER
Allergy serums received at [place].     Vials received below:    Vial Color Content                      Vial Size Expiration Date  Red 1:1 Cat, Dog, Dust Mite 5mL  11/05/20  Red 1:1 Grass, Trees 5mL  11/05/20  Red 1:1 Weeds 5mL  11/05/20  Red 1:1 Molds 5mL  11/05/20      Signature  Silver Araujo LPN

## 2019-11-12 DIAGNOSIS — L30.9 ECZEMA, UNSPECIFIED TYPE: ICD-10-CM

## 2019-11-12 RX ORDER — TRIAMCINOLONE ACETONIDE 1 MG/G
CREAM TOPICAL
Qty: 80 G | Refills: 1 | Status: SHIPPED | OUTPATIENT
Start: 2019-11-12 | End: 2020-12-21

## 2019-11-12 NOTE — TELEPHONE ENCOUNTER
Pt returning call.     Pt can be contacted @:  [phone] (pt states she will be available tomorrow after 10:30 am, Thursday morning until noon, and then all day on Friday)    Denise Behrendt Specialty CSS

## 2019-11-13 NOTE — TELEPHONE ENCOUNTER
Tried to call as described, no answer and left a voicemail. Will try again tomorrow Thursday, after surgery.  If not, will try again early next week as I will be out of town Friday.

## 2019-11-19 ENCOUNTER — TELEPHONE (OUTPATIENT)
Dept: ALLERGY | Facility: CLINIC | Age: 54
End: 2019-11-19

## 2019-11-19 NOTE — TELEPHONE ENCOUNTER
Reason for Call:  Other appointment    Detailed comments: Pt states she forgot about her allergy appt (dealing with a brother in the hospital)  Wondering if she can be seen on Friday?    Phone Number Patient can be reached at: Home number on file [phone] (home)    Best Time: call after 9am    Can we leave a detailed message on this number? YES    Call taken on 11/19/2019 at 8:22 AM by Jesenia Mccann

## 2019-11-19 NOTE — TELEPHONE ENCOUNTER
Left detailed message for patient.  Informed patient that she can make an appointment on Friday, she will be at day 28 and has until day 35 to remain at her top dose.  Advised patient to call back to schedule the appointment.  Dee Martínez RN

## 2019-11-22 ENCOUNTER — ALLIED HEALTH/NURSE VISIT (OUTPATIENT)
Dept: ALLERGY | Facility: CLINIC | Age: 54
End: 2019-11-22
Payer: COMMERCIAL

## 2019-11-22 DIAGNOSIS — Z51.6 NEED FOR DESENSITIZATION TO ALLERGENS: Primary | ICD-10-CM

## 2019-11-22 PROCEDURE — 99207 ZZC DROP WITH A PROCEDURE: CPT

## 2019-11-22 PROCEDURE — 95117 IMMUNOTHERAPY INJECTIONS: CPT

## 2019-11-29 ENCOUNTER — ALLIED HEALTH/NURSE VISIT (OUTPATIENT)
Dept: ALLERGY | Facility: CLINIC | Age: 54
End: 2019-11-29
Payer: COMMERCIAL

## 2019-11-29 DIAGNOSIS — Z51.6 NEED FOR DESENSITIZATION TO ALLERGENS: Primary | ICD-10-CM

## 2019-11-29 PROCEDURE — 99207 ZZC DROP WITH A PROCEDURE: CPT

## 2019-11-29 PROCEDURE — 95117 IMMUNOTHERAPY INJECTIONS: CPT

## 2019-12-06 ENCOUNTER — OFFICE VISIT (OUTPATIENT)
Dept: ALLERGY | Facility: CLINIC | Age: 54
End: 2019-12-06
Payer: COMMERCIAL

## 2019-12-06 ENCOUNTER — ALLIED HEALTH/NURSE VISIT (OUTPATIENT)
Dept: ALLERGY | Facility: CLINIC | Age: 54
End: 2019-12-06
Payer: COMMERCIAL

## 2019-12-06 VITALS
DIASTOLIC BLOOD PRESSURE: 79 MMHG | HEART RATE: 85 BPM | SYSTOLIC BLOOD PRESSURE: 116 MMHG | WEIGHT: 208.78 LBS | TEMPERATURE: 97.6 F | BODY MASS INDEX: 36.4 KG/M2 | OXYGEN SATURATION: 100 %

## 2019-12-06 DIAGNOSIS — J30.1 CHRONIC SEASONAL ALLERGIC RHINITIS DUE TO POLLEN: ICD-10-CM

## 2019-12-06 DIAGNOSIS — J30.89 ALLERGIC RHINITIS DUE TO MOLD: ICD-10-CM

## 2019-12-06 DIAGNOSIS — J30.81 CHRONIC ALLERGIC RHINITIS DUE TO ANIMAL HAIR AND DANDER: ICD-10-CM

## 2019-12-06 DIAGNOSIS — J45.40 MODERATE PERSISTENT ASTHMA WITHOUT COMPLICATION: Primary | ICD-10-CM

## 2019-12-06 DIAGNOSIS — J30.89 ALLERGIC RHINITIS DUE TO DUST MITE: ICD-10-CM

## 2019-12-06 DIAGNOSIS — Z53.9 ERRONEOUS ENCOUNTER--DISREGARD: Primary | ICD-10-CM

## 2019-12-06 DIAGNOSIS — H10.13 ALLERGIC CONJUNCTIVITIS OF BOTH EYES: ICD-10-CM

## 2019-12-06 LAB
FEF 25/75: NORMAL
FEV-1: NORMAL
FEV1/FVC: NORMAL
FVC: NORMAL

## 2019-12-06 PROCEDURE — 94010 BREATHING CAPACITY TEST: CPT | Performed by: ALLERGY & IMMUNOLOGY

## 2019-12-06 PROCEDURE — 99214 OFFICE O/P EST MOD 30 MIN: CPT | Mod: 25 | Performed by: ALLERGY & IMMUNOLOGY

## 2019-12-06 RX ORDER — ALBUTEROL SULFATE 90 UG/1
2 AEROSOL, METERED RESPIRATORY (INHALATION) EVERY 4 HOURS PRN
Qty: 1 INHALER | Refills: 3 | Status: SHIPPED | OUTPATIENT
Start: 2019-12-06 | End: 2020-06-02

## 2019-12-06 RX ORDER — LORATADINE 10 MG/1
TABLET ORAL PRN
COMMUNITY
Start: 2019-11-12 | End: 2020-12-21

## 2019-12-06 RX ORDER — AZELASTINE HYDROCHLORIDE 0.5 MG/ML
1 SOLUTION/ DROPS OPHTHALMIC 2 TIMES DAILY
Qty: 1 BOTTLE | Refills: 5 | Status: SHIPPED | OUTPATIENT
Start: 2019-12-06 | End: 2020-06-02

## 2019-12-06 RX ORDER — AZELASTINE 1 MG/ML
2 SPRAY, METERED NASAL 2 TIMES DAILY PRN
Qty: 30 ML | Refills: 3 | Status: SHIPPED | OUTPATIENT
Start: 2019-12-06 | End: 2020-06-02

## 2019-12-06 RX ORDER — MONTELUKAST SODIUM 10 MG/1
10 TABLET ORAL AT BEDTIME
Qty: 90 TABLET | Refills: 1 | Status: SHIPPED | OUTPATIENT
Start: 2019-12-06 | End: 2020-06-02

## 2019-12-06 RX ORDER — CETIRIZINE HYDROCHLORIDE 10 MG/1
TABLET ORAL PRN
COMMUNITY
Start: 2019-11-12 | End: 2020-12-21

## 2019-12-06 ASSESSMENT — ENCOUNTER SYMPTOMS
ADENOPATHY: 0
WHEEZING: 0
COUGH: 0
ACTIVITY CHANGE: 0
JOINT SWELLING: 0
VOMITING: 0
DIARRHEA: 0
EYE DISCHARGE: 0
SHORTNESS OF BREATH: 0
FACIAL SWELLING: 0
EYE ITCHING: 0
HEADACHES: 0
ARTHRALGIAS: 0
RHINORRHEA: 0
CHILLS: 0
EYE REDNESS: 0
MYALGIAS: 0
FEVER: 0
CHEST TIGHTNESS: 0
NAUSEA: 0
SINUS PRESSURE: 0

## 2019-12-06 NOTE — LETTER
12/6/2019         RE: Dilia Solomon  [address] [facility] [address]        Dear Colleague,    Thank you for referring your patient, Dilia Solomon, to the [facility]. Please see a copy of my visit note below.    SUBJECTIVE:                                                                   Dilia Solomon presents today to our Allergy Clinic at [facility] for a follow up visit.  She is a 54 year old female with moderate persistent asthma and allergic rhinitis.    She was diagnosed with asthma approximately in 3724-9978. Triggers are environmental allergies and viral respiratory infections.   Serum IgE for regional aeroallergen panel performed in June 2017 with multiple levels of various sensitivities to molds, cat, dog, dust mite, pollen of trees, grasses, and weeds.  She had 2 sinus surgeries in the past. The last one was in 2018.   She started allergen immunotherapy in July 2017. She was on allergen immunotherapy before that with Dr. Haines, and it was helpful at that time; however, symptoms got worse soon after stopping it.     Allergy Immunotherapy  Date/time of injection(s):  11/29/2019     Vial Color                               Content                                  Dose  Red 1:1                                   Weeds                                     0.4mL  Red 1:1                                   Grass, Trees                           0.4mL    Red 1:1                                   Molds                                      0.4mL  Red 1:1                                   Cat, Dog, Dust Mite                0.4mL     She tolerates injections well without persistent large local reactions or systemic reactions. She has been on the maintenance dose since January 2018. The current dose is decreased due to the new vial change.    She has been using Advair 250/50 mcg 1 puff twice daily and taking montelukast 10 mg by mouth once daily at bedtime. She has been  under a lot of stress recently. Her brother passed away 3 days ago. She has been using albuterol more often because she frequently cries and it causes wheezing. These days she has been using albuterol approximately twice a week. She had to wake up once last week and use albuterol. There has been no use of oral steroids since the last visit. No ED/PCP/urgent care/other specialist visits for asthma flare since the previous visit.     Regarding her allergic rhinitis, she has been using sinus rinses once daily, intranasal fluticasone 2 sprays in each nostril once daily, azelastine 2 sprays in each nostril twice daily, cetirizine as needed, and montelukast 10 mg by mouth daily at bedtime. She reports a significant improvement in symptoms. She uses azelastine eye drops twice daily. She wasn't aware she could have used them as needed. The patient denies clear rhinorrhea, nasal itch, stuffiness, sneezing, or interval sinusitis symptoms of fever, facial pain, or purulent rhinorrhea.      Patient Active Problem List   Diagnosis     Need for prophylactic vaccination and inoculation against influenza     Sprain of interphalangeal (joint) of hand     Radial styloid tenosynovitis     Carpal tunnel syndrome     Synovial cyst of popliteal space     Pain in joint, lower leg     Hyperlipidemia LDL goal <130     Advanced directives, counseling/discussion     Moderate persistent asthma     Allergic rhinitis due to dust mite     Food allergy     FH: diabetes mellitus     Chronic allergic rhinitis due to animal hair and dander     Allergic rhinitis due to mold     Chronic seasonal allergic rhinitis due to pollen     Obesity (BMI 35.0-39.9) with comorbidity (H)     Allergic conjunctivitis, bilateral     History of endoscopic sinus surgery     Chronic pansinusitis       Past Medical History:   Diagnosis Date     Injury, other and unspecified, shoulder and upper arm 9/03      Problem (# of Occurrences) Relation (Name,Age of Onset)    Breast  Cancer (1) Maternal Aunt    C.A.D. (1) Mother    Cancer (1) Father: brain    Diabetes (1) Mother       Negative family history of: Cancer - colorectal        Past Surgical History:   Procedure Laterality Date     COLONOSCOPY N/A 10/6/2016    Procedure: COLONOSCOPY;  Surgeon: Shiva Johns MD;  Location: [facility]     ETHMOIDECTOMY  12/30/2013    Procedure: ETHMOIDECTOMY;  Bilateral Submucousal Reduction of InferiorTurbinates and Total Ethmoidectomy with Multiple Sinusotomies;  Surgeon: Lio More MD;  Location: [facility]     ETHMOIDECTOMY Bilateral 2/14/2018    Procedure: ETHMOIDECTOMY;  Bilateral anterior ethmoidectomy, bilateral maxillary antrostomy;  Surgeon: Santhosh Tierney MD;  Location: [facility]     SURGICAL HISTORY OF -   5/04    carpal tunnel release, bilateral     Social History     Socioeconomic History     Marital status: Single     Spouse name: None     Number of children: None     Years of education: None     Highest education level: None   Occupational History     None   Social Needs     Financial resource strain: None     Food insecurity:     Worry: None     Inability: None     Transportation needs:     Medical: None     Non-medical: None   Tobacco Use     Smoking status: Never Smoker     Smokeless tobacco: Never Used   Substance and Sexual Activity     Alcohol use: No     Drug use: No     Sexual activity: Never   Lifestyle     Physical activity:     Days per week: None     Minutes per session: None     Stress: None   Relationships     Social connections:     Talks on phone: None     Gets together: None     Attends [employer] service: None     Active member of club or organization: None     Attends meetings of clubs or organizations: None     Relationship status: None     Intimate partner violence:     Fear of current or ex partner: None     Emotionally abused: None     Physically abused: None     Forced sexual activity: None   Other Topics Concern     Parent/sibling w/ CABG, MI or angioplasty before  65F 55M? Yes     Comment: mother   Social History Narrative    December 6, 2019    ENVIRONMENTAL HISTORY: [place] is heated with a forced air. They do not have central air conditioning. The patient's bedroom is furnished with hard pawel in bedroom, allergen mattress cover, allergen pillowcase cover and fabric window coverings.  Pets inside the house include 1 dog. There is not history of cockroach or mice infestation. There are no smokers in the house.  The house does have a damp basement.            Review of Systems   Constitutional: Negative for activity change, chills and fever.   HENT: Negative for congestion, dental problem, ear pain, facial swelling, nosebleeds, postnasal drip, rhinorrhea, sinus pressure and sneezing.    Eyes: Negative for discharge, redness and itching.   Respiratory: Negative for cough, chest tightness, shortness of breath and wheezing.    Cardiovascular: Negative for chest pain.   Gastrointestinal: Negative for diarrhea, nausea and vomiting.   Musculoskeletal: Negative for arthralgias, joint swelling and myalgias.   Skin: Negative for rash.   Neurological: Negative for headaches.   Hematological: Negative for adenopathy.   Psychiatric/Behavioral: Negative for behavioral problems and self-injury.           Current Outpatient Medications:      albuterol (PROAIR HFA/PROVENTIL HFA/VENTOLIN HFA) 108 (90 Base) MCG/ACT inhaler, Inhale 2 puffs into the lungs every 4 hours as needed for shortness of breath / dyspnea or wheezing, Disp: 1 Inhaler, Rfl: 3     azelastine (ASTELIN) 0.1 % nasal spray, Spray 2 sprays into both nostrils 2 times daily as needed for rhinitis, Disp: 30 mL, Rfl: 3     azelastine (OPTIVAR) 0.05 % ophthalmic solution, Apply 1 drop to eye 2 times daily, Disp: 1 Bottle, Rfl: 5     Emollient (CERAVE) CREA, Externally apply 1 dose. topically 2 times daily, Disp: 2 Bottle, Rfl: 11     fluticasone (FLONASE) 50 MCG/ACT nasal spray,  Spray 2 sprays into both nostrils daily, Disp: 48 g, Rfl: 1     fluticasone-salmeterol (ADVAIR) 250-50 MCG/DOSE inhaler, Inhale 1 puff into the lungs 2 times daily, Disp: 3 Inhaler, Rfl: 3     montelukast (SINGULAIR) 10 MG tablet, Take 1 tablet (10 mg) by mouth At Bedtime, Disp: 90 tablet, Rfl: 1     MULTIVITAMIN OR, 1 tab daily, Disp: , Rfl:      ORDER FOR ALLERGEN IMMUNOTHERAPY, Name of Mix: Mix #1  Mold Alternaria Tenuis 1:10 w/v, HS  0.5 ml Aspergillus Fumigatus 1:10 w/v, HS  0.5 ml Epicoccum Nigrum 1:10 w/v, HS 0.5 ml Hormodendrum Cladosporioides 1:10 w/v, HS 0.5 ml Penicillium Mix 1:10 w/v, HS  0.5 ml Diluent: HSA qs to 5ml, Disp: 5 mL, Rfl: PRN     ORDER FOR ALLERGEN IMMUNOTHERAPY, Name of Mix: Mix #2  Dust Mite, Cat, Dog Cat Hair, Standardized 10,000 BAU/mL, ALK  2.0 ml Dog Hair Dander, A. P.  1:100 w/v, HS  1.0 ml Dust Mites F 30,000AU/mL, HS  0.3 ml Dust Mites P. 30,000 AU/mL, HS  0.3 ml  Diluent: HSA qs to 5ml, Disp: 5 mL, Rfl: PRN     ORDER FOR ALLERGEN IMMUNOTHERAPY, Name of Mix: Mix #3 Grass,Tree  Dmitry,White 1:20w/v, HS 0.5ml Birch Mix PRW 1:20w/v, HS 0.5ml Boxelder-Maple Mix BHR (Boxelder Hard Red) 1:20w/v, HS 0.5ml Bledsoe,Common 1:20w/v, HS 0.5ml Elm,American 1:20w/v, HS 0.5ml [place] Mix RW 1:20w/v, HS 0.5ml Oak Mix RVW 1:20w/v, HS 0.5ml [place] Tree,Black 1:20w/v, HS 0.5ml Grass Mix #7 100,000 BAU/mL, HS 0.4ml Jonathan Grass 1:20w/v, HS 0.5ml Diluent: HSA qs to 5ml, Disp: 5 mL, Rfl: PRN     ORDER FOR ALLERGEN IMMUNOTHERAPY, Name of Mix: Mix #4  Weeds Kochia 1:20 w/v, HS 0.5 ml Lamb's Quarters 1:20 w/v, HS 0.5 ml Nettle 1:20 w/v, HS 0.5 ml Plantain, English 1:20 w/v, HS 0.5 ml Ragweed Mixed 1:20 w/v ALK  0.5 ml Russian Thistle 1:20 w/v, HS 0.5 ml Sagebrush, Mugwort 1:20 w/v, HS 0.5 ml Sorrel, Sheep 1:20 w/v, HS 0.5 ml Diluent: HSA qs to 5ml, Disp: 5 mL, Rfl: PRN     order for DME, Equipment being ordered: Dynaflex insert, Disp: 1 Units, Rfl: 0     triamcinolone (KENALOG) 0.1 % external cream,  Apply sparingly to affected area two times daily as needed but not more than 14 days in a row. Spare face, armpits, neck, and groin., Disp: 80 g, Rfl: 1     albuterol (2.5 MG/3ML) 0.083% neb solution, Take 1 vial (2.5 mg) by nebulization every 4 hours as needed (Patient not taking: Reported on 12/6/2019), Disp: 1 Box, Rfl: 3     cetirizine (ZYRTEC) 10 MG tablet, as needed, Disp: , Rfl:      EPINEPHrine (EPIPEN/ADRENACLICK/OR ANY BX GENERIC EQUIV) 0.3 MG/0.3ML injection 2-pack, Inject 0.3 mLs (0.3 mg) into the muscle once as needed for anaphylaxis (Patient not taking: Reported on 12/6/2019), Disp: 0.6 mL, Rfl: 3     loratadine (CLARITIN) 10 MG tablet, as needed, Disp: , Rfl:      order for DME, Equipment being ordered: thumb spica splint RIGHT (Patient not taking: Reported on 12/6/2019), Disp: 1 Units, Rfl: 0     order for DME, Equipment being ordered: Silicone heel cup (Patient not taking: Reported on 12/6/2019), Disp: 1 Units, Rfl: 0     order for DME, Equipment being ordered: Silicone heel cup (Patient not taking: Reported on 12/6/2019), Disp: 1 Units, Rfl: 0  Immunization History   Administered Date(s) Administered     Influenza (IIV3) PF 12/12/2002, 11/28/2003, 10/24/2006, 02/11/2009, 10/05/2011, 11/15/2012, 10/07/2014     Influenza Quad, Recombinant, p-free (RIV4) 10/02/2018, 10/30/2019     Influenza Vaccine IM > 6 months Valent IIV4 10/16/2013, 10/20/2015, 10/26/2016, 11/21/2017     Mantoux Tuberculin Skin Test 02/11/2009     Measles 10/22/1979     Pneumococcal 23 valent 10/05/2011     TDAP Vaccine (Adacel) 02/11/2009, 07/06/2018     Allergies   Allergen Reactions     Clinoril [Nsaids] Hives     Tolerates ibuprofen and aspirin without hives     Pineapple Hives     OBJECTIVE:                                                                 /79 (BP Location: Left arm, Patient Position: Sitting, Cuff Size: Adult Regular)   Pulse 85   Temp 97.6  F (36.4  C) (Tympanic)   Wt 94.7 kg (208 lb 12.4 oz)   LMP  07/18/2015 (Approximate)   SpO2 100%   BMI 36.40 kg/m           Physical Exam  Vitals signs and nursing note reviewed.   Constitutional:       General: She is not in acute distress.     Appearance: She is not diaphoretic.   HENT:      Head: Normocephalic and atraumatic.      Right Ear: Tympanic membrane, ear canal and external ear normal.      Left Ear: Tympanic membrane, ear canal and external ear normal.      Nose: No mucosal edema or rhinorrhea.   Eyes:      General:         Right eye: No discharge.         Left eye: No discharge.      Conjunctiva/sclera: Conjunctivae normal.   Neck:      Musculoskeletal: Normal range of motion.   Cardiovascular:      Rate and Rhythm: Normal rate and regular rhythm.      Heart sounds: Normal heart sounds. No murmur.   Pulmonary:      Effort: Pulmonary effort is normal. No respiratory distress.      Breath sounds: Normal breath sounds. No wheezing or rales.   Musculoskeletal: Normal range of motion.   Lymphadenopathy:      Cervical: No cervical adenopathy.   Skin:     General: Skin is warm.      Comments: Moderate xerosis of the skin of palms and dorsal aspect of hands. No active dermatitis.   Neurological:      Mental Status: She is alert and oriented to person, place, and time.   Psychiatric:         Behavior: Behavior normal.         WORKUP:         ASSESSMENT/PLAN:  1.  Moderate persistent asthma without complication  (primary encounter diagnosis)    Recently, she had to use albuterol several times, but her symptoms are still well controlled with Advair 250/50 micrograms 1 puff twice daily, montelukast 10 mg by mouth daily at bedtime, and albuterol inhaler as needed.  -Continue as is.      albuterol (PROAIR HFA/PROVENTIL HFA/VENTOLIN         HFA) 108 (90 Base) MCG/ACT inhaler,         fluticasone-salmeterol (ADVAIR) 250-50 MCG/DOSE        inhaler, montelukast (SINGULAIR) 10 MG tablet    2. Chronic allergic rhinitis due to animal hair and dander 3. Chronic seasonal allergic  rhinitis due to pollen 4. Allergic rhinitis due to mold  5. Allergic rhinitis due to dust mite 6.  Allergic conjunctivitis, bilateral    Symptoms are well controlled with allergen immunotherapy, sinus rinses once daily, intranasal fluticasone 2 sprays in each nostril once daily, azelastine 2 sprays in each nostril twice daily, cetirizine on as-needed basis, and montelukast 10 mg by mouth daily at bedtime.  -Continue as is.    The patient is satisfied with the efficacy of allergen immunotherapy and would like to continue it further.  Considering that her symptoms relapsed within 1 year after completing allergen immunotherapy for 5 years, I anticipate long-term treatment.    Because of the increased stress and recent albuterol use, we decided to postpone allergen immunotherapy injections until the next week.     azelastine (ASTELIN) 0.1 % nasal spray,         montelukast (SINGULAIR) 10 MG tablet        azelastine (OPTIVAR) 0.05 % ophthalmic solution       Return in about 6 months (around 6/6/2020), or if symptoms worsen or fail to improve.    Thank you for allowing us to participate in the care of this patient. Please feel free to contact us if there are any questions or concerns about the patient.    Disclaimer: This note consists of symbols derived from keyboarding, dictation and/or voice recognition software. As a result, there may be errors in the script that have gone undetected. Please consider this when interpreting information found in this chart.    Michael Lujan MD, FAAAAI, FACAAI  Allergy, Asthma and Immunology  [place], [place] and [place]    Again, thank you for allowing me to participate in the care of your patient.        Sincerely,        Michael Lujan MD

## 2019-12-06 NOTE — PROGRESS NOTES
SUBJECTIVE:                                                                   Dilia Solomon presents today to our Allergy Clinic at [facility] for a follow up visit.  She is a 54 year old female with moderate persistent asthma and allergic rhinitis.    She was diagnosed with asthma approximately in 4754-2627. Triggers are environmental allergies and viral respiratory infections.   Serum IgE for regional aeroallergen panel performed in June 2017 with multiple levels of various sensitivities to molds, cat, dog, dust mite, pollen of trees, grasses, and weeds.  She had 2 sinus surgeries in the past. The last one was in 2018.   She started allergen immunotherapy in July 2017. She was on allergen immunotherapy before that with Dr. Haines, and it was helpful at that time; however, symptoms got worse soon after stopping it.     Allergy Immunotherapy  Date/time of injection(s): 11/29/2019     Vial Color                               Content                                  Dose  Red 1:1                                   Weeds                                     0.4mL  Red 1:1                                   Grass, Trees                           0.4mL    Red 1:1                                   Molds                                      0.4mL  Red 1:1                                   Cat, Dog, Dust Mite                0.4mL     She tolerates injections well without persistent large local reactions or systemic reactions. She has been on the maintenance dose since January 2018. The current dose is decreased due to the new vial change.    She has been using Advair 250/50 mcg 1 puff twice daily and taking montelukast 10 mg by mouth once daily at bedtime. She has been under a lot of stress recently. Her brother passed away 3 days ago. She has been using albuterol more often because she frequently cries and it causes wheezing. These days she has been using albuterol approximately twice a week. She had to  wake up once last week and use albuterol. There has been no use of oral steroids since the last visit. No ED/PCP/urgent care/other specialist visits for asthma flare since the previous visit.     Regarding her allergic rhinitis, she has been using sinus rinses once daily, intranasal fluticasone 2 sprays in each nostril once daily, azelastine 2 sprays in each nostril twice daily, cetirizine as needed, and montelukast 10 mg by mouth daily at bedtime. She reports a significant improvement in symptoms. She uses azelastine eye drops twice daily. She wasn't aware she could have used them as needed. The patient denies clear rhinorrhea, nasal itch, stuffiness, sneezing, or interval sinusitis symptoms of fever, facial pain, or purulent rhinorrhea.      Patient Active Problem List   Diagnosis     Need for prophylactic vaccination and inoculation against influenza     Sprain of interphalangeal (joint) of hand     Radial styloid tenosynovitis     Carpal tunnel syndrome     Synovial cyst of popliteal space     Pain in joint, lower leg     Hyperlipidemia LDL goal <130     Advanced directives, counseling/discussion     Moderate persistent asthma     Allergic rhinitis due to dust mite     Food allergy     FH: diabetes mellitus     Chronic allergic rhinitis due to animal hair and dander     Allergic rhinitis due to mold     Chronic seasonal allergic rhinitis due to pollen     Obesity (BMI 35.0-39.9) with comorbidity (H)     Allergic conjunctivitis, bilateral     History of endoscopic sinus surgery     Chronic pansinusitis       Past Medical History:   Diagnosis Date     Injury, other and unspecified, shoulder and upper arm 9/03      Problem (# of Occurrences) Relation (Name,Age of Onset)    Breast Cancer (1) Maternal Aunt    C.A.D. (1) Mother    Cancer (1) Father: brain    Diabetes (1) Mother       Negative family history of: Cancer - colorectal        Past Surgical History:   Procedure Laterality Date     COLONOSCOPY N/A 10/6/2016     Procedure: COLONOSCOPY;  Surgeon: Shiva Johns MD;  Location: [facility]     ETHMOIDECTOMY  12/30/2013    Procedure: ETHMOIDECTOMY;  Bilateral Submucousal Reduction of InferiorTurbinates and Total Ethmoidectomy with Multiple Sinusotomies;  Surgeon: Lio More MD;  Location: [facility]     ETHMOIDECTOMY Bilateral 2/14/2018    Procedure: ETHMOIDECTOMY;  Bilateral anterior ethmoidectomy, bilateral maxillary antrostomy;  Surgeon: Santhosh Tierney MD;  Location: [facility]     SURGICAL HISTORY OF -   5/04    carpal tunnel release, bilateral     Social History     Socioeconomic History     Marital status: Single     Spouse name: None     Number of children: None     Years of education: None     Highest education level: None   Occupational History     None   Social Needs     Financial resource strain: None     Food insecurity:     Worry: None     Inability: None     Transportation needs:     Medical: None     Non-medical: None   Tobacco Use     Smoking status: Never Smoker     Smokeless tobacco: Never Used   Substance and Sexual Activity     Alcohol use: No     Drug use: No     Sexual activity: Never   Lifestyle     Physical activity:     Days per week: None     Minutes per session: None     Stress: None   Relationships     Social connections:     Talks on phone: None     Gets together: None     Attends Protestant service: None     Active member of club or organization: None     Attends meetings of clubs or organizations: None     Relationship status: None     Intimate partner violence:     Fear of current or ex partner: None     Emotionally abused: None     Physically abused: None     Forced sexual activity: None   Other Topics Concern     Parent/sibling w/ CABG, MI or angioplasty before 65F 55M? Yes     Comment: mother   Social History Narrative    December 6, 2019    ENVIRONMENTAL HISTORY: [place] The home is heated with a forced air. They do not have central air conditioning.  The patient's bedroom is furnished with hard pawel in bedroom, allergen mattress cover, allergen pillowcase cover and fabric window coverings.  Pets inside the house include 1 dog. There is not history of cockroach or mice infestation. There are no smokers in the house.  The house does have a damp basement.            Review of Systems   Constitutional: Negative for activity change, chills and fever.   HENT: Negative for congestion, dental problem, ear pain, facial swelling, nosebleeds, postnasal drip, rhinorrhea, sinus pressure and sneezing.    Eyes: Negative for discharge, redness and itching.   Respiratory: Negative for cough, chest tightness, shortness of breath and wheezing.    Cardiovascular: Negative for chest pain.   Gastrointestinal: Negative for diarrhea, nausea and vomiting.   Musculoskeletal: Negative for arthralgias, joint swelling and myalgias.   Skin: Negative for rash.   Neurological: Negative for headaches.   Hematological: Negative for adenopathy.   Psychiatric/Behavioral: Negative for behavioral problems and self-injury.           Current Outpatient Medications:      albuterol (PROAIR HFA/PROVENTIL HFA/VENTOLIN HFA) 108 (90 Base) MCG/ACT inhaler, Inhale 2 puffs into the lungs every 4 hours as needed for shortness of breath / dyspnea or wheezing, Disp: 1 Inhaler, Rfl: 3     azelastine (ASTELIN) 0.1 % nasal spray, Spray 2 sprays into both nostrils 2 times daily as needed for rhinitis, Disp: 30 mL, Rfl: 3     azelastine (OPTIVAR) 0.05 % ophthalmic solution, Apply 1 drop to eye 2 times daily, Disp: 1 Bottle, Rfl: 5     Emollient (CERAVE) CREA, Externally apply 1 dose. topically 2 times daily, Disp: 2 Bottle, Rfl: 11     fluticasone (FLONASE) 50 MCG/ACT nasal spray, Spray 2 sprays into both nostrils daily, Disp: 48 g, Rfl: 1     fluticasone-salmeterol (ADVAIR) 250-50 MCG/DOSE inhaler, Inhale 1 puff into the lungs 2 times daily, Disp: 3 Inhaler, Rfl: 3     montelukast (SINGULAIR) 10 MG tablet, Take 1  tablet (10 mg) by mouth At Bedtime, Disp: 90 tablet, Rfl: 1     MULTIVITAMIN OR, 1 tab daily, Disp: , Rfl:      ORDER FOR ALLERGEN IMMUNOTHERAPY, Name of Mix: Mix #1  Mold Alternaria Tenuis 1:10 w/v, HS  0.5 ml Aspergillus Fumigatus 1:10 w/v, HS  0.5 ml Epicoccum Nigrum 1:10 w/v, HS 0.5 ml Hormodendrum Cladosporioides 1:10 w/v, HS 0.5 ml Penicillium Mix 1:10 w/v, HS  0.5 ml Diluent: HSA qs to 5ml, Disp: 5 mL, Rfl: PRN     ORDER FOR ALLERGEN IMMUNOTHERAPY, Name of Mix: Mix #2  Dust Mite, Cat, Dog Cat Hair, Standardized 10,000 BAU/mL, ALK  2.0 ml Dog Hair Dander, A. P.  1:100 w/v, HS  1.0 ml Dust Mites F 30,000AU/mL, HS  0.3 ml Dust Mites P. 30,000 AU/mL, HS  0.3 ml  Diluent: HSA qs to 5ml, Disp: 5 mL, Rfl: PRN     ORDER FOR ALLERGEN IMMUNOTHERAPY, Name of Mix: Mix #3 Grass,Tree  Dmitry,White 1:20w/v, HS 0.5ml Birch Mix PRW 1:20w/v, HS 0.5ml Boxelder-Maple Mix BHR (Boxelder Hard Red) 1:20w/v, HS 0.5ml Rubicon,Common 1:20w/v, HS 0.5ml Elm,American 1:20w/v, HS 0.5ml [place] Mix RW 1:20w/v, HS 0.5ml Oak Mix RVW 1:20w/v, HS 0.5ml Ranier Tree,Black 1:20w/v, HS 0.5ml Grass Mix #7 100,000 BAU/mL, HS 0.4ml Jonathan Grass 1:20w/v, HS 0.5ml Diluent: HSA qs to 5ml, Disp: 5 mL, Rfl: PRN     ORDER FOR ALLERGEN IMMUNOTHERAPY, Name of Mix: Mix #4  Weeds Kochia 1:20 w/v, HS 0.5 ml Lamb's Quarters 1:20 w/v, HS 0.5 ml Nettle 1:20 w/v, HS 0.5 ml Plantain, English 1:20 w/v, HS 0.5 ml Ragweed Mixed 1:20 w/v ALK  0.5 ml Russian Thistle 1:20 w/v, HS 0.5 ml Sagebrush, Mugwort 1:20 w/v, HS 0.5 ml Sorrel, Sheep 1:20 w/v, HS 0.5 ml Diluent: HSA qs to 5ml, Disp: 5 mL, Rfl: PRN     order for DME, Equipment being ordered: Dynaflex insert, Disp: 1 Units, Rfl: 0     triamcinolone (KENALOG) 0.1 % external cream, Apply sparingly to affected area two times daily as needed but not more than 14 days in a row. Spare face, armpits, neck, and groin., Disp: 80 g, Rfl: 1     albuterol (2.5 MG/3ML) 0.083% neb solution, Take 1 vial (2.5 mg) by nebulization  every 4 hours as needed (Patient not taking: Reported on 12/6/2019), Disp: 1 Box, Rfl: 3     cetirizine (ZYRTEC) 10 MG tablet, as needed, Disp: , Rfl:      EPINEPHrine (EPIPEN/ADRENACLICK/OR ANY BX GENERIC EQUIV) 0.3 MG/0.3ML injection 2-pack, Inject 0.3 mLs (0.3 mg) into the muscle once as needed for anaphylaxis (Patient not taking: Reported on 12/6/2019), Disp: 0.6 mL, Rfl: 3     loratadine (CLARITIN) 10 MG tablet, as needed, Disp: , Rfl:      order for DME, Equipment being ordered: thumb spica splint RIGHT (Patient not taking: Reported on 12/6/2019), Disp: 1 Units, Rfl: 0     order for DME, Equipment being ordered: Silicone heel cup (Patient not taking: Reported on 12/6/2019), Disp: 1 Units, Rfl: 0     order for DME, Equipment being ordered: Silicone heel cup (Patient not taking: Reported on 12/6/2019), Disp: 1 Units, Rfl: 0  Immunization History   Administered Date(s) Administered     Influenza (IIV3) PF 12/12/2002, 11/28/2003, 10/24/2006, 02/11/2009, 10/05/2011, 11/15/2012, 10/07/2014     Influenza Quad, Recombinant, p-free (RIV4) 10/02/2018, 10/30/2019     Influenza Vaccine IM > 6 months Valent IIV4 10/16/2013, 10/20/2015, 10/26/2016, 11/21/2017     Mantoux Tuberculin Skin Test 02/11/2009     Measles 10/22/1979     Pneumococcal 23 valent 10/05/2011     TDAP Vaccine (Adacel) 02/11/2009, 07/06/2018     Allergies   Allergen Reactions     Clinoril [Nsaids] Hives     Tolerates ibuprofen and aspirin without hives     Pineapple Hives     OBJECTIVE:                                                                 /79 (BP Location: Left arm, Patient Position: Sitting, Cuff Size: Adult Regular)   Pulse 85   Temp 97.6  F (36.4  C) (Tympanic)   Wt 94.7 kg (208 lb 12.4 oz)   LMP 07/18/2015 (Approximate)   SpO2 100%   BMI 36.40 kg/m          Physical Exam  Vitals signs and nursing note reviewed.   Constitutional:       General: She is not in acute distress.     Appearance: She is not diaphoretic.   HENT:       Head: Normocephalic and atraumatic.      Right Ear: Tympanic membrane, ear canal and external ear normal.      Left Ear: Tympanic membrane, ear canal and external ear normal.      Nose: No mucosal edema or rhinorrhea.   Eyes:      General:         Right eye: No discharge.         Left eye: No discharge.      Conjunctiva/sclera: Conjunctivae normal.   Neck:      Musculoskeletal: Normal range of motion.   Cardiovascular:      Rate and Rhythm: Normal rate and regular rhythm.      Heart sounds: Normal heart sounds. No murmur.   Pulmonary:      Effort: Pulmonary effort is normal. No respiratory distress.      Breath sounds: Normal breath sounds. No wheezing or rales.   Musculoskeletal: Normal range of motion.   Lymphadenopathy:      Cervical: No cervical adenopathy.   Skin:     General: Skin is warm.      Comments: Moderate xerosis of the skin of palms and dorsal aspect of hands. No active dermatitis.   Neurological:      Mental Status: She is alert and oriented to person, place, and time.   Psychiatric:         Behavior: Behavior normal.         WORKUP:   SPIROMETRY       FVC 2.80L (82% of predicted).     FEV1 2.34L (87% of predicted).     FEV1/FVC 84%     FEF 25%-75%  3.09L/s (118% of predicted).      Asthma Control Test (ACT) total score: 16           ASSESSMENT/PLAN:  1.  Moderate persistent asthma without complication  (primary encounter diagnosis)    Recently, she had to use albuterol several times, but her symptoms are still well controlled with Advair 250/50 micrograms 1 puff twice daily, montelukast 10 mg by mouth daily at bedtime, and albuterol inhaler as needed.  -Continue as is.      albuterol (PROAIR HFA/PROVENTIL HFA/VENTOLIN         HFA) 108 (90 Base) MCG/ACT inhaler,         fluticasone-salmeterol (ADVAIR) 250-50 MCG/DOSE        inhaler, montelukast (SINGULAIR) 10 MG tablet    2. Chronic allergic rhinitis due to animal hair and dander 3. Chronic seasonal allergic rhinitis due to pollen 4. Allergic  rhinitis due to mold  5. Allergic rhinitis due to dust mite 6.  Allergic conjunctivitis, bilateral    Symptoms are well controlled with allergen immunotherapy, sinus rinses once daily, intranasal fluticasone 2 sprays in each nostril once daily, azelastine 2 sprays in each nostril twice daily, cetirizine on as-needed basis, and montelukast 10 mg by mouth daily at bedtime.  -Continue as is.    The patient is satisfied with the efficacy of allergen immunotherapy and would like to continue it further.  Considering that her symptoms relapsed within 1 year after completing allergen immunotherapy for 5 years, I anticipate long-term treatment.    Because of the increased stress and recent albuterol use, we decided to postpone allergen immunotherapy injections until the next week.     azelastine (ASTELIN) 0.1 % nasal spray,         montelukast (SINGULAIR) 10 MG tablet        azelastine (OPTIVAR) 0.05 % ophthalmic solution       Return in about 6 months (around 6/6/2020), or if symptoms worsen or fail to improve.    Thank you for allowing us to participate in the care of this patient. Please feel free to contact us if there are any questions or concerns about the patient.    Disclaimer: This note consists of symbols derived from keyboarding, dictation and/or voice recognition software. As a result, there may be errors in the script that have gone undetected. Please consider this when interpreting information found in this chart.    Michael Lujan MD, FAAAAI, FACAAI  Allergy, Asthma and Immunology  [facility]

## 2019-12-07 ASSESSMENT — ASTHMA QUESTIONNAIRES: ACT_TOTALSCORE: 16

## 2019-12-20 ENCOUNTER — ALLIED HEALTH/NURSE VISIT (OUTPATIENT)
Dept: ALLERGY | Facility: CLINIC | Age: 54
End: 2019-12-20
Payer: COMMERCIAL

## 2019-12-20 DIAGNOSIS — Z51.6 NEED FOR DESENSITIZATION TO ALLERGENS: Primary | ICD-10-CM

## 2019-12-20 PROCEDURE — 95117 IMMUNOTHERAPY INJECTIONS: CPT

## 2019-12-20 PROCEDURE — 99207 ZZC DROP WITH A PROCEDURE: CPT

## 2019-12-27 ENCOUNTER — ALLIED HEALTH/NURSE VISIT (OUTPATIENT)
Dept: ALLERGY | Facility: CLINIC | Age: 54
End: 2019-12-27
Payer: COMMERCIAL

## 2019-12-27 DIAGNOSIS — Z51.6 NEED FOR DESENSITIZATION TO ALLERGENS: Primary | ICD-10-CM

## 2019-12-27 PROCEDURE — 99207 ZZC DROP WITH A PROCEDURE: CPT

## 2019-12-27 PROCEDURE — 95117 IMMUNOTHERAPY INJECTIONS: CPT

## 2020-01-17 ENCOUNTER — ALLIED HEALTH/NURSE VISIT (OUTPATIENT)
Dept: ALLERGY | Facility: CLINIC | Age: 55
End: 2020-01-17
Payer: COMMERCIAL

## 2020-01-17 DIAGNOSIS — Z51.6 NEED FOR DESENSITIZATION TO ALLERGENS: Primary | ICD-10-CM

## 2020-01-17 PROCEDURE — 95117 IMMUNOTHERAPY INJECTIONS: CPT

## 2020-01-17 PROCEDURE — 99207 ZZC DROP WITH A PROCEDURE: CPT

## 2020-01-28 ENCOUNTER — OFFICE VISIT (OUTPATIENT)
Dept: VASCULAR SURGERY | Facility: CLINIC | Age: 55
End: 2020-01-28
Payer: COMMERCIAL

## 2020-01-28 DIAGNOSIS — Z53.9 ERRONEOUS ENCOUNTER--DISREGARD: Primary | ICD-10-CM

## 2020-01-28 DIAGNOSIS — I83.813 VARICOSE VEINS OF BOTH LOWER EXTREMITIES WITH PAIN: Primary | ICD-10-CM

## 2020-01-28 NOTE — LETTER
1/28/2020         RE: Dilia Solomon  [address] [facility] [address]        Dear Colleague,    [facility] you [facility] your patient, Dilia Solomon[facility]. Please see a copy of my visit note below.    Cancelled    Again, thank you for allowing me to participate in the care of your patient.        Sincerely,        Tres Samuels MD

## 2020-02-05 ENCOUNTER — ANCILLARY PROCEDURE (OUTPATIENT)
Dept: ULTRASOUND IMAGING | Facility: CLINIC | Age: 55
End: 2020-02-05
Attending: SPECIALIST
Payer: COMMERCIAL

## 2020-02-05 ENCOUNTER — OFFICE VISIT (OUTPATIENT)
Dept: VASCULAR SURGERY | Facility: CLINIC | Age: 55
End: 2020-02-05
Attending: SPECIALIST
Payer: COMMERCIAL

## 2020-02-05 DIAGNOSIS — I83.813 VARICOSE VEINS OF BOTH LOWER EXTREMITIES WITH PAIN: ICD-10-CM

## 2020-02-05 DIAGNOSIS — I83.92 ASYMPTOMATIC VARICOSE VEINS OF LEFT LOWER EXTREMITY: Primary | ICD-10-CM

## 2020-02-05 PROCEDURE — 93970 EXTREMITY STUDY: CPT | Performed by: SPECIALIST

## 2020-02-05 PROCEDURE — 99213 OFFICE O/P EST LOW 20 MIN: CPT | Performed by: SPECIALIST

## 2020-02-05 NOTE — PROGRESS NOTES
1 year f/U      Subjective:   Patient is a 54-year-old white female who had a left leg greater saphenous vein radiofrequency ablation done about a year ago.  She then noticed some new veins pop up several months ago and now mentions it on a yearly follow-up.  She denies any pain edema or other symptoms in that leg.  She does states she does need some new compression socks.    Objective:  B/P: Data Unavailable, T: Data Unavailable, P: Data Unavailable, R: Data Unavailable  RLE - (+)DP/PT pulses.  Calf varicosities. No edema.  No CVSD changes      US -   Name:  Dilia Solomon                                                        Patient ID: [id number]  Date: 2020                                                           : 1965  Sex: female                                                                 Examined by: Ivana Lorenzo, RVT  Age:  54 year old                                                         Reading MD: Tres Samuels MD     INDICATIONS:    Post VNUS Closure     EXAM TYPE  BILATERAL LOWER EXTREMITY VENOUS DUPLEX   1 YEAR POST VNUS CLOSURE  GSV     TECHNICAL SUMMARY     Multiple transverse and longitudinal images of bilateral lower extremities were obtained.        RIGHT:       The CFV demonstrates phasic flow, compresses and responds to augmentations.  No evidence of DVT at this time.  The remainder of deep veins including right femoral, popliteal, posterior tibial and peroneal veins are widely patent and fully compressible with no evidence for DVT at this time.     The GSV is not seen 21 mm from the SFJ to the distal thigh.      LEFT:       The CFV and Popliteal veins demonstrates phasic flow, compresses and responds to augmentations.  No evidence of DVT or reflux at this time.  The remainder of deep veins including left femoral, popliteal, posterior tibial and peroneal veins are widely patent and fully compressible with no evidence for DVT at this time.     The GSV is not seen  12mm from the SFJ to the distal thigh. 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 The GSV in the calf demonstrates phasic flow, compresses and responds to augmentations.  The GSV is incompetent from the proximal calf to mid calf, with the greatest reflux time of 7077 milliseconds.  The GSV gives rise to multiple incompetent varicose veins, the largest measures 4.1mm off the proximal calf coursing medial with a reflux time of 5427 milliseconds.      The SSV is competent. The SSV demonstrates phasic flow, compresses and responds to augmentations at the mid calf.  The SPJ is not seen.     No evidence for left leg incompetent  veins.      FINAL SUMMARY:  1.         Bilateral CFV's and left popliteal veins are patent.  2.         The right GSV is closed/ not seen.  3.         The left GSV is closed/not seen in the thigh/knee.  4.         The left GSV is incompetent in the calf.  5.         Left incompetent varicose veins.  6.         Time of incompetence is greater than 500 milliseconds.         Assessment/plan:  This is a 54-year-old lady status post a left GSV RF ablation who returns with some new varicosities in her left lower extremity.  They are asymptomatic at this time.  She is a CEAP 2.  She would like to try some new socks.  After discussion with the patient the plan at this time is to have her fit for new compression socks and follow-up with us should she become symptomatic despite compression therapy.      Tres Samuels MD, FACS

## 2020-02-05 NOTE — LETTER
2020         RE: Dilia Solomon  [address] [facility] [address]        Dear Colleague,    Thank you for referring your patient, Dilia Solomon, to [facility] [place]. Please see a copy of my visit note below.    1 year f/U      Subjective:   Patient is a 54-year-old white female who had a left leg greater saphenous vein radiofrequency ablation done about a year ago.  She then noticed some new veins pop up several months ago and now mentions it on a yearly follow-up.  She denies any pain edema or other symptoms in that leg.  She does states she does need some new compression socks.    Objective:  B/P: Data Unavailable, T: Data Unavailable, P: Data Unavailable, R: Data Unavailable  RLE - (+)DP/PT pulses.  Calf varicosities. No edema.  No CVSD changes      US -   Name:  Dilia Solomon                                                        Patient ID: [id number]  Date: 2020                                                           : 1965  Sex: female                                                                 Examined by: Ivana Lorenzo, RVT  Age:  54 year old                                                         Reading MD: Tres Samuels MD     INDICATIONS:    Post VNUS Closure     EXAM TYPE  BILATERAL LOWER EXTREMITY VENOUS DUPLEX   1 YEAR POST VNUS CLOSURE  GSV     TECHNICAL SUMMARY     Multiple transverse and longitudinal images of bilateral lower extremities were obtained.        RIGHT:       The CFV demonstrates phasic flow, compresses and responds to augmentations.  No evidence of DVT at this time.  The remainder of deep veins including right femoral, popliteal, posterior tibial and peroneal veins are widely patent and fully compressible with no evidence for DVT at this time.     The GSV is not seen 21 mm from the SFJ to the distal thigh.      LEFT:       The CFV and Popliteal veins demonstrates phasic flow, compresses and responds to  augmentations.  No evidence of DVT or reflux at this time.  The remainder of deep veins including left femoral, popliteal, posterior tibial and peroneal veins are widely patent and fully compressible with no evidence for DVT at this time.     The GSV is not seen 12mm from the SFJ to the distal thigh. 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 The GSV in the calf demonstrates phasic flow, compresses and responds to augmentations.  The GSV is incompetent from the proximal calf to mid calf, with the greatest reflux time of 7077 milliseconds.  The GSV gives rise to multiple incompetent varicose veins, the largest measures 4.1mm off the proximal calf coursing medial with a reflux time of 5427 milliseconds.      The SSV is competent. The SSV demonstrates phasic flow, compresses and responds to augmentations at the mid calf.  The SPJ is not seen.     No evidence for left leg incompetent  veins.      FINAL SUMMARY:  1.         Bilateral CFV's and left popliteal veins are patent.  2.         The right GSV is closed/ not seen.  3.         The left GSV is closed/not seen in the thigh/knee.  4.         The left GSV is incompetent in the calf.  5.         Left incompetent varicose veins.  6.         Time of incompetence is greater than 500 milliseconds.         Assessment/plan:  This is a 54-year-old lady status post a left GSV RF ablation who returns with some new varicosities in her left lower extremity.  They are asymptomatic at this time.  She is a CEAP 2.  She would like to try some new socks.  After discussion with the patient the plan at this time is to have her fit for new compression socks and follow-up with us should she become symptomatic despite compression therapy.      Tres Samuels MD, FACS    Again, thank you for allowing me to participate in the care of your patient.        Sincerely,        Tres Samuels MD

## 2020-02-14 DIAGNOSIS — J30.81 CHRONIC ALLERGIC RHINITIS DUE TO ANIMAL HAIR AND DANDER: ICD-10-CM

## 2020-02-14 DIAGNOSIS — J30.89 ALLERGIC RHINITIS DUE TO DUST MITE: ICD-10-CM

## 2020-02-14 DIAGNOSIS — J30.89 ALLERGIC RHINITIS DUE TO MOLD: ICD-10-CM

## 2020-02-14 DIAGNOSIS — J30.1 CHRONIC SEASONAL ALLERGIC RHINITIS DUE TO POLLEN: ICD-10-CM

## 2020-02-14 RX ORDER — FLUTICASONE PROPIONATE 50 MCG
2 SPRAY, SUSPENSION (ML) NASAL DAILY
Qty: 48 G | Refills: 1 | Status: SHIPPED | OUTPATIENT
Start: 2020-02-14 | End: 2020-06-02

## 2020-02-21 ENCOUNTER — ALLIED HEALTH/NURSE VISIT (OUTPATIENT)
Dept: ALLERGY | Facility: CLINIC | Age: 55
End: 2020-02-21
Payer: COMMERCIAL

## 2020-02-21 DIAGNOSIS — Z51.6 NEED FOR DESENSITIZATION TO ALLERGENS: Primary | ICD-10-CM

## 2020-02-21 PROCEDURE — 99207 ZZC DROP WITH A PROCEDURE: CPT

## 2020-02-21 PROCEDURE — 95117 IMMUNOTHERAPY INJECTIONS: CPT

## 2020-05-07 ENCOUNTER — TELEPHONE (OUTPATIENT)
Dept: ALLERGY | Facility: CLINIC | Age: 55
End: 2020-05-07

## 2020-05-07 NOTE — LETTER
Dilia Solomon  [address] [facility] [address]        May 12, 2020      Dear Dilia Solomon,        We have opened our allergy injection schedule to some patients at the [facility] and [facility] only.  The other sites are not currently open at this time.  If you have received this letter and would like to resume your allergy injections, please call and request to speak to an Allergy nurse at [phone].  We will provide you with additional information when you call.      Sincerely,      [facility] Allergy Department

## 2020-05-07 NOTE — TELEPHONE ENCOUNTER
Left message on answering machine for patient to call back.    Patient is able to schedule allergy injection    Gia Bae RN

## 2020-06-02 ENCOUNTER — ALLIED HEALTH/NURSE VISIT (OUTPATIENT)
Dept: ALLERGY | Facility: CLINIC | Age: 55
End: 2020-06-02
Payer: COMMERCIAL

## 2020-06-02 ENCOUNTER — OFFICE VISIT (OUTPATIENT)
Dept: ALLERGY | Facility: CLINIC | Age: 55
End: 2020-06-02
Payer: COMMERCIAL

## 2020-06-02 VITALS
SYSTOLIC BLOOD PRESSURE: 111 MMHG | WEIGHT: 208.34 LBS | DIASTOLIC BLOOD PRESSURE: 70 MMHG | TEMPERATURE: 97.6 F | HEART RATE: 78 BPM | BODY MASS INDEX: 36.33 KG/M2 | OXYGEN SATURATION: 95 %

## 2020-06-02 DIAGNOSIS — J30.1 CHRONIC SEASONAL ALLERGIC RHINITIS DUE TO POLLEN: ICD-10-CM

## 2020-06-02 DIAGNOSIS — J30.89 ALLERGIC RHINITIS DUE TO DUST MITE: ICD-10-CM

## 2020-06-02 DIAGNOSIS — Z51.6 NEED FOR DESENSITIZATION TO ALLERGENS: Primary | ICD-10-CM

## 2020-06-02 DIAGNOSIS — J30.89 ALLERGIC RHINITIS DUE TO MOLD: ICD-10-CM

## 2020-06-02 DIAGNOSIS — H10.13 ALLERGIC CONJUNCTIVITIS OF BOTH EYES: ICD-10-CM

## 2020-06-02 DIAGNOSIS — Z91.09 OTHER ALLERGY, OTHER THAN TO MEDICINAL AGENTS: ICD-10-CM

## 2020-06-02 DIAGNOSIS — J45.40 MODERATE PERSISTENT ASTHMA WITHOUT COMPLICATION: Primary | ICD-10-CM

## 2020-06-02 DIAGNOSIS — J30.81 CHRONIC ALLERGIC RHINITIS DUE TO ANIMAL HAIR AND DANDER: ICD-10-CM

## 2020-06-02 PROCEDURE — 99214 OFFICE O/P EST MOD 30 MIN: CPT | Performed by: ALLERGY & IMMUNOLOGY

## 2020-06-02 PROCEDURE — 95117 IMMUNOTHERAPY INJECTIONS: CPT

## 2020-06-02 PROCEDURE — 99207 ZZC DROP WITH A PROCEDURE: CPT

## 2020-06-02 RX ORDER — MONTELUKAST SODIUM 10 MG/1
10 TABLET ORAL AT BEDTIME
Qty: 90 TABLET | Refills: 1 | Status: SHIPPED | OUTPATIENT
Start: 2020-06-02 | End: 2020-12-08

## 2020-06-02 RX ORDER — AZELASTINE HYDROCHLORIDE 0.5 MG/ML
1 SOLUTION/ DROPS OPHTHALMIC 2 TIMES DAILY
Qty: 1 BOTTLE | Refills: 5 | Status: SHIPPED | OUTPATIENT
Start: 2020-06-02 | End: 2020-12-08

## 2020-06-02 RX ORDER — ALBUTEROL SULFATE 90 UG/1
2 AEROSOL, METERED RESPIRATORY (INHALATION) EVERY 4 HOURS PRN
Qty: 1 INHALER | Refills: 3 | Status: SHIPPED | OUTPATIENT
Start: 2020-06-02 | End: 2022-03-14

## 2020-06-02 RX ORDER — EPINEPHRINE 0.3 MG/.3ML
0.3 INJECTION SUBCUTANEOUS
Qty: 0.6 ML | Refills: 3 | Status: SHIPPED | OUTPATIENT
Start: 2020-06-02 | End: 2021-10-12

## 2020-06-02 RX ORDER — FLUTICASONE PROPIONATE 50 MCG
2 SPRAY, SUSPENSION (ML) NASAL DAILY
Qty: 48 G | Refills: 1 | Status: SHIPPED | OUTPATIENT
Start: 2020-06-02 | End: 2020-12-08

## 2020-06-02 RX ORDER — AZELASTINE 1 MG/ML
2 SPRAY, METERED NASAL 2 TIMES DAILY PRN
Qty: 90 ML | Refills: 3 | Status: SHIPPED | OUTPATIENT
Start: 2020-06-02 | End: 2020-12-08

## 2020-06-02 ASSESSMENT — ENCOUNTER SYMPTOMS
CHEST TIGHTNESS: 0
ARTHRALGIAS: 0
RHINORRHEA: 0
HEADACHES: 0
DIARRHEA: 0
EYE REDNESS: 0
EYE ITCHING: 0
JOINT SWELLING: 0
SHORTNESS OF BREATH: 0
FACIAL SWELLING: 0
WHEEZING: 0
FEVER: 0
NAUSEA: 0
CHILLS: 0
VOMITING: 0
SINUS PRESSURE: 0
ACTIVITY CHANGE: 0
EYE DISCHARGE: 0
MYALGIAS: 0
COUGH: 0
ADENOPATHY: 0

## 2020-06-02 NOTE — PATIENT INSTRUCTIONS
Continue asthma medications as is.     Continue Flonase 1-2 sprays in each nostril once daily. You can try using azelastine nasal sprays only if needed. You can take cetirizine (Zyrtec) or loratadine (Claritin) as needed. Take Zyrtec (cetirizine) on the day of the allergy shots. You can use azelastine eye drops as needed too.

## 2020-06-02 NOTE — TELEPHONE ENCOUNTER
Reason for Call:  Other appointment    Detailed comments: Pt calling states she would like to schedule allergy shots    Phone Number Patient can be reached at: Home number on file [phone] (home)    Best Time:     Can we leave a detailed message on this number? YES    Call taken on 6/2/2020 at 1:25 PM by Jesenia Mccann

## 2020-06-02 NOTE — PROGRESS NOTES
SUBJECTIVE:                                                                   Dilia Solomon presents today to our Allergy Clinic at [facility] for a follow up visit.  As you know, she is a 55 year old female with moderate persistent asthma and allergic rhinitis.  She was diagnosed with asthma approximately in 3720-2473. Triggers are environmental allergies and viral respiratory infections.   Serum IgE for regional aeroallergen panel performed in June 2017 with multiple levels of various sensitivities to molds, cat, dog, dust mite, pollen of trees, grasses, and weeds.  She had 2 sinus surgeries in the past. The last one was in 2018.   She started allergen immunotherapy in July 2017. She was on allergen immunotherapy before that with Dr. Haines, and it was helpful at that time; however, symptoms got worse soon after stopping it.     Allergy Immunotherapy  Date/time of injection(s): 6/2/2020    Vial Color                               Content                                  Dose  Red 1:1                                   Weeds                                     0.2mL  Red 1:1                                   Grass, Trees                           0.2mL    Red 1:1                                   Molds                                      0.2mL  Red 1:1                                   Cat, Dog, Dust Mite                0.2mL     She tolerates injections well without persistent large local reactions or systemic reactions. She has been on the maintenance dose since January 2018. The current dose is decreased due to the COVID 19 schedule.    She has been using Advair 250/50 mcg 1 puff twice daily and taking montelukast 10 mg by mouth once daily at bedtime.  Dilia  is using albuterol HFA less than twice per week for rescue from chest symptoms. She is waking up less than twice per month due to chest symptoms.  There has been no use of oral steroids since last visit. No ED/PCP/urgent care/other  specialist visits for asthma flare since the previous visit. The patient denies current chest tightness, cough, wheeze, or shortness of breath.   Asthma triggers remain unchanged. she uses a spacer/chamber device, where applicable.  Regarding her allergic rhinitis, she has been using sinus rinses once daily, intranasal fluticasone 2 sprays in each nostril once daily, azelastine 2 sprays in each nostril twice daily, cetirizine and loratadine as needed, and montelukast 10 mg by mouth daily at bedtime. She reports a significant improvement in symptoms with allergen immunotherapy. She uses azelastine eye drops twice daily. She wasn't aware she could have used them as needed. The patient denies clear rhinorrhea, nasal itch, stuffiness, sneezing, or interval sinusitis symptoms of fever, facial pain, or purulent rhinorrhea.    Patient Active Problem List   Diagnosis     Need for prophylactic vaccination and inoculation against influenza     Sprain of interphalangeal (joint) of hand     Radial styloid tenosynovitis     Carpal tunnel syndrome     Synovial cyst of popliteal space     Pain in joint, lower leg     Hyperlipidemia LDL goal <130     Advanced directives, counseling/discussion     Moderate persistent asthma     Allergic rhinitis due to dust mite     Food allergy     FH: diabetes mellitus     Chronic allergic rhinitis due to animal hair and dander     Allergic rhinitis due to mold     Chronic seasonal allergic rhinitis due to pollen     Obesity (BMI 35.0-39.9) with comorbidity (H)     Allergic conjunctivitis, bilateral     History of endoscopic sinus surgery     Chronic pansinusitis       Past Medical History:   Diagnosis Date     Injury, other and unspecified, shoulder and upper arm 9/03      Problem (# of Occurrences) Relation (Name,Age of Onset)    Breast Cancer (1) Maternal Aunt    C.A.D. (1) Mother    Cancer (1) Father: brain    Diabetes (1) Mother       Negative family history of: Cancer - colorectal         Past Surgical History:   Procedure Laterality Date     COLONOSCOPY N/A 10/6/2016    Procedure: COLONOSCOPY;  Surgeon: Shiva Johns MD;  Location: [facility]     ETHMOIDECTOMY  12/30/2013    Procedure: ETHMOIDECTOMY;  Bilateral Submucousal Reduction of InferiorTurbinates and Total Ethmoidectomy with Multiple Sinusotomies;  Surgeon: Lio More MD;  Location: [facility]     ETHMOIDECTOMY Bilateral 2/14/2018    Procedure: ETHMOIDECTOMY;  Bilateral anterior ethmoidectomy, bilateral maxillary antrostomy;  Surgeon: Santhosh Tierney MD;  Location: [facility]     SURGICAL HISTORY OF -   5/04    carpal tunnel release, bilateral     Social History     Socioeconomic History     Marital status: Single     Spouse name: None     Number of children: None     Years of education: None     Highest education level: None   Occupational History     None   Social Needs     Financial resource strain: None     Food insecurity     Worry: None     Inability: None     Transportation needs     Medical: None     Non-medical: None   Tobacco Use     Smoking status: Never Smoker     Smokeless tobacco: Never Used   Substance and Sexual Activity     Alcohol use: No     Drug use: No     Sexual activity: Never   Lifestyle     Physical activity     Days per week: None     Minutes per session: None     Stress: None   Relationships     Social connections     Talks on phone: None     Gets together: None     Attends [employer] service: None     Active member of club or organization: None     Attends meetings of clubs or organizations: None     Relationship status: None     Intimate partner violence     Fear of current or ex partner: None     Emotionally abused: None     Physically abused: None     Forced sexual activity: None   Other Topics Concern     Parent/sibling w/ CABG, MI or angioplasty before 65F 55M? Yes     Comment: mother   Social History Narrative    June 2, 2020    ENVIRONMENTAL HISTORY: [place]  home is heated with a forced air. They do not have central air conditioning. The patient's bedroom is furnished with hard pawel in bedroom, allergen mattress cover, allergen pillowcase cover and fabric window coverings.  Pets inside the house include 1 dog. There is not history of cockroach or mice infestation. There are no smokers in the house.  The house does have a damp basement.            Review of Systems   Constitutional: Negative for activity change, chills and fever.   HENT: Negative for congestion, dental problem, ear pain, facial swelling, nosebleeds, postnasal drip, rhinorrhea, sinus pressure and sneezing.    Eyes: Negative for discharge, redness and itching.   Respiratory: Negative for cough, chest tightness, shortness of breath and wheezing.    Cardiovascular: Negative for chest pain.   Gastrointestinal: Negative for diarrhea, nausea and vomiting.   Musculoskeletal: Negative for arthralgias, joint swelling and myalgias.   Skin: Negative for rash.   Allergic/Immunologic: Positive for environmental allergies.   Neurological: Negative for headaches.   Hematological: Negative for adenopathy.   Psychiatric/Behavioral: Negative for behavioral problems and self-injury.           Current Outpatient Medications:      albuterol (PROAIR HFA/PROVENTIL HFA/VENTOLIN HFA) 108 (90 Base) MCG/ACT inhaler, Inhale 2 puffs into the lungs every 4 hours as needed for shortness of breath / dyspnea or wheezing, Disp: 1 Inhaler, Rfl: 3     azelastine (ASTELIN) 0.1 % nasal spray, Spray 2 sprays into both nostrils 2 times daily as needed for rhinitis or allergies, Disp: 90 mL, Rfl: 3     azelastine (OPTIVAR) 0.05 % ophthalmic solution, Apply 1 drop to eye 2 times daily, Disp: 1 Bottle, Rfl: 5     cetirizine (ZYRTEC) 10 MG tablet, as needed, Disp: , Rfl:      Emollient (CERAVE) CREA, Externally apply 1 dose. topically 2 times daily, Disp: 2 Bottle, Rfl: 11     EPINEPHrine (ANY BX GENERIC EQUIV) 0.3 MG/0.3ML injection 2-pack,  Inject 0.3 mLs (0.3 mg) into the muscle once as needed for anaphylaxis, Disp: 0.6 mL, Rfl: 3     fluticasone (FLONASE) 50 MCG/ACT nasal spray, Spray 2 sprays into both nostrils daily, Disp: 48 g, Rfl: 1     fluticasone-salmeterol (ADVAIR) 250-50 MCG/DOSE inhaler, Inhale 1 puff into the lungs 2 times daily, Disp: 3 Inhaler, Rfl: 3     loratadine (CLARITIN) 10 MG tablet, as needed, Disp: , Rfl:      montelukast (SINGULAIR) 10 MG tablet, Take 1 tablet (10 mg) by mouth At Bedtime, Disp: 90 tablet, Rfl: 1     MULTIVITAMIN OR, 1 tab daily, Disp: , Rfl:      ORDER FOR ALLERGEN IMMUNOTHERAPY, Name of Mix: Mix #1  Mold Alternaria Tenuis 1:10 w/v, HS  0.5 ml Aspergillus Fumigatus 1:10 w/v, HS  0.5 ml Epicoccum Nigrum 1:10 w/v, HS 0.5 ml Hormodendrum Cladosporioides 1:10 w/v, HS 0.5 ml Penicillium Mix 1:10 w/v, HS  0.5 ml Diluent: HSA qs to 5ml, Disp: 5 mL, Rfl: PRN     ORDER FOR ALLERGEN IMMUNOTHERAPY, Name of Mix: Mix #2  Dust Mite, Cat, Dog Cat Hair, Standardized 10,000 BAU/mL, ALK  2.0 ml Dog Hair Dander, A. P.  1:100 w/v, HS  1.0 ml Dust Mites F 30,000AU/mL, HS  0.3 ml Dust Mites P. 30,000 AU/mL, HS  0.3 ml  Diluent: HSA qs to 5ml, Disp: 5 mL, Rfl: PRN     ORDER FOR ALLERGEN IMMUNOTHERAPY, Name of Mix: Mix #3 Grass,Tree  Dmitry,White 1:20w/v, HS 0.5ml Birch Mix PRW 1:20w/v, HS 0.5ml Boxelder-Maple Mix BHR (Boxelder Hard Red) 1:20w/v, HS 0.5ml Gage,Common 1:20w/v, HS 0.5ml Elm,American 1:20w/v, HS 0.5ml [place] Mix RW 1:20w/v, HS 0.5ml Oak Mix RVW 1:20w/v, HS 0.5ml [place] Tree,Black 1:20w/v, HS 0.5ml Grass Mix #7 100,000 BAU/mL, HS 0.4ml Jonathan Grass 1:20w/v, HS 0.5ml Diluent: HSA qs to 5ml, Disp: 5 mL, Rfl: PRN     ORDER FOR ALLERGEN IMMUNOTHERAPY, Name of Mix: Mix #4  Weeds Kochia 1:20 w/v, HS 0.5 ml Lamb's Quarters 1:20 w/v, HS 0.5 ml Nettle 1:20 w/v, HS 0.5 ml Plantain, English 1:20 w/v, HS 0.5 ml Ragweed Mixed 1:20 w/v ALK  0.5 ml Russian Thistle 1:20 w/v, HS 0.5 ml Sagebrush, Mugwort 1:20 w/v, HS 0.5 ml Sorrel,  Sheep 1:20 w/v, HS 0.5 ml Diluent: HSA qs to 5ml, Disp: 5 mL, Rfl: PRN     order for DME, Equipment being ordered: Dynaflex insert, Disp: 1 Units, Rfl: 0     albuterol (2.5 MG/3ML) 0.083% neb solution, Take 1 vial (2.5 mg) by nebulization every 4 hours as needed (Patient not taking: Reported on 12/6/2019), Disp: 1 Box, Rfl: 3     order for DME, Equipment being ordered: thumb spica splint RIGHT (Patient not taking: Reported on 12/6/2019), Disp: 1 Units, Rfl: 0     order for DME, Equipment being ordered: Silicone heel cup (Patient not taking: Reported on 12/6/2019), Disp: 1 Units, Rfl: 0     order for DME, Equipment being ordered: Silicone heel cup (Patient not taking: Reported on 6/2/2020), Disp: 1 Units, Rfl: 0     triamcinolone (KENALOG) 0.1 % external cream, Apply sparingly to affected area two times daily as needed but not more than 14 days in a row. Spare face, armpits, neck, and groin. (Patient not taking: Reported on 6/2/2020), Disp: 80 g, Rfl: 1  Immunization History   Administered Date(s) Administered     Influenza (IIV3) PF 12/12/2002, 11/28/2003, 10/24/2006, 02/11/2009, 10/05/2011, 11/15/2012, 10/07/2014     Influenza Quad, Recombinant, p-free (RIV4) 10/02/2018, 10/30/2019     Influenza Vaccine IM > 6 months Valent IIV4 10/16/2013, 10/20/2015, 10/26/2016, 11/21/2017     Mantoux Tuberculin Skin Test 02/11/2009     Measles 10/22/1979     Pneumococcal 23 valent 10/05/2011     TDAP Vaccine (Adacel) 02/11/2009, 07/06/2018     Allergies   Allergen Reactions     Clinoril [Nsaids] Hives     Tolerates ibuprofen and aspirin without hives     Pineapple Hives     OBJECTIVE:                                                                 /70 (BP Location: Left arm, Patient Position: Sitting, Cuff Size: Adult Regular)   Pulse 78   Temp 97.6  F (36.4  C) (Tympanic)   Wt 94.5 kg (208 lb 5.4 oz)   LMP 07/18/2015 (Approximate)   SpO2 95%   BMI 36.33 kg/m          Physical Exam  Vitals signs and nursing note  reviewed.   Constitutional:       General: She is not in acute distress.     Appearance: She is not diaphoretic.   HENT:      Head: Normocephalic and atraumatic.      Right Ear: Tympanic membrane, ear canal and external ear normal.      Left Ear: Tympanic membrane, ear canal and external ear normal.      Nose: No mucosal edema or rhinorrhea.      Mouth/Throat:      Lips: Pink.      Mouth: Mucous membranes are moist.      Pharynx: Oropharynx is clear. No pharyngeal swelling, oropharyngeal exudate or posterior oropharyngeal erythema.   Eyes:      General:         Right eye: No discharge.         Left eye: No discharge.      Conjunctiva/sclera: Conjunctivae normal.   Neck:      Musculoskeletal: Normal range of motion.   Cardiovascular:      Rate and Rhythm: Normal rate and regular rhythm.      Heart sounds: Normal heart sounds. No murmur.   Pulmonary:      Effort: Pulmonary effort is normal. No respiratory distress.      Breath sounds: Normal breath sounds and air entry. No stridor, decreased air movement or transmitted upper airway sounds. No decreased breath sounds, wheezing, rhonchi or rales.   Musculoskeletal: Normal range of motion.   Lymphadenopathy:      Cervical: No cervical adenopathy.   Skin:     General: Skin is warm.      Comments: Moderate xerosis of the skin of palms and dorsal aspect of hands. No active dermatitis.   Neurological:      Mental Status: She is alert and oriented to person, place, and time.   Psychiatric:         Mood and Affect: Mood normal.         Behavior: Behavior normal.         WORKUP:   ACT Score:  20    ASSESSMENT/PLAN:    1. Moderate persistent asthma without complication  Currently well controlled with Advair 250/50 micrograms 1 puff twice daily, montelukast 10 mg by mouth once daily, and albuterol inhaler on as-needed basis.  - Continue as is.    - albuterol (PROAIR HFA/PROVENTIL HFA/VENTOLIN HFA) 108 (90 Base) MCG/ACT inhaler  Dispense: 1 Inhaler; Refill: 3  -  fluticasone-salmeterol (ADVAIR) 250-50 MCG/DOSE inhaler  Dispense: 3 Inhaler; Refill: 3  - montelukast (SINGULAIR) 10 MG tablet  Dispense: 90 tablet; Refill: 1    2. Chronic allergic rhinitis due to animal hair and dander 3. Chronic seasonal allergic rhinitis due to pollen 4. Allergic rhinitis due to mold 5. Allergic rhinitis due to dust mite 6. Allergic conjunctivitis of both eyes 7. Other allergy, other than to medicinal agents  Symptoms are well controlled with allergen immunotherapy, sinus rinses once daily, intranasal fluticasone 2 sprays in each nostril once daily, azelastine 2 sprays in each nostril twice daily, cetirizine and loratadine on as-needed basis, Optivar 1 drop in each eye twice daily, and montelukast 10 mg by mouth once daily at bedtime.  - Continue allergen immunotherapy as is.  To prevent frequent trips to the allergy clinic, will maintain on current dose.  If her symptoms get worse, may consider an exception to build her back to old maintenance dose.  - Consider switching azelastine nasal spray and azelastine eyedrops from daily to PRN basis.    - azelastine (ASTELIN) 0.1 % nasal spray  Dispense: 90 mL; Refill: 3  - fluticasone (FLONASE) 50 MCG/ACT nasal spray  Dispense: 48 g; Refill: 1  - montelukast (SINGULAIR) 10 MG tablet  Dispense: 90 tablet; Refill: 1  - azelastine (OPTIVAR) 0.05 % ophthalmic solution  Dispense: 1 Bottle; Refill: 5     Return in about 6 months (around 12/2/2020), or if symptoms worsen or fail to improve.    Thank you for allowing us to participate in the care of this patient. Please feel free to contact us if there are any questions or concerns about the patient.    Disclaimer: This note consists of symbols derived from keyboarding, dictation and/or voice recognition software. As a result, there may be errors in the script that have gone undetected. Please consider this when interpreting information found in this chart.    Michael Lujan MD, FAAAAI, FACAAI  Allergy,  Asthma and Immunology  [facility], [place] and [place]

## 2020-06-02 NOTE — LETTER
6/2/2020         RE: Dilia Solomon  [address] [facility] [id number]        Dear Colleague,    Thank you for referring your patient, Dilia Solomon, to the [facility]. Please see a copy of my visit note below.    SUBJECTIVE:                                                                   Dilia Solomon presents today to our Allergy Clinic at [facility] for a follow up visit.  As you know, she is a 55 year old female with moderate persistent asthma and allergic rhinitis.  She was diagnosed with asthma approximately in [id number]. Triggers are environmental allergies and viral respiratory infections.   Serum IgE for regional aeroallergen panel performed in June 2017 with multiple levels of various sensitivities to molds, cat, dog, dust mite, pollen of trees, grasses, and weeds.  She had 2 sinus surgeries in the past. The last one was in 2018.   She started allergen immunotherapy in July 2017. She was on allergen immunotherapy before that with Dr. Haines, and it was helpful at that time; however, symptoms got worse soon after stopping it.     Allergy Immunotherapy  Date/time of injection(s): 6/2/2020    Vial Color                               Content                                  Dose  Red 1:1                                   Weeds                                     0.2mL  Red 1:1                                   Grass, Trees                           0.2mL    Red 1:1                                   Molds                                      0.2mL  Red 1:1                                   Cat, Dog, Dust Mite                0.2mL     She tolerates injections well without persistent large local reactions or systemic reactions. She has been on the maintenance dose since January 2018. The current dose is decreased due to the COVID 19 schedule.    She has been using Advair 250/50 mcg 1 puff twice daily and taking montelukast 10 mg by mouth once daily at bedtime.  Dilia   is using albuterol HFA less than twice per week for rescue from chest symptoms. She is waking up less than twice per month due to chest symptoms.  There has been no use of oral steroids since last visit. No ED/PCP/urgent care/other specialist visits for asthma flare since the previous visit. The patient denies current chest tightness, cough, wheeze, or shortness of breath.   Asthma triggers remain unchanged. she uses a spacer/chamber device, where applicable.  Regarding her allergic rhinitis, she has been using sinus rinses once daily, intranasal fluticasone 2 sprays in each nostril once daily, azelastine 2 sprays in each nostril twice daily, cetirizine and loratadine as needed, and montelukast 10 mg by mouth daily at bedtime. She reports a significant improvement in symptoms with allergen immunotherapy. She uses azelastine eye drops twice daily. She wasn't aware she could have used them as needed. The patient denies clear rhinorrhea, nasal itch, stuffiness, sneezing, or interval sinusitis symptoms of fever, facial pain, or purulent rhinorrhea.    Patient Active Problem List   Diagnosis     Need for prophylactic vaccination and inoculation against influenza     Sprain of interphalangeal (joint) of hand     Radial styloid tenosynovitis     Carpal tunnel syndrome     Synovial cyst of popliteal space     Pain in joint, lower leg     Hyperlipidemia LDL goal <130     Advanced directives, counseling/discussion     Moderate persistent asthma     Allergic rhinitis due to dust mite     Food allergy     FH: diabetes mellitus     Chronic allergic rhinitis due to animal hair and dander     Allergic rhinitis due to mold     Chronic seasonal allergic rhinitis due to pollen     Obesity (BMI 35.0-39.9) with comorbidity (H)     Allergic conjunctivitis, bilateral     History of endoscopic sinus surgery     Chronic pansinusitis       Past Medical History:   Diagnosis Date     Injury, other and unspecified, shoulder and upper arm 9/03       Problem (# of Occurrences) Relation (Name,Age of Onset)    Breast Cancer (1) Maternal Aunt    C.A.D. (1) Mother    Cancer (1) Father: brain    Diabetes (1) Mother       Negative family history of: Cancer - colorectal        Past Surgical History:   Procedure Laterality Date     COLONOSCOPY N/A 10/6/2016    Procedure: COLONOSCOPY;  Surgeon: Shiva Johns MD;  Location: [facility]     ETHMOIDECTOMY  12/30/2013    Procedure: ETHMOIDECTOMY;  Bilateral Submucousal Reduction of InferiorTurbinates and Total Ethmoidectomy with Multiple Sinusotomies;  Surgeon: Lio More MD;  Location: [facility]     ETHMOIDECTOMY Bilateral 2/14/2018    Procedure: ETHMOIDECTOMY;  Bilateral anterior ethmoidectomy, bilateral maxillary antrostomy;  Surgeon: Santhosh Tierney MD;  Location: [facility]     SURGICAL HISTORY OF -   5/04    carpal tunnel release, bilateral     Social History     Socioeconomic History     Marital status: Single     Spouse name: None     Number of children: None     Years of education: None     Highest education level: None   Occupational History     None   Social Needs     Financial resource strain: None     Food insecurity     Worry: None     Inability: None     Transportation needs     Medical: None     Non-medical: None   Tobacco Use     Smoking status: Never Smoker     Smokeless tobacco: Never Used   Substance and Sexual Activity     Alcohol use: No     Drug use: No     Sexual activity: Never   Lifestyle     Physical activity     Days per week: None     Minutes per session: None     Stress: None   Relationships     Social connections     Talks on phone: None     Gets together: None     Attends Jew service: None     Active member of club or organization: None     Attends meetings of clubs or organizations: None     Relationship status: None     Intimate partner violence     Fear of current or ex partner: None     Emotionally abused: None     Physically abused: None     Forced sexual activity: None   Other  Topics Concern     Parent/sibling w/ CABG, MI or angioplasty before 65F 55M? Yes     Comment: mother   Social History Narrative    June 2, 2020    ENVIRONMENTAL HISTORY: [place] The home is heated with a forced air. They do not have central air conditioning. The patient's bedroom is furnished with hard pawel in bedroom, allergen mattress cover, allergen pillowcase cover and fabric window coverings.  Pets inside the house include 1 dog. There is not history of cockroach or mice infestation. There are no smokers in the house.  The house does have a damp basement.            Review of Systems   Constitutional: Negative for activity change, chills and fever.   HENT: Negative for congestion, dental problem, ear pain, facial swelling, nosebleeds, postnasal drip, rhinorrhea, sinus pressure and sneezing.    Eyes: Negative for discharge, redness and itching.   Respiratory: Negative for cough, chest tightness, shortness of breath and wheezing.    Cardiovascular: Negative for chest pain.   Gastrointestinal: Negative for diarrhea, nausea and vomiting.   Musculoskeletal: Negative for arthralgias, joint swelling and myalgias.   Skin: Negative for rash.   Allergic/Immunologic: Positive for environmental allergies.   Neurological: Negative for headaches.   Hematological: Negative for adenopathy.   Psychiatric/Behavioral: Negative for behavioral problems and self-injury.           Current Outpatient Medications:      albuterol (PROAIR HFA/PROVENTIL HFA/VENTOLIN HFA) 108 (90 Base) MCG/ACT inhaler, Inhale 2 puffs into the lungs every 4 hours as needed for shortness of breath / dyspnea or wheezing, Disp: 1 Inhaler, Rfl: 3     azelastine (ASTELIN) 0.1 % nasal spray, Spray 2 sprays into both nostrils 2 times daily as needed for rhinitis or allergies, Disp: 90 mL, Rfl: 3     azelastine (OPTIVAR) 0.05 % ophthalmic solution, Apply 1 drop to eye 2 times daily, Disp: 1 Bottle, Rfl: 5      cetirizine (ZYRTEC) 10 MG tablet, as needed, Disp: , Rfl:      Emollient (CERAVE) CREA, Externally apply 1 dose. topically 2 times daily, Disp: 2 Bottle, Rfl: 11     EPINEPHrine (ANY BX GENERIC EQUIV) 0.3 MG/0.3ML injection 2-pack, Inject 0.3 mLs (0.3 mg) into the muscle once as needed for anaphylaxis, Disp: 0.6 mL, Rfl: 3     fluticasone (FLONASE) 50 MCG/ACT nasal spray, Spray 2 sprays into both nostrils daily, Disp: 48 g, Rfl: 1     fluticasone-salmeterol (ADVAIR) 250-50 MCG/DOSE inhaler, Inhale 1 puff into the lungs 2 times daily, Disp: 3 Inhaler, Rfl: 3     loratadine (CLARITIN) 10 MG tablet, as needed, Disp: , Rfl:      montelukast (SINGULAIR) 10 MG tablet, Take 1 tablet (10 mg) by mouth At Bedtime, Disp: 90 tablet, Rfl: 1     MULTIVITAMIN OR, 1 tab daily, Disp: , Rfl:      ORDER FOR ALLERGEN IMMUNOTHERAPY, Name of Mix: Mix #1  Mold Alternaria Tenuis 1:10 w/v, HS  0.5 ml Aspergillus Fumigatus 1:10 w/v, HS  0.5 ml Epicoccum Nigrum 1:10 w/v, HS 0.5 ml Hormodendrum Cladosporioides 1:10 w/v, HS 0.5 ml Penicillium Mix 1:10 w/v, HS  0.5 ml Diluent: HSA qs to 5ml, Disp: 5 mL, Rfl: PRN     ORDER FOR ALLERGEN IMMUNOTHERAPY, Name of Mix: Mix #2  Dust Mite, Cat, Dog Cat Hair, Standardized 10,000 BAU/mL, ALK  2.0 ml Dog Hair Dander, A. P.  1:100 w/v, HS  1.0 ml Dust Mites F 30,000AU/mL, HS  0.3 ml Dust Mites P. 30,000 AU/mL, HS  0.3 ml  Diluent: HSA qs to 5ml, Disp: 5 mL, Rfl: PRN     ORDER FOR ALLERGEN IMMUNOTHERAPY, Name of Mix: Mix #3 Grass,Tree  Dmitry,White 1:20w/v, HS 0.5ml Birch Mix PRW 1:20w/v, HS 0.5ml Boxelder-Maple Mix BHR (Boxelder Hard Red) 1:20w/v, HS 0.5ml Corson,Common 1:20w/v, HS 0.5ml Elm,American 1:20w/v, HS 0.5ml Clinchco Mix RW 1:20w/v, HS 0.5ml Oak Mix RVW 1:20w/v, HS 0.5ml [place] Tree,Black 1:20w/v, HS 0.5ml Grass Mix #7 100,000 BAU/mL, HS 0.4ml Jonathan Grass 1:20w/v, HS 0.5ml Diluent: HSA qs to 5ml, Disp: 5 mL, Rfl: PRN     ORDER FOR ALLERGEN IMMUNOTHERAPY, Name of Mix: Mix #4  Weeds Kochia 1:20  w/v, HS 0.5 ml Lamb's Quarters 1:20 w/v, HS 0.5 ml Nettle 1:20 w/v, HS 0.5 ml Plantain, English 1:20 w/v, HS 0.5 ml Ragweed Mixed 1:20 w/v ALK  0.5 ml Russian Thistle 1:20 w/v, HS 0.5 ml Sagebrush, Mugwort 1:20 w/v, HS 0.5 ml Sorrel, Sheep 1:20 w/v, HS 0.5 ml Diluent: HSA qs to 5ml, Disp: 5 mL, Rfl: PRN     order for DME, Equipment being ordered: Dynaflex insert, Disp: 1 Units, Rfl: 0     albuterol (2.5 MG/3ML) 0.083% neb solution, Take 1 vial (2.5 mg) by nebulization every 4 hours as needed (Patient not taking: Reported on 12/6/2019), Disp: 1 Box, Rfl: 3     order for DME, Equipment being ordered: thumb spica splint RIGHT (Patient not taking: Reported on 12/6/2019), Disp: 1 Units, Rfl: 0     order for DME, Equipment being ordered: Silicone heel cup (Patient not taking: Reported on 12/6/2019), Disp: 1 Units, Rfl: 0     order for DME, Equipment being ordered: Silicone heel cup (Patient not taking: Reported on 6/2/2020), Disp: 1 Units, Rfl: 0     triamcinolone (KENALOG) 0.1 % external cream, Apply sparingly to affected area two times daily as needed but not more than 14 days in a row. Spare face, armpits, neck, and groin. (Patient not taking: Reported on 6/2/2020), Disp: 80 g, Rfl: 1  Immunization History   Administered Date(s) Administered     Influenza (IIV3) PF 12/12/2002, 11/28/2003, 10/24/2006, 02/11/2009, 10/05/2011, 11/15/2012, 10/07/2014     Influenza Quad, Recombinant, p-free (RIV4) 10/02/2018, 10/30/2019     Influenza Vaccine IM > 6 months Valent IIV4 10/16/2013, 10/20/2015, 10/26/2016, 11/21/2017     Mantoux Tuberculin Skin Test 02/11/2009     Measles 10/22/1979     Pneumococcal 23 valent 10/05/2011     TDAP Vaccine (Adacel) 02/11/2009, 07/06/2018     Allergies   Allergen Reactions     Clinoril [Nsaids] Hives     Tolerates ibuprofen and aspirin without hives     Pineapple Hives     OBJECTIVE:                                                                 /70 (BP Location: Left arm, Patient Position:  Sitting, Cuff Size: Adult Regular)   Pulse 78   Temp 97.6  F (36.4  C) (Tympanic)   Wt 94.5 kg (208 lb 5.4 oz)   LMP 07/18/2015 (Approximate)   SpO2 95%   BMI 36.33 kg/m          Physical Exam  Vitals signs and nursing note reviewed.   Constitutional:       General: She is not in acute distress.     Appearance: She is not diaphoretic.   HENT:      Head: Normocephalic and atraumatic.      Right Ear: Tympanic membrane, ear canal and external ear normal.      Left Ear: Tympanic membrane, ear canal and external ear normal.      Nose: No mucosal edema or rhinorrhea.      Mouth/Throat:      Lips: Pink.      Mouth: Mucous membranes are moist.      Pharynx: Oropharynx is clear. No pharyngeal swelling, oropharyngeal exudate or posterior oropharyngeal erythema.   Eyes:      General:         Right eye: No discharge.         Left eye: No discharge.      Conjunctiva/sclera: Conjunctivae normal.   Neck:      Musculoskeletal: Normal range of motion.   Cardiovascular:      Rate and Rhythm: Normal rate and regular rhythm.      Heart sounds: Normal heart sounds. No murmur.   Pulmonary:      Effort: Pulmonary effort is normal. No respiratory distress.      Breath sounds: Normal breath sounds and air entry. No stridor, decreased air movement or transmitted upper airway sounds. No decreased breath sounds, wheezing, rhonchi or rales.   Musculoskeletal: Normal range of motion.   Lymphadenopathy:      Cervical: No cervical adenopathy.   Skin:     General: Skin is warm.      Comments: Moderate xerosis of the skin of palms and dorsal aspect of hands. No active dermatitis.   Neurological:      Mental Status: She is alert and oriented to person, place, and time.   Psychiatric:         Mood and Affect: Mood normal.         Behavior: Behavior normal.         WORKUP:   ACT Score:  20    ASSESSMENT/PLAN:    1. Moderate persistent asthma without complication  Currently well controlled with Advair 250/50 micrograms 1 puff twice daily,  montelukast 10 mg by mouth once daily, and albuterol inhaler on as-needed basis.  - Continue as is.    - albuterol (PROAIR HFA/PROVENTIL HFA/VENTOLIN HFA) 108 (90 Base) MCG/ACT inhaler  Dispense: 1 Inhaler; Refill: 3  - fluticasone-salmeterol (ADVAIR) 250-50 MCG/DOSE inhaler  Dispense: 3 Inhaler; Refill: 3  - montelukast (SINGULAIR) 10 MG tablet  Dispense: 90 tablet; Refill: 1    2. Chronic allergic rhinitis due to animal hair and dander 3. Chronic seasonal allergic rhinitis due to pollen 4. Allergic rhinitis due to mold 5. Allergic rhinitis due to dust mite 6. Allergic conjunctivitis of both eyes 7. Other allergy, other than to medicinal agents  Symptoms are well controlled with allergen immunotherapy, sinus rinses once daily, intranasal fluticasone 2 sprays in each nostril once daily, azelastine 2 sprays in each nostril twice daily, cetirizine and loratadine on as-needed basis, Optivar 1 drop in each eye twice daily, and montelukast 10 mg by mouth once daily at bedtime.  - Continue allergen immunotherapy as is.  To prevent frequent trips to the allergy clinic, will maintain on current dose.  If her symptoms get worse, may consider an exception to build her back to old maintenance dose.  - Consider switching azelastine nasal spray and azelastine eyedrops from daily to PRN basis.    - azelastine (ASTELIN) 0.1 % nasal spray  Dispense: 90 mL; Refill: 3  - fluticasone (FLONASE) 50 MCG/ACT nasal spray  Dispense: 48 g; Refill: 1  - montelukast (SINGULAIR) 10 MG tablet  Dispense: 90 tablet; Refill: 1  - azelastine (OPTIVAR) 0.05 % ophthalmic solution  Dispense: 1 Bottle; Refill: 5     Return in about 6 months (around 12/2/2020), or if symptoms worsen or fail to improve.    Thank you for allowing us to participate in the care of this patient. Please feel free to contact us if there are any questions or concerns about the patient.    Disclaimer: This note consists of symbols derived from keyboarding, dictation and/or voice  recognition software. As a result, there may be errors in the script that have gone undetected. Please consider this when interpreting information found in this chart.    Michael Lujan MD, FAAAAI, FACAAI  Allergy, Asthma and Immunology  [place], [place] and [place]      Again, thank you for allowing me to participate in the care of your patient.        Sincerely,        Michael Lujan MD

## 2020-06-02 NOTE — PROGRESS NOTES
Patient presented after waiting 30 minutes with no reaction to  injections. Discharged from clinic.    Kaylee GRANT   Allergy [facility]

## 2020-06-03 ASSESSMENT — ASTHMA QUESTIONNAIRES: ACT_TOTALSCORE: 20

## 2020-07-07 ENCOUNTER — ALLIED HEALTH/NURSE VISIT (OUTPATIENT)
Dept: ALLERGY | Facility: CLINIC | Age: 55
End: 2020-07-07
Payer: COMMERCIAL

## 2020-07-07 DIAGNOSIS — Z51.6 NEED FOR DESENSITIZATION TO ALLERGENS: Primary | ICD-10-CM

## 2020-07-07 PROCEDURE — 95117 IMMUNOTHERAPY INJECTIONS: CPT

## 2020-07-07 PROCEDURE — 99207 ZZC DROP WITH A PROCEDURE: CPT

## 2020-07-10 ENCOUNTER — ALLIED HEALTH/NURSE VISIT (OUTPATIENT)
Dept: ALLERGY | Facility: CLINIC | Age: 55
End: 2020-07-10
Payer: COMMERCIAL

## 2020-07-10 DIAGNOSIS — Z51.6 NEED FOR DESENSITIZATION TO ALLERGENS: Primary | ICD-10-CM

## 2020-07-10 PROCEDURE — 95117 IMMUNOTHERAPY INJECTIONS: CPT

## 2020-07-10 PROCEDURE — 99207 ZZC DROP WITH A PROCEDURE: CPT

## 2020-07-13 ENCOUNTER — ALLIED HEALTH/NURSE VISIT (OUTPATIENT)
Dept: ALLERGY | Facility: CLINIC | Age: 55
End: 2020-07-13
Payer: COMMERCIAL

## 2020-07-13 DIAGNOSIS — Z51.6 NEED FOR DESENSITIZATION TO ALLERGENS: Primary | ICD-10-CM

## 2020-07-13 PROCEDURE — 95117 IMMUNOTHERAPY INJECTIONS: CPT

## 2020-07-13 PROCEDURE — 99207 ZZC DROP WITH A PROCEDURE: CPT

## 2020-07-17 ENCOUNTER — ALLIED HEALTH/NURSE VISIT (OUTPATIENT)
Dept: ALLERGY | Facility: CLINIC | Age: 55
End: 2020-07-17
Payer: COMMERCIAL

## 2020-07-17 DIAGNOSIS — Z51.6 NEED FOR DESENSITIZATION TO ALLERGENS: Primary | ICD-10-CM

## 2020-07-17 PROCEDURE — 95117 IMMUNOTHERAPY INJECTIONS: CPT

## 2020-07-17 PROCEDURE — 99207 ZZC DROP WITH A PROCEDURE: CPT

## 2020-08-17 ENCOUNTER — ALLIED HEALTH/NURSE VISIT (OUTPATIENT)
Dept: ALLERGY | Facility: CLINIC | Age: 55
End: 2020-08-17
Payer: COMMERCIAL

## 2020-08-17 DIAGNOSIS — J30.89 ALLERGIC RHINITIS DUE TO MOLD: ICD-10-CM

## 2020-08-17 DIAGNOSIS — J30.81 CHRONIC ALLERGIC RHINITIS DUE TO ANIMAL HAIR AND DANDER: ICD-10-CM

## 2020-08-17 DIAGNOSIS — J30.89 ALLERGIC RHINITIS DUE TO DUST MITE: ICD-10-CM

## 2020-08-17 DIAGNOSIS — Z51.6 NEED FOR DESENSITIZATION TO ALLERGENS: Primary | ICD-10-CM

## 2020-08-17 DIAGNOSIS — J30.1 CHRONIC SEASONAL ALLERGIC RHINITIS DUE TO POLLEN: ICD-10-CM

## 2020-08-17 PROCEDURE — 95117 IMMUNOTHERAPY INJECTIONS: CPT

## 2020-08-17 PROCEDURE — 99207 ZZC DROP WITH A PROCEDURE: CPT

## 2020-08-17 NOTE — TELEPHONE ENCOUNTER
ALLERGY SOLUTION RE-ORDER REQUEST    Dilia Solomon 1965 MRN: [id number]    DATE NEEDED:  08/31/20  Vial Color Content   Top Dose   Last Dose Vial Size  Red 1:1 Weeds   Red 1:1 0.5   Red 1:10.5 5mL  Red 1:1 Grass, Trees   Red 1:1 0.5   Red 1:10.5 5mL  Red 1:1 Molds   Red 1:1 0.5   Red 1:10.5 5mL  Red 1:1 Cat, Dog, Dust Mite   Red 1:1 0.5   Red 1:10.5 5mL      Serum reorder consent signed and patient/parent was advised that new serums would be ordered through the pharmacy and billed to their insurance company when they arrive in clinic. Yes    Shot Clinic Location:  [place]  Ship to Location: [place]  Serum billed to:  [place]    Special Instructions:        Updated Prescription Needed: No      Requester Signature  Silver Araujo LPN

## 2020-08-27 DIAGNOSIS — J30.1 CHRONIC SEASONAL ALLERGIC RHINITIS DUE TO POLLEN: ICD-10-CM

## 2020-08-27 DIAGNOSIS — J30.81 CHRONIC ALLERGIC RHINITIS DUE TO ANIMAL HAIR AND DANDER: Primary | ICD-10-CM

## 2020-08-27 DIAGNOSIS — J30.89 ALLERGIC RHINITIS DUE TO DUST MITE: ICD-10-CM

## 2020-08-27 DIAGNOSIS — J30.89 ALLERGIC RHINITIS DUE TO MOLD: ICD-10-CM

## 2020-08-27 PROCEDURE — 95165 ANTIGEN THERAPY SERVICES: CPT | Performed by: ALLERGY & IMMUNOLOGY

## 2020-08-27 NOTE — TELEPHONE ENCOUNTER
Allergy serums received at [place].     Vials received below:    Vial Color Content                      Vial Size Expiration Date  Red 1:1 Weeds 5mL  08/18/21  Red 1:1 Molds 5mL  08/18/21  Red 1:1 Cat, Dog, Dust Mite 5mL  08/18/21  Red 1:1 Grass, Trees 5mL  08/18/21      Signature  Silver Araujo LPN

## 2020-08-27 NOTE — PROGRESS NOTES
Allergy serums billed at [place].     Vials billed below:    Vial Color Content                      Vial Size Expiration Date  Red 1:1 Weeds 5mL  08/18/21  Red 1:1 Molds 5mL  08/18/21  Red 1:1 Cat, Dog, Dust Mite 5mL  08/18/21  Red 1:1 Grass, Trees 5mL  08/18/21    Original Refill encounter date: 08/17/20      Signature  Silver Araujo LPN

## 2020-09-14 ENCOUNTER — ALLIED HEALTH/NURSE VISIT (OUTPATIENT)
Dept: ALLERGY | Facility: CLINIC | Age: 55
End: 2020-09-14
Payer: COMMERCIAL

## 2020-09-14 DIAGNOSIS — Z51.6 NEED FOR DESENSITIZATION TO ALLERGENS: Primary | ICD-10-CM

## 2020-09-14 PROCEDURE — 95117 IMMUNOTHERAPY INJECTIONS: CPT

## 2020-09-14 PROCEDURE — 99207 ZZC DROP WITH A PROCEDURE: CPT

## 2020-09-21 ENCOUNTER — ALLIED HEALTH/NURSE VISIT (OUTPATIENT)
Dept: ALLERGY | Facility: CLINIC | Age: 55
End: 2020-09-21
Payer: COMMERCIAL

## 2020-09-21 DIAGNOSIS — Z51.6 NEED FOR DESENSITIZATION TO ALLERGENS: Primary | ICD-10-CM

## 2020-09-21 PROCEDURE — 95117 IMMUNOTHERAPY INJECTIONS: CPT

## 2020-09-21 PROCEDURE — 99207 ZZC DROP WITH A PROCEDURE: CPT

## 2020-09-28 ENCOUNTER — ALLIED HEALTH/NURSE VISIT (OUTPATIENT)
Dept: ALLERGY | Facility: CLINIC | Age: 55
End: 2020-09-28
Payer: COMMERCIAL

## 2020-09-28 DIAGNOSIS — Z51.6 NEED FOR DESENSITIZATION TO ALLERGENS: Primary | ICD-10-CM

## 2020-09-28 PROCEDURE — 99207 ZZC DROP WITH A PROCEDURE: CPT

## 2020-09-28 PROCEDURE — 95117 IMMUNOTHERAPY INJECTIONS: CPT

## 2020-10-26 ENCOUNTER — ALLIED HEALTH/NURSE VISIT (OUTPATIENT)
Dept: ALLERGY | Facility: CLINIC | Age: 55
End: 2020-10-26
Payer: COMMERCIAL

## 2020-10-26 DIAGNOSIS — Z51.6 NEED FOR DESENSITIZATION TO ALLERGENS: Primary | ICD-10-CM

## 2020-10-26 PROCEDURE — 95117 IMMUNOTHERAPY INJECTIONS: CPT

## 2020-10-26 PROCEDURE — 99207 PR DROP WITH A PROCEDURE: CPT

## 2020-10-28 ENCOUNTER — IMMUNIZATION (OUTPATIENT)
Dept: FAMILY MEDICINE | Facility: CLINIC | Age: 55
End: 2020-10-28
Payer: COMMERCIAL

## 2020-10-28 PROCEDURE — 90682 RIV4 VACC RECOMBINANT DNA IM: CPT

## 2020-10-28 PROCEDURE — 90471 IMMUNIZATION ADMIN: CPT

## 2020-11-22 ENCOUNTER — HEALTH MAINTENANCE LETTER (OUTPATIENT)
Age: 55
End: 2020-11-22

## 2020-12-08 ENCOUNTER — ALLIED HEALTH/NURSE VISIT (OUTPATIENT)
Dept: ALLERGY | Facility: CLINIC | Age: 55
End: 2020-12-08
Payer: COMMERCIAL

## 2020-12-08 ENCOUNTER — OFFICE VISIT (OUTPATIENT)
Dept: ALLERGY | Facility: CLINIC | Age: 55
End: 2020-12-08
Payer: COMMERCIAL

## 2020-12-08 VITALS
HEART RATE: 81 BPM | TEMPERATURE: 98.8 F | BODY MASS INDEX: 35.48 KG/M2 | SYSTOLIC BLOOD PRESSURE: 110 MMHG | WEIGHT: 203.48 LBS | OXYGEN SATURATION: 98 % | DIASTOLIC BLOOD PRESSURE: 77 MMHG

## 2020-12-08 DIAGNOSIS — J30.1 CHRONIC SEASONAL ALLERGIC RHINITIS DUE TO POLLEN: ICD-10-CM

## 2020-12-08 DIAGNOSIS — H10.13 ALLERGIC CONJUNCTIVITIS OF BOTH EYES: ICD-10-CM

## 2020-12-08 DIAGNOSIS — J30.89 ALLERGIC RHINITIS DUE TO DUST MITE: ICD-10-CM

## 2020-12-08 DIAGNOSIS — J30.81 CHRONIC ALLERGIC RHINITIS DUE TO ANIMAL HAIR AND DANDER: ICD-10-CM

## 2020-12-08 DIAGNOSIS — J30.89 ALLERGIC RHINITIS DUE TO MOLD: ICD-10-CM

## 2020-12-08 DIAGNOSIS — Z91.09 OTHER ALLERGY, OTHER THAN TO MEDICINAL AGENTS: ICD-10-CM

## 2020-12-08 DIAGNOSIS — J45.40 MODERATE PERSISTENT ASTHMA WITHOUT COMPLICATION: Primary | ICD-10-CM

## 2020-12-08 DIAGNOSIS — Z51.6 NEED FOR DESENSITIZATION TO ALLERGENS: Primary | ICD-10-CM

## 2020-12-08 PROCEDURE — 95117 IMMUNOTHERAPY INJECTIONS: CPT

## 2020-12-08 PROCEDURE — 99207 PR DROP WITH A PROCEDURE: CPT

## 2020-12-08 PROCEDURE — 99214 OFFICE O/P EST MOD 30 MIN: CPT | Performed by: ALLERGY & IMMUNOLOGY

## 2020-12-08 RX ORDER — MONTELUKAST SODIUM 10 MG/1
10 TABLET ORAL AT BEDTIME
Qty: 90 TABLET | Refills: 1 | Status: SHIPPED | OUTPATIENT
Start: 2020-12-08 | End: 2022-03-11

## 2020-12-08 RX ORDER — AZELASTINE 1 MG/ML
2 SPRAY, METERED NASAL 2 TIMES DAILY PRN
Qty: 90 ML | Refills: 3 | Status: SHIPPED | OUTPATIENT
Start: 2020-12-08 | End: 2022-03-11

## 2020-12-08 RX ORDER — AZELASTINE HYDROCHLORIDE 0.5 MG/ML
1 SOLUTION/ DROPS OPHTHALMIC 2 TIMES DAILY
Qty: 1 BOTTLE | Refills: 5 | Status: SHIPPED | OUTPATIENT
Start: 2020-12-08 | End: 2022-03-11

## 2020-12-08 RX ORDER — FLUTICASONE PROPIONATE 50 MCG
2 SPRAY, SUSPENSION (ML) NASAL DAILY
Qty: 48 G | Refills: 1 | Status: SHIPPED | OUTPATIENT
Start: 2020-12-08 | End: 2022-03-11

## 2020-12-08 SDOH — ECONOMIC STABILITY: TRANSPORTATION INSECURITY
IN THE PAST 12 MONTHS, HAS LACK OF TRANSPORTATION KEPT YOU FROM MEETINGS, WORK, OR FROM GETTING THINGS NEEDED FOR DAILY LIVING?: NOT ASKED

## 2020-12-08 SDOH — ECONOMIC STABILITY: INCOME INSECURITY: HOW HARD IS IT FOR YOU TO PAY FOR THE VERY BASICS LIKE FOOD, HOUSING, MEDICAL CARE, AND HEATING?: NOT ASKED

## 2020-12-08 SDOH — ECONOMIC STABILITY: FOOD INSECURITY: WITHIN THE PAST 12 MONTHS, THE FOOD YOU BOUGHT JUST DIDN'T LAST AND YOU DIDN'T HAVE MONEY TO GET MORE.: NOT ASKED

## 2020-12-08 SDOH — ECONOMIC STABILITY: FOOD INSECURITY: WITHIN THE PAST 12 MONTHS, YOU WORRIED THAT YOUR FOOD WOULD RUN OUT BEFORE YOU GOT MONEY TO BUY MORE.: NOT ASKED

## 2020-12-08 SDOH — ECONOMIC STABILITY: TRANSPORTATION INSECURITY
IN THE PAST 12 MONTHS, HAS THE LACK OF TRANSPORTATION KEPT YOU FROM MEDICAL APPOINTMENTS OR FROM GETTING MEDICATIONS?: NOT ASKED

## 2020-12-08 ASSESSMENT — ENCOUNTER SYMPTOMS
VOMITING: 0
SINUS PRESSURE: 0
JOINT SWELLING: 0
EYE DISCHARGE: 0
ARTHRALGIAS: 0
MYALGIAS: 0
WHEEZING: 0
CHEST TIGHTNESS: 0
ACTIVITY CHANGE: 0
EYE ITCHING: 0
DIARRHEA: 0
FACIAL SWELLING: 0
COUGH: 0
FEVER: 0
HEADACHES: 0
ADENOPATHY: 0
SHORTNESS OF BREATH: 0
NAUSEA: 0
RHINORRHEA: 0
EYE REDNESS: 0
CHILLS: 0

## 2020-12-08 NOTE — PROGRESS NOTES
SUBJECTIVE:                                                                   Dilia Solomon presents today to our Allergy Clinic at [facility]  for a follow up visit.  she is a 55 year old female with moderate persistent asthma and allergic rhinitis.  She was diagnosed with asthma approximately in 2352-7632. Triggers are environmental allergies and viral respiratory infections.   Serum IgE for regional aeroallergen panel performed in June 2017 with multiple levels of various sensitivities to molds, cat, dog, dust mite, pollen of trees, grasses, and weeds.  She had 2 sinus surgeries in the past. The last one was in 2018.   She started allergen immunotherapy in July 2017. She was on allergen immunotherapy before that with Dr. Haines, and it was helpful at that time; however, symptoms got worse soon after stopping it.     Allergy Immunotherapy  Date/time of injection(s): 12/8/2020      Vial Color                               Content                                  Dose  Red 1:1                                   Weeds                                     0.45mL  Red 1:1                                   Grass, Trees                           0.45mL    Red 1:1                                   Molds                                      0.45mL  Red 1:1                                   Cat, Dog, Dust Mite                0.45mL    Current dose was decreased because it has been 43 days since the last injection.  The patient tolerates injections well without persistent large local reactions or systemic reactions. She has been on the maintenance dose since January 2018.   Dilia has been using Advair 250/50 mcg 1 puff twice daily and taking montelukast 10 mg by mouth once daily at bedtime.She is using albuterol less than twice per week for rescue from chest symptoms. She is waking up less than twice per month due to chest symptoms.  There has been no use of oral steroids since the last visit. No  ED/PCP/urgent care/other specialist visits for asthma flare since the previous visit. The patient denies current chest tightness, cough, wheeze, or shortness of breath.   Asthma triggers remain unchanged. she uses a spacer/chamber device, where applicable.  Regarding her allergic rhinitis, she has been using sinus rinses twice daily, intranasal fluticasone 2 sprays in each nostril once daily, azelastine 2 sprays in each nostril twice daily, cetirizine and loratadine as needed (rarely), and montelukast 10 mg by mouth daily at bedtime. She reports a significant improvement in symptoms with allergen immunotherapy. She uses azelastine eye drops twice daily as needed. The patient denies clear rhinorrhea, nasal itch, stuffiness, sneezing, or interval sinusitis symptoms of fever, facial pain, or purulent rhinorrhea.    Patient Active Problem List   Diagnosis     Need for prophylactic vaccination and inoculation against influenza     Sprain of interphalangeal (joint) of hand     Radial styloid tenosynovitis     Carpal tunnel syndrome     Synovial cyst of popliteal space     Pain in joint, lower leg     Hyperlipidemia LDL goal <130     Advanced directives, counseling/discussion     Moderate persistent asthma     Allergic rhinitis due to dust mite     Food allergy     FH: diabetes mellitus     Chronic allergic rhinitis due to animal hair and dander     Allergic rhinitis due to mold     Chronic seasonal allergic rhinitis due to pollen     Obesity (BMI 35.0-39.9) with comorbidity (H)     Allergic conjunctivitis, bilateral     History of endoscopic sinus surgery     Chronic pansinusitis       Past Medical History:   Diagnosis Date     Injury, other and unspecified, shoulder and upper arm 9/03      Problem (# of Occurrences) Relation (Name,Age of Onset)    Breast Cancer (1) Maternal Aunt    C.A.D. (1) Mother    Cancer (1) Father: brain    Diabetes (1) Mother       Negative family history of: Cancer - colorectal        Past  Surgical History:   Procedure Laterality Date     COLONOSCOPY N/A 10/6/2016    Procedure: COLONOSCOPY;  Surgeon: Shiva Johns MD;  Location: [facility]     ETHMOIDECTOMY  12/30/2013    Procedure: ETHMOIDECTOMY;  Bilateral Submucousal Reduction of InferiorTurbinates and Total Ethmoidectomy with Multiple Sinusotomies;  Surgeon: Lio More MD;  Location: [facility]     ETHMOIDECTOMY Bilateral 2/14/2018    Procedure: ETHMOIDECTOMY;  Bilateral anterior ethmoidectomy, bilateral maxillary antrostomy;  Surgeon: Santhosh Tierney MD;  Location: [facility]     SURGICAL HISTORY OF -   5/04    carpal tunnel release, bilateral     Social History     Socioeconomic History     Marital status: Single     Spouse name: None     Number of children: None     Years of education: None     Highest education level: None   Occupational History     Employer: [employer]    Social Needs     Financial resource strain: None     Food insecurity     Worry: None     Inability: None     Transportation needs     Medical: None     Non-medical: None   Tobacco Use     Smoking status: Never Smoker     Smokeless tobacco: Never Used   Substance and Sexual Activity     Alcohol use: No     Drug use: No     Sexual activity: Never   Lifestyle     Physical activity     Days per week: None     Minutes per session: None     Stress: None   Relationships     Social connections     Talks on phone: None     Gets together: None     Attends Baptism service: None     Active member of club or organization: None     Attends meetings of clubs or organizations: None     Relationship status: None     Intimate partner violence     Fear of current or ex partner: None     Emotionally abused: None     Physically abused: None     Forced sexual activity: None   Other Topics Concern     Parent/sibling w/ CABG, MI or angioplasty before 65F 55M? Yes     Comment: mother   Social History Narrative    December 8, 2020    ENVIRONMENTAL HISTORY: [place] is heated with a forced air. They do not have central air conditioning. The patient's bedroom is furnished with hard pawel in bedroom, allergen mattress cover, allergen pillowcase cover and fabric window coverings.  Pets inside the house include 1 dog. There is not history of cockroach or mice infestation. There are no smokers in the house.  The house does have a damp basement.     Patient is currently living and taking care of a friend in the friends home.           Review of Systems   Constitutional: Negative for activity change, chills and fever.   HENT: Negative for congestion, dental problem, ear pain, facial swelling, nosebleeds, postnasal drip, rhinorrhea, sinus pressure and sneezing.    Eyes: Negative for discharge, redness and itching.   Respiratory: Negative for cough, chest tightness, shortness of breath and wheezing.    Cardiovascular: Negative for chest pain.   Gastrointestinal: Negative for diarrhea, nausea and vomiting.   Musculoskeletal: Negative for arthralgias, joint swelling and myalgias.   Skin: Negative for rash.   Allergic/Immunologic: Positive for environmental allergies.   Neurological: Negative for headaches.   Hematological: Negative for adenopathy.   Psychiatric/Behavioral: Negative for behavioral problems and self-injury.           Current Outpatient Medications:      azelastine (ASTELIN) 0.1 % nasal spray, Spray 2 sprays into both nostrils 2 times daily as needed for rhinitis or allergies, Disp: 90 mL, Rfl: 3     azelastine (OPTIVAR) 0.05 % ophthalmic solution, Apply 1 drop to eye 2 times daily, Disp: 1 Bottle, Rfl: 5     Emollient (CERAVE) CREA, Externally apply 1 dose. topically 2 times daily, Disp: 2 Bottle, Rfl: 11     fluticasone (FLONASE) 50 MCG/ACT nasal spray, Spray 2 sprays into both nostrils daily, Disp: 48 g, Rfl: 1     fluticasone-salmeterol (ADVAIR) 250-50 MCG/DOSE inhaler, Inhale 1 puff into the lungs 2 times daily, Disp: 3 Inhaler, Rfl: 3      montelukast (SINGULAIR) 10 MG tablet, Take 1 tablet (10 mg) by mouth At Bedtime, Disp: 90 tablet, Rfl: 1     MULTIVITAMIN OR, 1 tab daily, Disp: , Rfl:      ORDER FOR ALLERGEN IMMUNOTHERAPY, Name of Mix: Mix #1  Mold Alternaria Tenuis 1:10 w/v, HS  0.5 ml Aspergillus Fumigatus 1:10 w/v, HS  0.5 ml Epicoccum Nigrum 1:10 w/v, HS 0.5 ml Hormodendrum Cladosporioides 1:10 w/v, HS 0.5 ml Penicillium Mix 1:10 w/v, HS  0.5 ml Diluent: HSA qs to 5ml, Disp: 5 mL, Rfl: PRN     ORDER FOR ALLERGEN IMMUNOTHERAPY, Name of Mix: Mix #2  Dust Mite, Cat, Dog Cat Hair, Standardized 10,000 BAU/mL, ALK  2.0 ml Dog Hair Dander, A. P.  1:100 w/v, HS  1.0 ml Dust Mites F 30,000AU/mL, HS  0.3 ml Dust Mites P. 30,000 AU/mL, HS  0.3 ml  Diluent: HSA qs to 5ml, Disp: 5 mL, Rfl: PRN     ORDER FOR ALLERGEN IMMUNOTHERAPY, Name of Mix: Mix #3 Grass,Tree  Dmitry,White 1:20w/v, HS 0.5ml Birch Mix PRW 1:20w/v, HS 0.5ml Boxelder-Maple Mix BHR (Boxelder Hard Red) 1:20w/v, HS 0.5ml Huron,Common 1:20w/v, HS 0.5ml Elm,American 1:20w/v, HS 0.5ml Waelder Mix RW 1:20w/v, HS 0.5ml Oak Mix RVW 1:20w/v, HS 0.5ml Sullivan Tree,Black 1:20w/v, HS 0.5ml Suresh Grass (Std) 100,000 BAU/mL, HS 0.4ml Jonathan Grass 1:20w/v, HS 0.5ml Diluent: HSA qs to 5ml, Disp: 5 mL, Rfl: PRN     ORDER FOR ALLERGEN IMMUNOTHERAPY, Name of Mix: Mix #4  Weeds Kochia 1:20 w/v, HS 0.5 ml Lamb's Quarters 1:20 w/v, HS 0.5 ml Nettle 1:20 w/v, HS 0.5 ml Plantain, English 1:20 w/v, HS 0.5 ml Ragweed Mixed 1:20 w/v ALK  0.5 ml Russian Thistle 1:20 w/v, HS 0.5 ml Sagebrush, Mugwort 1:20 w/v, HS 0.5 ml Sorrel, Sheep 1:20 w/v, HS 0.5 ml Diluent: HSA qs to 5ml, Disp: 5 mL, Rfl: PRN     albuterol (2.5 MG/3ML) 0.083% neb solution, Take 1 vial (2.5 mg) by nebulization every 4 hours as needed (Patient not taking: Reported on 12/6/2019), Disp: 1 Box, Rfl: 3     albuterol (PROAIR HFA/PROVENTIL HFA/VENTOLIN HFA) 108 (90 Base) MCG/ACT inhaler, Inhale 2 puffs into the lungs every 4 hours as needed for  shortness of breath / dyspnea or wheezing (Patient not taking: Reported on 12/8/2020), Disp: 1 Inhaler, Rfl: 3     cetirizine (ZYRTEC) 10 MG tablet, as needed, Disp: , Rfl:      EPINEPHrine (ANY BX GENERIC EQUIV) 0.3 MG/0.3ML injection 2-pack, Inject 0.3 mLs (0.3 mg) into the muscle once as needed for anaphylaxis (Patient not taking: Reported on 12/8/2020), Disp: 0.6 mL, Rfl: 3     loratadine (CLARITIN) 10 MG tablet, as needed, Disp: , Rfl:      order for DME, Equipment being ordered: thumb spica splint RIGHT (Patient not taking: Reported on 12/6/2019), Disp: 1 Units, Rfl: 0     order for DME, Equipment being ordered: Silicone heel cup (Patient not taking: Reported on 12/6/2019), Disp: 1 Units, Rfl: 0     order for DME, Equipment being ordered: Dynaflex insert (Patient not taking: Reported on 12/8/2020), Disp: 1 Units, Rfl: 0     order for DME, Equipment being ordered: Silicone heel cup (Patient not taking: Reported on 6/2/2020), Disp: 1 Units, Rfl: 0     triamcinolone (KENALOG) 0.1 % external cream, Apply sparingly to affected area two times daily as needed but not more than 14 days in a row. Spare face, armpits, neck, and groin. (Patient not taking: Reported on 12/8/2020), Disp: 80 g, Rfl: 1  Immunization History   Administered Date(s) Administered     Influenza (IIV3) PF 12/12/2002, 11/28/2003, 10/24/2006, 02/11/2009, 10/05/2011, 11/15/2012, 10/07/2014     Influenza Quad, Recombinant, p-free (RIV4) 10/02/2018, 10/30/2019, 10/28/2020     Influenza Vaccine IM > 6 months Valent IIV4 10/16/2013, 10/20/2015, 10/26/2016, 11/21/2017     Mantoux Tuberculin Skin Test 02/11/2009     Measles 10/22/1979     Pneumococcal 23 valent 10/05/2011     TDAP Vaccine (Adacel) 02/11/2009, 07/06/2018     Allergies   Allergen Reactions     Niacin Hives     Nsaids Hives     Tolerates ibuprofen and aspirin without hives       Pineapple Hives     OBJECTIVE:                                                                 /77 (BP  Location: Left arm, Patient Position: Sitting, Cuff Size: Adult Large)   Pulse 81   Temp 98.8  F (37.1  C) (Tympanic)   Wt 92.3 kg (203 lb 7.8 oz)   LMP 07/18/2015 (Approximate)   SpO2 98%   BMI 35.48 kg/m          Physical Exam  Vitals signs and nursing note reviewed.   Constitutional:       General: She is not in acute distress.     Appearance: She is not diaphoretic.   HENT:      Head: Normocephalic and atraumatic.      Right Ear: Tympanic membrane, ear canal and external ear normal.      Left Ear: Tympanic membrane, ear canal and external ear normal.      Nose: No mucosal edema or rhinorrhea.      Mouth/Throat:      Lips: Pink.      Mouth: Mucous membranes are moist.      Pharynx: Oropharynx is clear. No pharyngeal swelling, oropharyngeal exudate or posterior oropharyngeal erythema.   Eyes:      General:         Right eye: No discharge.         Left eye: No discharge.      Conjunctiva/sclera: Conjunctivae normal.   Neck:      Musculoskeletal: Normal range of motion.   Cardiovascular:      Rate and Rhythm: Normal rate and regular rhythm.      Heart sounds: Normal heart sounds. No murmur.   Pulmonary:      Effort: Pulmonary effort is normal. No respiratory distress.      Breath sounds: Normal breath sounds and air entry. No stridor, decreased air movement or transmitted upper airway sounds. No decreased breath sounds, wheezing, rhonchi or rales.   Musculoskeletal: Normal range of motion.   Lymphadenopathy:      Cervical: No cervical adenopathy.   Skin:     General: Skin is warm.   Neurological:      Mental Status: She is alert and oriented to person, place, and time.   Psychiatric:         Mood and Affect: Mood normal.         Behavior: Behavior normal.         WORKUP:   ACT Score:  24    ASSESSMENT/PLAN:    Moderate persistent asthma without complication    Currently well controlled with Advair 250/50 micrograms 1 puff twice daily, montelukast 10 mg by mouth once daily, and albuterol inhaler on as-needed  basis.  - Continue as is.  Apple watch skin  -Will get interval PFT.    - montelukast (SINGULAIR) 10 MG tablet  Dispense: 90 tablet; Refill: 1  - fluticasone-salmeterol (ADVAIR) 250-50 MCG/DOSE inhaler  Dispense: 3 Inhaler; Refill: 3  - General PFT Lab (Please always keep checked)  - Pulmonary Function Test    Chronic seasonal allergic rhinitis due to pollen  Allergic rhinitis due to mold  Allergic rhinitis due to dust mite  Allergic conjunctivitis of both eyes  Other allergy, other than to medicinal agents  Chronic allergic rhinitis due to animal hair and dander    Symptoms are well controlled with allergen immunotherapy, sinus rinses twice daily, intranasal fluticasone 2 sprays in each nostril once daily, azelastine 2 sprays in each nostril twice daily, cetirizine and loratadine on as-needed basis, Optivar 1 drop in each eye twice daily as needed, and montelukast 10 mg by mouth once daily at bedtime.  - Continue as is.     - montelukast (SINGULAIR) 10 MG tablet  Dispense: 90 tablet; Refill: 1  - fluticasone (FLONASE) 50 MCG/ACT nasal spray  Dispense: 48 g; Refill: 1  - azelastine (ASTELIN) 0.1 % nasal spray  Dispense: 90 mL; Refill: 3  - azelastine (OPTIVAR) 0.05 % ophthalmic solution  Dispense: 1 Bottle; Refill: 5        Return in about 6 months (around 6/8/2021), or if symptoms worsen or fail to improve.    Thank you for allowing us to participate in the care of this patient. Please feel free to contact us if there are any questions or concerns about the patient.    Disclaimer: This note consists of symbols derived from keyboarding, dictation and/or voice recognition software. As a result, there may be errors in the script that have gone undetected. Please consider this when interpreting information found in this chart.    Michael Lujan MD, FAAAAI, FACAAI  Allergy, Asthma and Immunology    [facility]

## 2020-12-08 NOTE — LETTER
12/8/2020         RE: Dilia Solomon  [address] [facility] [address]        Dear Colleague,    Thank you for referring your patient, Dilia Solomon, to the [facility] [place]. Please see a copy of my visit note below.    SUBJECTIVE:                                                                   Dilia Solomon presents today to our Allergy Clinic at [facility]  for a follow up visit.  she is a 55 year old female with moderate persistent asthma and allergic rhinitis.  She was diagnosed with asthma approximately in [id number]. Triggers are environmental allergies and viral respiratory infections.   Serum IgE for regional aeroallergen panel performed in June 2017 with multiple levels of various sensitivities to molds, cat, dog, dust mite, pollen of trees, grasses, and weeds.  She had 2 sinus surgeries in the past. The last one was in 2018.   She started allergen immunotherapy in July 2017. She was on allergen immunotherapy before that with Dr. Haines, and it was helpful at that time; however, symptoms got worse soon after stopping it.     Allergy Immunotherapy  Date/time of injection(s): 12/8/2020      Vial Color                               Content                                  Dose  Red 1:1                                   Weeds                                     0.45mL  Red 1:1                                   Grass, Trees                           0.45mL    Red 1:1                                   Molds                                      0.45mL  Red 1:1                                   Cat, Dog, Dust Mite                0.45mL    Current dose was decreased because it has been 43 days since the last injection.  The patient tolerates injections well without persistent large local reactions or systemic reactions. She has been on the maintenance dose since January 2018.   Dilia has been using Advair 250/50 mcg 1 puff twice daily and taking montelukast 10  mg by mouth once daily at bedtime.She is using albuterol less than twice per week for rescue from chest symptoms. She is waking up less than twice per month due to chest symptoms.  There has been no use of oral steroids since the last visit. No ED/PCP/urgent care/other specialist visits for asthma flare since the previous visit. The patient denies current chest tightness, cough, wheeze, or shortness of breath.   Asthma triggers remain unchanged. she uses a spacer/chamber device, where applicable.  Regarding her allergic rhinitis, she has been using sinus rinses twice daily, intranasal fluticasone 2 sprays in each nostril once daily, azelastine 2 sprays in each nostril twice daily, cetirizine and loratadine as needed (rarely), and montelukast 10 mg by mouth daily at bedtime. She reports a significant improvement in symptoms with allergen immunotherapy. She uses azelastine eye drops twice daily as needed. The patient denies clear rhinorrhea, nasal itch, stuffiness, sneezing, or interval sinusitis symptoms of fever, facial pain, or purulent rhinorrhea.    Patient Active Problem List   Diagnosis     Need for prophylactic vaccination and inoculation against influenza     Sprain of interphalangeal (joint) of hand     Radial styloid tenosynovitis     Carpal tunnel syndrome     Synovial cyst of popliteal space     Pain in joint, lower leg     Hyperlipidemia LDL goal <130     Advanced directives, counseling/discussion     Moderate persistent asthma     Allergic rhinitis due to dust mite     Food allergy     FH: diabetes mellitus     Chronic allergic rhinitis due to animal hair and dander     Allergic rhinitis due to mold     Chronic seasonal allergic rhinitis due to pollen     Obesity (BMI 35.0-39.9) with comorbidity (H)     Allergic conjunctivitis, bilateral     History of endoscopic sinus surgery     Chronic pansinusitis       Past Medical History:   Diagnosis Date     Injury, other and unspecified, shoulder and upper arm  9/03      Problem (# of Occurrences) Relation (Name,Age of Onset)    Breast Cancer (1) Maternal Aunt    C.A.D. (1) Mother    Cancer (1) Father: brain    Diabetes (1) Mother       Negative family history of: Cancer - colorectal        Past Surgical History:   Procedure Laterality Date     COLONOSCOPY N/A 10/6/2016    Procedure: COLONOSCOPY;  Surgeon: Shiva Johns MD;  Location: [facility]     ETHMOIDECTOMY  12/30/2013    Procedure: ETHMOIDECTOMY;  Bilateral Submucousal Reduction of InferiorTurbinates and Total Ethmoidectomy with Multiple Sinusotomies;  Surgeon: Lio More MD;  Location: [facility]     ETHMOIDECTOMY Bilateral 2/14/2018    Procedure: ETHMOIDECTOMY;  Bilateral anterior ethmoidectomy, bilateral maxillary antrostomy;  Surgeon: Santhosh Tierney MD;  Location: [facility]     SURGICAL HISTORY OF -   5/04    carpal tunnel release, bilateral     Social History     Socioeconomic History     Marital status: Single     Spouse name: None     Number of children: None     Years of education: None     Highest education level: None   Occupational History     Employer: [employer]    Social Needs     Financial resource strain: None     Food insecurity     Worry: None     Inability: None     Transportation needs     Medical: None     Non-medical: None   Tobacco Use     Smoking status: Never Smoker     Smokeless tobacco: Never Used   Substance and Sexual Activity     Alcohol use: No     Drug use: No     Sexual activity: Never   Lifestyle     Physical activity     Days per week: None     Minutes per session: None     Stress: None   Relationships     Social connections     Talks on phone: None     Gets together: None     Attends Advent service: None     Active member of club or organization: None     Attends meetings of clubs or organizations: None     Relationship status: None     Intimate partner violence     Fear of current or ex partner: None     Emotionally abused: None     Physically abused: None      Forced sexual activity: None   Other Topics Concern     Parent/sibling w/ CABG, MI or angioplasty before 65F 55M? Yes     Comment: mother   Social History Narrative    December 8, 2020    ENVIRONMENTAL HISTORY: [place] is heated with a forced air. They do not have central air conditioning. The patient's bedroom is furnished with hard pawel in bedroom, allergen mattress cover, allergen pillowcase cover and fabric window coverings.  Pets inside the house include 1 dog. There is not history of cockroach or mice infestation. There are no smokers in the house.  The house does have a damp basement.     Patient is currently living and taking care of a friend in the friends home.           Review of Systems   Constitutional: Negative for activity change, chills and fever.   HENT: Negative for congestion, dental problem, ear pain, facial swelling, nosebleeds, postnasal drip, rhinorrhea, sinus pressure and sneezing.    Eyes: Negative for discharge, redness and itching.   Respiratory: Negative for cough, chest tightness, shortness of breath and wheezing.    Cardiovascular: Negative for chest pain.   Gastrointestinal: Negative for diarrhea, nausea and vomiting.   Musculoskeletal: Negative for arthralgias, joint swelling and myalgias.   Skin: Negative for rash.   Allergic/Immunologic: Positive for environmental allergies.   Neurological: Negative for headaches.   Hematological: Negative for adenopathy.   Psychiatric/Behavioral: Negative for behavioral problems and self-injury.           Current Outpatient Medications:      azelastine (ASTELIN) 0.1 % nasal spray, Spray 2 sprays into both nostrils 2 times daily as needed for rhinitis or allergies, Disp: 90 mL, Rfl: 3     azelastine (OPTIVAR) 0.05 % ophthalmic solution, Apply 1 drop to eye 2 times daily, Disp: 1 Bottle, Rfl: 5     Emollient (CERAVE) CREA, Externally apply 1 dose. topically 2 times daily, Disp: 2 Bottle, Rfl:  11     fluticasone (FLONASE) 50 MCG/ACT nasal spray, Spray 2 sprays into both nostrils daily, Disp: 48 g, Rfl: 1     fluticasone-salmeterol (ADVAIR) 250-50 MCG/DOSE inhaler, Inhale 1 puff into the lungs 2 times daily, Disp: 3 Inhaler, Rfl: 3     montelukast (SINGULAIR) 10 MG tablet, Take 1 tablet (10 mg) by mouth At Bedtime, Disp: 90 tablet, Rfl: 1     MULTIVITAMIN OR, 1 tab daily, Disp: , Rfl:      ORDER FOR ALLERGEN IMMUNOTHERAPY, Name of Mix: Mix #1  Mold Alternaria Tenuis 1:10 w/v, HS  0.5 ml Aspergillus Fumigatus 1:10 w/v, HS  0.5 ml Epicoccum Nigrum 1:10 w/v, HS 0.5 ml Hormodendrum Cladosporioides 1:10 w/v, HS 0.5 ml Penicillium Mix 1:10 w/v, HS  0.5 ml Diluent: HSA qs to 5ml, Disp: 5 mL, Rfl: PRN     ORDER FOR ALLERGEN IMMUNOTHERAPY, Name of Mix: Mix #2  Dust Mite, Cat, Dog Cat Hair, Standardized 10,000 BAU/mL, ALK  2.0 ml Dog Hair Dander, A. P.  1:100 w/v, HS  1.0 ml Dust Mites F 30,000AU/mL, HS  0.3 ml Dust Mites P. 30,000 AU/mL, HS  0.3 ml  Diluent: HSA qs to 5ml, Disp: 5 mL, Rfl: PRN     ORDER FOR ALLERGEN IMMUNOTHERAPY, Name of Mix: Mix #3 Grass,Tree  Dmitry,White 1:20w/v, HS 0.5ml Birch Mix PRW 1:20w/v, HS 0.5ml Boxelder-Maple Mix BHR (Boxelder Hard Red) 1:20w/v, HS 0.5ml Glacier,Common 1:20w/v, HS 0.5ml Elm,American 1:20w/v, HS 0.5ml Ransom Mix RW 1:20w/v, HS 0.5ml Oak Mix RVW 1:20w/v, HS 0.5ml [place] Tree,Black 1:20w/v, HS 0.5ml Suresh Grass (Std) 100,000 BAU/mL, HS 0.4ml Jonathan Grass 1:20w/v, HS 0.5ml Diluent: HSA qs to 5ml, Disp: 5 mL, Rfl: PRN     ORDER FOR ALLERGEN IMMUNOTHERAPY, Name of Mix: Mix #4  Weeds Kochia 1:20 w/v, HS 0.5 ml Lamb's Quarters 1:20 w/v, HS 0.5 ml Nettle 1:20 w/v, HS 0.5 ml Plantain, English 1:20 w/v, HS 0.5 ml Ragweed Mixed 1:20 w/v ALK  0.5 ml Russian Thistle 1:20 w/v, HS 0.5 ml Sagebrush, Mugwort 1:20 w/v, HS 0.5 ml Sorrel, Sheep 1:20 w/v, HS 0.5 ml Diluent: HSA qs to 5ml, Disp: 5 mL, Rfl: PRN     albuterol (2.5 MG/3ML) 0.083% neb solution, Take 1 vial (2.5 mg) by  nebulization every 4 hours as needed (Patient not taking: Reported on 12/6/2019), Disp: 1 Box, Rfl: 3     albuterol (PROAIR HFA/PROVENTIL HFA/VENTOLIN HFA) 108 (90 Base) MCG/ACT inhaler, Inhale 2 puffs into the lungs every 4 hours as needed for shortness of breath / dyspnea or wheezing (Patient not taking: Reported on 12/8/2020), Disp: 1 Inhaler, Rfl: 3     cetirizine (ZYRTEC) 10 MG tablet, as needed, Disp: , Rfl:      EPINEPHrine (ANY BX GENERIC EQUIV) 0.3 MG/0.3ML injection 2-pack, Inject 0.3 mLs (0.3 mg) into the muscle once as needed for anaphylaxis (Patient not taking: Reported on 12/8/2020), Disp: 0.6 mL, Rfl: 3     loratadine (CLARITIN) 10 MG tablet, as needed, Disp: , Rfl:      order for DME, Equipment being ordered: thumb spica splint RIGHT (Patient not taking: Reported on 12/6/2019), Disp: 1 Units, Rfl: 0     order for DME, Equipment being ordered: Silicone heel cup (Patient not taking: Reported on 12/6/2019), Disp: 1 Units, Rfl: 0     order for DME, Equipment being ordered: Dynaflex insert (Patient not taking: Reported on 12/8/2020), Disp: 1 Units, Rfl: 0     order for DME, Equipment being ordered: Silicone heel cup (Patient not taking: Reported on 6/2/2020), Disp: 1 Units, Rfl: 0     triamcinolone (KENALOG) 0.1 % external cream, Apply sparingly to affected area two times daily as needed but not more than 14 days in a row. Spare face, armpits, neck, and groin. (Patient not taking: Reported on 12/8/2020), Disp: 80 g, Rfl: 1  Immunization History   Administered Date(s) Administered     Influenza (IIV3) PF 12/12/2002, 11/28/2003, 10/24/2006, 02/11/2009, 10/05/2011, 11/15/2012, 10/07/2014     Influenza Quad, Recombinant, p-free (RIV4) 10/02/2018, 10/30/2019, 10/28/2020     Influenza Vaccine IM > 6 months Valent IIV4 10/16/2013, 10/20/2015, 10/26/2016, 11/21/2017     Mantoux Tuberculin Skin Test 02/11/2009     Measles 10/22/1979     Pneumococcal 23 valent 10/05/2011     TDAP Vaccine (Adacel) 02/11/2009,  07/06/2018     Allergies   Allergen Reactions     Niacin Hives     Nsaids Hives     Tolerates ibuprofen and aspirin without hives       Pineapple Hives     OBJECTIVE:                                                                 /77 (BP Location: Left arm, Patient Position: Sitting, Cuff Size: Adult Large)   Pulse 81   Temp 98.8  F (37.1  C) (Tympanic)   Wt 92.3 kg (203 lb 7.8 oz)   LMP 07/18/2015 (Approximate)   SpO2 98%   BMI 35.48 kg/m          Physical Exam  Vitals signs and nursing note reviewed.   Constitutional:       General: She is not in acute distress.     Appearance: She is not diaphoretic.   HENT:      Head: Normocephalic and atraumatic.      Right Ear: Tympanic membrane, ear canal and external ear normal.      Left Ear: Tympanic membrane, ear canal and external ear normal.      Nose: No mucosal edema or rhinorrhea.      Mouth/Throat:      Lips: Pink.      Mouth: Mucous membranes are moist.      Pharynx: Oropharynx is clear. No pharyngeal swelling, oropharyngeal exudate or posterior oropharyngeal erythema.   Eyes:      General:         Right eye: No discharge.         Left eye: No discharge.      Conjunctiva/sclera: Conjunctivae normal.   Neck:      Musculoskeletal: Normal range of motion.   Cardiovascular:      Rate and Rhythm: Normal rate and regular rhythm.      Heart sounds: Normal heart sounds. No murmur.   Pulmonary:      Effort: Pulmonary effort is normal. No respiratory distress.      Breath sounds: Normal breath sounds and air entry. No stridor, decreased air movement or transmitted upper airway sounds. No decreased breath sounds, wheezing, rhonchi or rales.   Musculoskeletal: Normal range of motion.   Lymphadenopathy:      Cervical: No cervical adenopathy.   Skin:     General: Skin is warm.   Neurological:      Mental Status: She is alert and oriented to person, place, and time.   Psychiatric:         Mood and Affect: Mood normal.         Behavior: Behavior normal.          WORKUP:   ACT Score:  24    ASSESSMENT/PLAN:    Moderate persistent asthma without complication    Currently well controlled with Advair 250/50 micrograms 1 puff twice daily, montelukast 10 mg by mouth once daily, and albuterol inhaler on as-needed basis.  - Continue as is.  Apple watch skin  -Will get interval PFT.    - montelukast (SINGULAIR) 10 MG tablet  Dispense: 90 tablet; Refill: 1  - fluticasone-salmeterol (ADVAIR) 250-50 MCG/DOSE inhaler  Dispense: 3 Inhaler; Refill: 3  - General PFT Lab (Please always keep checked)  - Pulmonary Function Test    Chronic seasonal allergic rhinitis due to pollen  Allergic rhinitis due to mold  Allergic rhinitis due to dust mite  Allergic conjunctivitis of both eyes  Other allergy, other than to medicinal agents  Chronic allergic rhinitis due to animal hair and dander    Symptoms are well controlled with allergen immunotherapy, sinus rinses twice daily, intranasal fluticasone 2 sprays in each nostril once daily, azelastine 2 sprays in each nostril twice daily, cetirizine and loratadine on as-needed basis, Optivar 1 drop in each eye twice daily as needed, and montelukast 10 mg by mouth once daily at bedtime.  - Continue as is.     - montelukast (SINGULAIR) 10 MG tablet  Dispense: 90 tablet; Refill: 1  - fluticasone (FLONASE) 50 MCG/ACT nasal spray  Dispense: 48 g; Refill: 1  - azelastine (ASTELIN) 0.1 % nasal spray  Dispense: 90 mL; Refill: 3  - azelastine (OPTIVAR) 0.05 % ophthalmic solution  Dispense: 1 Bottle; Refill: 5        Return in about 6 months (around 6/8/2021), or if symptoms worsen or fail to improve.    Thank you for allowing us to participate in the care of this patient. Please feel free to contact us if there are any questions or concerns about the patient.    Disclaimer: This note consists of symbols derived from keyboarding, dictation and/or voice recognition software. As a result, there may be errors in the script that have gone undetected. Please  consider this when interpreting information found in this chart.    Michael Lujan MD, FAACAROLI, TERESITA  Allergy, Asthma and Immunology    [facility]        Again, thank you for allowing me to participate in the care of your patient.        Sincerely,        Michael Luajn MD

## 2020-12-09 ASSESSMENT — ASTHMA QUESTIONNAIRES: ACT_TOTALSCORE: 24

## 2020-12-14 ENCOUNTER — HOSPITAL ENCOUNTER (OUTPATIENT)
Dept: RESPIRATORY THERAPY | Facility: CLINIC | Age: 55
Discharge: HOME | End: 2020-12-14
Attending: INTERNAL MEDICINE | Admitting: INTERNAL MEDICINE
Payer: COMMERCIAL

## 2020-12-14 DIAGNOSIS — J45.40 MODERATE PERSISTENT ASTHMA WITHOUT COMPLICATION: ICD-10-CM

## 2020-12-14 PROCEDURE — 94726 PLETHYSMOGRAPHY LUNG VOLUMES: CPT | Mod: 26 | Performed by: INTERNAL MEDICINE

## 2020-12-14 PROCEDURE — 94729 DIFFUSING CAPACITY: CPT | Mod: 26 | Performed by: INTERNAL MEDICINE

## 2020-12-14 PROCEDURE — 94726 PLETHYSMOGRAPHY LUNG VOLUMES: CPT

## 2020-12-14 PROCEDURE — 94375 RESPIRATORY FLOW VOLUME LOOP: CPT

## 2020-12-14 PROCEDURE — 94729 DIFFUSING CAPACITY: CPT

## 2020-12-14 PROCEDURE — 94010 BREATHING CAPACITY TEST: CPT | Mod: 26 | Performed by: INTERNAL MEDICINE

## 2020-12-16 LAB
DLCOUNC-%PRED-PRE: 109 %
DLCOUNC-PRE: 21.99 ML/MIN/MMHG
DLCOUNC-PRED: 20.02 ML/MIN/MMHG
ERV-%PRED-PRE: 117 %
ERV-PRE: 0.54 L
ERV-PRED: 0.46 L
EXPTIME-PRE: 5.54 SEC
FEF2575-%PRED-PRE: 133 %
FEF2575-PRE: 3.33 L/SEC
FEF2575-PRED: 2.49 L/SEC
FEFMAX-%PRED-PRE: 84 %
FEFMAX-PRE: 5.46 L/SEC
FEFMAX-PRED: 6.43 L/SEC
FEV1-%PRED-PRE: 90 %
FEV1-PRE: 2.34 L
FEV1FEV6-PRE: 87 %
FEV1FEV6-PRED: 82 %
FEV1FVC-PRE: 87 %
FEV1FVC-PRED: 80 %
FEV1SVC-PRE: 80 %
FEV1SVC-PRED: 79 %
FIFMAX-PRE: 3.04 L/SEC
FRCPLETH-%PRED-PRE: 87 %
FRCPLETH-PRE: 2.3 L
FRCPLETH-PRED: 2.64 L
FVC-%PRED-PRE: 82 %
FVC-PRE: 2.68 L
FVC-PRED: 3.23 L
IC-%PRED-PRE: 80 %
IC-PRE: 2.25 L
IC-PRED: 2.81 L
RVPLETH-%PRED-PRE: 92 %
RVPLETH-PRE: 1.64 L
RVPLETH-PRED: 1.76 L
TLCPLETH-%PRED-PRE: 95 %
TLCPLETH-PRE: 4.55 L
TLCPLETH-PRED: 4.77 L
VA-%PRED-PRE: 88 %
VA-PRE: 4.13 L
VC-%PRED-PRE: 89 %
VC-PRE: 2.91 L
VC-PRED: 3.27 L

## 2020-12-21 ENCOUNTER — ALLIED HEALTH/NURSE VISIT (OUTPATIENT)
Dept: ALLERGY | Facility: CLINIC | Age: 55
End: 2020-12-21
Payer: COMMERCIAL

## 2020-12-21 DIAGNOSIS — L30.9 ECZEMA, UNSPECIFIED TYPE: ICD-10-CM

## 2020-12-21 DIAGNOSIS — Z51.6 NEED FOR DESENSITIZATION TO ALLERGENS: Primary | ICD-10-CM

## 2020-12-21 PROCEDURE — 99207 PR DROP WITH A PROCEDURE: CPT

## 2020-12-21 PROCEDURE — 95117 IMMUNOTHERAPY INJECTIONS: CPT

## 2020-12-21 RX ORDER — LORATADINE 10 MG/1
10 TABLET ORAL DAILY PRN
Qty: 90 TABLET | Refills: 3 | Status: SHIPPED | OUTPATIENT
Start: 2020-12-21

## 2020-12-21 RX ORDER — TRIAMCINOLONE ACETONIDE 1 MG/G
CREAM TOPICAL
Qty: 80 G | Refills: 1 | Status: SHIPPED | OUTPATIENT
Start: 2020-12-21 | End: 2022-04-20

## 2020-12-21 RX ORDER — CETIRIZINE HYDROCHLORIDE 10 MG/1
10 TABLET ORAL DAILY PRN
Qty: 90 TABLET | Refills: 3 | Status: SHIPPED | OUTPATIENT
Start: 2020-12-21 | End: 2022-03-11

## 2020-12-21 NOTE — PROGRESS NOTES
Routing refill request to provider for review/approval because:  Medication is reported/historical for cetirizine and claritin.  Patient reports she will be leaving for [place] at the end of January for 6-8 weeks, she is request requesting refills to be sent pharmacy for 90 day supply.  Patient being proactive with allergy medication as she will be missing 1-2 injections.    Gia Bae RN      Patient presented after waiting 30 minutes with no reaction to  injections. Discharged from clinic.  Gia Bae RN

## 2021-01-12 ENCOUNTER — ALLIED HEALTH/NURSE VISIT (OUTPATIENT)
Dept: ALLERGY | Facility: CLINIC | Age: 56
End: 2021-01-12
Payer: COMMERCIAL

## 2021-01-12 DIAGNOSIS — Z51.6 NEED FOR DESENSITIZATION TO ALLERGENS: Primary | ICD-10-CM

## 2021-01-12 PROCEDURE — 99207 PR DROP WITH A PROCEDURE: CPT

## 2021-01-12 PROCEDURE — 95117 IMMUNOTHERAPY INJECTIONS: CPT

## 2021-02-13 ENCOUNTER — HEALTH MAINTENANCE LETTER (OUTPATIENT)
Age: 56
End: 2021-02-13

## 2021-03-09 ENCOUNTER — ALLIED HEALTH/NURSE VISIT (OUTPATIENT)
Dept: ALLERGY | Facility: CLINIC | Age: 56
End: 2021-03-09
Payer: COMMERCIAL

## 2021-03-09 DIAGNOSIS — Z51.6 NEED FOR DESENSITIZATION TO ALLERGENS: Primary | ICD-10-CM

## 2021-03-09 PROCEDURE — 95117 IMMUNOTHERAPY INJECTIONS: CPT

## 2021-03-09 PROCEDURE — 99207 PR DROP WITH A PROCEDURE: CPT

## 2021-03-16 ENCOUNTER — NURSE TRIAGE (OUTPATIENT)
Dept: NURSING | Facility: CLINIC | Age: 56
End: 2021-03-16

## 2021-03-16 ENCOUNTER — E-VISIT (OUTPATIENT)
Dept: URGENT CARE | Facility: URGENT CARE | Age: 56
End: 2021-03-16

## 2021-03-16 ENCOUNTER — ALLIED HEALTH/NURSE VISIT (OUTPATIENT)
Dept: ALLERGY | Facility: CLINIC | Age: 56
End: 2021-03-16
Payer: COMMERCIAL

## 2021-03-16 DIAGNOSIS — Z20.822 CLOSE EXPOSURE TO 2019 NOVEL CORONAVIRUS: Primary | ICD-10-CM

## 2021-03-16 DIAGNOSIS — Z51.6 NEED FOR DESENSITIZATION TO ALLERGENS: Primary | ICD-10-CM

## 2021-03-16 PROCEDURE — 95117 IMMUNOTHERAPY INJECTIONS: CPT

## 2021-03-16 PROCEDURE — 99421 OL DIG E/M SVC 5-10 MIN: CPT | Performed by: PHYSICIAN ASSISTANT

## 2021-03-16 PROCEDURE — 99207 PR DROP WITH A PROCEDURE: CPT

## 2021-03-17 DIAGNOSIS — Z20.822 CLOSE EXPOSURE TO 2019 NOVEL CORONAVIRUS: ICD-10-CM

## 2021-03-17 LAB
SARS-COV-2 RNA RESP QL NAA+PROBE: NORMAL
SPECIMEN SOURCE: NORMAL

## 2021-03-17 PROCEDURE — U0003 INFECTIOUS AGENT DETECTION BY NUCLEIC ACID (DNA OR RNA); SEVERE ACUTE RESPIRATORY SYNDROME CORONAVIRUS 2 (SARS-COV-2) (CORONAVIRUS DISEASE [COVID-19]), AMPLIFIED PROBE TECHNIQUE, MAKING USE OF HIGH THROUGHPUT TECHNOLOGIES AS DESCRIBED BY CMS-2020-01-R: HCPCS | Performed by: PHYSICIAN ASSISTANT

## 2021-03-17 PROCEDURE — U0005 INFEC AGEN DETEC AMPLI PROBE: HCPCS | Performed by: PHYSICIAN ASSISTANT

## 2021-03-17 NOTE — PATIENT INSTRUCTIONS
"  Dear Dilia Solomon,    Based on your exposure to COVID-19 (coronavirus), we would like to test you for this virus. I have placed an order for this test. The best time for testing is 5-7 days after the exposure.    How to schedule:  For all employees or close contacts (except [place] and [employer] - see below), go to your [employer] home page and scroll down to the section that says  You have an appointment that needs to be scheduled  and click the large green button that says  Schedule Now  and follow the steps to find the next available opening.     If you are unable to complete these steps or if you cannot find any available times, please call [phone] to schedule employee testing.     [place] employees or close contacts, please call [phone].   [place] ([employer]) employees or close contacts call [phone].    Return to work/school/ guidance:   For people with high risk exposures outside the home    Please let your workplace manager and staffing office know when your quarantine ends.     We can not give you an exact date as it depends on the information below. You can calculate this on your own or work with your manager/staffing office to calculate this. (For example if you were exposed on 10/4, you would have to quarantine for 14 full days. That would be through 10/18. You could return on 10/19.)    Quarantine Guidelines:  Patients (\"contacts\") who have been in close prolonged contact of an infected person(s) (within six feet for at least 15 minutes within a 24 hour period), and remain asymptomatic should enter quarantine based on the following options:    14-day quarantine period (this remains the CDC recommendation for the greatest protection against spread of COVID-19) OR    Minimum 7-day quarantine with negative RT-PCR test collected on day 5 or later OR    10-day quarantine with no test  Quarantine Guideline exceptions are as follows:    People who have been fully vaccinated do not need " to quarantine if the exposure was at least 2 weeks after the last vaccination.    Individuals who work in health care, congregate care, or congregate living should be off work for 14 days from their last date of exposure. Community activities for this group can be resumed based on options above. Vaccinated individuals in this group still need to quarantine from work after exposure, but they do not need to quarantine from non-work activities.    People whose high-risk exposure was a household member should always quarantine for 14 days from their last date of exposure.    Residents of congregate care and congregate living settings should always quarantine for 14 days from their last date of exposure.    Note: If you have ongoing exposure to the covid positive person, this quarantine period may be more than 14 days. (For example, if you are continued to be exposed to your child who tested positive and cannot isolate from them, then the quarantine of 7-14 days can't start until your child is no longer contagious. This is typically 10 days from onset of the child's symptoms. So the total duration may be 17-24 days in this case.)    You should continue symptom monitoring until day 14 post-exposure. If you develop signs or symptoms of COVID-19, isolate and get tested (even if you have been tested already).    How to quarantine:   Stay home and away from others. Don't go to school or anywhere else. Generally quarantine means staying home from work but there are some exceptions to this. Please contact your workplace.  No hugging, kissing or shaking hands.  Don't let anyone visit.  Cover your mouth and nose with a mask, tissue or washcloth to avoid spreading germs.  Wash your hands and face often. Use soap and water.    What are the symptoms of COVID-19?  The most common symptoms are cough, fever and trouble breathing. Less common symptoms include headache, body aches, fatigue (feeling very tired), chills, sore throat,  stuffy or runny nose, diarrhea (loose poop), loss of taste or smell, belly pain, and nausea or vomiting (feeling sick to your stomach or throwing up).  After 14 days, if you have still don't have symptoms, you likely don't have this virus.  If you develop symptoms, follow these guidelines.  If you're normally healthy: Please start another eVisit.  If you have a serious health problem (like cancer, heart failure, an organ transplant or kidney disease): Call your specialty clinic. Let them know that you might have COVID-19.    Where can I get more information?  [facility] - About COVID-19: [email or web address]  CDC - What to Do If You're Sick: www.cdc.gov/coronavirus/2019-ncov/about/steps-when-sick.html  CDC - Ending Home Isolation: www.cdc.gov/coronavirus/2019-ncov/hcp/disposition-in-home-patients.html  CDC - Caring for Someone: www.cdc.gov/coronavirus/2019-ncov/if-you-are-sick/care-for-someone.html  [facility] clinical trials (COVID-19 research studies): clinicalaffairs.[facility]-clinical-trials  Below are the COVID-19 hotlines at the [facility] ([facility]). Interpreters are available.  For health questions: Call 284-731-3661 or 1-568.148.1116 (7 a.m. to 7 p.m.)  For questions about schools and childcare: Call 459-974-5621 or 1-282.634.3067 (7 a.m. to 7 p.m.)      March 16, 2021  RE:  Dilia Solomon                                                                                                                   [address] [facility] [address]      To whom it may concern:    I evaluated Dilia Solomon on March 16, 2021. Dilia Solomon should be excused from work/school.    They should let their workplace manager and staffing office know when their quarantine ends.    We can not give an exact date as it depends on the information below. They can calculate this on their own or work with their manager/staffing office to calculate this. (For example if they were  "exposed on 10/04, they would have to quarantine for 14 full days. That would be through 10/18. They could return on 10/19.)    Quarantine Guidelines:    Patients (\"contacts\") who have been in close prolonged contact of an infected person(s) (within six feet for at least 15 minutes within a 24 hour period) and remain asymptomatic should enter quarantine based on the following options:      14-day quarantine period (this remains the CDC recommendation for the greatest protection against spread of COVID-19) OR    Minimum 7-day quarantine with negative RT-PCR test collected on day 5 or later OR    10-day quarantine with no test   Quarantine Guideline exceptions are as follows:    People who have been fully vaccinated do not need to quarantine if the exposure was at least 2 weeks after the last vaccination.    Individuals who work in health care, congregate care, or congregate living should be off work for 14 days from their last date of exposure. Community activities for this group can be resumed based on options above. Vaccinated individuals in this group still need to quarantine from work after exposure, but they do not need to quarantine from non-work activities.    People whose high-risk exposure was a household member should always quarantine for 14 days from their last date of exposure.    Residents of congregate care and congregate living settings should always quarantine for 14 days from their last date of exposure.    Note: If there is ongoing exposure to the covid positive person, this quarantine period may be longer than 14 days. (For example, if they are continually exposed to their child, who tested positive and cannot isolate from them, then the quarantine of 7-14 days can't start until their child is no longer contagious. This is typically 10 days from onset to the child's symptoms. So the total duration may be 17-24 days in this case.)    Dilia Solomon should continue symptom monitoring until day 14 " post-exposure. If they develop signs or symptoms of COVID-19, they should isolate and get tested (even if they have been tested already).    Sincerely,  Elsi Rao PA-C

## 2021-03-17 NOTE — TELEPHONE ENCOUNTER
Triage Call:    Was around someone on Friday at a dog training class that tested positive for COVID-19.  She is wondering about being tested     Pt was advised of protocol recommendation/disposition of [email or web address] visit to arrange order for testing.       Brie Gilbert RN on 3/16/2021 at 7:36 PM        COVID 19 Nurse Triage Plan/Patient Instructions    Please be aware that novel coronavirus (COVID-19) may be circulating in the community. If you develop symptoms such as fever, cough, or SOB or if you have concerns about the presence of another infection including coronavirus (COVID-19), please contact your health care provider or visit [email or web address].     Disposition/Instructions    Virtual Visit with provider recommended. Reference Visit Selection Guide.    Thank you for taking steps to prevent the spread of this virus.  o Limit your contact with others.  o Wear a simple mask to cover your cough.  o Wash your hands well and often.    Resources    [facility]: About COVID-19: [email or web address]    CDC: What to Do If You're Sick: www.cdc.gov/coronavirus/2019-ncov/about/steps-when-sick.html    CDC: Ending Home Isolation: www.cdc.gov/coronavirus/2019-ncov/hcp/disposition-in-home-patients.html     CDC: Caring for Someone: www.cdc.gov/coronavirus/2019-ncov/if-you-are-sick/care-for-someone.html     [facility]: Interim Guidance for Hospital Discharge to Home: www.health.[facility].[place].us/diseases/coronavirus/hcp/hospdischarge.pdf    [facility] clinical trials (COVID-19 research studies): clinicalaffairs.[facility]/n-clinical-trials     Below are the COVID-19 hotlines at the [facility] ([facility]). Interpreters are available.   o For health questions: Call 017-820-5518 or 1-384.233.4341 (7 a.m. to 7 p.m.)  o For questions about schools and childcare: Call 867-505-3016 or 1-631.588.1295 (7 a.m. to 7 p.m.)                   Reason for Disposition    [1] CLOSE CONTACT COVID-19 EXPOSURE  within last 14 days AND [2] NO symptoms    Additional Information    Negative: COVID-19 lab test positive    Negative: [1] Lives with someone known to have influenza (flu test positive) AND [2] flu-like symptoms (e.g., cough, runny nose, sore throat, SOB; with or without fever)    Negative: [1] Symptoms of COVID-19 (e.g., cough, fever, SOB, or others) AND [2] HCP diagnosed COVID-19 based on symptoms    Negative: [1] Symptoms of COVID-19 (e.g., cough, fever, SOB, or others) AND [2] lives in an area with community spread    Negative: [1] Symptoms of COVID-19 (e.g., cough, fever, SOB, or others) AND [2] within 14 days of EXPOSURE (close contact) with diagnosed or suspected COVID-19 patient    Negative: [1] Symptoms of COVID-19 (e.g., cough, fever, SOB, or others) AND [2] within 14 days of travel from high-risk area for COVID-19 community spread (identified by CDC)    Negative: [1] Difficulty breathing (shortness of breath) occurs AND [2] onset > 14 days after COVID-19 EXPOSURE (Close Contact)    Negative: [1] Dry cough occurs AND [2] onset > 14 days after COVID-19 EXPOSURE    Negative: [1] Wet cough (i.e., white-yellow, yellow, green, or luis colored sputum) AND [2] onset > 14 days after COVID-19 EXPOSURE    Negative: [1] Common cold symptoms AND [2] onset > 14 days after COVID-19 EXPOSURE    Negative: COVID-19 vaccine reaction suspected (e.g., fever, headache, muscle aches) occurring during days 1-3 after getting vaccine    Negative: COVID-19 vaccine, questions about    Negative: [1] CLOSE CONTACT COVID-19 EXPOSURE within last 14 days AND [2] needs COVID-19 lab test to return to work AND [3] NO symptoms    Negative: [1] CLOSE CONTACT COVID-19 EXPOSURE within last 14 days AND [2] exposed person is a  (e.g., police or paramedic) AND [3] NO symptoms    Negative: [1] CLOSE CONTACT COVID-19 EXPOSURE within last 14 days AND [2] exposed person is a healthcare worker who was NOT using all recommended personal  protective equipment (i.e., a respirator-N95 mask, eye protection, gloves, and gown) AND [3] NO symptoms    Negative: [1] Living or working in a correctional facility, long-term care facility, or shelter (i.e., congregate setting; densely populated) AND [2] where an outbreak has occurred AND [3] NO symptoms    Protocols used: CORONAVIRUS (COVID-19) EXPOSURE-A-AH 1.3

## 2021-03-18 LAB
LABORATORY COMMENT REPORT: NORMAL
SARS-COV-2 RNA RESP QL NAA+PROBE: NEGATIVE
SPECIMEN SOURCE: NORMAL

## 2021-04-13 ENCOUNTER — ALLIED HEALTH/NURSE VISIT (OUTPATIENT)
Dept: ALLERGY | Facility: CLINIC | Age: 56
End: 2021-04-13
Payer: COMMERCIAL

## 2021-04-13 DIAGNOSIS — J30.81 CHRONIC ALLERGIC RHINITIS DUE TO ANIMAL HAIR AND DANDER: ICD-10-CM

## 2021-04-13 DIAGNOSIS — J30.1 CHRONIC SEASONAL ALLERGIC RHINITIS DUE TO POLLEN: ICD-10-CM

## 2021-04-13 DIAGNOSIS — J30.89 ALLERGIC RHINITIS DUE TO MOLD: ICD-10-CM

## 2021-04-13 DIAGNOSIS — Z51.6 NEED FOR DESENSITIZATION TO ALLERGENS: Primary | ICD-10-CM

## 2021-04-13 DIAGNOSIS — J30.89 ALLERGIC RHINITIS DUE TO DUST MITE: ICD-10-CM

## 2021-04-13 PROCEDURE — 95117 IMMUNOTHERAPY INJECTIONS: CPT

## 2021-04-13 PROCEDURE — 99207 PR DROP WITH A PROCEDURE: CPT

## 2021-04-13 NOTE — TELEPHONE ENCOUNTER
ALLERGY SOLUTION RE-ORDER REQUEST    Dilia Solomon 1965 MRN: [id number]    DATE NEEDED:  04/27/21  Vial Color Content    Vial Size  Red 1:1 Weeds    5mL  Red 1:1 Grass, Trees   5mL  Red 1:1 Molds    5mL  Red 1:1 Cat, Dog, Dust Mite    5mL      Serum reorder consent signed and patient/parent was advised that new serums would be ordered through the pharmacy and billed to their insurance company when they arrive in clinic. Yes    [facility] Location:  [place]  Ship to Location: [place]  Serum billed to:  [place]    Special Instructions:          Requester Signature  Silver Araujo LPN

## 2021-04-22 DIAGNOSIS — J30.1 CHRONIC SEASONAL ALLERGIC RHINITIS DUE TO POLLEN: Primary | ICD-10-CM

## 2021-04-22 DIAGNOSIS — H10.13 ALLERGIC CONJUNCTIVITIS OF BOTH EYES: ICD-10-CM

## 2021-04-22 DIAGNOSIS — J30.89 ALLERGIC RHINITIS DUE TO DUST MITE: ICD-10-CM

## 2021-04-22 DIAGNOSIS — J30.81 CHRONIC ALLERGIC RHINITIS DUE TO ANIMAL HAIR AND DANDER: ICD-10-CM

## 2021-04-22 PROCEDURE — 95165 ANTIGEN THERAPY SERVICES: CPT | Performed by: ALLERGY & IMMUNOLOGY

## 2021-04-22 NOTE — PROGRESS NOTES
Allergy serums billed to [place].     Vials billed below:    Vial Color Content                      Vial Size Expiration Date  Red 1:1                                   Weeds                             5mL  4/22/2022  Red 1:1                                   Grass, Trees                    5mL 4/22/2022    Checked by Kaylee GRANT RN  Billed 20 Units      Signature  Kaylee David RN

## 2021-04-23 DIAGNOSIS — J30.89 ALLERGIC RHINITIS DUE TO MOLD: ICD-10-CM

## 2021-04-23 DIAGNOSIS — J30.89 ALLERGIC RHINITIS DUE TO DUST MITE: ICD-10-CM

## 2021-04-23 DIAGNOSIS — J30.1 CHRONIC SEASONAL ALLERGIC RHINITIS DUE TO POLLEN: Primary | ICD-10-CM

## 2021-04-23 DIAGNOSIS — J30.81 CHRONIC ALLERGIC RHINITIS DUE TO ANIMAL HAIR AND DANDER: ICD-10-CM

## 2021-04-23 PROCEDURE — 95165 ANTIGEN THERAPY SERVICES: CPT | Performed by: ALLERGY & IMMUNOLOGY

## 2021-04-23 NOTE — PROGRESS NOTES
Allergy serums billed to [place].     Vials billed below:    Vial Color Content                      Vial Size Expiration Date  Red 1:1                                   Molds                                  5mL 4/23/2022  Red 1:1                                   Cat, Dog, Dust Mite          5mL 4/23/2022    Checked by Gia GUEVARA  20 units billed    Signature  Gia Bae RN

## 2021-04-28 NOTE — TELEPHONE ENCOUNTER
Allergy serums received at [place].     Vials received below:    Vial Color Content                      Vial Size Expiration Date  Red 1:1 Grass, Trees 5mL  04/22/2022  Red 1:1 Cat, Dog, Dust Mite 5mL  04/23/2022  Red 1:1 Molds 5mL  04/23/2022  Red 1:1 Weeds 5mL  04/22/2022      Signature  Silver Araujo LPN

## 2021-05-11 ENCOUNTER — ALLIED HEALTH/NURSE VISIT (OUTPATIENT)
Dept: ALLERGY | Facility: CLINIC | Age: 56
End: 2021-05-11
Payer: COMMERCIAL

## 2021-05-11 DIAGNOSIS — Z51.6 NEED FOR DESENSITIZATION TO ALLERGENS: Primary | ICD-10-CM

## 2021-05-11 PROCEDURE — 95117 IMMUNOTHERAPY INJECTIONS: CPT

## 2021-05-11 PROCEDURE — 99207 PR DROP WITH A PROCEDURE: CPT

## 2021-05-18 ENCOUNTER — ALLIED HEALTH/NURSE VISIT (OUTPATIENT)
Dept: ALLERGY | Facility: CLINIC | Age: 56
End: 2021-05-18
Payer: COMMERCIAL

## 2021-05-18 DIAGNOSIS — Z51.6 NEED FOR DESENSITIZATION TO ALLERGENS: Primary | ICD-10-CM

## 2021-05-18 PROCEDURE — 95117 IMMUNOTHERAPY INJECTIONS: CPT

## 2021-05-18 PROCEDURE — 99207 PR DROP WITH A PROCEDURE: CPT

## 2021-05-25 ENCOUNTER — ALLIED HEALTH/NURSE VISIT (OUTPATIENT)
Dept: ALLERGY | Facility: CLINIC | Age: 56
End: 2021-05-25
Payer: COMMERCIAL

## 2021-05-25 DIAGNOSIS — Z51.6 NEED FOR DESENSITIZATION TO ALLERGENS: Primary | ICD-10-CM

## 2021-05-25 PROCEDURE — 99207 PR DROP WITH A PROCEDURE: CPT

## 2021-05-25 PROCEDURE — 95117 IMMUNOTHERAPY INJECTIONS: CPT

## 2021-06-22 ENCOUNTER — ALLIED HEALTH/NURSE VISIT (OUTPATIENT)
Dept: ALLERGY | Facility: CLINIC | Age: 56
End: 2021-06-22
Payer: COMMERCIAL

## 2021-06-22 DIAGNOSIS — J30.89 ALLERGIC RHINITIS DUE TO DUST MITE: ICD-10-CM

## 2021-06-22 DIAGNOSIS — J30.1 CHRONIC SEASONAL ALLERGIC RHINITIS DUE TO POLLEN: Primary | ICD-10-CM

## 2021-06-22 DIAGNOSIS — J30.81 CHRONIC ALLERGIC RHINITIS DUE TO ANIMAL HAIR AND DANDER: ICD-10-CM

## 2021-06-22 DIAGNOSIS — J30.89 ALLERGIC RHINITIS DUE TO MOLD: ICD-10-CM

## 2021-06-22 PROCEDURE — 99207 PR DROP WITH A PROCEDURE: CPT

## 2021-06-22 PROCEDURE — 95117 IMMUNOTHERAPY INJECTIONS: CPT

## 2021-06-28 ENCOUNTER — TRANSFERRED RECORDS (OUTPATIENT)
Dept: HEALTH INFORMATION MANAGEMENT | Facility: CLINIC | Age: 56
End: 2021-06-28

## 2021-07-01 ENCOUNTER — OFFICE VISIT (OUTPATIENT)
Dept: ALLERGY | Facility: CLINIC | Age: 56
End: 2021-07-01
Payer: COMMERCIAL

## 2021-07-01 VITALS
HEART RATE: 86 BPM | DIASTOLIC BLOOD PRESSURE: 85 MMHG | BODY MASS INDEX: 35.86 KG/M2 | SYSTOLIC BLOOD PRESSURE: 125 MMHG | OXYGEN SATURATION: 99 % | TEMPERATURE: 98.2 F | WEIGHT: 205.69 LBS

## 2021-07-01 DIAGNOSIS — R51.9 PAIN OF CHEEK: ICD-10-CM

## 2021-07-01 DIAGNOSIS — J30.89 ALLERGIC RHINITIS DUE TO MOLD: ICD-10-CM

## 2021-07-01 DIAGNOSIS — H10.13 ALLERGIC CONJUNCTIVITIS OF BOTH EYES: ICD-10-CM

## 2021-07-01 DIAGNOSIS — J30.89 ALLERGIC RHINITIS DUE TO DUST MITE: ICD-10-CM

## 2021-07-01 DIAGNOSIS — J30.81 CHRONIC ALLERGIC RHINITIS DUE TO ANIMAL HAIR AND DANDER: ICD-10-CM

## 2021-07-01 DIAGNOSIS — J45.40 MODERATE PERSISTENT ASTHMA WITHOUT COMPLICATION: Primary | ICD-10-CM

## 2021-07-01 DIAGNOSIS — J30.1 CHRONIC SEASONAL ALLERGIC RHINITIS DUE TO POLLEN: ICD-10-CM

## 2021-07-01 PROCEDURE — 99214 OFFICE O/P EST MOD 30 MIN: CPT | Performed by: ALLERGY & IMMUNOLOGY

## 2021-07-01 ASSESSMENT — ENCOUNTER SYMPTOMS
SORE THROAT: 0
CHEST TIGHTNESS: 0
SINUS PRESSURE: 0
SHORTNESS OF BREATH: 0
EYE DISCHARGE: 0
RHINORRHEA: 0
SINUS PAIN: 0
EYE ITCHING: 0
EYE REDNESS: 0
LIGHT-HEADEDNESS: 1
WHEEZING: 0

## 2021-07-01 NOTE — PROGRESS NOTES
SUBJECTIVE:                                                                   Dilia Solomon presents today to our Allergy Clinic at [facility] for a follow up visit.  She is a 56 year old female with moderate persistent asthma and allergic rhinitis.  She was diagnosed with asthma approximately in 8212-5746. Triggers are environmental allergies and viral respiratory infections.   Serum IgE for regional aeroallergen panel performed in June 2017 with multiple levels of various sensitivities to molds, cat, dog, dust mite, pollen of trees, grasses, and weeds.  She had 2 sinus surgeries in the past. The last one was in 2018.   She started allergen immunotherapy in July 2017. She was on allergen immunotherapy before that with Dr. Haines, and it was helpful at that time; however, symptoms got worse soon after stopping it.     Allergy Immunotherapy  Date/time of injection(s): 6/22/2021      Vial Color                               Content                                  Dose  Red 1:1                                   Weeds                                     0.5mL  Red 1:1                                   Grass, Trees                           0.5mL    Red 1:1                                   Molds                                      0.5mL  Red 1:1                                   Cat, Dog, Dust Mite                0.5mL     The patient tolerates injections well without persistent large local reactions or systemic reactions. She has been on the maintenance dose since January 2018.   Regarding her allergic rhinitis, she has been using sinus rinses once-twice daily, intranasal fluticasone 2 sprays in each nostril once daily, azelastine 2 sprays in each nostril once-twice daily, cetirizine and loratadine as needed (rarely), and montelukast 10 mg by mouth daily at bedtime.  She uses azelastine eyedrops on as-needed basis, and they do help.   She reports a significant improvement in symptoms with  allergen immunotherapy. Approximately ten days, had a bloody nose on the left side x 1. Before that, she felt soreness and sensation that something was swollen in the left upper cheek area. It is still stable.  When she touches the skin in that area, it is helpful. Prevents her to sleep on that side. She admits having some lightheadedness as well. No facial pressure, no postnasal drip, no headaches, no rhinorrhea. It feels differently compared with sinus infections she used to have in the past. No fever or cough.  Initially, she thought that her teeth were bothering her.  Yesterday, she saw a dentist that thought that patient's symptoms were possibly sinus related. If she uses sinus rinses, she feels that her left ear hurts. She has a history of TMJ problems. The pain is 4/10 but can get worse when she sleeps or eats.     Dilia has been using Advair 250/50 mcg 1 puff twice daily and taking montelukast 10 mg by mouth once daily at bedtime. She is using albuterol less than twice per week for rescue from chest symptoms. She is waking up less than twice per month due to chest symptoms.  There has been no use of oral steroids since the last visit. No ED/PCP/urgent care/other specialist visits for asthma flare since the previous visit. The patient denies current chest tightness, cough, wheeze, or shortness of breath.   Asthma triggers remain unchanged. She uses a spacer/chamber device, where applicable.      Patient Active Problem List   Diagnosis     Need for prophylactic vaccination and inoculation against influenza     Sprain of interphalangeal (joint) of hand     Radial styloid tenosynovitis     Carpal tunnel syndrome     Synovial cyst of popliteal space     Pain in joint, lower leg     Hyperlipidemia LDL goal <130     Advanced directives, counseling/discussion     Moderate persistent asthma     Allergic rhinitis due to dust mite     Food allergy     FH: diabetes mellitus     Chronic allergic rhinitis due to animal  hair and dander     Allergic rhinitis due to mold     Chronic seasonal allergic rhinitis due to pollen     Obesity (BMI 35.0-39.9) with comorbidity (H)     Allergic conjunctivitis, bilateral     History of endoscopic sinus surgery     Chronic pansinusitis       Past Medical History:   Diagnosis Date     Injury, other and unspecified, shoulder and upper arm 9/03      Problem (# of Occurrences) Relation (Name,Age of Onset)    Breast Cancer (1) Maternal Aunt    C.A.D. (1) Mother    Cancer (1) Father: brain    Diabetes (1) Mother       Negative family history of: Cancer - colorectal        Past Surgical History:   Procedure Laterality Date     COLONOSCOPY N/A 10/6/2016    Procedure: COLONOSCOPY;  Surgeon: Shiva Johns MD;  Location: [facility]     ETHMOIDECTOMY  12/30/2013    Procedure: ETHMOIDECTOMY;  Bilateral Submucousal Reduction of InferiorTurbinates and Total Ethmoidectomy with Multiple Sinusotomies;  Surgeon: Lio More MD;  Location: [facility]     ETHMOIDECTOMY Bilateral 2/14/2018    Procedure: ETHMOIDECTOMY;  Bilateral anterior ethmoidectomy, bilateral maxillary antrostomy;  Surgeon: Santhosh Tierney MD;  Location: [facility]     SURGICAL HISTORY OF -   5/04    carpal tunnel release, bilateral     Social History     Socioeconomic History     Marital status: Single     Spouse name: None     Number of children: None     Years of education: None     Highest education level: None   Occupational History     Employer: [employer]    Social Needs     Financial resource strain: None     Food insecurity     Worry: None     Inability: None     Transportation needs     Medical: None     Non-medical: None   Tobacco Use     Smoking status: Never Smoker     Smokeless tobacco: Never Used   Substance and Sexual Activity     Alcohol use: No     Drug use: No     Sexual activity: Never   Lifestyle     Physical activity     Days per week: None     Minutes per session: None     Stress: None   Relationships     Social  connections     Talks on phone: None     Gets together: None     Attends Anabaptism service: None     Active member of club or organization: None     Attends meetings of clubs or organizations: None     Relationship status: None     Intimate partner violence     Fear of current or ex partner: None     Emotionally abused: None     Physically abused: None     Forced sexual activity: None   Other Topics Concern     Parent/sibling w/ CABG, MI or angioplasty before 65F 55M? Yes     Comment: mother   Social History Narrative    July 1, 2021    ENVIRONMENTAL HISTORY: [place]. [place] is heated with a forced air. They do not have central air conditioning. The patient's bedroom is furnished with hard pawel in bedroom, allergen mattress cover, allergen pillowcase cover and fabric window coverings.  Pets inside the house include 1 dog. There is not history of cockroach or mice infestation. There are no smokers in the house.  The house does have a damp basement.     Patient is currently living and taking care of a friend in the friends home.           Review of Systems   HENT: Positive for ear pain. Negative for congestion, postnasal drip, rhinorrhea, sinus pressure, sinus pain, sneezing and sore throat.         Upper left cheek pain   Eyes: Negative for discharge, redness and itching.   Respiratory: Negative for chest tightness, shortness of breath and wheezing.    Allergic/Immunologic: Positive for environmental allergies.   Neurological: Positive for light-headedness.           Current Outpatient Medications:      albuterol (2.5 MG/3ML) 0.083% neb solution, Take 1 vial (2.5 mg) by nebulization every 4 hours as needed, Disp: 1 Box, Rfl: 3     albuterol (PROAIR HFA/PROVENTIL HFA/VENTOLIN HFA) 108 (90 Base) MCG/ACT inhaler, Inhale 2 puffs into the lungs every 4 hours as needed for shortness of breath / dyspnea or wheezing, Disp: 1 Inhaler, Rfl: 3     azelastine (ASTELIN) 0.1 % nasal  spray, Spray 2 sprays into both nostrils 2 times daily as needed for rhinitis or allergies, Disp: 90 mL, Rfl: 3     azelastine (OPTIVAR) 0.05 % ophthalmic solution, Apply 1 drop to eye 2 times daily, Disp: 1 Bottle, Rfl: 5     cetirizine (ZYRTEC) 10 MG tablet, Take 1 tablet (10 mg) by mouth daily as needed for allergies, Disp: 90 tablet, Rfl: 3     Emollient (CERAVE) CREA, Externally apply 1 dose. topically 2 times daily, Disp: 2 Bottle, Rfl: 11     EPINEPHrine (ANY BX GENERIC EQUIV) 0.3 MG/0.3ML injection 2-pack, Inject 0.3 mLs (0.3 mg) into the muscle once as needed for anaphylaxis, Disp: 0.6 mL, Rfl: 3     fluticasone (FLONASE) 50 MCG/ACT nasal spray, Spray 2 sprays into both nostrils daily, Disp: 48 g, Rfl: 1     fluticasone-salmeterol (ADVAIR) 250-50 MCG/DOSE inhaler, Inhale 1 puff into the lungs 2 times daily, Disp: 3 Inhaler, Rfl: 3     loratadine (CLARITIN) 10 MG tablet, Take 1 tablet (10 mg) by mouth daily as needed for allergies, Disp: 90 tablet, Rfl: 3     montelukast (SINGULAIR) 10 MG tablet, Take 1 tablet (10 mg) by mouth At Bedtime, Disp: 90 tablet, Rfl: 1     MULTIVITAMIN OR, 1 tab daily, Disp: , Rfl:      ORDER FOR ALLERGEN IMMUNOTHERAPY, Name of Mix: Mix #1  Mold Alternaria Tenuis 1:10 w/v, HS  0.5 ml Aspergillus Fumigatus 1:10 w/v, HS  0.5 ml Epicoccum Nigrum 1:10 w/v, HS 0.5 ml Hormodendrum Cladosporioides 1:10 w/v, HS 0.5 ml Penicillium Mix 1:10 w/v, HS  0.5 ml Diluent: HSA qs to 5ml, Disp: 5 mL, Rfl: PRN     ORDER FOR ALLERGEN IMMUNOTHERAPY, Name of Mix: Mix #2  Dust Mite, Cat, Dog Cat Hair, Standardized 10,000 BAU/mL, ALK  2.0 ml Dog Hair Dander, A. P.  1:100 w/v, HS  1.0 ml Dust Mites F 30,000AU/mL, HS  0.3 ml Dust Mites P. 30,000 AU/mL, HS  0.3 ml  Diluent: HSA qs to 5ml, Disp: 5 mL, Rfl: PRN     ORDER FOR ALLERGEN IMMUNOTHERAPY, Name of Mix: Mix #3 Grass,Tree  Dmitry,White 1:20w/v, HS 0.5ml Birch Mix PRW 1:20w/v, HS 0.5ml Boxelder-Maple Mix BHR (Boxelder Hard Red) 1:20w/v, HS 0.5ml  Adair,Common 1:20w/v, HS 0.5ml Elm,American 1:20w/v, HS 0.5ml Monroe Mix RW 1:20w/v, HS 0.5ml Oak Mix RVW 1:20w/v, HS 0.5ml [place] Tree,Black 1:20w/v, HS 0.5ml Suresh Grass (Std) 100,000 BAU/mL, HS 0.4ml Jonathan Grass 1:20w/v, HS 0.5ml Diluent: HSA qs to 5ml, Disp: 5 mL, Rfl: PRN     ORDER FOR ALLERGEN IMMUNOTHERAPY, Name of Mix: Mix #4  Weeds Kochia 1:20 w/v, HS 0.5 ml Lamb's Quarters 1:20 w/v, HS 0.5 ml Nettle 1:20 w/v, HS 0.5 ml Plantain, English 1:20 w/v, HS 0.5 ml Ragweed Mixed 1:20 w/v ALK  0.5 ml Russian Thistle 1:20 w/v, HS 0.5 ml Sagebrush, Mugwort 1:20 w/v, HS 0.5 ml Sorrel, Sheep 1:20 w/v, HS 0.5 ml Diluent: HSA qs to 5ml, Disp: 5 mL, Rfl: PRN     order for DME, Equipment being ordered: thumb spica splint RIGHT, Disp: 1 Units, Rfl: 0     order for DME, Equipment being ordered: Silicone heel cup, Disp: 1 Units, Rfl: 0     order for DME, Equipment being ordered: Dynaflex insert, Disp: 1 Units, Rfl: 0     order for DME, Equipment being ordered: Silicone heel cup, Disp: 1 Units, Rfl: 0     triamcinolone (KENALOG) 0.1 % external cream, Apply sparingly to affected area two times daily as needed but not more than 14 days in a row. Spare face, armpits, neck, and groin., Disp: 80 g, Rfl: 1  Immunization History   Administered Date(s) Administered     COVID-19,PF,Moderna 03/19/2021, 04/16/2021     Influenza (IIV3) PF 12/12/2002, 11/28/2003, 10/24/2006, 02/11/2009, 10/05/2011, 11/15/2012, 10/07/2014     Influenza Quad, Recombinant, p-free (RIV4) 10/02/2018, 10/30/2019, 10/28/2020     Influenza Vaccine IM > 6 months Valent IIV4 10/16/2013, 10/20/2015, 10/26/2016, 11/21/2017     Mantoux Tuberculin Skin Test 02/11/2009     Measles 10/22/1979     Pneumococcal 23 valent 10/05/2011     TDAP Vaccine (Adacel) 02/11/2009, 07/06/2018     Allergies   Allergen Reactions     Niacin Hives     Nsaids Hives     Tolerates ibuprofen and aspirin without hives       Pineapple Hives     OBJECTIVE:                                                                  /85 (BP Location: Left arm, Patient Position: Sitting, Cuff Size: Adult Regular)   Pulse 86   Temp 98.2  F (36.8  C) (Tympanic)   Wt 93.3 kg (205 lb 11 oz)   LMP 07/18/2015 (Approximate)   SpO2 99%   BMI 35.86 kg/m          Physical Exam  Vitals signs and nursing note reviewed.   Constitutional:       General: She is not in acute distress.     Appearance: She is not diaphoretic.   HENT:      Head: Normocephalic and atraumatic.      Right Ear: Tympanic membrane, ear canal and external ear normal.      Left Ear: Tympanic membrane, ear canal and external ear normal.      Nose: No mucosal edema or rhinorrhea.      Comments: Tenderness on palpation over left zygomatic area.  Overlying skin is not inflamed or erythematous.  No fluctuation.     Mouth/Throat:      Lips: Pink.      Mouth: Mucous membranes are moist.      Pharynx: Oropharynx is clear. No pharyngeal swelling, oropharyngeal exudate or posterior oropharyngeal erythema.   Eyes:      General:         Right eye: No discharge.         Left eye: No discharge.      Conjunctiva/sclera: Conjunctivae normal.   Neck:      Musculoskeletal: Normal range of motion.   Cardiovascular:      Rate and Rhythm: Normal rate and regular rhythm.      Heart sounds: Normal heart sounds. No murmur.   Pulmonary:      Effort: Pulmonary effort is normal. No respiratory distress.      Breath sounds: Normal breath sounds and air entry. No stridor, decreased air movement or transmitted upper airway sounds. No decreased breath sounds, wheezing, rhonchi or rales.   Musculoskeletal: Normal range of motion.   Lymphadenopathy:      Cervical: No cervical adenopathy.   Skin:     General: Skin is warm.   Neurological:      Mental Status: She is alert and oriented to person, place, and time.   Psychiatric:         Mood and Affect: Mood normal.         Behavior: Behavior normal.         WORKUP:   ACT Score:  25    ASSESSMENT/PLAN:    Moderate persistent asthma  without complication  Currently well controlled with Advair 250/50 micrograms 1 puff twice daily, montelukast 10 mg by mouth once daily, and albuterol inhaler on as-needed basis.  - Continue as is.      Pain of cheek  She does have a history of TMJ problems.  I wonder if that is the case.  On exam, nasal passages look within normal limits.  Her symptoms do not seem to correlate with a sinus infection, unless there is an unusual presentation.  To spare her from an extra radiation, instead of ordering sinus CT, I recommend the patient to see ENT for rhinoscopy.  Considering that she did have sinus surgeries in the past, hopefully, rhinoscopy will allow an appropriate look in her sinuses.    - OTOLARYNGOLOGY REFERRAL    Chronic seasonal allergic rhinitis due to pollen  Chronic allergic rhinitis due to animal hair and dander  Allergic rhinitis due to dust mite  Allergic rhinitis due to mold  Allergic conjunctivitis of both eyes  Overall, rhinoconjunctivitis symptoms are well controlled with allergen immunotherapy, sinus rinses twice daily, intranasal fluticasone 2 sprays in each nostril once daily, azelastine 2 sprays in each nostril twice daily, cetirizine and loratadine on as-needed basis, Optivar 1 drop in each eye twice daily as needed, and montelukast 10 mg by mouth once daily at bedtime.   -Continue allergen immunotherapy.  Continue current medication therapy.  Notify of systemic reaction.    Return in about 6 months (around 1/1/2022), or if symptoms worsen or fail to improve.    Thank you for allowing us to participate in the care of this patient. Please feel free to contact us if there are any questions or concerns about the patient.    Disclaimer: This note consists of symbols derived from keyboarding, dictation and/or voice recognition software. As a result, there may be errors in the script that have gone undetected. Please consider this when interpreting information found in this chart.    Michael Lujan MD,  TERES[facility] DELGADO  Allergy, Asthma and Immunology    [facility]

## 2021-07-01 NOTE — LETTER
7/1/2021         RE: Dilia Solomon  [address] [facility] [address]        Dear Colleague,    Thank you for referring your patient, Dilia Solomon, to the [facility]. Please see a copy of my visit note below.    SUBJECTIVE:                                                                   Dilia Solomon presents today to our Allergy Clinic at [facility] for a follow up visit.  She is a 56 year old female with moderate persistent asthma and allergic rhinitis.  She was diagnosed with asthma approximately in [id number]. Triggers are environmental allergies and viral respiratory infections.   Serum IgE for regional aeroallergen panel performed in June 2017 with multiple levels of various sensitivities to molds, cat, dog, dust mite, pollen of trees, grasses, and weeds.  She had 2 sinus surgeries in the past. The last one was in 2018.   She started allergen immunotherapy in July 2017. She was on allergen immunotherapy before that with Dr. Haines, and it was helpful at that time; however, symptoms got worse soon after stopping it.     Allergy Immunotherapy  Date/time of injection(s): 6/22/2021      Vial Color                               Content                                  Dose  Red 1:1                                   Weeds                                     0.5mL  Red 1:1                                   Grass, Trees                           0.5mL    Red 1:1                                   Molds                                      0.5mL  Red 1:1                                   Cat, Dog, Dust Mite                0.5mL     The patient tolerates injections well without persistent large local reactions or systemic reactions. She has been on the maintenance dose since January 2018.   Regarding her allergic rhinitis, she has been using sinus rinses once-twice daily, intranasal fluticasone 2 sprays in each nostril once daily, azelastine 2 sprays in each  nostril once-twice daily, cetirizine and loratadine as needed (rarely), and montelukast 10 mg by mouth daily at bedtime.  She uses azelastine eyedrops on as-needed basis, and they do help.   She reports a significant improvement in symptoms with allergen immunotherapy. Approximately ten days, had a bloody nose on the left side x 1. Before that, she felt soreness and sensation that something was swollen in the left upper cheek area. It is still stable.  When she touches the skin in that area, it is helpful. Prevents her to sleep on that side. She admits having some lightheadedness as well. No facial pressure, no postnasal drip, no headaches, no rhinorrhea. It feels differently compared with sinus infections she used to have in the past. No fever or cough.  Initially, she thought that her teeth were bothering her.  Yesterday, she saw a dentist that thought that patient's symptoms were possibly sinus related. If she uses sinus rinses, she feels that her left ear hurts. She has a history of TMJ problems. The pain is 4/10 but can get worse when she sleeps or eats.     Dilia has been using Advair 250/50 mcg 1 puff twice daily and taking montelukast 10 mg by mouth once daily at bedtime. She is using albuterol less than twice per week for rescue from chest symptoms. She is waking up less than twice per month due to chest symptoms.  There has been no use of oral steroids since the last visit. No ED/PCP/urgent care/other specialist visits for asthma flare since the previous visit. The patient denies current chest tightness, cough, wheeze, or shortness of breath.   Asthma triggers remain unchanged. She uses a spacer/chamber device, where applicable.      Patient Active Problem List   Diagnosis     Need for prophylactic vaccination and inoculation against influenza     Sprain of interphalangeal (joint) of hand     Radial styloid tenosynovitis     Carpal tunnel syndrome     Synovial cyst of popliteal space     Pain in joint,  lower leg     Hyperlipidemia LDL goal <130     Advanced directives, counseling/discussion     Moderate persistent asthma     Allergic rhinitis due to dust mite     Food allergy     FH: diabetes mellitus     Chronic allergic rhinitis due to animal hair and dander     Allergic rhinitis due to mold     Chronic seasonal allergic rhinitis due to pollen     Obesity (BMI 35.0-39.9) with comorbidity (H)     Allergic conjunctivitis, bilateral     History of endoscopic sinus surgery     Chronic pansinusitis       Past Medical History:   Diagnosis Date     Injury, other and unspecified, shoulder and upper arm 9/03      Problem (# of Occurrences) Relation (Name,Age of Onset)    Breast Cancer (1) Maternal Aunt    C.A.D. (1) Mother    Cancer (1) Father: brain    Diabetes (1) Mother       Negative family history of: Cancer - colorectal        Past Surgical History:   Procedure Laterality Date     COLONOSCOPY N/A 10/6/2016    Procedure: COLONOSCOPY;  Surgeon: Shiva Johns MD;  Location: [facility]     ETHMOIDECTOMY  12/30/2013    Procedure: ETHMOIDECTOMY;  Bilateral Submucousal Reduction of InferiorTurbinates and Total Ethmoidectomy with Multiple Sinusotomies;  Surgeon: Lio More MD;  Location: [facility]     ETHMOIDECTOMY Bilateral 2/14/2018    Procedure: ETHMOIDECTOMY;  Bilateral anterior ethmoidectomy, bilateral maxillary antrostomy;  Surgeon: Santhosh Tierney MD;  Location: [facility]     SURGICAL HISTORY OF -   5/04    carpal tunnel release, bilateral     Social History     Socioeconomic History     Marital status: Single     Spouse name: None     Number of children: None     Years of education: None     Highest education level: None   Occupational History     Employer: [employer]    Social Needs     Financial resource strain: None     Food insecurity     Worry: None     Inability: None     Transportation needs     Medical: None     Non-medical: None   Tobacco Use     Smoking status: Never Smoker     Smokeless  tobacco: Never Used   Substance and Sexual Activity     Alcohol use: No     Drug use: No     Sexual activity: Never   Lifestyle     Physical activity     Days per week: None     Minutes per session: None     Stress: None   Relationships     Social connections     Talks on phone: None     Gets together: None     Attends Nondenominational service: None     Active member of club or organization: None     Attends meetings of clubs or organizations: None     Relationship status: None     Intimate partner violence     Fear of current or ex partner: None     Emotionally abused: None     Physically abused: None     Forced sexual activity: None   Other Topics Concern     Parent/sibling w/ CABG, MI or angioplasty before 65F 55M? Yes     Comment: mother   Social History Narrative    July 1, 2021    ENVIRONMENTAL HISTORY: [place] is heated with a forced air. They do not have central air conditioning. The patient's bedroom is furnished with hard pawel in bedroom, allergen mattress cover, allergen pillowcase cover and fabric window coverings.  Pets inside the house include 1 dog. There is not history of cockroach or mice infestation. There are no smokers in the house.  The house does have a damp basement.     Patient is currently living and taking care of a friend in the friends home.           Review of Systems   HENT: Positive for ear pain. Negative for congestion, postnasal drip, rhinorrhea, sinus pressure, sinus pain, sneezing and sore throat.         Upper left cheek pain   Eyes: Negative for discharge, redness and itching.   Respiratory: Negative for chest tightness, shortness of breath and wheezing.    Allergic/Immunologic: Positive for environmental allergies.   Neurological: Positive for light-headedness.           Current Outpatient Medications:      albuterol (2.5 MG/3ML) 0.083% neb solution, Take 1 vial (2.5 mg) by nebulization every 4 hours as needed, Disp: 1 Box, Rfl:  3     albuterol (PROAIR HFA/PROVENTIL HFA/VENTOLIN HFA) 108 (90 Base) MCG/ACT inhaler, Inhale 2 puffs into the lungs every 4 hours as needed for shortness of breath / dyspnea or wheezing, Disp: 1 Inhaler, Rfl: 3     azelastine (ASTELIN) 0.1 % nasal spray, Spray 2 sprays into both nostrils 2 times daily as needed for rhinitis or allergies, Disp: 90 mL, Rfl: 3     azelastine (OPTIVAR) 0.05 % ophthalmic solution, Apply 1 drop to eye 2 times daily, Disp: 1 Bottle, Rfl: 5     cetirizine (ZYRTEC) 10 MG tablet, Take 1 tablet (10 mg) by mouth daily as needed for allergies, Disp: 90 tablet, Rfl: 3     Emollient (CERAVE) CREA, Externally apply 1 dose. topically 2 times daily, Disp: 2 Bottle, Rfl: 11     EPINEPHrine (ANY BX GENERIC EQUIV) 0.3 MG/0.3ML injection 2-pack, Inject 0.3 mLs (0.3 mg) into the muscle once as needed for anaphylaxis, Disp: 0.6 mL, Rfl: 3     fluticasone (FLONASE) 50 MCG/ACT nasal spray, Spray 2 sprays into both nostrils daily, Disp: 48 g, Rfl: 1     fluticasone-salmeterol (ADVAIR) 250-50 MCG/DOSE inhaler, Inhale 1 puff into the lungs 2 times daily, Disp: 3 Inhaler, Rfl: 3     loratadine (CLARITIN) 10 MG tablet, Take 1 tablet (10 mg) by mouth daily as needed for allergies, Disp: 90 tablet, Rfl: 3     montelukast (SINGULAIR) 10 MG tablet, Take 1 tablet (10 mg) by mouth At Bedtime, Disp: 90 tablet, Rfl: 1     MULTIVITAMIN OR, 1 tab daily, Disp: , Rfl:      ORDER FOR ALLERGEN IMMUNOTHERAPY, Name of Mix: Mix #1  Mold Alternaria Tenuis 1:10 w/v, HS  0.5 ml Aspergillus Fumigatus 1:10 w/v, HS  0.5 ml Epicoccum Nigrum 1:10 w/v, HS 0.5 ml Hormodendrum Cladosporioides 1:10 w/v, HS 0.5 ml Penicillium Mix 1:10 w/v, HS  0.5 ml Diluent: HSA qs to 5ml, Disp: 5 mL, Rfl: PRN     ORDER FOR ALLERGEN IMMUNOTHERAPY, Name of Mix: Mix #2  Dust Mite, Cat, Dog Cat Hair, Standardized 10,000 BAU/mL, ALK  2.0 ml Dog Hair Dander, A. P.  1:100 w/v, HS  1.0 ml Dust Mites F 30,000AU/mL, HS  0.3 ml Dust Mites P. 30,000 AU/mL, HS  0.3 ml   Diluent: HSA qs to 5ml, Disp: 5 mL, Rfl: PRN     ORDER FOR ALLERGEN IMMUNOTHERAPY, Name of Mix: Mix #3 Grass,Tree  Dmitry,White 1:20w/v, HS 0.5ml Birch Mix PRW 1:20w/v, HS 0.5ml Boxelder-Maple Mix BHR (Boxelder Hard Red) 1:20w/v, HS 0.5ml Mineral,Common 1:20w/v, HS 0.5ml Elm,American 1:20w/v, HS 0.5ml [place] Mix RW 1:20w/v, HS 0.5ml Oak Mix RVW 1:20w/v, HS 0.5ml Bruno Tree,Black 1:20w/v, HS 0.5ml Suresh Grass (Std) 100,000 BAU/mL, HS 0.4ml Jonathan Grass 1:20w/v, HS 0.5ml Diluent: HSA qs to 5ml, Disp: 5 mL, Rfl: PRN     ORDER FOR ALLERGEN IMMUNOTHERAPY, Name of Mix: Mix #4  Weeds Kochia 1:20 w/v, HS 0.5 ml Lamb's Quarters 1:20 w/v, HS 0.5 ml Nettle 1:20 w/v, HS 0.5 ml Plantain, English 1:20 w/v, HS 0.5 ml Ragweed Mixed 1:20 w/v ALK  0.5 ml Russian Thistle 1:20 w/v, HS 0.5 ml Sagebrush, Mugwort 1:20 w/v, HS 0.5 ml Sorrel, Sheep 1:20 w/v, HS 0.5 ml Diluent: HSA qs to 5ml, Disp: 5 mL, Rfl: PRN     order for DME, Equipment being ordered: thumb spica splint RIGHT, Disp: 1 Units, Rfl: 0     order for DME, Equipment being ordered: Silicone heel cup, Disp: 1 Units, Rfl: 0     order for DME, Equipment being ordered: Dynaflex insert, Disp: 1 Units, Rfl: 0     order for DME, Equipment being ordered: Silicone heel cup, Disp: 1 Units, Rfl: 0     triamcinolone (KENALOG) 0.1 % external cream, Apply sparingly to affected area two times daily as needed but not more than 14 days in a row. Spare face, armpits, neck, and groin., Disp: 80 g, Rfl: 1  Immunization History   Administered Date(s) Administered     COVID-19,PF,Moderna 03/19/2021, 04/16/2021     Influenza (IIV3) PF 12/12/2002, 11/28/2003, 10/24/2006, 02/11/2009, 10/05/2011, 11/15/2012, 10/07/2014     Influenza Quad, Recombinant, p-free (RIV4) 10/02/2018, 10/30/2019, 10/28/2020     Influenza Vaccine IM > 6 months Valent IIV4 10/16/2013, 10/20/2015, 10/26/2016, 11/21/2017     Mantoux Tuberculin Skin Test 02/11/2009     Measles 10/22/1979     Pneumococcal 23 valent 10/05/2011      TDAP Vaccine (Adacel) 02/11/2009, 07/06/2018     Allergies   Allergen Reactions     Niacin Hives     Nsaids Hives     Tolerates ibuprofen and aspirin without hives       Pineapple Hives     OBJECTIVE:                                                                 /85 (BP Location: Left arm, Patient Position: Sitting, Cuff Size: Adult Regular)   Pulse 86   Temp 98.2  F (36.8  C) (Tympanic)   Wt 93.3 kg (205 lb 11 oz)   LMP 07/18/2015 (Approximate)   SpO2 99%   BMI 35.86 kg/m          Physical Exam  Vitals signs and nursing note reviewed.   Constitutional:       General: She is not in acute distress.     Appearance: She is not diaphoretic.   HENT:      Head: Normocephalic and atraumatic.      Right Ear: Tympanic membrane, ear canal and external ear normal.      Left Ear: Tympanic membrane, ear canal and external ear normal.      Nose: No mucosal edema or rhinorrhea.      Comments: Tenderness on palpation over left zygomatic area.  Overlying skin is not inflamed or erythematous.  No fluctuation.     Mouth/Throat:      Lips: Pink.      Mouth: Mucous membranes are moist.      Pharynx: Oropharynx is clear. No pharyngeal swelling, oropharyngeal exudate or posterior oropharyngeal erythema.   Eyes:      General:         Right eye: No discharge.         Left eye: No discharge.      Conjunctiva/sclera: Conjunctivae normal.   Neck:      Musculoskeletal: Normal range of motion.   Cardiovascular:      Rate and Rhythm: Normal rate and regular rhythm.      Heart sounds: Normal heart sounds. No murmur.   Pulmonary:      Effort: Pulmonary effort is normal. No respiratory distress.      Breath sounds: Normal breath sounds and air entry. No stridor, decreased air movement or transmitted upper airway sounds. No decreased breath sounds, wheezing, rhonchi or rales.   Musculoskeletal: Normal range of motion.   Lymphadenopathy:      Cervical: No cervical adenopathy.   Skin:     General: Skin is warm.   Neurological:       Mental Status: She is alert and oriented to person, place, and time.   Psychiatric:         Mood and Affect: Mood normal.         Behavior: Behavior normal.         WORKUP:   ACT Score:  25    ASSESSMENT/PLAN:    Moderate persistent asthma without complication  Currently well controlled with Advair 250/50 micrograms 1 puff twice daily, montelukast 10 mg by mouth once daily, and albuterol inhaler on as-needed basis.  - Continue as is.      Pain of cheek  She does have a history of TMJ problems.  I wonder if that is the case.  On exam, nasal passages look within normal limits.  Her symptoms do not seem to correlate with a sinus infection, unless there is an unusual presentation.  To spare her from an extra radiation, instead of ordering sinus CT, I recommend the patient to see ENT for rhinoscopy.  Considering that she did have sinus surgeries in the past, hopefully, rhinoscopy will allow an appropriate look in her sinuses.    - OTOLARYNGOLOGY REFERRAL    Chronic seasonal allergic rhinitis due to pollen  Chronic allergic rhinitis due to animal hair and dander  Allergic rhinitis due to dust mite  Allergic rhinitis due to mold  Allergic conjunctivitis of both eyes  Overall, rhinoconjunctivitis symptoms are well controlled with allergen immunotherapy, sinus rinses twice daily, intranasal fluticasone 2 sprays in each nostril once daily, azelastine 2 sprays in each nostril twice daily, cetirizine and loratadine on as-needed basis, Optivar 1 drop in each eye twice daily as needed, and montelukast 10 mg by mouth once daily at bedtime.   -Continue allergen immunotherapy.  Continue current medication therapy.  Notify of systemic reaction.    Return in about 6 months (around 1/1/2022), or if symptoms worsen or fail to improve.    Thank you for allowing us to participate in the care of this patient. Please feel free to contact us if there are any questions or concerns about the patient.    Disclaimer: This note consists of  symbols derived from keyboarding, dictation and/or voice recognition software. As a result, there may be errors in the script that have gone undetected. Please consider this when interpreting information found in this chart.    Michael Lujan MD, FAAAAI, FACAAI  Allergy, Asthma and Immunology    [facility]         Again, thank you for allowing me to participate in the care of your patient.        Sincerely,        Michael Lujan MD

## 2021-07-01 NOTE — PATIENT INSTRUCTIONS
Continue allergen immunotherapy.  Continue current medication therapy for environmental allergies and asthma.  Notify of systemic reaction.      See ENT for cheek pain.

## 2021-07-02 ASSESSMENT — ASTHMA QUESTIONNAIRES: ACT_TOTALSCORE: 25

## 2021-07-12 NOTE — PROGRESS NOTES
Chief Complaint   Patient presents with     Ent Problem     Consult sinus issues/left side pain - into jaw/ear area     History of Present Illness   Dilia Solomon is a 56 year old female who presents for nose and sinus evaluation. I am seeing this patient in consultation for pain of cheek at the request of the provider Dr. Lujan.  The patient has had 2 previous endoscopic sinus surgeries, most recently with Dr. Tierney on 2/14/2018.  She has a history of chronic allergic rhinitis and does receive allergy injection immunotherapy.  She was referred for further nose and sinus evaluation.    The patient describes symptoms of left-sided malar/facial pressure, pain, discomfort.  It does extend a bit into the ear.  She does have a history of TMJ and does wear a oral splint at nighttime.  She is had to see physical therapy for her TMJ in the past.  She denies any significant nasal obstruction, nasal congestion, rhinorrhea, taste or smell disturbance.  She does have intermittent postnasal drainage.  She had the 2 previous sinus surgeries.  She is currently using Flonase and Astelin in her nose.  She is having her allergy injection immunotherapy and asthma managed by Dr. Lujan.      My review of the patient's most recent sinus CT performed 10/30/2019 showed a midline nasal septum.  The patient's postsurgical changes of bilateral maxillary antrostomies and anterior ethmoidectomies.  There is a slight amount of thickening in the floor of the right maxillary sinus.  The remainder the paranasal sinuses are well aerated without any acute or chronic inflammation.    Past Medical History  Patient Active Problem List   Diagnosis     Need for prophylactic vaccination and inoculation against influenza     Sprain of interphalangeal (joint) of hand     Radial styloid tenosynovitis     Carpal tunnel syndrome     Synovial cyst of popliteal space     Pain in joint, lower leg     Hyperlipidemia LDL goal <130     Advanced directives,  counseling/discussion     Moderate persistent asthma     Allergic rhinitis due to dust mite     Food allergy     FH: diabetes mellitus     Chronic allergic rhinitis due to animal hair and dander     Allergic rhinitis due to mold     Chronic seasonal allergic rhinitis due to pollen     Obesity (BMI 35.0-39.9) with comorbidity (H)     Allergic conjunctivitis, bilateral     History of endoscopic sinus surgery     Chronic pansinusitis     Current Medications     Current Outpatient Medications:      albuterol (2.5 MG/3ML) 0.083% neb solution, Take 1 vial (2.5 mg) by nebulization every 4 hours as needed, Disp: 1 Box, Rfl: 3     albuterol (PROAIR HFA/PROVENTIL HFA/VENTOLIN HFA) 108 (90 Base) MCG/ACT inhaler, Inhale 2 puffs into the lungs every 4 hours as needed for shortness of breath / dyspnea or wheezing, Disp: 1 Inhaler, Rfl: 3     azelastine (ASTELIN) 0.1 % nasal spray, Spray 2 sprays into both nostrils 2 times daily as needed for rhinitis or allergies, Disp: 90 mL, Rfl: 3     azelastine (OPTIVAR) 0.05 % ophthalmic solution, Apply 1 drop to eye 2 times daily, Disp: 1 Bottle, Rfl: 5     Emollient (CERAVE) CREA, Externally apply 1 dose. topically 2 times daily, Disp: 2 Bottle, Rfl: 11     EPINEPHrine (ANY BX GENERIC EQUIV) 0.3 MG/0.3ML injection 2-pack, Inject 0.3 mLs (0.3 mg) into the muscle once as needed for anaphylaxis, Disp: 0.6 mL, Rfl: 3     fluticasone (FLONASE) 50 MCG/ACT nasal spray, Spray 2 sprays into both nostrils daily, Disp: 48 g, Rfl: 1     fluticasone-salmeterol (ADVAIR) 250-50 MCG/DOSE inhaler, Inhale 1 puff into the lungs 2 times daily, Disp: 3 Inhaler, Rfl: 3     montelukast (SINGULAIR) 10 MG tablet, Take 1 tablet (10 mg) by mouth At Bedtime, Disp: 90 tablet, Rfl: 1     MULTIVITAMIN OR, 1 tab daily, Disp: , Rfl:      ORDER FOR ALLERGEN IMMUNOTHERAPY, Name of Mix: Mix #1  Mold Alternaria Tenuis 1:10 w/v, HS  0.5 ml Aspergillus Fumigatus 1:10 w/v, HS  0.5 ml Epicoccum Nigrum 1:10 w/v, HS 0.5 ml  Hormodendrum Cladosporioides 1:10 w/v, HS 0.5 ml Penicillium Mix 1:10 w/v, HS  0.5 ml Diluent: HSA qs to 5ml, Disp: 5 mL, Rfl: PRN     ORDER FOR ALLERGEN IMMUNOTHERAPY, Name of Mix: Mix #2  Dust Mite, Cat, Dog Cat Hair, Standardized 10,000 BAU/mL, ALK  2.0 ml Dog Hair Dander, A. P.  1:100 w/v, HS  1.0 ml Dust Mites F 30,000AU/mL, HS  0.3 ml Dust Mites P. 30,000 AU/mL, HS  0.3 ml  Diluent: HSA qs to 5ml, Disp: 5 mL, Rfl: PRN     ORDER FOR ALLERGEN IMMUNOTHERAPY, Name of Mix: Mix #3 Grass,Tree  Dmitry,White 1:20w/v, HS 0.5ml Birch Mix PRW 1:20w/v, HS 0.5ml Boxelder-Maple Mix BHR (Boxelder Hard Red) 1:20w/v, HS 0.5ml Kennedy,Common 1:20w/v, HS 0.5ml Elm,American 1:20w/v, HS 0.5ml Superior Mix RW 1:20w/v, HS 0.5ml Oak Mix RVW 1:20w/v, HS 0.5ml [place] Tree,Black 1:20w/v, HS 0.5ml Suresh Grass (Std) 100,000 BAU/mL, HS 0.4ml Jonathan Grass 1:20w/v, HS 0.5ml Diluent: HSA qs to 5ml, Disp: 5 mL, Rfl: PRN     ORDER FOR ALLERGEN IMMUNOTHERAPY, Name of Mix: Mix #4  Weeds Kochia 1:20 w/v, HS 0.5 ml Lamb's Quarters 1:20 w/v, HS 0.5 ml Nettle 1:20 w/v, HS 0.5 ml Plantain, English 1:20 w/v, HS 0.5 ml Ragweed Mixed 1:20 w/v ALK  0.5 ml Russian Thistle 1:20 w/v, HS 0.5 ml Sagebrush, Mugwort 1:20 w/v, HS 0.5 ml Sorrel, Sheep 1:20 w/v, HS 0.5 ml Diluent: HSA qs to 5ml, Disp: 5 mL, Rfl: PRN     order for DME, Equipment being ordered: thumb spica splint RIGHT, Disp: 1 Units, Rfl: 0     order for DME, Equipment being ordered: Silicone heel cup, Disp: 1 Units, Rfl: 0     order for DME, Equipment being ordered: Dynaflex insert, Disp: 1 Units, Rfl: 0     order for DME, Equipment being ordered: Silicone heel cup, Disp: 1 Units, Rfl: 0     triamcinolone (KENALOG) 0.1 % external cream, Apply sparingly to affected area two times daily as needed but not more than 14 days in a row. Spare face, armpits, neck, and groin., Disp: 80 g, Rfl: 1     cetirizine (ZYRTEC) 10 MG tablet, Take 1 tablet (10 mg) by mouth daily as needed for allergies (Patient not  taking: Reported on 7/14/2021), Disp: 90 tablet, Rfl: 3     loratadine (CLARITIN) 10 MG tablet, Take 1 tablet (10 mg) by mouth daily as needed for allergies (Patient not taking: Reported on 7/14/2021), Disp: 90 tablet, Rfl: 3    Allergies  Allergies   Allergen Reactions     Niacin Hives     Nsaids Hives     Tolerates ibuprofen and aspirin without hives       Pineapple Hives       Social History   Social History     Socioeconomic History     Marital status: Single     Spouse name: Not on file     Number of children: Not on file     Years of education: Not on file     Highest education level: Not on file   Occupational History     Employer: [employer]    Tobacco Use     Smoking status: Never Smoker     Smokeless tobacco: Never Used   Substance and Sexual Activity     Alcohol use: No     Drug use: No     Sexual activity: Never   Other Topics Concern     Parent/sibling w/ CABG, MI or angioplasty before 65F 55M? Yes     Comment: mother   Social History Narrative    July 1, 2021    ENVIRONMENTAL HISTORY: [place] The [place] is heated with a forced air. They do not have central air conditioning. The patient's bedroom is furnished with hard pawel in bedroom, allergen mattress cover, allergen pillowcase cover and fabric window coverings.  Pets inside the house include 1 dog. There is not history of cockroach or mice infestation. There are no smokers in the house.  The house does have a damp basement.     Patient is currently living and taking care of a friend in the friends home.     Social Determinants of Health     Financial Resource Strain:      Difficulty of Paying Living Expenses:    Food Insecurity:      Worried About Running Out of Food in the Last Year:      Ran Out of Food in the Last Year:    Transportation Needs:      Lack of Transportation (Medical):      Lack of Transportation (Non-Medical):    Physical Activity:      Days of Exercise per Week:      Minutes  "of Exercise per Session:    Stress:      Feeling of Stress :    Social Connections:      Frequency of Communication with Friends and Family:      Frequency of Social Gatherings with Friends and Family:      Attends Yarsani Services:      Active Member of Clubs or Organizations:      Attends Club or Organization Meetings:      Marital Status:    Intimate Partner Violence:      Fear of Current or Ex-Partner:      Emotionally Abused:      Physically Abused:      Sexually Abused:        Family History  Family History   Problem Relation Age of Onset     C.A.D. Mother      Diabetes Mother      Cancer Father         brain     Breast Cancer Maternal Aunt      Cancer - colorectal No family hx of        Review of Systems  As per HPI and PMHx, otherwise 10+ comprehensive system review is negative.    Physical Exam  /68 (BP Location: Right arm, Patient Position: Sitting, Cuff Size: Adult Large)   Pulse 76   Temp (!) 96.7  F (35.9  C) (Tympanic)   Ht 1.6 m (5' 3\")   Wt 93 kg (205 lb)   LMP 07/18/2015 (Approximate)   BMI 36.31 kg/m    GENERAL: Patient is a pleasant, cooperative 56 year old female in no acute distress.  HEAD: Normocephalic, atraumatic.  Hair and scalp are normal.  EYES: Pupils are equal, round, reactive to light and accommodation.  Extraocular movements are intact.  The sclera nonicteric without injection.  The extraocular structures are normal.  EARS: Normal shape and symmetry.  No tenderness when palpating the mastoid or tragal areas bilaterally.  Otoscopic exam reveals a minimal amount of cerumen bilaterally.  The bilateral tympanic membranes are round, intact without evidence of effusion, good landmarks.  No retraction, granulation, or drainage.  NOSE: Nares are patent.  Nasal mucosa is pink and moist.  Nasal septum is midline.  The inferior turbinates are moderately hypertrophied.  No nasal cavity masses, polyps, or mucopurulence on anterior rhinoscopy.  ORAL CAVITY: Dentition is in good repair. "  Mucous membranes are moist.  Palpation of the bilateral temporomandibular joints reveals some significant late clicking and some dislocation of the right TMJ joint with opening.  No significant or severe crepitus.  No locking.  NEUROLOGIC: Cranial nerves II through XII are grossly intact.  Voice is strong.  Patient is House-Brackmann I/VI bilaterally.  CARDIOVASCULAR: Extremities are warm and well-perfused.  No significant peripheral edema.  RESPIRATORY: Patient has nonlabored breathing without cough, wheeze, stridor.  PSYCHIATRIC: Patient is alert and oriented.  Mood and affect appear normal.  SKIN: Warm and dry.  No scalp, face, or neck lesions noted.    Procedure: Flexible Nasal Endoscopy  Indication: Left face/sinus pain, history of endoscopic sinus surgery    To best visualize the sinonasal anatomy and due to the chief complaint and HPI, I proceeded with flexible fiberoptic nasal endoscopy.  The bilateral nasal cavities were anesthetized and decongested with a mixture of lidocaine and neosynephrine.  The bilateral nasal cavities were then examined using flexible fiberoptic nasal endoscope.  The right nasal cavity shows a widely patent maxillary antrostomy and anterior ethmoid cavity.  The right nasal cavity was without masses, polyps, or mucopurulence.  The right middle turbinate and middle meatus was normal in appearance.  The sphenoethmoid recess was clear.  The left nasal cavity shows a widely patent maxillary antrostomy and anterior ethmoid cavity.  The left nasal cavity was without masses, polyps, or mucopurulence.  The left middle turbinate and middle meatus was normal in appearance.  The sphenoethmoid recess was clear.  The sinonasal mucosa appeared normal.  The nasal septum is essentially midline.  The nasopharynx had a normal appearance with normal Eustachian tube openings and fossa of Rosenmuller bilaterally. Minimal adenoid tissue.  The scope was removed.  The patient tolerated the procedure  well.                    Assessment and Plan     ICD-10-CM    1. Chronic pansinusitis  J32.4 NASAL ENDOSCOPY, DIAGNOSTIC   2. History of endoscopic sinus surgery  Z98.890 NASAL ENDOSCOPY, DIAGNOSTIC   3. Left-sided face pain  R51.9 NASAL ENDOSCOPY, DIAGNOSTIC   4. Temporal mandibular joint disorder  M26.609 NASAL ENDOSCOPY, DIAGNOSTIC     It was my pleasure seeing Dilia Solomon today in clinic.  The patient presents with left-sided facial/sinus pain in the setting of a history of chronic sinusitis and previous sinus surgery.  The patient's endoscopic nasal exam today is completely within normal limits without any signs of inflammation or infection.  Given her past history of TMJ issues, I think this is likely TMJ arthralgia.  The patient does wear a splint at nighttime, this is about 3 years old and works well for her.  I gave the patient some instructions to help with her TMJ symptoms including soft diet, limit gum chewing, using the Tylenol that she has been previously, using warm compresses on the joint, and some jaw stretching exercises.  This is not helpful, I could have her see physical therapy for some more TMJ management or we could have her see a TMJ specialist.  The patient will contact me if she is not improving or if she is worsening.     Maximilian Abad MD  Department of Otolarygology-Head and Neck Surgery  [facility]

## 2021-07-14 ENCOUNTER — OFFICE VISIT (OUTPATIENT)
Dept: OTOLARYNGOLOGY | Facility: CLINIC | Age: 56
End: 2021-07-14
Attending: ALLERGY & IMMUNOLOGY
Payer: COMMERCIAL

## 2021-07-14 VITALS
BODY MASS INDEX: 36.32 KG/M2 | HEIGHT: 63 IN | WEIGHT: 205 LBS | SYSTOLIC BLOOD PRESSURE: 124 MMHG | DIASTOLIC BLOOD PRESSURE: 68 MMHG | HEART RATE: 76 BPM | TEMPERATURE: 96.7 F

## 2021-07-14 DIAGNOSIS — M26.609 TEMPORAL MANDIBULAR JOINT DISORDER: ICD-10-CM

## 2021-07-14 DIAGNOSIS — R51.9 LEFT-SIDED FACE PAIN: ICD-10-CM

## 2021-07-14 DIAGNOSIS — J32.4 CHRONIC PANSINUSITIS: Primary | ICD-10-CM

## 2021-07-14 DIAGNOSIS — Z98.890 HISTORY OF ENDOSCOPIC SINUS SURGERY: ICD-10-CM

## 2021-07-14 PROCEDURE — 31231 NASAL ENDOSCOPY DX: CPT | Performed by: OTOLARYNGOLOGY

## 2021-07-14 PROCEDURE — 99243 OFF/OP CNSLTJ NEW/EST LOW 30: CPT | Mod: 25 | Performed by: OTOLARYNGOLOGY

## 2021-07-14 ASSESSMENT — MIFFLIN-ST. JEOR: SCORE: 1494

## 2021-07-14 NOTE — PROGRESS NOTES
This laryngoscopy scope was used on this patient.    [facility]   2024-02-26  LOT [id number]   [place] [id number]  Elsi Mcpherson CMA

## 2021-07-14 NOTE — LETTER
7/14/2021         RE: Dilia Solomon  [address] [facility] [address]        Dear Colleague,    Thank you for referring your patient, Dilia Solomon, to the [facility]. Please see a copy of my visit note below.    Chief Complaint   Patient presents with     Ent Problem     Consult sinus issues/left side pain - into jaw/ear area     History of Present Illness   Dilia Solomon is a 56 year old female who presents for nose and sinus evaluation. I am seeing this patient in consultation for pain of cheek at the request of the provider Dr. Lujan.  The patient has had 2 previous endoscopic sinus surgeries, most recently with Dr. Tierney on 2/14/2018.  She has a history of chronic allergic rhinitis and does receive allergy injection immunotherapy.  She was referred for further nose and sinus evaluation.    The patient describes symptoms of left-sided malar/facial pressure, pain, discomfort.  It does extend a bit into the ear.  She does have a history of TMJ and does wear a oral splint at nighttime.  She is had to see physical therapy for her TMJ in the past.  She denies any significant nasal obstruction, nasal congestion, rhinorrhea, taste or smell disturbance.  She does have intermittent postnasal drainage.  She had the 2 previous sinus surgeries.  She is currently using Flonase and Astelin in her nose.  She is having her allergy injection immunotherapy and asthma managed by Dr. Lujan.      My review of the patient's most recent sinus CT performed 10/30/2019 showed a midline nasal septum.  The patient's postsurgical changes of bilateral maxillary antrostomies and anterior ethmoidectomies.  There is a slight amount of thickening in the floor of the right maxillary sinus.  The remainder the paranasal sinuses are well aerated without any acute or chronic inflammation.    Past Medical History  Patient Active Problem List   Diagnosis     Need for prophylactic vaccination and inoculation  against influenza     Sprain of interphalangeal (joint) of hand     Radial styloid tenosynovitis     Carpal tunnel syndrome     Synovial cyst of popliteal space     Pain in joint, lower leg     Hyperlipidemia LDL goal <130     Advanced directives, counseling/discussion     Moderate persistent asthma     Allergic rhinitis due to dust mite     Food allergy     FH: diabetes mellitus     Chronic allergic rhinitis due to animal hair and dander     Allergic rhinitis due to mold     Chronic seasonal allergic rhinitis due to pollen     Obesity (BMI 35.0-39.9) with comorbidity (H)     Allergic conjunctivitis, bilateral     History of endoscopic sinus surgery     Chronic pansinusitis     Current Medications     Current Outpatient Medications:      albuterol (2.5 MG/3ML) 0.083% neb solution, Take 1 vial (2.5 mg) by nebulization every 4 hours as needed, Disp: 1 Box, Rfl: 3     albuterol (PROAIR HFA/PROVENTIL HFA/VENTOLIN HFA) 108 (90 Base) MCG/ACT inhaler, Inhale 2 puffs into the lungs every 4 hours as needed for shortness of breath / dyspnea or wheezing, Disp: 1 Inhaler, Rfl: 3     azelastine (ASTELIN) 0.1 % nasal spray, Spray 2 sprays into both nostrils 2 times daily as needed for rhinitis or allergies, Disp: 90 mL, Rfl: 3     azelastine (OPTIVAR) 0.05 % ophthalmic solution, Apply 1 drop to eye 2 times daily, Disp: 1 Bottle, Rfl: 5     Emollient (CERAVE) CREA, Externally apply 1 dose. topically 2 times daily, Disp: 2 Bottle, Rfl: 11     EPINEPHrine (ANY BX GENERIC EQUIV) 0.3 MG/0.3ML injection 2-pack, Inject 0.3 mLs (0.3 mg) into the muscle once as needed for anaphylaxis, Disp: 0.6 mL, Rfl: 3     fluticasone (FLONASE) 50 MCG/ACT nasal spray, Spray 2 sprays into both nostrils daily, Disp: 48 g, Rfl: 1     fluticasone-salmeterol (ADVAIR) 250-50 MCG/DOSE inhaler, Inhale 1 puff into the lungs 2 times daily, Disp: 3 Inhaler, Rfl: 3     montelukast (SINGULAIR) 10 MG tablet, Take 1 tablet (10 mg) by mouth At Bedtime, Disp: 90  tablet, Rfl: 1     MULTIVITAMIN OR, 1 tab daily, Disp: , Rfl:      ORDER FOR ALLERGEN IMMUNOTHERAPY, Name of Mix: Mix #1  Mold Alternaria Tenuis 1:10 w/v, HS  0.5 ml Aspergillus Fumigatus 1:10 w/v, HS  0.5 ml Epicoccum Nigrum 1:10 w/v, HS 0.5 ml Hormodendrum Cladosporioides 1:10 w/v, HS 0.5 ml Penicillium Mix 1:10 w/v, HS  0.5 ml Diluent: HSA qs to 5ml, Disp: 5 mL, Rfl: PRN     ORDER FOR ALLERGEN IMMUNOTHERAPY, Name of Mix: Mix #2  Dust Mite, Cat, Dog Cat Hair, Standardized 10,000 BAU/mL, ALK  2.0 ml Dog Hair Dander, A. P.  1:100 w/v, HS  1.0 ml Dust Mites F 30,000AU/mL, HS  0.3 ml Dust Mites P. 30,000 AU/mL, HS  0.3 ml  Diluent: HSA qs to 5ml, Disp: 5 mL, Rfl: PRN     ORDER FOR ALLERGEN IMMUNOTHERAPY, Name of Mix: Mix #3 Grass,Tree  Dmitry,White 1:20w/v, HS 0.5ml Birch Mix PRW 1:20w/v, HS 0.5ml Boxelder-Maple Mix BHR (Boxelder Hard Red) 1:20w/v, HS 0.5ml Kevin,Common 1:20w/v, HS 0.5ml Elm,American 1:20w/v, HS 0.5ml Mifflin Mix RW 1:20w/v, HS 0.5ml Oak Mix RVW 1:20w/v, HS 0.5ml [place] Tree,Black 1:20w/v, HS 0.5ml Suresh Grass (Std) 100,000 BAU/mL, HS 0.4ml Jonathan Grass 1:20w/v, HS 0.5ml Diluent: HSA qs to 5ml, Disp: 5 mL, Rfl: PRN     ORDER FOR ALLERGEN IMMUNOTHERAPY, Name of Mix: Mix #4  Weeds Kochia 1:20 w/v, HS 0.5 ml Lamb's Quarters 1:20 w/v, HS 0.5 ml Nettle 1:20 w/v, HS 0.5 ml Plantain, English 1:20 w/v, HS 0.5 ml Ragweed Mixed 1:20 w/v ALK  0.5 ml Russian Thistle 1:20 w/v, HS 0.5 ml Sagebrush, Mugwort 1:20 w/v, HS 0.5 ml Sorrel, Sheep 1:20 w/v, HS 0.5 ml Diluent: HSA qs to 5ml, Disp: 5 mL, Rfl: PRN     order for DME, Equipment being ordered: thumb spica splint RIGHT, Disp: 1 Units, Rfl: 0     order for DME, Equipment being ordered: Silicone heel cup, Disp: 1 Units, Rfl: 0     order for DME, Equipment being ordered: Dynaflex insert, Disp: 1 Units, Rfl: 0     order for DME, Equipment being ordered: Silicone heel cup, Disp: 1 Units, Rfl: 0     triamcinolone (KENALOG) 0.1 % external cream, Apply sparingly to  affected area two times daily as needed but not more than 14 days in a row. Spare face, armpits, neck, and groin., Disp: 80 g, Rfl: 1     cetirizine (ZYRTEC) 10 MG tablet, Take 1 tablet (10 mg) by mouth daily as needed for allergies (Patient not taking: Reported on 7/14/2021), Disp: 90 tablet, Rfl: 3     loratadine (CLARITIN) 10 MG tablet, Take 1 tablet (10 mg) by mouth daily as needed for allergies (Patient not taking: Reported on 7/14/2021), Disp: 90 tablet, Rfl: 3    Allergies  Allergies   Allergen Reactions     Niacin Hives     Nsaids Hives     Tolerates ibuprofen and aspirin without hives       Pineapple Hives       Social History   Social History     Socioeconomic History     Marital status: Single     Spouse name: Not on file     Number of children: Not on file     Years of education: Not on file     Highest education level: Not on file   Occupational History     Employer: [employer]    Tobacco Use     Smoking status: Never Smoker     Smokeless tobacco: Never Used   Substance and Sexual Activity     Alcohol use: No     Drug use: No     Sexual activity: Never   Other Topics Concern     Parent/sibling w/ CABG, MI or angioplasty before 65F 55M? Yes     Comment: mother   Social History Narrative    July 1, 2021    ENVIRONMENTAL HISTORY: [place] The home is heated with a forced air. They do not have central air conditioning. The patient's bedroom is furnished with hard pawel in bedroom, allergen mattress cover, allergen pillowcase cover and fabric window coverings.  Pets inside the house include 1 dog. There is not history of cockroach or mice infestation. There are no smokers in the house.  The house does have a damp basement.     Patient is currently living and taking care of a friend in the friends home.     Social Determinants of Health     Financial Resource Strain:      Difficulty of Paying Living Expenses:    Food Insecurity:      Worried About  "Running Out of Food in the Last Year:      Ran Out of Food in the Last Year:    Transportation Needs:      Lack of Transportation (Medical):      Lack of Transportation (Non-Medical):    Physical Activity:      Days of Exercise per Week:      Minutes of Exercise per Session:    Stress:      Feeling of Stress :    Social Connections:      Frequency of Communication with Friends and Family:      Frequency of Social Gatherings with Friends and Family:      Attends Mandaen Services:      Active Member of Clubs or Organizations:      Attends Club or Organization Meetings:      Marital Status:    Intimate Partner Violence:      Fear of Current or Ex-Partner:      Emotionally Abused:      Physically Abused:      Sexually Abused:        Family History  Family History   Problem Relation Age of Onset     C.A.D. Mother      Diabetes Mother      Cancer Father         brain     Breast Cancer Maternal Aunt      Cancer - colorectal No family hx of        Review of Systems  As per HPI and PMHx, otherwise 10+ comprehensive system review is negative.    Physical Exam  /68 (BP Location: Right arm, Patient Position: Sitting, Cuff Size: Adult Large)   Pulse 76   Temp (!) 96.7  F (35.9  C) (Tympanic)   Ht 1.6 m (5' 3\")   Wt 93 kg (205 lb)   LMP 07/18/2015 (Approximate)   BMI 36.31 kg/m    GENERAL: Patient is a pleasant, cooperative 56 year old female in no acute distress.  HEAD: Normocephalic, atraumatic.  Hair and scalp are normal.  EYES: Pupils are equal, round, reactive to light and accommodation.  Extraocular movements are intact.  The sclera nonicteric without injection.  The extraocular structures are normal.  EARS: Normal shape and symmetry.  No tenderness when palpating the mastoid or tragal areas bilaterally.  Otoscopic exam reveals a minimal amount of cerumen bilaterally.  The bilateral tympanic membranes are round, intact without evidence of effusion, good landmarks.  No retraction, granulation, or " drainage.  NOSE: Nares are patent.  Nasal mucosa is pink and moist.  Nasal septum is midline.  The inferior turbinates are moderately hypertrophied.  No nasal cavity masses, polyps, or mucopurulence on anterior rhinoscopy.  ORAL CAVITY: Dentition is in good repair.  Mucous membranes are moist.  Palpation of the bilateral temporomandibular joints reveals some significant late clicking and some dislocation of the right TMJ joint with opening.  No significant or severe crepitus.  No locking.  NEUROLOGIC: Cranial nerves II through XII are grossly intact.  Voice is strong.  Patient is House-Brackmann I/VI bilaterally.  CARDIOVASCULAR: Extremities are warm and well-perfused.  No significant peripheral edema.  RESPIRATORY: Patient has nonlabored breathing without cough, wheeze, stridor.  PSYCHIATRIC: Patient is alert and oriented.  Mood and affect appear normal.  SKIN: Warm and dry.  No scalp, face, or neck lesions noted.    Procedure: Flexible Nasal Endoscopy  Indication: Left face/sinus pain, history of endoscopic sinus surgery    To best visualize the sinonasal anatomy and due to the chief complaint and HPI, I proceeded with flexible fiberoptic nasal endoscopy.  The bilateral nasal cavities were anesthetized and decongested with a mixture of lidocaine and neosynephrine.  The bilateral nasal cavities were then examined using flexible fiberoptic nasal endoscope.  The right nasal cavity shows a widely patent maxillary antrostomy and anterior ethmoid cavity.  The right nasal cavity was without masses, polyps, or mucopurulence.  The right middle turbinate and middle meatus was normal in appearance.  The sphenoethmoid recess was clear.  The left nasal cavity shows a widely patent maxillary antrostomy and anterior ethmoid cavity.  The left nasal cavity was without masses, polyps, or mucopurulence.  The left middle turbinate and middle meatus was normal in appearance.  The sphenoethmoid recess was clear.  The sinonasal mucosa  appeared normal.  The nasal septum is essentially midline.  The nasopharynx had a normal appearance with normal Eustachian tube openings and fossa of Rosenmuller bilaterally. Minimal adenoid tissue.  The scope was removed.  The patient tolerated the procedure well.                    Assessment and Plan     ICD-10-CM    1. Chronic pansinusitis  J32.4 NASAL ENDOSCOPY, DIAGNOSTIC   2. History of endoscopic sinus surgery  Z98.890 NASAL ENDOSCOPY, DIAGNOSTIC   3. Left-sided face pain  R51.9 NASAL ENDOSCOPY, DIAGNOSTIC   4. Temporal mandibular joint disorder  M26.609 NASAL ENDOSCOPY, DIAGNOSTIC     It was my pleasure seeing Dilia Solomon today in clinic.  The patient presents with left-sided facial/sinus pain in the setting of a history of chronic sinusitis and previous sinus surgery.  The patient's endoscopic nasal exam today is completely within normal limits without any signs of inflammation or infection.  Given her past history of TMJ issues, I think this is likely TMJ arthralgia.  The patient does wear a splint at nighttime, this is about 3 years old and works well for her.  I gave the patient some instructions to help with her TMJ symptoms including soft diet, limit gum chewing, using the Tylenol that she has been previously, using warm compresses on the joint, and some jaw stretching exercises.  This is not helpful, I could have her see physical therapy for some more TMJ management or we could have her see a TMJ specialist.  The patient will contact me if she is not improving or if she is worsening.     Maximilian Abad MD  Department of Otolarygology-Head and Neck Surgery  [facility]       This laryngoscopy scope was used on this patient.    [facility]   2024-02-26  LOT [id number]   [facility] [id number]  Elsi Mcpherson [facility]          Again, thank you for allowing me to participate in the care of your patient.        Sincerely,        Maximilian Abad MD

## 2021-07-14 NOTE — NURSING NOTE
"Initial /68 (BP Location: Right arm, Patient Position: Sitting, Cuff Size: Adult Large)   Pulse 76   Temp (!) 96.7  F (35.9  C) (Tympanic)   Ht 1.6 m (5' 3\")   Wt 93 kg (205 lb)   LMP 07/18/2015 (Approximate)   BMI 36.31 kg/m   Estimated body mass index is 36.31 kg/m  as calculated from the following:    Height as of this encounter: 1.6 m (5' 3\").    Weight as of this encounter: 93 kg (205 lb). .    Elsi Mcpherson CMA    "

## 2021-07-14 NOTE — PATIENT INSTRUCTIONS
Per physician instructions      If you have questions or concerns on any instructions given to you by your provider today or if you need to schedule an appointment, you can reach us at [phone].     Temporal mandibular joint dysfunction instructions    For the next 2 weeks:   1. Soft diet  2. No chewing gum  3. Use acetaminophen as needed  4. Warm compresses over jaw joint in front of ears  5. Use oral appliance at night.     TMJ exercises:  1. Close your mouth to touch your upper and lower teeth without clenching them. Rest the tip of your tongue on your palate behind your upper teeth.  2. Slide the tip of your tongue backwards toward the throat as far as you can, keeping your teeth together.  3. Try to touch the soft palate with the tip of your tongue, and keep it there. Then slowly open your mouth until you feel your tongue is being pulled away from the soft palate. Keep your mouth open in this position for five seconds before closing your mouth to relax.  4. Repeat the above steps slowly for 5 minutes. You may want to check in a mirror to be sure your teeth are closed aligned straight up and down, without your jaw being off to one side.     Do the exercises twice daily for the first 2 weeks, but then you can do it more often if it seems to help. You shouldn't hear any clicks or noise from your joints, and if you do then you should restart the exercise rather than opening the mouth further. With practice, you will be able to open further without having clicking.

## 2021-07-20 ENCOUNTER — ALLIED HEALTH/NURSE VISIT (OUTPATIENT)
Dept: ALLERGY | Facility: CLINIC | Age: 56
End: 2021-07-20
Payer: COMMERCIAL

## 2021-07-20 DIAGNOSIS — J30.89 ALLERGIC RHINITIS DUE TO DUST MITE: ICD-10-CM

## 2021-07-20 DIAGNOSIS — J30.89 ALLERGIC RHINITIS DUE TO MOLD: ICD-10-CM

## 2021-07-20 DIAGNOSIS — J30.81 CHRONIC ALLERGIC RHINITIS DUE TO ANIMAL HAIR AND DANDER: ICD-10-CM

## 2021-07-20 DIAGNOSIS — J30.1 CHRONIC SEASONAL ALLERGIC RHINITIS DUE TO POLLEN: Primary | ICD-10-CM

## 2021-07-20 PROCEDURE — 95117 IMMUNOTHERAPY INJECTIONS: CPT

## 2021-08-17 ENCOUNTER — ALLIED HEALTH/NURSE VISIT (OUTPATIENT)
Dept: ALLERGY | Facility: CLINIC | Age: 56
End: 2021-08-17
Payer: COMMERCIAL

## 2021-08-17 DIAGNOSIS — J30.81 CHRONIC ALLERGIC RHINITIS DUE TO ANIMAL HAIR AND DANDER: ICD-10-CM

## 2021-08-17 DIAGNOSIS — J30.89 ALLERGIC RHINITIS DUE TO DUST MITE: ICD-10-CM

## 2021-08-17 DIAGNOSIS — J30.1 CHRONIC SEASONAL ALLERGIC RHINITIS DUE TO POLLEN: Primary | ICD-10-CM

## 2021-08-17 DIAGNOSIS — J30.89 ALLERGIC RHINITIS DUE TO MOLD: ICD-10-CM

## 2021-08-17 PROCEDURE — 95117 IMMUNOTHERAPY INJECTIONS: CPT

## 2021-09-14 ENCOUNTER — ALLIED HEALTH/NURSE VISIT (OUTPATIENT)
Dept: ALLERGY | Facility: CLINIC | Age: 56
End: 2021-09-14
Payer: COMMERCIAL

## 2021-09-14 DIAGNOSIS — J30.1 CHRONIC SEASONAL ALLERGIC RHINITIS DUE TO POLLEN: Primary | ICD-10-CM

## 2021-09-14 DIAGNOSIS — J30.89 ALLERGIC RHINITIS DUE TO DUST MITE: ICD-10-CM

## 2021-09-14 DIAGNOSIS — J30.89 ALLERGIC RHINITIS DUE TO MOLD: ICD-10-CM

## 2021-09-14 DIAGNOSIS — J30.81 CHRONIC ALLERGIC RHINITIS DUE TO ANIMAL HAIR AND DANDER: ICD-10-CM

## 2021-09-14 PROCEDURE — 95117 IMMUNOTHERAPY INJECTIONS: CPT

## 2021-09-14 NOTE — PROGRESS NOTES
Patient presented after waiting 30 minutes with no reaction to  injections. Discharged from clinic.    Kaylee GRANT RN  Specialty/Allergy Clinics

## 2021-09-19 ENCOUNTER — HEALTH MAINTENANCE LETTER (OUTPATIENT)
Age: 56
End: 2021-09-19

## 2021-10-12 ENCOUNTER — ALLIED HEALTH/NURSE VISIT (OUTPATIENT)
Dept: ALLERGY | Facility: CLINIC | Age: 56
End: 2021-10-12
Payer: COMMERCIAL

## 2021-10-12 DIAGNOSIS — J30.81 CHRONIC ALLERGIC RHINITIS DUE TO ANIMAL HAIR AND DANDER: ICD-10-CM

## 2021-10-12 DIAGNOSIS — J30.89 ALLERGIC RHINITIS DUE TO DUST MITE: ICD-10-CM

## 2021-10-12 DIAGNOSIS — Z51.6 NEED FOR DESENSITIZATION TO ALLERGENS: ICD-10-CM

## 2021-10-12 DIAGNOSIS — J30.89 ALLERGIC RHINITIS DUE TO MOLD: ICD-10-CM

## 2021-10-12 DIAGNOSIS — J30.1 CHRONIC SEASONAL ALLERGIC RHINITIS DUE TO POLLEN: Primary | ICD-10-CM

## 2021-10-12 PROCEDURE — 95117 IMMUNOTHERAPY INJECTIONS: CPT

## 2021-10-12 RX ORDER — EPINEPHRINE 0.3 MG/.3ML
0.3 INJECTION SUBCUTANEOUS
Qty: 0.6 ML | Refills: 3 | Status: SHIPPED | OUTPATIENT
Start: 2021-10-12 | End: 2023-06-14

## 2021-10-12 NOTE — PROGRESS NOTES
Patient presented after waiting 30 minutes with no reaction to  injections. Discharged from clinic.  Gia GUEVARA RN  Specialty/Allergy Clinic

## 2021-10-15 ENCOUNTER — IMMUNIZATION (OUTPATIENT)
Dept: FAMILY MEDICINE | Facility: CLINIC | Age: 56
End: 2021-10-15
Payer: COMMERCIAL

## 2021-10-15 DIAGNOSIS — Z23 NEED FOR PROPHYLACTIC VACCINATION AND INOCULATION AGAINST INFLUENZA: Primary | ICD-10-CM

## 2021-10-15 PROCEDURE — 90471 IMMUNIZATION ADMIN: CPT

## 2021-10-15 PROCEDURE — 90682 RIV4 VACC RECOMBINANT DNA IM: CPT

## 2021-10-22 ENCOUNTER — HOSPITAL ENCOUNTER (EMERGENCY)
Facility: CLINIC | Age: 56
Discharge: HOME | End: 2021-10-22
Attending: NURSE PRACTITIONER | Admitting: NURSE PRACTITIONER
Payer: COMMERCIAL

## 2021-10-22 VITALS
BODY MASS INDEX: 33.66 KG/M2 | SYSTOLIC BLOOD PRESSURE: 145 MMHG | RESPIRATION RATE: 12 BRPM | WEIGHT: 190 LBS | DIASTOLIC BLOOD PRESSURE: 85 MMHG | TEMPERATURE: 97 F | OXYGEN SATURATION: 98 % | HEART RATE: 90 BPM

## 2021-10-22 DIAGNOSIS — H10.31 ACUTE CONJUNCTIVITIS OF RIGHT EYE: ICD-10-CM

## 2021-10-22 PROCEDURE — 99284 EMERGENCY DEPT VISIT MOD MDM: CPT | Performed by: NURSE PRACTITIONER

## 2021-10-22 PROCEDURE — 99283 EMERGENCY DEPT VISIT LOW MDM: CPT | Performed by: NURSE PRACTITIONER

## 2021-10-22 RX ORDER — ERYTHROMYCIN 5 MG/G
0.5 OINTMENT OPHTHALMIC 4 TIMES DAILY
Qty: 4 G | Refills: 0 | Status: SHIPPED | OUTPATIENT
Start: 2021-10-22 | End: 2022-03-11

## 2021-10-22 ASSESSMENT — ENCOUNTER SYMPTOMS
EYE DISCHARGE: 1
EYE REDNESS: 1
EYE ITCHING: 0
PHOTOPHOBIA: 0
EYE PAIN: 0

## 2021-10-23 NOTE — ED PROVIDER NOTES
History     Chief Complaint   Patient presents with     Eye Problem     right eye irritation for 3 days, unknown source, mattery today, itchy and watery     HPI  Dilia Solomon is a 56 year old female who presents the emergency department for evaluation of right eye redness and discharge.  Eyes felt irritated for the last 3 days however green matting present this morning.  Eyes not painful.  Minimal itching.  Frequent eye watering.  Uses allergy eyedrops daily.  Does not wear contacts.  No fevers, headache, double vision or blurry vision.    Allergies:  Allergies   Allergen Reactions     Niacin Hives     Nsaids Hives     Tolerates ibuprofen and aspirin without hives       Pineapple Hives       Problem List:    Patient Active Problem List    Diagnosis Date Noted     History of endoscopic sinus surgery 10/25/2019     Priority: Medium     Chronic pansinusitis 10/25/2019     Priority: Medium     Allergic conjunctivitis, bilateral 06/05/2019     Priority: Medium     Obesity (BMI 35.0-39.9) with comorbidity (H) 01/29/2019     Priority: Medium     Allergic rhinitis due to dust mite 07/13/2017     Priority: Medium     IgE testing 6/28/17 positive for cat, dog, dust mite, trees, grass, weeds, and molds       Chronic allergic rhinitis due to animal hair and dander 07/13/2017     Priority: Medium     IgE testing 6/28/17 positive for cat, dog, dust mite, trees, grass, weeds, and molds       Allergic rhinitis due to mold 07/13/2017     Priority: Medium     IgE testing 6/28/17 positive for cat, dog, dust mite, trees, grass, weeds, and molds       Chronic seasonal allergic rhinitis due to pollen 07/13/2017     Priority: Medium     IgE testing 6/28/17 positive for cat, dog, dust mite, trees, grass, weeds, and molds       FH: diabetes mellitus 09/01/2015     Priority: Medium     Food allergy 02/17/2015     Priority: Medium     Moderate persistent asthma 06/28/2011     Priority: Medium     Spirometry results not clear.  Some FVC  "down with URI, improved over time.  Never had decreased fev/fvc ratio.  Consider trial off advair to see how she responds.         Advanced directives, counseling/discussion 05/27/2011     Priority: Medium     Patient does not have an Advance/Health Care Directive (HCD), given \"What is Advance Care Planning?\" flyer.    Ruma Romero  May 27, 2011         Hyperlipidemia LDL goal <130 01/12/2011     Priority: Medium     Synovial cyst of popliteal space 07/28/2007     Priority: Medium     Pain in joint, lower leg 07/28/2007     Priority: Medium     Carpal tunnel syndrome 07/04/2007     Priority: Medium     S/p surgery 04-05/2004 bilat       Radial styloid tenosynovitis 04/24/2007     Priority: Medium     Need for prophylactic vaccination and inoculation against influenza 10/24/2006     Priority: Medium     Sprain of interphalangeal (joint) of hand 10/24/2006     Priority: Medium        Past Medical History:    Past Medical History:   Diagnosis Date     Injury, other and unspecified, shoulder and upper arm 9/03       Past Surgical History:    Past Surgical History:   Procedure Laterality Date     COLONOSCOPY N/A 10/6/2016    Procedure: COLONOSCOPY;  Surgeon: Shiva Johns MD;  Location: [facility]     ETHMOIDECTOMY  12/30/2013    Procedure: ETHMOIDECTOMY;  Bilateral Submucousal Reduction of InferiorTurbinates and Total Ethmoidectomy with Multiple Sinusotomies;  Surgeon: Lio More MD;  Location: [facility]     ETHMOIDECTOMY Bilateral 2/14/2018    Procedure: ETHMOIDECTOMY;  Bilateral anterior ethmoidectomy, bilateral maxillary antrostomy;  Surgeon: Santhosh Tierney MD;  Location: [facility]     SURGICAL HISTORY OF -   5/04    carpal tunnel release, bilateral       Family History:    Family History   Problem Relation Age of Onset     C.A.D. Mother      Diabetes Mother      Cancer Father         brain     Breast Cancer Maternal Aunt      Cancer - colorectal No family hx of        Social History:  Marital Status:  Single " [1]  Social History     Tobacco Use     Smoking status: Never Smoker     Smokeless tobacco: Never Used   Substance Use Topics     Alcohol use: No     Drug use: No        Medications:    erythromycin (ROMYCIN) 5 MG/GM ophthalmic ointment  albuterol (2.5 MG/3ML) 0.083% neb solution  albuterol (PROAIR HFA/PROVENTIL HFA/VENTOLIN HFA) 108 (90 Base) MCG/ACT inhaler  azelastine (ASTELIN) 0.1 % nasal spray  azelastine (OPTIVAR) 0.05 % ophthalmic solution  cetirizine (ZYRTEC) 10 MG tablet  Emollient (CERAVE) CREA  EPINEPHrine (ANY BX GENERIC EQUIV) 0.3 MG/0.3ML injection 2-pack  fluticasone (FLONASE) 50 MCG/ACT nasal spray  fluticasone-salmeterol (ADVAIR) 250-50 MCG/DOSE inhaler  loratadine (CLARITIN) 10 MG tablet  montelukast (SINGULAIR) 10 MG tablet  MULTIVITAMIN OR  ORDER FOR ALLERGEN IMMUNOTHERAPY  ORDER FOR ALLERGEN IMMUNOTHERAPY  ORDER FOR ALLERGEN IMMUNOTHERAPY  ORDER FOR ALLERGEN IMMUNOTHERAPY  order for DME  order for DME  order for DME  order for DME  triamcinolone (KENALOG) 0.1 % external cream          Review of Systems   Eyes: Positive for discharge and redness. Negative for photophobia, pain, itching and visual disturbance.   All other systems reviewed and are negative.      Physical Exam   BP: (!) 145/85  Pulse: 90  Temp: 97  F (36.1  C)  Resp: 12  Weight: 86.2 kg (190 lb)  SpO2: 98 %    Physical Exam  Constitutional:       General: She is not in acute distress.     Appearance: Normal appearance.   Eyes:      General: Lids are normal.         Right eye: No foreign body.      Extraocular Movements: Extraocular movements intact.      Conjunctiva/sclera:      Right eye: Right conjunctiva is injected. Exudate present.      Left eye: Left conjunctiva is injected. No exudate.     Pupils: Pupils are equal, round, and reactive to light.   Cardiovascular:      Rate and Rhythm: Normal rate.   Pulmonary:      Effort: Pulmonary effort is normal.   Musculoskeletal:         General: Normal range of motion.   Skin:      General: Skin is warm.      Capillary Refill: Capillary refill takes less than 2 seconds.   Neurological:      General: No focal deficit present.      Mental Status: She is alert.         ED Course        Procedures    No results found for this or any previous visit (from the past 24 hour(s)).    Medications - No data to display    Assessments & Plan (with Medical Decision Making)   Dilia Solomon is a 56 year old female who presents the emergency department for evaluation of right eye redness and discharge.  Eyes felt irritated for the last 3 days however green matting present this morning.  Eyes not painful.  Minimal itching.  Vital signs normal.  Exam as above.  Prescription for erythromycin sent.  Patient with ophthalmology appointment on Tuesday.  Return sooner if needed.  Agreeable to plan of care and discharged in good condition.  I have reviewed the nursing notes.    I have reviewed the findings, diagnosis, plan and need for follow up with the patient.    Discharge Medication List as of 10/22/2021  9:15 PM      START taking these medications    Details   erythromycin (ROMYCIN) 5 MG/GM ophthalmic ointment Place 0.5 inches into the right eye 4 times dailyDisp-4 g, R-0InstyMeds             Final diagnoses:   Acute conjunctivitis of right eye       10/22/2021   [facility] EMERGENCY DEPT     Alicia Mckeon, APRN CNP  10/22/21 1649

## 2021-11-09 ENCOUNTER — ALLIED HEALTH/NURSE VISIT (OUTPATIENT)
Dept: ALLERGY | Facility: CLINIC | Age: 56
End: 2021-11-09
Payer: COMMERCIAL

## 2021-11-09 DIAGNOSIS — J30.89 ALLERGIC RHINITIS DUE TO MOLD: ICD-10-CM

## 2021-11-09 DIAGNOSIS — J30.81 CHRONIC ALLERGIC RHINITIS DUE TO ANIMAL HAIR AND DANDER: ICD-10-CM

## 2021-11-09 DIAGNOSIS — J30.89 ALLERGIC RHINITIS DUE TO DUST MITE: ICD-10-CM

## 2021-11-09 DIAGNOSIS — J30.1 CHRONIC SEASONAL ALLERGIC RHINITIS DUE TO POLLEN: Primary | ICD-10-CM

## 2021-11-09 DIAGNOSIS — J30.1 CHRONIC SEASONAL ALLERGIC RHINITIS DUE TO POLLEN: ICD-10-CM

## 2021-11-09 PROCEDURE — 95117 IMMUNOTHERAPY INJECTIONS: CPT

## 2021-11-09 NOTE — TELEPHONE ENCOUNTER
ALLERGY SOLUTION RE-ORDER REQUEST    Dilia Solomon 1965 MRN: [id number]    DATE NEEDED:  11/23/2021  Vial Color Content    Vial Size  Red 1:1 Weeds    5mL  Red 1:1 Grass, Trees   5mL  Red 1:1 Molds    5mL  Red 1:1 Cat, Dog, Dust Mite    5mL      Serum reorder consent signed and patient/parent was advised that new serums would be ordered through the pharmacy and billed to their insurance company when they arrive in clinic. Yes    [facility] Location:  [place]  Ship to Location: [place]  Serum billed to:  [place]    Special Instructions:          Requester Signature  Silver Araujo LPN

## 2021-11-29 ENCOUNTER — TELEPHONE (OUTPATIENT)
Dept: ALLERGY | Facility: CLINIC | Age: 56
End: 2021-11-29
Payer: COMMERCIAL

## 2021-11-29 NOTE — TELEPHONE ENCOUNTER
"Pt calling with complaints of ear pain.     Pt states that on 11/20 she did her nasal rinses x 2 and then had bilateral ear pain/popping after. Denies any other complaint. Stopped nasal rinses until recently (the last couple days resumed). Concerned that her left ear is sore and feels \"itchy\".     Informed pt that she should have her ears checked with PCP. Agreeable. States she has an appointment for tomorrow in [place].     Pt would like to know what Dr Lujan thinks.     No other complaints.     Kaylee GRANT RN  Specialty/Allergy Clinics      "

## 2021-12-02 ENCOUNTER — OFFICE VISIT (OUTPATIENT)
Dept: FAMILY MEDICINE | Facility: CLINIC | Age: 56
End: 2021-12-02
Payer: COMMERCIAL

## 2021-12-02 VITALS
RESPIRATION RATE: 18 BRPM | HEART RATE: 70 BPM | BODY MASS INDEX: 38.45 KG/M2 | SYSTOLIC BLOOD PRESSURE: 120 MMHG | TEMPERATURE: 97.2 F | DIASTOLIC BLOOD PRESSURE: 70 MMHG | HEIGHT: 63 IN | WEIGHT: 217 LBS

## 2021-12-02 DIAGNOSIS — H92.03 OTALGIA, BILATERAL: Primary | ICD-10-CM

## 2021-12-02 DIAGNOSIS — J30.89 ALLERGIC RHINITIS DUE TO DUST MITE: Primary | ICD-10-CM

## 2021-12-02 DIAGNOSIS — J30.1 CHRONIC SEASONAL ALLERGIC RHINITIS DUE TO POLLEN: ICD-10-CM

## 2021-12-02 DIAGNOSIS — J30.89 ALLERGIC RHINITIS DUE TO MOLD: ICD-10-CM

## 2021-12-02 PROCEDURE — 95165 ANTIGEN THERAPY SERVICES: CPT | Performed by: ALLERGY & IMMUNOLOGY

## 2021-12-02 PROCEDURE — 90471 IMMUNIZATION ADMIN: CPT | Performed by: FAMILY MEDICINE

## 2021-12-02 PROCEDURE — 90750 HZV VACC RECOMBINANT IM: CPT | Performed by: FAMILY MEDICINE

## 2021-12-02 PROCEDURE — 99213 OFFICE O/P EST LOW 20 MIN: CPT | Mod: 25 | Performed by: FAMILY MEDICINE

## 2021-12-02 ASSESSMENT — PAIN SCALES - GENERAL: PAINLEVEL: MODERATE PAIN (5)

## 2021-12-02 ASSESSMENT — MIFFLIN-ST. JEOR: SCORE: 1548.44

## 2021-12-02 NOTE — PROGRESS NOTES
Allergy serums billed to [place].     Vials billed below:    Vial Color Content                      Vial Size Expiration Date  Red 1:1                                   Weeds                                5mL 12/2/2022  Red 1:1                                   Grass, Trees                       5mL 12/2/2022  Red 1:1                                   Molds                                      5mL 12/2/2022    Checked by Kaylee GRANT  30 units billed    Signature  Gia Bae RN

## 2021-12-02 NOTE — NURSING NOTE
"Chief Complaint   Patient presents with     Ear Problem     /70 (Cuff Size: Adult Regular)   Pulse 70   Temp 97.2  F (36.2  C) (Tympanic)   Resp 18   Ht 1.6 m (5' 3\")   Wt 98.4 kg (217 lb)   LMP 07/18/2015 (Approximate)   Breastfeeding No   BMI 38.44 kg/m   Estimated body mass index is 38.44 kg/m  as calculated from the following:    Height as of this encounter: 1.6 m (5' 3\").    Weight as of this encounter: 98.4 kg (217 lb).  Patient presents to the clinic using No DME      Health Maintenance that is potentially due pending provider review:    Health Maintenance Due   Topic Date Due     ANNUAL REVIEW OF HM ORDERS  Never done     HIV SCREENING  Never done     HEPATITIS C SCREENING  Never done     ZOSTER IMMUNIZATION (1 of 2) Never done     ADVANCE CARE PLANNING  05/27/2016     PREVENTIVE CARE VISIT  01/29/2020     ASTHMA ACTION PLAN  06/04/2020     PHQ-2  01/01/2021     MAMMO SCREENING  10/25/2021     ASTHMA CONTROL TEST  01/01/2022                "

## 2021-12-02 NOTE — PROGRESS NOTES
"  Assessment & Plan     Otalgia, bilateral  2 weeks of bilateral otalgia since November 20, when she used a Colette pot, at some point felt a pop, and felt pain later that day.  Uses distilled water.  Also had recent event of falling and hitting her head.  Has been using hot pack with relief since that time.  History of pansinusitis with 2 sinus surgeries in 2013 in 2017 due to recurrent infections and allergies.  Last seen in ENT in 1 to 2 years ago.  No signs of active infection or focal findings at this time, so will refer to reestablish with ENT. Encouraged symptomatic management including hot packs and OTC analgesia.   - Otolaryngology Referral; [id number]     BMI:   Estimated body mass index is 38.44 kg/m  as calculated from the following:    Height as of this encounter: 1.6 m (5' 3\").    Weight as of this encounter: 98.4 kg (217 lb).       JANEYJorge Castillo [facility] MS3 12/02/21 9:12 AM       I have reviewed today's vital signs, notes, medications, labs and imaging. I have also seen and examined the patient and agree with the findings and plan as outlined above.      Mahednra Sultana MD  [facility]    Pedro Luis Dobbs is a 56 year old who presents for the following health issues     HPI     Concern - Ear pain   Onset: 11/20/21  Description: left ear, left side jaw   Intensity: moderate  Progression of Symptoms:  same  Accompanying Signs & Symptoms: thinks there might be a sore in her ear   Previous history of similar problem:   Therapies tried and outcome: heat packs, tylenol         Review of Systems   Constitutional, HEENT, cardiovascular, pulmonary, gi and gu systems are negative, except as otherwise noted.      Objective    /70 (Cuff Size: Adult Regular)   Pulse 70   Temp 97.2  F (36.2  C) (Tympanic)   Resp 18   Ht 1.6 m (5' 3\")   Wt 98.4 kg (217 lb)   LMP 07/18/2015 (Approximate)   Breastfeeding No   BMI 38.44 kg/m    Body mass index is 38.44 kg/m .  Physical Exam "   GENERAL: healthy, alert and no distress  HENT: ear canals and TM's normal, potential slight erythema, no ulcerations or lesions in canal, cerumen present,nose and mouth without ulcers or lesions. No pain or clicking with jaw ROM. Slight TTP anterior to L ear.   NECK: no adenopathy  RESP: lungs clear to auscultation - no rales, rhonchi or wheezes  CV: regular rate and rhythm, normal S1 S2, no S3 or S4, no murmur, click or rub, no peripheral edema  ABDOMEN: soft, nontender, no hepatosplenomegaly, no masses  MS: no gross musculoskeletal defects noted, no edema  LYMPH: no cervical or postauricular adenopathy

## 2021-12-03 DIAGNOSIS — J30.89 ALLERGIC RHINITIS DUE TO DUST MITE: Primary | ICD-10-CM

## 2021-12-03 DIAGNOSIS — J30.81 CHRONIC ALLERGIC RHINITIS DUE TO ANIMAL HAIR AND DANDER: ICD-10-CM

## 2021-12-03 PROCEDURE — 95165 ANTIGEN THERAPY SERVICES: CPT | Performed by: ALLERGY & IMMUNOLOGY

## 2021-12-03 NOTE — TELEPHONE ENCOUNTER
Allergy serums received at [place].     Vials received below:    Vial Color Content                      Vial Size Expiration Date  Red 1:1 Molds 5mL  12/02/22  Red 1:1 Weeds 5mL  12/02/22  Red 1:1 Cat, Dog, Dust Mite 5mL  12/03/22  Red 1:1 Grass, Trees 5mL  12/02/22      Signature  Silver Araujo LPN

## 2021-12-03 NOTE — PROGRESS NOTES
Allergy serums billed to [place].     Vials billed below:    Vial Color Content                      Vial Size Expiration Date  Red 1:1                                   Cat, Dog, Dust Mite    5mL                 12/03/22    Checked by Kaylee GRANT  10 units billed    Signature  Gia Bae RN

## 2021-12-07 ENCOUNTER — ALLIED HEALTH/NURSE VISIT (OUTPATIENT)
Dept: ALLERGY | Facility: CLINIC | Age: 56
End: 2021-12-07
Payer: COMMERCIAL

## 2021-12-07 DIAGNOSIS — J30.81 CHRONIC ALLERGIC RHINITIS DUE TO ANIMAL HAIR AND DANDER: ICD-10-CM

## 2021-12-07 DIAGNOSIS — J30.89 ALLERGIC RHINITIS DUE TO DUST MITE: Primary | ICD-10-CM

## 2021-12-07 DIAGNOSIS — J30.1 CHRONIC SEASONAL ALLERGIC RHINITIS DUE TO POLLEN: ICD-10-CM

## 2021-12-07 DIAGNOSIS — J30.89 ALLERGIC RHINITIS DUE TO MOLD: ICD-10-CM

## 2021-12-07 PROCEDURE — 95117 IMMUNOTHERAPY INJECTIONS: CPT

## 2022-01-04 ENCOUNTER — ALLIED HEALTH/NURSE VISIT (OUTPATIENT)
Dept: ALLERGY | Facility: CLINIC | Age: 57
End: 2022-01-04
Payer: COMMERCIAL

## 2022-01-04 DIAGNOSIS — J30.89 ALLERGIC RHINITIS DUE TO MOLD: ICD-10-CM

## 2022-01-04 DIAGNOSIS — J30.81 CHRONIC ALLERGIC RHINITIS DUE TO ANIMAL HAIR AND DANDER: ICD-10-CM

## 2022-01-04 DIAGNOSIS — J30.89 ALLERGIC RHINITIS DUE TO DUST MITE: Primary | ICD-10-CM

## 2022-01-04 DIAGNOSIS — J30.1 CHRONIC SEASONAL ALLERGIC RHINITIS DUE TO POLLEN: ICD-10-CM

## 2022-01-04 PROCEDURE — 95117 IMMUNOTHERAPY INJECTIONS: CPT

## 2022-01-04 NOTE — PROGRESS NOTES
Chief Complaint   Patient presents with     Ear Problem     c/o popping after using nasal rinses in 11/2021 and c/o pain in ears at [facility] states she has never had an ear infection/audio     History of Present Illness   Dilia Solomon is a 56 year old female who presents today for follow-up.  I evaluated the patient on 7/14/2021 with discomfort in the left cheek into the jaw.      The patient has had 2 previous endoscopic sinus surgeries, most recently with Dr. Tierney on 2/14/2018.  She has a history of chronic allergic rhinitis and does receive allergy injection immunotherapy.    At the time of her prior examination, I felt that she most likely had TMJ discomfort.  Her past history is significant for TMJ disorder.  She does wear an oral splint at nighttime.  She is had physical therapy for her TMJ in the past.  She was recently having ear discomfort after nasal irrigation in the fall.  She was referred to ENT for further evaluation of her bilateral otalgia.    From a symptom standpoint, the patient reports having some discomfort in her ears for several days after an unfortunate nasal irrigation incident.  She is not had any symptoms since.  She currently denies any otalgia or otorrhea.  She has not had previous ear surgery.    My review of the patient's most recent sinus CT performed 10/30/2019 showed a midline nasal septum.  The patient's postsurgical changes of bilateral maxillary antrostomies and anterior ethmoidectomies.  There is a slight amount of thickening in the floor of the right maxillary sinus.  The remainder the paranasal sinuses are well aerated without any acute or chronic inflammation.    Past Medical History  Patient Active Problem List   Diagnosis     Need for prophylactic vaccination and inoculation against influenza     Sprain of interphalangeal (joint) of hand     Radial styloid tenosynovitis     Carpal tunnel syndrome     Synovial cyst of popliteal space     Pain in joint, lower  leg     Hyperlipidemia LDL goal <130     Advanced directives, counseling/discussion     Moderate persistent asthma     Allergic rhinitis due to dust mite     Food allergy     FH: diabetes mellitus     Chronic allergic rhinitis due to animal hair and dander     Allergic rhinitis due to mold     Chronic seasonal allergic rhinitis due to pollen     Obesity (BMI 35.0-39.9) with comorbidity (H)     Allergic conjunctivitis, bilateral     History of endoscopic sinus surgery     Chronic pansinusitis     Current Medications     Current Outpatient Medications:      albuterol (2.5 MG/3ML) 0.083% neb solution, Take 1 vial (2.5 mg) by nebulization every 4 hours as needed, Disp: 1 Box, Rfl: 3     albuterol (PROAIR HFA/PROVENTIL HFA/VENTOLIN HFA) 108 (90 Base) MCG/ACT inhaler, Inhale 2 puffs into the lungs every 4 hours as needed for shortness of breath / dyspnea or wheezing, Disp: 1 Inhaler, Rfl: 3     azelastine (ASTELIN) 0.1 % nasal spray, Spray 2 sprays into both nostrils 2 times daily as needed for rhinitis or allergies, Disp: 90 mL, Rfl: 3     azelastine (OPTIVAR) 0.05 % ophthalmic solution, Apply 1 drop to eye 2 times daily, Disp: 1 Bottle, Rfl: 5     cetirizine (ZYRTEC) 10 MG tablet, Take 1 tablet (10 mg) by mouth daily as needed for allergies, Disp: 90 tablet, Rfl: 3     Emollient (CERAVE) CREA, Externally apply 1 dose. topically 2 times daily, Disp: 2 Bottle, Rfl: 11     erythromycin (ROMYCIN) 5 MG/GM ophthalmic ointment, Place 0.5 inches into the right eye 4 times daily, Disp: 4 g, Rfl: 0     fluticasone (FLONASE) 50 MCG/ACT nasal spray, Spray 2 sprays into both nostrils daily, Disp: 48 g, Rfl: 1     fluticasone-salmeterol (ADVAIR) 250-50 MCG/DOSE inhaler, Inhale 1 puff into the lungs 2 times daily, Disp: 3 Inhaler, Rfl: 3     loratadine (CLARITIN) 10 MG tablet, Take 1 tablet (10 mg) by mouth daily as needed for allergies, Disp: 90 tablet, Rfl: 3     montelukast (SINGULAIR) 10 MG tablet, Take 1 tablet (10 mg) by mouth  At Bedtime, Disp: 90 tablet, Rfl: 1     MULTIVITAMIN OR, 1 tab daily, Disp: , Rfl:      ORDER FOR ALLERGEN IMMUNOTHERAPY, Name of Mix: Mix #1  Mold Alternaria Tenuis 1:10 w/v, HS  0.5 ml Aspergillus Fumigatus 1:10 w/v, HS  0.5 ml Epicoccum Nigrum 1:10 w/v, HS 0.5 ml Hormodendrum Cladosporioides 1:10 w/v, HS 0.5 ml Penicillium Mix 1:10 w/v, HS  0.5 ml Diluent: HSA qs to 5ml, Disp: 5 mL, Rfl: PRN     ORDER FOR ALLERGEN IMMUNOTHERAPY, Name of Mix: Mix #2  Dust Mite, Cat, Dog Cat Hair, Standardized 10,000 BAU/mL, ALK  2.0 ml Dog Hair Dander, A. P.  1:100 w/v, HS  1.0 ml Dust Mites F 30,000AU/mL, HS  0.3 ml Dust Mites P. 30,000 AU/mL, HS  0.3 ml  Diluent: HSA qs to 5ml, Disp: 5 mL, Rfl: PRN     ORDER FOR ALLERGEN IMMUNOTHERAPY, Name of Mix: Mix #3 Grass,Tree  Dmitry,White 1:20w/v, HS 0.5ml Birch Mix PRW 1:20w/v, HS 0.5ml Boxelder-Maple Mix BHR (Boxelder Hard Red) 1:20w/v, HS 0.5ml Dundy,Common 1:20w/v, HS 0.5ml Elm,American 1:20w/v, HS 0.5ml [place] Mix RW 1:20w/v, HS 0.5ml Oak Mix RVW 1:20w/v, HS 0.5ml [place] Tree,Black 1:20w/v, HS 0.5ml Suresh Grass (Std) 100,000 BAU/mL, HS 0.4ml Jonathan Grass 1:20w/v, HS 0.5ml Diluent: HSA qs to 5ml, Disp: 5 mL, Rfl: PRN     ORDER FOR ALLERGEN IMMUNOTHERAPY, Name of Mix: Mix #4  Weeds Kochia 1:20 w/v, HS 0.5 ml Lamb's Quarters 1:20 w/v, HS 0.5 ml Nettle 1:20 w/v, HS 0.5 ml Plantain, English 1:20 w/v, HS 0.5 ml Ragweed Mixed 1:20 w/v ALK  0.5 ml Russian Thistle 1:20 w/v, HS 0.5 ml Sagebrush, Mugwort 1:20 w/v, HS 0.5 ml Sorrel, Sheep 1:20 w/v, HS 0.5 ml Diluent: HSA qs to 5ml, Disp: 5 mL, Rfl: PRN     order for DME, Equipment being ordered: thumb spica splint RIGHT, Disp: 1 Units, Rfl: 0     order for DME, Equipment being ordered: Silicone heel cup, Disp: 1 Units, Rfl: 0     order for DME, Equipment being ordered: Dynaflex insert, Disp: 1 Units, Rfl: 0     order for DME, Equipment being ordered: Silicone heel cup, Disp: 1 Units, Rfl: 0     triamcinolone (KENALOG) 0.1 % external  cream, Apply sparingly to affected area two times daily as needed but not more than 14 days in a row. Spare face, armpits, neck, and groin., Disp: 80 g, Rfl: 1     EPINEPHrine (ANY BX GENERIC EQUIV) 0.3 MG/0.3ML injection 2-pack, Inject 0.3 mLs (0.3 mg) into the muscle once as needed for anaphylaxis (Patient not taking: Reported on 1/5/2022), Disp: 0.6 mL, Rfl: 3    Allergies  Allergies   Allergen Reactions     Niacin Hives     Nsaids Hives     Tolerates ibuprofen and aspirin without hives       Pineapple Hives       Social History   Social History     Socioeconomic History     Marital status: Single     Spouse name: Not on file     Number of children: Not on file     Years of education: Not on file     Highest education level: Not on file   Occupational History     Employer: [employer]    Tobacco Use     Smoking status: Never Smoker     Smokeless tobacco: Never Used   Substance and Sexual Activity     Alcohol use: No     Drug use: No     Sexual activity: Never   Other Topics Concern     Parent/sibling w/ CABG, MI or angioplasty before 65F 55M? Yes     Comment: mother   Social History Narrative    July 1, 2021    ENVIRONMENTAL HISTORY: [place] is heated with a forced air. They do not have central air conditioning. The patient's bedroom is furnished with hard pawel in bedroom, allergen mattress cover, allergen pillowcase cover and fabric window coverings.  Pets inside the house include 1 dog. There is not history of cockroach or mice infestation. There are no smokers in the house.  The house does have a damp basement.     Patient is currently living and taking care of a friend in the friends home.     Social Determinants of Health     Financial Resource Strain: Not on file   Food Insecurity: Not on file   Transportation Needs: Not on file   Physical Activity: Not on file   Stress: Not on file   Social Connections: Not on file   Intimate Partner  Violence: Not on file   Housing Stability: Not on file       Family History  Family History   Problem Relation Age of Onset     C.A.D. Mother      Diabetes Mother      Cancer Father         brain     Breast Cancer Maternal Aunt      Cancer - colorectal No family hx of        Review of Systems  As per HPI and PMHx, otherwise 10+ comprehensive system review is negative.    Physical Exam  BP (!) 141/83 (BP Location: Right arm, Patient Position: Chair, Cuff Size: Adult Regular)   Pulse 77   Temp 98.2  F (36.8  C) (Tympanic)   Wt 99.8 kg (220 lb)   LMP 07/18/2015 (Approximate)   BMI 38.97 kg/m    GENERAL: Patient is a pleasant, cooperative 56 year old female in no acute distress.  HEAD: Normocephalic, atraumatic.  Hair and scalp are normal.  EYES: Pupils are equal, round, reactive to light and accommodation.  Extraocular movements are intact.  The sclera nonicteric without injection.  The extraocular structures are normal.  EARS: Normal shape and symmetry.  No tenderness when palpating the mastoid or tragal areas bilaterally.  Otoscopic exam reveals a minimal amount of cerumen bilaterally.  The bilateral tympanic membranes are round, intact without evidence of effusion, good landmarks.  No retraction, granulation, or drainage.  NOSE: Nares are patent.  Nasal mucosa is pink and moist.  Nasal septum is midline.  The inferior turbinates are moderately hypertrophied.  No nasal cavity masses, polyps, or mucopurulence on anterior rhinoscopy.  ORAL CAVITY: Dentition is in good repair.  Mucous membranes are moist.  Palpation of the bilateral temporomandibular joints reveals some significant late clicking and some dislocation of the right TMJ joint with opening.  No significant or severe crepitus.  No locking.  NEUROLOGIC: Cranial nerves II through XII are grossly intact.  Voice is strong.  Patient is House-Brackmann I/VI bilaterally.  CARDIOVASCULAR: Extremities are warm and well-perfused.  No significant peripheral  edema.  RESPIRATORY: Patient has nonlabored breathing without cough, wheeze, stridor.  PSYCHIATRIC: Patient is alert and oriented.  Mood and affect appear normal.  SKIN: Warm and dry.  No scalp, face, or neck lesions noted.    Audiogram  The patient underwent an audiogram performed today.  My review of the audiogram shows normal hearing in the right ear and normal hearing to 3000 Hz with some mild mixed hearing loss in the left ear.  Pure-tone average is 17 dB on the right and 17 dB on the left.  Speech reception threshold is 10 dB on the right and 20 dB on the left.  The patient had 96% word recognition on the right and 100% word recognition on the left.  The patient had a type A deep tympanogram on the right and a type A tympanogram on the left.    Assessment and Plan     ICD-10-CM    1. Ear discomfort, bilateral  H92.03 Adult Audiology Referral   2. Mixed conductive and sensorineural hearing loss of left ear with unrestricted hearing of right ear  H90.72    3. Temporal mandibular joint disorder  M26.609 Adult Audiology Referral   4. History of endoscopic sinus surgery  Z98.890 Adult Audiology Referral     It was my pleasure seeing Dilia Solomon today in clinic.  The patient presents with history of ear discomfort after irrigation of the nose approximately 6 to 8 weeks ago.  Her symptoms have resolved and she just presents today for ear evaluation and hearing test.  She does have some slight asymmetry in the high tones in the left ear but otherwise normal ear exam and hearing evaluation.  Based on her history and physical exam, I know that she has some baseline temporomandibular joint dysfunction.  At this point time, I would recommend observation.  We discussed irrigation techniques to prevent having the ear again to go up the eustachian tube.  The patient can return to clinic as needed with any problems or concerns.    Dilia to follow up with Primary Care provider regarding elevated blood pressure.    Maximilian LAM  MD [place]  Department of Otolaryngology-Head and Neck Surgery  [facility]

## 2022-01-05 ENCOUNTER — OFFICE VISIT (OUTPATIENT)
Dept: AUDIOLOGY | Facility: CLINIC | Age: 57
End: 2022-01-05
Payer: COMMERCIAL

## 2022-01-05 ENCOUNTER — OFFICE VISIT (OUTPATIENT)
Dept: OTOLARYNGOLOGY | Facility: CLINIC | Age: 57
End: 2022-01-05
Payer: COMMERCIAL

## 2022-01-05 VITALS
BODY MASS INDEX: 38.97 KG/M2 | WEIGHT: 220 LBS | TEMPERATURE: 98.2 F | SYSTOLIC BLOOD PRESSURE: 141 MMHG | HEART RATE: 77 BPM | DIASTOLIC BLOOD PRESSURE: 83 MMHG

## 2022-01-05 DIAGNOSIS — H92.03 OTALGIA, BILATERAL: ICD-10-CM

## 2022-01-05 DIAGNOSIS — H92.03 EAR DISCOMFORT, BILATERAL: Primary | ICD-10-CM

## 2022-01-05 DIAGNOSIS — M26.609 TEMPORAL MANDIBULAR JOINT DISORDER: ICD-10-CM

## 2022-01-05 DIAGNOSIS — Z98.890 HISTORY OF ENDOSCOPIC SINUS SURGERY: ICD-10-CM

## 2022-01-05 DIAGNOSIS — H90.72 MIXED CONDUCTIVE AND SENSORINEURAL HEARING LOSS OF LEFT EAR WITH UNRESTRICTED HEARING OF RIGHT EAR: Primary | ICD-10-CM

## 2022-01-05 DIAGNOSIS — H90.72 MIXED CONDUCTIVE AND SENSORINEURAL HEARING LOSS OF LEFT EAR WITH UNRESTRICTED HEARING OF RIGHT EAR: ICD-10-CM

## 2022-01-05 PROCEDURE — 92550 TYMPANOMETRY & REFLEX THRESH: CPT | Performed by: AUDIOLOGIST

## 2022-01-05 PROCEDURE — 92557 COMPREHENSIVE HEARING TEST: CPT | Performed by: AUDIOLOGIST

## 2022-01-05 PROCEDURE — 99207 PR NO CHARGE LOS: CPT | Performed by: AUDIOLOGIST

## 2022-01-05 PROCEDURE — 99213 OFFICE O/P EST LOW 20 MIN: CPT | Performed by: OTOLARYNGOLOGY

## 2022-01-05 ASSESSMENT — PAIN SCALES - GENERAL: PAINLEVEL: MILD PAIN (2)

## 2022-01-05 NOTE — NURSING NOTE
"Initial BP (!) 141/83 (BP Location: Right arm, Patient Position: Chair, Cuff Size: Adult Regular)   Pulse 77   Temp 98.2  F (36.8  C) (Tympanic)   Wt 99.8 kg (220 lb)   LMP 07/18/2015 (Approximate)   BMI 38.97 kg/m   Estimated body mass index is 38.97 kg/m  as calculated from the following:    Height as of 12/2/21: 1.6 m (5' 3\").    Weight as of this encounter: 99.8 kg (220 lb). .    Marla Lo LPN    " Onset: Last night  Location/description: Stomach pains, Emesis all morning x 4. Drainage from both years. Was yellow now bloody  Associated Symptoms: Runny nose  What improves/worsens symptoms: Nothing  Symptom specific medications: None  Input and Output: Decrease  Activity level: Fatgiued  Temperature (route and time): Denies  Weight: 16.2 kg   Recent Care: 11/15/2017    Appointment made   Pt remains in [address] [place] [address]  11/09/20 1124

## 2022-01-05 NOTE — LETTER
1/5/2022         RE: Dilia Solomon  [address] [facility] [address]        Dear Colleague,    Thank you for referring your patient, Dilia Solomon, to the [facility]. Please see a copy of my visit note below.    Chief Complaint   Patient presents with     Ear Problem     c/o popping after using nasal rinses in 11/2021 and c/o pain in ears at intervals - paitent states she has never had an ear infection/audio     History of Present Illness   Dilia Solomon is a 56 year old female who presents today for follow-up.  I evaluated the patient on 7/14/2021 with discomfort in the left cheek into the jaw.      The patient has had 2 previous endoscopic sinus surgeries, most recently with Dr. Tierney on 2/14/2018.  She has a history of chronic allergic rhinitis and does receive allergy injection immunotherapy.    At the time of her prior examination, I felt that she most likely had TMJ discomfort.  Her past history is significant for TMJ disorder.  She does wear an oral splint at nighttime.  She is had physical therapy for her TMJ in the past.  She was recently having ear discomfort after nasal irrigation in the fall.  She was referred to ENT for further evaluation of her bilateral otalgia.    From a symptom standpoint, the patient reports having some discomfort in her ears for several days after an unfortunate nasal irrigation incident.  She is not had any symptoms since.  She currently denies any otalgia or otorrhea.  She has not had previous ear surgery.    My review of the patient's most recent sinus CT performed 10/30/2019 showed a midline nasal septum.  The patient's postsurgical changes of bilateral maxillary antrostomies and anterior ethmoidectomies.  There is a slight amount of thickening in the floor of the right maxillary sinus.  The remainder the paranasal sinuses are well aerated without any acute or chronic inflammation.    Past Medical History  Patient Active Problem List    Diagnosis     Need for prophylactic vaccination and inoculation against influenza     Sprain of interphalangeal (joint) of hand     Radial styloid tenosynovitis     Carpal tunnel syndrome     Synovial cyst of popliteal space     Pain in joint, lower leg     Hyperlipidemia LDL goal <130     Advanced directives, counseling/discussion     Moderate persistent asthma     Allergic rhinitis due to dust mite     Food allergy     FH: diabetes mellitus     Chronic allergic rhinitis due to animal hair and dander     Allergic rhinitis due to mold     Chronic seasonal allergic rhinitis due to pollen     Obesity (BMI 35.0-39.9) with comorbidity (H)     Allergic conjunctivitis, bilateral     History of endoscopic sinus surgery     Chronic pansinusitis     Current Medications     Current Outpatient Medications:      albuterol (2.5 MG/3ML) 0.083% neb solution, Take 1 vial (2.5 mg) by nebulization every 4 hours as needed, Disp: 1 Box, Rfl: 3     albuterol (PROAIR HFA/PROVENTIL HFA/VENTOLIN HFA) 108 (90 Base) MCG/ACT inhaler, Inhale 2 puffs into the lungs every 4 hours as needed for shortness of breath / dyspnea or wheezing, Disp: 1 Inhaler, Rfl: 3     azelastine (ASTELIN) 0.1 % nasal spray, Spray 2 sprays into both nostrils 2 times daily as needed for rhinitis or allergies, Disp: 90 mL, Rfl: 3     azelastine (OPTIVAR) 0.05 % ophthalmic solution, Apply 1 drop to eye 2 times daily, Disp: 1 Bottle, Rfl: 5     cetirizine (ZYRTEC) 10 MG tablet, Take 1 tablet (10 mg) by mouth daily as needed for allergies, Disp: 90 tablet, Rfl: 3     Emollient (CERAVE) CREA, Externally apply 1 dose. topically 2 times daily, Disp: 2 Bottle, Rfl: 11     erythromycin (ROMYCIN) 5 MG/GM ophthalmic ointment, Place 0.5 inches into the right eye 4 times daily, Disp: 4 g, Rfl: 0     fluticasone (FLONASE) 50 MCG/ACT nasal spray, Spray 2 sprays into both nostrils daily, Disp: 48 g, Rfl: 1     fluticasone-salmeterol (ADVAIR) 250-50 MCG/DOSE inhaler, Inhale 1 puff  into the lungs 2 times daily, Disp: 3 Inhaler, Rfl: 3     loratadine (CLARITIN) 10 MG tablet, Take 1 tablet (10 mg) by mouth daily as needed for allergies, Disp: 90 tablet, Rfl: 3     montelukast (SINGULAIR) 10 MG tablet, Take 1 tablet (10 mg) by mouth At Bedtime, Disp: 90 tablet, Rfl: 1     MULTIVITAMIN OR, 1 tab daily, Disp: , Rfl:      ORDER FOR ALLERGEN IMMUNOTHERAPY, Name of Mix: Mix #1  Mold Alternaria Tenuis 1:10 w/v, HS  0.5 ml Aspergillus Fumigatus 1:10 w/v, HS  0.5 ml Epicoccum Nigrum 1:10 w/v, HS 0.5 ml Hormodendrum Cladosporioides 1:10 w/v, HS 0.5 ml Penicillium Mix 1:10 w/v, HS  0.5 ml Diluent: HSA qs to 5ml, Disp: 5 mL, Rfl: PRN     ORDER FOR ALLERGEN IMMUNOTHERAPY, Name of Mix: Mix #2  Dust Mite, Cat, Dog Cat Hair, Standardized 10,000 BAU/mL, ALK  2.0 ml Dog Hair Dander, A. P.  1:100 w/v, HS  1.0 ml Dust Mites F 30,000AU/mL, HS  0.3 ml Dust Mites P. 30,000 AU/mL, HS  0.3 ml  Diluent: HSA qs to 5ml, Disp: 5 mL, Rfl: PRN     ORDER FOR ALLERGEN IMMUNOTHERAPY, Name of Mix: Mix #3 Grass,Tree  Dmitry,White 1:20w/v, HS 0.5ml Birch Mix PRW 1:20w/v, HS 0.5ml Boxelder-Maple Mix BHR (Boxelder Hard Red) 1:20w/v, HS 0.5ml Riva,Common 1:20w/v, HS 0.5ml Elm,American 1:20w/v, HS 0.5ml Hallie Mix RW 1:20w/v, HS 0.5ml Oak Mix RVW 1:20w/v, HS 0.5ml [place] Tree,Black 1:20w/v, HS 0.5ml Suresh Grass (Std) 100,000 BAU/mL, HS 0.4ml Jonathan Grass 1:20w/v, HS 0.5ml Diluent: HSA qs to 5ml, Disp: 5 mL, Rfl: PRN     ORDER FOR ALLERGEN IMMUNOTHERAPY, Name of Mix: Mix #4  Weeds Kochia 1:20 w/v, HS 0.5 ml Lamb's Quarters 1:20 w/v, HS 0.5 ml Nettle 1:20 w/v, HS 0.5 ml Plantain, English 1:20 w/v, HS 0.5 ml Ragweed Mixed 1:20 w/v ALK  0.5 ml Russian Thistle 1:20 w/v, HS 0.5 ml Sagebrush, Mugwort 1:20 w/v, HS 0.5 ml Sorrel, Sheep 1:20 w/v, HS 0.5 ml Diluent: HSA qs to 5ml, Disp: 5 mL, Rfl: PRN     order for DME, Equipment being ordered: thumb spica splint RIGHT, Disp: 1 Units, Rfl: 0     order for DME, Equipment being ordered:  Silicone heel cup, Disp: 1 Units, Rfl: 0     order for DME, Equipment being ordered: Dynaflex insert, Disp: 1 Units, Rfl: 0     order for DME, Equipment being ordered: Silicone heel cup, Disp: 1 Units, Rfl: 0     triamcinolone (KENALOG) 0.1 % external cream, Apply sparingly to affected area two times daily as needed but not more than 14 days in a row. Spare face, armpits, neck, and groin., Disp: 80 g, Rfl: 1     EPINEPHrine (ANY BX GENERIC EQUIV) 0.3 MG/0.3ML injection 2-pack, Inject 0.3 mLs (0.3 mg) into the muscle once as needed for anaphylaxis (Patient not taking: Reported on 1/5/2022), Disp: 0.6 mL, Rfl: 3    Allergies  Allergies   Allergen Reactions     Niacin Hives     Nsaids Hives     Tolerates ibuprofen and aspirin without hives       Pineapple Hives       Social History   Social History     Socioeconomic History     Marital status: Single     Spouse name: Not on file     Number of children: Not on file     Years of education: Not on file     Highest education level: Not on file   Occupational History     Employer: [employer]    Tobacco Use     Smoking status: Never Smoker     Smokeless tobacco: Never Used   Substance and Sexual Activity     Alcohol use: No     Drug use: No     Sexual activity: Never   Other Topics Concern     Parent/sibling w/ CABG, MI or angioplasty before 65F 55M? Yes     Comment: mother   Social History Narrative    July 1, 2021    ENVIRONMENTAL HISTORY: [place] is heated with a forced air. They do not have central air conditioning. The patient's bedroom is furnished with hard pawel in bedroom, allergen mattress cover, allergen pillowcase cover and fabric window coverings.  Pets inside the house include 1 dog. There is not history of cockroach or mice infestation. There are no smokers in the house.  The house does have a damp basement.     Patient is currently living and taking care of a friend in the friends home.      Social Determinants of Health     Financial Resource Strain: Not on file   Food Insecurity: Not on file   Transportation Needs: Not on file   Physical Activity: Not on file   Stress: Not on file   Social Connections: Not on file   Intimate Partner Violence: Not on file   Housing Stability: Not on file       Family History  Family History   Problem Relation Age of Onset     C.A.D. Mother      Diabetes Mother      Cancer Father         brain     Breast Cancer Maternal Aunt      Cancer - colorectal No family hx of        Review of Systems  As per HPI and PMHx, otherwise 10+ comprehensive system review is negative.    Physical Exam  BP (!) 141/83 (BP Location: Right arm, Patient Position: Chair, Cuff Size: Adult Regular)   Pulse 77   Temp 98.2  F (36.8  C) (Tympanic)   Wt 99.8 kg (220 lb)   LMP 07/18/2015 (Approximate)   BMI 38.97 kg/m    GENERAL: Patient is a pleasant, cooperative 56 year old female in no acute distress.  HEAD: Normocephalic, atraumatic.  Hair and scalp are normal.  EYES: Pupils are equal, round, reactive to light and accommodation.  Extraocular movements are intact.  The sclera nonicteric without injection.  The extraocular structures are normal.  EARS: Normal shape and symmetry.  No tenderness when palpating the mastoid or tragal areas bilaterally.  Otoscopic exam reveals a minimal amount of cerumen bilaterally.  The bilateral tympanic membranes are round, intact without evidence of effusion, good landmarks.  No retraction, granulation, or drainage.  NOSE: Nares are patent.  Nasal mucosa is pink and moist.  Nasal septum is midline.  The inferior turbinates are moderately hypertrophied.  No nasal cavity masses, polyps, or mucopurulence on anterior rhinoscopy.  ORAL CAVITY: Dentition is in good repair.  Mucous membranes are moist.  Palpation of the bilateral temporomandibular joints reveals some significant late clicking and some dislocation of the right TMJ joint with opening.  No significant  or severe crepitus.  No locking.  NEUROLOGIC: Cranial nerves II through XII are grossly intact.  Voice is strong.  Patient is House-Brackmann I/VI bilaterally.  CARDIOVASCULAR: Extremities are warm and well-perfused.  No significant peripheral edema.  RESPIRATORY: Patient has nonlabored breathing without cough, wheeze, stridor.  PSYCHIATRIC: Patient is alert and oriented.  Mood and affect appear normal.  SKIN: Warm and dry.  No scalp, face, or neck lesions noted.    Audiogram  The patient underwent an audiogram performed today.  My review of the audiogram shows normal hearing in the right ear and normal hearing to 3000 Hz with some mild mixed hearing loss in the left ear.  Pure-tone average is 17 dB on the right and 17 dB on the left.  Speech reception threshold is 10 dB on the right and 20 dB on the left.  The patient had 96% word recognition on the right and 100% word recognition on the left.  The patient had a type A deep tympanogram on the right and a type A tympanogram on the left.    Assessment and Plan     ICD-10-CM    1. Ear discomfort, bilateral  H92.03 Adult Audiology Referral   2. Mixed conductive and sensorineural hearing loss of left ear with unrestricted hearing of right ear  H90.72    3. Temporal mandibular joint disorder  M26.609 Adult Audiology Referral   4. History of endoscopic sinus surgery  Z98.890 Adult Audiology Referral     It was my pleasure seeing Dilia Solomon today in clinic.  The patient presents with history of ear discomfort after irrigation of the nose approximately 6 to 8 weeks ago.  Her symptoms have resolved and she just presents today for ear evaluation and hearing test.  She does have some slight asymmetry in the high tones in the left ear but otherwise normal ear exam and hearing evaluation.  Based on her history and physical exam, I know that she has some baseline temporomandibular joint dysfunction.  At this point time, I would recommend observation.  We discussed irrigation  techniques to prevent having the ear again to go up the eustachian tube.  The patient can return to clinic as needed with any problems or concerns.    Dilia to follow up with Primary Care provider regarding elevated blood pressure.    Maximilian Abad MD  Department of Otolaryngology-Head and Neck Surgery  [facility]         Again, thank you for allowing me to participate in the care of your patient.        Sincerely,        Maximilian Abad MD

## 2022-01-08 ENCOUNTER — HEALTH MAINTENANCE LETTER (OUTPATIENT)
Age: 57
End: 2022-01-08

## 2022-01-12 ENCOUNTER — ALLIED HEALTH/NURSE VISIT (OUTPATIENT)
Dept: ALLERGY | Facility: CLINIC | Age: 57
End: 2022-01-12
Payer: COMMERCIAL

## 2022-01-12 DIAGNOSIS — J30.9 ALLERGIC RHINITIS: ICD-10-CM

## 2022-01-12 DIAGNOSIS — J30.89 ALLERGIC RHINITIS DUE TO MOLD: ICD-10-CM

## 2022-01-12 DIAGNOSIS — J30.1 CHRONIC SEASONAL ALLERGIC RHINITIS DUE TO POLLEN: ICD-10-CM

## 2022-01-12 DIAGNOSIS — J30.81 CHRONIC ALLERGIC RHINITIS DUE TO ANIMAL HAIR AND DANDER: ICD-10-CM

## 2022-01-12 DIAGNOSIS — J30.89 ALLERGIC RHINITIS DUE TO DUST MITE: Primary | ICD-10-CM

## 2022-01-12 PROCEDURE — 95117 IMMUNOTHERAPY INJECTIONS: CPT

## 2022-01-18 ENCOUNTER — ALLIED HEALTH/NURSE VISIT (OUTPATIENT)
Dept: ALLERGY | Facility: CLINIC | Age: 57
End: 2022-01-18
Payer: COMMERCIAL

## 2022-01-18 DIAGNOSIS — J30.1 CHRONIC SEASONAL ALLERGIC RHINITIS DUE TO POLLEN: ICD-10-CM

## 2022-01-18 DIAGNOSIS — J30.89 ALLERGIC RHINITIS DUE TO MOLD: ICD-10-CM

## 2022-01-18 DIAGNOSIS — J30.81 CHRONIC ALLERGIC RHINITIS DUE TO ANIMAL HAIR AND DANDER: ICD-10-CM

## 2022-01-18 DIAGNOSIS — J30.89 ALLERGIC RHINITIS DUE TO DUST MITE: Primary | ICD-10-CM

## 2022-01-18 PROCEDURE — 95117 IMMUNOTHERAPY INJECTIONS: CPT

## 2022-02-02 ENCOUNTER — ALLIED HEALTH/NURSE VISIT (OUTPATIENT)
Dept: FAMILY MEDICINE | Facility: CLINIC | Age: 57
End: 2022-02-02
Payer: COMMERCIAL

## 2022-02-02 DIAGNOSIS — Z23 NEED FOR VACCINATION: Primary | ICD-10-CM

## 2022-02-02 PROCEDURE — 99207 PR NO CHARGE NURSE ONLY: CPT

## 2022-02-02 PROCEDURE — 90471 IMMUNIZATION ADMIN: CPT

## 2022-02-02 PROCEDURE — 90750 HZV VACC RECOMBINANT IM: CPT

## 2022-02-03 ENCOUNTER — TELEPHONE (OUTPATIENT)
Dept: FAMILY MEDICINE | Facility: CLINIC | Age: 57
End: 2022-02-03
Payer: COMMERCIAL

## 2022-02-03 NOTE — TELEPHONE ENCOUNTER
Call placed to patient. We discussed reaction to vaccination. Advised to treat symptoms with OTC for fever, general aches. Can rub injection site and apply heat. Symptoms should resolve 2-3 days. If worsens she should reach out for further recommendations.  She states understanding

## 2022-02-03 NOTE — TELEPHONE ENCOUNTER
Reason for Call:  Shingles Vaccine    Detailed comments: Pt calling because she had the shingles vaccine done yesterday afternoon at [place]. She started having symptoms this morning of feeling really chilled, no fever, and a sore arm. Wondering if she should be concerned at all or if she is having an allergic reaction to it.    Phone Number Patient can be reached at: Home number on file [phone] (home)    Best Time: anytime    Can we leave a detailed message on this number? YES    Call taken on 2/3/2022 at 4:21 PM by Harriet Hanson

## 2022-02-25 ENCOUNTER — ALLIED HEALTH/NURSE VISIT (OUTPATIENT)
Dept: ALLERGY | Facility: CLINIC | Age: 57
End: 2022-02-25
Payer: COMMERCIAL

## 2022-02-25 DIAGNOSIS — J30.1 CHRONIC SEASONAL ALLERGIC RHINITIS DUE TO POLLEN: ICD-10-CM

## 2022-02-25 DIAGNOSIS — J30.81 CHRONIC ALLERGIC RHINITIS DUE TO ANIMAL HAIR AND DANDER: ICD-10-CM

## 2022-02-25 DIAGNOSIS — J30.89 ALLERGIC RHINITIS DUE TO MOLD: ICD-10-CM

## 2022-02-25 DIAGNOSIS — J30.89 ALLERGIC RHINITIS DUE TO DUST MITE: Primary | ICD-10-CM

## 2022-02-25 PROCEDURE — 95117 IMMUNOTHERAPY INJECTIONS: CPT

## 2022-03-11 ENCOUNTER — OFFICE VISIT (OUTPATIENT)
Dept: ALLERGY | Facility: CLINIC | Age: 57
End: 2022-03-11
Payer: COMMERCIAL

## 2022-03-11 VITALS
OXYGEN SATURATION: 97 % | BODY MASS INDEX: 38.55 KG/M2 | SYSTOLIC BLOOD PRESSURE: 110 MMHG | TEMPERATURE: 98.9 F | HEART RATE: 88 BPM | DIASTOLIC BLOOD PRESSURE: 73 MMHG | WEIGHT: 217.59 LBS

## 2022-03-11 DIAGNOSIS — J30.81 CHRONIC ALLERGIC RHINITIS DUE TO ANIMAL HAIR AND DANDER: ICD-10-CM

## 2022-03-11 DIAGNOSIS — J30.89 ALLERGIC RHINITIS DUE TO MOLD: ICD-10-CM

## 2022-03-11 DIAGNOSIS — H10.13 ALLERGIC CONJUNCTIVITIS OF BOTH EYES: ICD-10-CM

## 2022-03-11 DIAGNOSIS — J30.1 CHRONIC SEASONAL ALLERGIC RHINITIS DUE TO POLLEN: ICD-10-CM

## 2022-03-11 DIAGNOSIS — J30.89 ALLERGIC RHINITIS DUE TO DUST MITE: ICD-10-CM

## 2022-03-11 DIAGNOSIS — J45.40 MODERATE PERSISTENT ASTHMA WITHOUT COMPLICATION: Primary | ICD-10-CM

## 2022-03-11 PROCEDURE — 99214 OFFICE O/P EST MOD 30 MIN: CPT | Performed by: ALLERGY & IMMUNOLOGY

## 2022-03-11 RX ORDER — AZELASTINE 1 MG/ML
2 SPRAY, METERED NASAL 2 TIMES DAILY PRN
Qty: 90 ML | Refills: 3 | Status: SHIPPED | OUTPATIENT
Start: 2022-03-11 | End: 2023-03-09

## 2022-03-11 RX ORDER — CETIRIZINE HYDROCHLORIDE 10 MG/1
10 TABLET ORAL DAILY PRN
Qty: 90 TABLET | Refills: 3 | Status: SHIPPED | OUTPATIENT
Start: 2022-03-11 | End: 2023-03-09

## 2022-03-11 RX ORDER — FLUTICASONE PROPIONATE 50 MCG
2 SPRAY, SUSPENSION (ML) NASAL DAILY
Qty: 48 G | Refills: 1 | Status: SHIPPED | OUTPATIENT
Start: 2022-03-11 | End: 2022-08-31

## 2022-03-11 RX ORDER — MONTELUKAST SODIUM 10 MG/1
10 TABLET ORAL AT BEDTIME
Qty: 90 TABLET | Refills: 1 | Status: SHIPPED | OUTPATIENT
Start: 2022-03-11 | End: 2022-06-09

## 2022-03-11 RX ORDER — AZELASTINE HYDROCHLORIDE 0.5 MG/ML
1 SOLUTION/ DROPS OPHTHALMIC 2 TIMES DAILY PRN
Qty: 6 ML | Refills: 1 | Status: SHIPPED | OUTPATIENT
Start: 2022-03-11 | End: 2022-04-28

## 2022-03-11 ASSESSMENT — ENCOUNTER SYMPTOMS
WHEEZING: 0
COUGH: 0
NAUSEA: 0
FACIAL SWELLING: 0
EYE REDNESS: 0
EYE ITCHING: 0
CHEST TIGHTNESS: 0
VOMITING: 0
ADENOPATHY: 0
EYE DISCHARGE: 0
SINUS PAIN: 0
RHINORRHEA: 1
SINUS PRESSURE: 1
SHORTNESS OF BREATH: 0
DIARRHEA: 0
HEADACHES: 0
SORE THROAT: 0

## 2022-03-11 NOTE — LETTER
3/11/2022         RE: Dilia Solomon  [address] [facility] [address]        Dear Colleague,    Thank you for referring your patient, Dilia Solomon, to the [facility]. Please see a copy of my visit note below.    SUBJECTIVE:                                                                   Dilia Solomon presents today to our Allergy Clinic at [facility] for a follow up visit. She is a 56 year old female with moderate persistent asthma and allergic rhinitis.    She was diagnosed with asthma approximately in [id number]. Triggers are environmental allergies and viral respiratory infections.   Serum IgE for regional aeroallergen panel performed in June 2017 with multiple levels of various sensitivities to molds, cat, dog, dust mite, pollen of trees, grasses, and weeds.  She had 2 sinus surgeries in the past. The last one was in 2018.   She started allergen immunotherapy in July 2017. She was on allergen immunotherapy before that with Dr. Haines, and it was helpful at that time; however, symptoms got worse soon after stopping it.  PFT within normal limits  in December 2020.     Allergy Immunotherapy  Date/time of injection(s):  2/25/2022        Vial Color                               Content                                  Dose  Red 1:1                                   Weeds                                     0.5mL  Red 1:1                                   Grass, Trees                           0.5mL    Red 1:1                                   Molds                                      0.5mL  Red 1:1                                   Cat, Dog, Dust Mite                0.5mL     The patient tolerates injections well without persistent large local reactions or systemic reactions. She has been on the maintenance dose since January 2018.     She was diagnosed with acute bronchitis in January/February and it took her some time to recover. Had to miss shots due  to that.     Regarding her allergic rhinitis, she has been using sinus rinses once-twice daily, intranasal fluticasone 2 sprays in each nostril once daily, azelastine 2 sprays in each nostril once-twice daily, cetirizine and loratadine as needed (rarely), and montelukast 10 mg by mouth daily at bedtime. In January, she had some discomfort in her ears for several days after an unfortunate nasal irrigation incident. She uses azelastine eyedrops on as-needed basis, and they do help. She reports a significant improvement in symptoms with allergen immunotherapy.    However, in February, she had bronchitis and sinus symptoms. I prescribed Augmentin. She got better. Several days ago, she noticed a squirrel in her attic. Since then, she has had nasal congestion, 4-5/10 severity, and rhinorrhea 4-5/10 severity. She thinks she is allergic to the squirrel. Symptoms are worse in the room that is connected to attic.   Has mild facial pressure. No postnasal drip. It feels differently compared with sinus infections she used to have in the past. No fever or cough.      Dilia has been using Advair 250/50 mcg 1 puff twice daily and taking montelukast 10 mg by mouth once daily at bedtime. Bronchitis symptoms got better. She barely has needed albuterol for the past 2 weeks. There has been no use of oral steroids since the last visit. No ED/PCP/urgent care/other specialist visits for asthma flare since the previous visit. The patient denies current chest tightness, cough, wheeze, or shortness of breath. Asthma triggers remain unchanged. She uses a spacer/chamber device, where applicable.      Patient Active Problem List   Diagnosis     Need for prophylactic vaccination and inoculation against influenza     Sprain of interphalangeal (joint) of hand     Radial styloid tenosynovitis     Carpal tunnel syndrome     Synovial cyst of popliteal space     Pain in joint, lower leg     Hyperlipidemia LDL goal <130     Advanced directives,  counseling/discussion     Moderate persistent asthma     Allergic rhinitis due to dust mite     Food allergy     FH: diabetes mellitus     Chronic allergic rhinitis due to animal hair and dander     Allergic rhinitis due to mold     Chronic seasonal allergic rhinitis due to pollen     Obesity (BMI 35.0-39.9) with comorbidity (H)     Allergic conjunctivitis, bilateral     History of endoscopic sinus surgery     Chronic pansinusitis       Past Medical History:   Diagnosis Date     Injury, other and unspecified, shoulder and upper arm 9/03      Problem (# of Occurrences) Relation (Name,Age of Onset)    Breast Cancer (1) Maternal Aunt    C.A.D. (1) Mother    Cancer (1) Father: brain    Diabetes (1) Mother       Negative family history of: Cancer - colorectal        Past Surgical History:   Procedure Laterality Date     COLONOSCOPY N/A 10/6/2016    Procedure: COLONOSCOPY;  Surgeon: Shiva Johns MD;  Location: [facility]     ETHMOIDECTOMY  12/30/2013    Procedure: ETHMOIDECTOMY;  Bilateral Submucousal Reduction of InferiorTurbinates and Total Ethmoidectomy with Multiple Sinusotomies;  Surgeon: Lio More MD;  Location: [facility]     ETHMOIDECTOMY Bilateral 2/14/2018    Procedure: ETHMOIDECTOMY;  Bilateral anterior ethmoidectomy, bilateral maxillary antrostomy;  Surgeon: Santhosh Tierney MD;  Location: [facility]     SURGICAL HISTORY OF -   5/04    carpal tunnel release, bilateral     Social History     Socioeconomic History     Marital status: Single     Spouse name: None     Number of children: None     Years of education: None     Highest education level: None   Occupational History     Employer: [employer]    Tobacco Use     Smoking status: Never Smoker     Smokeless tobacco: Never Used   Substance and Sexual Activity     Alcohol use: No     Drug use: No     Sexual activity: Never   Other Topics Concern     Parent/sibling w/ CABG, MI or angioplasty before 65F 55M? Yes     Comment: mother   Social History  Narrative    March 11, 2022    ENVIRONMENTAL HISTORY: [place] is heated with a forced air. They do not have central air conditioning. The patient's bedroom is furnished with hard pawel in bedroom, allergen mattress cover, allergen pillowcase cover and fabric window coverings.  Pets inside the house include 1 dog. There is not history of cockroach or mice infestation. There are no smokers in the house.  The house does have a damp basement.     Patient is currently living and taking care of a friend in the friends home.     Social Determinants of Health     Financial Resource Strain: Not on file   Food Insecurity: Not on file   Transportation Needs: Not on file   Physical Activity: Not on file   Stress: Not on file   Social Connections: Not on file   Intimate Partner Violence: Not on file   Housing Stability: Not on file           Review of Systems   HENT: Positive for congestion, postnasal drip, rhinorrhea and sinus pressure. Negative for ear discharge, ear pain, facial swelling, sinus pain, sneezing and sore throat.    Eyes: Negative for discharge, redness and itching.   Respiratory: Negative for cough, chest tightness, shortness of breath and wheezing.    Cardiovascular: Negative for chest pain.   Gastrointestinal: Negative for diarrhea, nausea and vomiting.   Skin: Negative for rash.   Allergic/Immunologic: Positive for environmental allergies.   Neurological: Negative for headaches.   Hematological: Negative for adenopathy.           Current Outpatient Medications:      albuterol (PROAIR HFA/PROVENTIL HFA/VENTOLIN HFA) 108 (90 Base) MCG/ACT inhaler, Inhale 2 puffs into the lungs every 4 hours as needed for shortness of breath / dyspnea or wheezing, Disp: 18 g, Rfl: 3     azelastine (ASTELIN) 0.1 % nasal spray, Spray 2 sprays into both nostrils 2 times daily as needed for rhinitis or allergies, Disp: 90 mL, Rfl: 3     azelastine (OPTIVAR) 0.05 % ophthalmic  solution, Apply 1 drop to eye 2 times daily as needed (itchy/watery eyes), Disp: 6 mL, Rfl: 1     cetirizine (ZYRTEC) 10 MG tablet, Take 1 tablet (10 mg) by mouth daily as needed for allergies, Disp: 90 tablet, Rfl: 3     Emollient (CERAVE) CREA, Externally apply 1 dose. topically 2 times daily, Disp: 2 Bottle, Rfl: 11     fluticasone (FLONASE) 50 MCG/ACT nasal spray, Spray 2 sprays into both nostrils daily, Disp: 48 g, Rfl: 1     fluticasone-salmeterol (ADVAIR) 250-50 MCG/DOSE inhaler, Inhale 1 puff into the lungs 2 times daily, Disp: 180 each, Rfl: 3     loratadine (CLARITIN) 10 MG tablet, Take 1 tablet (10 mg) by mouth daily as needed for allergies, Disp: 90 tablet, Rfl: 3     montelukast (SINGULAIR) 10 MG tablet, Take 1 tablet (10 mg) by mouth At Bedtime, Disp: 90 tablet, Rfl: 1     MULTIVITAMIN OR, 1 tab daily, Disp: , Rfl:      ORDER FOR ALLERGEN IMMUNOTHERAPY, Name of Mix: Mix #1  Mold Alternaria Tenuis 1:10 w/v, HS  0.5 ml Aspergillus Fumigatus 1:10 w/v, HS  0.5 ml Epicoccum Nigrum 1:10 w/v, HS 0.5 ml Hormodendrum Cladosporioides 1:10 w/v, HS 0.5 ml Penicillium Mix 1:10 w/v, HS  0.5 ml Diluent: HSA qs to 5ml, Disp: 5 mL, Rfl: PRN     ORDER FOR ALLERGEN IMMUNOTHERAPY, Name of Mix: Mix #2  Dust Mite, Cat, Dog Cat Hair, Standardized 10,000 BAU/mL, ALK  2.0 ml Dog Hair Dander, A. P.  1:100 w/v, HS  1.0 ml Dust Mites F 30,000AU/mL, HS  0.3 ml Dust Mites P. 30,000 AU/mL, HS  0.3 ml  Diluent: HSA qs to 5ml, Disp: 5 mL, Rfl: PRN     ORDER FOR ALLERGEN IMMUNOTHERAPY, Name of Mix: Mix #3 Grass,Tree  Dmitry,White 1:20w/v, HS 0.5ml Birch Mix PRW 1:20w/v, HS 0.5ml Boxelder-Maple Mix BHR (Boxelder Hard Red) 1:20w/v, HS 0.5ml Westland,Common 1:20w/v, HS 0.5ml Elm,American 1:20w/v, HS 0.5ml [place] Mix RW 1:20w/v, HS 0.5ml Oak Mix RVW 1:20w/v, HS 0.5ml Britt Tree,Black 1:20w/v, HS 0.5ml Suresh Grass (Std) 100,000 BAU/mL, HS 0.4ml Jonathan Grass 1:20w/v, HS 0.5ml Diluent: HSA qs to 5ml, Disp: 5 mL, Rfl: PRN     ORDER FOR  ALLERGEN IMMUNOTHERAPY, Name of Mix: Mix #4  Weeds Kochia 1:20 w/v, HS 0.5 ml Lamb's Quarters 1:20 w/v, HS 0.5 ml Nettle 1:20 w/v, HS 0.5 ml Plantain, English 1:20 w/v, HS 0.5 ml Ragweed Mixed 1:20 w/v ALK  0.5 ml Russian Thistle 1:20 w/v, HS 0.5 ml Sagebrush, Mugwort 1:20 w/v, HS 0.5 ml Sorrel, Sheep 1:20 w/v, HS 0.5 ml Diluent: HSA qs to 5ml, Disp: 5 mL, Rfl: PRN     order for DME, Equipment being ordered: Silicone heel cup, Disp: 1 Units, Rfl: 0     order for DME, Equipment being ordered: Dynaflex insert, Disp: 1 Units, Rfl: 0     order for DME, Equipment being ordered: Silicone heel cup, Disp: 1 Units, Rfl: 0     triamcinolone (KENALOG) 0.1 % external cream, Apply sparingly to affected area two times daily as needed but not more than 14 days in a row. Spare face, armpits, neck, and groin., Disp: 80 g, Rfl: 1     albuterol (2.5 MG/3ML) 0.083% neb solution, Take 1 vial (2.5 mg) by nebulization every 4 hours as needed (Patient not taking: Reported on 3/11/2022), Disp: 1 Box, Rfl: 3     EPINEPHrine (ANY BX GENERIC EQUIV) 0.3 MG/0.3ML injection 2-pack, Inject 0.3 mLs (0.3 mg) into the muscle once as needed for anaphylaxis (Patient not taking: Reported on 1/5/2022), Disp: 0.6 mL, Rfl: 3     order for DME, Equipment being ordered: thumb spica splint RIGHT (Patient not taking: Reported on 3/11/2022), Disp: 1 Units, Rfl: 0  Immunization History   Administered Date(s) Administered     COVID-19,PF,Moderna 03/19/2021, 04/16/2021, 11/02/2021     Influenza (IIV3) PF 12/12/2002, 11/28/2003, 10/24/2006, 02/11/2009, 10/05/2011, 11/15/2012, 10/07/2014     Influenza Quad, Recombinant, pf(RIV4) (Flublok) 10/02/2018, 10/30/2019, 10/28/2020, 10/15/2021     Influenza Vaccine IM > 6 months Valent IIV4 (Alfuria,Fluzone) 10/16/2013, 10/20/2015, 10/26/2016, 11/21/2017     Mantoux Tuberculin Skin Test 02/11/2009     Measles 10/22/1979     Pneumococcal 23 valent 10/05/2011     TDAP Vaccine (Adacel) 02/11/2009, 07/06/2018     Zoster vaccine  recombinant adjuvanted (SHINGRIX) 12/02/2021, 02/02/2022     Allergies   Allergen Reactions     Niacin Hives     Nsaids Hives     Tolerates ibuprofen and aspirin without hives       Pineapple Hives     OBJECTIVE:                                                                 /73 (BP Location: Left arm, Patient Position: Sitting, Cuff Size: Adult Large)   Pulse 88   Temp 98.9  F (37.2  C) (Tympanic)   Wt 98.7 kg (217 lb 9.5 oz)   LMP 07/18/2015 (Approximate)   SpO2 97%   BMI 38.55 kg/m          Physical Exam  Vitals and nursing note reviewed.   Constitutional:       General: She is not in acute distress.     Appearance: She is not diaphoretic.   HENT:      Head: Normocephalic and atraumatic.      Right Ear: Tympanic membrane, ear canal and external ear normal.      Left Ear: Tympanic membrane, ear canal and external ear normal.      Nose: No mucosal edema or rhinorrhea.      Right Turbinates: Not enlarged or swollen.      Left Turbinates: Not enlarged or swollen.      Mouth/Throat:      Lips: Pink.      Mouth: Mucous membranes are moist.      Pharynx: Oropharynx is clear. No pharyngeal swelling, oropharyngeal exudate or posterior oropharyngeal erythema.   Eyes:      General:         Right eye: No discharge.         Left eye: No discharge.      Conjunctiva/sclera: Conjunctivae normal.   Cardiovascular:      Rate and Rhythm: Normal rate and regular rhythm.      Heart sounds: Normal heart sounds. No murmur heard.  Pulmonary:      Effort: Pulmonary effort is normal. No respiratory distress.      Breath sounds: Normal breath sounds and air entry. No stridor, decreased air movement or transmitted upper airway sounds. No decreased breath sounds, wheezing, rhonchi or rales.   Musculoskeletal:         General: Normal range of motion.      Cervical back: Normal range of motion.   Lymphadenopathy:      Cervical: No cervical adenopathy.   Skin:     General: Skin is warm.   Neurological:      Mental Status: She is  alert and oriented to person, place, and time.   Psychiatric:         Mood and Affect: Mood normal.         Behavior: Behavior normal.         ASSESSMENT/PLAN:    Moderate persistent asthma without complication  Currently, his symptoms are well controlled.  She recovered from bronchitis.  -Continue Advair 250/50 micrograms 1 puff twice daily and montelukast 10 mg by mouth once daily.  -Continue using albuterol inhaler 2 to 4 puffs every 4 hours as needed for persistent cough/chest tightness/wheezing/shortness of breath.  -Depending on future symptom control, may need to step up on ICS.      - montelukast (SINGULAIR) 10 MG tablet  Dispense: 90 tablet; Refill: 1  - fluticasone-salmeterol (ADVAIR) 250-50 MCG/DOSE inhaler  Dispense: 180 each; Refill: 3  - albuterol (PROAIR HFA/PROVENTIL HFA/VENTOLIN HFA) 108 (90 Base) MCG/ACT inhaler  Dispense: 18 g; Refill: 3    Chronic allergic rhinitis due to animal hair and dander  Allergic rhinitis due to dust mite  Allergic rhinitis due to mold  Chronic seasonal allergic rhinitis due to pollen  Allergic conjunctivitis of both eyes    Optivar eyedrops control her ocular symptoms quite well.  -Continue as is.    Rhinitis symptoms are suboptimally controlled.  -Use azelastine 2 sprays in each nostril twice a day when necessary.  -Increase intranasal fluticasone to 2 sprays in each nostril twice daily. Decrease, when you get better.   There is a company that is trying to help her with squirrel.   I checked with the lab and serum IgE for squirrel dander or hair/pelt isn't available.   Continue allergen immunotherapy. Notify of a systemic reaction.      - fluticasone (FLONASE) 50 MCG/ACT nasal spray  Dispense: 48 g; Refill: 1  - montelukast (SINGULAIR) 10 MG tablet  Dispense: 90 tablet; Refill: 1  - azelastine (ASTELIN) 0.1 % nasal spray  Dispense: 90 mL; Refill: 3  - cetirizine (ZYRTEC) 10 MG tablet  Dispense: 90 tablet; Refill: 3  - azelastine (OPTIVAR) 0.05 % ophthalmic solution   Dispense: 6 mL; Refill: 1       Return in about 3 months (around 6/11/2022), or if symptoms worsen or fail to improve.    Thank you for allowing us to participate in the care of this patient. Please feel free to contact us if there are any questions or concerns about the patient.    Disclaimer: This note consists of symbols derived from keyboarding, dictation and/or voice recognition software. As a result, there may be errors in the script that have gone undetected. Please consider this when interpreting information found in this chart.    Michael Lujan MD, FAAAAI, FACAAI  Allergy, Asthma and Immunology     [facility]         Again, thank you for allowing me to participate in the care of your patient.        Sincerely,        Michael Lujan MD

## 2022-03-11 NOTE — PROGRESS NOTES
SUBJECTIVE:                                                                   Dilia Solomon presents today to our Allergy Clinic at [facility] for a follow up visit. She is a 56 year old female with moderate persistent asthma and allergic rhinitis.    She was diagnosed with asthma approximately in [id number]. Triggers are environmental allergies and viral respiratory infections.   Serum IgE for regional aeroallergen panel performed in June 2017 with multiple levels of various sensitivities to molds, cat, dog, dust mite, pollen of trees, grasses, and weeds.  She had 2 sinus surgeries in the past. The last one was in 2018.   She started allergen immunotherapy in July 2017. She was on allergen immunotherapy before that with Dr. Haines, and it was helpful at that time; however, symptoms got worse soon after stopping it.  PFT within normal limits  in December 2020.     Allergy Immunotherapy  Date/time of injection(s):  2/25/2022        Vial Color                               Content                                  Dose  Red 1:1                                   Weeds                                     0.5mL  Red 1:1                                   Grass, Trees                           0.5mL    Red 1:1                                   Molds                                      0.5mL  Red 1:1                                   Cat, Dog, Dust Mite                0.5mL     The patient tolerates injections well without persistent large local reactions or systemic reactions. She has been on the maintenance dose since January 2018.     She was diagnosed with acute bronchitis in January/February and it took her some time to recover. Had to miss shots due to that.     Regarding her allergic rhinitis, she has been using sinus rinses once-twice daily, intranasal fluticasone 2 sprays in each nostril once daily, azelastine 2 sprays in each nostril once-twice daily, cetirizine and loratadine as needed  (rarely), and montelukast 10 mg by mouth daily at bedtime. In January, she had some discomfort in her ears for several days after an unfortunate nasal irrigation incident. She uses azelastine eyedrops on as-needed basis, and they do help. She reports a significant improvement in symptoms with allergen immunotherapy.    However, in February, she had bronchitis and sinus symptoms. I prescribed Augmentin. She got better. Several days ago, she noticed a squirrel in her attic. Since then, she has had nasal congestion, 4-5/10 severity, and rhinorrhea 4-5/10 severity. She thinks she is allergic to the squirrel. Symptoms are worse in the room that is connected to attic.   Has mild facial pressure. No postnasal drip. It feels differently compared with sinus infections she used to have in the past. No fever or cough.      Dilia has been using Advair 250/50 mcg 1 puff twice daily and taking montelukast 10 mg by mouth once daily at bedtime. Bronchitis symptoms got better. She barely has needed albuterol for the past 2 weeks. There has been no use of oral steroids since the last visit. No ED/PCP/urgent care/other specialist visits for asthma flare since the previous visit. The patient denies current chest tightness, cough, wheeze, or shortness of breath. Asthma triggers remain unchanged. She uses a spacer/chamber device, where applicable.      Patient Active Problem List   Diagnosis     Need for prophylactic vaccination and inoculation against influenza     Sprain of interphalangeal (joint) of hand     Radial styloid tenosynovitis     Carpal tunnel syndrome     Synovial cyst of popliteal space     Pain in joint, lower leg     Hyperlipidemia LDL goal <130     Advanced directives, counseling/discussion     Moderate persistent asthma     Allergic rhinitis due to dust mite     Food allergy     FH: diabetes mellitus     Chronic allergic rhinitis due to animal hair and dander     Allergic rhinitis due to mold     Chronic seasonal  allergic rhinitis due to pollen     Obesity (BMI 35.0-39.9) with comorbidity (H)     Allergic conjunctivitis, bilateral     History of endoscopic sinus surgery     Chronic pansinusitis       Past Medical History:   Diagnosis Date     Injury, other and unspecified, shoulder and upper arm 9/03      Problem (# of Occurrences) Relation (Name,Age of Onset)    Breast Cancer (1) Maternal Aunt    C.A.D. (1) Mother    Cancer (1) Father: brain    Diabetes (1) Mother       Negative family history of: Cancer - colorectal        Past Surgical History:   Procedure Laterality Date     COLONOSCOPY N/A 10/6/2016    Procedure: COLONOSCOPY;  Surgeon: Shiva Johns MD;  Location: [facility]     ETHMOIDECTOMY  12/30/2013    Procedure: ETHMOIDECTOMY;  Bilateral Submucousal Reduction of InferiorTurbinates and Total Ethmoidectomy with Multiple Sinusotomies;  Surgeon: Lio More MD;  Location: [facility]     ETHMOIDECTOMY Bilateral 2/14/2018    Procedure: ETHMOIDECTOMY;  Bilateral anterior ethmoidectomy, bilateral maxillary antrostomy;  Surgeon: Santhosh Tierney MD;  Location: [facility]     SURGICAL HISTORY OF -   5/04    carpal tunnel release, bilateral     Social History     Socioeconomic History     Marital status: Single     Spouse name: None     Number of children: None     Years of education: None     Highest education level: None   Occupational History     Employer: [employer]    Tobacco Use     Smoking status: Never Smoker     Smokeless tobacco: Never Used   Substance and Sexual Activity     Alcohol use: No     Drug use: No     Sexual activity: Never   Other Topics Concern     Parent/sibling w/ CABG, MI or angioplasty before 65F 55M? Yes     Comment: mother   Social History Narrative    March 11, 2022    ENVIRONMENTAL HISTORY: [place]. [place] is heated with a forced air. They do not have central air conditioning. The patient's bedroom is furnished with hard pawel in bedroom,  allergen mattress cover, allergen pillowcase cover and fabric window coverings.  Pets inside the house include 1 dog. There is not history of cockroach or mice infestation. There are no smokers in the house.  The house does have a damp basement.     Patient is currently living and taking care of a friend in the friends home.     Social Determinants of Health     Financial Resource Strain: Not on file   Food Insecurity: Not on file   Transportation Needs: Not on file   Physical Activity: Not on file   Stress: Not on file   Social Connections: Not on file   Intimate Partner Violence: Not on file   Housing Stability: Not on file           Review of Systems   HENT: Positive for congestion, postnasal drip, rhinorrhea and sinus pressure. Negative for ear discharge, ear pain, facial swelling, sinus pain, sneezing and sore throat.    Eyes: Negative for discharge, redness and itching.   Respiratory: Negative for cough, chest tightness, shortness of breath and wheezing.    Cardiovascular: Negative for chest pain.   Gastrointestinal: Negative for diarrhea, nausea and vomiting.   Skin: Negative for rash.   Allergic/Immunologic: Positive for environmental allergies.   Neurological: Negative for headaches.   Hematological: Negative for adenopathy.           Current Outpatient Medications:      albuterol (PROAIR HFA/PROVENTIL HFA/VENTOLIN HFA) 108 (90 Base) MCG/ACT inhaler, Inhale 2 puffs into the lungs every 4 hours as needed for shortness of breath / dyspnea or wheezing, Disp: 18 g, Rfl: 3     azelastine (ASTELIN) 0.1 % nasal spray, Spray 2 sprays into both nostrils 2 times daily as needed for rhinitis or allergies, Disp: 90 mL, Rfl: 3     azelastine (OPTIVAR) 0.05 % ophthalmic solution, Apply 1 drop to eye 2 times daily as needed (itchy/watery eyes), Disp: 6 mL, Rfl: 1     cetirizine (ZYRTEC) 10 MG tablet, Take 1 tablet (10 mg) by mouth daily as needed for allergies, Disp: 90 tablet, Rfl: 3     Emollient (CERAVE) CREA,  Externally apply 1 dose. topically 2 times daily, Disp: 2 Bottle, Rfl: 11     fluticasone (FLONASE) 50 MCG/ACT nasal spray, Spray 2 sprays into both nostrils daily, Disp: 48 g, Rfl: 1     fluticasone-salmeterol (ADVAIR) 250-50 MCG/DOSE inhaler, Inhale 1 puff into the lungs 2 times daily, Disp: 180 each, Rfl: 3     loratadine (CLARITIN) 10 MG tablet, Take 1 tablet (10 mg) by mouth daily as needed for allergies, Disp: 90 tablet, Rfl: 3     montelukast (SINGULAIR) 10 MG tablet, Take 1 tablet (10 mg) by mouth At Bedtime, Disp: 90 tablet, Rfl: 1     MULTIVITAMIN OR, 1 tab daily, Disp: , Rfl:      ORDER FOR ALLERGEN IMMUNOTHERAPY, Name of Mix: Mix #1  Mold Alternaria Tenuis 1:10 w/v, HS  0.5 ml Aspergillus Fumigatus 1:10 w/v, HS  0.5 ml Epicoccum Nigrum 1:10 w/v, HS 0.5 ml Hormodendrum Cladosporioides 1:10 w/v, HS 0.5 ml Penicillium Mix 1:10 w/v, HS  0.5 ml Diluent: HSA qs to 5ml, Disp: 5 mL, Rfl: PRN     ORDER FOR ALLERGEN IMMUNOTHERAPY, Name of Mix: Mix #2  Dust Mite, Cat, Dog Cat Hair, Standardized 10,000 BAU/mL, ALK  2.0 ml Dog Hair Dander, A. P.  1:100 w/v, HS  1.0 ml Dust Mites F 30,000AU/mL, HS  0.3 ml Dust Mites P. 30,000 AU/mL, HS  0.3 ml  Diluent: HSA qs to 5ml, Disp: 5 mL, Rfl: PRN     ORDER FOR ALLERGEN IMMUNOTHERAPY, Name of Mix: Mix #3 Grass,Tree  Dmitry,White 1:20w/v, HS 0.5ml Birch Mix PRW 1:20w/v, HS 0.5ml Boxelder-Maple Mix BHR (Boxelder Hard Red) 1:20w/v, HS 0.5ml Tilly,Common 1:20w/v, HS 0.5ml Elm,American 1:20w/v, HS 0.5ml [place] Mix RW 1:20w/v, HS 0.5ml Oak Mix RVW 1:20w/v, HS 0.5ml [place] Tree,Black 1:20w/v, HS 0.5ml Suresh Grass (Std) 100,000 BAU/mL, HS 0.4ml Jonathan Grass 1:20w/v, HS 0.5ml Diluent: HSA qs to 5ml, Disp: 5 mL, Rfl: PRN     ORDER FOR ALLERGEN IMMUNOTHERAPY, Name of Mix: Mix #4  Weeds Kochia 1:20 w/v, HS 0.5 ml Lamb's Quarters 1:20 w/v, HS 0.5 ml Nettle 1:20 w/v, HS 0.5 ml Plantain, English 1:20 w/v, HS 0.5 ml Ragweed Mixed 1:20 w/v ALK  0.5 ml Russian Thistle 1:20 w/v, HS 0.5 ml  Sagebrush, Mugwort 1:20 w/v, HS 0.5 ml Sorrel, Sheep 1:20 w/v, HS 0.5 ml Diluent: HSA qs to 5ml, Disp: 5 mL, Rfl: PRN     order for DME, Equipment being ordered: Silicone heel cup, Disp: 1 Units, Rfl: 0     order for DME, Equipment being ordered: Dynaflex insert, Disp: 1 Units, Rfl: 0     order for DME, Equipment being ordered: Silicone heel cup, Disp: 1 Units, Rfl: 0     triamcinolone (KENALOG) 0.1 % external cream, Apply sparingly to affected area two times daily as needed but not more than 14 days in a row. Spare face, armpits, neck, and groin., Disp: 80 g, Rfl: 1     albuterol (2.5 MG/3ML) 0.083% neb solution, Take 1 vial (2.5 mg) by nebulization every 4 hours as needed (Patient not taking: Reported on 3/11/2022), Disp: 1 Box, Rfl: 3     EPINEPHrine (ANY BX GENERIC EQUIV) 0.3 MG/0.3ML injection 2-pack, Inject 0.3 mLs (0.3 mg) into the muscle once as needed for anaphylaxis (Patient not taking: Reported on 1/5/2022), Disp: 0.6 mL, Rfl: 3     order for DME, Equipment being ordered: thumb spica splint RIGHT (Patient not taking: Reported on 3/11/2022), Disp: 1 Units, Rfl: 0  Immunization History   Administered Date(s) Administered     COVID-19,PF,Moderna 03/19/2021, 04/16/2021, 11/02/2021     Influenza (IIV3) PF 12/12/2002, 11/28/2003, 10/24/2006, 02/11/2009, 10/05/2011, 11/15/2012, 10/07/2014     Influenza Quad, Recombinant, pf(RIV4) (Flublok) 10/02/2018, 10/30/2019, 10/28/2020, 10/15/2021     Influenza Vaccine IM > 6 months Valent IIV4 (Alfuria,Fluzone) 10/16/2013, 10/20/2015, 10/26/2016, 11/21/2017     Mantoux Tuberculin Skin Test 02/11/2009     Measles 10/22/1979     Pneumococcal 23 valent 10/05/2011     TDAP Vaccine (Adacel) 02/11/2009, 07/06/2018     Zoster vaccine recombinant adjuvanted (SHINGRIX) 12/02/2021, 02/02/2022     Allergies   Allergen Reactions     Niacin Hives     Nsaids Hives     Tolerates ibuprofen and aspirin without hives       Pineapple Hives     OBJECTIVE:                                                                  /73 (BP Location: Left arm, Patient Position: Sitting, Cuff Size: Adult Large)   Pulse 88   Temp 98.9  F (37.2  C) (Tympanic)   Wt 98.7 kg (217 lb 9.5 oz)   LMP 07/18/2015 (Approximate)   SpO2 97%   BMI 38.55 kg/m          Physical Exam  Vitals and nursing note reviewed.   Constitutional:       General: She is not in acute distress.     Appearance: She is not diaphoretic.   HENT:      Head: Normocephalic and atraumatic.      Right Ear: Tympanic membrane, ear canal and external ear normal.      Left Ear: Tympanic membrane, ear canal and external ear normal.      Nose: No mucosal edema or rhinorrhea.      Right Turbinates: Not enlarged or swollen.      Left Turbinates: Not enlarged or swollen.      Mouth/Throat:      Lips: Pink.      Mouth: Mucous membranes are moist.      Pharynx: Oropharynx is clear. No pharyngeal swelling, oropharyngeal exudate or posterior oropharyngeal erythema.   Eyes:      General:         Right eye: No discharge.         Left eye: No discharge.      Conjunctiva/sclera: Conjunctivae normal.   Cardiovascular:      Rate and Rhythm: Normal rate and regular rhythm.      Heart sounds: Normal heart sounds. No murmur heard.  Pulmonary:      Effort: Pulmonary effort is normal. No respiratory distress.      Breath sounds: Normal breath sounds and air entry. No stridor, decreased air movement or transmitted upper airway sounds. No decreased breath sounds, wheezing, rhonchi or rales.   Musculoskeletal:         General: Normal range of motion.      Cervical back: Normal range of motion.   Lymphadenopathy:      Cervical: No cervical adenopathy.   Skin:     General: Skin is warm.   Neurological:      Mental Status: She is alert and oriented to person, place, and time.   Psychiatric:         Mood and Affect: Mood normal.         Behavior: Behavior normal.         ASSESSMENT/PLAN:    Moderate persistent asthma without complication  Currently, his symptoms are well  controlled.  She recovered from bronchitis.  -Continue Advair 250/50 micrograms 1 puff twice daily and montelukast 10 mg by mouth once daily.  -Continue using albuterol inhaler 2 to 4 puffs every 4 hours as needed for persistent cough/chest tightness/wheezing/shortness of breath.  -Depending on future symptom control, may need to step up on ICS.      - montelukast (SINGULAIR) 10 MG tablet  Dispense: 90 tablet; Refill: 1  - fluticasone-salmeterol (ADVAIR) 250-50 MCG/DOSE inhaler  Dispense: 180 each; Refill: 3  - albuterol (PROAIR HFA/PROVENTIL HFA/VENTOLIN HFA) 108 (90 Base) MCG/ACT inhaler  Dispense: 18 g; Refill: 3    Chronic allergic rhinitis due to animal hair and dander  Allergic rhinitis due to dust mite  Allergic rhinitis due to mold  Chronic seasonal allergic rhinitis due to pollen  Allergic conjunctivitis of both eyes    Optivar eyedrops control her ocular symptoms quite well.  -Continue as is.    Rhinitis symptoms are suboptimally controlled.  -Use azelastine 2 sprays in each nostril twice a day when necessary.  -Increase intranasal fluticasone to 2 sprays in each nostril twice daily. Decrease, when you get better.   There is a company that is trying to help her with squirrel.   I checked with the lab and serum IgE for squirrel dander or hair/pelt isn't available.   Continue allergen immunotherapy. Notify of a systemic reaction.      - fluticasone (FLONASE) 50 MCG/ACT nasal spray  Dispense: 48 g; Refill: 1  - montelukast (SINGULAIR) 10 MG tablet  Dispense: 90 tablet; Refill: 1  - azelastine (ASTELIN) 0.1 % nasal spray  Dispense: 90 mL; Refill: 3  - cetirizine (ZYRTEC) 10 MG tablet  Dispense: 90 tablet; Refill: 3  - azelastine (OPTIVAR) 0.05 % ophthalmic solution  Dispense: 6 mL; Refill: 1       Return in about 3 months (around 6/11/2022), or if symptoms worsen or fail to improve.    Thank you for allowing us to participate in the care of this patient. Please feel free to contact us if there are any  questions or concerns about the patient.    Disclaimer: This note consists of symbols derived from keyboarding, dictation and/or voice recognition software. As a result, there may be errors in the script that have gone undetected. Please consider this when interpreting information found in this chart.    Michael Lujan MD, FAAAAI, FACAAI  Allergy, Asthma and Immunology     [facility]

## 2022-03-11 NOTE — PATIENT INSTRUCTIONS
While you have symptoms with your sinuses, increase Flonase to 2 sprays in each nostril twice daily. Decrease, when you get better.   Find a way to remove the squirrel from your home.    Continue azelastine 2 sprays in each nostril twice daily.   Continue montelukast 10 mg by mouth once daily.     Continue taking either loratadine or cetirizine 10 mg by mouth twice daily.       Continue Advair 250/50 mcg 1 puff twice daily.    -Continue using albuterol inhaler 2-4 puffs every 4 hours as needed for chest tightness/wheezing/shortness of breath/persistent cough.        Optivar 1 drop in each eye twice daily as needed.            Allergy Staff Appt Hours Shot Hours Locations    Physician     Michael Lujan MD       Support Staff     Kaylee Vickers LPN     Ron CMA    Tuesday:   [place] :  [place]: :         [facility] :  [place] [address]  [place]        Thursday: 7:20        Friday: 712:20     [place]        Tuesday: :: :: :20        Tuesday: :: 7-:20    [place]       Tues & Wed: :20       Mon & Thurs: -:20       Fri: :20           [facility]  [address] [place] [address]  Appt Line: [phone]      [facility]  [address] [place] [address]  Appt Line: [phone]    Pulmonary Function Scheduling:  [place]: [phone]  [place]: [phone]  [phone]     Important Scheduling Information (if recommended by provider):  Aspirin Desensitization: Appt will last 2 clinic days. Please call the Allergy RN line for your clinic to schedule. Discontinue antihistamines 7 days prior to the appointment.     Food Challenges: Appt will last 3-4 hours. Please call the Allergy RN line for your clinic to schedule. Discontinue antihistamines 7 days prior to the appointment.     Penicillin Testing: Appt will last 2-3 hours. Please call the  Allergy RN line for your clinic to schedule. Discontinue antihistamines 7 days prior to the appointment.     Skin Testing: Appt will about 40 minutes. Call the appointment line for your clinic to schedule. Discontinue antihistamines 7 days prior to the appointment.     Thank you for trusting us with your Allergy, Asthma, and Immunology care. Please feel free to contact us with any questions or concerns you may have.      [facility] [facility] [phone]

## 2022-03-14 RX ORDER — ALBUTEROL SULFATE 90 UG/1
2 AEROSOL, METERED RESPIRATORY (INHALATION) EVERY 4 HOURS PRN
Qty: 18 G | Refills: 3 | Status: SHIPPED | OUTPATIENT
Start: 2022-03-14 | End: 2024-03-05

## 2022-03-30 ENCOUNTER — ALLIED HEALTH/NURSE VISIT (OUTPATIENT)
Dept: ALLERGY | Facility: CLINIC | Age: 57
End: 2022-03-30
Payer: COMMERCIAL

## 2022-03-30 DIAGNOSIS — J30.89 ALLERGIC RHINITIS DUE TO DUST MITE: ICD-10-CM

## 2022-03-30 DIAGNOSIS — J30.89 ALLERGIC RHINITIS DUE TO MOLD: ICD-10-CM

## 2022-03-30 DIAGNOSIS — J30.81 CHRONIC ALLERGIC RHINITIS DUE TO ANIMAL HAIR AND DANDER: ICD-10-CM

## 2022-03-30 DIAGNOSIS — J45.40 MODERATE PERSISTENT ASTHMA WITHOUT COMPLICATION: Primary | ICD-10-CM

## 2022-03-30 DIAGNOSIS — H10.13 ALLERGIC CONJUNCTIVITIS OF BOTH EYES: ICD-10-CM

## 2022-03-30 PROCEDURE — 95117 IMMUNOTHERAPY INJECTIONS: CPT

## 2022-04-20 DIAGNOSIS — L30.9 ECZEMA, UNSPECIFIED TYPE: ICD-10-CM

## 2022-04-20 RX ORDER — TRIAMCINOLONE ACETONIDE 1 MG/G
CREAM TOPICAL
Qty: 80 G | Refills: 1 | Status: SHIPPED | OUTPATIENT
Start: 2022-04-20

## 2022-04-20 NOTE — TELEPHONE ENCOUNTER
Routing refill request to provider for review/approval because:  Updated Rx needed, last written on 12/21/2020    LOV:  3/11/2022    Gia GUEVARA  RN  Specialty/Allergy Clinic

## 2022-04-28 DIAGNOSIS — H10.13 ALLERGIC CONJUNCTIVITIS OF BOTH EYES: ICD-10-CM

## 2022-04-28 RX ORDER — AZELASTINE HYDROCHLORIDE 0.5 MG/ML
1 SOLUTION/ DROPS OPHTHALMIC 2 TIMES DAILY PRN
Qty: 6 ML | Refills: 1 | Status: SHIPPED | OUTPATIENT
Start: 2022-04-28 | End: 2023-03-09

## 2022-04-28 NOTE — TELEPHONE ENCOUNTER
Prescription approved per [facility] Refill Protocol.    Kaylee GRANT RN  Specialty/Allergy Clinics

## 2022-05-04 ENCOUNTER — ALLIED HEALTH/NURSE VISIT (OUTPATIENT)
Dept: ALLERGY | Facility: CLINIC | Age: 57
End: 2022-05-04
Payer: COMMERCIAL

## 2022-05-04 DIAGNOSIS — J30.89 ALLERGIC RHINITIS DUE TO DUST MITE: ICD-10-CM

## 2022-05-04 DIAGNOSIS — J30.89 ALLERGIC RHINITIS DUE TO MOLD: ICD-10-CM

## 2022-05-04 DIAGNOSIS — J30.81 CHRONIC ALLERGIC RHINITIS DUE TO ANIMAL HAIR AND DANDER: Primary | ICD-10-CM

## 2022-05-04 DIAGNOSIS — J30.1 CHRONIC SEASONAL ALLERGIC RHINITIS DUE TO POLLEN: ICD-10-CM

## 2022-05-04 PROCEDURE — 95117 IMMUNOTHERAPY INJECTIONS: CPT

## 2022-06-08 ENCOUNTER — ALLIED HEALTH/NURSE VISIT (OUTPATIENT)
Dept: ALLERGY | Facility: CLINIC | Age: 57
End: 2022-06-08
Payer: COMMERCIAL

## 2022-06-08 DIAGNOSIS — H10.13 ALLERGIC CONJUNCTIVITIS OF BOTH EYES: ICD-10-CM

## 2022-06-08 DIAGNOSIS — J30.89 ALLERGIC RHINITIS DUE TO DUST MITE: ICD-10-CM

## 2022-06-08 DIAGNOSIS — J30.89 ALLERGIC RHINITIS DUE TO MOLD: ICD-10-CM

## 2022-06-08 DIAGNOSIS — J30.81 CHRONIC ALLERGIC RHINITIS DUE TO ANIMAL HAIR AND DANDER: Primary | ICD-10-CM

## 2022-06-08 DIAGNOSIS — J30.1 CHRONIC SEASONAL ALLERGIC RHINITIS DUE TO POLLEN: ICD-10-CM

## 2022-06-08 PROCEDURE — 95117 IMMUNOTHERAPY INJECTIONS: CPT

## 2022-06-09 ENCOUNTER — OFFICE VISIT (OUTPATIENT)
Dept: ALLERGY | Facility: CLINIC | Age: 57
End: 2022-06-09
Payer: COMMERCIAL

## 2022-06-09 VITALS — SYSTOLIC BLOOD PRESSURE: 128 MMHG | RESPIRATION RATE: 20 BRPM | HEART RATE: 84 BPM | DIASTOLIC BLOOD PRESSURE: 80 MMHG

## 2022-06-09 DIAGNOSIS — J30.1 CHRONIC SEASONAL ALLERGIC RHINITIS DUE TO POLLEN: ICD-10-CM

## 2022-06-09 DIAGNOSIS — R21 RASH AND NONSPECIFIC SKIN ERUPTION: ICD-10-CM

## 2022-06-09 DIAGNOSIS — J30.89 ALLERGIC RHINITIS DUE TO MOLD: ICD-10-CM

## 2022-06-09 DIAGNOSIS — J30.81 CHRONIC ALLERGIC RHINITIS DUE TO ANIMAL HAIR AND DANDER: ICD-10-CM

## 2022-06-09 DIAGNOSIS — H10.13 ALLERGIC CONJUNCTIVITIS, BILATERAL: ICD-10-CM

## 2022-06-09 DIAGNOSIS — J45.40 MODERATE PERSISTENT ASTHMA WITHOUT COMPLICATION: Primary | ICD-10-CM

## 2022-06-09 DIAGNOSIS — J30.89 ALLERGIC RHINITIS DUE TO DUST MITE: ICD-10-CM

## 2022-06-09 PROCEDURE — 99214 OFFICE O/P EST MOD 30 MIN: CPT | Performed by: ALLERGY & IMMUNOLOGY

## 2022-06-09 RX ORDER — MONTELUKAST SODIUM 10 MG/1
10 TABLET ORAL AT BEDTIME
Qty: 90 TABLET | Refills: 1 | Status: SHIPPED | OUTPATIENT
Start: 2022-06-09 | End: 2022-08-31

## 2022-06-09 ASSESSMENT — ENCOUNTER SYMPTOMS
COUGH: 0
MYALGIAS: 0
DIARRHEA: 0
ACTIVITY CHANGE: 0
WHEEZING: 0
RHINORRHEA: 0
FEVER: 0
SHORTNESS OF BREATH: 0
SINUS PRESSURE: 0
NAUSEA: 0
JOINT SWELLING: 0
ADENOPATHY: 0
EYE DISCHARGE: 0
CHILLS: 0
ARTHRALGIAS: 0
CHEST TIGHTNESS: 0
HEADACHES: 0
VOMITING: 0
EYE ITCHING: 1
EYE REDNESS: 0
FACIAL SWELLING: 0

## 2022-06-09 ASSESSMENT — ASTHMA QUESTIONNAIRES: ACT_TOTALSCORE: 20

## 2022-06-09 NOTE — LETTER
6/9/2022         RE: Dilia Solomon  [address] [facility] [id number]        Dear Colleague,    Thank you for referring your patient, Dilia Solomon, to the [facility]. Please see a copy of my visit note below.    SUBJECTIVE:                                                                 Dilia Solomon presents today to our Allergy Clinic at [facility] for a follow up visit. She is a 57 year old female with moderate persistent asthma and allergic rhinitis.     She was diagnosed with asthma approximately in 8927-6106. Triggers are environmental allergies and viral respiratory infections.   Serum IgE for regional aeroallergen panel performed in June 2017 with multiple levels of various sensitivities to molds, cat, dog, dust mite, pollen of trees, grasses, and weeds.  She had 2 sinus surgeries in the past. The last one was in 2018.   She started allergen immunotherapy in July 2017. She was on allergen immunotherapy before that with Dr. Haines, and it was helpful at that time; however, symptoms got worse soon after stopping it.  PFT within normal limits  in December 2020.     Allergy Immunotherapy  Date/time of injection(s):  6/08/2022         Vial Color                               Content                                  Dose  Red 1:1                                   Weeds                                     0.5mL  Red 1:1                                   Grass, Trees                           0.5mL    Red 1:1                                   Molds                                      0.5mL  Red 1:1                                   Cat, Dog, Dust Mite                0.5mL     The patient tolerates injections well without persistent large local reactions or systemic reactions. She has been on the maintenance dose since January 2018.      Regarding her allergic rhinitis, she has been using sinus rinses once-twice daily, intranasal fluticasone 2 sprays in  each nostril once daily, azelastine 2 sprays in each nostril once-twice daily, cetirizine and loratadine as needed (rarely), and montelukast 10 mg by mouth daily at bedtime.   Overall, she is doing well on this regimen. The patient denies clear rhinorrhea, nasal itch, stuffiness, sneezing, or interval sinusitis symptoms of fever, facial pain, or purulent rhinorrhea.  Optivar eye drops help. She forgot to use them this morning. Has mildly itchy eyes.   Dilia has been using Advair 250/50 mcg 1 puff twice daily and taking montelukast 10 mg by mouth once daily at bedtime. Bronchitis symptoms that she had in Winter resolved completely. She barely has to use albuterol. Dilia is using albuterol HFA  less than twice per week for rescue from chest symptoms. She is waking up less than twice per month due to chest symptoms. There has been no use of oral steroids since last visit. No ED/PCP/urgent care/other specialist visits for asthma flare since the previous visit. The patient denies current chest tightness, cough, wheeze, or shortness of breath.   She developed a rash in May on her legs and arms. Uses triamcinolone cream, and it seems to help.    Patient Active Problem List   Diagnosis     Need for prophylactic vaccination and inoculation against influenza     Sprain of interphalangeal (joint) of hand     Radial styloid tenosynovitis     Carpal tunnel syndrome     Synovial cyst of popliteal space     Pain in joint, lower leg     Hyperlipidemia LDL goal <130     Advanced directives, counseling/discussion     Moderate persistent asthma     Allergic rhinitis due to dust mite     Food allergy     FH: diabetes mellitus     Chronic allergic rhinitis due to animal hair and dander     Allergic rhinitis due to mold     Chronic seasonal allergic rhinitis due to pollen     Obesity (BMI 35.0-39.9) with comorbidity (H)     Allergic conjunctivitis, bilateral     History of endoscopic sinus surgery     Chronic pansinusitis       Past  Medical History:   Diagnosis Date     Injury, other and unspecified, shoulder and upper arm 9/03      Problem (# of Occurrences) Relation (Name,Age of Onset)    Breast Cancer (1) Maternal Aunt    C.A.D. (1) Mother    Cancer (1) Father: brain    Diabetes (1) Mother       Negative family history of: Cancer - colorectal        Past Surgical History:   Procedure Laterality Date     COLONOSCOPY N/A 10/6/2016    Procedure: COLONOSCOPY;  Surgeon: Shiva Johns MD;  Location: [facility]     ETHMOIDECTOMY  12/30/2013    Procedure: ETHMOIDECTOMY;  Bilateral Submucousal Reduction of InferiorTurbinates and Total Ethmoidectomy with Multiple Sinusotomies;  Surgeon: Lio More MD;  Location: [facility]     ETHMOIDECTOMY Bilateral 2/14/2018    Procedure: ETHMOIDECTOMY;  Bilateral anterior ethmoidectomy, bilateral maxillary antrostomy;  Surgeon: Santhosh Tierney MD;  Location: [facility]     SURGICAL HISTORY OF -   5/04    carpal tunnel release, bilateral     Social History     Socioeconomic History     Marital status: Single     Spouse name: None     Number of children: None     Years of education: None     Highest education level: None   Occupational History     Employer: [employer]    Tobacco Use     Smoking status: Never Smoker     Smokeless tobacco: Never Used   Substance and Sexual Activity     Alcohol use: No     Drug use: No     Sexual activity: Never   Other Topics Concern     Parent/sibling w/ CABG, MI or angioplasty before 65F 55M? Yes     Comment: mother   Social History Narrative    March 11, 2022    ENVIRONMENTAL HISTORY: [place]. [place] is heated with a forced air. They do not have central air conditioning. The patient's bedroom is furnished with hard pawel in bedroom, allergen mattress cover, allergen pillowcase cover and fabric window coverings.  Pets inside the house include 1 dog. There is not history of cockroach or mice infestation. There are no smokers in  the house.  The house does have a damp basement.     Patient is currently living and taking care of a friend in the friends home.           Review of Systems   Constitutional: Negative for activity change, chills and fever.   HENT: Negative for congestion, dental problem, ear pain, facial swelling, nosebleeds, postnasal drip, rhinorrhea, sinus pressure and sneezing.    Eyes: Positive for itching. Negative for discharge and redness.   Respiratory: Negative for cough, chest tightness, shortness of breath and wheezing.    Cardiovascular: Negative for chest pain.   Gastrointestinal: Negative for diarrhea, nausea and vomiting.   Musculoskeletal: Negative for arthralgias, joint swelling and myalgias.   Skin: Positive for rash.   Neurological: Negative for headaches.   Hematological: Negative for adenopathy.   Psychiatric/Behavioral: Negative for behavioral problems and self-injury.           Current Outpatient Medications:      albuterol (PROAIR HFA/PROVENTIL HFA/VENTOLIN HFA) 108 (90 Base) MCG/ACT inhaler, Inhale 2 puffs into the lungs every 4 hours as needed for shortness of breath / dyspnea or wheezing, Disp: 18 g, Rfl: 3     azelastine (ASTELIN) 0.1 % nasal spray, Spray 2 sprays into both nostrils 2 times daily as needed for rhinitis or allergies, Disp: 90 mL, Rfl: 3     azelastine (OPTIVAR) 0.05 % ophthalmic solution, Apply 1 drop to eye 2 times daily as needed (itchy/watery eyes), Disp: 6 mL, Rfl: 1     cetirizine (ZYRTEC) 10 MG tablet, Take 1 tablet (10 mg) by mouth daily as needed for allergies, Disp: 90 tablet, Rfl: 3     Emollient (CERAVE) CREA, Externally apply 1 dose. topically 2 times daily, Disp: 2 Bottle, Rfl: 11     EPINEPHrine (ANY BX GENERIC EQUIV) 0.3 MG/0.3ML injection 2-pack, Inject 0.3 mLs (0.3 mg) into the muscle once as needed for anaphylaxis, Disp: 0.6 mL, Rfl: 3     fluticasone (FLONASE) 50 MCG/ACT nasal spray, Spray 2 sprays into both nostrils daily, Disp: 48 g, Rfl: 1     fluticasone-salmeterol  (ADVAIR) 250-50 MCG/DOSE inhaler, Inhale 1 puff into the lungs 2 times daily, Disp: 180 each, Rfl: 3     loratadine (CLARITIN) 10 MG tablet, Take 1 tablet (10 mg) by mouth daily as needed for allergies, Disp: 90 tablet, Rfl: 3     montelukast (SINGULAIR) 10 MG tablet, Take 1 tablet (10 mg) by mouth At Bedtime, Disp: 90 tablet, Rfl: 1     MULTIVITAMIN OR, 1 tab daily, Disp: , Rfl:      ORDER FOR ALLERGEN IMMUNOTHERAPY, Name of Mix: Mix #1  Mold Alternaria Tenuis 1:10 w/v, HS  0.5 ml Aspergillus Fumigatus 1:10 w/v, HS  0.5 ml Epicoccum Nigrum 1:10 w/v, HS 0.5 ml Hormodendrum Cladosporioides 1:10 w/v, HS 0.5 ml Penicillium Mix 1:10 w/v, HS  0.5 ml Diluent: HSA qs to 5ml, Disp: 5 mL, Rfl: PRN     ORDER FOR ALLERGEN IMMUNOTHERAPY, Name of Mix: Mix #2  Dust Mite, Cat, Dog Cat Hair, Standardized 10,000 BAU/mL, ALK  2.0 ml Dog Hair Dander, A. P.  1:100 w/v, HS  1.0 ml Dust Mites F 30,000AU/mL, HS  0.3 ml Dust Mites P. 30,000 AU/mL, HS  0.3 ml  Diluent: HSA qs to 5ml, Disp: 5 mL, Rfl: PRN     ORDER FOR ALLERGEN IMMUNOTHERAPY, Name of Mix: Mix #3 Grass,Tree  Dmitry,White 1:20w/v, HS 0.5ml Birch Mix PRW 1:20w/v, HS 0.5ml Boxelder-Maple Mix BHR (Boxelder Hard Red) 1:20w/v, HS 0.5ml [place],Common 1:20w/v, HS 0.5ml Elm,American 1:20w/v, HS 0.5ml [place] Mix RW 1:20w/v, HS 0.5ml Oak Mix RVW 1:20w/v, HS 0.5ml [place] Tree,Black 1:20w/v, HS 0.5ml Suresh Grass (Std) 100,000 BAU/mL, HS 0.4ml Jonathan Grass 1:20w/v, HS 0.5ml Diluent: HSA qs to 5ml, Disp: 5 mL, Rfl: PRN     ORDER FOR ALLERGEN IMMUNOTHERAPY, Name of Mix: Mix #4  Weeds Kochia 1:20 w/v, HS 0.5 ml Lamb's Quarters 1:20 w/v, HS 0.5 ml Nettle 1:20 w/v, HS 0.5 ml Plantain, English 1:20 w/v, HS 0.5 ml Ragweed Mixed 1:20 w/v ALK  0.5 ml Russian Thistle 1:20 w/v, HS 0.5 ml Sagebrush, Mugwort 1:20 w/v, HS 0.5 ml Sorrel, Sheep 1:20 w/v, HS 0.5 ml Diluent: HSA qs to 5ml, Disp: 5 mL, Rfl: PRN     order for DME, Equipment being ordered: Silicone heel cup, Disp: 1 Units, Rfl: 0      order for DME, Equipment being ordered: Dynaflex insert, Disp: 1 Units, Rfl: 0     order for DME, Equipment being ordered: Silicone heel cup, Disp: 1 Units, Rfl: 0     triamcinolone (KENALOG) 0.1 % external cream, Apply sparingly to affected area two times daily as needed but not more than 14 days in a row. Spare face, armpits, neck, and groin., Disp: 80 g, Rfl: 1     albuterol (2.5 MG/3ML) 0.083% neb solution, Take 1 vial (2.5 mg) by nebulization every 4 hours as needed (Patient not taking: No sig reported), Disp: 1 Box, Rfl: 3     order for DME, Equipment being ordered: thumb spica splint RIGHT (Patient not taking: No sig reported), Disp: 1 Units, Rfl: 0  Immunization History   Administered Date(s) Administered     COVID-19,PF,Moderna 03/19/2021, 04/16/2021, 11/02/2021     Influenza (IIV3) PF 12/12/2002, 11/28/2003, 10/24/2006, 02/11/2009, 10/05/2011, 11/15/2012, 10/07/2014     Influenza Quad, Recombinant, pf(RIV4) (Flublok) 10/02/2018, 10/30/2019, 10/28/2020, 10/15/2021     Influenza Vaccine IM > 6 months Valent IIV4 (Alfuria,Fluzone) 10/16/2013, 10/20/2015, 10/26/2016, 11/21/2017     Mantoux Tuberculin Skin Test 02/11/2009     Measles 10/22/1979     Pneumococcal 23 valent 10/05/2011     TDAP Vaccine (Adacel) 02/11/2009, 07/06/2018     Zoster vaccine recombinant adjuvanted (SHINGRIX) 12/02/2021, 02/02/2022     Allergies   Allergen Reactions     Niacin Hives     Nsaids Hives     Tolerates ibuprofen and aspirin without hives       Pineapple Hives     OBJECTIVE:                                                                 /80 (BP Location: Left arm, Patient Position: Sitting, Cuff Size: Adult Large)   Pulse 84   Resp 20   LMP 07/18/2015 (Approximate)         Physical Exam  Vitals and nursing note reviewed.   Constitutional:       General: She is not in acute distress.     Appearance: She is not diaphoretic.   HENT:      Head: Normocephalic and atraumatic.      Right Ear: Tympanic membrane, ear canal and  external ear normal.      Left Ear: Tympanic membrane, ear canal and external ear normal.      Nose: No mucosal edema or rhinorrhea.      Right Turbinates: Not enlarged or swollen.      Left Turbinates: Not enlarged or swollen.      Mouth/Throat:      Lips: Pink.      Mouth: Mucous membranes are moist.      Pharynx: Oropharynx is clear. No pharyngeal swelling, oropharyngeal exudate or posterior oropharyngeal erythema.   Eyes:      General:         Right eye: No discharge.         Left eye: No discharge.      Conjunctiva/sclera: Conjunctivae normal.   Cardiovascular:      Rate and Rhythm: Normal rate and regular rhythm.      Heart sounds: Normal heart sounds. No murmur heard.  Pulmonary:      Effort: Pulmonary effort is normal. No respiratory distress.      Breath sounds: Normal breath sounds and air entry. No stridor, decreased air movement or transmitted upper airway sounds. No decreased breath sounds, wheezing, rhonchi or rales.   Musculoskeletal:         General: Normal range of motion.      Cervical back: Normal range of motion.   Skin:     General: Skin is warm.      Comments: Several non specific well healing scabs on the left arm and leg. No active dermatitis    Neurological:      Mental Status: She is alert and oriented to person, place, and time.   Psychiatric:         Mood and Affect: Mood normal.         Behavior: Behavior normal.           WORKUP: ACT score 20      ASSESSMENT/PLAN:    Moderate persistent asthma without complication  Currently well controlled with Advair 250/50 micrograms 1 puff twice daily, montelukast 10 mg by mouth once daily, and albuterol as needed.  - Continue as is.  - Ordered interval spirometry.    - montelukast (SINGULAIR) 10 MG tablet  Dispense: 90 tablet; Refill: 1  - General PFT Lab (Please always keep checked)  - Pulmonary Function Test    Chronic allergic rhinitis due to animal hair and dander  Allergic rhinitis due to mold  Allergic rhinitis due to dust mite  Chronic  seasonal allergic rhinitis due to pollen  Allergic conjunctivitis, bilateral    Overall symptoms are well controlled with allergen immunotherapy, Optivar eyedrops on as-needed basis, intranasal fluticasone 2 sprays in each nostril once daily, azelastine 2 sprays in each nostril once-twice daily, montelukast 10 mg by mouth once daily, and oral antihistamines (cetirizine and loratadine on as-needed basis).  - Continue as is.    - montelukast (SINGULAIR) 10 MG tablet  Dispense: 90 tablet; Refill: 1       Return in about 6 months (around 12/9/2022), or if symptoms worsen or fail to improve.    Thank you for allowing us to participate in the care of this patient. Please feel free to contact us if there are any questions or concerns about the patient.    Disclaimer: This note consists of symbols derived from keyboarding, dictation and/or voice recognition software. As a result, there may be errors in the script that have gone undetected. Please consider this when interpreting information found in this chart.    Michael Lujan MD, FAAAAI, FACAAI  Allergy, Asthma and Immunology     [facility]       Again, thank you for allowing me to participate in the care of your patient.        Sincerely,        Michael Lujan MD

## 2022-06-09 NOTE — PROGRESS NOTES
SUBJECTIVE:                                                                 Dilia Solomon presents today to our Allergy Clinic at [facility] for a follow up visit. She is a 57 year old female with moderate persistent asthma and allergic rhinitis.     She was diagnosed with asthma approximately in 1547-3304. Triggers are environmental allergies and viral respiratory infections.   Serum IgE for regional aeroallergen panel performed in June 2017 with multiple levels of various sensitivities to molds, cat, dog, dust mite, pollen of trees, grasses, and weeds.  She had 2 sinus surgeries in the past. The last one was in 2018.   She started allergen immunotherapy in July 2017. She was on allergen immunotherapy before that with Dr. Haines, and it was helpful at that time; however, symptoms got worse soon after stopping it.  PFT within normal limits  in December 2020.     Allergy Immunotherapy  Date/time of injection(s):  6/08/2022         Vial Color                               Content                                  Dose  Red 1:1                                   Weeds                                     0.5mL  Red 1:1                                   Grass, Trees                           0.5mL    Red 1:1                                   Molds                                      0.5mL  Red 1:1                                   Cat, Dog, Dust Mite                0.5mL     The patient tolerates injections well without persistent large local reactions or systemic reactions. She has been on the maintenance dose since January 2018.      Regarding her allergic rhinitis, she has been using sinus rinses once-twice daily, intranasal fluticasone 2 sprays in each nostril once daily, azelastine 2 sprays in each nostril once-twice daily, cetirizine and loratadine as needed (rarely), and montelukast 10 mg by mouth daily at bedtime.   Overall, she is doing well on this regimen. The patient denies clear  rhinorrhea, nasal itch, stuffiness, sneezing, or interval sinusitis symptoms of fever, facial pain, or purulent rhinorrhea.  Optivar eye drops help. She forgot to use them this morning. Has mildly itchy eyes.   Dilia has been using Advair 250/50 mcg 1 puff twice daily and taking montelukast 10 mg by mouth once daily at bedtime. Bronchitis symptoms that she had in Winter resolved completely. She barely has to use albuterol. Dilia is using albuterol HFA  less than twice per week for rescue from chest symptoms. She is waking up less than twice per month due to chest symptoms. There has been no use of oral steroids since last visit. No ED/PCP/urgent care/other specialist visits for asthma flare since the previous visit. The patient denies current chest tightness, cough, wheeze, or shortness of breath.   She developed a rash in May on her legs and arms. Uses triamcinolone cream, and it seems to help.    Patient Active Problem List   Diagnosis     Need for prophylactic vaccination and inoculation against influenza     Sprain of interphalangeal (joint) of hand     Radial styloid tenosynovitis     Carpal tunnel syndrome     Synovial cyst of popliteal space     Pain in joint, lower leg     Hyperlipidemia LDL goal <130     Advanced directives, counseling/discussion     Moderate persistent asthma     Allergic rhinitis due to dust mite     Food allergy     FH: diabetes mellitus     Chronic allergic rhinitis due to animal hair and dander     Allergic rhinitis due to mold     Chronic seasonal allergic rhinitis due to pollen     Obesity (BMI 35.0-39.9) with comorbidity (H)     Allergic conjunctivitis, bilateral     History of endoscopic sinus surgery     Chronic pansinusitis       Past Medical History:   Diagnosis Date     Injury, other and unspecified, shoulder and upper arm 9/03      Problem (# of Occurrences) Relation (Name,Age of Onset)    Breast Cancer (1) Maternal Aunt    C.A.D. (1) Mother    Cancer (1) Father: brain     Diabetes (1) Mother       Negative family history of: Cancer - colorectal        Past Surgical History:   Procedure Laterality Date     COLONOSCOPY N/A 10/6/2016    Procedure: COLONOSCOPY;  Surgeon: Shiva Johns MD;  Location: [facility]     ETHMOIDECTOMY  12/30/2013    Procedure: ETHMOIDECTOMY;  Bilateral Submucousal Reduction of InferiorTurbinates and Total Ethmoidectomy with Multiple Sinusotomies;  Surgeon: Lio More MD;  Location: [facility]     ETHMOIDECTOMY Bilateral 2/14/2018    Procedure: ETHMOIDECTOMY;  Bilateral anterior ethmoidectomy, bilateral maxillary antrostomy;  Surgeon: Santhosh Tierney MD;  Location: [facility]     SURGICAL HISTORY OF -   5/04    carpal tunnel release, bilateral     Social History     Socioeconomic History     Marital status: Single     Spouse name: None     Number of children: None     Years of education: None     Highest education level: None   Occupational History     Employer: [employer]    Tobacco Use     Smoking status: Never Smoker     Smokeless tobacco: Never Used   Substance and Sexual Activity     Alcohol use: No     Drug use: No     Sexual activity: Never   Other Topics Concern     Parent/sibling w/ CABG, MI or angioplasty before 65F 55M? Yes     Comment: mother   Social History Narrative    March 11, 2022    ENVIRONMENTAL HISTORY: [place]. The [place] is heated with a forced air. They do not have central air conditioning. The patient's bedroom is furnished with hard pawel in bedroom, allergen mattress cover, allergen pillowcase cover and fabric window coverings.  Pets inside the house include 1 dog. There is not history of cockroach or mice infestation. There are no smokers in the house.  The house does have a damp basement.     Patient is currently living and taking care of a friend in the friends home.           Review of Systems   Constitutional: Negative for activity change, chills and fever.   HENT: Negative for  congestion, dental problem, ear pain, facial swelling, nosebleeds, postnasal drip, rhinorrhea, sinus pressure and sneezing.    Eyes: Positive for itching. Negative for discharge and redness.   Respiratory: Negative for cough, chest tightness, shortness of breath and wheezing.    Cardiovascular: Negative for chest pain.   Gastrointestinal: Negative for diarrhea, nausea and vomiting.   Musculoskeletal: Negative for arthralgias, joint swelling and myalgias.   Skin: Positive for rash.   Neurological: Negative for headaches.   Hematological: Negative for adenopathy.   Psychiatric/Behavioral: Negative for behavioral problems and self-injury.           Current Outpatient Medications:      albuterol (PROAIR HFA/PROVENTIL HFA/VENTOLIN HFA) 108 (90 Base) MCG/ACT inhaler, Inhale 2 puffs into the lungs every 4 hours as needed for shortness of breath / dyspnea or wheezing, Disp: 18 g, Rfl: 3     azelastine (ASTELIN) 0.1 % nasal spray, Spray 2 sprays into both nostrils 2 times daily as needed for rhinitis or allergies, Disp: 90 mL, Rfl: 3     azelastine (OPTIVAR) 0.05 % ophthalmic solution, Apply 1 drop to eye 2 times daily as needed (itchy/watery eyes), Disp: 6 mL, Rfl: 1     cetirizine (ZYRTEC) 10 MG tablet, Take 1 tablet (10 mg) by mouth daily as needed for allergies, Disp: 90 tablet, Rfl: 3     Emollient (CERAVE) CREA, Externally apply 1 dose. topically 2 times daily, Disp: 2 Bottle, Rfl: 11     EPINEPHrine (ANY BX GENERIC EQUIV) 0.3 MG/0.3ML injection 2-pack, Inject 0.3 mLs (0.3 mg) into the muscle once as needed for anaphylaxis, Disp: 0.6 mL, Rfl: 3     fluticasone (FLONASE) 50 MCG/ACT nasal spray, Spray 2 sprays into both nostrils daily, Disp: 48 g, Rfl: 1     fluticasone-salmeterol (ADVAIR) 250-50 MCG/DOSE inhaler, Inhale 1 puff into the lungs 2 times daily, Disp: 180 each, Rfl: 3     loratadine (CLARITIN) 10 MG tablet, Take 1 tablet (10 mg) by mouth daily as needed for allergies, Disp: 90 tablet, Rfl: 3     montelukast  (SINGULAIR) 10 MG tablet, Take 1 tablet (10 mg) by mouth At Bedtime, Disp: 90 tablet, Rfl: 1     MULTIVITAMIN OR, 1 tab daily, Disp: , Rfl:      ORDER FOR ALLERGEN IMMUNOTHERAPY, Name of Mix: Mix #1  Mold Alternaria Tenuis 1:10 w/v, HS  0.5 ml Aspergillus Fumigatus 1:10 w/v, HS  0.5 ml Epicoccum Nigrum 1:10 w/v, HS 0.5 ml Hormodendrum Cladosporioides 1:10 w/v, HS 0.5 ml Penicillium Mix 1:10 w/v, HS  0.5 ml Diluent: HSA qs to 5ml, Disp: 5 mL, Rfl: PRN     ORDER FOR ALLERGEN IMMUNOTHERAPY, Name of Mix: Mix #2  Dust Mite, Cat, Dog Cat Hair, Standardized 10,000 BAU/mL, ALK  2.0 ml Dog Hair Dander, A. P.  1:100 w/v, HS  1.0 ml Dust Mites F 30,000AU/mL, HS  0.3 ml Dust Mites P. 30,000 AU/mL, HS  0.3 ml  Diluent: HSA qs to 5ml, Disp: 5 mL, Rfl: PRN     ORDER FOR ALLERGEN IMMUNOTHERAPY, Name of Mix: Mix #3 Grass,Tree  Dmitry,White 1:20w/v, HS 0.5ml Birch Mix PRW 1:20w/v, HS 0.5ml Boxelder-Maple Mix BHR (Boxelder Hard Red) 1:20w/v, HS 0.5ml Ransom,Common 1:20w/v, HS 0.5ml Elm,American 1:20w/v, HS 0.5ml Villanova Mix RW 1:20w/v, HS 0.5ml Oak Mix RVW 1:20w/v, HS 0.5ml Powderly Tree,Black 1:20w/v, HS 0.5ml Suresh Grass (Std) 100,000 BAU/mL, HS 0.4ml Jonathan Grass 1:20w/v, HS 0.5ml Diluent: HSA qs to 5ml, Disp: 5 mL, Rfl: PRN     ORDER FOR ALLERGEN IMMUNOTHERAPY, Name of Mix: Mix #4  Weeds Kochia 1:20 w/v, HS 0.5 ml Lamb's Quarters 1:20 w/v, HS 0.5 ml Nettle 1:20 w/v, HS 0.5 ml Plantain, English 1:20 w/v, HS 0.5 ml Ragweed Mixed 1:20 w/v ALK  0.5 ml Russian Thistle 1:20 w/v, HS 0.5 ml Sagebrush, Mugwort 1:20 w/v, HS 0.5 ml Sorrel, Sheep 1:20 w/v, HS 0.5 ml Diluent: HSA qs to 5ml, Disp: 5 mL, Rfl: PRN     order for DME, Equipment being ordered: Silicone heel cup, Disp: 1 Units, Rfl: 0     order for DME, Equipment being ordered: Dynaflex insert, Disp: 1 Units, Rfl: 0     order for DME, Equipment being ordered: Silicone heel cup, Disp: 1 Units, Rfl: 0     triamcinolone (KENALOG) 0.1 % external cream, Apply sparingly to affected area  two times daily as needed but not more than 14 days in a row. Spare face, armpits, neck, and groin., Disp: 80 g, Rfl: 1     albuterol (2.5 MG/3ML) 0.083% neb solution, Take 1 vial (2.5 mg) by nebulization every 4 hours as needed (Patient not taking: No sig reported), Disp: 1 Box, Rfl: 3     order for DME, Equipment being ordered: thumb spica splint RIGHT (Patient not taking: No sig reported), Disp: 1 Units, Rfl: 0  Immunization History   Administered Date(s) Administered     COVID-19,PF,Moderna 03/19/2021, 04/16/2021, 11/02/2021     Influenza (IIV3) PF 12/12/2002, 11/28/2003, 10/24/2006, 02/11/2009, 10/05/2011, 11/15/2012, 10/07/2014     Influenza Quad, Recombinant, pf(RIV4) (Flublok) 10/02/2018, 10/30/2019, 10/28/2020, 10/15/2021     Influenza Vaccine IM > 6 months Valent IIV4 (Alfuria,Fluzone) 10/16/2013, 10/20/2015, 10/26/2016, 11/21/2017     Mantoux Tuberculin Skin Test 02/11/2009     Measles 10/22/1979     Pneumococcal 23 valent 10/05/2011     TDAP Vaccine (Adacel) 02/11/2009, 07/06/2018     Zoster vaccine recombinant adjuvanted (SHINGRIX) 12/02/2021, 02/02/2022     Allergies   Allergen Reactions     Niacin Hives     Nsaids Hives     Tolerates ibuprofen and aspirin without hives       Pineapple Hives     OBJECTIVE:                                                                 /80 (BP Location: Left arm, Patient Position: Sitting, Cuff Size: Adult Large)   Pulse 84   Resp 20   LMP 07/18/2015 (Approximate)         Physical Exam  Vitals and nursing note reviewed.   Constitutional:       General: She is not in acute distress.     Appearance: She is not diaphoretic.   HENT:      Head: Normocephalic and atraumatic.      Right Ear: Tympanic membrane, ear canal and external ear normal.      Left Ear: Tympanic membrane, ear canal and external ear normal.      Nose: No mucosal edema or rhinorrhea.      Right Turbinates: Not enlarged or swollen.      Left Turbinates: Not enlarged or swollen.      Mouth/Throat:       Lips: Pink.      Mouth: Mucous membranes are moist.      Pharynx: Oropharynx is clear. No pharyngeal swelling, oropharyngeal exudate or posterior oropharyngeal erythema.   Eyes:      General:         Right eye: No discharge.         Left eye: No discharge.      Conjunctiva/sclera: Conjunctivae normal.   Cardiovascular:      Rate and Rhythm: Normal rate and regular rhythm.      Heart sounds: Normal heart sounds. No murmur heard.  Pulmonary:      Effort: Pulmonary effort is normal. No respiratory distress.      Breath sounds: Normal breath sounds and air entry. No stridor, decreased air movement or transmitted upper airway sounds. No decreased breath sounds, wheezing, rhonchi or rales.   Musculoskeletal:         General: Normal range of motion.      Cervical back: Normal range of motion.   Skin:     General: Skin is warm.      Comments: Several non specific well healing scabs on the left arm and leg. No active dermatitis    Neurological:      Mental Status: She is alert and oriented to person, place, and time.   Psychiatric:         Mood and Affect: Mood normal.         Behavior: Behavior normal.           WORKUP: ACT score 20      ASSESSMENT/PLAN:    Moderate persistent asthma without complication  Currently well controlled with Advair 250/50 micrograms 1 puff twice daily, montelukast 10 mg by mouth once daily, and albuterol as needed.  - Continue as is.  - Ordered interval spirometry.    - montelukast (SINGULAIR) 10 MG tablet  Dispense: 90 tablet; Refill: 1  - General PFT Lab (Please always keep checked)  - Pulmonary Function Test    Chronic allergic rhinitis due to animal hair and dander  Allergic rhinitis due to mold  Allergic rhinitis due to dust mite  Chronic seasonal allergic rhinitis due to pollen  Allergic conjunctivitis, bilateral    Overall symptoms are well controlled with allergen immunotherapy, Optivar eyedrops on as-needed basis, intranasal fluticasone 2 sprays in each nostril once daily,  azelastine 2 sprays in each nostril once-twice daily, montelukast 10 mg by mouth once daily, and oral antihistamines (cetirizine and loratadine on as-needed basis).  - Continue as is.    - montelukast (SINGULAIR) 10 MG tablet  Dispense: 90 tablet; Refill: 1       Return in about 6 months (around 12/9/2022), or if symptoms worsen or fail to improve.    Thank you for allowing us to participate in the care of this patient. Please feel free to contact us if there are any questions or concerns about the patient.    Disclaimer: This note consists of symbols derived from keyboarding, dictation and/or voice recognition software. As a result, there may be errors in the script that have gone undetected. Please consider this when interpreting information found in this chart.    Michael Lujan MD, FAAAAI, FACAAI  Allergy, Asthma and Immunology     [facility]

## 2022-06-09 NOTE — PATIENT INSTRUCTIONS
Continue allergen immunotherapy.  Continue current medication therapy.  Notify of a systemic reaction.    You can stop using triamcinolone cream on the rash. Restart only if it happnes again. Apply sparingly to affected area two times daily as needed but not more than 14 days in a row. Spare face, armpits, neck, and groin.        Call to schedule breathing test    [phone]      Do not use albuterol on the day of the breathing test if possible.

## 2022-06-28 ENCOUNTER — TELEPHONE (OUTPATIENT)
Dept: ALLERGY | Facility: CLINIC | Age: 57
End: 2022-06-28

## 2022-06-28 DIAGNOSIS — J45.40 MODERATE PERSISTENT ASTHMA WITHOUT COMPLICATION: Primary | ICD-10-CM

## 2022-06-28 NOTE — TELEPHONE ENCOUNTER
Called and let pt know that the order is in place.     Kaylee GRANT RN  Specialty/Allergy Clinics

## 2022-07-07 ENCOUNTER — ALLIED HEALTH/NURSE VISIT (OUTPATIENT)
Dept: ALLERGY | Facility: CLINIC | Age: 57
End: 2022-07-07
Payer: COMMERCIAL

## 2022-07-07 DIAGNOSIS — J30.81 CHRONIC ALLERGIC RHINITIS DUE TO ANIMAL HAIR AND DANDER: Primary | ICD-10-CM

## 2022-07-07 DIAGNOSIS — H10.13 ALLERGIC CONJUNCTIVITIS, BILATERAL: ICD-10-CM

## 2022-07-07 DIAGNOSIS — J30.89 ALLERGIC RHINITIS DUE TO MOLD: ICD-10-CM

## 2022-07-07 DIAGNOSIS — J30.89 ALLERGIC RHINITIS DUE TO DUST MITE: ICD-10-CM

## 2022-07-07 DIAGNOSIS — J30.1 CHRONIC SEASONAL ALLERGIC RHINITIS DUE TO POLLEN: ICD-10-CM

## 2022-07-07 PROCEDURE — 99207 PR DROP WITH A PROCEDURE: CPT

## 2022-07-07 PROCEDURE — 95117 IMMUNOTHERAPY INJECTIONS: CPT

## 2022-07-26 ENCOUNTER — HOSPITAL ENCOUNTER (OUTPATIENT)
Dept: RESPIRATORY THERAPY | Facility: CLINIC | Age: 57
Discharge: HOME | End: 2022-07-26
Attending: ALLERGY & IMMUNOLOGY | Admitting: ALLERGY & IMMUNOLOGY
Payer: COMMERCIAL

## 2022-07-26 DIAGNOSIS — J45.40 MODERATE PERSISTENT ASTHMA WITHOUT COMPLICATION: ICD-10-CM

## 2022-07-26 PROCEDURE — 250N000009 HC RX 250: Performed by: ALLERGY & IMMUNOLOGY

## 2022-07-26 PROCEDURE — 94060 EVALUATION OF WHEEZING: CPT

## 2022-07-26 RX ORDER — ALBUTEROL SULFATE 0.83 MG/ML
2.5 SOLUTION RESPIRATORY (INHALATION) ONCE
Status: COMPLETED | OUTPATIENT
Start: 2022-07-26 | End: 2022-07-26

## 2022-07-26 RX ADMIN — ALBUTEROL SULFATE 2.5 MG: 2.5 SOLUTION RESPIRATORY (INHALATION) at 14:30

## 2022-07-27 LAB
EXPTIME-PRE: 4.6 SEC
FEF2575-%PRED-POST: 163 %
FEF2575-%PRED-PRE: 149 %
FEF2575-POST: 3.92 L/SEC
FEF2575-PRE: 3.58 L/SEC
FEF2575-PRED: 2.4 L/SEC
FEFMAX-%PRED-PRE: 80 %
FEFMAX-PRE: 5.11 L/SEC
FEFMAX-PRED: 6.34 L/SEC
FEV1-%PRED-PRE: 89 %
FEV1-PRE: 2.26 L
FEV1FEV6-PRE: 89 %
FEV1FEV6-PRED: 81 %
FEV1FVC-PRE: 89 %
FEV1FVC-PRED: 80 %
FIFMAX-PRE: 2.47 L/SEC
FVC-%PRED-PRE: 80 %
FVC-PRE: 2.55 L
FVC-PRED: 3.17 L

## 2022-08-01 NOTE — RESULT ENCOUNTER NOTE
My interpretation:   Spirometry does not suggest an obstruction.  - No changes in the treatment plan.

## 2022-08-03 ENCOUNTER — ALLIED HEALTH/NURSE VISIT (OUTPATIENT)
Dept: ALLERGY | Facility: CLINIC | Age: 57
End: 2022-08-03
Payer: COMMERCIAL

## 2022-08-03 ENCOUNTER — HOSPITAL ENCOUNTER (EMERGENCY)
Facility: CLINIC | Age: 57
Discharge: HOME | End: 2022-08-03
Attending: PHYSICIAN ASSISTANT | Admitting: PHYSICIAN ASSISTANT
Payer: COMMERCIAL

## 2022-08-03 ENCOUNTER — APPOINTMENT (OUTPATIENT)
Dept: GENERAL RADIOLOGY | Facility: CLINIC | Age: 57
End: 2022-08-03
Attending: PHYSICIAN ASSISTANT
Payer: COMMERCIAL

## 2022-08-03 VITALS
TEMPERATURE: 97.4 F | SYSTOLIC BLOOD PRESSURE: 144 MMHG | RESPIRATION RATE: 16 BRPM | DIASTOLIC BLOOD PRESSURE: 77 MMHG | HEART RATE: 92 BPM | OXYGEN SATURATION: 98 %

## 2022-08-03 DIAGNOSIS — J30.89 ALLERGIC RHINITIS DUE TO DUST MITE: ICD-10-CM

## 2022-08-03 DIAGNOSIS — J30.1 CHRONIC SEASONAL ALLERGIC RHINITIS DUE TO POLLEN: ICD-10-CM

## 2022-08-03 DIAGNOSIS — J30.89 ALLERGIC RHINITIS DUE TO MOLD: ICD-10-CM

## 2022-08-03 DIAGNOSIS — J30.81 CHRONIC ALLERGIC RHINITIS DUE TO ANIMAL HAIR AND DANDER: ICD-10-CM

## 2022-08-03 DIAGNOSIS — L08.9 LOCAL INFECTION OF SKIN AND SUBCUTANEOUS TISSUE: ICD-10-CM

## 2022-08-03 DIAGNOSIS — J30.81 CHRONIC ALLERGIC RHINITIS DUE TO ANIMAL HAIR AND DANDER: Primary | ICD-10-CM

## 2022-08-03 PROCEDURE — 73140 X-RAY EXAM OF FINGER(S): CPT | Mod: RT

## 2022-08-03 PROCEDURE — 95117 IMMUNOTHERAPY INJECTIONS: CPT

## 2022-08-03 PROCEDURE — 99214 OFFICE O/P EST MOD 30 MIN: CPT | Performed by: PHYSICIAN ASSISTANT

## 2022-08-03 PROCEDURE — G0463 HOSPITAL OUTPT CLINIC VISIT: HCPCS | Performed by: PHYSICIAN ASSISTANT

## 2022-08-03 RX ORDER — CEPHALEXIN 500 MG/1
500 CAPSULE ORAL 4 TIMES DAILY
Qty: 28 CAPSULE | Refills: 0 | Status: SHIPPED | OUTPATIENT
Start: 2022-08-03 | End: 2022-08-10

## 2022-08-03 RX ORDER — PREDNISONE 20 MG/1
TABLET ORAL
Qty: 10 TABLET | Refills: 0 | Status: SHIPPED | OUTPATIENT
Start: 2022-08-03 | End: 2022-08-03

## 2022-08-03 ASSESSMENT — ENCOUNTER SYMPTOMS
CONSTITUTIONAL NEGATIVE: 1
COLOR CHANGE: 1
NEUROLOGICAL NEGATIVE: 1
MUSCULOSKELETAL NEGATIVE: 1
WOUND: 1

## 2022-08-03 NOTE — TELEPHONE ENCOUNTER
ALLERGY SOLUTION RE-ORDER REQUEST    Dilia Solomon 1965 MRN: [id number]    DATE NEEDED:  8/17/2022  Vial Color Content    Vial Size  Red 1:1 Molds    5 mL  Red 1:1 Grass, Trees   5 mL  Red 1:1 Cat, Dog    5 mL  Red 1:1 Weeds    5 mL      Serum reorder consent signed and patient/parent was advised that new serums would be ordered through the pharmacy and billed to their insurance company when they arrive in clinic. Yes    Shot Clinic Location:  [place]  Ship to Location: [place]  Serum billed to:  [place]    Special Instructions:          Requester Signature  Silver Araujo LPN

## 2022-08-04 NOTE — ED PROVIDER NOTES
History     Chief Complaint   Patient presents with     Laceration     Pt cut her finger on a piece of glass at the [place] on 8/1. Finger is now red swollen and itchy.     HPI  Dilia Solomon is a 57 year old female with a past medical history of radial styloid tenosynovitis, moderate persistent asthma, and allergic rhinitis who presents for evaluation of redness and swelling in her right index finger after injuring the finger on August 1, 2022.  States that she struck her hand against a piece of broken glass under water at the [place] 2 days ago.  There was considerable bleeding from the wound initially, no foreign bodies were evident.  Bleeding was controlled with direct pressure.  Over the past 24 hours especially she has noticed increased swelling and redness at the distal aspect of the right index finger.  She has been applying Neosporin to the affected area with no relief.  Also has mild pain and itching in the right index finger.  No concerns with breathing or swallowing, no chest discomfort or shortness of breath.  No fevers, no red streaks progressing up into the right hand.  Last Tdap vaccine was in 2018.    Allergies:  Allergies   Allergen Reactions     Clinoril [Sulindac]      Hives     Niacin Hives     Nsaids Hives     Tolerates ibuprofen and aspirin without hives       Pineapple Hives       Problem List:    Patient Active Problem List    Diagnosis Date Noted     History of endoscopic sinus surgery 10/25/2019     Priority: Medium     Chronic pansinusitis 10/25/2019     Priority: Medium     Allergic conjunctivitis, bilateral 06/05/2019     Priority: Medium     Obesity (BMI 35.0-39.9) with comorbidity (H) 01/29/2019     Priority: Medium     Allergic rhinitis due to dust mite 07/13/2017     Priority: Medium     IgE testing 6/28/17 positive for cat, dog, dust mite, trees, grass, weeds, and molds       Chronic allergic rhinitis due to animal hair and dander 07/13/2017     Priority: Medium     IgE  "testing 6/28/17 positive for cat, dog, dust mite, trees, grass, weeds, and molds       Allergic rhinitis due to mold 07/13/2017     Priority: Medium     IgE testing 6/28/17 positive for cat, dog, dust mite, trees, grass, weeds, and molds       Chronic seasonal allergic rhinitis due to pollen 07/13/2017     Priority: Medium     IgE testing 6/28/17 positive for cat, dog, dust mite, trees, grass, weeds, and molds       FH: diabetes mellitus 09/01/2015     Priority: Medium     Food allergy 02/17/2015     Priority: Medium     Moderate persistent asthma 06/28/2011     Priority: Medium     Spirometry results not clear.  Some FVC down with URI, improved over time.  Never had decreased fev/fvc ratio.  Consider trial off advair to see how she responds.         Advanced directives, counseling/discussion 05/27/2011     Priority: Medium     Patient does not have an Advance/Health Care Directive (HCD), given \"What is Advance Care Planning?\" flyer.    Ruma Romero  May 27, 2011         Hyperlipidemia LDL goal <130 01/12/2011     Priority: Medium     Synovial cyst of popliteal space 07/28/2007     Priority: Medium     Pain in joint, lower leg 07/28/2007     Priority: Medium     Carpal tunnel syndrome 07/04/2007     Priority: Medium     S/p surgery 04-05/2004 bilat       Radial styloid tenosynovitis 04/24/2007     Priority: Medium     Need for prophylactic vaccination and inoculation against influenza 10/24/2006     Priority: Medium     Sprain of interphalangeal (joint) of hand 10/24/2006     Priority: Medium        Past Medical History:    Past Medical History:   Diagnosis Date     Injury, other and unspecified, shoulder and upper arm 9/03       Past Surgical History:    Past Surgical History:   Procedure Laterality Date     COLONOSCOPY N/A 10/6/2016    Procedure: COLONOSCOPY;  Surgeon: Shiva Johns MD;  Location: [facility]     ETHMOIDECTOMY  12/30/2013    Procedure: ETHMOIDECTOMY;  Bilateral Submucousal Reduction of " InferiorTurbinates and Total Ethmoidectomy with Multiple Sinusotomies;  Surgeon: Lio More MD;  Location: [facility]     ETHMOIDECTOMY Bilateral 2/14/2018    Procedure: ETHMOIDECTOMY;  Bilateral anterior ethmoidectomy, bilateral maxillary antrostomy;  Surgeon: Santhosh Tierney MD;  Location: [facility]     SURGICAL HISTORY OF -   5/04    carpal tunnel release, bilateral       Family History:    Family History   Problem Relation Age of Onset     C.A.D. Mother      Diabetes Mother      Cancer Father         brain     Breast Cancer Maternal Aunt      Cancer - colorectal No family hx of        Social History:  Marital Status:  Single [1]  Social History     Tobacco Use     Smoking status: Never Smoker     Smokeless tobacco: Never Used   Substance Use Topics     Alcohol use: No     Drug use: No        Medications:    cephALEXin (KEFLEX) 500 MG capsule  albuterol (2.5 MG/3ML) 0.083% neb solution  albuterol (PROAIR HFA/PROVENTIL HFA/VENTOLIN HFA) 108 (90 Base) MCG/ACT inhaler  azelastine (ASTELIN) 0.1 % nasal spray  azelastine (OPTIVAR) 0.05 % ophthalmic solution  cetirizine (ZYRTEC) 10 MG tablet  Emollient (CERAVE) CREA  EPINEPHrine (ANY BX GENERIC EQUIV) 0.3 MG/0.3ML injection 2-pack  fluticasone (FLONASE) 50 MCG/ACT nasal spray  fluticasone-salmeterol (ADVAIR) 250-50 MCG/DOSE inhaler  loratadine (CLARITIN) 10 MG tablet  montelukast (SINGULAIR) 10 MG tablet  MULTIVITAMIN OR  ORDER FOR ALLERGEN IMMUNOTHERAPY  ORDER FOR ALLERGEN IMMUNOTHERAPY  ORDER FOR ALLERGEN IMMUNOTHERAPY  ORDER FOR ALLERGEN IMMUNOTHERAPY  order for DME  order for DME  order for DME  order for DME  triamcinolone (KENALOG) 0.1 % external cream        Review of Systems   Constitutional: Negative.    Musculoskeletal: Negative.    Skin: Positive for color change and wound.   Neurological: Negative.        Physical Exam   BP: (!) 144/77  Pulse: 92  Temp: 97.4  F (36.3  C)  Resp: 16  SpO2: 98 %      Physical Exam  Constitutional:       General: She is not  in acute distress.     Appearance: Normal appearance. She is not ill-appearing, toxic-appearing or diaphoretic.   Cardiovascular:      Pulses: Normal pulses.           Radial pulses are 2+ on the right side.   Musculoskeletal:         General: No swelling, tenderness, deformity or signs of injury. Normal range of motion.      Comments: Not able to completely flex the right index finger, but does not have intense pain in the finger with flexion.  No pain along the length of the right index finger with full extension.  No red streaks progressing up into the right hand   Skin:     General: Skin is warm and dry.      Findings: Erythema present. No lesion or rash.      Comments: Mild swelling along the length of the right index finger.  There is erythema especially around the fingernail, mild tenderness to palpation.  No fluctuant masses, no purulent drainage.  No open wounds.   Neurological:      Mental Status: She is alert and oriented to person, place, and time.   Psychiatric:         Behavior: Behavior normal.         ED Course                 Procedures             Results for orders placed or performed during the hospital encounter of 08/03/22 (from the past 24 hour(s))   XR Finger Right G/E 2 Views    Narrative    EXAM: XR FINGER RIGHT G/E 2 VIEWS  LOCATION: [facility]  DATE/TIME: 8/3/2022 8:34 PM    INDICATION: Pain. concern for foreign body in distal phalanx  COMPARISON: None.      Impression    IMPRESSION: No fracture. 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 Soft tissue swelling. There is a punctate calcific density focus projecting over the superficial soft tissues of the distal end of the index finger   which could represent a tiny foreign body.         Medications - No data to display    Assessments & Plan (with Medical Decision Making)     X-rays of the right index finger showed no evidence for foreign bodies present at the site of injury.   There is a punctate calcific density noted in the superficial soft tissues of the distal end of the index finger, but she had no laceration or injury at the location of the small calcific density.  No erythema or signs of infection at this location as well.    Finger is slightly swollen but she has no other findings consistent with [place] signs today.  No pain along the flexor or extensor tendons, no redness progressing up into the right hand or right forearm.  Able to extend the right index finger without pain.  No fluctuant masses, no purulent drainage.  No history of MRSA or ESBL.    I am discharging the patient with oral Keflex due to concern for localized infection today.  Recommend over-the-counter Tylenol/ibuprofen as needed for pain control.  Elevate the right hand as much as possible. Recommend applying heat to the affected area at 15-minute intervals. Provided the patient with a referral to orthopedics for further evaluation and management..  Recommend follow-up with orthopedics in 1 to 2 days to ensure infection is resolving.  Alternatively may follow-up with primary care in 2 to 3 days.    May apply hydrocortisone 1% cream to the finger for symptomatic relief of itching.  Apply twice per day for the next 14 days as needed until symptoms resolve.  May also take oral Benadryl for symptomatic relief of itching as desired.  Please note that Benadryl can make you drowsy, no working, driving, or operating equipment for 8 hours from your last dose of taking Benadryl.    Provided a splint to protect the right index finger.    Recommend urgent medical evaluation if you develop worsening pain, pain out of proportion to injury, numbness or tingling or loss of feeling, worsening redness/tenderness/swelling, pain and redness progressing up into the right hand and right forearm, or pain in the finger with full extension.    You should see improvement in symptoms in 24 to 48 hours after starting antibiotic. Return to  clinic if symptoms worsen or persist for more than 48 hours.     Recommend taking a probiotic at the same time you are on antibiotic. Probiotic supports and maintains digestive health while taking antibiotic.      I have reviewed the nursing notes.    I have reviewed the findings, diagnosis, plan and need for follow up with the patient.    New Prescriptions    CEPHALEXIN (KEFLEX) 500 MG CAPSULE    Take 1 capsule (500 mg) by mouth 4 times daily for 7 days       Final diagnoses:   Local infection of skin and subcutaneous tissue       8/3/2022   [facility] EMERGENCY DEPT     Carlos Barillas PA-C  08/03/22 [id number]

## 2022-08-04 NOTE — DISCHARGE INSTRUCTIONS
Recommend over-the-counter Tylenol/ibuprofen as needed for pain control.  Elevate the right hand as much as possible. Recommend applying heat to the affected area at 15-minute intervals.  Recommend follow-up with orthopedics in 1 to 2 days to ensure infection is resolving.  Alternatively may follow-up with primary care in 2 to 3 days.    May apply hydrocortisone 1% cream to the finger for symptomatic relief of itching.  Apply twice per day for the next 14 days as needed until symptoms resolve.  May also take oral Benadryl for symptomatic relief of itching as desired.  Please note that Benadryl can make you drowsy, no working, driving, or operating equipment for 8 hours from your last dose of taking Benadryl.    Recommend urgent medical evaluation if you develop worsening pain, pain out of proportion to injury, numbness or tingling or loss of feeling, worsening redness/tenderness/swelling, pain and redness progressing up into the right hand and right forearm, or pain in the finger with full extension.    You should see improvement in symptoms in 24 to 48 hours after starting antibiotic. Return to clinic if symptoms worsen or persist for more than 48 hours.     Recommend taking a probiotic at the same time you are on antibiotic. Probiotic supports and maintains digestive health while taking antibiotic.

## 2022-08-05 ENCOUNTER — TRANSFERRED RECORDS (OUTPATIENT)
Dept: HEALTH INFORMATION MANAGEMENT | Facility: CLINIC | Age: 57
End: 2022-08-05

## 2022-08-16 NOTE — TELEPHONE ENCOUNTER
Change on Birch antigen, Birch mix 1:20 w/v, ALK will be replacing Birch mix PRW 1:20 w/v, HS  Please update prescription with Birch Mix-ALK, along with dosing instructions for allergy staff to document in patients flowsheet.     Gia GUEVARA RN  Specialty/Allergy Clinic

## 2022-08-16 NOTE — TELEPHONE ENCOUNTER
Due to the change in the , please decrease the birch pollen containing vial to Red 1:1, 0.1 mL, and then increase by 0.1 mL up to the maintenance dose.    Michael Lujan MD

## 2022-08-18 DIAGNOSIS — J30.1 CHRONIC SEASONAL ALLERGIC RHINITIS DUE TO POLLEN: ICD-10-CM

## 2022-08-18 DIAGNOSIS — J30.89 ALLERGIC RHINITIS DUE TO DUST MITE: Primary | ICD-10-CM

## 2022-08-18 DIAGNOSIS — J30.89 ALLERGIC RHINITIS DUE TO MOLD: ICD-10-CM

## 2022-08-18 PROCEDURE — 95165 ANTIGEN THERAPY SERVICES: CPT | Performed by: ALLERGY & IMMUNOLOGY

## 2022-08-18 NOTE — PROGRESS NOTES
Allergy serums billed to [place].     Vials billed below:    Vial Color Content                      Vial Size Expiration Date  Red 1:1 Molds 5mL  8/18/23  Red 1:1 Weeds 5mL  8/18/23  Red 1:1 Grass, Trees 5mL  8/18/23    Billed 30 units    Checked by Silver Araujo / LPN        Signature  Silver Araujo LPN

## 2022-08-19 DIAGNOSIS — J30.89 ALLERGIC RHINITIS DUE TO DUST MITE: Primary | ICD-10-CM

## 2022-08-19 DIAGNOSIS — J30.81 CHRONIC ALLERGIC RHINITIS DUE TO ANIMAL HAIR AND DANDER: ICD-10-CM

## 2022-08-19 PROCEDURE — 95165 ANTIGEN THERAPY SERVICES: CPT | Performed by: ALLERGY & IMMUNOLOGY

## 2022-08-19 NOTE — PROGRESS NOTES
Allergy serums billed to [place].     Vials billed below:    Vial Color Content                      Vial Size Expiration Date  Red 1:1 Cat, Dog, Dust Mite 5mL  8/19/23    Billed 10 units    Checked by Silver Araujo / LPN        Signature  Silver Araujo LPN

## 2022-08-19 NOTE — TELEPHONE ENCOUNTER
Allergy serums received at [place].     Vials received below:    Vial Color Content                      Vial Size Expiration Date  Red 1:1 Weeds 5mL  8/18/23  Red 1:1 Cat, Dog, Dust Mite 5mL  8/19/23  Red 1:1 Molds 5mL  8/18/23  Red 1:1 Grass, Trees 5mL  8/18/23      Signature  Silver Araujo LPN

## 2022-08-31 ENCOUNTER — ALLIED HEALTH/NURSE VISIT (OUTPATIENT)
Dept: ALLERGY | Facility: CLINIC | Age: 57
End: 2022-08-31
Payer: COMMERCIAL

## 2022-08-31 DIAGNOSIS — J30.81 CHRONIC ALLERGIC RHINITIS DUE TO ANIMAL HAIR AND DANDER: ICD-10-CM

## 2022-08-31 DIAGNOSIS — J30.89 ALLERGIC RHINITIS DUE TO MOLD: ICD-10-CM

## 2022-08-31 DIAGNOSIS — J30.1 CHRONIC SEASONAL ALLERGIC RHINITIS DUE TO POLLEN: ICD-10-CM

## 2022-08-31 DIAGNOSIS — J30.89 ALLERGIC RHINITIS DUE TO DUST MITE: ICD-10-CM

## 2022-08-31 DIAGNOSIS — J30.89 ALLERGIC RHINITIS DUE TO DUST MITE: Primary | ICD-10-CM

## 2022-08-31 DIAGNOSIS — J45.40 MODERATE PERSISTENT ASTHMA WITHOUT COMPLICATION: ICD-10-CM

## 2022-08-31 DIAGNOSIS — H10.13 ALLERGIC CONJUNCTIVITIS, BILATERAL: ICD-10-CM

## 2022-08-31 PROCEDURE — 95117 IMMUNOTHERAPY INJECTIONS: CPT

## 2022-08-31 RX ORDER — FLUTICASONE PROPIONATE 50 MCG
2 SPRAY, SUSPENSION (ML) NASAL DAILY
Qty: 48 G | Refills: 1 | Status: SHIPPED | OUTPATIENT
Start: 2022-08-31 | End: 2022-09-01

## 2022-08-31 RX ORDER — MONTELUKAST SODIUM 10 MG/1
10 TABLET ORAL AT BEDTIME
Qty: 90 TABLET | Refills: 1 | Status: SHIPPED | OUTPATIENT
Start: 2022-08-31 | End: 2022-09-01

## 2022-08-31 NOTE — TELEPHONE ENCOUNTER
Prescription approved per [facility] Refill Protocol.    Gia GUEVARA RN  Specialty/Allergy Clinic

## 2022-09-01 DIAGNOSIS — J30.89 ALLERGIC RHINITIS DUE TO MOLD: ICD-10-CM

## 2022-09-01 DIAGNOSIS — J45.40 MODERATE PERSISTENT ASTHMA WITHOUT COMPLICATION: ICD-10-CM

## 2022-09-01 DIAGNOSIS — J30.89 ALLERGIC RHINITIS DUE TO DUST MITE: ICD-10-CM

## 2022-09-01 DIAGNOSIS — J30.1 CHRONIC SEASONAL ALLERGIC RHINITIS DUE TO POLLEN: ICD-10-CM

## 2022-09-01 DIAGNOSIS — J30.81 CHRONIC ALLERGIC RHINITIS DUE TO ANIMAL HAIR AND DANDER: ICD-10-CM

## 2022-09-01 RX ORDER — MONTELUKAST SODIUM 10 MG/1
10 TABLET ORAL AT BEDTIME
Qty: 90 TABLET | Refills: 1 | Status: SHIPPED | OUTPATIENT
Start: 2022-09-01 | End: 2023-02-21

## 2022-09-01 RX ORDER — FLUTICASONE PROPIONATE 50 MCG
2 SPRAY, SUSPENSION (ML) NASAL DAILY
Qty: 48 G | Refills: 1 | Status: SHIPPED | OUTPATIENT
Start: 2022-09-01 | End: 2023-03-09

## 2022-09-01 NOTE — TELEPHONE ENCOUNTER
Last OV 6/9/22. 6 month f/u appointment advised.   Refilled per protocol.  Dee Martínez RN on 9/1/2022 at 7:24 AM

## 2022-09-13 ENCOUNTER — NURSE TRIAGE (OUTPATIENT)
Dept: NURSING | Facility: CLINIC | Age: 57
End: 2022-09-13

## 2022-09-13 DIAGNOSIS — R05.9 COUGH: Primary | ICD-10-CM

## 2022-09-13 RX ORDER — DOXYCYCLINE 100 MG/1
100 CAPSULE ORAL 2 TIMES DAILY
Qty: 28 CAPSULE | Refills: 0 | Status: SHIPPED | OUTPATIENT
Start: 2022-09-13 | End: 2022-09-27

## 2022-09-13 NOTE — TELEPHONE ENCOUNTER
I apologize.  I was absolutely sure that I sent the antibiotic.  I thought I ordered it and then I sent the message.  Once again, I apologize.      Michael Lujan MD

## 2022-09-13 NOTE — TELEPHONE ENCOUNTER
Triage Call:    Patient calling to report prescription is not at the pharmacy.  Dr Lujan had noted that they would be prescribing doxycycline for patient yesterday.  Prescription not found in chart and pharmacy reports not receiving the order.  Patient reports she won't be able to pick it up until tomorrow.  Patient's preferred pharmacy is [facility].    Routing note to care team of Dr Lujan.    Deepthi Hilton RN  09/13/22 5:33 PM  [facility] Nurse Advisor      Reason for Disposition    [1] Caller has NON-URGENT medicine question about med that PCP prescribed AND [2] triager unable to answer question    Additional Information    Negative: [1] Intentional drug overdose AND [2] suicidal thoughts or ideas    Negative: Drug overdose and triager unable to answer question    Negative: Caller requesting a renewal or refill of a medicine patient is currently taking    Negative: Caller requesting information unrelated to medicine    Negative: Caller requesting information about COVID-19 Vaccine    Negative: Caller requesting information about Emergency Contraception    Negative: Caller requesting information about Combined Birth Control Pills    Negative: Caller requesting information about Progestin Birth Control Pills    Negative: Caller requesting information about Post-Op pain or medicines    Negative: Caller requesting a prescription antibiotic (such as Penicillin) for Strep throat and has a positive culture result    Negative: Caller requesting a prescription anti-viral med (such as Tamiflu) and has influenza (flu) symptoms    Negative: Immunization reaction suspected    Negative: Rash while taking a medicine or within 3 days of stopping it    Negative: [1] Asthma and [2] having symptoms of asthma (cough, wheezing, etc.)    Negative: [1] Symptom of illness (e.g., headache, abdominal pain, earache, vomiting) AND [2] more than mild    Negative: Breastfeeding questions about mother's  medicines and diet    Negative: MORE THAN A DOUBLE DOSE of a prescription or over-the-counter (OTC) drug    Negative: [1] DOUBLE DOSE (an extra dose or lesser amount) of prescription drug AND [2] any symptoms (e.g., dizziness, nausea, pain, sleepiness)    Negative: [1] DOUBLE DOSE (an extra dose or lesser amount) of over-the-counter (OTC) drug AND [2] any symptoms (e.g., dizziness, nausea, pain, sleepiness)    Negative: Took another person's prescription drug    Negative: [1] DOUBLE DOSE (an extra dose or lesser amount) of prescription drug AND [2] NO symptoms (Exception: a double dose of antibiotics)    Negative: Diabetes drug error or overdose (e.g., took wrong type of insulin or took extra dose)    Negative: [1] Prescription not at pharmacy AND [2] was prescribed by PCP recently (Exception: triager has access to EMR and prescription is recorded there. Go to Home Care and confirm for pharmacy.)    Negative: [1] Pharmacy calling with prescription question AND [2] triager unable to answer question    Negative: [1] Caller has URGENT medicine question about med that PCP or specialist prescribed AND [2] triager unable to answer question    Negative: Medicine patch causing local rash or itching    Negative: [1] Caller has medicine question about med NOT prescribed by PCP AND [2] triager unable to answer question (e.g., compatibility with other med, storage)    Negative: Prescription request for new medicine (not a refill)    Protocols used: MEDICATION QUESTION CALL-[facility]

## 2022-09-13 NOTE — TELEPHONE ENCOUNTER
Patient called and notified that RX has been sent to SILVIA GUEVARA RN  Specialty/Allergy Clinic

## 2022-09-19 ENCOUNTER — ALLIED HEALTH/NURSE VISIT (OUTPATIENT)
Dept: ALLERGY | Facility: CLINIC | Age: 57
End: 2022-09-19
Payer: COMMERCIAL

## 2022-09-19 DIAGNOSIS — J30.89 ALLERGIC RHINITIS DUE TO DUST MITE: ICD-10-CM

## 2022-09-19 DIAGNOSIS — J30.81 CHRONIC ALLERGIC RHINITIS DUE TO ANIMAL HAIR AND DANDER: Primary | ICD-10-CM

## 2022-09-19 DIAGNOSIS — J30.1 CHRONIC SEASONAL ALLERGIC RHINITIS DUE TO POLLEN: ICD-10-CM

## 2022-09-19 DIAGNOSIS — J30.89 ALLERGIC RHINITIS DUE TO MOLD: ICD-10-CM

## 2022-09-19 PROCEDURE — 95117 IMMUNOTHERAPY INJECTIONS: CPT

## 2022-09-26 ENCOUNTER — ALLIED HEALTH/NURSE VISIT (OUTPATIENT)
Dept: ALLERGY | Facility: CLINIC | Age: 57
End: 2022-09-26
Payer: COMMERCIAL

## 2022-09-26 DIAGNOSIS — J30.89 ALLERGIC RHINITIS DUE TO MOLD: ICD-10-CM

## 2022-09-26 DIAGNOSIS — J30.1 CHRONIC SEASONAL ALLERGIC RHINITIS DUE TO POLLEN: ICD-10-CM

## 2022-09-26 DIAGNOSIS — J30.81 CHRONIC ALLERGIC RHINITIS DUE TO ANIMAL HAIR AND DANDER: Primary | ICD-10-CM

## 2022-09-26 DIAGNOSIS — J30.89 ALLERGIC RHINITIS DUE TO DUST MITE: ICD-10-CM

## 2022-09-26 PROCEDURE — 95117 IMMUNOTHERAPY INJECTIONS: CPT

## 2022-10-03 ENCOUNTER — ALLIED HEALTH/NURSE VISIT (OUTPATIENT)
Dept: ALLERGY | Facility: CLINIC | Age: 57
End: 2022-10-03
Payer: COMMERCIAL

## 2022-10-03 DIAGNOSIS — J30.89 ALLERGIC RHINITIS DUE TO MOLD: ICD-10-CM

## 2022-10-03 DIAGNOSIS — J30.1 CHRONIC SEASONAL ALLERGIC RHINITIS DUE TO POLLEN: ICD-10-CM

## 2022-10-03 DIAGNOSIS — J30.81 CHRONIC ALLERGIC RHINITIS DUE TO ANIMAL HAIR AND DANDER: Primary | ICD-10-CM

## 2022-10-03 DIAGNOSIS — J30.89 ALLERGIC RHINITIS DUE TO DUST MITE: ICD-10-CM

## 2022-10-03 PROCEDURE — 95117 IMMUNOTHERAPY INJECTIONS: CPT

## 2022-10-10 ENCOUNTER — ALLIED HEALTH/NURSE VISIT (OUTPATIENT)
Dept: ALLERGY | Facility: CLINIC | Age: 57
End: 2022-10-10
Payer: COMMERCIAL

## 2022-10-10 DIAGNOSIS — J30.1 CHRONIC SEASONAL ALLERGIC RHINITIS DUE TO POLLEN: Primary | ICD-10-CM

## 2022-10-10 PROCEDURE — 95115 IMMUNOTHERAPY ONE INJECTION: CPT

## 2022-10-17 ENCOUNTER — ALLIED HEALTH/NURSE VISIT (OUTPATIENT)
Dept: ALLERGY | Facility: CLINIC | Age: 57
End: 2022-10-17
Payer: COMMERCIAL

## 2022-10-17 DIAGNOSIS — J30.89 ALLERGIC RHINITIS DUE TO MOLD: ICD-10-CM

## 2022-10-17 DIAGNOSIS — J30.89 ALLERGIC RHINITIS DUE TO DUST MITE: ICD-10-CM

## 2022-10-17 DIAGNOSIS — J30.1 CHRONIC SEASONAL ALLERGIC RHINITIS DUE TO POLLEN: Primary | ICD-10-CM

## 2022-10-17 DIAGNOSIS — J30.81 CHRONIC ALLERGIC RHINITIS DUE TO ANIMAL HAIR AND DANDER: ICD-10-CM

## 2022-10-17 PROCEDURE — 95117 IMMUNOTHERAPY INJECTIONS: CPT

## 2022-11-16 ENCOUNTER — ALLIED HEALTH/NURSE VISIT (OUTPATIENT)
Dept: ALLERGY | Facility: CLINIC | Age: 57
End: 2022-11-16
Payer: COMMERCIAL

## 2022-11-16 DIAGNOSIS — J30.89 ALLERGIC RHINITIS DUE TO DUST MITE: ICD-10-CM

## 2022-11-16 DIAGNOSIS — J30.1 CHRONIC SEASONAL ALLERGIC RHINITIS DUE TO POLLEN: Primary | ICD-10-CM

## 2022-11-16 DIAGNOSIS — J30.89 ALLERGIC RHINITIS DUE TO MOLD: ICD-10-CM

## 2022-11-16 DIAGNOSIS — J30.81 CHRONIC ALLERGIC RHINITIS DUE TO ANIMAL HAIR AND DANDER: ICD-10-CM

## 2022-11-16 PROCEDURE — 95117 IMMUNOTHERAPY INJECTIONS: CPT

## 2022-11-16 NOTE — PROGRESS NOTES
Dilia Solomon presents to clinic today at the request of Michael Lujan MD  (ordering provider) for Allergy Immunotherapy injection(s).       This service provided today was under the care of Michael Lujan MD ; the supervising provider of the day; who was available if needed.      Patient presented after waiting 30 minutes with no reaction to  injections. Discharged from clinic.    Gia Bae RN

## 2022-11-20 ENCOUNTER — HEALTH MAINTENANCE LETTER (OUTPATIENT)
Age: 57
End: 2022-11-20

## 2022-12-13 ENCOUNTER — ALLIED HEALTH/NURSE VISIT (OUTPATIENT)
Dept: ALLERGY | Facility: CLINIC | Age: 57
End: 2022-12-13
Payer: COMMERCIAL

## 2022-12-13 DIAGNOSIS — J30.81 CHRONIC ALLERGIC RHINITIS DUE TO ANIMAL HAIR AND DANDER: ICD-10-CM

## 2022-12-13 DIAGNOSIS — J30.89 ALLERGIC RHINITIS DUE TO MOLD: ICD-10-CM

## 2022-12-13 DIAGNOSIS — J30.89 ALLERGIC RHINITIS DUE TO DUST MITE: ICD-10-CM

## 2022-12-13 DIAGNOSIS — J30.1 CHRONIC SEASONAL ALLERGIC RHINITIS DUE TO POLLEN: Primary | ICD-10-CM

## 2022-12-13 PROCEDURE — 95117 IMMUNOTHERAPY INJECTIONS: CPT

## 2023-01-11 ENCOUNTER — ALLIED HEALTH/NURSE VISIT (OUTPATIENT)
Dept: ALLERGY | Facility: CLINIC | Age: 58
End: 2023-01-11
Payer: COMMERCIAL

## 2023-01-11 DIAGNOSIS — J30.89 ALLERGIC RHINITIS DUE TO MOLD: ICD-10-CM

## 2023-01-11 DIAGNOSIS — J30.89 ALLERGIC RHINITIS DUE TO DUST MITE: ICD-10-CM

## 2023-01-11 DIAGNOSIS — J30.81 CHRONIC ALLERGIC RHINITIS DUE TO ANIMAL HAIR AND DANDER: ICD-10-CM

## 2023-01-11 DIAGNOSIS — J30.1 CHRONIC SEASONAL ALLERGIC RHINITIS DUE TO POLLEN: Primary | ICD-10-CM

## 2023-01-11 PROCEDURE — 99207 PR DROP WITH A PROCEDURE: CPT

## 2023-01-11 PROCEDURE — 95117 IMMUNOTHERAPY INJECTIONS: CPT

## 2023-01-11 NOTE — NURSING NOTE
Patient presented after waiting 30 minutes with no reaction to  injections. Discharged from clinic.        Dee Martínez RN on 1/11/2023 at 3:24 PM

## 2023-01-29 NOTE — PROGRESS NOTES
Chief Complaint   Patient presents with     Ent Problem     Patient states having aFroster dog back in December,and patient had a lot of bleach/cleaning supplies. Any time now patient is around bleach/chemicals mid chest becomes tight/and having a hard time swallowing.      History of Present Illness  Dilia Solomon is a 57 year old female who presents today for follow-up.  I have evaluated the patient previously for ear and sinus complaints. The patient has had 2 previous endoscopic sinus surgeries, most recently with Dr. Tierney on 2/14/2018.  She has a history of chronic allergic rhinitis and does receive allergy injection immunotherapy. Her past history is significant for TMJ disorder.  She does wear an oral splint at nighttime.  She has had physical therapy for her TMJ in the past.  She was last seen on 1/5/2022 with symptoms most consistent with temporomandibular joint dysfunction.  She presents today for evaluation of her throat.    From a symptom standpoint, the patient reports developing some throat tightness, hoarse voice, shortness of breath when she was using bleach a month or 2 ago.  She notes that when she is around more caustic chemicals that her breathing gets worse.  She does have a history of asthma.  She was having to use her albuterol more frequently but now is using it very minimally.  She does take a daily controller inhaler.  She has been struggling with having her voice becoming hoarse with voice use.  She will have some swallowing trouble if her throat is irritated by a chemical but overall is swallowing well maintaining her weight, nutrition, hydration.  The patient is a lifetime non-smoker.    My review of the patient's most recent sinus CT performed 10/30/2019 showed a midline nasal septum.  The patient's postsurgical changes of bilateral maxillary antrostomies and anterior ethmoidectomies.  There is a slight amount of thickening in the floor of the right maxillary sinus.  The remainder  the paranasal sinuses are well aerated without any acute or chronic inflammation.    Past Medical History  Patient Active Problem List   Diagnosis     Need for prophylactic vaccination and inoculation against influenza     Sprain of interphalangeal (joint) of hand     Radial styloid tenosynovitis     Carpal tunnel syndrome     Synovial cyst of popliteal space     Pain in joint, lower leg     Hyperlipidemia LDL goal <130     Advanced directives, counseling/discussion     Moderate persistent asthma     Allergic rhinitis due to dust mite     Food allergy     FH: diabetes mellitus     Chronic allergic rhinitis due to animal hair and dander     Allergic rhinitis due to mold     Chronic seasonal allergic rhinitis due to pollen     Obesity (BMI 35.0-39.9) with comorbidity (H)     Allergic conjunctivitis, bilateral     History of endoscopic sinus surgery     Chronic pansinusitis     Current Medications    Current Outpatient Medications:      albuterol (PROAIR HFA/PROVENTIL HFA/VENTOLIN HFA) 108 (90 Base) MCG/ACT inhaler, Inhale 2 puffs into the lungs every 4 hours as needed for shortness of breath / dyspnea or wheezing, Disp: 18 g, Rfl: 3     azelastine (ASTELIN) 0.1 % nasal spray, Spray 2 sprays into both nostrils 2 times daily as needed for rhinitis or allergies, Disp: 90 mL, Rfl: 3     azelastine (OPTIVAR) 0.05 % ophthalmic solution, Apply 1 drop to eye 2 times daily as needed (itchy/watery eyes), Disp: 6 mL, Rfl: 1     cetirizine (ZYRTEC) 10 MG tablet, Take 1 tablet (10 mg) by mouth daily as needed for allergies, Disp: 90 tablet, Rfl: 3     Emollient (CERAVE) CREA, Externally apply 1 dose. topically 2 times daily, Disp: 2 Bottle, Rfl: 11     fluticasone (FLONASE) 50 MCG/ACT nasal spray, Spray 2 sprays into both nostrils daily, Disp: 48 g, Rfl: 1     fluticasone-salmeterol (ADVAIR) 250-50 MCG/DOSE inhaler, Inhale 1 puff into the lungs 2 times daily, Disp: 180 each, Rfl: 3     loratadine (CLARITIN) 10 MG tablet, Take 1  tablet (10 mg) by mouth daily as needed for allergies, Disp: 90 tablet, Rfl: 3     montelukast (SINGULAIR) 10 MG tablet, Take 1 tablet (10 mg) by mouth At Bedtime, Disp: 90 tablet, Rfl: 1     MULTIVITAMIN OR, 1 tab daily, Disp: , Rfl:      ORDER FOR ALLERGEN IMMUNOTHERAPY, Name of Mix: Mix #1  Mold Alternaria Tenuis 1:10 w/v, HS  0.5 ml Aspergillus Fumigatus 1:10 w/v, HS  0.5 ml Epicoccum Nigrum 1:10 w/v, HS 0.5 ml Hormodendrum Cladosporioides 1:10 w/v, HS 0.5 ml Penicillium Mix 1:10 w/v, HS  0.5 ml Diluent: HSA 2.5mL to 5ml, Disp: 5 mL, Rfl: PRN     ORDER FOR ALLERGEN IMMUNOTHERAPY, Name of Mix: Mix #2  Dust Mite, Cat, Dog Cat Hair, Standardized 10,000 BAU/mL, ALK  2.0 ml Dog Hair Dander, A. P.  1:100 w/v, HS  1.0 ml Dust Mites F 30,000AU/mL, HS  0.3 ml Dust Mites P. 30,000 AU/mL, HS  0.3 ml  Diluent: HSA 1.4mL to 5ml, Disp: 5 mL, Rfl: PRN     ORDER FOR ALLERGEN IMMUNOTHERAPY, Name of Mix: Mix #3 Grass,Tree  Dmitry,White 1:20w/v, HS 0.5ml Birch Mix 1:20w/v, ALK 0.5ml Boxelder-Maple Mix BHR (Boxelder Hard Red) 1:20w/v, HS 0.5ml [place],Common 1:20w/v, HS 0.5ml Elm,American 1:20w/v, HS 0.5ml [place] Mix RW 1:20w/v, HS 0.5ml Oak Mix RVW 1:20w/v, HS 0.5ml [place] Tree,Black 1:20w/v, HS 0.5ml Suresh Grass (Std) 100,000 BAU/mL, HS 0.4ml Jonathan Grass 1:20w/v, HS 0.5ml Diluent: HSA 0.1mL to 5ml, Disp: 5 mL, Rfl: PRN     ORDER FOR ALLERGEN IMMUNOTHERAPY, Name of Mix: Mix #4  Weeds Kochia 1:20 w/v, HS 0.5 ml Lamb's Quarters 1:20 w/v, HS 0.5 ml Nettle 1:20 w/v, HS 0.5 ml Plantain, English 1:20 w/v, HS 0.5 ml Ragweed Mixed 1:20 w/v ALK  0.5 ml Russian Thistle 1:20 w/v, HS 0.5 ml Sagebrush, Mugwort 1:20 w/v, HS 0.5 ml Sorrel, Sheep 1:20 w/v, HS 0.5 ml Diluent: HSA 1mL to 5ml, Disp: 5 mL, Rfl: PRN     order for DME, Equipment being ordered: Silicone heel cup, Disp: 1 Units, Rfl: 0     order for DME, Equipment being ordered: Dynaflex insert, Disp: 1 Units, Rfl: 0     order for DME, Equipment being ordered: Silicone heel cup,  Disp: 1 Units, Rfl: 0     triamcinolone (KENALOG) 0.1 % external cream, Apply sparingly to affected area two times daily as needed but not more than 14 days in a row. Spare face, armpits, neck, and groin., Disp: 80 g, Rfl: 1     albuterol (2.5 MG/3ML) 0.083% neb solution, Take 1 vial (2.5 mg) by nebulization every 4 hours as needed (Patient not taking: Reported on 3/11/2022), Disp: 1 Box, Rfl: 3     EPINEPHrine (ANY BX GENERIC EQUIV) 0.3 MG/0.3ML injection 2-pack, Inject 0.3 mLs (0.3 mg) into the muscle once as needed for anaphylaxis (Patient not taking: Reported on 2/1/2023), Disp: 0.6 mL, Rfl: 3    Allergies  Allergies   Allergen Reactions     Clinoril [Sulindac]      Hives     Niacin Hives     Nsaids Hives     Tolerates ibuprofen and aspirin without hives       Pineapple Hives       Social History  Social History     Socioeconomic History     Marital status: Single   Occupational History     Employer: [employer]    Tobacco Use     Smoking status: Never     Smokeless tobacco: Never   Substance and Sexual Activity     Alcohol use: No     Drug use: No     Sexual activity: Never   Other Topics Concern     Parent/sibling w/ CABG, MI or angioplasty before 65F 55M? Yes     Comment: mother   Social History Narrative    March 11, 2022    ENVIRONMENTAL HISTORY: [place] The home is heated with a forced air. They do not have central air conditioning. The patient's bedroom is furnished with hard pawel in bedroom, allergen mattress cover, allergen pillowcase cover and fabric window coverings.  Pets inside the house include 1 dog. There is not history of cockroach or mice infestation. There are no smokers in the house.  The house does have a damp basement.     Patient is currently living and taking care of a friend in the friends home.       Family History  Family History   Problem Relation Age of Onset     C.A.D. Mother      Diabetes Mother      Cancer Father          brain     Breast Cancer Maternal Aunt      Cancer - colorectal No family hx of        Review of Systems  As per HPI and PMHx, otherwise 10 system review including the head and neck, constitutional, eyes, respiratory, GI, skin, neurologic, lymphatic, endocrine, and allergy systems is negative.    Physical Exam  /69 (BP Location: Right arm, Patient Position: Sitting, Cuff Size: Adult Regular)   Pulse 78   Wt 88.5 kg (195 lb)   LMP 07/18/2015 (Approximate)   SpO2 98%   BMI 34.54 kg/m    GENERAL: Patient is a pleasant, cooperative 57 year old female in no acute distress.  HEAD: Normocephalic, atraumatic.  Hair and scalp are normal.  EYES: Pupils are equal, round, reactive to light and accommodation.  Extraocular movements are intact.  The sclera nonicteric without injection.  The extraocular structures are normal.  EARS: Normal shape and symmetry.  No tenderness when palpating the mastoid or tragal areas bilaterally.  Otoscopic exam reveals a minimal amount of cerumen bilaterally.  The bilateral tympanic membranes are round, intact without evidence of effusion, good landmarks.  No retraction, granulation, or drainage.  NOSE: Nares are patent.  Nasal mucosa is pink and moist.  Negative anterior rhinoscopy.  ORAL CAVITY: Dentition is in good repair.  Mucous membranes are slightly dry.  Tongue is mobile, protrudes to the midline.  Palate elevates symmetrically.  No erythema or exudate.  No oral cavity or oropharyngeal masses, lesions, ulcerations, leukoplakia.  NECK: Supple, trachea is midline.  There no palpable cervical lymphadenopathy or masses bilaterally.  Palpation of the bilateral parotid and submandibular areas reveal no masses.  No thyromegaly.    NEUROLOGIC: Cranial nerves II through XII are grossly intact.  Voice is strong.  Patient is House-Brackmann I/VI bilaterally.  CARDIOVASCULAR: Extremities are warm and well-perfused.  No significant peripheral edema.  RESPIRATORY: Patient has nonlabored  breathing without cough, wheeze, stridor.  PSYCHIATRIC: Patient is alert and oriented.  Mood and affect appear normal.  SKIN: Warm and dry.  No scalp, face, or neck lesions noted.    Procedure: Flexible Laryngoscopy  Indication: Throat tightness, dysphonia    To best visualize the upper airway anatomy and due to the chief complaint and HPI, I proceeded with flexible fiberoptic laryngoscopy examination.  The bilateral nasal cavities were anesthetized and decongested with a mixture of lidocaine and neosynephrine.  The bilateral nasal cavities were examined using a flexible fiberoptic laryngoscope.  There were no nasal cavity masses, polyps, or mucopurulence bilaterally.  The nasopharynx had a normal appearance with normal Eustachian tube openings and fossa of Rosenmuller bilaterally.  Minimal adenoid tissue.  The base of tongue, vallecula, epiglottis, aryepiglottic folds, arytenoids, and piriform sinuses were without mass or lesion.  The bilateral true vocal folds were symmetrically mobile without nodules or masses.  The visualized portions of the infraglottic and subglottic airway are unremarkable.  The scope was removed.  The patient tolerated the procedure well.                    Assessment and Plan     ICD-10-CM    1. Muscular tension dysphonia  R49.0 LARYNGOSCOPY FLEX FIBEROPTIC, DIAGNOSTIC     Speech Therapy Referral      2. Globus sensation  R09.89 LARYNGOSCOPY FLEX FIBEROPTIC, DIAGNOSTIC     Speech Therapy Referral      3. Moderate persistent asthma without complication  J45.40 LARYNGOSCOPY FLEX FIBEROPTIC, DIAGNOSTIC     Speech Therapy Referral      4. History of allergic rhinitis  Z87.09 LARYNGOSCOPY FLEX FIBEROPTIC, DIAGNOSTIC     Speech Therapy Referral         It was my pleasure seeing Dilia Solomon today in clinic.  The patient's primary concerns are throat irritation around caustic chemicals and issues with dysphonia especially with voice use.  She has some signs of muscle tension dysphonia on her  endoscopic examination.  We discussed trying to avoid situations where she is around chemicals.  We discussed switching from a bleach-based  to an enzyme  for her pet, which I think should be helpful.  I will place referral for speech therapy so they can help her with voice use techniques and vocal hygiene to help treat her muscle tension dysphonia.    Maximilian Abad MD  Department of Otolaryngology-Head and Neck Surgery  [facility]

## 2023-02-01 ENCOUNTER — OFFICE VISIT (OUTPATIENT)
Dept: OTOLARYNGOLOGY | Facility: CLINIC | Age: 58
End: 2023-02-01
Payer: COMMERCIAL

## 2023-02-01 VITALS
DIASTOLIC BLOOD PRESSURE: 69 MMHG | OXYGEN SATURATION: 98 % | SYSTOLIC BLOOD PRESSURE: 104 MMHG | HEART RATE: 78 BPM | WEIGHT: 195 LBS | BODY MASS INDEX: 34.54 KG/M2

## 2023-02-01 DIAGNOSIS — Z87.09 HISTORY OF ALLERGIC RHINITIS: ICD-10-CM

## 2023-02-01 DIAGNOSIS — R49.0 MUSCULAR TENSION DYSPHONIA: Primary | ICD-10-CM

## 2023-02-01 DIAGNOSIS — J45.40 MODERATE PERSISTENT ASTHMA WITHOUT COMPLICATION: ICD-10-CM

## 2023-02-01 DIAGNOSIS — R09.A2 GLOBUS SENSATION: ICD-10-CM

## 2023-02-01 PROCEDURE — 31575 DIAGNOSTIC LARYNGOSCOPY: CPT | Performed by: OTOLARYNGOLOGY

## 2023-02-01 PROCEDURE — 99214 OFFICE O/P EST MOD 30 MIN: CPT | Mod: 25 | Performed by: OTOLARYNGOLOGY

## 2023-02-01 NOTE — LETTER
2/1/2023         RE: Dilia Solomon  [address] [facility] [address]        Dear Colleague,    Thank you for referring your patient, Dilia Solomon, to the [facility]. Please see a copy of my visit note below.    Chief Complaint   Patient presents with     Ent Problem     Patient states having aFroster dog back in December,and patient had a lot of bleach/cleaning supplies. Any time now patient is around bleach/chemicals mid chest becomes tight/and having a hard time swallowing.      History of Present Illness  Dilia Solomon is a 57 year old female who presents today for follow-up.  I have evaluated the patient previously for ear and sinus complaints. The patient has had 2 previous endoscopic sinus surgeries, most recently with Dr. Tierney on 2/14/2018.  She has a history of chronic allergic rhinitis and does receive allergy injection immunotherapy. Her past history is significant for TMJ disorder.  She does wear an oral splint at nighttime.  She has had physical therapy for her TMJ in the past.  She was last seen on 1/5/2022 with symptoms most consistent with temporomandibular joint dysfunction.  She presents today for evaluation of her throat.    From a symptom standpoint, the patient reports developing some throat tightness, hoarse voice, shortness of breath when she was using bleach a month or 2 ago.  She notes that when she is around more caustic chemicals that her breathing gets worse.  She does have a history of asthma.  She was having to use her albuterol more frequently but now is using it very minimally.  She does take a daily controller inhaler.  She has been struggling with having her voice becoming hoarse with voice use.  She will have some swallowing trouble if her throat is irritated by a chemical but overall is swallowing well maintaining her weight, nutrition, hydration.  The patient is a lifetime non-smoker.    My review of the patient's most recent sinus CT  performed 10/30/2019 showed a midline nasal septum.  The patient's postsurgical changes of bilateral maxillary antrostomies and anterior ethmoidectomies.  There is a slight amount of thickening in the floor of the right maxillary sinus.  The remainder the paranasal sinuses are well aerated without any acute or chronic inflammation.    Past Medical History  Patient Active Problem List   Diagnosis     Need for prophylactic vaccination and inoculation against influenza     Sprain of interphalangeal (joint) of hand     Radial styloid tenosynovitis     Carpal tunnel syndrome     Synovial cyst of popliteal space     Pain in joint, lower leg     Hyperlipidemia LDL goal <130     Advanced directives, counseling/discussion     Moderate persistent asthma     Allergic rhinitis due to dust mite     Food allergy     FH: diabetes mellitus     Chronic allergic rhinitis due to animal hair and dander     Allergic rhinitis due to mold     Chronic seasonal allergic rhinitis due to pollen     Obesity (BMI 35.0-39.9) with comorbidity (H)     Allergic conjunctivitis, bilateral     History of endoscopic sinus surgery     Chronic pansinusitis     Current Medications    Current Outpatient Medications:      albuterol (PROAIR HFA/PROVENTIL HFA/VENTOLIN HFA) 108 (90 Base) MCG/ACT inhaler, Inhale 2 puffs into the lungs every 4 hours as needed for shortness of breath / dyspnea or wheezing, Disp: 18 g, Rfl: 3     azelastine (ASTELIN) 0.1 % nasal spray, Spray 2 sprays into both nostrils 2 times daily as needed for rhinitis or allergies, Disp: 90 mL, Rfl: 3     azelastine (OPTIVAR) 0.05 % ophthalmic solution, Apply 1 drop to eye 2 times daily as needed (itchy/watery eyes), Disp: 6 mL, Rfl: 1     cetirizine (ZYRTEC) 10 MG tablet, Take 1 tablet (10 mg) by mouth daily as needed for allergies, Disp: 90 tablet, Rfl: 3     Emollient (CERAVE) CREA, Externally apply 1 dose. topically 2 times daily, Disp: 2 Bottle, Rfl: 11     fluticasone (FLONASE) 50  MCG/ACT nasal spray, Spray 2 sprays into both nostrils daily, Disp: 48 g, Rfl: 1     fluticasone-salmeterol (ADVAIR) 250-50 MCG/DOSE inhaler, Inhale 1 puff into the lungs 2 times daily, Disp: 180 each, Rfl: 3     loratadine (CLARITIN) 10 MG tablet, Take 1 tablet (10 mg) by mouth daily as needed for allergies, Disp: 90 tablet, Rfl: 3     montelukast (SINGULAIR) 10 MG tablet, Take 1 tablet (10 mg) by mouth At Bedtime, Disp: 90 tablet, Rfl: 1     MULTIVITAMIN OR, 1 tab daily, Disp: , Rfl:      ORDER FOR ALLERGEN IMMUNOTHERAPY, Name of Mix: Mix #1  Mold Alternaria Tenuis 1:10 w/v, HS  0.5 ml Aspergillus Fumigatus 1:10 w/v, HS  0.5 ml Epicoccum Nigrum 1:10 w/v, HS 0.5 ml Hormodendrum Cladosporioides 1:10 w/v, HS 0.5 ml Penicillium Mix 1:10 w/v, HS  0.5 ml Diluent: HSA 2.5mL to 5ml, Disp: 5 mL, Rfl: PRN     ORDER FOR ALLERGEN IMMUNOTHERAPY, Name of Mix: Mix #2  Dust Mite, Cat, Dog Cat Hair, Standardized 10,000 BAU/mL, ALK  2.0 ml Dog Hair Dander, A. P.  1:100 w/v, HS  1.0 ml Dust Mites F 30,000AU/mL, HS  0.3 ml Dust Mites P. 30,000 AU/mL, HS  0.3 ml  Diluent: HSA 1.4mL to 5ml, Disp: 5 mL, Rfl: PRN     ORDER FOR ALLERGEN IMMUNOTHERAPY, Name of Mix: Mix #3 Grass,Tree  Dmitry,White 1:20w/v, HS 0.5ml Birch Mix 1:20w/v, ALK 0.5ml Boxelder-Maple Mix BHR (Boxelder Hard Red) 1:20w/v, HS 0.5ml [place],Common 1:20w/v, HS 0.5ml Elm,American 1:20w/v, HS 0.5ml [place] Mix RW 1:20w/v, HS 0.5ml Oak Mix RVW 1:20w/v, HS 0.5ml [place] Tree,Black 1:20w/v, HS 0.5ml Suresh Grass (Std) 100,000 BAU/mL, HS 0.4ml Jonathan Grass 1:20w/v, HS 0.5ml Diluent: HSA 0.1mL to 5ml, Disp: 5 mL, Rfl: PRN     ORDER FOR ALLERGEN IMMUNOTHERAPY, Name of Mix: Mix #4  Weeds Kochia 1:20 w/v, HS 0.5 ml Lamb's Quarters 1:20 w/v, HS 0.5 ml Nettle 1:20 w/v, HS 0.5 ml Plantain, English 1:20 w/v, HS 0.5 ml Ragweed Mixed 1:20 w/v ALK  0.5 ml Russian Thistle 1:20 w/v, HS 0.5 ml Sagebrush, Mugwort 1:20 w/v, HS 0.5 ml Sorrel, Sheep 1:20 w/v, HS 0.5 ml Diluent: HSA 1mL to  5ml, Disp: 5 mL, Rfl: PRN     order for DME, Equipment being ordered: Silicone heel cup, Disp: 1 Units, Rfl: 0     order for DME, Equipment being ordered: Dynaflex insert, Disp: 1 Units, Rfl: 0     order for DME, Equipment being ordered: Silicone heel cup, Disp: 1 Units, Rfl: 0     triamcinolone (KENALOG) 0.1 % external cream, Apply sparingly to affected area two times daily as needed but not more than 14 days in a row. Spare face, armpits, neck, and groin., Disp: 80 g, Rfl: 1     albuterol (2.5 MG/3ML) 0.083% neb solution, Take 1 vial (2.5 mg) by nebulization every 4 hours as needed (Patient not taking: Reported on 3/11/2022), Disp: 1 Box, Rfl: 3     EPINEPHrine (ANY BX GENERIC EQUIV) 0.3 MG/0.3ML injection 2-pack, Inject 0.3 mLs (0.3 mg) into the muscle once as needed for anaphylaxis (Patient not taking: Reported on 2/1/2023), Disp: 0.6 mL, Rfl: 3    Allergies  Allergies   Allergen Reactions     Clinoril [Sulindac]      Hives     Niacin Hives     Nsaids Hives     Tolerates ibuprofen and aspirin without hives       Pineapple Hives       Social History  Social History     Socioeconomic History     Marital status: Single   Occupational History     Employer: [employer]    Tobacco Use     Smoking status: Never     Smokeless tobacco: Never   Substance and Sexual Activity     Alcohol use: No     Drug use: No     Sexual activity: Never   Other Topics Concern     Parent/sibling w/ CABG, MI or angioplasty before 65F 55M? Yes     Comment: mother   Social History Narrative    March 11, 2022    ENVIRONMENTAL HISTORY: [place] The home is heated with a forced air. They do not have central air conditioning. The patient's bedroom is furnished with hard pawel in bedroom, allergen mattress cover, allergen pillowcase cover and fabric window coverings.  Pets inside the house include 1 dog. There is not history of cockroach or mice infestation. There are no smokers in the house.   The house does have a damp basement.     Patient is currently living and taking care of a friend in the friends home.       Family History  Family History   Problem Relation Age of Onset     C.A.D. Mother      Diabetes Mother      Cancer Father         brain     Breast Cancer Maternal Aunt      Cancer - colorectal No family hx of        Review of Systems  As per HPI and PMHx, otherwise 10 system review including the head and neck, constitutional, eyes, respiratory, GI, skin, neurologic, lymphatic, endocrine, and allergy systems is negative.    Physical Exam  /69 (BP Location: Right arm, Patient Position: Sitting, Cuff Size: Adult Regular)   Pulse 78   Wt 88.5 kg (195 lb)   LMP 07/18/2015 (Approximate)   SpO2 98%   BMI 34.54 kg/m    GENERAL: Patient is a pleasant, cooperative 57 year old female in no acute distress.  HEAD: Normocephalic, atraumatic.  Hair and scalp are normal.  EYES: Pupils are equal, round, reactive to light and accommodation.  Extraocular movements are intact.  The sclera nonicteric without injection.  The extraocular structures are normal.  EARS: Normal shape and symmetry.  No tenderness when palpating the mastoid or tragal areas bilaterally.  Otoscopic exam reveals a minimal amount of cerumen bilaterally.  The bilateral tympanic membranes are round, intact without evidence of effusion, good landmarks.  No retraction, granulation, or drainage.  NOSE: Nares are patent.  Nasal mucosa is pink and moist.  Negative anterior rhinoscopy.  ORAL CAVITY: Dentition is in good repair.  Mucous membranes are slightly dry.  Tongue is mobile, protrudes to the midline.  Palate elevates symmetrically.  No erythema or exudate.  No oral cavity or oropharyngeal masses, lesions, ulcerations, leukoplakia.  NECK: Supple, trachea is midline.  There no palpable cervical lymphadenopathy or masses bilaterally.  Palpation of the bilateral parotid and submandibular areas reveal no masses.  No thyromegaly.     NEUROLOGIC: Cranial nerves II through XII are grossly intact.  Voice is strong.  Patient is House-Brackmann I/VI bilaterally.  CARDIOVASCULAR: Extremities are warm and well-perfused.  No significant peripheral edema.  RESPIRATORY: Patient has nonlabored breathing without cough, wheeze, stridor.  PSYCHIATRIC: Patient is alert and oriented.  Mood and affect appear normal.  SKIN: Warm and dry.  No scalp, face, or neck lesions noted.    Procedure: Flexible Laryngoscopy  Indication: Throat tightness, dysphonia    To best visualize the upper airway anatomy and due to the chief complaint and HPI, I proceeded with flexible fiberoptic laryngoscopy examination.  The bilateral nasal cavities were anesthetized and decongested with a mixture of lidocaine and neosynephrine.  The bilateral nasal cavities were examined using a flexible fiberoptic laryngoscope.  There were no nasal cavity masses, polyps, or mucopurulence bilaterally.  The nasopharynx had a normal appearance with normal Eustachian tube openings and fossa of Rosenmuller bilaterally.  Minimal adenoid tissue.  The base of tongue, vallecula, epiglottis, aryepiglottic folds, arytenoids, and piriform sinuses were without mass or lesion.  The bilateral true vocal folds were symmetrically mobile without nodules or masses.  The visualized portions of the infraglottic and subglottic airway are unremarkable.  The scope was removed.  The patient tolerated the procedure well.                    Assessment and Plan     ICD-10-CM    1. Muscular tension dysphonia  R49.0 LARYNGOSCOPY FLEX FIBEROPTIC, DIAGNOSTIC     Speech Therapy Referral      2. Globus sensation  R09.89 LARYNGOSCOPY FLEX FIBEROPTIC, DIAGNOSTIC     Speech Therapy Referral      3. Moderate persistent asthma without complication  J45.40 LARYNGOSCOPY FLEX FIBEROPTIC, DIAGNOSTIC     Speech Therapy Referral      4. History of allergic rhinitis  Z87.09 LARYNGOSCOPY FLEX FIBEROPTIC, DIAGNOSTIC     Speech Therapy Referral          It was my pleasure seeing Dilia Solomon today in clinic.  The patient's primary concerns are throat irritation around caustic chemicals and issues with dysphonia especially with voice use.  She has some signs of muscle tension dysphonia on her endoscopic examination.  We discussed trying to avoid situations where she is around chemicals.  We discussed switching from a bleach-based  to an enzyme  for her pet, which I think should be helpful.  I will place referral for speech therapy so they can help her with voice use techniques and vocal hygiene to help treat her muscle tension dysphonia.    Maximilian Abad MD  Department of Otolaryngology-Head and Neck Surgery  [facility]         Again, thank you for allowing me to participate in the care of your patient.        Sincerely,        Maximilian Abad MD

## 2023-02-01 NOTE — NURSING NOTE
Chief Complaint   Patient presents with     Ent Problem     Patient states having aFroster dog back in December,and patient had a lot of bleach/cleaning supplies. Any time now patient is around bleach/chemicals mid chest becomes tight/and having a hard time swallowing.        Vitals:    02/01/23 1314   Pulse: 78   SpO2: 98%   Weight: 88.5 kg (195 lb)     Wt Readings from Last 1 Encounters:   02/01/23 88.5 kg (195 lb)     Bere Lewis MA

## 2023-02-08 ENCOUNTER — ALLIED HEALTH/NURSE VISIT (OUTPATIENT)
Dept: ALLERGY | Facility: CLINIC | Age: 58
End: 2023-02-08
Payer: COMMERCIAL

## 2023-02-08 DIAGNOSIS — J30.81 CHRONIC ALLERGIC RHINITIS DUE TO ANIMAL HAIR AND DANDER: ICD-10-CM

## 2023-02-08 DIAGNOSIS — J30.89 ALLERGIC RHINITIS DUE TO MOLD: ICD-10-CM

## 2023-02-08 DIAGNOSIS — J30.1 CHRONIC SEASONAL ALLERGIC RHINITIS DUE TO POLLEN: ICD-10-CM

## 2023-02-08 DIAGNOSIS — H10.13 ALLERGIC CONJUNCTIVITIS, BILATERAL: ICD-10-CM

## 2023-02-08 DIAGNOSIS — J30.1 CHRONIC SEASONAL ALLERGIC RHINITIS DUE TO POLLEN: Primary | ICD-10-CM

## 2023-02-08 DIAGNOSIS — J30.89 ALLERGIC RHINITIS DUE TO DUST MITE: ICD-10-CM

## 2023-02-08 PROCEDURE — 95117 IMMUNOTHERAPY INJECTIONS: CPT

## 2023-02-08 NOTE — PROGRESS NOTES
Dilia Solomon presents to clinic today at the request of Michael Lujan MD  (ordering provider) for Allergy Immunotherapy injection(s).       This service provided today was under the care of Butch Horowitz MD; the supervising provider of the day; who was available if needed.      Patient presented after waiting 30 minutes with no reaction to  injections. Discharged from clinic.    Gia Bae RN

## 2023-02-08 NOTE — TELEPHONE ENCOUNTER
ALLERGY SOLUTION RE-ORDER REQUEST    Dilia Solomon 1965 MRN: [id number]    DATE NEEDED:  2/23/2023    Vial Color Content    Vial Size  Red 1:1 Grass, Trees    5 mL  Red 1:1 Cat, Dog, Molds   5 mL  Red 1:1 Molds    5 mL  Red 1:1 Weeds    5 mL      Serum reorder consent signed and patient/parent was advised that new serums would be ordered through the pharmacy and billed to their insurance company when they arrive in clinic. Yes    Shot Clinic Location:  [facility].  Ship to Location: [facility].  Serum billed to:  [facility].    Special Instructions:  no        Requester Signature  Gia Bae RN

## 2023-02-21 DIAGNOSIS — J30.89 ALLERGIC RHINITIS DUE TO DUST MITE: ICD-10-CM

## 2023-02-21 DIAGNOSIS — J45.40 MODERATE PERSISTENT ASTHMA WITHOUT COMPLICATION: ICD-10-CM

## 2023-02-21 DIAGNOSIS — J30.1 CHRONIC SEASONAL ALLERGIC RHINITIS DUE TO POLLEN: ICD-10-CM

## 2023-02-21 DIAGNOSIS — J30.89 ALLERGIC RHINITIS DUE TO MOLD: ICD-10-CM

## 2023-02-21 DIAGNOSIS — J30.81 CHRONIC ALLERGIC RHINITIS DUE TO ANIMAL HAIR AND DANDER: ICD-10-CM

## 2023-02-21 RX ORDER — MONTELUKAST SODIUM 10 MG/1
10 TABLET ORAL AT BEDTIME
Qty: 90 TABLET | Refills: 1 | Status: SHIPPED | OUTPATIENT
Start: 2023-02-21 | End: 2023-09-07

## 2023-02-21 NOTE — TELEPHONE ENCOUNTER
Routing refill request to provider for review/approval because:  No ACT score in last 3 months.     Pt has follow up appointment scheduled for 3/9/23.        Kaylee GRANT RN  Specialty/Allergy Clinics

## 2023-02-21 NOTE — TELEPHONE ENCOUNTER
change for Cat antigen.  Please update prescription to reflect Standardized Cat Hair- 10,000 BAU/ml HS (Sae Emmanuel).    Gia GUEVARA RN  Specialty/Allergy Clinic

## 2023-02-24 DIAGNOSIS — J30.89 ALLERGIC RHINITIS DUE TO DUST MITE: ICD-10-CM

## 2023-02-24 DIAGNOSIS — J30.81 CHRONIC ALLERGIC RHINITIS DUE TO ANIMAL HAIR AND DANDER: Primary | ICD-10-CM

## 2023-02-24 PROCEDURE — 95165 ANTIGEN THERAPY SERVICES: CPT | Performed by: ALLERGY & IMMUNOLOGY

## 2023-02-24 NOTE — TELEPHONE ENCOUNTER
Allergy serums received at [facility].    Vials received below:    Vial Color Content                      Vial Size Expiration Date  Red 1:1                                   Cat, Dog, Dust Mite    5mL                 2/24/24    Signature  Silver Araujo LPN

## 2023-02-24 NOTE — PROGRESS NOTES
Allergy serums billed to [place].     Vials billed below:    Vial Color Content                      Vial Size Expiration Date  Red 1:1 Cat, Dog, Dust Mite 5mL  2/24/24    Billed 10 units    Checked by Silver Araujo / LPN        Signature  Silver Araujo LPN

## 2023-02-28 ENCOUNTER — HOSPITAL ENCOUNTER (OUTPATIENT)
Dept: SPEECH THERAPY | Facility: CLINIC | Age: 58
Setting detail: THERAPIES SERIES
Discharge: HOME | End: 2023-02-28
Attending: OTOLARYNGOLOGY
Payer: COMMERCIAL

## 2023-02-28 DIAGNOSIS — R49.0 MUSCULAR TENSION DYSPHONIA: ICD-10-CM

## 2023-02-28 DIAGNOSIS — J45.40 MODERATE PERSISTENT ASTHMA WITHOUT COMPLICATION: ICD-10-CM

## 2023-02-28 DIAGNOSIS — Z87.09 HISTORY OF ALLERGIC RHINITIS: ICD-10-CM

## 2023-02-28 DIAGNOSIS — R09.A2 GLOBUS SENSATION: ICD-10-CM

## 2023-02-28 PROCEDURE — 92524 BEHAVRAL QUALIT ANALYS VOICE: CPT | Mod: GN | Performed by: SPEECH-LANGUAGE PATHOLOGIST

## 2023-03-02 DIAGNOSIS — J30.89 ALLERGIC RHINITIS DUE TO MOLD: Primary | ICD-10-CM

## 2023-03-02 DIAGNOSIS — J30.1 CHRONIC SEASONAL ALLERGIC RHINITIS DUE TO POLLEN: ICD-10-CM

## 2023-03-02 PROCEDURE — 95165 ANTIGEN THERAPY SERVICES: CPT | Performed by: ALLERGY & IMMUNOLOGY

## 2023-03-02 NOTE — TELEPHONE ENCOUNTER
Allergy serums received at [facility].    Vials received below:    Vial Color Content                      Vial Size Expiration Date  Red 1:1                                   Weeds                         5mL                 03/02/2024  Red 1:1                                   Grass, Trees               5mL                 03/02/24  Red 1:1                                   Molds                          5mL                 03/02/24    Signature  Silver Araujo LPN

## 2023-03-02 NOTE — PROGRESS NOTES
Allergy serums billed to [place].     Vials billed below:    Vial Color Content                      Vial Size Expiration Date  Red 1:1 Weeds 5mL  03/02/2024  Red 1:1 Grass, Trees 5mL  03/02/24  Red 1:1 Molds 5mL  03/02/24      Billed 30 units    Checked by Silver Araujo / LPN        Signature  Silver Araujo LPN

## 2023-03-07 ENCOUNTER — HOSPITAL ENCOUNTER (OUTPATIENT)
Dept: SPEECH THERAPY | Facility: CLINIC | Age: 58
Setting detail: THERAPIES SERIES
Discharge: HOME | End: 2023-03-07
Attending: OTOLARYNGOLOGY
Payer: COMMERCIAL

## 2023-03-07 PROCEDURE — 92507 TX SP LANG VOICE COMM INDIV: CPT | Mod: GN | Performed by: SPEECH-LANGUAGE PATHOLOGIST

## 2023-03-08 NOTE — PROGRESS NOTES
Voice Evaluation   02/28/23    General Information   Type Of Visit Initial   Start Of Care Date 02/28/23   Referring Physician Maximilian Abad MD   Orders Evaluate And Treat   Medical Diagnosis Muscular tension dysphonia (R49.0)  - Primary     Globus sensation (R09.89)     Moderate persistent asthma without complication (J45.40)     History of allergic rhinitis (Z87.09)   Onset Of Illness/injury Or Date Of Surgery 02/01/23  (order date)   Precautions/Limitations  no known precautions/limitations   Hearing No difficulties indicated or noted during assessment.   Surgical/Medical history reviewed Yes   Pertinent History Of Current Problem Per ENT visit note: The patient's primary concerns are throat irritation around caustic chemicals and issues with dysphonia especially with voice use.  She has some signs of muscle tension dysphonia on her endoscopic examination.   Prior Level of Functioning No previous problems.   Patient Role/employment History Employed  ([employer])   Living environment [facility]   General Observations Pt is pleasant and cooperative.   Patient/family Goals Following up with speech per doctor referral.   General Information Comments Pt indicated she thinks this could be related to an allergy. She says she has been getting allergy shots every 4 weeks since 2009/2010. Her symptoms began after frequent exposure to bleach. She said she sometimes loses her voice and has difficulty talking/swallowing. Since eliminating the use of irritating chemicals, symptoms have intermittently occured (about 1x per week or less). She said her only known triggers are when she is exposed to bleach/chemicals.   Fall Risk Screen   Fall screen completed by SLP   Have you fallen 2 or more times in the past year? No   Have you fallen and had an injury in the past year? No   Abuse Screen (yes response referral indicated)   Feels Unsafe at Home or Work/School no   Feels Threatened by Someone no   Does Anyone Try to Keep You  From Having Contact with Others or Doing Things Outside Your Home? no   Physical Signs of Abuse Present no   Pain Assessment   Pain Reported No   Personal Rating / Voice Use Rating   Describe the amount of voice use required for your job Moderate   Describe the intensity of voice use required for your job Moderate   Comments Pt reports she mostly talks at work with her coworkers.   Voice Profile during conversation, 1 min monologue and paragraph reading   Voice Quality Hoarse   Voice quality severity rating continuum (1=Severe, 7=WNL) 6   Breath control Weak   Breath Control comments Limited breath support/control to sustain sounds. Unsure if this is consistent with baseline.   Breath control severity rating continuum (1=Severe, 7=WNL) 4   Voice Use / Effort WNL   Voice use / Effort severity rating continuum (1=Severe, 7=WNL) 7   Pitch /Frequency Description WNL   Pitch / Frequency severity rating continuum (1=Severe, 7=WNL) 7   Volume comments WNL   Volume severity rating continuum (1=Severe, 7=WNL) 7   Neuromuscular Control WNL   Neuromuscular Control severity rating continuum (1=Severe, 7=WNL) 7   Resonance Hyponasal;WNL   Resonance severity rating continuum (1=Severe, 7=WNL) 5   Resonance comments Reported she had a sinus infection. Hyponasality is reportedly not typical.   Comments Most aspects of pt's voice are WNL. Pt had slight hyponasality which could be attributed to sinus cold. She also had trouble with breath control and running out of air. The pt rates her voice 9/10(0=bad, 10=perfect), and the impact of her voice difficulties as 2/10 (0 =minor, 10=major).   Vibratory Function of Vocal Folds   Prolonged 'ah' at mid pitch (sec) 4   Prolonged 'ah' at high pitch (sec) 3   Prolonged 'ah' at low pitch (sec) 5   Mucosal Function of the Vocal Folds   S/Z ratio (__ sec/ __sec = __percentage) 1.8   Function of Lengtheners / Shorteners (CT and TA Muscles)   Pitch glides Upper pitches more dysphonic   Respiratory  "Function Screening   Longest prolonged \"S\" from S/Z ratio (#of sec) 8.23   Longest prolonged \"S\" characteristics Respiratory weakness   General Therapy Interventions   Planned Therapy Interventions Voice   Voice Relaxed breath sequence techniques   Impressions and Recommendations   Communication Diagnosis Possible Muscle Tension Dysphonia   Summary Due to pt's limited breath support/control and intermittent vocal difficulties it is recommended that pt attends 2-3 sessions to learn breathing techniques and muscle relaxtion stretches to determine if vocal quality/diffiulties improve.   Recommendations Breathing and Muscle Relaxation Techniques to determine if they improve vocal quality/difficulties   Frequency and Duration 2-3x total   Prognosis  Good with intervention   Risks and Benefits of Treatment have been explained. Yes   Patient & /or Caregiver  in agreement with plan of care Yes   Educational Assessment   Barriers to Learning No barriers   Voice Goal 1   Goal Identifier Breath Support   Goal Description Patient will complete diaphragmatic breathing exercises with 80% accuracy and min cues.   Target Date 03/14/23   Voice Goal 2   Goal Identifier Tension   Goal Description Patient will complete deactivation stretches with 80% accuracy and min cues.   Target Date 03/14/23   Voice Goal 3   Goal Identifier Home Programming   Goal Description Patient will complete home programming 4/7 days per week.   Target Date 03/14/23   Total Session Time   Voice Minutes (29769) 45   Total Evaluation Time 45   Therapy Certification   Certification date from 02/28/23   Certification date to 03/31/23   Medical Diagnosis Muscular tension dysphonia (R49.0)  - Primary     Globus sensation (R09.89)     Moderate persistent asthma without complication (J45.40)     History of allergic rhinitis (Z87.09)   Certification I certify the need for these services furnished under this plan of treatment and while under my care.  (Physician " co-signature of this document indicates review and certification of the therapy plan).

## 2023-03-09 ENCOUNTER — ALLIED HEALTH/NURSE VISIT (OUTPATIENT)
Dept: ALLERGY | Facility: CLINIC | Age: 58
End: 2023-03-09
Payer: COMMERCIAL

## 2023-03-09 ENCOUNTER — OFFICE VISIT (OUTPATIENT)
Dept: ALLERGY | Facility: CLINIC | Age: 58
End: 2023-03-09
Payer: COMMERCIAL

## 2023-03-09 VITALS
DIASTOLIC BLOOD PRESSURE: 68 MMHG | OXYGEN SATURATION: 98 % | WEIGHT: 195 LBS | BODY MASS INDEX: 34.55 KG/M2 | HEIGHT: 63 IN | HEART RATE: 82 BPM | SYSTOLIC BLOOD PRESSURE: 109 MMHG

## 2023-03-09 DIAGNOSIS — J45.40 MODERATE PERSISTENT ASTHMA WITHOUT COMPLICATION: Primary | ICD-10-CM

## 2023-03-09 DIAGNOSIS — Z23 NEED FOR PROPHYLACTIC VACCINATION AND INOCULATION AGAINST INFLUENZA: ICD-10-CM

## 2023-03-09 DIAGNOSIS — J30.81 CHRONIC ALLERGIC RHINITIS DUE TO ANIMAL HAIR AND DANDER: ICD-10-CM

## 2023-03-09 DIAGNOSIS — J30.1 CHRONIC SEASONAL ALLERGIC RHINITIS DUE TO POLLEN: ICD-10-CM

## 2023-03-09 DIAGNOSIS — J30.89 ALLERGIC RHINITIS DUE TO DUST MITE: ICD-10-CM

## 2023-03-09 DIAGNOSIS — J30.89 ALLERGIC RHINITIS DUE TO MOLD: ICD-10-CM

## 2023-03-09 DIAGNOSIS — J32.9 RECURRENT SINUS INFECTIONS: ICD-10-CM

## 2023-03-09 DIAGNOSIS — R49.0 DYSPHONIA: ICD-10-CM

## 2023-03-09 DIAGNOSIS — H10.13 ALLERGIC CONJUNCTIVITIS OF BOTH EYES: ICD-10-CM

## 2023-03-09 DIAGNOSIS — J30.89 ALLERGIC RHINITIS DUE TO MOLD: Primary | ICD-10-CM

## 2023-03-09 LAB
BASOPHILS # BLD AUTO: 0.1 10E3/UL (ref 0–0.2)
BASOPHILS NFR BLD AUTO: 1 %
CD19 CELLS # BLD: 249 CELLS/UL (ref 107–698)
CD19 CELLS NFR BLD: 20 % (ref 6–27)
CD3 CELLS # BLD: 951 CELLS/UL (ref 603–2990)
CD3 CELLS NFR BLD: 75 % (ref 49–84)
CD3+CD4+ CELLS # BLD: 646 CELLS/UL (ref 441–2156)
CD3+CD4+ CELLS NFR BLD: 51 % (ref 28–63)
CD3+CD4+ CELLS/CD3+CD8+ CLL BLD: 2.28 % (ref 1.4–2.6)
CD3+CD8+ CELLS # BLD: 284 CELLS/UL (ref 125–1312)
CD3+CD8+ CELLS NFR BLD: 22 % (ref 10–40)
CD3-CD16+CD56+ CELLS # BLD: 63 CELLS/UL (ref 95–640)
CD3-CD16+CD56+ CELLS NFR BLD: 5 % (ref 4–25)
DEPRECATED CALCIDIOL+CALCIFEROL SERPL-MC: 15 UG/L (ref 20–75)
EOSINOPHIL # BLD AUTO: 0.2 10E3/UL (ref 0–0.7)
EOSINOPHIL NFR BLD AUTO: 5 %
ERYTHROCYTE [DISTWIDTH] IN BLOOD BY AUTOMATED COUNT: 13.5 % (ref 10–15)
HCT VFR BLD AUTO: 41.3 % (ref 35–47)
HGB BLD-MCNC: 13.5 G/DL (ref 11.7–15.7)
LYMPHOCYTES # BLD AUTO: 1.3 10E3/UL (ref 0.8–5.3)
LYMPHOCYTES NFR BLD AUTO: 34 %
MCH RBC QN AUTO: 30.1 PG (ref 26.5–33)
MCHC RBC AUTO-ENTMCNC: 32.7 G/DL (ref 31.5–36.5)
MCV RBC AUTO: 92 FL (ref 78–100)
MONOCYTES # BLD AUTO: 0.3 10E3/UL (ref 0–1.3)
MONOCYTES NFR BLD AUTO: 7 %
NEUTROPHILS # BLD AUTO: 1.9 10E3/UL (ref 1.6–8.3)
NEUTROPHILS NFR BLD AUTO: 52 %
PLATELET # BLD AUTO: 226 10E3/UL (ref 150–450)
RBC # BLD AUTO: 4.49 10E6/UL (ref 3.8–5.2)
T CELL EXTENDED COMMENT: ABNORMAL
WBC # BLD AUTO: 3.7 10E3/UL (ref 4–11)

## 2023-03-09 PROCEDURE — 85025 COMPLETE CBC W/AUTO DIFF WBC: CPT | Performed by: ALLERGY & IMMUNOLOGY

## 2023-03-09 PROCEDURE — 86317 IMMUNOASSAY INFECTIOUS AGENT: CPT | Mod: 90 | Performed by: ALLERGY & IMMUNOLOGY

## 2023-03-09 PROCEDURE — 82784 ASSAY IGA/IGD/IGG/IGM EACH: CPT | Performed by: ALLERGY & IMMUNOLOGY

## 2023-03-09 PROCEDURE — 86162 COMPLEMENT TOTAL (CH50): CPT | Mod: 90 | Performed by: ALLERGY & IMMUNOLOGY

## 2023-03-09 PROCEDURE — 99000 SPECIMEN HANDLING OFFICE-LAB: CPT | Performed by: ALLERGY & IMMUNOLOGY

## 2023-03-09 PROCEDURE — 95117 IMMUNOTHERAPY INJECTIONS: CPT

## 2023-03-09 PROCEDURE — 36415 COLL VENOUS BLD VENIPUNCTURE: CPT | Performed by: ALLERGY & IMMUNOLOGY

## 2023-03-09 PROCEDURE — 86161 COMPLEMENT/FUNCTION ACTIVITY: CPT | Mod: 90 | Performed by: ALLERGY & IMMUNOLOGY

## 2023-03-09 PROCEDURE — 90471 IMMUNIZATION ADMIN: CPT | Performed by: ALLERGY & IMMUNOLOGY

## 2023-03-09 PROCEDURE — 86360 T CELL ABSOLUTE COUNT/RATIO: CPT | Performed by: ALLERGY & IMMUNOLOGY

## 2023-03-09 PROCEDURE — 86357 NK CELLS TOTAL COUNT: CPT | Performed by: ALLERGY & IMMUNOLOGY

## 2023-03-09 PROCEDURE — 82785 ASSAY OF IGE: CPT | Performed by: ALLERGY & IMMUNOLOGY

## 2023-03-09 PROCEDURE — 90682 RIV4 VACC RECOMBINANT DNA IM: CPT | Performed by: ALLERGY & IMMUNOLOGY

## 2023-03-09 PROCEDURE — 82306 VITAMIN D 25 HYDROXY: CPT | Performed by: ALLERGY & IMMUNOLOGY

## 2023-03-09 PROCEDURE — 86355 B CELLS TOTAL COUNT: CPT | Performed by: ALLERGY & IMMUNOLOGY

## 2023-03-09 PROCEDURE — 86359 T CELLS TOTAL COUNT: CPT | Performed by: ALLERGY & IMMUNOLOGY

## 2023-03-09 PROCEDURE — 99214 OFFICE O/P EST MOD 30 MIN: CPT | Mod: 25 | Performed by: ALLERGY & IMMUNOLOGY

## 2023-03-09 RX ORDER — FLUTICASONE PROPIONATE AND SALMETEROL 250; 50 UG/1; UG/1
1 POWDER RESPIRATORY (INHALATION) EVERY 12 HOURS
Qty: 60 EACH | Refills: 3 | Status: SHIPPED | OUTPATIENT
Start: 2023-03-09 | End: 2023-06-14

## 2023-03-09 RX ORDER — FLUTICASONE PROPIONATE 50 MCG
2 SPRAY, SUSPENSION (ML) NASAL DAILY
Qty: 48 G | Refills: 1 | Status: SHIPPED | OUTPATIENT
Start: 2023-03-09 | End: 2023-09-06

## 2023-03-09 RX ORDER — AZELASTINE HYDROCHLORIDE 0.5 MG/ML
1 SOLUTION/ DROPS OPHTHALMIC 2 TIMES DAILY PRN
Qty: 6 ML | Refills: 1 | Status: SHIPPED | OUTPATIENT
Start: 2023-03-09 | End: 2023-05-11

## 2023-03-09 RX ORDER — CETIRIZINE HYDROCHLORIDE 10 MG/1
10 TABLET ORAL DAILY PRN
Qty: 90 TABLET | Refills: 3 | Status: SHIPPED | OUTPATIENT
Start: 2023-03-09 | End: 2024-02-27

## 2023-03-09 RX ORDER — AZELASTINE 1 MG/ML
2 SPRAY, METERED NASAL 2 TIMES DAILY PRN
Qty: 90 ML | Refills: 3 | Status: SHIPPED | OUTPATIENT
Start: 2023-03-09 | End: 2023-09-07

## 2023-03-09 ASSESSMENT — ASTHMA QUESTIONNAIRES
QUESTION_4 LAST FOUR WEEKS HOW OFTEN HAVE YOU USED YOUR RESCUE INHALER OR NEBULIZER MEDICATION (SUCH AS ALBUTEROL): ONCE A WEEK OR LESS
QUESTION_3 LAST FOUR WEEKS HOW OFTEN DID YOUR ASTHMA SYMPTOMS (WHEEZING, COUGHING, SHORTNESS OF BREATH, CHEST TIGHTNESS OR PAIN) WAKE YOU UP AT NIGHT OR EARLIER THAN USUAL IN THE MORNING: ONCE OR TWICE
ACT_TOTALSCORE: 21
ACT_TOTALSCORE: 21
QUESTION_1 LAST FOUR WEEKS HOW MUCH OF THE TIME DID YOUR ASTHMA KEEP YOU FROM GETTING AS MUCH DONE AT WORK, SCHOOL OR AT HOME: A LITTLE OF THE TIME
QUESTION_2 LAST FOUR WEEKS HOW OFTEN HAVE YOU HAD SHORTNESS OF BREATH: NOT AT ALL
QUESTION_5 LAST FOUR WEEKS HOW WOULD YOU RATE YOUR ASTHMA CONTROL: WELL CONTROLLED

## 2023-03-09 ASSESSMENT — ENCOUNTER SYMPTOMS
NAUSEA: 0
DIARRHEA: 0
COUGH: 0
ACTIVITY CHANGE: 0
EYE DISCHARGE: 0
SINUS PRESSURE: 0
FACIAL SWELLING: 0
HEADACHES: 0
ARTHRALGIAS: 0
RHINORRHEA: 0
EYE REDNESS: 0
FEVER: 0
CHEST TIGHTNESS: 0
MYALGIAS: 0
EYE ITCHING: 0
ABDOMINAL PAIN: 0
VOMITING: 0
WHEEZING: 0
CHILLS: 0
SHORTNESS OF BREATH: 0
ADENOPATHY: 0

## 2023-03-09 ASSESSMENT — PAIN SCALES - GENERAL: PAINLEVEL: MODERATE PAIN (5)

## 2023-03-09 NOTE — PROGRESS NOTES
SUBJECTIVE:                                                                   Dilia Solomon presents today to our Allergy Clinic at [facility] for a follow up visit. She is a 57 year old female with moderate persistent asthma and allergic rhinitis.    She was diagnosed with asthma approximately in 4125-4889. Triggers are environmental allergies and viral respiratory infections.   Serum IgE for regional aeroallergen panel performed in June 2017 with multiple levels of various sensitivities to molds, cat, dog, dust mite, pollen of trees, grasses, and weeds.  She had 2 sinus surgeries in the past. The last one was in 2018.   She started allergen immunotherapy in July 2017. She was on allergen immunotherapy before that with Dr. Haines, and it was helpful at that time; however, symptoms got worse soon after stopping it.  PFT within normal limits  in December 2020 and July 2022.    Allergy Immunotherapy (started on 7/2017)  Date/time of injection(s): 3/9/23     Vial Color Content  Dose   Red 1:1 Weeds  0.5 mL   Red 1:1 Grass, Trees  0.5 mL    Red 1:1 Molds  0.5 mL   Red 1:1 Cat, Dog, Dust Mite  0.5 mL      She tolerates injections well without persistent large local reactions or any systemic reactions.     The patient finds allergen immunotherapy helpful. It improved her symptoms by 80%.     She has been using sinus saline rinses once-twice daily, intranasal fluticasone 2 sprays in each nostril once daily, azelastine 2 sprays in each nostril once-twice daily, montelukast 10 mg by mouth once daily, and oral antihistamines as needed. She uses Optivar occasionally.   Overall, she feels that she is doing well on this regimen.     She does have a history of sinus surgery in the past. The last sinus CT was done in October 2019, which showed mild mucosal thickening in maxillary sinuses.     But she had 2 sinus infections since Summer 2022, which improved with doxy.     Asthma-mcnulty, she has been using  Advair 250/50 micrograms 1 puff twice daily, taking montelukast 10 mg by mouth once daily at bedtime, and using albuterol as needed.  Dilia uses albuterol HFA  less than twice weekly for rescue from chest symptoms. She wakes up less than twice per month due to chest symptoms. There has been no use of oral steroids since the last visit. No ED/PCP/urgent care/other specialist visits for asthma flare since the previous visit. She had a sinus infection last month. She feels it made her asthma worse too. Had more cough and shortness of breath. Doxy helped with both sinus nd chest symptoms. The patient denies current chest tightness, cough, wheezing, or shortness of breath.       Since the last time I saw her, she developed a hoarse voice and throat tightness after being exposed to bleach.  Saw Dr. Abad. 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  She was seen by Speech therapy twice for that. She still has episodes when she loses her voice, and her neck is tight.     Patient Active Problem List   Diagnosis     Need for prophylactic vaccination and inoculation against influenza     Sprain of interphalangeal (joint) of hand     Radial styloid tenosynovitis     Carpal tunnel syndrome     Synovial cyst of popliteal space     Pain in joint, lower leg     Hyperlipidemia LDL goal <130     Advanced directives, counseling/discussion     Moderate persistent asthma     Allergic rhinitis due to dust mite     Food allergy     FH: diabetes mellitus     Chronic allergic rhinitis due to animal hair and dander     Allergic rhinitis due to mold     Chronic seasonal allergic rhinitis due to pollen     Obesity (BMI 35.0-39.9) with comorbidity (H)     Allergic conjunctivitis, bilateral     History of endoscopic sinus surgery     Chronic pansinusitis       Past Medical History:   Diagnosis Date     Injury, other and unspecified, shoulder and upper arm 9/03      Problem (# of Occurrences) Relation (Name,Age of  Onset)    Cancer (1) Father: brain    Diabetes (1) Mother    Breast Cancer (1) Maternal Aunt    C.A.D. (1) Mother       Negative family history of: Cancer - colorectal        Past Surgical History:   Procedure Laterality Date     COLONOSCOPY N/A 10/6/2016    Procedure: COLONOSCOPY;  Surgeon: Shiva Johns MD;  Location: [facility]     ETHMOIDECTOMY  12/30/2013    Procedure: ETHMOIDECTOMY;  Bilateral Submucousal Reduction of InferiorTurbinates and Total Ethmoidectomy with Multiple Sinusotomies;  Surgeon: Lio More MD;  Location: [facility]     ETHMOIDECTOMY Bilateral 2/14/2018    Procedure: ETHMOIDECTOMY;  Bilateral anterior ethmoidectomy, bilateral maxillary antrostomy;  Surgeon: Santhosh Tierney MD;  Location: [facility]     SURGICAL HISTORY OF -   5/04    carpal tunnel release, bilateral     Social History     Socioeconomic History     Marital status: Single     Spouse name: None     Number of children: None     Years of education: None     Highest education level: None   Occupational History     Employer: [employer]    Tobacco Use     Smoking status: Never     Smokeless tobacco: Never   Substance and Sexual Activity     Alcohol use: No     Drug use: No     Sexual activity: Never   Other Topics Concern     Parent/sibling w/ CABG, MI or angioplasty before 65F 55M? Yes     Comment: mother   Social History Narrative    March 11, 2022    ENVIRONMENTAL HISTORY: [place] is heated with a forced air. They do not have central air conditioning. The patient's bedroom is furnished with hard pawel in bedroom, allergen mattress cover, allergen pillowcase cover and fabric window coverings.  Pets inside the house include 1 dog. There is not history of cockroach or mice infestation. There are no smokers in the house.  The house does have a damp basement.     Patient is currently living and taking care of a friend in the friends home.           Review of Systems    Constitutional: Negative for activity change, chills and fever.   HENT: Negative for congestion, ear discharge, facial swelling, nosebleeds, postnasal drip, rhinorrhea, sinus pressure and sneezing.    Eyes: Negative for discharge, redness and itching.   Respiratory: Negative for cough, chest tightness, shortness of breath and wheezing.    Cardiovascular: Negative for chest pain.   Gastrointestinal: Negative for abdominal pain, diarrhea, nausea and vomiting.   Musculoskeletal: Negative for arthralgias and myalgias.   Skin: Negative for rash.   Allergic/Immunologic: Positive for environmental allergies.   Neurological: Negative for headaches.   Hematological: Negative for adenopathy.   Psychiatric/Behavioral: Negative for self-injury.           Current Outpatient Medications:      azelastine (ASTELIN) 0.1 % nasal spray, Spray 2 sprays into both nostrils 2 times daily as needed for rhinitis or allergies, Disp: 90 mL, Rfl: 3     azelastine (OPTIVAR) 0.05 % ophthalmic solution, Apply 1 drop to eye 2 times daily as needed (itchy/watery eyes), Disp: 6 mL, Rfl: 1     cetirizine (ZYRTEC) 10 MG tablet, Take 1 tablet (10 mg) by mouth daily as needed for allergies, Disp: 90 tablet, Rfl: 3     Emollient (CERAVE) CREA, Externally apply 1 dose. topically 2 times daily, Disp: 2 Bottle, Rfl: 11     fluticasone (FLONASE) 50 MCG/ACT nasal spray, Spray 2 sprays into both nostrils daily, Disp: 48 g, Rfl: 1     fluticasone-salmeterol (ADVAIR) 250-50 MCG/ACT inhaler, Inhale 1 puff into the lungs every 12 hours, Disp: 60 each, Rfl: 3     fluticasone-salmeterol (ADVAIR) 250-50 MCG/DOSE inhaler, Inhale 1 puff into the lungs 2 times daily, Disp: 180 each, Rfl: 3     montelukast (SINGULAIR) 10 MG tablet, Take 1 tablet (10 mg) by mouth At Bedtime, Disp: 90 tablet, Rfl: 1     MULTIVITAMIN OR, 1 tab daily, Disp: , Rfl:      ORDER FOR ALLERGEN IMMUNOTHERAPY, Name of Mix: Mix #1  Mold Alternaria Tenuis 1:10 w/v, HS  0.5 ml Aspergillus Fumigatus  1:10 w/v, HS  0.5 ml Epicoccum Nigrum 1:10 w/v, HS 0.5 ml Hormodendrum Cladosporioides 1:10 w/v, HS 0.5 ml Penicillium Mix 1:10 w/v, HS  0.5 ml Diluent: HSA 2.5mL to 5ml, Disp: 5 mL, Rfl: PRN     ORDER FOR ALLERGEN IMMUNOTHERAPY, Name of Mix: Mix #2  Dust Mite, Cat, Dog Cat Hair, Standardized A.P. 10,000 BAU/mL, HS  2.0 ml  Dog Hair Dander, A. P.  1:100 w/v, HS  1.0 ml Dust Mites F 30,000AU/mL, HS  0.3 ml Dust Mites P. 30,000 AU/mL, HS  0.3 ml  Diluent: HSA 1.4mL to 5ml, Disp: 5 mL, Rfl: PRN     ORDER FOR ALLERGEN IMMUNOTHERAPY, Name of Mix: Mix #3 Grass,Tree  Dmitry,White 1:20w/v, HS 0.5ml Birch Mix 1:20w/v, ALK 0.5ml Boxelder-Maple Mix BHR (Boxelder Hard Red) 1:20w/v, HS 0.5ml Kimble,Common 1:20w/v, HS 0.5ml Elm,American 1:20w/v, HS 0.5ml [place] Mix RW 1:20w/v, HS 0.5ml Oak Mix RVW 1:20w/v, HS 0.5ml Pacific Tree,Black 1:20w/v, HS 0.5ml Suresh Grass (Std) 100,000 BAU/mL, HS 0.4ml Jonathan Grass 1:20w/v, HS 0.5ml Diluent: HSA 0.1mL to 5ml, Disp: 5 mL, Rfl: PRN     ORDER FOR ALLERGEN IMMUNOTHERAPY, Name of Mix: Mix #4  Weeds Kochia 1:20 w/v, HS 0.5 ml Lamb's Quarters 1:20 w/v, HS 0.5 ml Nettle 1:20 w/v, HS 0.5 ml Plantain, English 1:20 w/v, HS 0.5 ml Ragweed Mixed 1:20 w/v ALK  0.5 ml Russian Thistle 1:20 w/v, HS 0.5 ml Sagebrush, Mugwort 1:20 w/v, HS 0.5 ml Sorrel, Sheep 1:20 w/v, HS 0.5 ml Diluent: HSA 1mL to 5ml, Disp: 5 mL, Rfl: PRN     order for DME, Equipment being ordered: Silicone heel cup, Disp: 1 Units, Rfl: 0     order for DME, Equipment being ordered: Silicone heel cup, Disp: 1 Units, Rfl: 0     triamcinolone (KENALOG) 0.1 % external cream, Apply sparingly to affected area two times daily as needed but not more than 14 days in a row. Spare face, armpits, neck, and groin., Disp: 80 g, Rfl: 1     albuterol (2.5 MG/3ML) 0.083% neb solution, Take 1 vial (2.5 mg) by nebulization every 4 hours as needed, Disp: 1 Box, Rfl: 3     albuterol (PROAIR HFA/PROVENTIL HFA/VENTOLIN HFA) 108 (90 Base) MCG/ACT inhaler,  "Inhale 2 puffs into the lungs every 4 hours as needed for shortness of breath / dyspnea or wheezing, Disp: 18 g, Rfl: 3     EPINEPHrine (ANY BX GENERIC EQUIV) 0.3 MG/0.3ML injection 2-pack, Inject 0.3 mLs (0.3 mg) into the muscle once as needed for anaphylaxis, Disp: 0.6 mL, Rfl: 3     loratadine (CLARITIN) 10 MG tablet, Take 1 tablet (10 mg) by mouth daily as needed for allergies, Disp: 90 tablet, Rfl: 3  Immunization History   Administered Date(s) Administered     COVID-19 Vaccine 18+ (Moderna) 03/19/2021, 04/16/2021, 11/02/2021     Influenza (IIV3) PF 12/12/2002, 11/28/2003, 10/24/2006, 02/11/2009, 10/05/2011, 11/15/2012, 10/07/2014     Influenza Vaccine 50-64 or 18-64 w/egg allergy (Flublok) 10/02/2018, 10/30/2019, 10/28/2020, 10/15/2021, 03/09/2023     Influenza Vaccine >6 months (Alfuria,Fluzone) 10/16/2013, 10/20/2015, 10/26/2016, 11/21/2017     Mantoux Tuberculin Skin Test 02/11/2009     Measles 10/22/1979     Pneumococcal 23 valent 10/05/2011     TDAP Vaccine (Adacel) 02/11/2009, 07/06/2018     Zoster vaccine recombinant adjuvanted (SHINGRIX) 12/02/2021, 02/02/2022     Allergies   Allergen Reactions     Clinoril [Sulindac]      Hives     Niacin Hives     Nsaids Hives     Tolerates ibuprofen and aspirin without hives       Pineapple Hives     OBJECTIVE:                                                                 /68   Pulse 82   Ht 1.6 m (5' 3\")   Wt 88.5 kg (195 lb)   LMP 07/18/2015 (Approximate)   SpO2 98%   BMI 34.54 kg/m          Physical Exam  Vitals and nursing note reviewed.   Constitutional:       General: She is not in acute distress.     Appearance: She is not diaphoretic.   HENT:      Head: Normocephalic and atraumatic.      Right Ear: Tympanic membrane, ear canal and external ear normal.      Left Ear: Tympanic membrane, ear canal and external ear normal.      Nose: No mucosal edema or rhinorrhea.      Right Turbinates: Not enlarged or swollen.      Left Turbinates: Not enlarged " or swollen.      Mouth/Throat:      Lips: Pink.      Mouth: Mucous membranes are moist.      Pharynx: Oropharynx is clear. No pharyngeal swelling, oropharyngeal exudate or posterior oropharyngeal erythema.   Eyes:      General:         Right eye: No discharge.         Left eye: No discharge.      Conjunctiva/sclera: Conjunctivae normal.   Cardiovascular:      Rate and Rhythm: Normal rate and regular rhythm.      Heart sounds: Normal heart sounds. No murmur heard.  Pulmonary:      Effort: Pulmonary effort is normal. No respiratory distress.      Breath sounds: Normal breath sounds and air entry. No stridor, decreased air movement or transmitted upper airway sounds. No decreased breath sounds, wheezing, rhonchi or rales.   Musculoskeletal:         General: Normal range of motion.      Cervical back: Normal range of motion.   Skin:     General: Skin is warm.   Neurological:      Mental Status: She is alert and oriented to person, place, and time.   Psychiatric:         Mood and Affect: Mood normal.         Behavior: Behavior normal.           WORKUP: ACT 21      ASSESSMENT/PLAN:    Moderate persistent asthma without complication    Well-controlled with Advair 250/50 micrograms 1 puff twice daily, montelukast 10 mg by mouth once daily, and albuterol as needed.  - Continue as is.  - Ordered vitamin D level.    - fluticasone-salmeterol (ADVAIR) 250-50 MCG/ACT inhaler  Dispense: 60 each; Refill: 3  - Vitamin D Deficiency      Chronic allergic rhinitis due to animal hair and dander  Allergic rhinitis due to dust mite  Allergic rhinitis due to mold  Chronic seasonal allergic rhinitis due to pollen  Allergic conjunctivitis of both eyes  Recurrent sinus infections    The allergic component seems to be well controlled with allergen immunotherapy, intranasal fluticasone 2 sprays in each nostril once daily, azelastine 2 sprays in each nostril twice daily, montelukast 10 mg by mouth once daily, and oral antihistamines as  needed.  - Continue as is.  - Considering recurrent sinus infections and history of sinus surgery, ordered primary immune deficiency screening.    - azelastine (ASTELIN) 0.1 % nasal spray  Dispense: 90 mL; Refill: 3  - cetirizine (ZYRTEC) 10 MG tablet  Dispense: 90 tablet; Refill: 3  - fluticasone (FLONASE) 50 MCG/ACT nasal spray  Dispense: 48 g; Refill: 1  - azelastine (OPTIVAR) 0.05 % ophthalmic solution  Dispense: 6 mL; Refill: 1  - Complement Activity Total (CH50)  - IgA  - IgE  - IgG  - IgM  - Strep pneumo IgG Abys 23 Serotypes  - T cell subset extended profile  - CBC with Platelets & Differential  - Alternate Complement Pathway  - Tetanus antibody  - Complement Activity Total (CH50)      Dysphonia    She will continue working with speech therapy in an [place] location.  Depending on symptom control, may consider referral to Voice Clinic.    Need for prophylactic vaccination and inoculation against influenza    - INFLUENZA QUAD, RECOMBINANT, P-FREE (RIV4) (FLUBLOK)         Continue allergen immunotherapy.  Continue current medication therapy. Notify of a systemic reaction.    Return in about 6 months (around 9/9/2023), or if symptoms worsen or fail to improve.    Thank you for allowing us to participate in the care of this patient. Please feel free to contact us if there are any questions or concerns about the patient.    Disclaimer: This note consists of symbols derived from keyboarding, dictation and/or voice recognition software. As a result, there may be errors in the script that have gone undetected. Please consider this when interpreting information found in this chart.    Michael Lujan MD, FAAAAI, FACAAI  Allergy, Asthma and Immunology     [facility]

## 2023-03-09 NOTE — LETTER
3/9/2023         RE: Dilia Solomon  [address] [facility] [address]        Dear Colleague,    Thank you for referring your patient, Dilia Solomon, to the [facility]. Please see a copy of my visit note below.    SUBJECTIVE:                                                                   Dilia Solomon presents today to our Allergy Clinic at [facility] for a follow up visit. She is a 57 year old female with moderate persistent asthma and allergic rhinitis.    She was diagnosed with asthma approximately in 5605-8550. Triggers are environmental allergies and viral respiratory infections.   Serum IgE for regional aeroallergen panel performed in June 2017 with multiple levels of various sensitivities to molds, cat, dog, dust mite, pollen of trees, grasses, and weeds.  She had 2 sinus surgeries in the past. The last one was in 2018.   She started allergen immunotherapy in July 2017. She was on allergen immunotherapy before that with Dr. Haines, and it was helpful at that time; however, symptoms got worse soon after stopping it.  PFT within normal limits  in December 2020 and July 2022.    Allergy Immunotherapy (started on 7/2017)  Date/time of injection(s): 3/9/23     Vial Color Content  Dose   Red 1:1 Weeds  0.5 mL   Red 1:1 Grass, Trees  0.5 mL    Red 1:1 Molds  0.5 mL   Red 1:1 Cat, Dog, Dust Mite  0.5 mL      She tolerates injections well without persistent large local reactions or any systemic reactions.     The patient finds allergen immunotherapy helpful. It improved her symptoms by 80%.     She has been using sinus saline rinses once-twice daily, intranasal fluticasone 2 sprays in each nostril once daily, azelastine 2 sprays in each nostril once-twice daily, montelukast 10 mg by mouth once daily, and oral antihistamines as needed. She uses Optivar occasionally.   Overall, she feels that she is doing well on this regimen.     She does have a history of  sinus surgery in the past. The last sinus CT was done in October 2019, which showed mild mucosal thickening in maxillary sinuses.     But she had 2 sinus infections since Summer 2022, which improved with doxy.     Asthma-mcnulty, she has been using Advair 250/50 micrograms 1 puff twice daily, taking montelukast 10 mg by mouth once daily at bedtime, and using albuterol as needed.  Dilia uses albuterol HFA  less than twice weekly for rescue from chest symptoms. She wakes up less than twice per month due to chest symptoms. There has been no use of oral steroids since the last visit. No ED/PCP/urgent care/other specialist visits for asthma flare since the previous visit. She had a sinus infection last month. She feels it made her asthma worse too. Had more cough and shortness of breath. Doxy helped with both sinus nd chest symptoms. The patient denies current chest tightness, cough, wheezing, or shortness of breath.       Since the last time I saw her, she developed a hoarse voice and throat tightness after being exposed to bleach.  Saw Dr. Abad. 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  She was seen by Speech therapy twice for that. She still has episodes when she loses her voice, and her neck is tight.     Patient Active Problem List   Diagnosis     Need for prophylactic vaccination and inoculation against influenza     Sprain of interphalangeal (joint) of hand     Radial styloid tenosynovitis     Carpal tunnel syndrome     Synovial cyst of popliteal space     Pain in joint, lower leg     Hyperlipidemia LDL goal <130     Advanced directives, counseling/discussion     Moderate persistent asthma     Allergic rhinitis due to dust mite     Food allergy     FH: diabetes mellitus     Chronic allergic rhinitis due to animal hair and dander     Allergic rhinitis due to mold     Chronic seasonal allergic rhinitis due to pollen     Obesity (BMI 35.0-39.9) with comorbidity (H)     Allergic  conjunctivitis, bilateral     History of endoscopic sinus surgery     Chronic pansinusitis       Past Medical History:   Diagnosis Date     Injury, other and unspecified, shoulder and upper arm 9/03      Problem (# of Occurrences) Relation (Name,Age of Onset)    Cancer (1) Father: brain    Diabetes (1) Mother    Breast Cancer (1) Maternal Aunt    C.A.D. (1) Mother       Negative family history of: Cancer - colorectal        Past Surgical History:   Procedure Laterality Date     COLONOSCOPY N/A 10/6/2016    Procedure: COLONOSCOPY;  Surgeon: Shiva Johns MD;  Location: [facility]     ETHMOIDECTOMY  12/30/2013    Procedure: ETHMOIDECTOMY;  Bilateral Submucousal Reduction of InferiorTurbinates and Total Ethmoidectomy with Multiple Sinusotomies;  Surgeon: Lio More MD;  Location: [facility]     ETHMOIDECTOMY Bilateral 2/14/2018    Procedure: ETHMOIDECTOMY;  Bilateral anterior ethmoidectomy, bilateral maxillary antrostomy;  Surgeon: Santhosh Tierney MD;  Location: [facility]     SURGICAL HISTORY OF -   5/04    carpal tunnel release, bilateral     Social History     Socioeconomic History     Marital status: Single     Spouse name: None     Number of children: None     Years of education: None     Highest education level: None   Occupational History     Employer: [employer]    Tobacco Use     Smoking status: Never     Smokeless tobacco: Never   Substance and Sexual Activity     Alcohol use: No     Drug use: No     Sexual activity: Never   Other Topics Concern     Parent/sibling w/ CABG, MI or angioplasty before 65F 55M? Yes     Comment: mother   Social History Narrative    March 11, 2022    ENVIRONMENTAL HISTORY: [place]. [place] is heated with a forced air. They do not have central air conditioning. The patient's bedroom is furnished with hard pawel in bedroom, allergen mattress cover, allergen pillowcase cover and fabric window coverings.  Pets inside the house  include 1 dog. There is not history of cockroach or mice infestation. There are no smokers in the house.  The house does have a damp basement.     Patient is currently living and taking care of a friend in the friends home.           Review of Systems   Constitutional: Negative for activity change, chills and fever.   HENT: Negative for congestion, ear discharge, facial swelling, nosebleeds, postnasal drip, rhinorrhea, sinus pressure and sneezing.    Eyes: Negative for discharge, redness and itching.   Respiratory: Negative for cough, chest tightness, shortness of breath and wheezing.    Cardiovascular: Negative for chest pain.   Gastrointestinal: Negative for abdominal pain, diarrhea, nausea and vomiting.   Musculoskeletal: Negative for arthralgias and myalgias.   Skin: Negative for rash.   Allergic/Immunologic: Positive for environmental allergies.   Neurological: Negative for headaches.   Hematological: Negative for adenopathy.   Psychiatric/Behavioral: Negative for self-injury.           Current Outpatient Medications:      azelastine (ASTELIN) 0.1 % nasal spray, Spray 2 sprays into both nostrils 2 times daily as needed for rhinitis or allergies, Disp: 90 mL, Rfl: 3     azelastine (OPTIVAR) 0.05 % ophthalmic solution, Apply 1 drop to eye 2 times daily as needed (itchy/watery eyes), Disp: 6 mL, Rfl: 1     cetirizine (ZYRTEC) 10 MG tablet, Take 1 tablet (10 mg) by mouth daily as needed for allergies, Disp: 90 tablet, Rfl: 3     Emollient (CERAVE) CREA, Externally apply 1 dose. topically 2 times daily, Disp: 2 Bottle, Rfl: 11     fluticasone (FLONASE) 50 MCG/ACT nasal spray, Spray 2 sprays into both nostrils daily, Disp: 48 g, Rfl: 1     fluticasone-salmeterol (ADVAIR) 250-50 MCG/ACT inhaler, Inhale 1 puff into the lungs every 12 hours, Disp: 60 each, Rfl: 3     fluticasone-salmeterol (ADVAIR) 250-50 MCG/DOSE inhaler, Inhale 1 puff into the lungs 2 times daily, Disp: 180 each, Rfl: 3     montelukast (SINGULAIR) 10  MG tablet, Take 1 tablet (10 mg) by mouth At Bedtime, Disp: 90 tablet, Rfl: 1     MULTIVITAMIN OR, 1 tab daily, Disp: , Rfl:      ORDER FOR ALLERGEN IMMUNOTHERAPY, Name of Mix: Mix #1  Mold Alternaria Tenuis 1:10 w/v, HS  0.5 ml Aspergillus Fumigatus 1:10 w/v, HS  0.5 ml Epicoccum Nigrum 1:10 w/v, HS 0.5 ml Hormodendrum Cladosporioides 1:10 w/v, HS 0.5 ml Penicillium Mix 1:10 w/v, HS  0.5 ml Diluent: HSA 2.5mL to 5ml, Disp: 5 mL, Rfl: PRN     ORDER FOR ALLERGEN IMMUNOTHERAPY, Name of Mix: Mix #2  Dust Mite, Cat, Dog Cat Hair, Standardized A.P. 10,000 BAU/mL, HS  2.0 ml  Dog Hair Dander, A. P.  1:100 w/v, HS  1.0 ml Dust Mites F 30,000AU/mL, HS  0.3 ml Dust Mites P. 30,000 AU/mL, HS  0.3 ml  Diluent: HSA 1.4mL to 5ml, Disp: 5 mL, Rfl: PRN     ORDER FOR ALLERGEN IMMUNOTHERAPY, Name of Mix: Mix #3 Grass,Tree  Dmitry,White 1:20w/v, HS 0.5ml Birch Mix 1:20w/v, ALK 0.5ml Boxelder-Maple Mix BHR (Boxelder Hard Red) 1:20w/v, HS 0.5ml [place],Common 1:20w/v, HS 0.5ml Elm,American 1:20w/v, HS 0.5ml [place] Mix RW 1:20w/v, HS 0.5ml Oak Mix RVW 1:20w/v, HS 0.5ml [place] Tree,Black 1:20w/v, HS 0.5ml Suresh Grass (Std) 100,000 BAU/mL, HS 0.4ml Jonathan Grass 1:20w/v, HS 0.5ml Diluent: HSA 0.1mL to 5ml, Disp: 5 mL, Rfl: PRN     ORDER FOR ALLERGEN IMMUNOTHERAPY, Name of Mix: Mix #4  Weeds Kochia 1:20 w/v, HS 0.5 ml Lamb's Quarters 1:20 w/v, HS 0.5 ml Nettle 1:20 w/v, HS 0.5 ml Plantain, English 1:20 w/v, HS 0.5 ml Ragweed Mixed 1:20 w/v ALK  0.5 ml Russian Thistle 1:20 w/v, HS 0.5 ml Sagebrush, Mugwort 1:20 w/v, HS 0.5 ml Sorrel, Sheep 1:20 w/v, HS 0.5 ml Diluent: HSA 1mL to 5ml, Disp: 5 mL, Rfl: PRN     order for DME, Equipment being ordered: Silicone heel cup, Disp: 1 Units, Rfl: 0     order for DME, Equipment being ordered: Silicone heel cup, Disp: 1 Units, Rfl: 0     triamcinolone (KENALOG) 0.1 % external cream, Apply sparingly to affected area two times daily as needed but not more than 14 days in a row. Spare face, armpits,  "neck, and groin., Disp: 80 g, Rfl: 1     albuterol (2.5 MG/3ML) 0.083% neb solution, Take 1 vial (2.5 mg) by nebulization every 4 hours as needed, Disp: 1 Box, Rfl: 3     albuterol (PROAIR HFA/PROVENTIL HFA/VENTOLIN HFA) 108 (90 Base) MCG/ACT inhaler, Inhale 2 puffs into the lungs every 4 hours as needed for shortness of breath / dyspnea or wheezing, Disp: 18 g, Rfl: 3     EPINEPHrine (ANY BX GENERIC EQUIV) 0.3 MG/0.3ML injection 2-pack, Inject 0.3 mLs (0.3 mg) into the muscle once as needed for anaphylaxis, Disp: 0.6 mL, Rfl: 3     loratadine (CLARITIN) 10 MG tablet, Take 1 tablet (10 mg) by mouth daily as needed for allergies, Disp: 90 tablet, Rfl: 3  Immunization History   Administered Date(s) Administered     COVID-19 Vaccine 18+ (Moderna) 03/19/2021, 04/16/2021, 11/02/2021     Influenza (IIV3) PF 12/12/2002, 11/28/2003, 10/24/2006, 02/11/2009, 10/05/2011, 11/15/2012, 10/07/2014     Influenza Vaccine 50-64 or 18-64 w/egg allergy (Flublok) 10/02/2018, 10/30/2019, 10/28/2020, 10/15/2021, 03/09/2023     Influenza Vaccine >6 months (Alfuria,Fluzone) 10/16/2013, 10/20/2015, 10/26/2016, 11/21/2017     Mantoux Tuberculin Skin Test 02/11/2009     Measles 10/22/1979     Pneumococcal 23 valent 10/05/2011     TDAP Vaccine (Adacel) 02/11/2009, 07/06/2018     Zoster vaccine recombinant adjuvanted (SHINGRIX) 12/02/2021, 02/02/2022     Allergies   Allergen Reactions     Clinoril [Sulindac]      Hives     Niacin Hives     Nsaids Hives     Tolerates ibuprofen and aspirin without hives       Pineapple Hives     OBJECTIVE:                                                                 /68   Pulse 82   Ht 1.6 m (5' 3\")   Wt 88.5 kg (195 lb)   LMP 07/18/2015 (Approximate)   SpO2 98%   BMI 34.54 kg/m          Physical Exam  Vitals and nursing note reviewed.   Constitutional:       General: She is not in acute distress.     Appearance: She is not diaphoretic.   HENT:      Head: Normocephalic and atraumatic.      Right " Ear: Tympanic membrane, ear canal and external ear normal.      Left Ear: Tympanic membrane, ear canal and external ear normal.      Nose: No mucosal edema or rhinorrhea.      Right Turbinates: Not enlarged or swollen.      Left Turbinates: Not enlarged or swollen.      Mouth/Throat:      Lips: Pink.      Mouth: Mucous membranes are moist.      Pharynx: Oropharynx is clear. No pharyngeal swelling, oropharyngeal exudate or posterior oropharyngeal erythema.   Eyes:      General:         Right eye: No discharge.         Left eye: No discharge.      Conjunctiva/sclera: Conjunctivae normal.   Cardiovascular:      Rate and Rhythm: Normal rate and regular rhythm.      Heart sounds: Normal heart sounds. No murmur heard.  Pulmonary:      Effort: Pulmonary effort is normal. No respiratory distress.      Breath sounds: Normal breath sounds and air entry. No stridor, decreased air movement or transmitted upper airway sounds. No decreased breath sounds, wheezing, rhonchi or rales.   Musculoskeletal:         General: Normal range of motion.      Cervical back: Normal range of motion.   Skin:     General: Skin is warm.   Neurological:      Mental Status: She is alert and oriented to person, place, and time.   Psychiatric:         Mood and Affect: Mood normal.         Behavior: Behavior normal.           WORKUP: ACT 21      ASSESSMENT/PLAN:    Moderate persistent asthma without complication    Well-controlled with Advair 250/50 micrograms 1 puff twice daily, montelukast 10 mg by mouth once daily, and albuterol as needed.  - Continue as is.  - Ordered vitamin D level.    - fluticasone-salmeterol (ADVAIR) 250-50 MCG/ACT inhaler  Dispense: 60 each; Refill: 3  - Vitamin D Deficiency      Chronic allergic rhinitis due to animal hair and dander  Allergic rhinitis due to dust mite  Allergic rhinitis due to mold  Chronic seasonal allergic rhinitis due to pollen  Allergic conjunctivitis of both eyes  Recurrent sinus infections    The  allergic component seems to be well controlled with allergen immunotherapy, intranasal fluticasone 2 sprays in each nostril once daily, azelastine 2 sprays in each nostril twice daily, montelukast 10 mg by mouth once daily, and oral antihistamines as needed.  - Continue as is.  - Considering recurrent sinus infections and history of sinus surgery, ordered primary immune deficiency screening.    - azelastine (ASTELIN) 0.1 % nasal spray  Dispense: 90 mL; Refill: 3  - cetirizine (ZYRTEC) 10 MG tablet  Dispense: 90 tablet; Refill: 3  - fluticasone (FLONASE) 50 MCG/ACT nasal spray  Dispense: 48 g; Refill: 1  - azelastine (OPTIVAR) 0.05 % ophthalmic solution  Dispense: 6 mL; Refill: 1  - Complement Activity Total (CH50)  - IgA  - IgE  - IgG  - IgM  - Strep pneumo IgG Abys 23 Serotypes  - T cell subset extended profile  - CBC with Platelets & Differential  - Alternate Complement Pathway  - Tetanus antibody  - Complement Activity Total (CH50)      Dysphonia    She will continue working with speech therapy in an [place] location.  Depending on symptom control, may consider referral to Voice Clinic.    Need for prophylactic vaccination and inoculation against influenza    - INFLUENZA QUAD, RECOMBINANT, P-FREE (RIV4) (FLUBLOK)         Continue allergen immunotherapy.  Continue current medication therapy. Notify of a systemic reaction.    Return in about 6 months (around 9/9/2023), or if symptoms worsen or fail to improve.    Thank you for allowing us to participate in the care of this patient. Please feel free to contact us if there are any questions or concerns about the patient.    Disclaimer: This note consists of symbols derived from keyboarding, dictation and/or voice recognition software. As a result, there may be errors in the script that have gone undetected. Please consider this when interpreting information found in this chart.    Michael Lujan MD, FAAAAI, FACAAI  Allergy, Asthma and Immunology     [facility]  [facility]       Again, thank you for allowing me to participate in the care of your patient.        Sincerely,        Michael Lujan MD

## 2023-03-09 NOTE — PATIENT INSTRUCTIONS
Continue allergen immunotherapy.  Continue current medication therapy.  Notify of a systemic reaction.    Get the bloodwork done.          Dr Lujan Scheduling:  [facility] (Tues / Wed) appointment line: [phone]  [place] allergy shot room: [phone]  [facility] (Thurs / Fri) appointment line: [phone]    Pulmonary Function Scheduling:  [facility] [phone]  [facility] [phone]  [place] - [phone]     Prescription Assistance  If you need assistance with your prescriptions (cost, coverage, etc) please contact: [employer] [facility]cription Assistance Program [phone]

## 2023-03-09 NOTE — NURSING NOTE
Dilia Solomon presents to clinic today at the request of Michael Lujan MD  (ordering provider) for Allergy Immunotherapy injection(s).       This service provided today was under the care of Michael Lujan MD ; the supervising provider of the day; who was available if needed.        Patient presented after waiting 30 minutes with no reaction to  injections. Discharged from clinic.    Dee Martínez RN

## 2023-03-10 LAB
CH50 SERPL-ACNC: 68.6 U/ML
IGA SERPL-MCNC: 363 MG/DL (ref 84–499)
IGE SERPL-ACNC: 1717 KU/L (ref 0–114)
IGG SERPL-MCNC: 1096 MG/DL (ref 610–1616)
IGM SERPL-MCNC: 156 MG/DL (ref 35–242)

## 2023-03-11 LAB — C TETANI TOXOID IGG SERPL IA-ACNC: 3.6 IU/ML

## 2023-03-12 LAB — AH50 ACT/NOR SER IA: 118 % NORMAL

## 2023-03-14 DIAGNOSIS — J45.40 MODERATE PERSISTENT ASTHMA WITHOUT COMPLICATION: Primary | ICD-10-CM

## 2023-03-14 LAB
S PN DA SERO 19F IGG SER-MCNC: 11.7 MCG/ML
S PNEUM DA 1 IGG SER-MCNC: 14.7 MCG/ML
S PNEUM DA 10A IGG SER-MCNC: 15.4 MCG/ML
S PNEUM DA 11A IGG SER-MCNC: 3 MCG/ML
S PNEUM DA 12F IGG SER-MCNC: 19.2 MCG/ML
S PNEUM DA 14 IGG SER-MCNC: 3.4 MCG/ML
S PNEUM DA 15B IGG SER-MCNC: 9 MCG/ML
S PNEUM DA 17F IGG SER-MCNC: 25.1 MCG/ML
S PNEUM DA 18C IGG SER-MCNC: 3.3 MCG/ML
S PNEUM DA 19A IGG SER-MCNC: 24.2 MCG/ML
S PNEUM DA 2 IGG SER-MCNC: 15.1 MCG/ML
S PNEUM DA 20A IGG SER-MCNC: 11 MCG/ML
S PNEUM DA 22F IGG SER-MCNC: 23.4 MCG/ML
S PNEUM DA 23F IGG SER-MCNC: 22.4 MCG/ML
S PNEUM DA 3 IGG SER-MCNC: 5.7 MCG/ML
S PNEUM DA 33F IGG SER-MCNC: 5.3 MCG/ML
S PNEUM DA 4 IGG SER-MCNC: 8.3 MCG/ML
S PNEUM DA 5 IGG SER-MCNC: 3.8 MCG/ML
S PNEUM DA 6B IGG SER-MCNC: 10.3 MCG/ML
S PNEUM DA 7F IGG SER-MCNC: 15.4 MCG/ML
S PNEUM DA 8 IGG SER-MCNC: 9.5 MCG/ML
S PNEUM DA 9N IGG SER-MCNC: NORMAL MCG/ML
S PNEUM DA 9V IGG SER-MCNC: 23.3 MCG/ML

## 2023-03-14 NOTE — RESULT ENCOUNTER NOTE
[employer] message sent:     CBC with differential with mildly decreased white blood cells.  - Recommend rechecking that in 4 weeks.  I will order the lab.    Vitamin D deficiency noted.  -Start vitamin 50,000 units once a week for 8 weeks.  After that, transition to 800 units daily.     While T and B cell panel showed mildly decreased B cells, immunoglobulin A, G, and M are within normal limits.  Elevated total serum IgE, which is not uncommon for the patients with allergic rhinitis, asthma, food allergies, and/or eczema.  Complement classic and alternative pathways are within normal limits.  Protective tetanus antibody level.  Pneumococcal antibody levels are 22/22 protective.    -No red flags for immunodeficiency.     No

## 2023-03-15 ENCOUNTER — APPOINTMENT (OUTPATIENT)
Dept: GENERAL RADIOLOGY | Facility: CLINIC | Age: 58
End: 2023-03-15
Attending: PHYSICIAN ASSISTANT
Payer: COMMERCIAL

## 2023-03-15 ENCOUNTER — HOSPITAL ENCOUNTER (OUTPATIENT)
Dept: SPEECH THERAPY | Facility: CLINIC | Age: 58
Setting detail: THERAPIES SERIES
Discharge: HOME | End: 2023-03-15
Attending: OTOLARYNGOLOGY
Payer: COMMERCIAL

## 2023-03-15 ENCOUNTER — HOSPITAL ENCOUNTER (EMERGENCY)
Facility: CLINIC | Age: 58
Discharge: HOME | End: 2023-03-15
Attending: PHYSICIAN ASSISTANT | Admitting: PHYSICIAN ASSISTANT
Payer: COMMERCIAL

## 2023-03-15 VITALS
OXYGEN SATURATION: 98 % | HEART RATE: 89 BPM | RESPIRATION RATE: 14 BRPM | SYSTOLIC BLOOD PRESSURE: 134 MMHG | TEMPERATURE: 98 F | DIASTOLIC BLOOD PRESSURE: 89 MMHG

## 2023-03-15 DIAGNOSIS — G89.29 CHRONIC PAIN OF LEFT KNEE: ICD-10-CM

## 2023-03-15 DIAGNOSIS — M25.562 CHRONIC PAIN OF LEFT KNEE: ICD-10-CM

## 2023-03-15 PROCEDURE — 73560 X-RAY EXAM OF KNEE 1 OR 2: CPT | Mod: LT

## 2023-03-15 PROCEDURE — G0463 HOSPITAL OUTPT CLINIC VISIT: HCPCS | Performed by: PHYSICIAN ASSISTANT

## 2023-03-15 PROCEDURE — 99213 OFFICE O/P EST LOW 20 MIN: CPT | Performed by: PHYSICIAN ASSISTANT

## 2023-03-15 PROCEDURE — 92507 TX SP LANG VOICE COMM INDIV: CPT | Mod: GN | Performed by: SPEECH-LANGUAGE PATHOLOGIST

## 2023-03-15 ASSESSMENT — ACTIVITIES OF DAILY LIVING (ADL): ADLS_ACUITY_SCORE: 35

## 2023-03-15 ASSESSMENT — ENCOUNTER SYMPTOMS: CONSTITUTIONAL NEGATIVE: 1

## 2023-03-15 NOTE — ED PROVIDER NOTES
"  History     Chief Complaint   Patient presents with     Knee Pain     HPI  Dilia Solomon is a 58 year old female who presents with complaints of left knee pain that has been ongoing since November of last year.  She states she fell on this knee at that time and was seen at an emergency department and told she had a \"bruised knee.\"  Patient states she was walking a lot in [place] last month and had worsening left lateral knee pain and associated swelling since that time.  Denies fevers or chills.  She has increased knee pain with weightbearing.  Full range of motion of the knee noted.      Allergies:  Allergies   Allergen Reactions     Clinoril [Sulindac]      Hives     Niacin Hives     Nsaids Hives     Tolerates ibuprofen and aspirin without hives       Pineapple Hives       Problem List:    Patient Active Problem List    Diagnosis Date Noted     History of endoscopic sinus surgery 10/25/2019     Priority: Medium     Chronic pansinusitis 10/25/2019     Priority: Medium     Allergic conjunctivitis, bilateral 06/05/2019     Priority: Medium     Obesity (BMI 35.0-39.9) with comorbidity (H) 01/29/2019     Priority: Medium     Allergic rhinitis due to dust mite 07/13/2017     Priority: Medium     IgE testing 6/28/17 positive for cat, dog, dust mite, trees, grass, weeds, and molds       Chronic allergic rhinitis due to animal hair and dander 07/13/2017     Priority: Medium     IgE testing 6/28/17 positive for cat, dog, dust mite, trees, grass, weeds, and molds       Allergic rhinitis due to mold 07/13/2017     Priority: Medium     IgE testing 6/28/17 positive for cat, dog, dust mite, trees, grass, weeds, and molds       Chronic seasonal allergic rhinitis due to pollen 07/13/2017     Priority: Medium     IgE testing 6/28/17 positive for cat, dog, dust mite, trees, grass, weeds, and molds       FH: diabetes mellitus 09/01/2015     Priority: Medium     Food allergy 02/17/2015     Priority: Medium     Moderate persistent " "asthma 06/28/2011     Priority: Medium     Spirometry results not clear.  Some FVC down with URI, improved over time.  Never had decreased fev/fvc ratio.  Consider trial off advair to see how she responds.         Advanced directives, counseling/discussion 05/27/2011     Priority: Medium     Patient does not have an Advance/Health Care Directive (HCD), given \"What is Advance Care Planning?\" flyer.    Ruma Romero  May 27, 2011         Hyperlipidemia LDL goal <130 01/12/2011     Priority: Medium     Synovial cyst of popliteal space 07/28/2007     Priority: Medium     Pain in joint, lower leg 07/28/2007     Priority: Medium     Carpal tunnel syndrome 07/04/2007     Priority: Medium     S/p surgery 04-05/2004 bilat       Radial styloid tenosynovitis 04/24/2007     Priority: Medium     Need for prophylactic vaccination and inoculation against influenza 10/24/2006     Priority: Medium     Sprain of interphalangeal (joint) of hand 10/24/2006     Priority: Medium        Past Medical History:    Past Medical History:   Diagnosis Date     Injury, other and unspecified, shoulder and upper arm 9/03       Past Surgical History:    Past Surgical History:   Procedure Laterality Date     COLONOSCOPY N/A 10/6/2016    Procedure: COLONOSCOPY;  Surgeon: Shiva Johns MD;  Location: [facility]     ETHMOIDECTOMY  12/30/2013    Procedure: ETHMOIDECTOMY;  Bilateral Submucousal Reduction of InferiorTurbinates and Total Ethmoidectomy with Multiple Sinusotomies;  Surgeon: Lio More MD;  Location: [facility]     ETHMOIDECTOMY Bilateral 2/14/2018    Procedure: ETHMOIDECTOMY;  Bilateral anterior ethmoidectomy, bilateral maxillary antrostomy;  Surgeon: Santhosh Tierney MD;  Location: [facility]     SURGICAL HISTORY OF -   5/04    carpal tunnel release, bilateral       Family History:    Family History   Problem Relation Age of Onset     C.A.D. Mother      Diabetes Mother      Cancer Father         brain     Breast Cancer Maternal Aunt      Cancer " - colorectal No family hx of        Social History:  Marital Status:  Single [1]  Social History     Tobacco Use     Smoking status: Never     Smokeless tobacco: Never   Substance Use Topics     Alcohol use: No     Drug use: No        Medications:    albuterol (2.5 MG/3ML) 0.083% neb solution  albuterol (PROAIR HFA/PROVENTIL HFA/VENTOLIN HFA) 108 (90 Base) MCG/ACT inhaler  azelastine (ASTELIN) 0.1 % nasal spray  azelastine (OPTIVAR) 0.05 % ophthalmic solution  cetirizine (ZYRTEC) 10 MG tablet  Emollient (CERAVE) CREA  EPINEPHrine (ANY BX GENERIC EQUIV) 0.3 MG/0.3ML injection 2-pack  fluticasone (FLONASE) 50 MCG/ACT nasal spray  fluticasone-salmeterol (ADVAIR) 250-50 MCG/ACT inhaler  fluticasone-salmeterol (ADVAIR) 250-50 MCG/DOSE inhaler  loratadine (CLARITIN) 10 MG tablet  montelukast (SINGULAIR) 10 MG tablet  MULTIVITAMIN OR  ORDER FOR ALLERGEN IMMUNOTHERAPY  ORDER FOR ALLERGEN IMMUNOTHERAPY  ORDER FOR ALLERGEN IMMUNOTHERAPY  ORDER FOR ALLERGEN IMMUNOTHERAPY  order for DME  order for DME  triamcinolone (KENALOG) 0.1 % external cream  vitamin D3 (CHOLECALCIFEROL) 1.25 MG (69265 UT) capsule          Review of Systems   Constitutional: Negative.    Musculoskeletal:        Left knee pain   Skin: Negative.    All other systems reviewed and are negative.      Physical Exam   BP: 134/89  Pulse: 89  Temp: 98  F (36.7  C)  Resp: 14  SpO2: 98 %      Physical Exam  Constitutional:       General: She is not in acute distress.     Appearance: Normal appearance. She is not ill-appearing, toxic-appearing or diaphoretic.   HENT:      Head: Normocephalic and atraumatic.   Eyes:      Conjunctiva/sclera: Conjunctivae normal.   Cardiovascular:      Pulses: Normal pulses.   Pulmonary:      Effort: Pulmonary effort is normal.   Musculoskeletal:      Left upper leg: Normal.      Left knee: No swelling, deformity, effusion, erythema or ecchymosis. Normal range of motion. Tenderness present over the lateral joint line and LCL.      Left  "lower leg: Normal. No swelling, tenderness or bony tenderness. No edema.      Left ankle: Normal. No swelling. No tenderness. Normal range of motion.   Skin:     General: Skin is warm and dry.      Capillary Refill: Capillary refill takes less than 2 seconds.   Neurological:      General: No focal deficit present.      Mental Status: She is alert.      Sensory: Sensation is intact.      Motor: Motor function is intact.         ED Course                 Procedures      Results for orders placed or performed during the hospital encounter of 03/15/23 (from the past 24 hour(s))   XR Knee Left 1/2 Views    Narrative    XR KNEE LEFT 1/2 VIEWS 3/15/2023 2:58 PM     HISTORY: left lateral knee pain    COMPARISON: None.         Impression    IMPRESSION: Minimal medial compartment narrowing left knee. No  evidence for fracture. No effusion.    DIOGO PRABHAKAR MD         SYSTEM ID:  [id number]     *Note: Due to a large number of results and/or encounters for the requested time period, some results have not been displayed. A complete set of results can be found in Results Review.       Medications - No data to display    Assessments & Plan (with Medical Decision Making)     Pt is a 58 year old female who presents with complaints of left knee pain that has been ongoing since November of last year.  She states she fell on this knee at that time and was seen at an emergency department and told she had a \"bruised knee.\"  Patient states she was walking a lot in [place] last month and had worsening left lateral knee pain and associated swelling since that time.  She has increased knee pain with weightbearing.  Full range of motion of the knee noted.    Pt is afebrile on arrival.  Exam as above.  X-rays of the left knee show minimal medial compartment narrowing.  No evidence of fracture or effusion.  Discussed results with patient.  Encouraged symptomatic treatments at home.  Return precautions were reviewed.  Hand-outs were " provided.    Patient was instructed to follow-up with orthopedics for continued care and management.  She is to return to the ED for persistent and/or worsening symptoms.  Patient expressed understanding of the diagnosis and plan and was discharged home in good condition.    I have reviewed the nursing notes.    I have reviewed the findings, diagnosis, plan and need for follow up with the patient.    Discharge Medication List as of 3/15/2023  3:11 PM          Final diagnoses:   Chronic pain of left knee       3/15/2023   [facility] EMERGENCY DEPT      Disclaimer:  This note consists of symbols derived from keyboarding, dictation and/or voice recognition software.  As a result, there may be errors in the script that have gone undetected.  Please consider this when interpreting information found in this chart.     Maribel Nunes PA-C  03/15/23 2056       Maribel Nunes PA-C  03/15/23 2056

## 2023-03-29 ENCOUNTER — OFFICE VISIT (OUTPATIENT)
Dept: ORTHOPEDICS | Facility: CLINIC | Age: 58
End: 2023-03-29
Payer: COMMERCIAL

## 2023-03-29 ENCOUNTER — ANCILLARY PROCEDURE (OUTPATIENT)
Dept: GENERAL RADIOLOGY | Facility: CLINIC | Age: 58
End: 2023-03-29
Attending: PEDIATRICS
Payer: COMMERCIAL

## 2023-03-29 VITALS
SYSTOLIC BLOOD PRESSURE: 110 MMHG | WEIGHT: 195 LBS | HEART RATE: 80 BPM | BODY MASS INDEX: 34.54 KG/M2 | DIASTOLIC BLOOD PRESSURE: 74 MMHG

## 2023-03-29 DIAGNOSIS — M17.12 ARTHRITIS OF LEFT KNEE: Primary | ICD-10-CM

## 2023-03-29 DIAGNOSIS — M25.562 CHRONIC PAIN OF LEFT KNEE: ICD-10-CM

## 2023-03-29 DIAGNOSIS — G89.29 CHRONIC PAIN OF LEFT KNEE: ICD-10-CM

## 2023-03-29 PROCEDURE — 73562 X-RAY EXAM OF KNEE 3: CPT | Mod: TC | Performed by: RADIOLOGY

## 2023-03-29 PROCEDURE — 99204 OFFICE O/P NEW MOD 45 MIN: CPT | Mod: 25 | Performed by: PEDIATRICS

## 2023-03-29 PROCEDURE — 20610 DRAIN/INJ JOINT/BURSA W/O US: CPT | Mod: LT | Performed by: PEDIATRICS

## 2023-03-29 RX ORDER — TRIAMCINOLONE ACETONIDE 40 MG/ML
40 INJECTION, SUSPENSION INTRA-ARTICULAR; INTRAMUSCULAR
Status: SHIPPED | OUTPATIENT
Start: 2023-03-29

## 2023-03-29 RX ORDER — LIDOCAINE HYDROCHLORIDE 10 MG/ML
2 INJECTION, SOLUTION INFILTRATION; PERINEURAL
Status: SHIPPED | OUTPATIENT
Start: 2023-03-29

## 2023-03-29 RX ADMIN — TRIAMCINOLONE ACETONIDE 40 MG: 40 INJECTION, SUSPENSION INTRA-ARTICULAR; INTRAMUSCULAR at 09:18

## 2023-03-29 RX ADMIN — LIDOCAINE HYDROCHLORIDE 2 ML: 10 INJECTION, SOLUTION INFILTRATION; PERINEURAL at 09:18

## 2023-03-29 NOTE — PATIENT INSTRUCTIONS
Discussed nature of degenerative arthrosis of the knee. Discussed symptom treatment with over-the-counter medications, ice or heat, topical treatments, and rest if needed. Discussed use of sleeve or wrap for comfort. Discussed benefits of exercise and weight loss to reduce pressure at the knee. Discussed injection therapy. Also briefly discussed future consideration of referral to orthopedic surgery for further evaluation and discussion of arthroplasty.    Plan:  - Today's Plan of Care:  Rehab: Physical Therapy: [facility] - [phone]    Steroid injection of the left knee was performed today in clinic  Icing for the next 1-2 days may be helpful for pain. Injection may take 10-14 days to see the full effect.    Home Exercise Program    Discussed activity considerations and other supportive care including Ice/Heat, OTC and other topical medications as needed.    -We also discussed other future treatment options:  MRI if more severe pain, swelling, locking    Follow Up: as needed    If you have any further questions for your physician or physician s care team you can call [phone] and use option 3 to leave a voice message.    After the Injection     After the injection, strenuous and repetitive activity should be minimized for approximately 48 hours.   Ice should be applied to the injected area at least for the next 48 hours.   Apply ice to the injected area at least 3 - 4 times a day for 20 minutes each time for the next 48 hours. This can reduce the painful  flare  reaction that can follow an injection the next day. This reaction can cause the area that was injected to hurt more the next day just from the injection. This will resolve within a day if it does occur.     Use over-the-counter pain medications such as Tylenol to help with the pain if necessary.     After 48 hours, icing the area may be continued if you find it beneficial.     The lidocaine or marcaine (commonly called Novocain) is an  anesthetic agent that is injected with the steroid will typically relieve your pain for a few hours following the injection. If the  Novocain  and steroid are injected near a nerve, you may experience local numbness or weakness from the nerve block until it wears off. After this wears off your pain may return until the steroid takes effect.   The steroid may be effective immediately after the injection. Do not be concerned if the injection is not effective in relieving your symptoms immediately. In some cases, it may take up to two weeks for the steroid to work.   If you are diabetic, the corticosteroid may cause your blood sugar to become elevated for several days following the injection. This response usually lasts about 2-4 days before it returns to your normal level.   You should report any adverse reaction to you doctor. Call if there are any questions.

## 2023-03-29 NOTE — LETTER
3/29/2023         RE: Dilia Solomon  [address] [facility] [address]        Dear Colleague,    Thank you for referring your patient, Dilia Solomon, to the [facility] [place]. Please see a copy of my visit note below.    ASSESSMENT & PLAN    Dilia was seen today for pain.    Diagnoses and all orders for this visit:    Arthritis of left knee  -     Physical Therapy Referral; Future    Chronic pain of left knee  -     Orthopedic  Referral  -     XR Knee Standing AP [place] Bilat Lat Left; Future  -     Physical Therapy Referral; Future    Other orders  -     Large Joint Injection/Arthocentesis: L knee joint      This issue is acute on chronic and Unchanged.    Discussed nature of degenerative arthrosis of the knee. Discussed symptom treatment with over-the-counter medications, ice or heat, topical treatments, and rest if needed. Discussed use of sleeve or wrap for comfort. Discussed benefits of exercise and weight loss to reduce pressure at the knee. Discussed injection therapy. Also briefly discussed future consideration of referral to orthopedic surgery for further evaluation and discussion of arthroplasty.    Plan:  - Today's Plan of Care:  Rehab: Physical Therapy: [facility] - [phone]    Steroid injection of the left knee was performed today in clinic  Icing for the next 1-2 days may be helpful for pain. Injection may take 10-14 days to see the full effect.    Home Exercise Program    Discussed activity considerations and other supportive care including Ice/Heat, OTC and other topical medications as needed.    -We also discussed other future treatment options:  MRI if more severe pain, swelling, locking    Follow Up: as needed    Concerning signs and symptoms were reviewed.  The patient expressed understanding of this management plan and all questions were answered at this time.    Nicol Walker MD [facility]  Sports Medicine Physician  [facility]  Orthopedics      -----  Chief Complaint   Patient presents with     Left Knee - Pain       SUBJECTIVE  Dilia Solomon is a/an 58 year old female who is seen as an ER referral for evaluation of left knee pain.    The patient is seen by themselves.    Onset: 1 month(s) ago. Reports insidious onset without acute precipitating event.  Location of Pain: left knee; lateral knee, posterior knee  Worsened by: walking, prolonged sitting and standing, sit to stand  Better with: nothing   Treatments tried: ice, heat, Tylenol, ibuprofen, previous imaging (xray 3/15/23) and casting/splinting/bracing  Associated symptoms: swelling and pain waxing/waning    Orthopedic/Surgical history: NO  Social History/Occupation: standing, she is a cook    No family history pertinent to patient's problem today.    REVIEW OF SYSTEMS:  Review of Systems  Skin: no bruising, intermittent swelling  Musculoskeletal: as above  Neurologic: no numbness, paresthesias  Remainder of review of systems is negative including constitutional, CV, pulmonary, GI, except as noted in HPI or medical history.    OBJECTIVE:  /74   Pulse 80   Wt 88.5 kg (195 lb)   LMP 07/18/2015 (Approximate)   BMI 34.54 kg/m     General: healthy, alert and in no distress  HEENT: no scleral icterus or conjunctival erythema  Skin: no suspicious lesions or rash. No jaundice.  CV: distal perfusion intact  Resp: normal respiratory effort without conversational dyspnea   Psych: normal mood and affect  Gait: NORMAL  Neuro: Normal light sensory exam of lower extremity    Bilateral Knee exam  Inspection:      mild effusion left    Patella:      Crepitus noted in the patellofemoral joint bilateral  - pain with patellar compression    Tender:      medial joint line left       lateral joint line left    Non Tender:      remainder of knee area bilateral    Knee ROM:      Full active and passive ROM with flexion and extension bilateral    Hip ROM:     Very slightly decreased range of  motion left knee    Strength:      5-/5 with knee extension left    Special Tests:     Some pain with Maykel left       neg (-) anterior drawer left       neg (-) posterior drawer left       neg (-) varus at 0 deg and 30 deg left       neg (-) valgus at 0 deg and 30 deg left    Lower Extremity Alignment:      Normal    Neurovascular:      2+ peripheral pulses bilaterally and brisk capillary refill       sensation grossly intact    RADIOLOGY:  I independently ordered, visualized and reviewed these images with the patient  AP and sunrise bilateral and left lateral XR views of knees reviewed: no acute bony abnormality, moderate tricompartmental degenerative changes, worse in the patellofemoral compartment  - will follow official read        Review of prior external note(s) from - ER note  Review of the result(s) of each unique test - XRs  Ordering of each unique test          Large Joint Injection/Arthocentesis: L knee joint    Date/Time: 3/29/2023 9:18 AM  Performed by: Nicol Walker MD  Authorized by: Nicol Walker MD     Indications:  Pain  Needle Size:  25 G  Guidance: landmark guided    Approach:  Anterolateral  Location:  Knee      Medications:  2 mL lidocaine 1 %; 40 mg triamcinolone 40 MG/ML  Outcome:  Tolerated well, no immediate complications  Procedure discussed: discussed risks, benefits, and alternatives    Consent Given by:  Patient  Timeout: timeout called immediately prior to procedure    Prep: patient was prepped and draped in usual sterile fashion     The risks, benefits and complications of steroid injection were discussed with the patient (including but not limited to: bleeding, infection, pain, scar, damage to adjacent structures, atrophy or necrosis of soft tissue, skin blanching, failure to relieve symptoms, worsening of symptoms, allergic reaction). After this discussion all questions were addressed and answered and the patient elected to proceed. The patient tolerated the procedure well  without complications.  Also discussed that if diabetic, recommend close monitoring of blood sugars over the next week as cortisone injections can temporarily elevate blood sugars.          Again, thank you for allowing me to participate in the care of your patient.        Sincerely,        Nicol Walker MD

## 2023-03-29 NOTE — PROGRESS NOTES
ASSESSMENT & PLAN    Dilia was seen today for pain.    Diagnoses and all orders for this visit:    Arthritis of left knee  -     Physical Therapy Referral; Future    Chronic pain of left knee  -     Orthopedic  Referral  -     XR Knee Standing AP [place] Bilat Lat Left; Future  -     Physical Therapy Referral; Future    Other orders  -     Large Joint Injection/Arthocentesis: L knee joint      This issue is acute on chronic and Unchanged.    Discussed nature of degenerative arthrosis of the knee. Discussed symptom treatment with over-the-counter medications, ice or heat, topical treatments, and rest if needed. Discussed use of sleeve or wrap for comfort. Discussed benefits of exercise and weight loss to reduce pressure at the knee. Discussed injection therapy. Also briefly discussed future consideration of referral to orthopedic surgery for further evaluation and discussion of arthroplasty.    Plan:  - Today's Plan of Care:  Rehab: Physical Therapy: [facility] - [phone]    Steroid injection of the left knee was performed today in clinic  Icing for the next 1-2 days may be helpful for pain. Injection may take 10-14 days to see the full effect.    Home Exercise Program    Discussed activity considerations and other supportive care including Ice/Heat, OTC and other topical medications as needed.    -We also discussed other future treatment options:  MRI if more severe pain, swelling, locking    Follow Up: as needed    Concerning signs and symptoms were reviewed.  The patient expressed understanding of this management plan and all questions were answered at this time.    Nicol Walker MD [facility]  Sports Medicine Physician  [facility]      -----  Chief Complaint   Patient presents with     Left Knee - Pain       SUBJECTIVE  Dilia Solomon is a/an 58 year old female who is seen as an ER referral for evaluation of left knee pain.    The patient is seen by themselves.    Onset: 1  month(s) ago. Reports insidious onset without acute precipitating event.  Location of Pain: left knee; lateral knee, posterior knee  Worsened by: walking, prolonged sitting and standing, sit to stand  Better with: nothing   Treatments tried: ice, heat, Tylenol, ibuprofen, previous imaging (xray 3/15/23) and casting/splinting/bracing  Associated symptoms: swelling and pain waxing/waning    Orthopedic/Surgical history: NO  Social History/Occupation: standing, she is a cook    No family history pertinent to patient's problem today.    REVIEW OF SYSTEMS:  Review of Systems  Skin: no bruising, intermittent swelling  Musculoskeletal: as above  Neurologic: no numbness, paresthesias  Remainder of review of systems is negative including constitutional, CV, pulmonary, GI, except as noted in HPI or medical history.    OBJECTIVE:  /74   Pulse 80   Wt 88.5 kg (195 lb)   LMP 07/18/2015 (Approximate)   BMI 34.54 kg/m     General: healthy, alert and in no distress  HEENT: no scleral icterus or conjunctival erythema  Skin: no suspicious lesions or rash. No jaundice.  CV: distal perfusion intact  Resp: normal respiratory effort without conversational dyspnea   Psych: normal mood and affect  Gait: NORMAL  Neuro: Normal light sensory exam of lower extremity    Bilateral Knee exam  Inspection:      mild effusion left    Patella:      Crepitus noted in the patellofemoral joint bilateral  - pain with patellar compression    Tender:      medial joint line left       lateral joint line left    Non Tender:      remainder of knee area bilateral    Knee ROM:      Full active and passive ROM with flexion and extension bilateral    Hip ROM:     Very slightly decreased range of motion left knee    Strength:      5-/5 with knee extension left    Special Tests:     Some pain with Maykel left       neg (-) anterior drawer left       neg (-) posterior drawer left       neg (-) varus at 0 deg and 30 deg left       neg (-) valgus at 0 deg  and 30 deg left    Lower Extremity Alignment:      Normal    Neurovascular:      2+ peripheral pulses bilaterally and brisk capillary refill       sensation grossly intact    RADIOLOGY:  I independently ordered, visualized and reviewed these images with the patient  AP and sunrise bilateral and left lateral XR views of knees reviewed: no acute bony abnormality, moderate tricompartmental degenerative changes, worse in the patellofemoral compartment  - will follow official read        Review of prior external note(s) from - ER note  Review of the result(s) of each unique test - XRs  Ordering of each unique test          Large Joint Injection/Arthocentesis: L knee joint    Date/Time: 3/29/2023 9:18 AM  Performed by: Nicol Walker MD  Authorized by: Nicol Walker MD     Indications:  Pain  Needle Size:  25 G  Guidance: landmark guided    Approach:  Anterolateral  Location:  Knee      Medications:  2 mL lidocaine 1 %; 40 mg triamcinolone 40 MG/ML  Outcome:  Tolerated well, no immediate complications  Procedure discussed: discussed risks, benefits, and alternatives    Consent Given by:  Patient  Timeout: timeout called immediately prior to procedure    Prep: patient was prepped and draped in usual sterile fashion     The risks, benefits and complications of steroid injection were discussed with the patient (including but not limited to: bleeding, infection, pain, scar, damage to adjacent structures, atrophy or necrosis of soft tissue, skin blanching, failure to relieve symptoms, worsening of symptoms, allergic reaction). After this discussion all questions were addressed and answered and the patient elected to proceed. The patient tolerated the procedure well without complications.  Also discussed that if diabetic, recommend close monitoring of blood sugars over the next week as cortisone injections can temporarily elevate blood sugars.

## 2023-04-05 ENCOUNTER — ALLIED HEALTH/NURSE VISIT (OUTPATIENT)
Dept: ALLERGY | Facility: CLINIC | Age: 58
End: 2023-04-05
Payer: COMMERCIAL

## 2023-04-05 DIAGNOSIS — H10.13 ALLERGIC CONJUNCTIVITIS OF BOTH EYES: ICD-10-CM

## 2023-04-05 DIAGNOSIS — J30.1 CHRONIC SEASONAL ALLERGIC RHINITIS DUE TO POLLEN: ICD-10-CM

## 2023-04-05 DIAGNOSIS — J30.89 ALLERGIC RHINITIS DUE TO MOLD: Primary | ICD-10-CM

## 2023-04-05 DIAGNOSIS — J30.81 CHRONIC ALLERGIC RHINITIS DUE TO ANIMAL HAIR AND DANDER: ICD-10-CM

## 2023-04-05 DIAGNOSIS — J30.89 ALLERGIC RHINITIS DUE TO DUST MITE: ICD-10-CM

## 2023-04-05 PROCEDURE — 95117 IMMUNOTHERAPY INJECTIONS: CPT

## 2023-04-06 ENCOUNTER — LAB (OUTPATIENT)
Dept: LAB | Facility: CLINIC | Age: 58
End: 2023-04-06
Payer: COMMERCIAL

## 2023-04-06 DIAGNOSIS — J45.40 MODERATE PERSISTENT ASTHMA WITHOUT COMPLICATION: ICD-10-CM

## 2023-04-06 LAB
BASOPHILS # BLD AUTO: 0 10E3/UL (ref 0–0.2)
BASOPHILS NFR BLD AUTO: 1 %
EOSINOPHIL # BLD AUTO: 0.2 10E3/UL (ref 0–0.7)
EOSINOPHIL NFR BLD AUTO: 4 %
ERYTHROCYTE [DISTWIDTH] IN BLOOD BY AUTOMATED COUNT: 13.6 % (ref 10–15)
HCT VFR BLD AUTO: 41.5 % (ref 35–47)
HGB BLD-MCNC: 13.4 G/DL (ref 11.7–15.7)
LYMPHOCYTES # BLD AUTO: 1.6 10E3/UL (ref 0.8–5.3)
LYMPHOCYTES NFR BLD AUTO: 29 %
MCH RBC QN AUTO: 29.5 PG (ref 26.5–33)
MCHC RBC AUTO-ENTMCNC: 32.3 G/DL (ref 31.5–36.5)
MCV RBC AUTO: 91 FL (ref 78–100)
MONOCYTES # BLD AUTO: 0.4 10E3/UL (ref 0–1.3)
MONOCYTES NFR BLD AUTO: 8 %
NEUTROPHILS # BLD AUTO: 3.2 10E3/UL (ref 1.6–8.3)
NEUTROPHILS NFR BLD AUTO: 59 %
PLATELET # BLD AUTO: 227 10E3/UL (ref 150–450)
RBC # BLD AUTO: 4.54 10E6/UL (ref 3.8–5.2)
WBC # BLD AUTO: 5.4 10E3/UL (ref 4–11)

## 2023-04-06 PROCEDURE — 36415 COLL VENOUS BLD VENIPUNCTURE: CPT

## 2023-04-06 PROCEDURE — 85025 COMPLETE CBC W/AUTO DIFF WBC: CPT

## 2023-04-12 ENCOUNTER — ALLIED HEALTH/NURSE VISIT (OUTPATIENT)
Dept: ALLERGY | Facility: CLINIC | Age: 58
End: 2023-04-12
Payer: COMMERCIAL

## 2023-04-12 DIAGNOSIS — J30.89 ALLERGIC RHINITIS DUE TO DUST MITE: ICD-10-CM

## 2023-04-12 DIAGNOSIS — J30.81 CHRONIC ALLERGIC RHINITIS DUE TO ANIMAL HAIR AND DANDER: ICD-10-CM

## 2023-04-12 DIAGNOSIS — J30.1 CHRONIC SEASONAL ALLERGIC RHINITIS DUE TO POLLEN: ICD-10-CM

## 2023-04-12 DIAGNOSIS — J30.89 ALLERGIC RHINITIS DUE TO MOLD: Primary | ICD-10-CM

## 2023-04-12 DIAGNOSIS — H10.13 ALLERGIC CONJUNCTIVITIS OF BOTH EYES: ICD-10-CM

## 2023-04-12 PROCEDURE — 95117 IMMUNOTHERAPY INJECTIONS: CPT

## 2023-04-12 NOTE — PROGRESS NOTES
Dilia Solomon presents to clinic today at the request of Michael Lujan MD  (ordering provider) for Allergy Immunotherapy injection(s).       This service provided today was under the care of Michael Lujan MD ; the supervising provider of the day; who was available if needed.      Patient presented after waiting 30 minutes with no reaction to  injections. Discharged from clinic.    Kaylee David RN

## 2023-04-15 ENCOUNTER — HEALTH MAINTENANCE LETTER (OUTPATIENT)
Age: 58
End: 2023-04-15

## 2023-04-17 ENCOUNTER — OFFICE VISIT (OUTPATIENT)
Dept: FAMILY MEDICINE | Facility: CLINIC | Age: 58
End: 2023-04-17
Payer: COMMERCIAL

## 2023-04-17 VITALS
HEIGHT: 63 IN | HEART RATE: 78 BPM | TEMPERATURE: 98 F | OXYGEN SATURATION: 98 % | RESPIRATION RATE: 16 BRPM | BODY MASS INDEX: 37.32 KG/M2 | SYSTOLIC BLOOD PRESSURE: 118 MMHG | WEIGHT: 210.6 LBS | DIASTOLIC BLOOD PRESSURE: 70 MMHG

## 2023-04-17 DIAGNOSIS — Z23 NEED FOR VACCINATION: ICD-10-CM

## 2023-04-17 DIAGNOSIS — G89.29 CHRONIC PAIN OF LEFT KNEE: Primary | ICD-10-CM

## 2023-04-17 DIAGNOSIS — M25.562 CHRONIC PAIN OF LEFT KNEE: Primary | ICD-10-CM

## 2023-04-17 PROCEDURE — 99213 OFFICE O/P EST LOW 20 MIN: CPT | Performed by: STUDENT IN AN ORGANIZED HEALTH CARE EDUCATION/TRAINING PROGRAM

## 2023-04-17 ASSESSMENT — PAIN SCALES - GENERAL: PAINLEVEL: MODERATE PAIN (5)

## 2023-04-17 NOTE — PROGRESS NOTES
1. Chronic pain of left knee, currently controlled   > Patient has 2 x-rays from different dates suggestive of arthritis  - Physical exam findings were not concerning for meniscal tear or cruciate ligament tear  -Informed patient that based on her physical exam findings, history, imaging findings there is a strong suspicion that her pain is secondary to her arthritis  -Patient admits that she tries to avoid NSAIDs when possible because she has a documented allergy of hives to NSAIDs, she did take an ibuprofen yesterday without complications and this appears to have helped her symptoms which further points towards arthritic pain  -Shared decision making was had, patient agreed to hold off on MRI imaging at this time  -Recommended follow-up with her orthopedic surgeon and physical therapy    2. Need for vaccination   -Patient is due for COVID-vaccine  -Patient does have an appointment for allergy shots this upcoming Wednesday, shared decision making was had risk and benefits were discussed patient aware that she can come in and make an appointment with an MA for vaccines 5-7 days after her allergy shots    Pedro Luis Dobbs is a 58 year old, presenting for the following health issues:  Knee Pain        4/17/2023     7:46 AM   Additional Questions   Roomed by Dee GRAJEDA LPN   Accompanied by self         4/17/2023     7:46 AM   Patient Reported Additional Medications   Patient reports taking the following new medications no new meds     Musculoskeletal Problem  This is a recurrent problem. The current episode started more than 1 month ago. The problem occurs constantly. The problem has been unchanged (current pain 5/10 and at it's worst 10/10). Associated symptoms comments: Swelling  . The symptoms are aggravated by twisting and walking. She has tried heat, ice, acetaminophen, position changes, relaxation, NSAIDs and lying down (and injections in the knee) for the symptoms. The treatment provided no relief.   History  "of Present Illness       Reason for visit:  Left knee    She eats 0-1 servings of fruits and vegetables daily.She consumes 0 sweetened beverage(s) daily.She exercises with enough effort to increase her heart rate 9 or less minutes per day.  She exercises with enough effort to increase her heart rate 3 or less days per week.   She is taking medications regularly.    When she was in [place] she fell and hurt her left knee  In Feb 2023 she started experiencing worsening pain in her left knee   Has received 1 steroid injection, which did not help (not even immediately afterwards)   Pain is worse when she kneels on her left knee   The knee gets swollen intermittently   Has full range of motion   States that her left knee is feeling better than it has in the past   Took ibuprofen     Review of Systems   As above       Objective    /70 (BP Location: Right arm, Patient Position: Sitting, Cuff Size: Adult Regular)   Pulse 78   Temp 98  F (36.7  C) (Tympanic)   Resp 16   Ht 1.6 m (5' 3\")   Wt 95.5 kg (210 lb 9.6 oz)   LMP 07/18/2015 (Approximate)   SpO2 98%   BMI 37.31 kg/m    Body mass index is 37.31 kg/m .  Physical Exam  Constitutional:       General: She is not in acute distress.     Appearance: Normal appearance.   HENT:      Head: Normocephalic and atraumatic.      Right Ear: External ear normal.      Left Ear: External ear normal.   Eyes:      General: No scleral icterus.        Right eye: No discharge.         Left eye: No discharge.      Extraocular Movements: Extraocular movements intact.      Conjunctiva/sclera: Conjunctivae normal.   Pulmonary:      Effort: Pulmonary effort is normal. No respiratory distress.   Musculoskeletal:         General: No deformity or signs of injury. Normal range of motion.      Cervical back: Normal range of motion and neck supple.      Right lower leg: No edema.      Left lower leg: No edema.      Comments: Mild antalgic gait  Negative posterior and anterior drawer test " bilaterally  Negative Maryam test bilaterally  Negative Thessaly test bilaterally  Mild tenderness to palpation along the lateral aspect of the left knee   Skin:     General: Skin is warm.      Comments: No rash on exposed skin   Neurological:      General: No focal deficit present.      Mental Status: She is alert and oriented to person, place, and time.   Psychiatric:         Mood and Affect: Mood normal.         Behavior: Behavior normal.

## 2023-04-19 ENCOUNTER — ALLIED HEALTH/NURSE VISIT (OUTPATIENT)
Dept: ALLERGY | Facility: CLINIC | Age: 58
End: 2023-04-19
Payer: COMMERCIAL

## 2023-04-19 DIAGNOSIS — J30.1 CHRONIC SEASONAL ALLERGIC RHINITIS DUE TO POLLEN: ICD-10-CM

## 2023-04-19 DIAGNOSIS — J30.89 ALLERGIC RHINITIS DUE TO DUST MITE: ICD-10-CM

## 2023-04-19 DIAGNOSIS — J30.89 ALLERGIC RHINITIS DUE TO MOLD: Primary | ICD-10-CM

## 2023-04-19 DIAGNOSIS — J30.81 CHRONIC ALLERGIC RHINITIS DUE TO ANIMAL HAIR AND DANDER: ICD-10-CM

## 2023-04-19 PROCEDURE — 95117 IMMUNOTHERAPY INJECTIONS: CPT

## 2023-05-03 DIAGNOSIS — J45.40 MODERATE PERSISTENT ASTHMA WITHOUT COMPLICATION: ICD-10-CM

## 2023-05-03 NOTE — TELEPHONE ENCOUNTER
Routing refill request to provider for review/approval because:  Per last office visit notes result review on 3/9/2023:    Vitamin D deficiency noted.  -Start vitamin 50,000 units once a week for 8 weeks.  After that, transition to 800 units daily.         Kaylee GRANT RN  Specialty/Allergy Clinics

## 2023-05-03 NOTE — TELEPHONE ENCOUNTER
If the patient completed 8 weeks of vitamin D, she does not need a refill.  She should transition to 800 units daily, which should be available over-the-counter.  We can repeat the level at her next follow-up visit, or she can ask the same thing from her PCP when she sees her.     Michael Lujan MD

## 2023-05-11 DIAGNOSIS — H10.13 ALLERGIC CONJUNCTIVITIS OF BOTH EYES: ICD-10-CM

## 2023-05-11 RX ORDER — AZELASTINE HYDROCHLORIDE 0.5 MG/ML
1 SOLUTION/ DROPS OPHTHALMIC 2 TIMES DAILY PRN
Qty: 6 ML | Refills: 1 | Status: SHIPPED | OUTPATIENT
Start: 2023-05-11 | End: 2023-08-10

## 2023-05-11 NOTE — TELEPHONE ENCOUNTER
Prescription approved per [facility] Refill Protocol.    Kaylee GRANT RN  Specialty/Allergy Clinics

## 2023-06-14 ENCOUNTER — ALLIED HEALTH/NURSE VISIT (OUTPATIENT)
Dept: ALLERGY | Facility: CLINIC | Age: 58
End: 2023-06-14
Payer: COMMERCIAL

## 2023-06-14 DIAGNOSIS — J45.40 MODERATE PERSISTENT ASTHMA WITHOUT COMPLICATION: ICD-10-CM

## 2023-06-14 DIAGNOSIS — J30.1 CHRONIC SEASONAL ALLERGIC RHINITIS DUE TO POLLEN: ICD-10-CM

## 2023-06-14 DIAGNOSIS — J30.89 ALLERGIC RHINITIS DUE TO DUST MITE: ICD-10-CM

## 2023-06-14 DIAGNOSIS — J30.81 CHRONIC ALLERGIC RHINITIS DUE TO ANIMAL HAIR AND DANDER: ICD-10-CM

## 2023-06-14 DIAGNOSIS — Z51.6 NEED FOR DESENSITIZATION TO ALLERGENS: ICD-10-CM

## 2023-06-14 DIAGNOSIS — J30.89 ALLERGIC RHINITIS DUE TO MOLD: Primary | ICD-10-CM

## 2023-06-14 DIAGNOSIS — J30.89 ALLERGIC RHINITIS DUE TO MOLD: ICD-10-CM

## 2023-06-14 PROCEDURE — 95117 IMMUNOTHERAPY INJECTIONS: CPT

## 2023-06-14 RX ORDER — EPINEPHRINE 0.3 MG/.3ML
0.3 INJECTION SUBCUTANEOUS
Qty: 0.6 ML | Refills: 3 | Status: SHIPPED | OUTPATIENT
Start: 2023-06-14 | End: 2023-10-17

## 2023-06-14 RX ORDER — FLUTICASONE PROPIONATE AND SALMETEROL 250; 50 UG/1; UG/1
1 POWDER RESPIRATORY (INHALATION) EVERY 12 HOURS
Qty: 60 EACH | Refills: 3 | Status: SHIPPED | OUTPATIENT
Start: 2023-06-14 | End: 2023-09-07

## 2023-06-14 NOTE — TELEPHONE ENCOUNTER
Prescription approved per [facility] Refill Protocol.    Kaylee GRANT RN  Specialty/Allergy Clinics

## 2023-06-20 ENCOUNTER — ALLIED HEALTH/NURSE VISIT (OUTPATIENT)
Dept: ALLERGY | Facility: CLINIC | Age: 58
End: 2023-06-20
Payer: COMMERCIAL

## 2023-06-20 DIAGNOSIS — J30.89 ALLERGIC RHINITIS DUE TO DUST MITE: ICD-10-CM

## 2023-06-20 DIAGNOSIS — J30.81 CHRONIC ALLERGIC RHINITIS DUE TO ANIMAL HAIR AND DANDER: ICD-10-CM

## 2023-06-20 DIAGNOSIS — J30.1 CHRONIC SEASONAL ALLERGIC RHINITIS DUE TO POLLEN: Primary | ICD-10-CM

## 2023-06-20 DIAGNOSIS — J30.89 ALLERGIC RHINITIS DUE TO MOLD: ICD-10-CM

## 2023-06-20 PROCEDURE — 95117 IMMUNOTHERAPY INJECTIONS: CPT

## 2023-07-18 ENCOUNTER — ALLIED HEALTH/NURSE VISIT (OUTPATIENT)
Dept: ALLERGY | Facility: CLINIC | Age: 58
End: 2023-07-18
Payer: COMMERCIAL

## 2023-07-18 ENCOUNTER — TELEPHONE (OUTPATIENT)
Dept: FAMILY MEDICINE | Facility: CLINIC | Age: 58
End: 2023-07-18

## 2023-07-18 DIAGNOSIS — J30.81 CHRONIC ALLERGIC RHINITIS DUE TO ANIMAL HAIR AND DANDER: Primary | ICD-10-CM

## 2023-07-18 DIAGNOSIS — J30.89 ALLERGIC RHINITIS DUE TO DUST MITE: ICD-10-CM

## 2023-07-18 DIAGNOSIS — H10.13 ALLERGIC CONJUNCTIVITIS OF BOTH EYES: ICD-10-CM

## 2023-07-18 DIAGNOSIS — J30.1 CHRONIC SEASONAL ALLERGIC RHINITIS DUE TO POLLEN: ICD-10-CM

## 2023-07-18 DIAGNOSIS — J30.89 ALLERGIC RHINITIS DUE TO MOLD: ICD-10-CM

## 2023-07-18 PROCEDURE — 95117 IMMUNOTHERAPY INJECTIONS: CPT

## 2023-07-18 NOTE — PROGRESS NOTES
Dilia Solomon presents to clinic today at the request of Michael Lujan MD  (ordering provider) for Allergy Immunotherapy injection(s).       This service provided today was under the care of Butch Horowitz MD; the supervising provider of the day; who was available if needed.      Patient presented after waiting 30 minutes with no reaction to  injections. Discharged from clinic.    Kaylee David RN

## 2023-07-18 NOTE — TELEPHONE ENCOUNTER
Patient Quality Outreach    Patient is due for the following:   Breast Cancer Screening - Mammogram  Physical Preventive Adult Physical    Next Steps:   Schedule a Adult Preventative    Type of outreach:    Sent letter.    Next Steps:  Reach out within 90 days via Letter.    Max number of attempts reached: Yes. Will try again in 90 days if patient still on fail list.    Questions for provider review:    None           Dee GRAJEDA LPN

## 2023-07-18 NOTE — LETTER
July 18, 2023      Dilia Solomon  [address] [facility] [address]    Your team at [facility] cares about your health. We have reviewed your chart and based on our findings; we are making the following recommendations to better manage your health.     You are in particular need of attention regarding the following:     Schedule Annual MAMMOGRAPHY. [facility] scheduling number is [phone] or schedule in [facility] (self referral).  1 in 8 women will develop invasive breast cancer during her lifetime and it is the most common non-skin cancer in [place]. EARLY detection, new treatments, and a better understanding of the disease have increased survival rates- the 5 year survival rate in the 1960's was 63% and today it is close to 90%.  If you are under/uninsured, we recommend you contact the [facility]. They offer mammograms at no charge or on a sliding fee charge. You can schedule with them at [phone]. Please have them send us the results.   PREVENTATIVE VISIT: Physical    If you have already completed these items, please contact the clinic via phone or   [employer] so your care team can review and update your records. Thank you for   choosing [facility] for your healthcare needs. For any questions,   concerns, or to schedule an appointment please contact our clinic at [phone].    Healthy Regards,      Your [facility] Care Team

## 2023-08-10 DIAGNOSIS — H10.13 ALLERGIC CONJUNCTIVITIS OF BOTH EYES: ICD-10-CM

## 2023-08-10 RX ORDER — AZELASTINE HYDROCHLORIDE 0.5 MG/ML
1 SOLUTION/ DROPS OPHTHALMIC 2 TIMES DAILY PRN
Qty: 6 ML | Refills: 1 | Status: SHIPPED | OUTPATIENT
Start: 2023-08-10 | End: 2023-09-07

## 2023-08-10 NOTE — TELEPHONE ENCOUNTER
Prescription approved per [facility] Refill Protocol.    Kaylee GRANT RN  Specialty/Allergy Clinics

## 2023-08-21 ENCOUNTER — TELEPHONE (OUTPATIENT)
Dept: FAMILY MEDICINE | Facility: CLINIC | Age: 58
End: 2023-08-21
Payer: COMMERCIAL
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
## 2023-08-21 NOTE — TELEPHONE ENCOUNTER
I don't have anything, can she see another provider? I am overbooked as it is. If somebody cancels, we'll let her know

## 2023-08-22 ENCOUNTER — ALLIED HEALTH/NURSE VISIT (OUTPATIENT)
Dept: ALLERGY | Facility: CLINIC | Age: 58
End: 2023-08-22
Payer: COMMERCIAL

## 2023-08-22 ENCOUNTER — VIRTUAL VISIT (OUTPATIENT)
Dept: FAMILY MEDICINE | Facility: CLINIC | Age: 58
End: 2023-08-22
Payer: COMMERCIAL
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
## 2023-08-22 DIAGNOSIS — R73.09 ELEVATED GLUCOSE LEVEL: ICD-10-CM

## 2023-08-22 DIAGNOSIS — J30.89 ALLERGIC RHINITIS DUE TO DUST MITE: ICD-10-CM

## 2023-08-22 DIAGNOSIS — M54.50 ACUTE LOW BACK PAIN WITHOUT SCIATICA, UNSPECIFIED BACK PAIN LATERALITY: ICD-10-CM

## 2023-08-22 DIAGNOSIS — J30.89 ALLERGIC RHINITIS DUE TO MOLD: ICD-10-CM

## 2023-08-22 DIAGNOSIS — S16.1XXD STRAIN OF NECK MUSCLE, SUBSEQUENT ENCOUNTER: ICD-10-CM

## 2023-08-22 DIAGNOSIS — J30.1 CHRONIC SEASONAL ALLERGIC RHINITIS DUE TO POLLEN: ICD-10-CM

## 2023-08-22 DIAGNOSIS — J30.81 CHRONIC ALLERGIC RHINITIS DUE TO ANIMAL HAIR AND DANDER: Primary | ICD-10-CM

## 2023-08-22 DIAGNOSIS — K76.89 HEPATIC CYST: ICD-10-CM

## 2023-08-22 PROCEDURE — 99214 OFFICE O/P EST MOD 30 MIN: CPT | Mod: VID | Performed by: NURSE PRACTITIONER

## 2023-08-22 PROCEDURE — 95117 IMMUNOTHERAPY INJECTIONS: CPT
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
## 2023-08-29 ENCOUNTER — LAB (OUTPATIENT)
Dept: LAB | Facility: CLINIC | Age: 58
End: 2023-08-29
Attending: NURSE PRACTITIONER
Payer: COMMERCIAL

## 2023-08-29 ENCOUNTER — HOSPITAL ENCOUNTER (OUTPATIENT)
Dept: ULTRASOUND IMAGING | Facility: CLINIC | Age: 58
Discharge: HOME | End: 2023-08-29
Attending: NURSE PRACTITIONER | Admitting: NURSE PRACTITIONER
Payer: COMMERCIAL

## 2023-08-29 DIAGNOSIS — K76.89 HEPATIC CYST: ICD-10-CM

## 2023-08-29 DIAGNOSIS — R73.09 ELEVATED GLUCOSE LEVEL: ICD-10-CM

## 2023-08-29 LAB
ALBUMIN SERPL BCG-MCNC: 4.1 G/DL (ref 3.5–5.2)
ALP SERPL-CCNC: 79 U/L (ref 35–104)
ALT SERPL W P-5'-P-CCNC: 20 U/L (ref 0–50)
ANION GAP SERPL CALCULATED.3IONS-SCNC: 9 MMOL/L (ref 7–15)
AST SERPL W P-5'-P-CCNC: 20 U/L (ref 0–45)
BILIRUB SERPL-MCNC: 0.3 MG/DL
BUN SERPL-MCNC: 20.9 MG/DL (ref 6–20)
CALCIUM SERPL-MCNC: 9.5 MG/DL (ref 8.6–10)
CHLORIDE SERPL-SCNC: 104 MMOL/L (ref 98–107)
CREAT SERPL-MCNC: 0.94 MG/DL (ref 0.51–0.95)
DEPRECATED HCO3 PLAS-SCNC: 26 MMOL/L (ref 22–29)
GFR SERPL CREATININE-BSD FRML MDRD: 70 ML/MIN/1.73M2
GLUCOSE SERPL-MCNC: 85 MG/DL (ref 70–99)
POTASSIUM SERPL-SCNC: 4.3 MMOL/L (ref 3.4–5.3)
PROT SERPL-MCNC: 7 G/DL (ref 6.4–8.3)
SODIUM SERPL-SCNC: 139 MMOL/L (ref 136–145)

## 2023-08-29 PROCEDURE — 36415 COLL VENOUS BLD VENIPUNCTURE: CPT

## 2023-08-29 PROCEDURE — 76705 ECHO EXAM OF ABDOMEN: CPT

## 2023-08-29 PROCEDURE — 80053 COMPREHEN METABOLIC PANEL: CPT

## 2023-08-29 NOTE — MR AVS SNAPSHOT
After Visit Summary   10/27/2017    Dilia Solomon    MRN: [id number]           Patient Information     Date Of Birth          1965        Visit Information        Provider Department      10/27/2017 7:15 AM [facility]ERGY [facility]        Today's Diagnoses     Seasonal allergic rhinitis, unspecified chronicity, unspecified trigger    -  1       Follow-ups after your visit        Your next 10 appointments already scheduled     Oct 31, 2017  7:00 AM CDT   Nurse Only with ALLERGY [facility] ([facility])    [address] [address]   [phone]           Every allergy patient MUST wait 30 minutes after their allergy shot. No exceptions.  Xolair shots #1-3 should plan to wait 2 hours in clinic Xolair shots after #4 should plan 30 minute wait in clinic            Nov 03, 2017  7:00 AM CDT   Nurse Only with ALLERGY [facility] ([facility])    [address] [address]   [phone]           Every allergy patient MUST wait 30 minutes after their allergy shot. No exceptions.  Xolair shots #1-3 should plan to wait 2 hours in clinic Xolair shots after #4 should plan 30 minute wait in clinic            Dec 06, 2017  8:20 AM CST   Return Visit with Butch Horowitz MD   [facility]    [address] [address]   [phone]              Who to contact     If you have questions or need follow up information about today's clinic visit or your schedule please contact [facility] directly at [phone].  Normal or non-critical lab and imaging results will be communicated to you by MyChart, letter or phone within 4 business days after the clinic has received the results. If you do not hear from us within 7 days, please contact the clinic through MyChart or phone. If you have a critical or  Assessment/Plan: Open wound of toe. Resolved paronychia. Acquired deformity foot bilateral.  Mycosis of nail, resolving. Pain upon ambulation. Plan. Chart reviewed. Lab work reviewed. Patient advised on condition. At this time patient remain on Lamisil. This has been reordered. All nails debrided without pain or complication. Right hallux bandaged with gentian violet dry sterile dressing. Patient advised on aftercare. Return for follow-up. Diagnoses and all orders for this visit:    Pain in both feet    Acquired deformity of both feet    Onychomycosis  -     terbinafine (LamISIL) 250 mg tablet; 1 tab p.o. every other day. Subjective: Patient is doing well. Took medication as prescribed. No Known Allergies      Current Outpatient Medications:   •  terbinafine (LamISIL) 250 mg tablet, 1 tab p.o. every other day., Disp: 15 tablet, Rfl: 0  •  aspirin 81 MG tablet, Take 81 mg by mouth daily. , Disp: , Rfl:   •  atorvastatin (LIPITOR) 10 mg tablet, Take 10 mg by mouth daily. , Disp: , Rfl:   •  folic acid (FOLVITE) 1 mg tablet, Take 1 mg by mouth daily. , Disp: , Rfl:   •  pantoprazole (PROTONIX) 40 mg tablet, Take 40 mg by mouth daily. , Disp: , Rfl:     There is no problem list on file for this patient. Patient ID: Garrison Monteiro is a 77 y.o. male. HPI    The following portions of the patient's history were reviewed and updated as appropriate:     family history is not on file. reports that he has never smoked. He does not have any smokeless tobacco history on file. He reports that he does not use drugs. No history on file for alcohol use. Vitals:    08/29/23 1116   Resp: 17       Review of Systems      Objective:  Patient's shoes and socks removed.    Foot ExamPhysical Exam      Foot Exam     General  General Appearance: appears stated age and healthy   Orientation: alert and oriented to person, place, and time   Affect: appropriate   Gait: antalgic         Right "abnormal lab result, we will notify you by phone as soon as possible.  Submit refill requests through [employer] or call your pharmacy and they will forward the refill request to us. Please allow 3 business days for your refill to be completed.          Additional Information About Your Visit        [employer] Information     [employer] lets you send messages to your doctor, view your test results, renew your prescriptions, schedule appointments and more. To sign up, go to [email or web address] . Click on \"Log in\" on the left side of the screen, which will take you to the Welcome page. Then click on \"Sign up Now\" on the right side of the page.     You will be asked to enter the access code listed below, as well as some personal information. Please follow the directions to create your username and password.     Your access code is: [email or web address]  Expires: 2017  7:40 AM     Your access code will  in 90 days. If you need help or a new code, please call your [facility] clinic or [phone].        Care EveryWhere ID     This is your Care EveryWhere ID. This could be used by other organizations to access your [facility] medical records  [id number]        Your Vitals Were     Last Period                   2015 (Approximate)            Blood Pressure from Last 3 Encounters:   17 (!) 147/92   17 122/81   17 129/72    Weight from Last 3 Encounters:   17 209 lb (94.8 kg)   17 208 lb 4.8 oz (94.5 kg)   17 210 lb 12.2 oz (95.6 kg)              We Performed the Following     Allergy Shot: Two or more injections        Primary Care Provider Office Phone # Fax #    Cookie Conklin [phone] [address] [place] [address]        Equal Access to Services     [facility]: Marcel Anguiano, waaxda luqadaha, qaybta kaalmada anna, hieu howard. So [facility] [phone].    ATENCIÓN: Si fer hinojosa " Foot/Ankle      Inspection and Palpation  Tenderness: metatarsals   Swelling: dorsum   Arch: pes planus  Skin Exam: dry skin;      Neurovascular  Dorsalis pedis: 1+  Posterior tibial: 3+        Left Foot/Ankle       Inspection and Palpation  Tenderness: metatarsals   Swelling: dorsum   Arch: pes planus  Skin Exam: dry skin;      Neurovascular  Dorsalis pedis: 1+  Posterior tibial: 3+           Physical Exam  Vitals and nursing note reviewed. Constitutional:       Appearance: Normal appearance. Cardiovascular:      Rate and Rhythm: Normal rate and regular rhythm.      Pulses:           Dorsalis pedis pulses are 1+ on the right side and 1+ on the left side.        Posterior tibial pulses are 3+ on the right side and 3+ on the left side. Feet:      Right foot:      Skin integrity: Dry skin present.      Left foot:      Skin integrity: Dry skin present. Skin:     Capillary Refill: Capillary refill takes less than 2 seconds.      Comments: Nails are dystrophic.  Nails demonstrate distal mycosis. Right hallux demonstrates ulcerated fibular nail groove. Negative pus or infection. Negative ingrown toenail    Negative pus   Neurological:      Mental Status: He is alert. Psychiatric:         Mood and Affect: Mood normal.         BehaviorCorbin Arturo         Thought Content:  Thought content normal.         Judgment: Judgment normal.            disposición servicios gratuitos de asistencia lingüística. Lucas kruger [phone].    We comply with applicable federal civil rights laws and [place] laws. We do not discriminate on the basis of race, color, national origin, age, disability, sex, sexual orientation, or gender identity.            Thank you!     Thank you for choosing [facility]  for your care. Our goal is always to provide you with excellent care. Hearing back from our patients is one way we can continue to improve our services. Please take a few minutes to complete the written survey that you may receive in the mail after your visit with us. Thank you!             Your Updated Medication List - Protect others around you: Learn how to safely use, store and throw away your medicines at www.disposemymeds.org.          This list is accurate as of: 10/27/17  7:40 AM.  Always use your most recent med list.                   Brand Name Dispense Instructions for use Diagnosis    * albuterol (2.5 MG/3ML) 0.083% neb solution     1 Box    Take 1 vial (2.5 mg) by nebulization every 4 hours as needed    Moderate persistent asthma, uncomplicated       * albuterol 108 (90 BASE) MCG/ACT Inhaler    PROAIR HFA/PROVENTIL HFA/VENTOLIN HFA    1 Inhaler    Inhale 2 puffs into the lungs every 4 hours as needed    Moderate persistent asthma, uncomplicated       * ALLERGEN IMMUNOTHERAPY PRESCRIPTION     5 mL    Name of Mix: Mix #1  Mold Alternaria Tenuis GLY 1:10 w/v, HS  0.5 ml Aspergillus Fumigatus GLY 1:10 w/v, HS  0.5 ml Epicoccum Nigrum 1:10 w/v, HS 0.5 ml Hormodendrum Cladosporioides 1:10 w/v, HS 0.5 ml Penicillium Mix GLY 1:10 w/v, HS  0.5 ml Diluent: HSA qs to 5ml    Chronic allergic rhinitis due to animal hair and dander, Allergic rhinitis due to dust mite, Allergic rhinitis due to mold, Chronic seasonal allergic rhinitis due to pollen       * ALLERGEN IMMUNOTHERAPY PRESCRIPTION     5 mL    Name of Mix: Mix #2  Dust Mite, Cat, Dog Cat Hair,  Standardized 10,000 BAU/mL, ALK  2.0 ml Dog Hair Dander, A. P.  1:100 w/v, HS  1.0 ml Dust Mites F 30,000AU/mL, HS  0.3 ml Dust Mites P. 30,000 AU/mL, HS  0.3 ml  Diluent: HSA qs to 5ml    Chronic allergic rhinitis due to animal hair and dander, Allergic rhinitis due to dust mite, Allergic rhinitis due to mold, Chronic seasonal allergic rhinitis due to pollen       * ALLERGEN IMMUNOTHERAPY PRESCRIPTION     5 mL    Name of Mix: Mix #3 Grass,Tree  Dmitry,White GLY 1:20w/v, HS 0.5ml Birch Mix GLY 1:20w/v, HS 0.5ml Boxelder-Maple Mix BHR (Boxelder Hard Red) 1:20w/v, HS 0.5ml Carroll,Common GLY 1:20w/v, HS 0.5ml Elm,American GLY 1:20w/v, HS 0.5ml [place] Mix GLY 1:20w/v, HS 0.5ml Oak Mix RVW GLY 1:20w/v, HS 0.5ml [place] Tree,Black GLY 1:20w/v, HS 0.5ml Grass Mix #7 100,000 BAU/mL, HS 0.4ml Jonathan Grass 1:20w/v, HS 0.5ml Diluent: HSA qs to 5ml    Chronic allergic rhinitis due to animal hair and dander, Allergic rhinitis due to dust mite, Allergic rhinitis due to mold, Chronic seasonal allergic rhinitis due to pollen       * ALLERGEN IMMUNOTHERAPY PRESCRIPTION     5 mL    Name of Mix: Mix #4  Weeds Kochia GLY 1:20 w/v, HS 0.5 ml Lamb's Quarters GLY 1:20 w/v, HS 0.5 ml Nettle GLY 1:20 w/v, HS 0.5 ml Plantain, English GLY 1:20 w/v, HS 0.5 ml Ragweed Mixed 1:20 w/v ALK  0.5 ml Russian Thistle GLY 1:20 w/v, HS 0.5 ml Sagebrush, Mugwort GLY 1:20 w/v, HS 0.5 ml Sorrel, Sheep GLY 1:20 w/v, HS 0.5 ml Diluent: HSA qs to 5ml    Chronic allergic rhinitis due to animal hair and dander, Allergic rhinitis due to dust mite, Allergic rhinitis due to mold, Chronic seasonal allergic rhinitis due to pollen       azelastine 0.05 % Soln ophthalmic solution    OPTIVAR    1 Bottle    Apply 1 drop to eye 2 times daily    Conjunctivitis, allergic, unspecified laterality       CERAVE Crea     2 Bottle    Externally apply 1 dose * topically 2 times daily    Eczema, unspecified type       cetirizine 10 MG tablet    zyrTEC    30 tablet    Take 1  tablet (10 mg) by mouth At Bedtime    Rash, Itching       EPINEPHrine 0.3 MG/0.3ML injection 2-pack    EPIPEN/ADRENACLICK/or ANY BX GENERIC EQUIV    0.6 mL    Inject 0.3 mLs (0.3 mg) into the muscle once as needed for anaphylaxis    Food allergy, Need for desensitization to allergens       fluticasone-salmeterol 500-50 MCG/DOSE diskus inhaler    ADVAIR    3 Inhaler    Inhale 1 puff into the lungs 2 times daily    Moderate persistent asthma, uncomplicated       loratadine 10 MG tablet    CLARITIN    30 tablet    Take 1 tablet (10 mg) by mouth daily    Seasonal allergic rhinitis, unspecified allergic rhinitis trigger, Chronic allergic rhinitis, Conjunctivitis, allergic, unspecified laterality       montelukast 10 MG tablet    SINGULAIR    30 tablet    Take 1 tablet (10 mg) by mouth At Bedtime    Moderate persistent asthma, uncomplicated, Seasonal allergic rhinitis, unspecified allergic rhinitis trigger, Chronic allergic rhinitis       MULTIVITAMIN PO      1 tab daily        * triamcinolone 0.1 % cream    KENALOG    80 g    Apply  topically 2 times daily as needed.    Eczema, unspecified type       * triamcinolone 0.1 % cream    KENALOG    80 g    APPLY SPARINGLY TO AFFECTED AREA THREE TIMES A DAY AS NEEDED    Dermatitis       * Notice:  This list has 8 medication(s) that are the same as other medications prescribed for you. Read the directions carefully, and ask your doctor or other care provider to review them with you.

## 2023-09-05 ENCOUNTER — VIRTUAL VISIT (OUTPATIENT)
Dept: FAMILY MEDICINE | Facility: CLINIC | Age: 58
End: 2023-09-05
Payer: COMMERCIAL

## 2023-09-05 DIAGNOSIS — K76.89 HEPATIC CYST: Primary | ICD-10-CM

## 2023-09-05 PROCEDURE — 99213 OFFICE O/P EST LOW 20 MIN: CPT | Mod: VID | Performed by: FAMILY MEDICINE

## 2023-09-05 NOTE — PATIENT INSTRUCTIONS
The liver cyst has been noted by the radiologist to show benign features. These cysts do not need additional follow up or treatment unless you develop any new symptoms.    Your kidney function tests on your recent blood tests were normal.  Drink plenty of water everyday.   Avoid taking Ibuprofen or Naproxen (NSAIDs) as much as possible.
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
## 2023-09-06 DIAGNOSIS — J30.1 CHRONIC SEASONAL ALLERGIC RHINITIS DUE TO POLLEN: ICD-10-CM

## 2023-09-06 DIAGNOSIS — J30.89 ALLERGIC RHINITIS DUE TO MOLD: ICD-10-CM

## 2023-09-06 DIAGNOSIS — J30.89 ALLERGIC RHINITIS DUE TO DUST MITE: ICD-10-CM

## 2023-09-06 DIAGNOSIS — J30.81 CHRONIC ALLERGIC RHINITIS DUE TO ANIMAL HAIR AND DANDER: ICD-10-CM

## 2023-09-06 RX ORDER — FLUTICASONE PROPIONATE 50 MCG
2 SPRAY, SUSPENSION (ML) NASAL DAILY
Qty: 48 G | Refills: 1 | Status: SHIPPED | OUTPATIENT
Start: 2023-09-06 | End: 2023-09-07

## 2023-09-06 NOTE — TELEPHONE ENCOUNTER
Prescription approved per [facility] Refill Protocol.    Kaylee GRANT RN  Specialty/Allergy Clinics

## 2023-09-07 ENCOUNTER — OFFICE VISIT (OUTPATIENT)
Dept: ALLERGY | Facility: CLINIC | Age: 58
End: 2023-09-07
Payer: COMMERCIAL

## 2023-09-07 VITALS
WEIGHT: 204.6 LBS | OXYGEN SATURATION: 99 % | TEMPERATURE: 97 F | SYSTOLIC BLOOD PRESSURE: 121 MMHG | BODY MASS INDEX: 36.24 KG/M2 | DIASTOLIC BLOOD PRESSURE: 73 MMHG | HEART RATE: 74 BPM

## 2023-09-07 DIAGNOSIS — J30.1 CHRONIC SEASONAL ALLERGIC RHINITIS DUE TO POLLEN: ICD-10-CM

## 2023-09-07 DIAGNOSIS — J30.89 ALLERGIC RHINITIS DUE TO MOLD: ICD-10-CM

## 2023-09-07 DIAGNOSIS — J30.81 CHRONIC ALLERGIC RHINITIS DUE TO ANIMAL HAIR AND DANDER: ICD-10-CM

## 2023-09-07 DIAGNOSIS — J45.40 MODERATE PERSISTENT ASTHMA WITHOUT COMPLICATION: Primary | ICD-10-CM

## 2023-09-07 DIAGNOSIS — J30.89 ALLERGIC RHINITIS DUE TO DUST MITE: ICD-10-CM

## 2023-09-07 DIAGNOSIS — H10.13 ALLERGIC CONJUNCTIVITIS OF BOTH EYES: ICD-10-CM

## 2023-09-07 PROCEDURE — 99214 OFFICE O/P EST MOD 30 MIN: CPT | Performed by: ALLERGY & IMMUNOLOGY

## 2023-09-07 PROCEDURE — 36415 COLL VENOUS BLD VENIPUNCTURE: CPT | Performed by: ALLERGY & IMMUNOLOGY

## 2023-09-07 PROCEDURE — 82306 VITAMIN D 25 HYDROXY: CPT | Performed by: ALLERGY & IMMUNOLOGY

## 2023-09-07 RX ORDER — AZELASTINE HYDROCHLORIDE 0.5 MG/ML
1 SOLUTION/ DROPS OPHTHALMIC 2 TIMES DAILY PRN
Qty: 6 ML | Refills: 1 | Status: SHIPPED | OUTPATIENT
Start: 2023-09-07 | End: 2023-11-09

## 2023-09-07 RX ORDER — AZELASTINE 1 MG/ML
2 SPRAY, METERED NASAL 2 TIMES DAILY PRN
Qty: 90 ML | Refills: 3 | Status: SHIPPED | OUTPATIENT
Start: 2023-09-07 | End: 2024-09-12

## 2023-09-07 RX ORDER — MONTELUKAST SODIUM 10 MG/1
10 TABLET ORAL AT BEDTIME
Qty: 90 TABLET | Refills: 3 | Status: SHIPPED | OUTPATIENT
Start: 2023-09-07 | End: 2024-09-12

## 2023-09-07 RX ORDER — FLUTICASONE PROPIONATE 50 MCG
2 SPRAY, SUSPENSION (ML) NASAL DAILY
Qty: 48 G | Refills: 1 | Status: SHIPPED | OUTPATIENT
Start: 2023-09-07 | End: 2024-09-12

## 2023-09-07 RX ORDER — FLUTICASONE PROPIONATE AND SALMETEROL 50; 250 UG/1; UG/1
1 POWDER RESPIRATORY (INHALATION) 2 TIMES DAILY
Qty: 3 EACH | Refills: 3 | Status: SHIPPED | OUTPATIENT
Start: 2023-09-07 | End: 2024-09-12

## 2023-09-07 ASSESSMENT — ENCOUNTER SYMPTOMS
SORE THROAT: 0
HEADACHES: 0
VOMITING: 0
EYE DISCHARGE: 0
NAUSEA: 0
CHEST TIGHTNESS: 0
COUGH: 0
FEVER: 0
FATIGUE: 0
CHILLS: 0
EYE ITCHING: 1
ADENOPATHY: 0
ACTIVITY CHANGE: 0
ABDOMINAL PAIN: 0
SINUS PRESSURE: 0
CONSTIPATION: 0
NERVOUS/ANXIOUS: 0
WHEEZING: 0
EYE REDNESS: 0
DIARRHEA: 0
LIGHT-HEADEDNESS: 0
DIZZINESS: 0
RHINORRHEA: 0
JOINT SWELLING: 0
SHORTNESS OF BREATH: 0

## 2023-09-07 ASSESSMENT — ASTHMA QUESTIONNAIRES
QUESTION_1 LAST FOUR WEEKS HOW MUCH OF THE TIME DID YOUR ASTHMA KEEP YOU FROM GETTING AS MUCH DONE AT WORK, SCHOOL OR AT HOME: NONE OF THE TIME
ACT_TOTALSCORE: 24
QUESTION_2 LAST FOUR WEEKS HOW OFTEN HAVE YOU HAD SHORTNESS OF BREATH: NOT AT ALL
QUESTION_5 LAST FOUR WEEKS HOW WOULD YOU RATE YOUR ASTHMA CONTROL: COMPLETELY CONTROLLED
ACT_TOTALSCORE: 24
QUESTION_4 LAST FOUR WEEKS HOW OFTEN HAVE YOU USED YOUR RESCUE INHALER OR NEBULIZER MEDICATION (SUCH AS ALBUTEROL): NOT AT ALL
QUESTION_3 LAST FOUR WEEKS HOW OFTEN DID YOUR ASTHMA SYMPTOMS (WHEEZING, COUGHING, SHORTNESS OF BREATH, CHEST TIGHTNESS OR PAIN) WAKE YOU UP AT NIGHT OR EARLIER THAN USUAL IN THE MORNING: ONCE OR TWICE

## 2023-09-07 NOTE — PROGRESS NOTES
SUBJECTIVE:                                                                   Dilia Solomon presents today to our Allergy Clinic at [facility] for a follow up visit. She is a 58 year old female with moderate persistent asthma and allergic rhinitis.     She was diagnosed with asthma approximately in 2941-3432. Triggers are environmental allergies and viral respiratory infections.   Serum IgE for regional aeroallergen panel performed in June 2017 with multiple levels of various sensitivities to molds, cat, dog, dust mite, pollen of trees, grasses, and weeds.  She had 2 sinus surgeries in the past. The last one was in 2018.   She started allergen immunotherapy in July 2017. She was on allergen immunotherapy before that with Dr. Haines, and it was helpful at that time; however, symptoms got worse soon after stopping it.  PFT within normal limits  in December 2020 and July 2022.    In March of 2023, no red flags for primary immunodeficiency. Was treated with vit. D for deficiency.     Allergy Immunotherapy (started on 7/19/2017)  Date/time of injection(s): 8/22/2023     Vial Color Content  Dose             Red 1:1 Weeds  0.5   Red 1:1 Grass, Trees  0.5    Red 1:1 Molds  0.5   Red 1:1 Cat, Dog, Dust Mite  0.5        She tolerates injections well without persistent large local reactions or any systemic reactions.   She has been using sinus saline rinses once-twice daily, intranasal fluticasone 2 sprays in each nostril once daily, azelastine 2 sprays in each nostril once-twice daily, montelukast 10 mg by mouth once daily, and oral antihistamines as needed. She uses Optivar occasionally.   Overall, she feels that she is doing well on this regimen. Several days ago developed mild nasal congestion 3/10 and itchy eyes. No interval sinus infection since the last visit.        The patient finds allergen immunotherapy helpful. It improved her symptoms by 80%.   Asthma-wise, she has been using Advair  250/50 micrograms 1 puff twice daily, taking montelukast 10 mg by mouth once daily at bedtime, and using albuterol as needed.  Dilia uses albuterol HFA  less than twice weekly for rescue from chest symptoms. She wakes up less than twice per month due to chest symptoms. There has been no use of oral steroids since the last visit. No ED/PCP/urgent care/other specialist visits for asthma flare since the previous visit.   Takes vitamin Dr daily.       Patient Active Problem List   Diagnosis    Need for prophylactic vaccination and inoculation against influenza    Sprain of interphalangeal (joint) of hand    Radial styloid tenosynovitis    Carpal tunnel syndrome    Synovial cyst of popliteal space    Pain in joint, lower leg    Hyperlipidemia LDL goal <130    Advanced directives, counseling/discussion    Moderate persistent asthma    Allergic rhinitis due to dust mite    Food allergy    FH: diabetes mellitus    Chronic allergic rhinitis due to animal hair and dander    Allergic rhinitis due to mold    Chronic seasonal allergic rhinitis due to pollen    Obesity (BMI 35.0-39.9) with comorbidity (H)    Allergic conjunctivitis, bilateral    History of endoscopic sinus surgery    Chronic pansinusitis       Past Medical History:   Diagnosis Date    Injury, other and unspecified, shoulder and upper arm 9/03      Problem (# of Occurrences) Relation (Name,Age of Onset)    Cancer (1) Father: brain    Diabetes (1) Mother    Breast Cancer (1) Maternal Aunt    C.A.D. (1) Mother           Negative family history of: Cancer - colorectal          Past Surgical History:   Procedure Laterality Date    COLONOSCOPY N/A 10/6/2016    Procedure: COLONOSCOPY;  Surgeon: Shiva Johns MD;  Location: [facility]    ETHMOIDECTOMY  12/30/2013    Procedure: ETHMOIDECTOMY;  Bilateral Submucousal Reduction of InferiorTurbinates and Total Ethmoidectomy with Multiple Sinusotomies;  Surgeon: Lio More MD;  Location: [facility]    ETHMOIDECTOMY Bilateral  2/14/2018    Procedure: ETHMOIDECTOMY;  Bilateral anterior ethmoidectomy, bilateral maxillary antrostomy;  Surgeon: Santhosh Tierney MD;  Location: [facility]    SURGICAL HISTORY OF -   5/04    carpal tunnel release, bilateral     Social History     Socioeconomic History    Marital status: Single     Spouse name: None    Number of children: None    Years of education: None    Highest education level: None   Occupational History     Employer: [employer]    Tobacco Use    Smoking status: Never    Smokeless tobacco: Never   Vaping Use    Vaping Use: Never used   Substance and Sexual Activity    Alcohol use: No    Drug use: No    Sexual activity: Never   Other Topics Concern    Parent/sibling w/ CABG, MI or angioplasty before 65F 55M? Yes     Comment: mother   Social History Narrative    09/07/23        ENVIRONMENTAL HISTORY: [place] is heated with a forced air. They do not have central air conditioning. The patient's bedroom is furnished with hard pawel in bedroom, allergen mattress cover, allergen pillowcase cover and fabric window coverings.  Pets inside the house include 1 dog. There is not history of cockroach or mice infestation. There are no smokers in the house.  The house does have a damp basement.     Patient is currently living and taking care of a friend in the friends home.           Review of Systems   Constitutional:  Negative for activity change, chills, fatigue and fever.   HENT:  Positive for congestion. Negative for nosebleeds, postnasal drip, rhinorrhea, sinus pressure, sneezing and sore throat.    Eyes:  Positive for itching. Negative for discharge and redness.   Respiratory:  Negative for cough, chest tightness, shortness of breath and wheezing.    Cardiovascular:  Negative for chest pain.   Gastrointestinal:  Negative for abdominal pain, constipation, diarrhea, nausea and vomiting.   Musculoskeletal:  Negative for joint swelling.   Skin:   Negative for rash.   Neurological:  Negative for dizziness, light-headedness and headaches.   Hematological:  Negative for adenopathy.   Psychiatric/Behavioral:  Negative for behavioral problems. The patient is not nervous/anxious.            Current Outpatient Medications:     ADVAIR DISKUS 250-50 MCG/ACT inhaler, Inhale 1 puff into the lungs 2 times daily, Disp: 3 each, Rfl: 3    azelastine (ASTELIN) 0.1 % nasal spray, Spray 2 sprays into both nostrils 2 times daily as needed for rhinitis or allergies, Disp: 90 mL, Rfl: 3    azelastine (OPTIVAR) 0.05 % ophthalmic solution, Apply 1 drop to eye 2 times daily as needed (itchy/watery eyes), Disp: 6 mL, Rfl: 1    cetirizine (ZYRTEC) 10 MG tablet, Take 1 tablet (10 mg) by mouth daily as needed for allergies, Disp: 90 tablet, Rfl: 3    Emollient (CERAVE) CREA, Externally apply 1 dose. topically 2 times daily, Disp: 2 Bottle, Rfl: 11    fluticasone (FLONASE) 50 MCG/ACT nasal spray, Spray 2 sprays into both nostrils daily, Disp: 48 g, Rfl: 1    montelukast (SINGULAIR) 10 MG tablet, Take 1 tablet (10 mg) by mouth At Bedtime, Disp: 90 tablet, Rfl: 3    MULTIVITAMIN OR, 1 tab daily, Disp: , Rfl:     ORDER FOR ALLERGEN IMMUNOTHERAPY, Name of Mix: Mix #1  Mold Alternaria Tenuis 1:10 w/v, HS  0.5 ml Aspergillus Fumigatus 1:10 w/v, HS  0.5 ml Epicoccum Nigrum 1:10 w/v, HS 0.5 ml Hormodendrum Cladosporioides 1:10 w/v, HS 0.5 ml Penicillium Mix 1:10 w/v, HS  0.5 ml Diluent: HSA 2.5mL to 5ml, Disp: 5 mL, Rfl: PRN    ORDER FOR ALLERGEN IMMUNOTHERAPY, Name of Mix: Mix #2  Dust Mite, Cat, Dog Cat Hair, Standardized A.P. 10,000 BAU/mL, HS  2.0 ml  Dog Hair Dander, A. P.  1:100 w/v, HS  1.0 ml Dust Mites F 30,000AU/mL, HS  0.3 ml Dust Mites P. 30,000 AU/mL, HS  0.3 ml  Diluent: HSA 1.4mL to 5ml, Disp: 5 mL, Rfl: PRN    ORDER FOR ALLERGEN IMMUNOTHERAPY, Name of Mix: Mix #3 Grass,Tree  Dmitry,White 1:20w/v, HS 0.5ml Birch Mix 1:20w/v, ALK 0.5ml Boxelder-Maple Mix BHR (Boxelder Hard Red)  1:20w/v, HS 0.5ml Orleans,Common 1:20w/v, HS 0.5ml Elm,American 1:20w/v, HS 0.5ml Parshall Mix RW 1:20w/v, HS 0.5ml Oak Mix RVW 1:20w/v, HS 0.5ml [place] Tree,Black 1:20w/v, HS 0.5ml Suresh Grass (Std) 100,000 BAU/mL, HS 0.4ml Jonathan Grass 1:20w/v, HS 0.5ml Diluent: HSA 0.1mL to 5ml, Disp: 5 mL, Rfl: PRN    ORDER FOR ALLERGEN IMMUNOTHERAPY, Name of Mix: Mix #4  Weeds Kochia 1:20 w/v, HS 0.5 ml Lamb's Quarters 1:20 w/v, HS 0.5 ml Nettle 1:20 w/v, HS 0.5 ml Plantain, English 1:20 w/v, HS 0.5 ml Ragweed Mixed 1:20 w/v ALK  0.5 ml Russian Thistle 1:20 w/v, HS 0.5 ml Sagebrush, Mugwort 1:20 w/v, HS 0.5 ml Sorrel, Sheep 1:20 w/v, HS 0.5 ml Diluent: HSA 1mL to 5ml, Disp: 5 mL, Rfl: PRN    albuterol (2.5 MG/3ML) 0.083% neb solution, Take 1 vial (2.5 mg) by nebulization every 4 hours as needed (Patient not taking: Reported on 9/7/2023), Disp: 1 Box, Rfl: 3    albuterol (PROAIR HFA/PROVENTIL HFA/VENTOLIN HFA) 108 (90 Base) MCG/ACT inhaler, Inhale 2 puffs into the lungs every 4 hours as needed for shortness of breath / dyspnea or wheezing (Patient not taking: Reported on 9/7/2023), Disp: 18 g, Rfl: 3    EPINEPHrine (ANY BX GENERIC EQUIV) 0.3 MG/0.3ML injection 2-pack, Inject 0.3 mLs (0.3 mg) into the muscle once as needed for anaphylaxis (Patient not taking: Reported on 9/7/2023), Disp: 0.6 mL, Rfl: 3    loratadine (CLARITIN) 10 MG tablet, Take 1 tablet (10 mg) by mouth daily as needed for allergies (Patient not taking: Reported on 9/7/2023), Disp: 90 tablet, Rfl: 3    order for DME, Equipment being ordered: Silicone heel cup (Patient not taking: Reported on 9/5/2023), Disp: 1 Units, Rfl: 0    order for DME, Equipment being ordered: Silicone heel cup (Patient not taking: Reported on 9/5/2023), Disp: 1 Units, Rfl: 0    triamcinolone (KENALOG) 0.1 % external cream, Apply sparingly to affected area two times daily as needed but not more than 14 days in a row. Spare face, armpits, neck, and groin. (Patient not taking: Reported  on 9/7/2023), Disp: 80 g, Rfl: 1    Current Facility-Administered Medications:     lidocaine 1 % injection 2 mL, 2 mL, , , Nicol Walker MD, 2 mL at 03/29/23 0918    triamcinolone (KENALOG-40) injection 40 mg, 40 mg, , , Nicol Walker MD, 40 mg at 03/29/23 0918  Immunization History   Administered Date(s) Administered    COVID-19 Monovalent 18+ (Moderna) 03/19/2021, 04/16/2021, 11/02/2021    Influenza (IIV3) PF 12/12/2002, 11/28/2003, 10/24/2006, 02/11/2009, 10/05/2011, 11/15/2012, 10/07/2014    Influenza Vaccine 18-64 (Flublok) 10/02/2018, 10/30/2019, 10/28/2020, 10/15/2021, 03/09/2023    Influenza Vaccine >6 months (Alfuria,Fluzone) 10/16/2013, 10/20/2015, 10/26/2016, 11/21/2017    Mantoux Tuberculin Skin Test 02/11/2009    Measles 10/22/1979    Pneumococcal 23 valent 10/05/2011    TDAP Vaccine (Adacel) 02/11/2009, 07/06/2018    Zoster recombinant adjuvanted (SHINGRIX) 12/02/2021, 02/02/2022     Allergies   Allergen Reactions    Clinoril [Sulindac]      Hives    Niacin Hives    Nsaids Hives     Tolerates ibuprofen and aspirin without hives      Pineapple Hives     OBJECTIVE:                                                                 /73 (BP Location: Left arm, Patient Position: Sitting, Cuff Size: Adult Regular)   Pulse 74   Temp 97  F (36.1  C) (Tympanic)   Wt 92.8 kg (204 lb 9.6 oz)   LMP 07/18/2015 (Approximate)   SpO2 99%   BMI 36.24 kg/m          Physical Exam  Vitals and nursing note reviewed.   Constitutional:       General: She is not in acute distress.     Appearance: She is not diaphoretic.   HENT:      Head: Normocephalic and atraumatic.      Right Ear: Tympanic membrane, ear canal and external ear normal.      Left Ear: Tympanic membrane, ear canal and external ear normal.      Nose: Septal deviation (mild, leftward) present. No mucosal edema or rhinorrhea.      Right Turbinates: Not enlarged or swollen.      Left Turbinates: Not enlarged or swollen.      Mouth/Throat:      Lips:  Pink.      Mouth: Mucous membranes are moist.      Pharynx: Oropharynx is clear. No pharyngeal swelling, oropharyngeal exudate or posterior oropharyngeal erythema.   Eyes:      General:         Right eye: No discharge.         Left eye: No discharge.      Conjunctiva/sclera: Conjunctivae normal.   Cardiovascular:      Rate and Rhythm: Normal rate and regular rhythm.      Heart sounds: Normal heart sounds. No murmur heard.  Pulmonary:      Effort: Pulmonary effort is normal. No respiratory distress.      Breath sounds: Normal breath sounds and air entry. No stridor, decreased air movement or transmitted upper airway sounds. No decreased breath sounds, wheezing, rhonchi or rales.   Musculoskeletal:         General: Normal range of motion.      Cervical back: Normal range of motion.   Skin:     General: Skin is warm.   Neurological:      Mental Status: She is alert and oriented to person, place, and time.   Psychiatric:         Mood and Affect: Mood normal.         Behavior: Behavior normal.           WORKUP: ACT 24    ASSESSMENT/PLAN:    Moderate persistent asthma without complication    Well-controlled with Advair 250/50 micrograms 1 puff twice daily, montelukast 10 mg by mouth once daily, and albuterol as needed.  - Continue as is.  - Considering consistent use of Advair and previous vitamin D deficiency, I will repeat the vitamin D level.    - Vitamin D Deficiency  - ADVAIR DISKUS 250-50 MCG/ACT inhaler  Dispense: 3 each; Refill: 3  - montelukast (SINGULAIR) 10 MG tablet  Dispense: 90 tablet; Refill: 3      Allergic rhinoconjunctivitis  Symptoms are fairly well controlled with allergen immunotherapy, sinus rinses, intranasal fluticasone 1-2 sprays in each nostril once daily, azelastine 2 sprays in each nostril once-twice daily, montelukast 10 mg by mouth once daily, and oral antihistamines as needed.  - Continue allergen immunotherapy.  Continue current medication regimen.  Notify of a systemic reaction.    -  montelukast (SINGULAIR) 10 MG tablet  Dispense: 90 tablet; Refill: 3  - azelastine (ASTELIN) 0.1 % nasal spray  Dispense: 90 mL; Refill: 3  - azelastine (OPTIVAR) 0.05 % ophthalmic solution  Dispense: 6 mL; Refill: 1       Follow-up in 1 year or sooner if needed.    Thank you for allowing us to participate in the care of this patient. Please feel free to contact us if there are any questions or concerns about the patient.    Disclaimer: This note consists of symbols derived from keyboarding, dictation and/or voice recognition software. As a result, there may be errors in the script that have gone undetected. Please consider this when interpreting information found in this chart.    Michael Lujan MD, FAAAAI, FACAAI  Allergy, Asthma and Immunology     [facility]

## 2023-09-07 NOTE — LETTER
9/7/2023         RE: Dilia Solomon  [address] [facility] [address]        Dear Colleague,    Thank you for referring your patient, Dilia Solomon, to the [facility]. Please see a copy of my visit note below.    SUBJECTIVE:                                                                   Dilia Solomon presents today to our Allergy Clinic at [facility] for a follow up visit. She is a 58 year old female with moderate persistent asthma and allergic rhinitis.     She was diagnosed with asthma approximately in [id number]. Triggers are environmental allergies and viral respiratory infections.   Serum IgE for regional aeroallergen panel performed in June 2017 with multiple levels of various sensitivities to molds, cat, dog, dust mite, pollen of trees, grasses, and weeds.  She had 2 sinus surgeries in the past. The last one was in 2018.   She started allergen immunotherapy in July 2017. She was on allergen immunotherapy before that with Dr. Haines, and it was helpful at that time; however, symptoms got worse soon after stopping it.  PFT within normal limits  in December 2020 and July 2022.    In March of 2023, no red flags for primary immunodeficiency. Was treated with vit. D for deficiency.     Allergy Immunotherapy (started on 7/19/2017)  Date/time of injection(s): 8/22/2023     Vial Color Content  Dose             Red 1:1 Weeds  0.5   Red 1:1 Grass, Trees  0.5    Red 1:1 Molds  0.5   Red 1:1 Cat, Dog, Dust Mite  0.5        She tolerates injections well without persistent large local reactions or any systemic reactions.   She has been using sinus saline rinses once-twice daily, intranasal fluticasone 2 sprays in each nostril once daily, azelastine 2 sprays in each nostril once-twice daily, montelukast 10 mg by mouth once daily, and oral antihistamines as needed. She uses Optivar occasionally.   Overall, she feels that she is doing well on this regimen. Several  days ago developed mild nasal congestion 3/10 and itchy eyes. No interval sinus infection since the last visit.        The patient finds allergen immunotherapy helpful. It improved her symptoms by 80%.   Asthma-wise, she has been using Advair 250/50 micrograms 1 puff twice daily, taking montelukast 10 mg by mouth once daily at bedtime, and using albuterol as needed.  Dilia uses albuterol HFA  less than twice weekly for rescue from chest symptoms. She wakes up less than twice per month due to chest symptoms. There has been no use of oral steroids since the last visit. No ED/PCP/urgent care/other specialist visits for asthma flare since the previous visit.   Takes vitamin Dr daily.       Patient Active Problem List   Diagnosis     Need for prophylactic vaccination and inoculation against influenza     Sprain of interphalangeal (joint) of hand     Radial styloid tenosynovitis     Carpal tunnel syndrome     Synovial cyst of popliteal space     Pain in joint, lower leg     Hyperlipidemia LDL goal <130     Advanced directives, counseling/discussion     Moderate persistent asthma     Allergic rhinitis due to dust mite     Food allergy     FH: diabetes mellitus     Chronic allergic rhinitis due to animal hair and dander     Allergic rhinitis due to mold     Chronic seasonal allergic rhinitis due to pollen     Obesity (BMI 35.0-39.9) with comorbidity (H)     Allergic conjunctivitis, bilateral     History of endoscopic sinus surgery     Chronic pansinusitis       Past Medical History:   Diagnosis Date     Injury, other and unspecified, shoulder and upper arm 9/03      Problem (# of Occurrences) Relation (Name,Age of Onset)    Cancer (1) Father: brain    Diabetes (1) Mother    Breast Cancer (1) Maternal Aunt    C.A.D. (1) Mother           Negative family history of: Cancer - colorectal          Past Surgical History:   Procedure Laterality Date     COLONOSCOPY N/A 10/6/2016    Procedure: COLONOSCOPY;  Surgeon: Shiva Johns  MD;  Location: [facility]     ETHMOIDECTOMY  12/30/2013    Procedure: ETHMOIDECTOMY;  Bilateral Submucousal Reduction of InferiorTurbinates and Total Ethmoidectomy with Multiple Sinusotomies;  Surgeon: Lio More MD;  Location: [facility]     ETHMOIDECTOMY Bilateral 2/14/2018    Procedure: ETHMOIDECTOMY;  Bilateral anterior ethmoidectomy, bilateral maxillary antrostomy;  Surgeon: Santhosh Tierney MD;  Location: [facility]     SURGICAL HISTORY OF -   5/04    carpal tunnel release, bilateral     Social History     Socioeconomic History     Marital status: Single     Spouse name: None     Number of children: None     Years of education: None     Highest education level: None   Occupational History     Employer: [employer]    Tobacco Use     Smoking status: Never     Smokeless tobacco: Never   Vaping Use     Vaping Use: Never used   Substance and Sexual Activity     Alcohol use: No     Drug use: No     Sexual activity: Never   Other Topics Concern     Parent/sibling w/ CABG, MI or angioplasty before 65F 55M? Yes     Comment: mother   Social History Narrative    09/07/23        ENVIRONMENTAL HISTORY: [place] The home is heated with a forced air. They do not have central air conditioning. The patient's bedroom is furnished with hard pawel in bedroom, allergen mattress cover, allergen pillowcase cover and fabric window coverings.  Pets inside the house include 1 dog. There is not history of cockroach or mice infestation. There are no smokers in the house.  The house does have a damp basement.     Patient is currently living and taking care of a friend in the friends home.           Review of Systems   Constitutional:  Negative for activity change, chills, fatigue and fever.   HENT:  Positive for congestion. Negative for nosebleeds, postnasal drip, rhinorrhea, sinus pressure, sneezing and sore throat.    Eyes:  Positive for itching. Negative for discharge and redness.    Respiratory:  Negative for cough, chest tightness, shortness of breath and wheezing.    Cardiovascular:  Negative for chest pain.   Gastrointestinal:  Negative for abdominal pain, constipation, diarrhea, nausea and vomiting.   Musculoskeletal:  Negative for joint swelling.   Skin:  Negative for rash.   Neurological:  Negative for dizziness, light-headedness and headaches.   Hematological:  Negative for adenopathy.   Psychiatric/Behavioral:  Negative for behavioral problems. The patient is not nervous/anxious.            Current Outpatient Medications:      ADVAIR DISKUS 250-50 MCG/ACT inhaler, Inhale 1 puff into the lungs 2 times daily, Disp: 3 each, Rfl: 3     azelastine (ASTELIN) 0.1 % nasal spray, Spray 2 sprays into both nostrils 2 times daily as needed for rhinitis or allergies, Disp: 90 mL, Rfl: 3     azelastine (OPTIVAR) 0.05 % ophthalmic solution, Apply 1 drop to eye 2 times daily as needed (itchy/watery eyes), Disp: 6 mL, Rfl: 1     cetirizine (ZYRTEC) 10 MG tablet, Take 1 tablet (10 mg) by mouth daily as needed for allergies, Disp: 90 tablet, Rfl: 3     Emollient (CERAVE) CREA, Externally apply 1 dose. topically 2 times daily, Disp: 2 Bottle, Rfl: 11     fluticasone (FLONASE) 50 MCG/ACT nasal spray, Spray 2 sprays into both nostrils daily, Disp: 48 g, Rfl: 1     montelukast (SINGULAIR) 10 MG tablet, Take 1 tablet (10 mg) by mouth At Bedtime, Disp: 90 tablet, Rfl: 3     MULTIVITAMIN OR, 1 tab daily, Disp: , Rfl:      ORDER FOR ALLERGEN IMMUNOTHERAPY, Name of Mix: Mix #1  Mold Alternaria Tenuis 1:10 w/v, HS  0.5 ml Aspergillus Fumigatus 1:10 w/v, HS  0.5 ml Epicoccum Nigrum 1:10 w/v, HS 0.5 ml Hormodendrum Cladosporioides 1:10 w/v, HS 0.5 ml Penicillium Mix 1:10 w/v, HS  0.5 ml Diluent: HSA 2.5mL to 5ml, Disp: 5 mL, Rfl: PRN     ORDER FOR ALLERGEN IMMUNOTHERAPY, Name of Mix: Mix #2  Dust Mite, Cat, Dog Cat Hair, Standardized A.P. 10,000 BAU/mL, HS  2.0 ml  Dog Hair Dander, A. P.  1:100 w/v, HS  1.0 ml Dust  Mites F 30,000AU/mL, HS  0.3 ml Dust Mites P. 30,000 AU/mL, HS  0.3 ml  Diluent: HSA 1.4mL to 5ml, Disp: 5 mL, Rfl: PRN     ORDER FOR ALLERGEN IMMUNOTHERAPY, Name of Mix: Mix #3 Grass,Tree  Dmitry,White 1:20w/v, HS 0.5ml Birch Mix 1:20w/v, ALK 0.5ml Boxelder-Maple Mix BHR (Boxelder Hard Red) 1:20w/v, HS 0.5ml Gold Hill,Common 1:20w/v, HS 0.5ml Elm,American 1:20w/v, HS 0.5ml Heber Mix RW 1:20w/v, HS 0.5ml Oak Mix RVW 1:20w/v, HS 0.5ml Britt Tree,Black 1:20w/v, HS 0.5ml Suresh Grass (Std) 100,000 BAU/mL, HS 0.4ml Jonathan Grass 1:20w/v, HS 0.5ml Diluent: HSA 0.1mL to 5ml, Disp: 5 mL, Rfl: PRN     ORDER FOR ALLERGEN IMMUNOTHERAPY, Name of Mix: Mix #4  Weeds Kochia 1:20 w/v, HS 0.5 ml Lamb's Quarters 1:20 w/v, HS 0.5 ml Nettle 1:20 w/v, HS 0.5 ml Plantain, English 1:20 w/v, HS 0.5 ml Ragweed Mixed 1:20 w/v ALK  0.5 ml Russian Thistle 1:20 w/v, HS 0.5 ml Sagebrush, Mugwort 1:20 w/v, HS 0.5 ml Sorrel, Sheep 1:20 w/v, HS 0.5 ml Diluent: HSA 1mL to 5ml, Disp: 5 mL, Rfl: PRN     albuterol (2.5 MG/3ML) 0.083% neb solution, Take 1 vial (2.5 mg) by nebulization every 4 hours as needed (Patient not taking: Reported on 9/7/2023), Disp: 1 Box, Rfl: 3     albuterol (PROAIR HFA/PROVENTIL HFA/VENTOLIN HFA) 108 (90 Base) MCG/ACT inhaler, Inhale 2 puffs into the lungs every 4 hours as needed for shortness of breath / dyspnea or wheezing (Patient not taking: Reported on 9/7/2023), Disp: 18 g, Rfl: 3     EPINEPHrine (ANY BX GENERIC EQUIV) 0.3 MG/0.3ML injection 2-pack, Inject 0.3 mLs (0.3 mg) into the muscle once as needed for anaphylaxis (Patient not taking: Reported on 9/7/2023), Disp: 0.6 mL, Rfl: 3     loratadine (CLARITIN) 10 MG tablet, Take 1 tablet (10 mg) by mouth daily as needed for allergies (Patient not taking: Reported on 9/7/2023), Disp: 90 tablet, Rfl: 3     order for DME, Equipment being ordered: Silicone heel cup (Patient not taking: Reported on 9/5/2023), Disp: 1 Units, Rfl: 0     order for DME, Equipment being  ordered: Silicone heel cup (Patient not taking: Reported on 9/5/2023), Disp: 1 Units, Rfl: 0     triamcinolone (KENALOG) 0.1 % external cream, Apply sparingly to affected area two times daily as needed but not more than 14 days in a row. Spare face, armpits, neck, and groin. (Patient not taking: Reported on 9/7/2023), Disp: 80 g, Rfl: 1    Current Facility-Administered Medications:      lidocaine 1 % injection 2 mL, 2 mL, , , Nicol Walker MD, 2 mL at 03/29/23 0918     triamcinolone (KENALOG-40) injection 40 mg, 40 mg, , , Nicol Walker MD, 40 mg at 03/29/23 0918  Immunization History   Administered Date(s) Administered     COVID-19 Monovalent 18+ (Moderna) 03/19/2021, 04/16/2021, 11/02/2021     Influenza (IIV3) PF 12/12/2002, 11/28/2003, 10/24/2006, 02/11/2009, 10/05/2011, 11/15/2012, 10/07/2014     Influenza Vaccine 18-64 (Flublok) 10/02/2018, 10/30/2019, 10/28/2020, 10/15/2021, 03/09/2023     Influenza Vaccine >6 months (Alfuria,Fluzone) 10/16/2013, 10/20/2015, 10/26/2016, 11/21/2017     Mantoux Tuberculin Skin Test 02/11/2009     Measles 10/22/1979     Pneumococcal 23 valent 10/05/2011     TDAP Vaccine (Adacel) 02/11/2009, 07/06/2018     Zoster recombinant adjuvanted (SHINGRIX) 12/02/2021, 02/02/2022     Allergies   Allergen Reactions     Clinoril [Sulindac]      Hives     Niacin Hives     Nsaids Hives     Tolerates ibuprofen and aspirin without hives       Pineapple Hives     OBJECTIVE:                                                                 /73 (BP Location: Left arm, Patient Position: Sitting, Cuff Size: Adult Regular)   Pulse 74   Temp 97  F (36.1  C) (Tympanic)   Wt 92.8 kg (204 lb 9.6 oz)   LMP 07/18/2015 (Approximate)   SpO2 99%   BMI 36.24 kg/m          Physical Exam  Vitals and nursing note reviewed.   Constitutional:       General: She is not in acute distress.     Appearance: She is not diaphoretic.   HENT:      Head: Normocephalic and atraumatic.      Right Ear: Tympanic  membrane, ear canal and external ear normal.      Left Ear: Tympanic membrane, ear canal and external ear normal.      Nose: Septal deviation (mild, leftward) present. No mucosal edema or rhinorrhea.      Right Turbinates: Not enlarged or swollen.      Left Turbinates: Not enlarged or swollen.      Mouth/Throat:      Lips: Pink.      Mouth: Mucous membranes are moist.      Pharynx: Oropharynx is clear. No pharyngeal swelling, oropharyngeal exudate or posterior oropharyngeal erythema.   Eyes:      General:         Right eye: No discharge.         Left eye: No discharge.      Conjunctiva/sclera: Conjunctivae normal.   Cardiovascular:      Rate and Rhythm: Normal rate and regular rhythm.      Heart sounds: Normal heart sounds. No murmur heard.  Pulmonary:      Effort: Pulmonary effort is normal. No respiratory distress.      Breath sounds: Normal breath sounds and air entry. No stridor, decreased air movement or transmitted upper airway sounds. No decreased breath sounds, wheezing, rhonchi or rales.   Musculoskeletal:         General: Normal range of motion.      Cervical back: Normal range of motion.   Skin:     General: Skin is warm.   Neurological:      Mental Status: She is alert and oriented to person, place, and time.   Psychiatric:         Mood and Affect: Mood normal.         Behavior: Behavior normal.           WORKUP: ACT 24    ASSESSMENT/PLAN:    Moderate persistent asthma without complication    Well-controlled with Advair 250/50 micrograms 1 puff twice daily, montelukast 10 mg by mouth once daily, and albuterol as needed.  - Continue as is.  - Considering consistent use of Advair and previous vitamin D deficiency, I will repeat the vitamin D level.    - Vitamin D Deficiency  - ADVAIR DISKUS 250-50 MCG/ACT inhaler  Dispense: 3 each; Refill: 3  - montelukast (SINGULAIR) 10 MG tablet  Dispense: 90 tablet; Refill: 3      Allergic rhinoconjunctivitis  Symptoms are fairly well controlled with allergen  immunotherapy, sinus rinses, intranasal fluticasone 1-2 sprays in each nostril once daily, azelastine 2 sprays in each nostril once-twice daily, montelukast 10 mg by mouth once daily, and oral antihistamines as needed.  - Continue allergen immunotherapy.  Continue current medication regimen.  Notify of a systemic reaction.    - montelukast (SINGULAIR) 10 MG tablet  Dispense: 90 tablet; Refill: 3  - azelastine (ASTELIN) 0.1 % nasal spray  Dispense: 90 mL; Refill: 3  - azelastine (OPTIVAR) 0.05 % ophthalmic solution  Dispense: 6 mL; Refill: 1       Follow-up in 1 year or sooner if needed.    Thank you for allowing us to participate in the care of this patient. Please feel free to contact us if there are any questions or concerns about the patient.    Disclaimer: This note consists of symbols derived from keyboarding, dictation and/or voice recognition software. As a result, there may be errors in the script that have gone undetected. Please consider this when interpreting information found in this chart.    Michael Lujan MD, FAAAAI, FACAAI  Allergy, Asthma and Immunology     [facility]       Again, thank you for allowing me to participate in the care of your patient.        Sincerely,        Michael Lujan MD

## 2023-09-07 NOTE — PATIENT INSTRUCTIONS
Continue allergen immunotherapy.  Continue current medication therapy from asthma and allergies standpoint.  Notify of a systemic reaction.  Get the bloodwork done.                      Prescription Assistance  If you need assistance with your prescriptions (cost, coverage, etc) please contact: [place] [facility] [phone]        If labs have been ordered/completed, please allow 7-14 business days for final interpretation of results to be sent on My Chart, phone or mail. Some lab results can take up to 28 days for results.       Allergy Staff Appt Hours Shot Hours Locations    Physician     Michael Lujan MD       Support Staff     Kaylee Beltre MA    Tuesday:   [place] :  [place] :         :  [place] 7-3     [place]        Tuesday: 7:20        Wednesday: :: 7-4:10        Tuesday: 7-4:10        Thursday: 7-3:10     & Wed: :10       Thurs: 12-4:10       Fri:            [facility]  [address] [place] [address]  Appt Line: [phone]      [facility]  [address] [place] [address]  Appt Line: [phone]    Pulmonary Function Scheduling:  [place]: [phone]  [place]: [phone]  [phone]

## 2023-09-08 LAB — DEPRECATED CALCIDIOL+CALCIFEROL SERPL-MC: 34 UG/L (ref 20–75)

## 2023-09-12 NOTE — RESULT ENCOUNTER NOTE
Sellsyhart message sent:   Vitamin D level has improved.  - Continue taking the same dose over-the-counter.

## 2023-09-19 ENCOUNTER — ALLIED HEALTH/NURSE VISIT (OUTPATIENT)
Dept: ALLERGY | Facility: CLINIC | Age: 58
End: 2023-09-19
Payer: COMMERCIAL

## 2023-09-19 DIAGNOSIS — J30.81 CHRONIC ALLERGIC RHINITIS DUE TO ANIMAL HAIR AND DANDER: Primary | ICD-10-CM

## 2023-09-19 DIAGNOSIS — J30.1 CHRONIC SEASONAL ALLERGIC RHINITIS DUE TO POLLEN: ICD-10-CM

## 2023-09-19 DIAGNOSIS — J30.89 ALLERGIC RHINITIS DUE TO DUST MITE: ICD-10-CM

## 2023-09-19 DIAGNOSIS — J30.89 ALLERGIC RHINITIS DUE TO MOLD: ICD-10-CM

## 2023-09-19 PROCEDURE — 95117 IMMUNOTHERAPY INJECTIONS: CPT

## 2023-09-26 ENCOUNTER — OFFICE VISIT (OUTPATIENT)
Dept: FAMILY MEDICINE | Facility: CLINIC | Age: 58
End: 2023-09-26
Attending: NURSE PRACTITIONER
Payer: COMMERCIAL

## 2023-09-26 VITALS
HEART RATE: 87 BPM | DIASTOLIC BLOOD PRESSURE: 64 MMHG | TEMPERATURE: 97.6 F | BODY MASS INDEX: 34.66 KG/M2 | HEIGHT: 64 IN | SYSTOLIC BLOOD PRESSURE: 102 MMHG | RESPIRATION RATE: 20 BRPM | OXYGEN SATURATION: 99 % | WEIGHT: 203 LBS

## 2023-09-26 DIAGNOSIS — Z23 NEED FOR PROPHYLACTIC VACCINATION AND INOCULATION AGAINST INFLUENZA: ICD-10-CM

## 2023-09-26 DIAGNOSIS — R73.9 ELEVATED BLOOD SUGAR: ICD-10-CM

## 2023-09-26 DIAGNOSIS — Z12.31 VISIT FOR SCREENING MAMMOGRAM: ICD-10-CM

## 2023-09-26 DIAGNOSIS — E78.5 HYPERLIPIDEMIA LDL GOAL <130: ICD-10-CM

## 2023-09-26 DIAGNOSIS — K76.89 LIVER CYST: Primary | ICD-10-CM

## 2023-09-26 LAB
CHOLEST SERPL-MCNC: 202 MG/DL
HBA1C MFR BLD: 5.8 % (ref 0–5.6)
HDLC SERPL-MCNC: 57 MG/DL
LDLC SERPL CALC-MCNC: 130 MG/DL
NONHDLC SERPL-MCNC: 145 MG/DL
T4 FREE SERPL-MCNC: 1.11 NG/DL (ref 0.9–1.7)
TRIGL SERPL-MCNC: 74 MG/DL
TSH SERPL DL<=0.005 MIU/L-ACNC: 4.74 UIU/ML (ref 0.3–4.2)

## 2023-09-26 PROCEDURE — 99214 OFFICE O/P EST MOD 30 MIN: CPT | Mod: 25 | Performed by: FAMILY MEDICINE

## 2023-09-26 PROCEDURE — 84439 ASSAY OF FREE THYROXINE: CPT | Performed by: FAMILY MEDICINE

## 2023-09-26 PROCEDURE — 84443 ASSAY THYROID STIM HORMONE: CPT | Performed by: FAMILY MEDICINE

## 2023-09-26 PROCEDURE — 90471 IMMUNIZATION ADMIN: CPT | Performed by: FAMILY MEDICINE

## 2023-09-26 PROCEDURE — 83036 HEMOGLOBIN GLYCOSYLATED A1C: CPT | Performed by: FAMILY MEDICINE

## 2023-09-26 PROCEDURE — 90472 IMMUNIZATION ADMIN EACH ADD: CPT | Performed by: FAMILY MEDICINE

## 2023-09-26 PROCEDURE — 80061 LIPID PANEL: CPT | Performed by: FAMILY MEDICINE

## 2023-09-26 PROCEDURE — 36415 COLL VENOUS BLD VENIPUNCTURE: CPT | Performed by: FAMILY MEDICINE

## 2023-09-26 PROCEDURE — 90677 PCV20 VACCINE IM: CPT | Performed by: FAMILY MEDICINE

## 2023-09-26 PROCEDURE — 90682 RIV4 VACC RECOMBINANT DNA IM: CPT | Performed by: FAMILY MEDICINE

## 2023-09-26 RX ORDER — SWAB
2 SWAB, NON-MEDICATED MISCELLANEOUS DAILY
COMMUNITY

## 2023-09-26 ASSESSMENT — ASTHMA QUESTIONNAIRES: ACT_TOTALSCORE: 21

## 2023-09-26 ASSESSMENT — PAIN SCALES - GENERAL: PAINLEVEL: NO PAIN (0)

## 2023-09-26 NOTE — PROGRESS NOTES
Assessment & Plan     Liver cyst  Discussed results, answered questions to her satisfaction. Reassured.     Elevated blood sugar  - Hemoglobin A1c; Future  - Hemoglobin A1c    Hyperlipidemia LDL goal <130  - Lipid panel reflex to direct LDL Fasting; Future  - TSH with free T4 reflex; Future  - Lipid panel reflex to direct LDL Fasting  - TSH with free T4 reflex  - T4 free    Visit for screening mammogram  - MA SCREENING DIGITAL BILAT - Future  (s+30); Future    Need for prophylactic vaccination and inoculation against influenza  Flu shot given    Patient Instructions   Labs and vaccines today        Cookie Arnold MD  [facility]    Pedro Luis Dobbs is a 58 year old, presenting for the following health issues:  Liver      9/26/2023     6:53 AM   Additional Questions   Roomed by Zaynab       History of Present Illness       Reason for visit:  Back results of blood test sinus issue    She eats 4 or more servings of fruits and vegetables daily.She consumes 2 sweetened beverage(s) daily.She exercises with enough effort to increase her heart rate 20 to 29 minutes per day.  She exercises with enough effort to increase her heart rate 4 days per week.   She is taking medications regularly.     Is concerned about her liver  She asks about her cyst on the liver    Results for orders placed or performed during the hospital encounter of 08/29/23   US Abdomen Limited    Narrative    EXAM: US ABDOMEN LIMITED  LOCATION: [facility]  DATE: 8/29/2023    INDICATION:  Hepatic cyst.  COMPARISON: None available.  TECHNIQUE: Limited abdominal ultrasound.    FINDINGS:    GALLBLADDER: No cholelithiasis, gallbladder wall thickening or pericholecystic fluid. Sonographic Johnston sign is negative.    BILE DUCTS: No biliary dilatation. The common duct measures 3 mm.    LIVER: Normal parenchyma with smooth contour. There is 1.1 x 1.9 x 1.8 cm bilobed cyst in the right hepatic lobe.    RIGHT  "KIDNEY: No hydronephrosis.    PANCREAS: The visualized portions are normal.    No ascites.      Impression    IMPRESSION:  1.  1.9 cm simple-appearing bilobed right hepatic cyst.                   *Note: Due to a large number of results and/or encounters for the requested time period, some results have not been displayed. A complete set of results can be found in Results Review.      Sinus infection is better - did sinus rinses  Thought she had another sinus infection, but the sinus rinses seem to have helped    Had an elevated blood sugar of 162 at outside hospital. Recheck here was normal. Her mom and brother had/have diabetes.     Asthma is well controlled. Followed by allergy/asthma.  On Advair, montelukast    Wt Readings from Last 5 Encounters:   09/26/23 92.1 kg (203 lb)   09/07/23 92.8 kg (204 lb 9.6 oz)   04/17/23 95.5 kg (210 lb 9.6 oz)   03/29/23 88.5 kg (195 lb)   03/09/23 88.5 kg (195 lb)        Review of Systems   ROS: 5 point ROS negative except as noted above in HPI, including Gen., Resp., CV, GI &  system review.       Objective    /64 (BP Location: Right arm)   Pulse 87   Temp 97.6  F (36.4  C) (Tympanic)   Resp 20   Ht 1.626 m (5' 4\")   Wt 92.1 kg (203 lb)   LMP 07/18/2015 (Approximate)   SpO2 99%   BMI 34.84 kg/m    Body mass index is 34.84 kg/m .  Physical Exam   GENERAL: healthy, alert and no distress  NECK: no adenopathy, no asymmetry, masses, or scars and thyroid normal to palpation  RESP: lungs clear to auscultation - no rales, rhonchi or wheezes  CV: regular rate and rhythm, normal S1 S2, no S3 or S4, no murmur, click or rub, no peripheral edema   ABDOMEN: soft, nontender, no hepatosplenomegaly, no masses and bowel sounds normal  MS: no gross musculoskeletal defects noted, no edema                "

## 2023-09-27 PROBLEM — E66.01 MORBID OBESITY (H): Status: RESOLVED | Noted: 2019-01-29 | Resolved: 2023-09-27

## 2023-10-11 ENCOUNTER — DOCUMENTATION ONLY (OUTPATIENT)
Dept: FAMILY MEDICINE | Facility: CLINIC | Age: 58
End: 2023-10-11
Payer: COMMERCIAL

## 2023-10-11 ENCOUNTER — TELEPHONE (OUTPATIENT)
Dept: FAMILY MEDICINE | Facility: CLINIC | Age: 58
End: 2023-10-11
Payer: COMMERCIAL

## 2023-10-11 DIAGNOSIS — R73.9 ELEVATED BLOOD SUGAR: Primary | ICD-10-CM

## 2023-10-11 DIAGNOSIS — E03.8 SUBCLINICAL HYPOTHYROIDISM: ICD-10-CM

## 2023-10-11 NOTE — TELEPHONE ENCOUNTER
Placed call to patient. Left voicemail.     Needing to reschedule Lab visit on 10/17. Too early for repeat . Dr Chi wants her to repeat at the end of December 2023.

## 2023-10-11 NOTE — PROGRESS NOTES
Patient is coming for 3 month repeat labs per Dr. Conklin's result note (9/26/23), please sign pended order.  Thanks!

## 2023-10-17 ENCOUNTER — ALLIED HEALTH/NURSE VISIT (OUTPATIENT)
Dept: ALLERGY | Facility: CLINIC | Age: 58
End: 2023-10-17
Payer: COMMERCIAL

## 2023-10-17 DIAGNOSIS — J30.81 CHRONIC ALLERGIC RHINITIS DUE TO ANIMAL HAIR AND DANDER: Primary | ICD-10-CM

## 2023-10-17 DIAGNOSIS — J30.81 CHRONIC ALLERGIC RHINITIS DUE TO ANIMAL HAIR AND DANDER: ICD-10-CM

## 2023-10-17 DIAGNOSIS — J30.1 CHRONIC SEASONAL ALLERGIC RHINITIS DUE TO POLLEN: ICD-10-CM

## 2023-10-17 DIAGNOSIS — J30.89 ALLERGIC RHINITIS DUE TO MOLD: ICD-10-CM

## 2023-10-17 DIAGNOSIS — J30.89 ALLERGIC RHINITIS DUE TO DUST MITE: ICD-10-CM

## 2023-10-17 DIAGNOSIS — Z51.6 NEED FOR DESENSITIZATION TO ALLERGENS: ICD-10-CM

## 2023-10-17 PROCEDURE — 95117 IMMUNOTHERAPY INJECTIONS: CPT

## 2023-10-17 RX ORDER — EPINEPHRINE 0.3 MG/.3ML
0.3 INJECTION SUBCUTANEOUS
Qty: 2 EACH | Refills: 3 | Status: SHIPPED | OUTPATIENT
Start: 2023-10-17 | End: 2023-11-07

## 2023-10-17 NOTE — TELEPHONE ENCOUNTER
Prescription approved per [facility] Refill Protocol.     Gia GUEVARA RN  Specialty/Allergy Clinic

## 2023-10-17 NOTE — TELEPHONE ENCOUNTER
ALLERGY SOLUTION RE-ORDER REQUEST    Dilia Solomon 1965 MRN: [id number]    DATE NEEDED:  routine  Vial Color Content    Vial Size  Red 1:1 Weeds    5 mL  Red 1:1 Grass, Trees   5 mL  Red 1:1 Molds    5 mL  Red 1:1 Cat, Dog, Dust Mite    5 mL      Serum reorder consent signed and patient/parent was advised that new serums would be ordered through the pharmacy and billed to their insurance company when they arrive in clinic. Yes    Shot Clinic Location:  [facility].  Ship to Location: [facility].  Serum billed to:  [facility].    Special Instructions:  no        Requester Signature  Kaylee David RN

## 2023-10-24 ENCOUNTER — HOSPITAL ENCOUNTER (OUTPATIENT)
Dept: MAMMOGRAPHY | Facility: CLINIC | Age: 58
Discharge: HOME | End: 2023-10-24
Attending: FAMILY MEDICINE | Admitting: FAMILY MEDICINE
Payer: COMMERCIAL

## 2023-10-24 DIAGNOSIS — Z12.31 VISIT FOR SCREENING MAMMOGRAM: ICD-10-CM

## 2023-10-24 PROCEDURE — 77067 SCR MAMMO BI INCL CAD: CPT

## 2023-10-24 NOTE — TELEPHONE ENCOUNTER
Change on Birch antigen.  Please update prescription with Birch Mix-HS along with dosing instructions for allergy staff to document in patients flowsheet.      change for Dog antigen.  Please update prescription to reflect [facility] Dog Hair-Dander- 1:650 w/v HS (Sae Emmanuel).    No history of reaction    Gia GUEVARA RN  Specialty/Allergy Clinic

## 2023-10-26 DIAGNOSIS — J30.1 CHRONIC SEASONAL ALLERGIC RHINITIS DUE TO POLLEN: ICD-10-CM

## 2023-10-26 DIAGNOSIS — J30.89 ALLERGIC RHINITIS DUE TO MOLD: ICD-10-CM

## 2023-10-26 DIAGNOSIS — J30.89 ALLERGIC RHINITIS DUE TO DUST MITE: Primary | ICD-10-CM

## 2023-10-26 PROCEDURE — 95165 ANTIGEN THERAPY SERVICES: CPT | Performed by: ALLERGY & IMMUNOLOGY

## 2023-10-26 NOTE — PROGRESS NOTES
Allergy serums billed to [place].     Vials billed below:    Vial Color Content                      Vial Size Expiration Date  Red 1:1 Grass, Trees 5mL  10/26/24  Red 1:1 Molds 5mL  10/26/24  Red 1:1 Weeds 5mL  10/26/24      Billed 30 units    Checked by Silver Araujo / LPN        Signature  Silver Araujo LPN

## 2023-10-27 DIAGNOSIS — J30.89 ALLERGIC RHINITIS DUE TO DUST MITE: Primary | ICD-10-CM

## 2023-10-27 DIAGNOSIS — J30.81 CHRONIC ALLERGIC RHINITIS DUE TO ANIMAL HAIR AND DANDER: ICD-10-CM

## 2023-10-27 PROCEDURE — 95165 ANTIGEN THERAPY SERVICES: CPT | Performed by: ALLERGY & IMMUNOLOGY

## 2023-10-27 NOTE — TELEPHONE ENCOUNTER
Allergy serums received at [facility].    Vials received below:    Vial Color Content                      Vial Size Expiration Date  Red 1:1                                   Weeds                             5 mL 10/26/24  Red 1:1                                   Grass, Trees                     5 mL 10/26/24  Red 1:1                                   Molds                                5 mL 10/26/24  Red 1:1                                   Cat, Dog, Dust Mite          5 mL 10/27/24      Signature  Silver Araujo LPN

## 2023-10-27 NOTE — PROGRESS NOTES
Allergy serums billed to [place].     Vials billed below:    Vial Color Content                      Vial Size Expiration Date  Red 1:1 Cat, Dog, Dust Mite 5mL  10/27/24      Billed 10 units    Checked by Silver Araujo / LPN        Signature  Silver Araujo LPN

## 2023-11-07 ENCOUNTER — ALLIED HEALTH/NURSE VISIT (OUTPATIENT)
Dept: ALLERGY | Facility: CLINIC | Age: 58
End: 2023-11-07
Payer: COMMERCIAL

## 2023-11-07 DIAGNOSIS — J30.81 CHRONIC ALLERGIC RHINITIS DUE TO ANIMAL HAIR AND DANDER: ICD-10-CM

## 2023-11-07 DIAGNOSIS — Z51.6 NEED FOR DESENSITIZATION TO ALLERGENS: ICD-10-CM

## 2023-11-07 DIAGNOSIS — J30.1 CHRONIC SEASONAL ALLERGIC RHINITIS DUE TO POLLEN: ICD-10-CM

## 2023-11-07 DIAGNOSIS — J30.89 ALLERGIC RHINITIS DUE TO MOLD: ICD-10-CM

## 2023-11-07 DIAGNOSIS — J30.89 ALLERGIC RHINITIS DUE TO DUST MITE: ICD-10-CM

## 2023-11-07 DIAGNOSIS — J30.89 ALLERGIC RHINITIS DUE TO DUST MITE: Primary | ICD-10-CM

## 2023-11-07 PROCEDURE — 95117 IMMUNOTHERAPY INJECTIONS: CPT

## 2023-11-07 RX ORDER — EPINEPHRINE 0.3 MG/.3ML
0.3 INJECTION SUBCUTANEOUS
Qty: 2 EACH | Refills: 3 | Status: SHIPPED | OUTPATIENT
Start: 2023-11-07

## 2023-11-07 NOTE — TELEPHONE ENCOUNTER
Prescription approved per [facility] Refill Protocol.     Gia GUEVARA RN  Specialty/Allergy Clinic

## 2023-11-09 DIAGNOSIS — H10.13 ALLERGIC CONJUNCTIVITIS OF BOTH EYES: ICD-10-CM

## 2023-11-09 RX ORDER — AZELASTINE HYDROCHLORIDE 0.5 MG/ML
1 SOLUTION/ DROPS OPHTHALMIC 2 TIMES DAILY PRN
Qty: 18 ML | Refills: 1 | Status: SHIPPED | OUTPATIENT
Start: 2023-11-09

## 2023-11-09 NOTE — TELEPHONE ENCOUNTER
Prescription approved per [facility] Refill Protocol.     Gia GUEVARA RN  Specialty/Allergy Clinic

## 2023-11-28 ENCOUNTER — ALLIED HEALTH/NURSE VISIT (OUTPATIENT)
Dept: ALLERGY | Facility: CLINIC | Age: 58
End: 2023-11-28
Payer: COMMERCIAL

## 2023-11-28 DIAGNOSIS — J30.81 CHRONIC ALLERGIC RHINITIS DUE TO ANIMAL HAIR AND DANDER: ICD-10-CM

## 2023-11-28 DIAGNOSIS — H10.13 ALLERGIC CONJUNCTIVITIS OF BOTH EYES: Primary | ICD-10-CM

## 2023-11-28 DIAGNOSIS — J30.89 ALLERGIC RHINITIS DUE TO DUST MITE: ICD-10-CM

## 2023-11-28 DIAGNOSIS — J30.1 CHRONIC SEASONAL ALLERGIC RHINITIS DUE TO POLLEN: ICD-10-CM

## 2023-11-28 DIAGNOSIS — J30.89 ALLERGIC RHINITIS DUE TO MOLD: ICD-10-CM

## 2023-11-28 PROCEDURE — 95117 IMMUNOTHERAPY INJECTIONS: CPT

## 2023-11-28 NOTE — PROGRESS NOTES
Dilia Solomon presents to clinic today at the request of Michael Lujan MD  (ordering provider) for Allergy Immunotherapy injection(s).       This service provided today was under the care of Michael Lujan MD ; the supervising provider of the day; who was available if needed.      Patient presented after waiting 30 minutes with no reaction to  injections. Discharged from clinic.    Gai Bae RN

## 2023-12-05 ENCOUNTER — ALLIED HEALTH/NURSE VISIT (OUTPATIENT)
Dept: ALLERGY | Facility: CLINIC | Age: 58
End: 2023-12-05
Payer: COMMERCIAL

## 2023-12-05 DIAGNOSIS — J30.1 CHRONIC SEASONAL ALLERGIC RHINITIS DUE TO POLLEN: ICD-10-CM

## 2023-12-05 DIAGNOSIS — J30.81 CHRONIC ALLERGIC RHINITIS DUE TO ANIMAL HAIR AND DANDER: ICD-10-CM

## 2023-12-05 DIAGNOSIS — H10.13 ALLERGIC CONJUNCTIVITIS OF BOTH EYES: Primary | ICD-10-CM

## 2023-12-05 DIAGNOSIS — J30.89 ALLERGIC RHINITIS DUE TO MOLD: ICD-10-CM

## 2023-12-05 DIAGNOSIS — J30.89 ALLERGIC RHINITIS DUE TO DUST MITE: ICD-10-CM

## 2023-12-05 PROCEDURE — 95117 IMMUNOTHERAPY INJECTIONS: CPT

## 2023-12-12 ENCOUNTER — ALLIED HEALTH/NURSE VISIT (OUTPATIENT)
Dept: ALLERGY | Facility: CLINIC | Age: 58
End: 2023-12-12
Payer: COMMERCIAL

## 2023-12-12 DIAGNOSIS — J30.89 ALLERGIC RHINITIS DUE TO DUST MITE: ICD-10-CM

## 2023-12-12 DIAGNOSIS — H10.13 ALLERGIC CONJUNCTIVITIS OF BOTH EYES: Primary | ICD-10-CM

## 2023-12-12 DIAGNOSIS — J30.81 CHRONIC ALLERGIC RHINITIS DUE TO ANIMAL HAIR AND DANDER: ICD-10-CM

## 2023-12-12 DIAGNOSIS — J30.1 CHRONIC SEASONAL ALLERGIC RHINITIS DUE TO POLLEN: ICD-10-CM

## 2023-12-12 DIAGNOSIS — J30.89 ALLERGIC RHINITIS DUE TO MOLD: ICD-10-CM

## 2023-12-12 PROCEDURE — 95117 IMMUNOTHERAPY INJECTIONS: CPT

## 2023-12-19 ENCOUNTER — ALLIED HEALTH/NURSE VISIT (OUTPATIENT)
Dept: ALLERGY | Facility: CLINIC | Age: 58
End: 2023-12-19
Payer: COMMERCIAL

## 2023-12-19 DIAGNOSIS — J30.89 ALLERGIC RHINITIS DUE TO MOLD: ICD-10-CM

## 2023-12-19 DIAGNOSIS — J30.1 CHRONIC SEASONAL ALLERGIC RHINITIS DUE TO POLLEN: ICD-10-CM

## 2023-12-19 DIAGNOSIS — H10.13 ALLERGIC CONJUNCTIVITIS OF BOTH EYES: Primary | ICD-10-CM

## 2023-12-19 DIAGNOSIS — J30.81 CHRONIC ALLERGIC RHINITIS DUE TO ANIMAL HAIR AND DANDER: ICD-10-CM

## 2023-12-19 DIAGNOSIS — J30.89 ALLERGIC RHINITIS DUE TO DUST MITE: ICD-10-CM

## 2023-12-19 PROCEDURE — 95115 IMMUNOTHERAPY ONE INJECTION: CPT

## 2023-12-19 NOTE — PROGRESS NOTES
Dilia Solomon presents to clinic today at the request of Michael Lujan MD  (ordering provider) for Allergy Immunotherapy injection(s).       This service provided today was under the care of Carlos Jenkins MD; the supervising provider of the day; who was available if needed.      Patient presented after waiting 30 minutes with no reaction to  injections. Discharged from clinic.    Gia Bae RN

## 2023-12-26 ENCOUNTER — ALLIED HEALTH/NURSE VISIT (OUTPATIENT)
Dept: ALLERGY | Facility: CLINIC | Age: 58
End: 2023-12-26
Payer: COMMERCIAL

## 2023-12-26 ENCOUNTER — LAB (OUTPATIENT)
Dept: LAB | Facility: CLINIC | Age: 58
End: 2023-12-26
Payer: COMMERCIAL

## 2023-12-26 DIAGNOSIS — E03.8 SUBCLINICAL HYPOTHYROIDISM: ICD-10-CM

## 2023-12-26 DIAGNOSIS — J30.89 ALLERGIC RHINITIS DUE TO MOLD: ICD-10-CM

## 2023-12-26 DIAGNOSIS — J30.89 ALLERGIC RHINITIS DUE TO DUST MITE: ICD-10-CM

## 2023-12-26 DIAGNOSIS — J30.81 CHRONIC ALLERGIC RHINITIS DUE TO ANIMAL HAIR AND DANDER: ICD-10-CM

## 2023-12-26 DIAGNOSIS — J30.1 CHRONIC SEASONAL ALLERGIC RHINITIS DUE TO POLLEN: Primary | ICD-10-CM

## 2023-12-26 DIAGNOSIS — R73.9 ELEVATED BLOOD SUGAR: ICD-10-CM

## 2023-12-26 LAB
ANION GAP SERPL CALCULATED.3IONS-SCNC: 8 MMOL/L (ref 7–15)
BUN SERPL-MCNC: 17.9 MG/DL (ref 6–20)
CALCIUM SERPL-MCNC: 9.8 MG/DL (ref 8.6–10)
CHLORIDE SERPL-SCNC: 106 MMOL/L (ref 98–107)
CREAT SERPL-MCNC: 0.94 MG/DL (ref 0.51–0.95)
DEPRECATED HCO3 PLAS-SCNC: 27 MMOL/L (ref 22–29)
EGFRCR SERPLBLD CKD-EPI 2021: 70 ML/MIN/1.73M2
GLUCOSE SERPL-MCNC: 68 MG/DL (ref 70–99)
HBA1C MFR BLD: 5.6 % (ref 0–5.6)
POTASSIUM SERPL-SCNC: 4.2 MMOL/L (ref 3.4–5.3)
SODIUM SERPL-SCNC: 141 MMOL/L (ref 135–145)
T4 FREE SERPL-MCNC: 1.15 NG/DL (ref 0.9–1.7)
TSH SERPL DL<=0.005 MIU/L-ACNC: 5.26 UIU/ML (ref 0.3–4.2)

## 2023-12-26 PROCEDURE — 95117 IMMUNOTHERAPY INJECTIONS: CPT

## 2023-12-26 PROCEDURE — 80048 BASIC METABOLIC PNL TOTAL CA: CPT

## 2023-12-26 PROCEDURE — 36415 COLL VENOUS BLD VENIPUNCTURE: CPT

## 2023-12-26 PROCEDURE — 84443 ASSAY THYROID STIM HORMONE: CPT

## 2023-12-26 PROCEDURE — 84439 ASSAY OF FREE THYROXINE: CPT

## 2023-12-26 PROCEDURE — 83036 HEMOGLOBIN GLYCOSYLATED A1C: CPT

## 2023-12-26 NOTE — PROGRESS NOTES
Dilia Solomon presents to clinic today at the request of Michael Lujan MD  (ordering provider) for Allergy Immunotherapy injection(s).       This service provided today was under the care of Deepthi Orozco MD; the supervising provider of the day; who was available if needed.      Patient presented after waiting 30 minutes with no reaction to  injections. Discharged from clinic.    Gia Bae RN

## 2024-01-30 ENCOUNTER — TELEPHONE (OUTPATIENT)
Dept: ALLERGY | Facility: CLINIC | Age: 59
End: 2024-01-30

## 2024-01-30 NOTE — TELEPHONE ENCOUNTER
Patient received 0.5 ml in Red 1:1 vial on 12//26/23, and ordered top dose is 0.5 in Red 1:1. Patient is scheduled for an injection 1/31/24. This will be 36 days after patient's last injection. Please advise new dosing orders, building schedule, and date which orders are valid through.     New orders will be added to immunotherapy flowsheet once they are received.     Kaylee David RN

## 2024-01-31 ENCOUNTER — ALLIED HEALTH/NURSE VISIT (OUTPATIENT)
Dept: ALLERGY | Facility: CLINIC | Age: 59
End: 2024-01-31
Payer: COMMERCIAL

## 2024-01-31 DIAGNOSIS — H10.13 ALLERGIC CONJUNCTIVITIS OF BOTH EYES: ICD-10-CM

## 2024-01-31 DIAGNOSIS — J30.1 CHRONIC SEASONAL ALLERGIC RHINITIS DUE TO POLLEN: Primary | ICD-10-CM

## 2024-01-31 DIAGNOSIS — J30.89 ALLERGIC RHINITIS DUE TO DUST MITE: ICD-10-CM

## 2024-01-31 DIAGNOSIS — J30.89 ALLERGIC RHINITIS DUE TO MOLD: ICD-10-CM

## 2024-01-31 DIAGNOSIS — J30.81 CHRONIC ALLERGIC RHINITIS DUE TO ANIMAL HAIR AND DANDER: ICD-10-CM

## 2024-01-31 PROCEDURE — 95117 IMMUNOTHERAPY INJECTIONS: CPT

## 2024-02-17 LAB
FEF 25/75: NORMAL
FEV-1: NORMAL
FEV1/FVC: NORMAL
FVC: NORMAL

## 2024-02-23 ENCOUNTER — TELEPHONE (OUTPATIENT)
Dept: ALLERGY | Facility: CLINIC | Age: 59
End: 2024-02-23
Payer: COMMERCIAL

## 2024-02-23 NOTE — TELEPHONE ENCOUNTER
10:00 AM- Patient reached out to me thinks she has a sinus infection going on. I will forward to [facility].    Ron BARERTT, PRAKASH

## 2024-02-23 NOTE — TELEPHONE ENCOUNTER
I talked to the patient.  She is willing to try ibuprofen as needed (she tolerates it without issues despite being listed in the allergy list).  If she does not get better, I will see her back on Tuesday and decide about antibiotic and/or oral steroid.      Michael Lujan MD

## 2024-02-23 NOTE — TELEPHONE ENCOUNTER
Left message on answering machine for patient to call back.    Kaylee GRANT RN  Specialty/Allergy Clinics

## 2024-02-23 NOTE — TELEPHONE ENCOUNTER
S-(situation): possible sinusitis    B-(background): onset 2/22/24, hx of chronic sinusitis    A-(assessment): Left sided congestion with green discharge, bilateral pressure but more on left side, using her nasal rinses with some improvement along with her azelastine and flonase nasal sprays (but very little relief per pt). Feeling tired with headache.    R-(recommendations): informed that we would be in contact with Dr Lujan and call her back. Made an appointment to see him in [place] on 2/27/24 at 3:15 pm. Pt is agreeable. Pt prefers [facility].    Kaylee GRANT RN  Specialty/Allergy Clinics

## 2024-02-27 ENCOUNTER — OFFICE VISIT (OUTPATIENT)
Dept: ALLERGY | Facility: OTHER | Age: 59
End: 2024-02-27
Payer: COMMERCIAL

## 2024-02-27 VITALS
DIASTOLIC BLOOD PRESSURE: 84 MMHG | HEIGHT: 64 IN | BODY MASS INDEX: 35 KG/M2 | WEIGHT: 205 LBS | OXYGEN SATURATION: 98 % | HEART RATE: 78 BPM | RESPIRATION RATE: 17 BRPM | SYSTOLIC BLOOD PRESSURE: 137 MMHG

## 2024-02-27 DIAGNOSIS — J30.1 CHRONIC SEASONAL ALLERGIC RHINITIS DUE TO POLLEN: ICD-10-CM

## 2024-02-27 DIAGNOSIS — J01.90 ACUTE SINUSITIS WITH COEXISTING CONDITION, NEED PROPHYLACTIC TREATMENT: ICD-10-CM

## 2024-02-27 DIAGNOSIS — J45.40 MODERATE PERSISTENT ASTHMA WITHOUT COMPLICATION: Primary | ICD-10-CM

## 2024-02-27 PROBLEM — E66.01 CLASS 2 SEVERE OBESITY DUE TO EXCESS CALORIES WITH SERIOUS COMORBIDITY IN ADULT (H): Status: ACTIVE | Noted: 2024-02-27

## 2024-02-27 PROBLEM — E66.812 CLASS 2 SEVERE OBESITY DUE TO EXCESS CALORIES WITH SERIOUS COMORBIDITY IN ADULT (H): Status: ACTIVE | Noted: 2024-02-27

## 2024-02-27 PROCEDURE — 99213 OFFICE O/P EST LOW 20 MIN: CPT | Mod: 25 | Performed by: ALLERGY & IMMUNOLOGY

## 2024-02-27 PROCEDURE — 87635 SARS-COV-2 COVID-19 AMP PRB: CPT | Performed by: ALLERGY & IMMUNOLOGY

## 2024-02-27 RX ORDER — PREDNISONE 20 MG/1
40 TABLET ORAL DAILY
Qty: 10 TABLET | Refills: 0 | Status: SHIPPED | OUTPATIENT
Start: 2024-02-27 | End: 2024-03-03

## 2024-02-27 RX ORDER — DOXYCYCLINE 100 MG/1
100 CAPSULE ORAL 2 TIMES DAILY
Qty: 20 CAPSULE | Refills: 0 | Status: CANCELLED | OUTPATIENT
Start: 2024-02-27 | End: 2024-03-08

## 2024-02-27 RX ORDER — CETIRIZINE HYDROCHLORIDE 10 MG/1
10 TABLET ORAL DAILY PRN
Qty: 90 TABLET | Refills: 3 | Status: SHIPPED | OUTPATIENT
Start: 2024-02-27

## 2024-02-27 ASSESSMENT — ENCOUNTER SYMPTOMS
SHORTNESS OF BREATH: 1
SINUS PRESSURE: 1
COUGH: 1
SINUS PAIN: 1
CHEST TIGHTNESS: 1
WHEEZING: 1
RHINORRHEA: 1

## 2024-02-27 ASSESSMENT — ASTHMA QUESTIONNAIRES: ACT_TOTALSCORE: 17

## 2024-02-27 ASSESSMENT — PAIN SCALES - GENERAL: PAINLEVEL: NO PAIN (0)

## 2024-02-27 NOTE — PATIENT INSTRUCTIONS
Continue with Advair 1 puff twice daily and montelukast 10 mg by mouth once daily at bedtime.   -Continue using albuterol inhaler 2-4 puffs every 4 hours as needed for chest tightness/wheezing/shortness of breath/persistent cough.  --Start prednisone 40 mg by mouth once a day for 5 days.     Continue with NetiPot, Flonase and azelastine as is.     Add Augmentin twice daily for 14 days.

## 2024-02-27 NOTE — LETTER
2/27/2024         RE: Dilia Solomon  [address] [facility] [address]        Dear Colleague,    Thank you for referring your patient, Dilia Solomon, to the [facility]. Please see a copy of my visit note below.    SUBJECTIVE:                                                                   Dilia Solomon presents today to our Allergy Clinic at [facility] for a follow up visit. She is a 58 year old female with moderate persistent asthma and allergic rhinitis.     She was diagnosed with asthma approximately in [id number]. Triggers are environmental allergies and viral respiratory infections.   Serum IgE for regional aeroallergen panel performed in June 2017 with multiple levels of various sensitivities to molds, cat, dog, dust mite, pollen of trees, grasses, and weeds.  She had 2 sinus surgeries in the past. The last one was in 2018.   She started allergen immunotherapy in July 2017. She was on allergen immunotherapy before that with Dr. Haines, and it was helpful at that time; however, symptoms got worse soon after stopping it.  PFT within normal limits  in December 2020 and July 2022.     In March of 2023, no red flags for primary immunodeficiency. Was treated with vit. D for deficiency. .  She is on allergen immunotherapy which she started in 2017.  Tolerates injections well.  At the end of last week, she started having sinus pressure, sinus pain, green anterior rhinorrhea and postnasal drainage, associated with significant nasal congestion.  Had a nosebleed yesterday as well.  Denies fever.  Denies sick contacts.  She continues having symptoms despite trying some NSAIDs, using [place] pot twice daily, intranasal fluticasone 2 sprays in each nostril once daily, azelastine 2 sprays in each nostril once-twice daily, montelukast 10 mg by mouth once daily.  With the current illness, her asthma symptoms also got worse.  She has wheezing, chest tightness, and  shortness of breath.  She has been using albuterol inhaler almost daily, but does not feel that it improves her symptoms.      Patient Active Problem List   Diagnosis     Need for prophylactic vaccination and inoculation against influenza     Sprain of interphalangeal (joint) of hand     Radial styloid tenosynovitis     Carpal tunnel syndrome     Synovial cyst of popliteal space     Pain in joint, lower leg     Hyperlipidemia LDL goal <130     Advanced directives, counseling/discussion     Moderate persistent asthma     Allergic rhinitis due to dust mite     Food allergy     FH: diabetes mellitus     Chronic allergic rhinitis due to animal hair and dander     Allergic rhinitis due to mold     Chronic seasonal allergic rhinitis due to pollen     Allergic conjunctivitis, bilateral     History of endoscopic sinus surgery     Chronic pansinusitis     Liver cyst     Class 2 severe obesity due to excess calories with serious comorbidity in adult (H)       Past Medical History:   Diagnosis Date     Injury, other and unspecified, shoulder and upper arm 9/03      Problem (# of Occurrences) Relation (Name,Age of Onset)    Cancer (1) Father: brain    Diabetes (1) Mother    Breast Cancer (1) Maternal Aunt    C.A.D. (1) Mother           Negative family history of: Cancer - colorectal          Past Surgical History:   Procedure Laterality Date     COLONOSCOPY N/A 10/6/2016    Procedure: COLONOSCOPY;  Surgeon: Shiva Johns MD;  Location: [facility]     ETHMOIDECTOMY  12/30/2013    Procedure: ETHMOIDECTOMY;  Bilateral Submucousal Reduction of InferiorTurbinates and Total Ethmoidectomy with Multiple Sinusotomies;  Surgeon: Lio More MD;  Location: [facility]     ETHMOIDECTOMY Bilateral 2/14/2018    Procedure: ETHMOIDECTOMY;  Bilateral anterior ethmoidectomy, bilateral maxillary antrostomy;  Surgeon: Santhosh Tierney MD;  Location: [facility]     SURGICAL HISTORY OF -   5/04    carpal tunnel release, bilateral     Social History      Socioeconomic History     Marital status: Single     Spouse name: None     Number of children: None     Years of education: None     Highest education level: None   Occupational History     Employer: [employer]    Tobacco Use     Smoking status: Never     Smokeless tobacco: Never   Vaping Use     Vaping Use: Never used   Substance and Sexual Activity     Alcohol use: No     Drug use: No     Sexual activity: Never   Other Topics Concern     Parent/sibling w/ CABG, MI or angioplasty before 65F 55M? Yes     Comment: mother   Social History Narrative    09/07/23        ENVIRONMENTAL HISTORY: [place] is heated with a forced air. They do not have central air conditioning. The patient's bedroom is furnished with hard pawel in bedroom, allergen mattress cover, allergen pillowcase cover and fabric window coverings.  Pets inside the house include 1 dog. There is not history of cockroach or mice infestation. There are no smokers in the house.  The house does have a damp basement.     Patient is currently living and taking care of a friend in the friends home.     Social Determinants of Health     Financial Resource Strain: Low Risk  (9/26/2023)    Financial Resource Strain      Within the past 12 months, have you or your family members you live with been unable to get utilities (heat, electricity) when it was really needed?: No   Food Insecurity: Low Risk  (9/26/2023)    Food Insecurity      Within the past 12 months, did you worry that your food would run out before you got money to buy more?: No      Within the past 12 months, did the food you bought just not last and you didn t have money to get more?: No   Transportation Needs: Low Risk  (9/26/2023)    Transportation Needs      Within the past 12 months, has lack of transportation kept you from medical appointments, getting your medicines, non-medical meetings or appointments, work, or from getting  things that you need?: No   Interpersonal Safety: Low Risk  (9/26/2023)    Interpersonal Safety      Do you feel physically and emotionally safe where you currently live?: Yes      Within the past 12 months, have you been hit, slapped, kicked or otherwise physically hurt by someone?: No      Within the past 12 months, have you been humiliated or emotionally abused in other ways by your partner or ex-partner?: No   Housing Stability: Low Risk  (9/26/2023)    Housing Stability      Do you have housing? : Yes      Are you worried about losing your housing?: No           Review of Systems   HENT:  Positive for congestion, nosebleeds, postnasal drip, rhinorrhea, sinus pressure, sinus pain and sneezing.    Respiratory:  Positive for cough, chest tightness, shortness of breath and wheezing.            Current Outpatient Medications:      ADVAIR DISKUS 250-50 MCG/ACT inhaler, Inhale 1 puff into the lungs 2 times daily, Disp: 3 each, Rfl: 3     albuterol (PROAIR HFA/PROVENTIL HFA/VENTOLIN HFA) 108 (90 Base) MCG/ACT inhaler, Inhale 2 puffs into the lungs every 4 hours as needed for shortness of breath / dyspnea or wheezing, Disp: 18 g, Rfl: 3     amoxicillin-clavulanate (AUGMENTIN) 875-125 MG tablet, Take 1 tablet by mouth 2 times daily for 14 days, Disp: 28 tablet, Rfl: 0     azelastine (ASTELIN) 0.1 % nasal spray, Spray 2 sprays into both nostrils 2 times daily as needed for rhinitis or allergies, Disp: 90 mL, Rfl: 3     azelastine (OPTIVAR) 0.05 % ophthalmic solution, Apply 1 drop to eye 2 times daily as needed (itchy/watery eyes), Disp: 18 mL, Rfl: 1     cetirizine (ZYRTEC) 10 MG tablet, Take 1 tablet (10 mg) by mouth daily as needed for allergies, Disp: 90 tablet, Rfl: 3     Emollient (CERAVE) CREA, Externally apply 1 dose. topically 2 times daily, Disp: 2 Bottle, Rfl: 11     fluticasone (FLONASE) 50 MCG/ACT nasal spray, Spray 2 sprays into both nostrils daily, Disp: 48 g, Rfl: 1     montelukast (SINGULAIR) 10 MG tablet,  Take 1 tablet (10 mg) by mouth At Bedtime, Disp: 90 tablet, Rfl: 3     MULTIVITAMIN OR, 1 tab daily, Disp: , Rfl:      predniSONE (DELTASONE) 20 MG tablet, Take 2 tablets (40 mg) by mouth daily for 5 days, Disp: 10 tablet, Rfl: 0     vitamin D3 (CHOLECALCIFEROL) 10 MCG (400 UNIT) capsule, Take 2 capsules by mouth daily, Disp: , Rfl:      albuterol (2.5 MG/3ML) 0.083% neb solution, Take 1 vial (2.5 mg) by nebulization every 4 hours as needed (Patient not taking: Reported on 9/26/2023), Disp: 1 Box, Rfl: 3     EPINEPHrine (ANY BX GENERIC EQUIV) 0.3 MG/0.3ML injection 2-pack, Inject 0.3 mLs (0.3 mg) into the muscle once as needed for anaphylaxis (Patient not taking: Reported on 2/27/2024), Disp: 2 each, Rfl: 3     loratadine (CLARITIN) 10 MG tablet, Take 1 tablet (10 mg) by mouth daily as needed for allergies (Patient not taking: Reported on 2/27/2024), Disp: 90 tablet, Rfl: 3     ORDER FOR ALLERGEN IMMUNOTHERAPY, Name of Mix: Mix #1  Mold Alternaria Tenuis 1:10 w/v, HS  0.5 ml Aspergillus Fumigatus 1:10 w/v, HS  0.5 ml Epicoccum Nigrum 1:10 w/v, HS 0.5 ml Hormodendrum Cladosporioides 1:10 w/v, HS 0.5 ml Penicillium Mix 1:10 w/v, HS  0.5 ml Diluent: HSA 2.5mL to 5ml, Disp: 5 mL, Rfl: PRN     ORDER FOR ALLERGEN IMMUNOTHERAPY, Name of Mix: Mix #2  Dust Mite, Cat, Dog Cat Hair, Standardized A.P. 10,000 BAU/mL, HS  2.0 ml  Dog Hair-Dander, UF  1:650 w/v, HS  1.0 ml   Dust Mites F 30,000AU/mL, HS  0.3 ml Dust Mites P. 30,000 AU/mL, HS  0.3 ml  Diluent: HSA 1.4mL to 5ml, Disp: 5 mL, Rfl: PRN     ORDER FOR ALLERGEN IMMUNOTHERAPY, Name of Mix: Mix #3 Grass,Tree  Dmitry,White 1:20w/v, HS 0.5ml Birch Mix 1:20w/v, HS 0.5ml Boxelder-Maple Mix BHR (Boxelder Hard Red) 1:20w/v, HS 0.5ml [place],Common 1:20w/v, HS 0.5ml Elm,American 1:20w/v, HS 0.5ml [place] Mix RW 1:20w/v, HS 0.5ml Oak Mix RVW 1:20w/v, HS 0.5ml [place] Key Tree,Black 1:20w/v, HS 0.5ml Suresh Grass (Std) 100,000 BAU/mL, HS 0.4ml Jonathan Grass 1:20w/v, HS 0.5ml Diluent:  HSA 0.1mL to 5ml, Disp: 5 mL, Rfl: PRN     ORDER FOR ALLERGEN IMMUNOTHERAPY, Name of Mix: Mix #4  Weeds Kochia 1:20 w/v, HS 0.5 ml Lamb's Quarters 1:20 w/v, HS 0.5 ml Nettle 1:20 w/v, HS 0.5 ml Plantain, English 1:20 w/v, HS 0.5 ml Ragweed Mixed 1:20 w/v ALK  0.5 ml Russian Thistle 1:20 w/v, HS 0.5 ml Sagebrush, Mugwort 1:20 w/v, HS 0.5 ml Sorrel, Sheep 1:20 w/v, HS 0.5 ml Diluent: HSA 1mL to 5ml, Disp: 5 mL, Rfl: PRN     order for DME, Equipment being ordered: Silicone heel cup, Disp: 1 Units, Rfl: 0     order for DME, Equipment being ordered: Silicone heel cup, Disp: 1 Units, Rfl: 0     triamcinolone (KENALOG) 0.1 % external cream, Apply sparingly to affected area two times daily as needed but not more than 14 days in a row. Spare face, armpits, neck, and groin. (Patient not taking: Reported on 2/27/2024), Disp: 80 g, Rfl: 1    Current Facility-Administered Medications:      lidocaine 1 % injection 2 mL, 2 mL, , , Nicol Walker MD, 2 mL at 03/29/23 0918     triamcinolone (KENALOG-40) injection 40 mg, 40 mg, , , Nicol Walker MD, 40 mg at 03/29/23 0918  Immunization History   Administered Date(s) Administered     COVID-19 12+ (2023-24) (Pfizer) 11/14/2023     COVID-19 Monovalent 18+ (Moderna) 03/19/2021, 04/16/2021, 11/02/2021     Influenza (IIV3) PF 12/12/2002, 11/28/2003, 10/24/2006, 02/11/2009, 10/05/2011, 11/15/2012, 10/07/2014     Influenza Vaccine 18-64 (Flublok) 10/02/2018, 10/30/2019, 10/28/2020, 10/15/2021, 03/09/2023, 09/26/2023     Influenza Vaccine >6 months,quad, PF 10/16/2013, 10/20/2015, 10/26/2016, 11/21/2017     Mantoux Tuberculin Skin Test 02/11/2009     Measles 10/22/1979     Pneumococcal 20 valent Conjugate (Prevnar 20) 09/26/2023     Pneumococcal 23 valent 10/05/2011     TDAP Vaccine (Adacel) 02/11/2009, 07/06/2018     Zoster recombinant adjuvanted (SHINGRIX) 12/02/2021, 02/02/2022     Allergies   Allergen Reactions     Clinoril [Sulindac]      Hives     Niacin Hives     Nsaids Hives      "Tolerates ibuprofen and aspirin without hives       Pineapple Hives     OBJECTIVE:                                                                 /84   Pulse 78   Resp 17   Ht 1.626 m (5' 4\")   Wt 93 kg (205 lb)   LMP 07/18/2015 (Approximate)   SpO2 98%   BMI 35.19 kg/m          Physical Exam  Vitals and nursing note reviewed.   Constitutional:       General: She is not in acute distress.     Appearance: She is not diaphoretic.   HENT:      Head: Normocephalic and atraumatic.      Right Ear: Tympanic membrane, ear canal and external ear normal.      Left Ear: Tympanic membrane, ear canal and external ear normal.      Nose: Septal deviation (mild, leftward) and mucosal edema present.      Right Turbinates: Enlarged.      Left Turbinates: Enlarged.      Comments: Mucoid secretions in both nostrils noted.  Mixed with scant amount of blood on the right side.     Mouth/Throat:      Lips: Pink.      Mouth: Mucous membranes are moist.      Pharynx: Oropharynx is clear. No pharyngeal swelling, oropharyngeal exudate or posterior oropharyngeal erythema.   Eyes:      General:         Right eye: No discharge.         Left eye: No discharge.      Conjunctiva/sclera: Conjunctivae normal.   Cardiovascular:      Rate and Rhythm: Normal rate and regular rhythm.      Heart sounds: Normal heart sounds. No murmur heard.  Pulmonary:      Effort: Pulmonary effort is normal. No respiratory distress.      Breath sounds: Normal breath sounds and air entry. No stridor, decreased air movement or transmitted upper airway sounds. No decreased breath sounds, wheezing, rhonchi or rales.   Musculoskeletal:         General: Normal range of motion.      Cervical back: Normal range of motion.   Skin:     General: Skin is warm.   Neurological:      Mental Status: She is alert and oriented to person, place, and time.   Psychiatric:         Mood and Affect: Mood normal.         Behavior: Behavior normal.           WORKUP:     Spirometry " was attempted but maneuvers were not reproducible.      ASSESSMENT/PLAN:    Moderate persistent asthma    Currently not well-controlled.  Triggered by upper respiratory infection.  No wheezing on exam.  Good air entry; however, the patient has been using albuterol, possibly masking some of the symptoms.  - Continue Advair 250/50 mcg 1 puff twice daily, montelukast 10 mg by mouth once daily, and albuterol as needed.  -Start prednisone 40 mg by mouth once daily for 5 days.    - BREATHING CAPACITY TEST [52149]  - predniSONE (DELTASONE) 20 MG tablet  Dispense: 10 tablet; Refill: 0    Acute sinusitis with coexisting condition, need prophylactic treatment  Not well-controlled.  Has a history of previous sinus surgeries.  - Will check for COVID.  - Continue intranasal fluticasone 1-2 sprays in each nostril once daily, azelastine 2 sprays in each nostril twice daily as needed, and NetiPot once-twice daily.  I azithromycin has not been effective in her case.  She used to feel better with doxycycline and Augmentin.  -Prescribed Augmentin 875/125 mg twice daily for 14 days.  She will also take prednisone for 5 days.    - Symptomatic COVID-19 Virus (Coronavirus) by PCR Nose  - amoxicillin-clavulanate (AUGMENTIN) 875-125 MG tablet  Dispense: 28 tablet; Refill: 0         Thank you for allowing us to participate in the care of this patient. Please feel free to contact us if there are any questions or concerns about the patient.    Disclaimer: This note consists of symbols derived from keyboarding, dictation and/or voice recognition software. As a result, there may be errors in the script that have gone undetected. Please consider this when interpreting information found in this chart.    Michael Lujan MD, FAAAAI, FACAAI  Allergy, Asthma and Immunology     [facility]        Again, thank you for allowing me to participate in the care of your patient.         Sincerely,        Michael Lujan MD

## 2024-02-27 NOTE — PROGRESS NOTES
SUBJECTIVE:                                                                   Dilia Solomon presents today to our Allergy Clinic at [facility] for a follow up visit. She is a 58 year old female with moderate persistent asthma and allergic rhinitis.     She was diagnosed with asthma approximately in [id number]. Triggers are environmental allergies and viral respiratory infections.   Serum IgE for regional aeroallergen panel performed in June 2017 with multiple levels of various sensitivities to molds, cat, dog, dust mite, pollen of trees, grasses, and weeds.  She had 2 sinus surgeries in the past. The last one was in 2018.   She started allergen immunotherapy in July 2017. She was on allergen immunotherapy before that with Dr. Haines, and it was helpful at that time; however, symptoms got worse soon after stopping it.  PFT within normal limits  in December 2020 and July 2022.     In March of 2023, no red flags for primary immunodeficiency. Was treated with vit. D for deficiency. .  She is on allergen immunotherapy which she started in 2017.  Tolerates injections well.  At the end of last week, she started having sinus pressure, sinus pain, green anterior rhinorrhea and postnasal drainage, associated with significant nasal congestion.  Had a nosebleed yesterday as well.  Denies fever.  Denies sick contacts.  She continues having symptoms despite trying some NSAIDs, using Colette pot twice daily, intranasal fluticasone 2 sprays in each nostril once daily, azelastine 2 sprays in each nostril once-twice daily, montelukast 10 mg by mouth once daily.  With the current illness, her asthma symptoms also got worse.  She has wheezing, chest tightness, and shortness of breath.  She has been using albuterol inhaler almost daily, but does not feel that it improves her symptoms.      Patient Active Problem List   Diagnosis    Need for prophylactic vaccination and inoculation against influenza    Sprain of  interphalangeal (joint) of hand    Radial styloid tenosynovitis    Carpal tunnel syndrome    Synovial cyst of popliteal space    Pain in joint, lower leg    Hyperlipidemia LDL goal <130    Advanced directives, counseling/discussion    Moderate persistent asthma    Allergic rhinitis due to dust mite    Food allergy    FH: diabetes mellitus    Chronic allergic rhinitis due to animal hair and dander    Allergic rhinitis due to mold    Chronic seasonal allergic rhinitis due to pollen    Allergic conjunctivitis, bilateral    History of endoscopic sinus surgery    Chronic pansinusitis    Liver cyst    Class 2 severe obesity due to excess calories with serious comorbidity in adult (H)       Past Medical History:   Diagnosis Date    Injury, other and unspecified, shoulder and upper arm 9/03      Problem (# of Occurrences) Relation (Name,Age of Onset)    Cancer (1) Father: brain    Diabetes (1) Mother    Breast Cancer (1) Maternal Aunt    C.A.D. (1) Mother           Negative family history of: Cancer - colorectal          Past Surgical History:   Procedure Laterality Date    COLONOSCOPY N/A 10/6/2016    Procedure: COLONOSCOPY;  Surgeon: Shiva Johns MD;  Location: [facility]    ETHMOIDECTOMY  12/30/2013    Procedure: ETHMOIDECTOMY;  Bilateral Submucousal Reduction of InferiorTurbinates and Total Ethmoidectomy with Multiple Sinusotomies;  Surgeon: Lio More MD;  Location: [facility]    ETHMOIDECTOMY Bilateral 2/14/2018    Procedure: ETHMOIDECTOMY;  Bilateral anterior ethmoidectomy, bilateral maxillary antrostomy;  Surgeon: Santhosh Tierney MD;  Location: [facility]    SURGICAL HISTORY OF -   5/04    carpal tunnel release, bilateral     Social History     Socioeconomic History    Marital status: Single     Spouse name: None    Number of children: None    Years of education: None    Highest education level: None   Occupational History     Employer: [employer]    Tobacco Use    Smoking status: Never    Smokeless  tobacco: Never   Vaping Use    Vaping Use: Never used   Substance and Sexual Activity    Alcohol use: No    Drug use: No    Sexual activity: Never   Other Topics Concern    Parent/sibling w/ CABG, MI or angioplasty before 65F 55M? Yes     Comment: mother   Social History Narrative    09/07/23        ENVIRONMENTAL HISTORY: [place] The home is heated with a forced air. They do not have central air conditioning. The patient's bedroom is furnished with hard pawel in bedroom, allergen mattress cover, allergen pillowcase cover and fabric window coverings.  Pets inside the house include 1 dog. There is not history of cockroach or mice infestation. There are no smokers in the house.  The house does have a damp basement.     Patient is currently living and taking care of a friend in the friends home.     Social Determinants of Health     Financial Resource Strain: Low Risk  (9/26/2023)    Financial Resource Strain     Within the past 12 months, have you or your family members you live with been unable to get utilities (heat, electricity) when it was really needed?: No   Food Insecurity: Low Risk  (9/26/2023)    Food Insecurity     Within the past 12 months, did you worry that your food would run out before you got money to buy more?: No     Within the past 12 months, did the food you bought just not last and you didn t have money to get more?: No   Transportation Needs: Low Risk  (9/26/2023)    Transportation Needs     Within the past 12 months, has lack of transportation kept you from medical appointments, getting your medicines, non-medical meetings or appointments, work, or from getting things that you need?: No   Interpersonal Safety: Low Risk  (9/26/2023)    Interpersonal Safety     Do you feel physically and emotionally safe where you currently live?: Yes     Within the past 12 months, have you been hit, slapped, kicked or otherwise physically hurt by someone?: No      Within the past 12 months, have you been humiliated or emotionally abused in other ways by your partner or ex-partner?: No   Housing Stability: Low Risk  (9/26/2023)    Housing Stability     Do you have housing? : Yes     Are you worried about losing your housing?: No           Review of Systems   HENT:  Positive for congestion, nosebleeds, postnasal drip, rhinorrhea, sinus pressure, sinus pain and sneezing.    Respiratory:  Positive for cough, chest tightness, shortness of breath and wheezing.            Current Outpatient Medications:     ADVAIR DISKUS 250-50 MCG/ACT inhaler, Inhale 1 puff into the lungs 2 times daily, Disp: 3 each, Rfl: 3    albuterol (PROAIR HFA/PROVENTIL HFA/VENTOLIN HFA) 108 (90 Base) MCG/ACT inhaler, Inhale 2 puffs into the lungs every 4 hours as needed for shortness of breath / dyspnea or wheezing, Disp: 18 g, Rfl: 3    amoxicillin-clavulanate (AUGMENTIN) 875-125 MG tablet, Take 1 tablet by mouth 2 times daily for 14 days, Disp: 28 tablet, Rfl: 0    azelastine (ASTELIN) 0.1 % nasal spray, Spray 2 sprays into both nostrils 2 times daily as needed for rhinitis or allergies, Disp: 90 mL, Rfl: 3    azelastine (OPTIVAR) 0.05 % ophthalmic solution, Apply 1 drop to eye 2 times daily as needed (itchy/watery eyes), Disp: 18 mL, Rfl: 1    cetirizine (ZYRTEC) 10 MG tablet, Take 1 tablet (10 mg) by mouth daily as needed for allergies, Disp: 90 tablet, Rfl: 3    Emollient (CERAVE) CREA, Externally apply 1 dose. topically 2 times daily, Disp: 2 Bottle, Rfl: 11    fluticasone (FLONASE) 50 MCG/ACT nasal spray, Spray 2 sprays into both nostrils daily, Disp: 48 g, Rfl: 1    montelukast (SINGULAIR) 10 MG tablet, Take 1 tablet (10 mg) by mouth At Bedtime, Disp: 90 tablet, Rfl: 3    MULTIVITAMIN OR, 1 tab daily, Disp: , Rfl:     predniSONE (DELTASONE) 20 MG tablet, Take 2 tablets (40 mg) by mouth daily for 5 days, Disp: 10 tablet, Rfl: 0    vitamin D3 (CHOLECALCIFEROL) 10 MCG (400 UNIT) capsule, Take 2 capsules  by mouth daily, Disp: , Rfl:     albuterol (2.5 MG/3ML) 0.083% neb solution, Take 1 vial (2.5 mg) by nebulization every 4 hours as needed (Patient not taking: Reported on 9/26/2023), Disp: 1 Box, Rfl: 3    EPINEPHrine (ANY BX GENERIC EQUIV) 0.3 MG/0.3ML injection 2-pack, Inject 0.3 mLs (0.3 mg) into the muscle once as needed for anaphylaxis (Patient not taking: Reported on 2/27/2024), Disp: 2 each, Rfl: 3    loratadine (CLARITIN) 10 MG tablet, Take 1 tablet (10 mg) by mouth daily as needed for allergies (Patient not taking: Reported on 2/27/2024), Disp: 90 tablet, Rfl: 3    ORDER FOR ALLERGEN IMMUNOTHERAPY, Name of Mix: Mix #1  Mold Alternaria Tenuis 1:10 w/v, HS  0.5 ml Aspergillus Fumigatus 1:10 w/v, HS  0.5 ml Epicoccum Nigrum 1:10 w/v, HS 0.5 ml Hormodendrum Cladosporioides 1:10 w/v, HS 0.5 ml Penicillium Mix 1:10 w/v, HS  0.5 ml Diluent: HSA 2.5mL to 5ml, Disp: 5 mL, Rfl: PRN    ORDER FOR ALLERGEN IMMUNOTHERAPY, Name of Mix: Mix #2  Dust Mite, Cat, Dog Cat Hair, Standardized A.P. 10,000 BAU/mL, HS  2.0 ml  Dog Hair-Dander, UF  1:650 w/v, HS  1.0 ml   Dust Mites F 30,000AU/mL, HS  0.3 ml Dust Mites P. 30,000 AU/mL, HS  0.3 ml  Diluent: HSA 1.4mL to 5ml, Disp: 5 mL, Rfl: PRN    ORDER FOR ALLERGEN IMMUNOTHERAPY, Name of Mix: Mix #3 Grass,Tree  Dmitry,White 1:20w/v, HS 0.5ml Birch Mix 1:20w/v, HS 0.5ml Boxelder-Maple Mix BHR (Boxelder Hard Red) 1:20w/v, HS 0.5ml Clallam,Common 1:20w/v, HS 0.5ml Elm,American 1:20w/v, HS 0.5ml Blaine Mix RW 1:20w/v, HS 0.5ml Oak Mix RVW 1:20w/v, HS 0.5ml [place] Tree,Black 1:20w/v, HS 0.5ml Suresh Grass (Std) 100,000 BAU/mL, HS 0.4ml Jonathan Grass 1:20w/v, HS 0.5ml Diluent: HSA 0.1mL to 5ml, Disp: 5 mL, Rfl: PRN    ORDER FOR ALLERGEN IMMUNOTHERAPY, Name of Mix: Mix #4  Weeds Kochia 1:20 w/v, HS 0.5 ml Lamb's Quarters 1:20 w/v, HS 0.5 ml Nettle 1:20 w/v, HS 0.5 ml Plantain, English 1:20 w/v, HS 0.5 ml Ragweed Mixed 1:20 w/v ALK  0.5 ml Russian Thistle 1:20 w/v, HS 0.5 ml Sagebrush,  "Mugwort 1:20 w/v, HS 0.5 ml Sorrel, Sheep 1:20 w/v, HS 0.5 ml Diluent: HSA 1mL to 5ml, Disp: 5 mL, Rfl: PRN    order for DME, Equipment being ordered: Silicone heel cup, Disp: 1 Units, Rfl: 0    order for DME, Equipment being ordered: Silicone heel cup, Disp: 1 Units, Rfl: 0    triamcinolone (KENALOG) 0.1 % external cream, Apply sparingly to affected area two times daily as needed but not more than 14 days in a row. Spare face, armpits, neck, and groin. (Patient not taking: Reported on 2/27/2024), Disp: 80 g, Rfl: 1    Current Facility-Administered Medications:     lidocaine 1 % injection 2 mL, 2 mL, , , Nicol Walker MD, 2 mL at 03/29/23 0918    triamcinolone (KENALOG-40) injection 40 mg, 40 mg, , , Nicol Walker MD, 40 mg at 03/29/23 0918  Immunization History   Administered Date(s) Administered    COVID-19 12+ (2023-24) (Pfizer) 11/14/2023    COVID-19 Monovalent 18+ (Moderna) 03/19/2021, 04/16/2021, 11/02/2021    Influenza (IIV3) PF 12/12/2002, 11/28/2003, 10/24/2006, 02/11/2009, 10/05/2011, 11/15/2012, 10/07/2014    Influenza Vaccine 18-64 (Flublok) 10/02/2018, 10/30/2019, 10/28/2020, 10/15/2021, 03/09/2023, 09/26/2023    Influenza Vaccine >6 months,quad, PF 10/16/2013, 10/20/2015, 10/26/2016, 11/21/2017    Mantoux Tuberculin Skin Test 02/11/2009    Measles 10/22/1979    Pneumococcal 20 valent Conjugate (Prevnar 20) 09/26/2023    Pneumococcal 23 valent 10/05/2011    TDAP Vaccine (Adacel) 02/11/2009, 07/06/2018    Zoster recombinant adjuvanted (SHINGRIX) 12/02/2021, 02/02/2022     Allergies   Allergen Reactions    Clinoril [Sulindac]      Hives    Niacin Hives    Nsaids Hives     Tolerates ibuprofen and aspirin without hives      Pineapple Hives     OBJECTIVE:                                                                 /84   Pulse 78   Resp 17   Ht 1.626 m (5' 4\")   Wt 93 kg (205 lb)   LMP 07/18/2015 (Approximate)   SpO2 98%   BMI 35.19 kg/m          Physical Exam  Vitals and nursing note " reviewed.   Constitutional:       General: She is not in acute distress.     Appearance: She is not diaphoretic.   HENT:      Head: Normocephalic and atraumatic.      Right Ear: Tympanic membrane, ear canal and external ear normal.      Left Ear: Tympanic membrane, ear canal and external ear normal.      Nose: Septal deviation (mild, leftward) and mucosal edema present.      Right Turbinates: Enlarged.      Left Turbinates: Enlarged.      Comments: Mucoid secretions in both nostrils noted.  Mixed with scant amount of blood on the right side.     Mouth/Throat:      Lips: Pink.      Mouth: Mucous membranes are moist.      Pharynx: Oropharynx is clear. No pharyngeal swelling, oropharyngeal exudate or posterior oropharyngeal erythema.   Eyes:      General:         Right eye: No discharge.         Left eye: No discharge.      Conjunctiva/sclera: Conjunctivae normal.   Cardiovascular:      Rate and Rhythm: Normal rate and regular rhythm.      Heart sounds: Normal heart sounds. No murmur heard.  Pulmonary:      Effort: Pulmonary effort is normal. No respiratory distress.      Breath sounds: Normal breath sounds and air entry. No stridor, decreased air movement or transmitted upper airway sounds. No decreased breath sounds, wheezing, rhonchi or rales.   Musculoskeletal:         General: Normal range of motion.      Cervical back: Normal range of motion.   Skin:     General: Skin is warm.   Neurological:      Mental Status: She is alert and oriented to person, place, and time.   Psychiatric:         Mood and Affect: Mood normal.         Behavior: Behavior normal.           WORKUP:     Spirometry was attempted but maneuvers were not reproducible.      ASSESSMENT/PLAN:    Moderate persistent asthma    Currently not well-controlled.  Triggered by upper respiratory infection.  No wheezing on exam.  Good air entry; however, the patient has been using albuterol, possibly masking some of the symptoms.  - Continue Advair 250/50 mcg  1 puff twice daily, montelukast 10 mg by mouth once daily, and albuterol as needed.  -Start prednisone 40 mg by mouth once daily for 5 days.    - BREATHING CAPACITY TEST [90876]  - predniSONE (DELTASONE) 20 MG tablet  Dispense: 10 tablet; Refill: 0    Acute sinusitis with coexisting condition, need prophylactic treatment  Not well-controlled.  Has a history of previous sinus surgeries.  - Will check for COVID.  - Continue intranasal fluticasone 1-2 sprays in each nostril once daily, azelastine 2 sprays in each nostril twice daily as needed, and NetiPot once-twice daily.  I azithromycin has not been effective in her case.  She used to feel better with doxycycline and Augmentin.  -Prescribed Augmentin 875/125 mg twice daily for 14 days.  She will also take prednisone for 5 days.    - Symptomatic COVID-19 Virus (Coronavirus) by PCR Nose  - amoxicillin-clavulanate (AUGMENTIN) 875-125 MG tablet  Dispense: 28 tablet; Refill: 0         Thank you for allowing us to participate in the care of this patient. Please feel free to contact us if there are any questions or concerns about the patient.    Disclaimer: This note consists of symbols derived from keyboarding, dictation and/or voice recognition software. As a result, there may be errors in the script that have gone undetected. Please consider this when interpreting information found in this chart.    Michael Lujan MD, FAAAAI, FACAAI  Allergy, Asthma and Immunology     [facility]

## 2024-02-27 NOTE — TELEPHONE ENCOUNTER
Prescription approved per [facility] Refill Protocol.    Kaylee GRANT RN  Specialty/Allergy Clinics

## 2024-02-28 LAB — SARS-COV-2 RNA RESP QL NAA+PROBE: NEGATIVE

## 2024-02-28 NOTE — RESULT ENCOUNTER NOTE
[facility] message sent:     Hans Dbobs  Your COVID test was negative.  - No changes in the previously discussed treatment plan.

## 2024-03-05 ENCOUNTER — ALLIED HEALTH/NURSE VISIT (OUTPATIENT)
Dept: ALLERGY | Facility: CLINIC | Age: 59
End: 2024-03-05
Payer: COMMERCIAL

## 2024-03-05 DIAGNOSIS — J45.40 MODERATE PERSISTENT ASTHMA, UNCOMPLICATED: ICD-10-CM

## 2024-03-05 DIAGNOSIS — J30.89 ALLERGIC RHINITIS DUE TO DUST MITE: ICD-10-CM

## 2024-03-05 DIAGNOSIS — J30.81 CHRONIC ALLERGIC RHINITIS DUE TO ANIMAL HAIR AND DANDER: ICD-10-CM

## 2024-03-05 DIAGNOSIS — J45.40 MODERATE PERSISTENT ASTHMA WITHOUT COMPLICATION: ICD-10-CM

## 2024-03-05 DIAGNOSIS — H10.13 ALLERGIC CONJUNCTIVITIS OF BOTH EYES: ICD-10-CM

## 2024-03-05 DIAGNOSIS — J30.1 CHRONIC SEASONAL ALLERGIC RHINITIS DUE TO POLLEN: Primary | ICD-10-CM

## 2024-03-05 DIAGNOSIS — J30.89 ALLERGIC RHINITIS DUE TO MOLD: ICD-10-CM

## 2024-03-05 PROCEDURE — 95117 IMMUNOTHERAPY INJECTIONS: CPT

## 2024-03-05 RX ORDER — ALBUTEROL SULFATE 90 UG/1
2 AEROSOL, METERED RESPIRATORY (INHALATION) EVERY 4 HOURS PRN
Qty: 18 G | Refills: 3 | Status: SHIPPED | OUTPATIENT
Start: 2024-03-05 | End: 2024-09-12

## 2024-03-05 ASSESSMENT — ASTHMA QUESTIONNAIRES: ACT_TOTALSCORE: 20

## 2024-03-05 NOTE — TELEPHONE ENCOUNTER
Prescription approved per [facility] Refill Protocol.    Kaylee GRANT RN  Specialty/Allergy Clinics

## 2024-04-02 ENCOUNTER — ALLIED HEALTH/NURSE VISIT (OUTPATIENT)
Dept: ALLERGY | Facility: CLINIC | Age: 59
End: 2024-04-02
Payer: COMMERCIAL

## 2024-04-02 DIAGNOSIS — J30.89 ALLERGIC RHINITIS DUE TO DUST MITE: ICD-10-CM

## 2024-04-02 DIAGNOSIS — H10.13 ALLERGIC CONJUNCTIVITIS OF BOTH EYES: ICD-10-CM

## 2024-04-02 DIAGNOSIS — J30.1 CHRONIC SEASONAL ALLERGIC RHINITIS DUE TO POLLEN: ICD-10-CM

## 2024-04-02 DIAGNOSIS — J30.81 CHRONIC ALLERGIC RHINITIS DUE TO ANIMAL HAIR AND DANDER: ICD-10-CM

## 2024-04-02 DIAGNOSIS — J45.40 MODERATE PERSISTENT ASTHMA, UNCOMPLICATED: Primary | ICD-10-CM

## 2024-04-02 DIAGNOSIS — J30.89 ALLERGIC RHINITIS DUE TO MOLD: ICD-10-CM

## 2024-04-02 PROCEDURE — 95117 IMMUNOTHERAPY INJECTIONS: CPT

## 2024-05-07 ENCOUNTER — TELEPHONE (OUTPATIENT)
Dept: ALLERGY | Facility: CLINIC | Age: 59
End: 2024-05-07

## 2024-05-07 NOTE — TELEPHONE ENCOUNTER
Patient received 0.5 ml in Red 1:1 vial on 4/2/2024, and ordered top dose is 0.5 in Red 1:1. Patient is scheduled for an injection 5/7/2024. This will be 36 days after patient's last injection. Please advise new dosing orders, building schedule, and date which orders are valid through.     New orders will be added to immunotherapy flowsheet once they are received.     Gia Bae RN

## 2024-05-08 ENCOUNTER — ALLIED HEALTH/NURSE VISIT (OUTPATIENT)
Dept: ALLERGY | Facility: CLINIC | Age: 59
End: 2024-05-08
Payer: COMMERCIAL

## 2024-05-08 DIAGNOSIS — J30.1 CHRONIC SEASONAL ALLERGIC RHINITIS DUE TO POLLEN: Primary | ICD-10-CM

## 2024-05-08 DIAGNOSIS — H10.13 ALLERGIC CONJUNCTIVITIS OF BOTH EYES: ICD-10-CM

## 2024-05-08 DIAGNOSIS — J30.81 CHRONIC ALLERGIC RHINITIS DUE TO ANIMAL HAIR AND DANDER: ICD-10-CM

## 2024-05-08 DIAGNOSIS — J30.89 ALLERGIC RHINITIS DUE TO DUST MITE: ICD-10-CM

## 2024-05-08 DIAGNOSIS — J30.89 ALLERGIC RHINITIS DUE TO MOLD: ICD-10-CM

## 2024-05-08 PROCEDURE — 95117 IMMUNOTHERAPY INJECTIONS: CPT

## 2024-06-01 ENCOUNTER — HOSPITAL ENCOUNTER (EMERGENCY)
Facility: CLINIC | Age: 59
Discharge: HOME | End: 2024-06-01
Attending: EMERGENCY MEDICINE | Admitting: EMERGENCY MEDICINE
Payer: COMMERCIAL

## 2024-06-01 ENCOUNTER — NURSE TRIAGE (OUTPATIENT)
Dept: NURSING | Facility: CLINIC | Age: 59
End: 2024-06-01
Payer: COMMERCIAL

## 2024-06-01 VITALS
RESPIRATION RATE: 20 BRPM | HEART RATE: 91 BPM | WEIGHT: 205 LBS | OXYGEN SATURATION: 97 % | TEMPERATURE: 98.3 F | BODY MASS INDEX: 35.19 KG/M2 | SYSTOLIC BLOOD PRESSURE: 147 MMHG | DIASTOLIC BLOOD PRESSURE: 82 MMHG

## 2024-06-01 DIAGNOSIS — J01.90 ACUTE SINUSITIS WITH COEXISTING CONDITION REQUIRING PROPHYLACTIC TREATMENT: ICD-10-CM

## 2024-06-01 LAB
FLUAV RNA SPEC QL NAA+PROBE: NEGATIVE
FLUBV RNA RESP QL NAA+PROBE: NEGATIVE
RSV RNA SPEC NAA+PROBE: NEGATIVE
SARS-COV-2 RNA RESP QL NAA+PROBE: NEGATIVE

## 2024-06-01 PROCEDURE — 99284 EMERGENCY DEPT VISIT MOD MDM: CPT

## 2024-06-01 PROCEDURE — 99284 EMERGENCY DEPT VISIT MOD MDM: CPT | Performed by: EMERGENCY MEDICINE

## 2024-06-01 PROCEDURE — 93010 ELECTROCARDIOGRAM REPORT: CPT | Performed by: EMERGENCY MEDICINE

## 2024-06-01 PROCEDURE — 87637 SARSCOV2&INF A&B&RSV AMP PRB: CPT | Performed by: EMERGENCY MEDICINE

## 2024-06-01 PROCEDURE — 93005 ELECTROCARDIOGRAM TRACING: CPT

## 2024-06-01 ASSESSMENT — ACTIVITIES OF DAILY LIVING (ADL)
ADLS_ACUITY_SCORE: 35
ADLS_ACUITY_SCORE: 33

## 2024-06-01 NOTE — TELEPHONE ENCOUNTER
"Nurse Triage SBAR    Is this a 2nd Level Triage? NO    Situation: Nasal congestion, chest pain    Background: Nasal congestion with slight cough. Started with an irritated throat on Thursday, nasal discharge started yesterday. Having some green nasal discharge and slight cough that started today. Has had asthma attacks in the past from nasal congestion. Reports constant chest pain which is worse with coughing.     Assessment: Denies severe difficulty breathing, signs of shock or fever.     Protocol Recommended Disposition:   Call  Now, See PCP Within 24 Hours    Recommendation: Call  now due to constant chest pain, patient agrees with plan.        Reason for Disposition   Earache   Chest pain lasting longer than 5 minutes and ANY of the following:    history of heart disease  (i.e., heart attack, bypass surgery, angina, angioplasty, CHF; not just a heart murmur)    described as crushing, pressure-like, or heavy    age > 50    age > 30 AND at least one cardiac risk factor (i.e., hypertension, diabetes, obesity, smoker or strong family history of heart disease)    not relieved with nitroglycerin    Additional Information   Negative: SEVERE difficulty breathing (e.g., struggling for each breath, speaks in single words)   Negative: Difficult to awaken or acting confused (e.g., disoriented, slurred speech)   Negative: Shock suspected (e.g., cold/pale/clammy skin, too weak to stand, low BP, rapid pulse)   Negative: Passed out (i.e., lost consciousness, collapsed and was not responding)   Negative: Heart beating < 50 beats per minute OR > 140 beats per minute   Negative: Visible sweat on face or sweat dripping down face   Negative: Sounds like a life-threatening emergency to the triager   Negative: SEVERE chest pain   Negative: [1] Chest pain (or \"angina\") comes and goes AND [2] is happening more often (increasing in frequency) or getting worse (increasing in severity)  (Exception: Chest pains that last only " "a few seconds.)   Negative: Pain also in shoulder(s) or arm(s) or jaw  (Exception: Pain is clearly made worse by movement.)   Negative: Difficulty breathing   Negative: Dizziness or lightheadedness   Negative: Coughing up blood   Negative: Cocaine use within last 3 days   Negative: Major surgery in past month   Negative: Hip or leg fracture (broken bone) in past month (or had cast on leg or ankle in past month)   Negative: Illness requiring prolonged bedrest in past month (e.g., immobilization, long hospital stay)   Negative: Long-distance travel in past month (e.g., car, bus, train, plane; with trip lasting 6 or more hours)   Negative: History of prior \"blood clot\" in leg or lungs (i.e., deep vein thrombosis, pulmonary embolism)   Negative: History of inherited increased risk of blood clots (e.g., Factor 5 Leiden, Anti-thrombin 3, Protein C or Protein S deficiency, Prothrombin mutation)   Negative: Cancer treatment in past six months (or has cancer now)   Negative: SEVERE difficulty breathing (e.g., struggling for each breath, speaks in single words)   Negative: Sounds like a life-threatening emergency to the triager   Negative: [1] Sinus infection AND [2] taking an antibiotic AND [3] symptoms continue   Negative: [1] Difficulty breathing AND [2] not from stuffy nose (e.g., not relieved by cleaning out the nose)   Negative: [1] SEVERE headache AND [2] fever   Negative: [1] Redness or swelling on the cheek, forehead or around the eye AND [2] fever   Negative: Fever > 104 F (40 C)   Negative: Patient sounds very sick or weak to the triager   Negative: [1] SEVERE pain AND [2] not improved 2 hours after pain medicine   Negative: [1] Redness or swelling on the cheek, forehead or around the eye AND [2] no fever   Negative: [1] Fever > 101 F (38.3 C) AND [2] age > 60 years   Negative: [1] Fever > 100.0 F (37.8 C) AND [2] bedridden (e.g., CVA, chronic illness, recovering from surgery)   Negative: [1] Fever > 100.0 F (37.8 " C) AND [2] diabetes mellitus or weak immune system (e.g., HIV positive, cancer chemo, splenectomy, organ transplant, chronic steroids)   Negative: Fever present > 3 days (72 hours)   Negative: [1] Fever returns after gone for over 24 hours AND [2] symptoms worse or not improved   Negative: [1] Sinus pain (not just congestion) AND [2] fever    Protocols used: Sinus Pain or Congestion-A-AH, Chest Pain-A-AH

## 2024-06-02 NOTE — DISCHARGE INSTRUCTIONS
Take Augmentin as prescribed.     Continue other medications and nasal rinse as previously instructed.     If your symptoms worsen or you develop new or concerning symptoms, please return to the Emergency Department for further evaluation and treatment.  Otherwise, follow up with your primary care team.

## 2024-06-02 NOTE — ED TRIAGE NOTES
Pt arrives with concerns nasal congestion, cough, and runny nose since Thursday. Pt has hx of asthma and has been taking her asthma medication and nasal rinses.      Triage Assessment (Adult)       Row Name 06/01/24 1939          Triage Assessment    Airway WDL WDL        Respiratory WDL    Respiratory WDL X;cough     Cough Frequency infrequent     Cough Type dry        Skin Circulation/Temperature WDL    Skin Circulation/Temperature WDL WDL        Cardiac WDL    Cardiac WDL WDL        Peripheral/Neurovascular WDL    Peripheral Neurovascular WDL WDL        Cognitive/Neuro/Behavioral WDL    Cognitive/Neuro/Behavioral WDL WDL

## 2024-06-02 NOTE — ED PROVIDER NOTES
History     Chief Complaint   Patient presents with    URI     HPI  Dilia Solomon is a 59 year old female with history of moderate persistent asthma, chronic pansinusitis, status post ethmoidectomy x 2 allergic rhinitis, sinusitis w/ three days of nasal congestion, rhinorrhea, and cough.  Symptoms started with scratchy throat then intermittent cough and nasal congestion and rhinorrhea with sinus pressure.  Has not taken her temperature at home.  No nausea or vomiting.  No wheezing.  States that she sees.  Allergy asthma specialist and frequently is given antibiotics when has symptoms of sinusitis.  States she has had a productive productive cough of green sputum.  No chest pain or abdominal pain.  Is taking Advair, albuterol, cetirizine, fluticasone, and Singulair.    Patient also complained of chest pain with cough.  Review of chart showed was nurse triage call for nasal congestion but was advised to call 911 due to chest pain.  Pain only when coughing.  No associated dyspnea.  No history of coronary artery disease.  No nausea, vomiting or diaphoresis.      The patient's PMHx, Surgical Hx, Allergies, and Medications were all reviewed with the patient.    Allergies:  Allergies   Allergen Reactions    Clinoril [Sulindac]      Hives    Niacin Hives    Nsaids Hives     Tolerates ibuprofen and aspirin without hives      Pineapple Hives       Problem List:    Patient Active Problem List    Diagnosis Date Noted    Class 2 severe obesity due to excess calories with serious comorbidity in adult (H) 02/27/2024     Priority: Medium    Liver cyst 09/26/2023     Priority: Medium    History of endoscopic sinus surgery 10/25/2019     Priority: Medium    Chronic pansinusitis 10/25/2019     Priority: Medium    Allergic conjunctivitis, bilateral 06/05/2019     Priority: Medium    Allergic rhinitis due to dust mite 07/13/2017     Priority: Medium     IgE testing 6/28/17 positive for cat, dog, dust mite, trees, grass, weeds, and  "molds      Chronic allergic rhinitis due to animal hair and dander 07/13/2017     Priority: Medium     IgE testing 6/28/17 positive for cat, dog, dust mite, trees, grass, weeds, and molds      Allergic rhinitis due to mold 07/13/2017     Priority: Medium     IgE testing 6/28/17 positive for cat, dog, dust mite, trees, grass, weeds, and molds      Chronic seasonal allergic rhinitis due to pollen 07/13/2017     Priority: Medium     IgE testing 6/28/17 positive for cat, dog, dust mite, trees, grass, weeds, and molds      FH: diabetes mellitus 09/01/2015     Priority: Medium    Food allergy 02/17/2015     Priority: Medium    Moderate persistent asthma 06/28/2011     Priority: Medium     Spirometry results not clear.  Some FVC down with URI, improved over time.  Never had decreased fev/fvc ratio.  Consider trial off advair to see how she responds.        Advanced directives, counseling/discussion 05/27/2011     Priority: Medium     Patient does not have an Advance/Health Care Directive (HCD), given \"What is Advance Care Planning?\" flyer.    Ruma Romero  May 27, 2011        Hyperlipidemia LDL goal <130 01/12/2011     Priority: Medium    Synovial cyst of popliteal space 07/28/2007     Priority: Medium    Pain in joint, lower leg 07/28/2007     Priority: Medium    Carpal tunnel syndrome 07/04/2007     Priority: Medium     S/p surgery 04-05/2004 bilat      Radial styloid tenosynovitis 04/24/2007     Priority: Medium    Need for prophylactic vaccination and inoculation against influenza 10/24/2006     Priority: Medium    Sprain of interphalangeal (joint) of hand 10/24/2006     Priority: Medium        Past Medical History:    Past Medical History:   Diagnosis Date    Injury, other and unspecified, shoulder and upper arm 9/03       Past Surgical History:    Past Surgical History:   Procedure Laterality Date    COLONOSCOPY N/A 10/6/2016    Procedure: COLONOSCOPY;  Surgeon: Shiva Johns MD;  Location: [facility]  " 12/30/2013    Procedure: ETHMOIDECTOMY;  Bilateral Submucousal Reduction of InferiorTurbinates and Total Ethmoidectomy with Multiple Sinusotomies;  Surgeon: Lio More MD;  Location: [facility] OR    ETHMOIDECTOMY Bilateral 2/14/2018    Procedure: ETHMOIDECTOMY;  Bilateral anterior ethmoidectomy, bilateral maxillary antrostomy;  Surgeon: Santhosh Tierney MD;  Location: [facility] OR    SURGICAL HISTORY OF -   5/04    carpal tunnel release, bilateral       Family History:    Family History   Problem Relation Age of Onset    C.A.D. Mother     Diabetes Mother     Cancer Father         brain    Breast Cancer Maternal Aunt     Cancer - colorectal No family hx of        Social History:  Marital Status:  Single [1]  Social History     Tobacco Use    Smoking status: Never    Smokeless tobacco: Never   Vaping Use    Vaping status: Never Used   Substance Use Topics    Alcohol use: No    Drug use: No        Medications:    amoxicillin-clavulanate (AUGMENTIN) 875-125 MG tablet  ADVAIR DISKUS 250-50 MCG/ACT inhaler  albuterol (2.5 MG/3ML) 0.083% neb solution  albuterol (PROAIR HFA/PROVENTIL HFA/VENTOLIN HFA) 108 (90 Base) MCG/ACT inhaler  azelastine (ASTELIN) 0.1 % nasal spray  azelastine (OPTIVAR) 0.05 % ophthalmic solution  cetirizine (ZYRTEC) 10 MG tablet  Emollient (CERAVE) CREA  EPINEPHrine (ANY BX GENERIC EQUIV) 0.3 MG/0.3ML injection 2-pack  fluticasone (FLONASE) 50 MCG/ACT nasal spray  loratadine (CLARITIN) 10 MG tablet  montelukast (SINGULAIR) 10 MG tablet  MULTIVITAMIN OR  ORDER FOR ALLERGEN IMMUNOTHERAPY  ORDER FOR ALLERGEN IMMUNOTHERAPY  ORDER FOR ALLERGEN IMMUNOTHERAPY  ORDER FOR ALLERGEN IMMUNOTHERAPY  order for DME  order for DME  triamcinolone (KENALOG) 0.1 % external cream  vitamin D3 (CHOLECALCIFEROL) 10 MCG (400 UNIT) capsule          Review of Systems  Pertinent positives and negatives mentioned in HPI    Physical Exam   BP: (!) 147/82  Pulse: 91  Temp: 98.3  F (36.8  C)  Resp: 20  Weight: 93 kg (205 lb)  SpO2: 97  %    GEN: Awake, alert, and cooperative.  Appears ill but nontoxic  HENT: No erythema posterior oropharynx.  TMs nonbulging and nonerythematous.  External canals without lesions.  Mild tenderness of maxillary sinuses.  No active   EYES: EOM intact. Conjunctiva clear. No discharge.   NECK: Symmetric, freely mobile.  No anterior cervical lymphadenopathy  CV : Regular rate and rhythm.  PULM: Normal effort. No wheezes, rales, or rhonchi bilaterally.  No prolongation of expiratory phase  NEURO: Normal speech. Following commands. Answering questions and interacting appropriately.   INT: Warm. No diaphoresis. Normal color.        ED Course        Procedures         EKG: Interpreted by Tres Georges MD Sinus rhythm with rate of 75 bpm.  Normal axis.  Normal R wave progression.  Normal intervals.  No ST segment elevations or depressions.  No abnormal T wave inversions.  No Q waves.  Impression sinus rhythm with no evidence of acute ischemia.    Critical Care time:  none               Results for orders placed or performed during the hospital encounter of 06/01/24 (from the past 24 hour(s))   Symptomatic Influenza A/B, RSV, & SARS-CoV2 PCR (COVID-19) Nose    Specimen: Nose; Swab   Result Value Ref Range    Influenza A PCR Negative Negative    Influenza B PCR Negative Negative    RSV PCR Negative Negative    SARS CoV2 PCR Negative Negative    Narrative    Testing was performed using the Xpert Xpress CoV2/Flu/RSV Assay on the [employer] GeneXpert Instrument. This test should be ordered for the detection of SARS-CoV-2, influenza, and RSV viruses in individuals who meet clinical and/or epidemiological criteria. Test performance is unknown in asymptomatic patients. This test is for in vitro diagnostic use under the FDA EUA for laboratories certified under CLIA to perform high or moderate complexity testing. This test has not been FDA cleared or approved. A negative result does not rule out the presence of PCR inhibitors in the  specimen or target RNA in concentration below the limit of detection for the assay. If only one viral target is positive but coinfection with multiple targets is suspected, the sample should be re-tested with another FDA cleared, approved, or authorized test, if coinfection would change clinical management. This test was validated by the [facility]. These laboratories are certified under the Clinical Laboratory Improvement Amendments of 1988 (CLIA-88) as qualified to perform high complexity laboratory testing.     *Note: Due to a large number of results and/or encounters for the requested time period, some results have not been displayed. A complete set of results can be found in Results Review.       Medications - No data to display    Assessments & Plan (with Medical Decision Making)   59 year old female with past medical history of chronic pansinusitis status post ethmoidectomy x 2, with 3 days of URI symptoms as detailed in HPI.  Patient also had complaint of chest pain with cough.  EKG without any evidence of acute ischemia.  Presentation inconsistent with ACS and do not feel any further cardiac workup necessary.  Clearly has URI symptoms and likely sinusitis.  SARS-CoV-2/influenza/RSV is negative.  Reviewed her outpatient records with Dr. Hong and she is typically treated with Augmentin when has similar symptoms due to prior ethmoidectomies.  Do not think that prednisone necessary which she requested because she has no signs of asthma exacerbation.  There is no wheezing or prolongation of expiratory phase.  Patient is SpO2 97% on room air and nonlabored breathing.  Patient is comfortable with this plan and prescriptions were sent to preferred pharmacy.  Follow-up and ED return precautions discussed.         I have reviewed the nursing notes.         Discharge Medication List as of 6/1/2024 10:45 PM        START taking these medications    Details   amoxicillin-clavulanate (AUGMENTIN)  875-125 MG tablet Take 1 tablet by mouth 2 times daily for 7 days, Disp-14 tablet, R-0, E-Prescribe             Final diagnoses:   Acute sinusitis with coexisting condition requiring prophylactic treatment     Tres Georges MD        6/1/2024   [facility] EMERGENCY DEPT    Disclaimer: This note consists of words and symbols derived from keyboarding and dictation using voice recognition software.  As a result, there may be errors that have gone undetected.  Please consider this when interpreting information found in this note.               Tres Georges MD  06/02/24 [id number]

## 2024-06-03 ENCOUNTER — PATIENT OUTREACH (OUTPATIENT)
Dept: FAMILY MEDICINE | Facility: CLINIC | Age: 59
End: 2024-06-03
Payer: COMMERCIAL

## 2024-06-03 NOTE — TELEPHONE ENCOUNTER
ED / Discharge Outreach Protocol    Patient Contact    Attempt # 1    Was call answered?  No.  Unable to leave message.  Mailbox is full.    Ivana Oritz RN

## 2024-06-22 ENCOUNTER — HEALTH MAINTENANCE LETTER (OUTPATIENT)
Age: 59
End: 2024-06-22

## 2024-07-01 ENCOUNTER — TELEPHONE (OUTPATIENT)
Dept: FAMILY MEDICINE | Facility: CLINIC | Age: 59
End: 2024-07-01
Payer: COMMERCIAL

## 2024-07-01 NOTE — TELEPHONE ENCOUNTER
Patient Quality Outreach    Patient is due for the following:   Cervical Cancer Screening - PAP Needed  Physical Preventive Adult Physical    Next Steps:   Schedule a Adult Preventative    Type of outreach:    Sent [facility] message.      Questions for provider review:    None           Dee Davis MA

## 2024-07-09 ENCOUNTER — ALLIED HEALTH/NURSE VISIT (OUTPATIENT)
Dept: ALLERGY | Facility: CLINIC | Age: 59
End: 2024-07-09
Payer: COMMERCIAL

## 2024-07-09 ENCOUNTER — TELEPHONE (OUTPATIENT)
Dept: ALLERGY | Facility: CLINIC | Age: 59
End: 2024-07-09

## 2024-07-09 DIAGNOSIS — J30.1 CHRONIC SEASONAL ALLERGIC RHINITIS DUE TO POLLEN: Primary | ICD-10-CM

## 2024-07-09 DIAGNOSIS — J30.89 ALLERGIC RHINITIS DUE TO MOLD: ICD-10-CM

## 2024-07-09 DIAGNOSIS — J30.81 CHRONIC ALLERGIC RHINITIS DUE TO ANIMAL HAIR AND DANDER: ICD-10-CM

## 2024-07-09 DIAGNOSIS — J30.89 ALLERGIC RHINITIS DUE TO DUST MITE: ICD-10-CM

## 2024-07-09 PROCEDURE — 95117 IMMUNOTHERAPY INJECTIONS: CPT

## 2024-07-09 NOTE — TELEPHONE ENCOUNTER
Red 1:1, 0.4mL, and then increase by 0.1 mL up to the maintenance dose.  This adjustment is valid until 8/8/2024. If the patient does not come until that day, further dose adjustments should be considered.    Michael Lujan MD

## 2024-07-09 NOTE — TELEPHONE ENCOUNTER
Pt has just added on for today 7/9 at 4:50 pm. Will need restart dose ASAP.    Kaylee GRANT RN  Specialty/Allergy Clinics

## 2024-07-09 NOTE — TELEPHONE ENCOUNTER
Patient received 0.5 ml in Red 1:1 vial on 5/8, and ordered top dose is 0.5 in Red 1:1. Patient is scheduled for an injection 7/10. This will be 63 days after patient's last injection. Please advise new dosing orders, building schedule, and date which orders are valid through.     New orders will be added to immunotherapy flowsheet once they are received.     Kaylee David RN

## 2024-07-16 ENCOUNTER — ALLIED HEALTH/NURSE VISIT (OUTPATIENT)
Dept: ALLERGY | Facility: CLINIC | Age: 59
End: 2024-07-16
Payer: COMMERCIAL

## 2024-07-16 DIAGNOSIS — J30.81 CHRONIC ALLERGIC RHINITIS DUE TO ANIMAL HAIR AND DANDER: ICD-10-CM

## 2024-07-16 DIAGNOSIS — J30.89 ALLERGIC RHINITIS DUE TO DUST MITE: ICD-10-CM

## 2024-07-16 DIAGNOSIS — J30.1 CHRONIC SEASONAL ALLERGIC RHINITIS DUE TO POLLEN: ICD-10-CM

## 2024-07-16 DIAGNOSIS — J30.89 ALLERGIC RHINITIS DUE TO MOLD: ICD-10-CM

## 2024-07-16 DIAGNOSIS — J30.1 CHRONIC SEASONAL ALLERGIC RHINITIS DUE TO POLLEN: Primary | ICD-10-CM

## 2024-07-16 DIAGNOSIS — H10.13 ALLERGIC CONJUNCTIVITIS OF BOTH EYES: ICD-10-CM

## 2024-07-16 PROCEDURE — 95117 IMMUNOTHERAPY INJECTIONS: CPT

## 2024-07-16 NOTE — TELEPHONE ENCOUNTER
ALLERGY SOLUTION RE-ORDER REQUEST    Dilia Solomon 1965 MRN: [id number]    DATE NEEDED:  7/30/2024  Vial Color Content    Vial Size  Red 1:1 Weeds    5 mL  Red 1:1 Grass, Trees   5 mL  Red 1:1 Molds    5 mL  Red 1:1 Cat, Dog, Dust Mite    5 mL      Serum reorder consent signed and patient/parent was advised that new serums would be ordered through the pharmacy and billed to their insurance company when they arrive in clinic. Yes    Shot Clinic Location:  [facility].  Ship to Location: [facility].  Serum billed to:  [facility].    Special Instructions:  No        Requester Signature  Gia Bae RN

## 2024-07-18 DIAGNOSIS — J30.89 ALLERGIC RHINITIS DUE TO MOLD: ICD-10-CM

## 2024-07-18 DIAGNOSIS — J30.1 CHRONIC SEASONAL ALLERGIC RHINITIS DUE TO POLLEN: Primary | ICD-10-CM

## 2024-07-18 PROCEDURE — 95165 ANTIGEN THERAPY SERVICES: CPT | Performed by: ALLERGY & IMMUNOLOGY

## 2024-07-18 NOTE — PROGRESS NOTES
Allergy serums billed to [facility].     Vials billed below:    Vial Color Content                      Vial Size Expiration Date  Red 1:1 Molds 5mL  7/18/25  Red 1:1 Weeds 5mL  7/18/25  Red 1:1 Grass, Trees 5mL  7/18/25      Billed 30 units    Checked by Silver Araujo / LPN        Signature  Silver Araujo LPN

## 2024-07-19 DIAGNOSIS — J30.81 CHRONIC ALLERGIC RHINITIS DUE TO ANIMAL HAIR AND DANDER: Primary | ICD-10-CM

## 2024-07-19 DIAGNOSIS — J30.89 ALLERGIC RHINITIS DUE TO DUST MITE: ICD-10-CM

## 2024-07-19 PROCEDURE — 95165 ANTIGEN THERAPY SERVICES: CPT | Performed by: ALLERGY & IMMUNOLOGY

## 2024-07-19 NOTE — TELEPHONE ENCOUNTER
Allergy serums received at [facility].    Vials received below:    Vial Color Content                      Vial Size Expiration Date  Red 1:1 Cat, Dog, Dust Mite 5mL  07/19/25  Red 1:1 Molds 5mL  07/18/25  Red 1:1 Grass, Trees 5mL  7/18/25  Red 1:1 Weeds 5mL  7/18/25      Signature  Silver Araujo LPN

## 2024-07-19 NOTE — PROGRESS NOTES
Allergy serums billed to [facility].     Vials billed below:    Vial Color Content                      Vial Size Expiration Date  Red 1:1 Cat, Dog, Dust Mite 5mL  07/19/25    Billed 10 units    Checked by Silver Araujo / LPN        Signature  Silver Araujo LPN

## 2024-08-06 ENCOUNTER — ALLIED HEALTH/NURSE VISIT (OUTPATIENT)
Dept: ALLERGY | Facility: CLINIC | Age: 59
End: 2024-08-06
Payer: COMMERCIAL

## 2024-08-06 DIAGNOSIS — J30.81 CHRONIC ALLERGIC RHINITIS DUE TO ANIMAL HAIR AND DANDER: Primary | ICD-10-CM

## 2024-08-06 DIAGNOSIS — J30.89 ALLERGIC RHINITIS DUE TO MOLD: ICD-10-CM

## 2024-08-06 DIAGNOSIS — J30.1 CHRONIC SEASONAL ALLERGIC RHINITIS DUE TO POLLEN: ICD-10-CM

## 2024-08-06 DIAGNOSIS — J30.89 ALLERGIC RHINITIS DUE TO DUST MITE: ICD-10-CM

## 2024-08-06 PROCEDURE — 95117 IMMUNOTHERAPY INJECTIONS: CPT

## 2024-08-13 ENCOUNTER — ALLIED HEALTH/NURSE VISIT (OUTPATIENT)
Dept: ALLERGY | Facility: CLINIC | Age: 59
End: 2024-08-13
Payer: COMMERCIAL

## 2024-08-13 DIAGNOSIS — J30.89 ALLERGIC RHINITIS DUE TO MOLD: ICD-10-CM

## 2024-08-13 DIAGNOSIS — J30.1 CHRONIC SEASONAL ALLERGIC RHINITIS DUE TO POLLEN: ICD-10-CM

## 2024-08-13 DIAGNOSIS — J30.89 ALLERGIC RHINITIS DUE TO DUST MITE: ICD-10-CM

## 2024-08-13 DIAGNOSIS — J30.81 CHRONIC ALLERGIC RHINITIS DUE TO ANIMAL HAIR AND DANDER: Primary | ICD-10-CM

## 2024-08-13 PROCEDURE — 95117 IMMUNOTHERAPY INJECTIONS: CPT

## 2024-08-20 ENCOUNTER — ALLIED HEALTH/NURSE VISIT (OUTPATIENT)
Dept: ALLERGY | Facility: CLINIC | Age: 59
End: 2024-08-20
Payer: COMMERCIAL

## 2024-08-20 DIAGNOSIS — J30.81 CHRONIC ALLERGIC RHINITIS DUE TO ANIMAL HAIR AND DANDER: Primary | ICD-10-CM

## 2024-08-20 DIAGNOSIS — J30.89 ALLERGIC RHINITIS DUE TO MOLD: ICD-10-CM

## 2024-08-20 DIAGNOSIS — J30.1 CHRONIC SEASONAL ALLERGIC RHINITIS DUE TO POLLEN: ICD-10-CM

## 2024-08-20 DIAGNOSIS — J30.89 ALLERGIC RHINITIS DUE TO DUST MITE: ICD-10-CM

## 2024-08-20 PROCEDURE — 95117 IMMUNOTHERAPY INJECTIONS: CPT

## 2024-08-24 ENCOUNTER — HOSPITAL ENCOUNTER (EMERGENCY)
Facility: CLINIC | Age: 59
Discharge: HOME | End: 2024-08-24
Attending: FAMILY MEDICINE | Admitting: FAMILY MEDICINE
Payer: COMMERCIAL

## 2024-08-24 ENCOUNTER — APPOINTMENT (OUTPATIENT)
Dept: CT IMAGING | Facility: CLINIC | Age: 59
End: 2024-08-24
Attending: FAMILY MEDICINE
Payer: COMMERCIAL

## 2024-08-24 ENCOUNTER — NURSE TRIAGE (OUTPATIENT)
Dept: NURSING | Facility: CLINIC | Age: 59
End: 2024-08-24
Payer: COMMERCIAL

## 2024-08-24 VITALS
DIASTOLIC BLOOD PRESSURE: 86 MMHG | HEART RATE: 93 BPM | RESPIRATION RATE: 18 BRPM | OXYGEN SATURATION: 99 % | WEIGHT: 205 LBS | HEIGHT: 64 IN | TEMPERATURE: 98.6 F | BODY MASS INDEX: 35 KG/M2 | SYSTOLIC BLOOD PRESSURE: 144 MMHG

## 2024-08-24 DIAGNOSIS — R09.81 CONGESTION OF PARANASAL SINUS: ICD-10-CM

## 2024-08-24 PROCEDURE — 99283 EMERGENCY DEPT VISIT LOW MDM: CPT | Performed by: FAMILY MEDICINE

## 2024-08-24 PROCEDURE — 70486 CT MAXILLOFACIAL W/O DYE: CPT

## 2024-08-24 PROCEDURE — 99284 EMERGENCY DEPT VISIT MOD MDM: CPT | Mod: 25 | Performed by: FAMILY MEDICINE

## 2024-08-24 ASSESSMENT — COLUMBIA-SUICIDE SEVERITY RATING SCALE - C-SSRS
2. HAVE YOU ACTUALLY HAD ANY THOUGHTS OF KILLING YOURSELF IN THE PAST MONTH?: NO
6. HAVE YOU EVER DONE ANYTHING, STARTED TO DO ANYTHING, OR PREPARED TO DO ANYTHING TO END YOUR LIFE?: NO
1. IN THE PAST MONTH, HAVE YOU WISHED YOU WERE DEAD OR WISHED YOU COULD GO TO SLEEP AND NOT WAKE UP?: NO

## 2024-08-24 ASSESSMENT — ACTIVITIES OF DAILY LIVING (ADL)
ADLS_ACUITY_SCORE: 35
ADLS_ACUITY_SCORE: 35

## 2024-08-24 NOTE — TELEPHONE ENCOUNTER
"Kimt calling.She's had an asthma attack and it caused a sinus infections. Her ENT told her that she should have a CT scan prior to having antibiotics prescribed. Patient has cold symptoms, and now has chest tightness and ear pain. She's calling to find out if she should come to the emergency room for a CT scan before she gets antibiotics. She is also concerned that she has chest pain. She has no cough. She denies feeling febrile. She has not tested for COVID, stating she was tested the last time she was in the ED \"not too long ago.\" Last visit to the ED was 6/1/2024.    Care advice given for patient to be seen in the emergency department at [facility] in [place]. Patient stated she would go after work, and was encouraged to go immediately. She states she has someone who can drive her.     Jnen Millan RN  [facility]  August 24, 2024, 5:49 PM    Reason for Disposition   Pain also in shoulder(s) or arm(s) or jaw  (Exception: Pain is clearly made worse by movement.)   Difficulty breathing    Additional Information   Negative: SEVERE difficulty breathing (e.g., struggling for each breath, speaks in single words)   Negative: Difficult to awaken or acting confused (e.g., disoriented, slurred speech)   Negative: Shock suspected (e.g., cold/pale/clammy skin, too weak to stand, low BP, rapid pulse)   Negative: Passed out (i.e., lost consciousness, collapsed and was not responding)   Negative: Chest pain lasting longer than 5 minutes and ANY of the following:    history of heart disease  (i.e., heart attack, bypass surgery, angina, angioplasty, CHF; not just a heart murmur)    described as crushing, pressure-like, or heavy    age > 50    age > 30 AND at least one cardiac risk factor (i.e., hypertension, diabetes, obesity, smoker or strong family history of heart disease)    not relieved with nitroglycerin   Negative: Heart beating < 50 beats per minute OR > 140 beats per minute   Negative: " "Visible sweat on face or sweat dripping down face   Negative: Sounds like a life-threatening emergency to the triager   Negative: Followed a chest injury   Negative: SEVERE chest pain   Negative: [1] Chest pain (or \"angina\") comes and goes AND [2] is happening more often (increasing in frequency) or getting worse (increasing in severity)  (Exception: Chest pains that last only a few seconds.)    Protocols used: Chest Pain-A-AH    "

## 2024-08-25 ENCOUNTER — NURSE TRIAGE (OUTPATIENT)
Dept: NURSING | Facility: CLINIC | Age: 59
End: 2024-08-25
Payer: COMMERCIAL

## 2024-08-25 ENCOUNTER — HOSPITAL ENCOUNTER (EMERGENCY)
Facility: CLINIC | Age: 59
Discharge: HOME | End: 2024-08-25
Payer: COMMERCIAL

## 2024-08-25 VITALS
HEART RATE: 104 BPM | DIASTOLIC BLOOD PRESSURE: 86 MMHG | OXYGEN SATURATION: 99 % | SYSTOLIC BLOOD PRESSURE: 141 MMHG | TEMPERATURE: 101.1 F

## 2024-08-25 DIAGNOSIS — U07.1 COVID-19: ICD-10-CM

## 2024-08-25 LAB
FLUAV RNA SPEC QL NAA+PROBE: NEGATIVE
FLUBV RNA RESP QL NAA+PROBE: NEGATIVE
GROUP A STREP BY PCR: NOT DETECTED
RSV RNA SPEC NAA+PROBE: NEGATIVE
SARS-COV-2 RNA RESP QL NAA+PROBE: POSITIVE

## 2024-08-25 PROCEDURE — 99214 OFFICE O/P EST MOD 30 MIN: CPT

## 2024-08-25 PROCEDURE — 87637 SARSCOV2&INF A&B&RSV AMP PRB: CPT

## 2024-08-25 PROCEDURE — 87651 STREP A DNA AMP PROBE: CPT

## 2024-08-25 PROCEDURE — G0463 HOSPITAL OUTPT CLINIC VISIT: HCPCS

## 2024-08-25 PROCEDURE — 250N000013 HC RX MED GY IP 250 OP 250 PS 637

## 2024-08-25 RX ORDER — IBUPROFEN 200 MG
600 TABLET ORAL ONCE
Status: COMPLETED | OUTPATIENT
Start: 2024-08-25 | End: 2024-08-25

## 2024-08-25 RX ORDER — ACETAMINOPHEN 325 MG/1
975 TABLET ORAL ONCE
Status: COMPLETED | OUTPATIENT
Start: 2024-08-25 | End: 2024-08-25

## 2024-08-25 RX ADMIN — ACETAMINOPHEN 975 MG: 325 TABLET, FILM COATED ORAL at 12:18

## 2024-08-25 RX ADMIN — IBUPROFEN 600 MG: 200 TABLET, FILM COATED ORAL at 12:20

## 2024-08-25 ASSESSMENT — ACTIVITIES OF DAILY LIVING (ADL)
ADLS_ACUITY_SCORE: 35
ADLS_ACUITY_SCORE: 35

## 2024-08-25 NOTE — ED PROVIDER NOTES
HPI   Patient is a 59-year-old female presenting with concern for sinusitis and pneumonia.  Per my chart review, the patient has a known history of chronic pansinusitis.  She has been seen by ENT previously and has had ethmoidectomy procedures twice.  She has had allergic rhinitis.  Most recent antibiotic use was Augmentin for sinusitis in June, 2024.  She has a history of asthma.  She gets recurring bronchitis.  The patient has been symptomatic since    Yesterday.  She has nasal congestion, facial pressure and discomfort, and ear fullness bilaterally.  She says that she will experience green nasal discharge when she washes out her sinuses tonight.  She has diarrhea bilateral upper and lower dental pain resolved up toward the TMJ.  No eye irritation or pain.  No exudate on the lashes.  No diffuse headache.  No neck pain.  She has some central chest discomfort with coughing.  Her cough is occasionally productive with clear to yellow sputum.  No wheezing.  No shortness of breath.  No nausea or vomiting.  No diarrhea.      Allergies:  Allergies   Allergen Reactions    Clinoril [Sulindac]      Hives    Niacin Hives    Nsaids Hives     Tolerates ibuprofen and aspirin without hives      Pineapple Hives     Problem List:    Patient Active Problem List    Diagnosis Date Noted    Class 2 severe obesity due to excess calories with serious comorbidity in adult (H) 02/27/2024     Priority: Medium    Liver cyst 09/26/2023     Priority: Medium    History of endoscopic sinus surgery 10/25/2019     Priority: Medium    Chronic pansinusitis 10/25/2019     Priority: Medium    Allergic conjunctivitis, bilateral 06/05/2019     Priority: Medium    Allergic rhinitis due to dust mite 07/13/2017     Priority: Medium     IgE testing 6/28/17 positive for cat, dog, dust mite, trees, grass, weeds, and molds      Chronic allergic rhinitis due to animal hair and dander 07/13/2017     Priority: Medium     IgE testing 6/28/17 positive for cat,  dog, dust mite, trees, grass, weeds, and molds      Allergic rhinitis due to mold 07/13/2017     Priority: Medium     IgE testing 6/28/17 positive for cat, dog, dust mite, trees, grass, weeds, and molds      Chronic seasonal allergic rhinitis due to pollen 07/13/2017     Priority: Medium     IgE testing 6/28/17 positive for cat, dog, dust mite, trees, grass, weeds, and molds      FH: diabetes mellitus 09/01/2015     Priority: Medium    Food allergy 02/17/2015     Priority: Medium    Moderate persistent asthma 06/28/2011     Priority: Medium     Spirometry results not clear.  Some FVC down with URI, improved over time.  Never had decreased fev/fvc ratio.  Consider trial off advair to see how she responds.        Hyperlipidemia LDL goal <130 01/12/2011     Priority: Medium    Synovial cyst of popliteal space 07/28/2007     Priority: Medium    Pain in joint, lower leg 07/28/2007     Priority: Medium    Carpal tunnel syndrome 07/04/2007     Priority: Medium     S/p surgery 04-05/2004 bilat      Radial styloid tenosynovitis 04/24/2007     Priority: Medium    Need for prophylactic vaccination and inoculation against influenza 10/24/2006     Priority: Medium    Sprain of interphalangeal (joint) of hand 10/24/2006     Priority: Medium      Past Medical History:    Past Medical History:   Diagnosis Date    Injury, other and unspecified, shoulder and upper arm 9/03     Past Surgical History:    Past Surgical History:   Procedure Laterality Date    COLONOSCOPY N/A 10/6/2016    Procedure: COLONOSCOPY;  Surgeon: Shiva Johns MD;  Location: [facility]    ETHMOIDECTOMY  12/30/2013    Procedure: ETHMOIDECTOMY;  Bilateral Submucousal Reduction of InferiorTurbinates and Total Ethmoidectomy with Multiple Sinusotomies;  Surgeon: Lio More MD;  Location: [facility]    ETHMOIDECTOMY Bilateral 2/14/2018    Procedure: ETHMOIDECTOMY;  Bilateral anterior ethmoidectomy, bilateral maxillary antrostomy;  Surgeon: Santhosh Tierney MD;   "Location: [facility]    SURGICAL HISTORY OF -   5/04    carpal tunnel release, bilateral     Family History:    Family History   Problem Relation Age of Onset    C.A.D. Mother     Diabetes Mother     Cancer Father         brain    Breast Cancer Maternal Aunt     Cancer - colorectal No family hx of      Social History:  Marital Status:  Single [1]  Social History     Tobacco Use    Smoking status: Never    Smokeless tobacco: Never   Vaping Use    Vaping status: Never Used   Substance Use Topics    Alcohol use: No    Drug use: No      Medications:    ADVAIR DISKUS 250-50 MCG/ACT inhaler  albuterol (2.5 MG/3ML) 0.083% neb solution  albuterol (PROAIR HFA/PROVENTIL HFA/VENTOLIN HFA) 108 (90 Base) MCG/ACT inhaler  azelastine (ASTELIN) 0.1 % nasal spray  azelastine (OPTIVAR) 0.05 % ophthalmic solution  cetirizine (ZYRTEC) 10 MG tablet  Emollient (CERAVE) CREA  EPINEPHrine (ANY BX GENERIC EQUIV) 0.3 MG/0.3ML injection 2-pack  fluticasone (FLONASE) 50 MCG/ACT nasal spray  loratadine (CLARITIN) 10 MG tablet  montelukast (SINGULAIR) 10 MG tablet  MULTIVITAMIN OR  ORDER FOR ALLERGEN IMMUNOTHERAPY  ORDER FOR ALLERGEN IMMUNOTHERAPY  ORDER FOR ALLERGEN IMMUNOTHERAPY  ORDER FOR ALLERGEN IMMUNOTHERAPY  order for DME  order for DME  triamcinolone (KENALOG) 0.1 % external cream  vitamin D3 (CHOLECALCIFEROL) 10 MCG (400 UNIT) capsule      Review of Systems   All other systems reviewed and are negative.      PE   BP: (!) 144/86  Pulse: 93  Temp: 98.6  F (37  C)  Resp: 18  Height: 162.6 cm (5' 4\")  Weight: 93 kg (205 lb)  SpO2: 99 %  Physical Exam  Vitals reviewed.   Constitutional:       General: She is not in acute distress.     Appearance: She is well-developed.   HENT:      Head: Normocephalic and atraumatic.      Right Ear: External ear normal.      Left Ear: External ear normal.      Nose: Nose normal.      Comments: Tender midface.     Mouth/Throat:      Mouth: Mucous membranes are moist.      Pharynx: Oropharynx is clear.   Eyes:      " "Extraocular Movements: Extraocular movements intact.      Conjunctiva/sclera: Conjunctivae normal.      Pupils: Pupils are equal, round, and reactive to light.   Cardiovascular:      Rate and Rhythm: Normal rate and regular rhythm.   Pulmonary:      Effort: Pulmonary effort is normal. No respiratory distress.      Breath sounds: Normal breath sounds. No stridor. No wheezing, rhonchi or rales.   Musculoskeletal:         General: Normal range of motion.      Cervical back: Normal range of motion.   Skin:     General: Skin is warm and dry.   Neurological:      Mental Status: She is alert and oriented to person, place, and time.   Psychiatric:         Behavior: Behavior normal.         ED COURSE and MDM   2205.  Patient with concern for sinusitis.  She has concern \"for evolving bronchitis too.  She wonders if she might have pneumonia, though she is not convinced.  She denies wheezing.  Lung examination is unremarkable, no respiratory distress.  No evidence for distended tympanic membranes or purulence behind.  I am less concerned about acute sinusitis with bacterial source of symptoms.  Still, she tells me that she needs a CT scan to clarify whether or not there is confirm sinusitis with her symptoms.  She tells me that her ENT provider asked that she come in to the ED when it happens so that she can be checked with a scan.  If scan unremarkable, no antibiotics will be given.  Low concern for pneumonia.  She does not have chest pain or shortness of breath.  Her lung sounds are clear, as above.  She does not have a fever.  Symptoms present since yesterday only.  No indication for chest x-ray at this time.  No viral swabs, per the patient.  I agree with this.    2310.  CT scan negative for acute sinusitis.  Mild mucosal inflammation on the right side, maxillary sinus.  No indication for antibiotics at this time.  Reach out to ENT for further guidance.    Electronic medical chart reviewed, including medical problems, " medications, medical allergies, social history.  Recent hospitalizations and surgical procedures reviewed.  Recent clinic visits and consultations reviewed.  Recent labs and test results reviewed.  Nursing notes reviewed.    The patient, their parent if applicable, and/or their medical decision maker(s) and I have reviewed all of the available historical information, applicable PMH, physical exam findings, and objective diagnostic data gathered during this ED visit.  We then discussed all work-up options and then together agreed upon the course taken during this visit.  The ultimate disposition and plan was a cooperative decision made between myself and the patient, their parent if applicable, and/or their legal decision maker(s).  The risks and benefits of all decisions made during this visit were discussed to the best of my abilities given the circumstances, and all parties are understanding of the pertinent ramifications of these decisions.      LABS  Labs Ordered and Resulted from Time of ED Arrival to Time of ED Departure - No data to display    IMAGING  Images reviewed by me.  Radiology report also reviewed.  CT Sinus w/o Contrast   Final Result   IMPRESSION:   1.  Moderate mucosal thickening along the floor of the right maxillary sinus. Paranasal sinuses are otherwise clear.          Procedures    Medications - No data to display      IMPRESSION       ICD-10-CM    1. Congestion of paranasal sinus  R09.81                Medication List      There are no discharge medications for this visit.                             Heraclio Hays MD  08/24/24 [id number]

## 2024-08-25 NOTE — TELEPHONE ENCOUNTER
Nurse Triage SBAR    Is this a 2nd Level Triage? NO    Situation: Patient calling with chest, ear, head, and sinus pain.     Background: Reports that yesterday she was in ER for a sinus infection but wasn't prescribed any antibiotics. She attempted a nasal wash today and her sinuses are completely blocked, as well as her eags.  She has pain under her eyes, in her ear, the back of her head, neck, shoulders and chest.  She rates pain a 7/10.  She has seen an ENT doctor for sinus issues and had a CAT scan in the ER last night.  She reports she has had many sinus infections since February of this year and was last on antibiotics in June. She is unable to get rid of the headache and ear pain that she is having and notes that is has gotten worse since her ER visit last night. The chest pain she is having is related to coughing, however, she also reports pain in her shoulders.        Assessment: chest pain with coughing, shoulder pain, pain in neck, under eyes, ears, and back of head     Protocol Recommended Disposition:   Go to ED now.    Recommendation: Advised patient to be seen in ED. Reviewed concerning symptoms and when to call back.      Does the patient meet one of the following criteria for ADS visit consideration? No    Tere Holt RN on 8/25/2024 at 10:44 AM    Reason for Disposition   [1] SEVERE pain AND [2] not improved 2 hours after pain medicine   Pain also in shoulder(s) or arm(s) or jaw    Additional Information   Negative: SEVERE difficulty breathing (e.g., struggling for each breath, speaks in single words)   Negative: Sounds like a life-threatening emergency to the triager   Negative: [1] Sinus infection AND [2] taking an antibiotic AND [3] symptoms continue   Negative: [1] Difficulty breathing AND [2] not from stuffy nose (e.g., not relieved by cleaning out the nose)   Negative: [1] SEVERE headache AND [2] fever   Negative: [1] Redness or swelling on the cheek, forehead or around the eye AND [2]  fever   Negative: Fever > 104 F (40 C)   Negative: Patient sounds very sick or weak to the triager   Negative: SEVERE difficulty breathing (e.g., struggling for each breath, speaks in single words)   Negative: Difficult to awaken or acting confused (e.g., disoriented, slurred speech)   Negative: Shock suspected (e.g., cold/pale/clammy skin, too weak to stand, low BP, rapid pulse)   Negative: Passed out (i.e., lost consciousness, collapsed and was not responding)   Negative: Chest pain lasting longer than 5 minutes and ANY of the following:         Pain is crushing, pressure-like, or heavy         Over 44 years old          Over 30 years old and one cardiac risk factor (e.g diabetes, high blood pressure, high cholesterol, smoker, or family history of heart disease)         History of heart disease (e.g. angina, heart attack, heart failure, bypass surgery, takes nitroglycerin)   Negative: Heart beating < 50 beats per minute OR > 140 beats per minute   Negative: Visible sweat on face or sweat dripping down face   Negative: Sounds like a life-threatening emergency to the triager   Negative: SEVERE chest pain    Protocols used: Sinus Pain or Congestion-A-AH, Chest Pain-A-[facility]

## 2024-08-25 NOTE — DISCHARGE INSTRUCTIONS
RETURN TO THE EMERGENCY ROOM FOR THE FOLLOWING:    Fever greater than 101, or at anytime for any concern.    FOLLOW UP:    With your ENT provider for further discussion.    TREATMENT RECOMMENDATIONS:    Ibuprofen 600 mg every six hours for pain (7 days duration).  Tylenol 1000 mg every six hours for pain (7 days duration).  Therefore, you can alternate these every three hours and do it safely.  ONLY take these medications if it is safe to do so given your medical history.    NURSE ADVICE LINE:  [phone] or [phone]

## 2024-08-25 NOTE — DISCHARGE INSTRUCTIONS
You tested positive for COVID-19 today.  I recommend that you be treated with the COVID-19 medication Paxlovid.  This medication interacts with your Advair inhaler.  You should discontinue your Advair inhaler while you are taking Paxlovid, and you should not resume it until 3 days after you have completed the Paxlovid medication.    Continue the rest of your medications as prescribed.  You can use your albuterol inhaler as needed for wheezing and shortness of breath.    Return to the ER if your symptoms are not improving after 7-10 days, or if you develop difficulty breathing, difficulty swallowing, vomiting, or if other concerning symptoms develop.

## 2024-08-25 NOTE — ED PROVIDER NOTES
History     Chief Complaint   Patient presents with    Facial Pain     Sinus pain and pressure, congestion, headache, jaw pain, ear now is feeling plugged up       Dliia Solomon is a 59 year old female with significant pmhx of chronic pansinusitis, allergic rhinitis, asthma, s/p ethmoidectomy x2 who presents for evaluation of sinus and ear pain.  Patient states she has a history of frequent sinus infections and ear infections.  On Friday she developed a sore throat, and yesterday she developed accompanying bilateral facial pain, congestion, bilateral ear pain, and dry cough.  She states that she develops bacterial infections quickly due to her sinus issues.  She does follow with ENT, and has a follow-up appointment with them on 9/5/2024.  She takes daily Flonase.  She has been doing sinus rinses with little relief.  She took Tylenol at 6 AM this morning.    Allergies:  Allergies   Allergen Reactions    Clinoril [Sulindac]      Hives    Niacin Hives    Nsaids Hives     Tolerates ibuprofen and aspirin without hives      Pineapple Hives       Problem List:    Patient Active Problem List    Diagnosis Date Noted    Class 2 severe obesity due to excess calories with serious comorbidity in adult (H) 02/27/2024     Priority: Medium    Liver cyst 09/26/2023     Priority: Medium    History of endoscopic sinus surgery 10/25/2019     Priority: Medium    Chronic pansinusitis 10/25/2019     Priority: Medium    Allergic conjunctivitis, bilateral 06/05/2019     Priority: Medium    Allergic rhinitis due to dust mite 07/13/2017     Priority: Medium     IgE testing 6/28/17 positive for cat, dog, dust mite, trees, grass, weeds, and molds      Chronic allergic rhinitis due to animal hair and dander 07/13/2017     Priority: Medium     IgE testing 6/28/17 positive for cat, dog, dust mite, trees, grass, weeds, and molds      Allergic rhinitis due to mold 07/13/2017     Priority: Medium     IgE testing 6/28/17 positive for cat, dog, dust  mite, trees, grass, weeds, and molds      Chronic seasonal allergic rhinitis due to pollen 07/13/2017     Priority: Medium     IgE testing 6/28/17 positive for cat, dog, dust mite, trees, grass, weeds, and molds      FH: diabetes mellitus 09/01/2015     Priority: Medium    Food allergy 02/17/2015     Priority: Medium    Moderate persistent asthma 06/28/2011     Priority: Medium     Spirometry results not clear.  Some FVC down with URI, improved over time.  Never had decreased fev/fvc ratio.  Consider trial off advair to see how she responds.        Hyperlipidemia LDL goal <130 01/12/2011     Priority: Medium    Synovial cyst of popliteal space 07/28/2007     Priority: Medium    Pain in joint, lower leg 07/28/2007     Priority: Medium    Carpal tunnel syndrome 07/04/2007     Priority: Medium     S/p surgery 04-05/2004 bilat      Radial styloid tenosynovitis 04/24/2007     Priority: Medium    Need for prophylactic vaccination and inoculation against influenza 10/24/2006     Priority: Medium    Sprain of interphalangeal (joint) of hand 10/24/2006     Priority: Medium        Past Medical History:    Past Medical History:   Diagnosis Date    Injury, other and unspecified, shoulder and upper arm 9/03       Past Surgical History:    Past Surgical History:   Procedure Laterality Date    COLONOSCOPY N/A 10/6/2016    Procedure: COLONOSCOPY;  Surgeon: Shiva Johns MD;  Location: [facility]    ETHMOIDECTOMY  12/30/2013    Procedure: ETHMOIDECTOMY;  Bilateral Submucousal Reduction of InferiorTurbinates and Total Ethmoidectomy with Multiple Sinusotomies;  Surgeon: Lio More MD;  Location: [facility]    ETHMOIDECTOMY Bilateral 2/14/2018    Procedure: ETHMOIDECTOMY;  Bilateral anterior ethmoidectomy, bilateral maxillary antrostomy;  Surgeon: Santhosh Tierney MD;  Location: [facility]    SURGICAL HISTORY OF -   5/04    carpal tunnel release, bilateral       Family History:    Family History   Problem Relation Age of Onset    C.A.D.  Mother     Diabetes Mother     Cancer Father         brain    Breast Cancer Maternal Aunt     Cancer - colorectal No family hx of        Social History:  Marital Status:  Single [1]  Social History     Tobacco Use    Smoking status: Never    Smokeless tobacco: Never   Vaping Use    Vaping status: Never Used   Substance Use Topics    Alcohol use: No    Drug use: No        Medications:    nirmatrelvir and ritonavir (PAXLOVID) 300 mg/100 mg therapy pack  ADVAIR DISKUS 250-50 MCG/ACT inhaler  albuterol (2.5 MG/3ML) 0.083% neb solution  albuterol (PROAIR HFA/PROVENTIL HFA/VENTOLIN HFA) 108 (90 Base) MCG/ACT inhaler  azelastine (ASTELIN) 0.1 % nasal spray  azelastine (OPTIVAR) 0.05 % ophthalmic solution  cetirizine (ZYRTEC) 10 MG tablet  Emollient (CERAVE) CREA  EPINEPHrine (ANY BX GENERIC EQUIV) 0.3 MG/0.3ML injection 2-pack  fluticasone (FLONASE) 50 MCG/ACT nasal spray  loratadine (CLARITIN) 10 MG tablet  montelukast (SINGULAIR) 10 MG tablet  MULTIVITAMIN OR  ORDER FOR ALLERGEN IMMUNOTHERAPY  ORDER FOR ALLERGEN IMMUNOTHERAPY  ORDER FOR ALLERGEN IMMUNOTHERAPY  ORDER FOR ALLERGEN IMMUNOTHERAPY  order for DME  order for DME  triamcinolone (KENALOG) 0.1 % external cream  vitamin D3 (CHOLECALCIFEROL) 10 MCG (400 UNIT) capsule          Physical Exam   BP: (!) 141/86  Pulse: 104  Temp: (!) 101.1  F (38.4  C)  SpO2: 99 %      Physical Exam  Vitals and nursing note reviewed.   Constitutional:       General: She is not in acute distress.     Appearance: She is not ill-appearing, toxic-appearing or diaphoretic.   HENT:      Head: Normocephalic and atraumatic.      Jaw: There is normal jaw occlusion. No trismus.      Right Ear: A middle ear effusion is present. No mastoid tenderness. Tympanic membrane is injected. Tympanic membrane is not perforated or bulging.      Left Ear: A middle ear effusion is present. No mastoid tenderness. Tympanic membrane is injected. Tympanic membrane is not perforated or bulging.      Nose: Congestion  present.      Right Sinus: Maxillary sinus tenderness present.      Left Sinus: Maxillary sinus tenderness present.      Mouth/Throat:      Mouth: Mucous membranes are moist.      Pharynx: Oropharynx is clear. Posterior oropharyngeal erythema present. No oropharyngeal exudate.   Eyes:      Extraocular Movements: Extraocular movements intact.      Pupils: Pupils are equal, round, and reactive to light.   Cardiovascular:      Rate and Rhythm: Normal rate and regular rhythm.      Heart sounds: Normal heart sounds.   Pulmonary:      Effort: Pulmonary effort is normal.      Breath sounds: Normal breath sounds. No wheezing, rhonchi or rales.   Musculoskeletal:         General: Normal range of motion.      Cervical back: Normal range of motion.   Lymphadenopathy:      Cervical: Cervical adenopathy present.   Neurological:      General: No focal deficit present.      Mental Status: She is alert and oriented to person, place, and time.   Psychiatric:         Mood and Affect: Mood normal.         Behavior: Behavior normal.         ED Course        Procedures                    Results for orders placed or performed during the hospital encounter of 08/25/24 (from the past 24 hour(s))   Symptomatic Influenza A/B, RSV, & SARS-CoV2 PCR (COVID-19) Nasopharyngeal    Specimen: Nasopharyngeal; Swab   Result Value Ref Range    Influenza A PCR Negative Negative    Influenza B PCR Negative Negative    RSV PCR Negative Negative    SARS CoV2 PCR Positive (A) Negative    Narrative    Testing was performed using the Xpert Xpress CoV2/Flu/RSV Assay on the [employer] GeneXpert Instrument. This test should be ordered for the detection of SARS-CoV-2, influenza, and RSV viruses in individuals who meet clinical and/or epidemiological criteria. Test performance is unknown in asymptomatic patients. This test is for in vitro diagnostic use under the FDA EUA for laboratories certified under CLIA to perform high or moderate complexity testing. This test  has not been FDA cleared or approved. A negative result does not rule out the presence of PCR inhibitors in the specimen or target RNA in concentration below the limit of detection for the assay. If only one viral target is positive but coinfection with multiple targets is suspected, the sample should be re-tested with another FDA cleared, approved, or authorized test, if coinfection would change clinical management. This test was validated by the [facility]. These laboratories are certified under the Clinical Laboratory Improvement Amendments of 1988 (CLIA-88) as qualified to perform high complexity laboratory testing.     *Note: Due to a large number of results and/or encounters for the requested time period, some results have not been displayed. A complete set of results can be found in Results Review.       Medications   acetaminophen (TYLENOL) tablet 975 mg (975 mg Oral $Given 8/25/24 1218)   ibuprofen (ADVIL/MOTRIN) tablet 600 mg (600 mg Oral $Given 8/25/24 1220)       Assessments & Plan (with Medical Decision Making)     I have reviewed the nursing notes.    I have reviewed the findings, diagnosis, plan and need for follow up with the patient.    Medical Decision Making  Dilia Solomon is a 59 year old female with significant pmhx of chronic pansinusitis, allergic rhinitis, asthma, s/p ethmoidectomy x2 who presents for evaluation of sinusitis.  Differential diagnoses include acute viral versus bacterial sinusitis, COVID, influenza, strep pharyngitis, other viral URI.  Vital signs with mild hypertension at 141/86.  She is febrile to 101.1F.  She is borderline tachycardic with a heart rate of 104 bpm.  She is satting 99% on room air with a regular rate and respiratory effort.    Per chart review, patient was evaluated in the emergency department here last night (less than 24 hours ago).  She was here for concerns for sinusitis with nasal congestion, facial pressure, ear fullness.  Exam  negative for findings of ear infection.  Noted to have tender midface.  Normal vital signs, including O2 sat 99% on room air and no fever.  Patient insisted on a CT scan to evaluate for acute sinusitis, and this came back negative for signs of acute sinusitis but did show right mucosal thickening along the maxillary floor.  Provider did not recommend treatment with antibiotic therapy at that time, and to follow-up closely with ENT given her history of sinus issues.    On examination patient is well-appearing, nontoxic.  Intermittent dry cough, patient sounds congested.  Bilateral ear exams with injection of the TMs, middle ear effusion.  No suppurative effusion, bulging, erythema.  There is maxillary tenderness bilaterally.  No facial swelling or redness.  Oropharyngeal exam with posterior erythema, but no swelling, exudates, lesions.  Mild tender cervical lymphadenopathy.  Neck with full range of motion, no rigidity.  Lungs are clear to auscultation bilaterally, no wheezing to suggest asthma exacerbation or focal crackles/rales concerning for pneumonia.  COVID and flu testing was obtained, and this came back positive for COVID.  Suspect patient's symptoms are due to acute COVID-19 infection and not a bacterial sinusitis, especially given CT findings last night.    Given patient's underlying sinus and asthma conditions, recommended that she be treated with Paxlovid.  I spoke with pharmacy regarding the paxlovid/advair interaction. They recommended holding advair for duration of paxlovid treatment, and to  resume 3 days after last dose of paxlovid.  This recommendation was discussed with the patient, and she voiced understanding of the need to hold her Advair.  She was instructed to continue the rest of her medications as prescribed.  We discussed symptomatic management by continuing nasal saline rinses, Flonase, antihistamines.  Recommended albuterol inhaler as needed.  She was instructed to return to the ER or  clinic for further evaluation if her symptoms do not improve over the next 7-10 days, or if she develops difficulty breathing, vomiting, severe headache, or if other concerning symptoms develop.  Patient voiced understanding of the plan and had no further questions.      New Prescriptions    NIRMATRELVIR AND RITONAVIR (PAXLOVID) 300 MG/100 MG THERAPY PACK    Take 3 tablets by mouth 2 times daily for 5 days.       Final diagnoses:   COVID-19       Lilian Polk PA-C  6/8/2024   [facility] EMERGENCY DEPT     Lilian Polk PA-C  08/25/24 [id number]

## 2024-08-25 NOTE — ED TRIAGE NOTES
Patient reports sinus infection symptoms since yesterday. Patient states she is here for a CT scan per her ENT specialist that wants to know where her sinus infections are coming from.     Triage Assessment (Adult)       Row Name 08/24/24 1500          Triage Assessment    Airway WDL WDL        Respiratory WDL    Respiratory WDL WDL        Skin Circulation/Temperature WDL    Skin Circulation/Temperature WDL WDL        Cardiac WDL    Cardiac WDL WDL        Peripheral/Neurovascular WDL    Peripheral Neurovascular WDL WDL        Cognitive/Neuro/Behavioral WDL    Cognitive/Neuro/Behavioral WDL WDL

## 2024-08-27 ENCOUNTER — TELEPHONE (OUTPATIENT)
Dept: ALLERGY | Facility: CLINIC | Age: 59
End: 2024-08-27
Payer: COMMERCIAL

## 2024-08-27 NOTE — TELEPHONE ENCOUNTER
Call placed to patient:   Unable to Leave message on answering machine for patient to call back due to mailbox being full     [employer] message sent    Gia GUEVARA RN  Specialty/Allergy Clinic

## 2024-08-27 NOTE — TELEPHONE ENCOUNTER
[facility] Call Center    Phone Message    May a detailed message be left on voicemail: no     Reason for Call: Other: Sinus/ Left Ear/ Tested & Covid +/ Paxlovid/ Green Stuff and Bloody nose- Call [phone] Dilia     Action Taken: Other: [facility]/ [facility]    Travel Screening: Not Applicable     Date of Service:

## 2024-09-06 ENCOUNTER — NURSE TRIAGE (OUTPATIENT)
Dept: FAMILY MEDICINE | Facility: CLINIC | Age: 59
End: 2024-09-06
Payer: COMMERCIAL

## 2024-09-06 NOTE — TELEPHONE ENCOUNTER
Nurse Triage SBAR    Is this a 2nd Level Triage? NO    Situation: Bee sting    Background: Allergies to clinoril, niacin, nsaids, and pineapple. Patient has Epipen on hand.     Assessment: Patient reports getting stung by ground bees an hour ago. Describes multiple bee stings above right ear. States redness and mild swelling present about the size of a baseball. Patient reports pain as 3 on a scale of 0 to 10. Mild itching.     Protocol Recommended Disposition:   Home Care     Recommendation: Reviewed care advice including cold pack method, pain medications, hydrocortisone cream. Discuss expected course after bee sting and when to call 911 or present to the Emergency Department. Patient verbalized understanding and has no further questions.     Additional Information   Negative: Passed out (i.e., fainted, collapsed and was not responding)   Negative: Wheezing or difficulty breathing   Negative: Hoarseness, cough, or tightness in the throat or chest   Negative: Swollen tongue or difficulty swallowing   Negative: Life-threatening reaction in past to sting (anaphylaxis) and < 2 hours since sting   Negative: Sounds like a life-threatening emergency to the triager   Negative: Not a bee, wasp, hornet, or yellow jacket sting   Negative: Widespread hives, itching, or facial swelling and started within 2 hours of sting   Negative: Vomiting or abdominal cramps and started within 2 hours of sting   Negative: Gave epinephrine shot and no symptoms now   Negative: Patient sounds very sick or weak to the triager   Negative: Sting inside the mouth   Negative: Sting on eyeball (e.g., cornea)   Negative: More than 50 stings   Negative: Fever and area is red   Negative: Fever and area is very tender to touch   Negative: Red streak or red line and length > 2 inches (5 cm)   Negative: Red or very tender (to touch) area, and started over 24 hours after the sting   Negative: Red or very tender (to touch) area, getting larger over 48 hours  after the sting   Negative: Swelling is huge (e.g., > 4 inches or 10 cm, spreads beyond wrist or ankle)   Negative: Patient wants to be seen   Negative: Widespread hives, itching, or facial swelling and started > 2 hours after sting   Negative: Scab drains pus or increases in size, and not improved after applying antibiotic ointment for 2 days   Normal local reaction to bee, wasp, or yellow jacket sting    Protocols used: Bee Sting-A-[place]

## 2024-09-12 ENCOUNTER — OFFICE VISIT (OUTPATIENT)
Dept: ALLERGY | Facility: CLINIC | Age: 59
End: 2024-09-12
Attending: ALLERGY & IMMUNOLOGY
Payer: COMMERCIAL

## 2024-09-12 ENCOUNTER — ALLIED HEALTH/NURSE VISIT (OUTPATIENT)
Dept: ALLERGY | Facility: CLINIC | Age: 59
End: 2024-09-12
Payer: COMMERCIAL

## 2024-09-12 VITALS
OXYGEN SATURATION: 99 % | SYSTOLIC BLOOD PRESSURE: 129 MMHG | BODY MASS INDEX: 36.6 KG/M2 | TEMPERATURE: 96.8 F | HEART RATE: 71 BPM | DIASTOLIC BLOOD PRESSURE: 85 MMHG | HEIGHT: 64 IN | WEIGHT: 214.4 LBS

## 2024-09-12 DIAGNOSIS — J45.40 MODERATE PERSISTENT ASTHMA WITHOUT COMPLICATION: Primary | ICD-10-CM

## 2024-09-12 DIAGNOSIS — J30.1 CHRONIC SEASONAL ALLERGIC RHINITIS DUE TO POLLEN: ICD-10-CM

## 2024-09-12 DIAGNOSIS — J30.89 ALLERGIC RHINITIS DUE TO DUST MITE: ICD-10-CM

## 2024-09-12 DIAGNOSIS — J32.9 RECURRENT SINUS INFECTIONS: ICD-10-CM

## 2024-09-12 DIAGNOSIS — J30.89 ALLERGIC RHINITIS DUE TO MOLD: ICD-10-CM

## 2024-09-12 DIAGNOSIS — J30.81 CHRONIC ALLERGIC RHINITIS DUE TO ANIMAL HAIR AND DANDER: Primary | ICD-10-CM

## 2024-09-12 DIAGNOSIS — J30.81 CHRONIC ALLERGIC RHINITIS DUE TO ANIMAL HAIR AND DANDER: ICD-10-CM

## 2024-09-12 LAB
FEF 25/75: NORMAL
FEV-1: NORMAL
FEV1/FVC: NORMAL
FVC: NORMAL

## 2024-09-12 PROCEDURE — 99214 OFFICE O/P EST MOD 30 MIN: CPT | Mod: 25 | Performed by: ALLERGY & IMMUNOLOGY

## 2024-09-12 PROCEDURE — 94010 BREATHING CAPACITY TEST: CPT | Performed by: ALLERGY & IMMUNOLOGY

## 2024-09-12 PROCEDURE — 95117 IMMUNOTHERAPY INJECTIONS: CPT

## 2024-09-12 RX ORDER — FLUTICASONE PROPIONATE AND SALMETEROL 50; 250 UG/1; UG/1
1 POWDER RESPIRATORY (INHALATION) 2 TIMES DAILY
Qty: 3 EACH | Refills: 3 | Status: SHIPPED | OUTPATIENT
Start: 2024-09-12

## 2024-09-12 RX ORDER — FLUTICASONE PROPIONATE 50 MCG
2 SPRAY, SUSPENSION (ML) NASAL DAILY
Qty: 48 G | Refills: 1 | Status: SHIPPED | OUTPATIENT
Start: 2024-09-12

## 2024-09-12 RX ORDER — MONTELUKAST SODIUM 10 MG/1
10 TABLET ORAL AT BEDTIME
Qty: 90 TABLET | Refills: 3 | Status: SHIPPED | OUTPATIENT
Start: 2024-09-12

## 2024-09-12 RX ORDER — AZELASTINE 1 MG/ML
2 SPRAY, METERED NASAL 2 TIMES DAILY PRN
Qty: 90 ML | Refills: 3 | Status: SHIPPED | OUTPATIENT
Start: 2024-09-12

## 2024-09-12 RX ORDER — ALBUTEROL SULFATE 90 UG/1
2 AEROSOL, METERED RESPIRATORY (INHALATION) EVERY 4 HOURS PRN
Qty: 18 G | Refills: 3 | Status: SHIPPED | OUTPATIENT
Start: 2024-09-12

## 2024-09-12 ASSESSMENT — ASTHMA QUESTIONNAIRES
QUESTION_4 LAST FOUR WEEKS HOW OFTEN HAVE YOU USED YOUR RESCUE INHALER OR NEBULIZER MEDICATION (SUCH AS ALBUTEROL): ONCE A WEEK OR LESS
QUESTION_1 LAST FOUR WEEKS HOW MUCH OF THE TIME DID YOUR ASTHMA KEEP YOU FROM GETTING AS MUCH DONE AT WORK, SCHOOL OR AT HOME: NONE OF THE TIME
ACT_TOTALSCORE: 24
QUESTION_2 LAST FOUR WEEKS HOW OFTEN HAVE YOU HAD SHORTNESS OF BREATH: NOT AT ALL
QUESTION_5 LAST FOUR WEEKS HOW WOULD YOU RATE YOUR ASTHMA CONTROL: COMPLETELY CONTROLLED
QUESTION_3 LAST FOUR WEEKS HOW OFTEN DID YOUR ASTHMA SYMPTOMS (WHEEZING, COUGHING, SHORTNESS OF BREATH, CHEST TIGHTNESS OR PAIN) WAKE YOU UP AT NIGHT OR EARLIER THAN USUAL IN THE MORNING: NOT AT ALL
ACT_TOTALSCORE: 24

## 2024-09-12 NOTE — PROGRESS NOTES
SUBJECTIVE:                                                                   Dilia Solomon presents today to our Allergy Clinic at [facility] for a follow up visit. She is a 59 year old female with moderate persistent asthma and allergic rhinitis.   She was diagnosed with asthma approximately in 8399-7249. Triggers are environmental allergies and viral respiratory infections.   Serum IgE for regional aeroallergen panel performed in June 2017 with multiple levels of various sensitivities to molds, cat, dog, dust mite, pollen of trees, grasses, and weeds.  She had 2 sinus surgeries in the past. The last one was in 2018.   She started allergen immunotherapy in July 2017. She was on allergen immunotherapy before that with Dr. Haines, and it was helpful at that time; however, symptoms got worse soon after stopping it.  PFT within normal limits  in December 2020 and July 2022.     In March of 2023, no red flags for primary immunodeficiency. Was treated with vit. D for deficiency.      Allergy Immunotherapy (started on 6/17/2024)  Date/time of injection(s): 9/12/2024    Vial Color Content  Dose  Red 1:1 Weeds  0.5  Red 1:1 Grass, Trees  0.5   Red 1:1 Molds  0.5  Red 1:1 Cat, Dog  0.5     The patient has been on allergen immunotherapy since 2017 and tolerates the injections well. She experienced sinus issues at the end of August following a COVID-19 diagnosis, but her symptoms resolved with Paxlovid, and she did not require antibiotics or oral steroids.    She finds allergen immunotherapy helpful in managing her sinus symptoms. She continues to use intranasal fluticasone, 2 sprays in each nostril daily, and azelastine nasal spray about once or twice a week, especially when spending time outdoors. She does not take oral antihistamines and denies any issues with uncontrolled nasal congestion, rhinorrhea, sinus pressure, or postnasal drainage. She uses a NetiPot only when she develops sinus  symptoms, not on a regular basis.    Her asthma is fairly well-controlled with Advair 250/50 mcg, 1 puff twice daily, montelukast 10 mg daily, and albuterol as needed. She had to use albuterol more frequently during her COVID-19 infection and was advised to stop Advair for 4 to 5 days while on Paxlovid. After restarting Advair, her symptoms improved. Currently, she uses albuterol no more than twice a week and denies frequent nocturnal asthma symptoms. She has not required oral steroids for asthma since February 2024.           Patient Active Problem List   Diagnosis    Need for prophylactic vaccination and inoculation against influenza    Sprain of interphalangeal (joint) of hand    Radial styloid tenosynovitis    Carpal tunnel syndrome    Synovial cyst of popliteal space    Pain in joint, lower leg    Hyperlipidemia LDL goal <130    Moderate persistent asthma    Allergic rhinitis due to dust mite    Food allergy    FH: diabetes mellitus    Chronic allergic rhinitis due to animal hair and dander    Allergic rhinitis due to mold    Chronic seasonal allergic rhinitis due to pollen    Allergic conjunctivitis, bilateral    History of endoscopic sinus surgery    Chronic pansinusitis    Liver cyst    Class 2 severe obesity due to excess calories with serious comorbidity in adult (H)       Past Medical History:   Diagnosis Date    Injury, other and unspecified, shoulder and upper arm 9/03      Problem (# of Occurrences) Relation (Name,Age of Onset)    Cancer (1) Father: brain    Diabetes (1) Mother    Breast Cancer (1) Maternal Aunt    C.A.D. (1) Mother           Negative family history of: Cancer - colorectal          Past Surgical History:   Procedure Laterality Date    COLONOSCOPY N/A 10/6/2016    Procedure: COLONOSCOPY;  Surgeon: Shiva Johns MD;  Location: [facility]    ETHMOIDECTOMY  12/30/2013    Procedure: ETHMOIDECTOMY;  Bilateral Submucousal Reduction of InferiorTurbinates and Total Ethmoidectomy with Multiple  Sinusotomies;  Surgeon: Lio More MD;  Location: [facility]    ETHMOIDECTOMY Bilateral 2/14/2018    Procedure: ETHMOIDECTOMY;  Bilateral anterior ethmoidectomy, bilateral maxillary antrostomy;  Surgeon: Santhosh Tierney MD;  Location: [facility]    SURGICAL HISTORY OF -   5/04    carpal tunnel release, bilateral     Social History     Socioeconomic History    Marital status: Single     Spouse name: None    Number of children: None    Years of education: None    Highest education level: None   Occupational History     Employer: [employer]    Tobacco Use    Smoking status: Never    Smokeless tobacco: Never   Vaping Use    Vaping status: Never Used   Substance and Sexual Activity    Alcohol use: No    Drug use: No    Sexual activity: Never   Other Topics Concern    Parent/sibling w/ CABG, MI or angioplasty before 65F 55M? Yes     Comment: mother   Social History Narrative    09/12/24        ENVIRONMENTAL HISTORY: [place] is heated with a forced air. They do not have central air conditioning. The patient's bedroom is furnished with hard pawel in bedroom, allergen mattress cover, allergen pillowcase cover and fabric window coverings.  Pets inside the house include 1 dog. There is not history of cockroach or mice infestation. There are no smokers in the house.  The house does have a damp basement.     Patient is currently living and taking care of a friend in the friends home.     Social Determinants of Health     Financial Resource Strain: Low Risk  (9/26/2023)    Financial Resource Strain     Within the past 12 months, have you or your family members you live with been unable to get utilities (heat, electricity) when it was really needed?: No   Food Insecurity: Low Risk  (9/26/2023)    Food Insecurity     Within the past 12 months, did you worry that your food would run out before you got money to buy more?: No     Within the past 12 months, did the  food you bought just not last and you didn t have money to get more?: No   Transportation Needs: Low Risk  (9/26/2023)    Transportation Needs     Within the past 12 months, has lack of transportation kept you from medical appointments, getting your medicines, non-medical meetings or appointments, work, or from getting things that you need?: No    Received from [facility], [facility]    Social Connections   Interpersonal Safety: Low Risk  (9/26/2023)    Interpersonal Safety     Do you feel physically and emotionally safe where you currently live?: Yes     Within the past 12 months, have you been hit, slapped, kicked or otherwise physically hurt by someone?: No     Within the past 12 months, have you been humiliated or emotionally abused in other ways by your partner or ex-partner?: No   Housing Stability: Low Risk  (9/26/2023)    Housing Stability     Do you have housing? : Yes     Are you worried about losing your housing?: No             Current Outpatient Medications:     ADVAIR DISKUS 250-50 MCG/ACT inhaler, Inhale 1 puff into the lungs 2 times daily., Disp: 3 each, Rfl: 3    albuterol (PROAIR HFA/PROVENTIL HFA/VENTOLIN HFA) 108 (90 Base) MCG/ACT inhaler, Inhale 2 puffs into the lungs every 4 hours as needed for shortness of breath or wheezing., Disp: 18 g, Rfl: 3    azelastine (ASTELIN) 0.1 % nasal spray, Spray 2 sprays into both nostrils 2 times daily as needed for rhinitis or allergies., Disp: 90 mL, Rfl: 3    Emollient (CERAVE) CREA, Externally apply 1 dose. topically 2 times daily, Disp: 2 Bottle, Rfl: 11    fluticasone (FLONASE) 50 MCG/ACT nasal spray, Spray 2 sprays into both nostrils daily., Disp: 48 g, Rfl: 1    montelukast (SINGULAIR) 10 MG tablet, Take 1 tablet (10 mg) by mouth at bedtime., Disp: 90 tablet, Rfl: 3    MULTIVITAMIN OR, 1 tab daily, Disp: , Rfl:     ORDER FOR ALLERGEN IMMUNOTHERAPY, Name of Mix: Mix #1  Mold Alternaria  Tenuis 1:10 w/v, HS  0.5 ml Aspergillus Fumigatus 1:10 w/v, HS  0.5 ml Epicoccum Nigrum 1:10 w/v, HS 0.5 ml Hormodendrum Cladosporioides 1:10 w/v, HS 0.5 ml Penicillium Mix 1:10 w/v, HS  0.5 ml Diluent: HSA 2.5mL to 5ml, Disp: , Rfl:     ORDER FOR ALLERGEN IMMUNOTHERAPY, Name of Mix: Mix #2  Dust Mite, Cat, Dog Cat Hair, Standardized A.P. 10,000 BAU/mL, HS  2.0 ml  Dog Hair-Dander, UF  1:650 w/v, HS  1.0 ml   Dust Mites F 30,000AU/mL, HS  0.3 ml Dust Mites P. 30,000 AU/mL, HS  0.3 ml  Diluent: HSA 1.4mL to 5ml, Disp: , Rfl:     ORDER FOR ALLERGEN IMMUNOTHERAPY, Name of Mix: Mix #3 Grass,Tree  Dmitry,White 1:20w/v, HS 0.5ml Birch Mix 1:20w/v, HS 0.5ml Boxelder-Maple Mix BHR (Boxelder Hard Red) 1:20w/v, HS 0.5ml [place],Common 1:20w/v, HS 0.5ml Elm,American 1:20w/v, HS 0.5ml Union Mix RW 1:20w/v, HS 0.5ml Oak Mix RVW 1:20w/v, HS 0.5ml [place] Tree,Black 1:20w/v, HS 0.5ml Suresh Grass (Std) 100,000 BAU/mL, HS 0.4ml Jonathan Grass 1:20w/v, HS 0.5ml Diluent: HSA 0.1mL to 5ml, Disp: , Rfl:     ORDER FOR ALLERGEN IMMUNOTHERAPY, Name of Mix: Mix #4  Weeds Kochia 1:20 w/v, HS 0.5 ml Lamb's Quarters 1:20 w/v, HS 0.5 ml Nettle 1:20 w/v, HS 0.5 ml Plantain, English 1:20 w/v, HS 0.5 ml Ragweed Mixed 1:20 w/v ALK  0.5 ml Russian Thistle 1:20 w/v, HS 0.5 ml Sagebrush, Mugwort 1:20 w/v, HS 0.5 ml Sorrel, Sheep 1:20 w/v, HS 0.5 ml Diluent: HSA 1mL to 5ml, Disp: , Rfl:     vitamin D3 (CHOLECALCIFEROL) 10 MCG (400 UNIT) capsule, Take 2 capsules by mouth daily, Disp: , Rfl:     albuterol (2.5 MG/3ML) 0.083% neb solution, Take 1 vial (2.5 mg) by nebulization every 4 hours as needed (Patient not taking: Reported on 9/26/2023), Disp: 1 Box, Rfl: 3    azelastine (OPTIVAR) 0.05 % ophthalmic solution, Apply 1 drop to eye 2 times daily as needed (itchy/watery eyes) (Patient not taking: Reported on 9/12/2024), Disp: 18 mL, Rfl: 1    cetirizine (ZYRTEC) 10 MG tablet, Take 1 tablet (10 mg) by mouth daily as needed for allergies, Disp: 90  tablet, Rfl: 3    EPINEPHrine (ANY BX GENERIC EQUIV) 0.3 MG/0.3ML injection 2-pack, Inject 0.3 mLs (0.3 mg) into the muscle once as needed for anaphylaxis (Patient not taking: Reported on 2/27/2024), Disp: 2 each, Rfl: 3    loratadine (CLARITIN) 10 MG tablet, Take 1 tablet (10 mg) by mouth daily as needed for allergies (Patient not taking: Reported on 2/27/2024), Disp: 90 tablet, Rfl: 3    order for DME, Equipment being ordered: Silicone heel cup (Patient not taking: Reported on 9/12/2024), Disp: 1 Units, Rfl: 0    order for DME, Equipment being ordered: Silicone heel cup (Patient not taking: Reported on 9/12/2024), Disp: 1 Units, Rfl: 0    triamcinolone (KENALOG) 0.1 % external cream, Apply sparingly to affected area two times daily as needed but not more than 14 days in a row. Spare face, armpits, neck, and groin. (Patient not taking: Reported on 2/27/2024), Disp: 80 g, Rfl: 1    Current Facility-Administered Medications:     lidocaine 1 % injection 2 mL, 2 mL, , , Nicol Walker MD, 2 mL at 03/29/23 0918    triamcinolone (KENALOG-40) injection 40 mg, 40 mg, , , Nicol Walker MD, 40 mg at 03/29/23 0918  Immunization History   Administered Date(s) Administered    COVID-19 12+ (Pfizer) 11/14/2023    COVID-19 Monovalent 18+ (Moderna) 03/19/2021, 04/16/2021, 11/02/2021    Influenza (IIV3) PF 12/12/2002, 11/28/2003, 10/24/2006, 02/11/2009, 10/05/2011, 11/15/2012, 10/07/2014    Influenza Vaccine 18-64 (Flublok) 10/02/2018, 10/30/2019, 10/28/2020, 10/15/2021, 03/09/2023, 09/26/2023    Influenza Vaccine >6 months,quad, PF 10/16/2013, 10/20/2015, 10/26/2016, 11/21/2017    Mantoux Tuberculin Skin Test 02/11/2009    Measles 10/22/1979    Pneumococcal 20 valent Conjugate (Prevnar 20) 09/26/2023    Pneumococcal 23 valent 10/05/2011    TDAP Vaccine (Adacel) 02/11/2009, 07/06/2018    Zoster recombinant adjuvanted (SHINGRIX) 12/02/2021, 02/02/2022     Allergies   Allergen Reactions    Clinoril [Sulindac]      Hives    Niacin  "Hives    Nsaids Hives     Tolerates ibuprofen and aspirin without hives      Pineapple Hives     OBJECTIVE:                                                                 /85 (BP Location: Left arm, Patient Position: Sitting, Cuff Size: Adult Large)   Pulse 71   Temp 96.8  F (36  C) (Tympanic)   Ht 1.626 m (5' 4\")   Wt 97.3 kg (214 lb 6.4 oz)   LMP 07/18/2015 (Approximate)   SpO2 99%   BMI 36.80 kg/m          Physical Exam  Vitals and nursing note reviewed.   Constitutional:       General: She is not in acute distress.     Appearance: She is not diaphoretic.   HENT:      Head: Normocephalic and atraumatic.      Right Ear: Tympanic membrane, ear canal and external ear normal.      Left Ear: Tympanic membrane, ear canal and external ear normal.      Nose: Septal deviation (mild, leftward) present. No mucosal edema.      Right Turbinates: Not enlarged.      Left Turbinates: Not enlarged.      Mouth/Throat:      Lips: Pink.      Mouth: Mucous membranes are moist.      Pharynx: Oropharynx is clear. No pharyngeal swelling, oropharyngeal exudate or posterior oropharyngeal erythema.   Eyes:      General:         Right eye: No discharge.         Left eye: No discharge.      Conjunctiva/sclera: Conjunctivae normal.   Cardiovascular:      Rate and Rhythm: Normal rate and regular rhythm.      Heart sounds: Normal heart sounds. No murmur heard.  Pulmonary:      Effort: Pulmonary effort is normal. No respiratory distress.      Breath sounds: Normal breath sounds and air entry. No stridor, decreased air movement or transmitted upper airway sounds. No decreased breath sounds, wheezing, rhonchi or rales.   Neurological:      Mental Status: She is alert and oriented to person, place, and time.   Psychiatric:         Mood and Affect: Mood normal.         Behavior: Behavior normal.                WORKUP:     SPIROMETRY       FVC 2.31L (69% of predicted).     FEV1 1.92L (74% of predicted).     FEV1/FVC 83%      I have " reviewed and interpreted these results.  The office spirometry performed today suggests mild obstruction, restrictive pattern cannot be excluded considering equally decreased FEV1 and FVC.    Asthma Control Test (ACT) total score: 24       ASSESSMENT/PLAN:        Moderate persistent asthma without complication  The patient's asthma is well-controlled with Advair 250/50 mcg, 1 puff twice daily, montelukast 10 mg by mouth once daily, and albuterol as needed.    Continue current regimen. No changes are required at this time.    - Adult Allergy Clinic Follow-Up Order  - Spirometry, Breathing Capacity  - montelukast (SINGULAIR) 10 MG tablet  Dispense: 90 tablet; Refill: 3  - ADVAIR DISKUS 250-50 MCG/ACT inhaler  Dispense: 3 each; Refill: 3  - albuterol (PROAIR HFA/PROVENTIL HFA/VENTOLIN HFA) 108 (90 Base) MCG/ACT inhaler  Dispense: 18 g; Refill: 3      Chronic seasonal allergic rhinitis due to pollen  Recurrent sinus infections    The patient's symptoms are fairly well-controlled with allergen immunotherapy, sinus rinses as needed, intranasal fluticasone (1-2 sprays in each nostril once daily), azelastine (2 sprays in each nostril once or twice daily as needed), montelukast 10 mg by mouth once daily, and oral antihistamines as needed.    Continue allergen immunotherapy and the current medication regimen.  Notify immediately if any systemic reactions occur.      - montelukast (SINGULAIR) 10 MG tablet  Dispense: 90 tablet; Refill: 3  - fluticasone (FLONASE) 50 MCG/ACT nasal spray  Dispense: 48 g; Refill: 1  - azelastine (ASTELIN) 0.1 % nasal spray  Dispense: 90 mL; Refill: 3       Follow-up in 6 months or sooner if needed.    Thank you for allowing us to participate in the care of this patient. Please feel free to contact us if there are any questions or concerns about the patient.    Disclaimer: This note consists of symbols derived from keyboarding, dictation and/or voice recognition software. As a result, there may be  errors in the script that have gone undetected. Please consider this when interpreting information found in this chart.    Consent was obtained from the patient to use an AI documentation tool in the creation of this note.     Michael Lujan MD, FAAAAI, FACAAI  Allergy and Asthma     [facility]

## 2024-09-12 NOTE — LETTER
9/12/2024      Dilia Solomon  [address] [facility] [address]      Dear Colleague,    Thank you for referring your patient, Dilia Solomon, to the [facility]. Please see a copy of my visit note below.    SUBJECTIVE:                                                                   Dilia Solomon presents today to our Allergy Clinic at [facility] for a follow up visit. She is a 59 year old female with moderate persistent asthma and allergic rhinitis.   She was diagnosed with asthma approximately in 0699-8104. Triggers are environmental allergies and viral respiratory infections.   Serum IgE for regional aeroallergen panel performed in June 2017 with multiple levels of various sensitivities to molds, cat, dog, dust mite, pollen of trees, grasses, and weeds.  She had 2 sinus surgeries in the past. The last one was in 2018.   She started allergen immunotherapy in July 2017. She was on allergen immunotherapy before that with Dr. Haines, and it was helpful at that time; however, symptoms got worse soon after stopping it.  PFT within normal limits  in December 2020 and July 2022.     In March of 2023, no red flags for primary immunodeficiency. Was treated with vit. D for deficiency.      Allergy Immunotherapy (started on 6/17/2024)  Date/time of injection(s): 9/12/2024    Vial Color Content  Dose  Red 1:1 Weeds  0.5  Red 1:1 Grass, Trees  0.5   Red 1:1 Molds  0.5  Red 1:1 Cat, Dog  0.5     The patient has been on allergen immunotherapy since 2017 and tolerates the injections well. She experienced sinus issues at the end of August following a COVID-19 diagnosis, but her symptoms resolved with Paxlovid, and she did not require antibiotics or oral steroids.    She finds allergen immunotherapy helpful in managing her sinus symptoms. She continues to use intranasal fluticasone, 2 sprays in each nostril daily, and azelastine nasal spray about once or twice a week,  especially when spending time outdoors. She does not take oral antihistamines and denies any issues with uncontrolled nasal congestion, rhinorrhea, sinus pressure, or postnasal drainage. She uses a NetiPot only when she develops sinus symptoms, not on a regular basis.    Her asthma is fairly well-controlled with Advair 250/50 mcg, 1 puff twice daily, montelukast 10 mg daily, and albuterol as needed. She had to use albuterol more frequently during her COVID-19 infection and was advised to stop Advair for 4 to 5 days while on Paxlovid. After restarting Advair, her symptoms improved. Currently, she uses albuterol no more than twice a week and denies frequent nocturnal asthma symptoms. She has not required oral steroids for asthma since February 2024.           Patient Active Problem List   Diagnosis     Need for prophylactic vaccination and inoculation against influenza     Sprain of interphalangeal (joint) of hand     Radial styloid tenosynovitis     Carpal tunnel syndrome     Synovial cyst of popliteal space     Pain in joint, lower leg     Hyperlipidemia LDL goal <130     Moderate persistent asthma     Allergic rhinitis due to dust mite     Food allergy     FH: diabetes mellitus     Chronic allergic rhinitis due to animal hair and dander     Allergic rhinitis due to mold     Chronic seasonal allergic rhinitis due to pollen     Allergic conjunctivitis, bilateral     History of endoscopic sinus surgery     Chronic pansinusitis     Liver cyst     Class 2 severe obesity due to excess calories with serious comorbidity in adult (H)       Past Medical History:   Diagnosis Date     Injury, other and unspecified, shoulder and upper arm 9/03      Problem (# of Occurrences) Relation (Name,Age of Onset)    Cancer (1) Father: brain    Diabetes (1) Mother    Breast Cancer (1) Maternal Aunt    C.A.D. (1) Mother           Negative family history of: Cancer - colorectal          Past Surgical History:   Procedure Laterality Date      COLONOSCOPY N/A 10/6/2016    Procedure: COLONOSCOPY;  Surgeon: Shiva Johns MD;  Location: [facility]     ETHMOIDECTOMY  12/30/2013    Procedure: ETHMOIDECTOMY;  Bilateral Submucousal Reduction of InferiorTurbinates and Total Ethmoidectomy with Multiple Sinusotomies;  Surgeon: Lio More MD;  Location: [facility]     ETHMOIDECTOMY Bilateral 2/14/2018    Procedure: ETHMOIDECTOMY;  Bilateral anterior ethmoidectomy, bilateral maxillary antrostomy;  Surgeon: Santhosh Tierney MD;  Location: [facility]     SURGICAL HISTORY OF -   5/04    carpal tunnel release, bilateral     Social History     Socioeconomic History     Marital status: Single     Spouse name: None     Number of children: None     Years of education: None     Highest education level: None   Occupational History     Employer: [employer]    Tobacco Use     Smoking status: Never     Smokeless tobacco: Never   Vaping Use     Vaping status: Never Used   Substance and Sexual Activity     Alcohol use: No     Drug use: No     Sexual activity: Never   Other Topics Concern     Parent/sibling w/ CABG, MI or angioplasty before 65F 55M? Yes     Comment: mother   Social History Narrative    09/12/24        ENVIRONMENTAL HISTORY: [place] The [place] is heated with a forced air. They do not have central air conditioning. The patient's bedroom is furnished with hard pawel in bedroom, allergen mattress cover, allergen pillowcase cover and fabric window coverings.  Pets inside the house include 1 dog. There is not history of cockroach or mice infestation. There are no smokers in the house.  The house does have a damp basement.     Patient is currently living and taking care of a friend in the friends home.     Social Determinants of Health     Financial Resource Strain: Low Risk  (9/26/2023)    Financial Resource Strain      Within the past 12 months, have you or your family members you live with been unable to get utilities  (heat, electricity) when it was really needed?: No   Food Insecurity: Low Risk  (9/26/2023)    Food Insecurity      Within the past 12 months, did you worry that your food would run out before you got money to buy more?: No      Within the past 12 months, did the food you bought just not last and you didn t have money to get more?: No   Transportation Needs: Low Risk  (9/26/2023)    Transportation Needs      Within the past 12 months, has lack of transportation kept you from medical appointments, getting your medicines, non-medical meetings or appointments, work, or from getting things that you need?: No    Received from [facility], [facility]    Social Connections   Interpersonal Safety: Low Risk  (9/26/2023)    Interpersonal Safety      Do you feel physically and emotionally safe where you currently live?: Yes      Within the past 12 months, have you been hit, slapped, kicked or otherwise physically hurt by someone?: No      Within the past 12 months, have you been humiliated or emotionally abused in other ways by your partner or ex-partner?: No   Housing Stability: Low Risk  (9/26/2023)    Housing Stability      Do you have housing? : Yes      Are you worried about losing your housing?: No             Current Outpatient Medications:      ADVAIR DISKUS 250-50 MCG/ACT inhaler, Inhale 1 puff into the lungs 2 times daily., Disp: 3 each, Rfl: 3     albuterol (PROAIR HFA/PROVENTIL HFA/VENTOLIN HFA) 108 (90 Base) MCG/ACT inhaler, Inhale 2 puffs into the lungs every 4 hours as needed for shortness of breath or wheezing., Disp: 18 g, Rfl: 3     azelastine (ASTELIN) 0.1 % nasal spray, Spray 2 sprays into both nostrils 2 times daily as needed for rhinitis or allergies., Disp: 90 mL, Rfl: 3     Emollient (CERAVE) CREA, Externally apply 1 dose. topically 2 times daily, Disp: 2 Bottle, Rfl: 11     fluticasone (FLONASE) 50 MCG/ACT nasal spray, Spray 2 sprays  into both nostrils daily., Disp: 48 g, Rfl: 1     montelukast (SINGULAIR) 10 MG tablet, Take 1 tablet (10 mg) by mouth at bedtime., Disp: 90 tablet, Rfl: 3     MULTIVITAMIN OR, 1 tab daily, Disp: , Rfl:      ORDER FOR ALLERGEN IMMUNOTHERAPY, Name of Mix: Mix #1  Mold Alternaria Tenuis 1:10 w/v, HS  0.5 ml Aspergillus Fumigatus 1:10 w/v, HS  0.5 ml Epicoccum Nigrum 1:10 w/v, HS 0.5 ml Hormodendrum Cladosporioides 1:10 w/v, HS 0.5 ml Penicillium Mix 1:10 w/v, HS  0.5 ml Diluent: HSA 2.5mL to 5ml, Disp: , Rfl:      ORDER FOR ALLERGEN IMMUNOTHERAPY, Name of Mix: Mix #2  Dust Mite, Cat, Dog Cat Hair, Standardized A.P. 10,000 BAU/mL, HS  2.0 ml  Dog Hair-Dander, UF  1:650 w/v, HS  1.0 ml   Dust Mites F 30,000AU/mL, HS  0.3 ml Dust Mites P. 30,000 AU/mL, HS  0.3 ml  Diluent: HSA 1.4mL to 5ml, Disp: , Rfl:      ORDER FOR ALLERGEN IMMUNOTHERAPY, Name of Mix: Mix #3 Grass,Tree  Dmitry,White 1:20w/v, HS 0.5ml Birch Mix 1:20w/v, HS 0.5ml Boxelder-Maple Mix BHR (Boxelder Hard Red) 1:20w/v, HS 0.5ml Attala,Common 1:20w/v, HS 0.5ml Elm,American 1:20w/v, HS 0.5ml Rocklin Mix RW 1:20w/v, HS 0.5ml Oak Mix RVW 1:20w/v, HS 0.5ml [place] Tree,Black 1:20w/v, HS 0.5ml Suresh Grass (Std) 100,000 BAU/mL, HS 0.4ml Jonathan Grass 1:20w/v, HS 0.5ml Diluent: HSA 0.1mL to 5ml, Disp: , Rfl:      ORDER FOR ALLERGEN IMMUNOTHERAPY, Name of Mix: Mix #4  Weeds Kochia 1:20 w/v, HS 0.5 ml Lamb's Quarters 1:20 w/v, HS 0.5 ml Nettle 1:20 w/v, HS 0.5 ml Plantain, English 1:20 w/v, HS 0.5 ml Ragweed Mixed 1:20 w/v ALK  0.5 ml Russian Thistle 1:20 w/v, HS 0.5 ml Sagebrush, Mugwort 1:20 w/v, HS 0.5 ml Sorrel, Sheep 1:20 w/v, HS 0.5 ml Diluent: HSA 1mL to 5ml, Disp: , Rfl:      vitamin D3 (CHOLECALCIFEROL) 10 MCG (400 UNIT) capsule, Take 2 capsules by mouth daily, Disp: , Rfl:      albuterol (2.5 MG/3ML) 0.083% neb solution, Take 1 vial (2.5 mg) by nebulization every 4 hours as needed (Patient not taking: Reported on 9/26/2023), Disp: 1 Box, Rfl: 3      azelastine (OPTIVAR) 0.05 % ophthalmic solution, Apply 1 drop to eye 2 times daily as needed (itchy/watery eyes) (Patient not taking: Reported on 9/12/2024), Disp: 18 mL, Rfl: 1     cetirizine (ZYRTEC) 10 MG tablet, Take 1 tablet (10 mg) by mouth daily as needed for allergies, Disp: 90 tablet, Rfl: 3     EPINEPHrine (ANY BX GENERIC EQUIV) 0.3 MG/0.3ML injection 2-pack, Inject 0.3 mLs (0.3 mg) into the muscle once as needed for anaphylaxis (Patient not taking: Reported on 2/27/2024), Disp: 2 each, Rfl: 3     loratadine (CLARITIN) 10 MG tablet, Take 1 tablet (10 mg) by mouth daily as needed for allergies (Patient not taking: Reported on 2/27/2024), Disp: 90 tablet, Rfl: 3     order for DME, Equipment being ordered: Silicone heel cup (Patient not taking: Reported on 9/12/2024), Disp: 1 Units, Rfl: 0     order for DME, Equipment being ordered: Silicone heel cup (Patient not taking: Reported on 9/12/2024), Disp: 1 Units, Rfl: 0     triamcinolone (KENALOG) 0.1 % external cream, Apply sparingly to affected area two times daily as needed but not more than 14 days in a row. Spare face, armpits, neck, and groin. (Patient not taking: Reported on 2/27/2024), Disp: 80 g, Rfl: 1    Current Facility-Administered Medications:      lidocaine 1 % injection 2 mL, 2 mL, , , Nicol Walker MD, 2 mL at 03/29/23 0918     triamcinolone (KENALOG-40) injection 40 mg, 40 mg, , , Nicol Walker MD, 40 mg at 03/29/23 0918  Immunization History   Administered Date(s) Administered     COVID-19 12+ (Pfizer) 11/14/2023     COVID-19 Monovalent 18+ (Moderna) 03/19/2021, 04/16/2021, 11/02/2021     Influenza (IIV3) PF 12/12/2002, 11/28/2003, 10/24/2006, 02/11/2009, 10/05/2011, 11/15/2012, 10/07/2014     Influenza Vaccine 18-64 (Flublok) 10/02/2018, 10/30/2019, 10/28/2020, 10/15/2021, 03/09/2023, 09/26/2023     Influenza Vaccine >6 months,quad, PF 10/16/2013, 10/20/2015, 10/26/2016, 11/21/2017     Mantoux Tuberculin Skin Test 02/11/2009     Measles  "10/22/1979     Pneumococcal 20 valent Conjugate (Prevnar 20) 09/26/2023     Pneumococcal 23 valent 10/05/2011     TDAP Vaccine (Adacel) 02/11/2009, 07/06/2018     Zoster recombinant adjuvanted (SHINGRIX) 12/02/2021, 02/02/2022     Allergies   Allergen Reactions     Clinoril [Sulindac]      Hives     Niacin Hives     Nsaids Hives     Tolerates ibuprofen and aspirin without hives       Pineapple Hives     OBJECTIVE:                                                                 /85 (BP Location: Left arm, Patient Position: Sitting, Cuff Size: Adult Large)   Pulse 71   Temp 96.8  F (36  C) (Tympanic)   Ht 1.626 m (5' 4\")   Wt 97.3 kg (214 lb 6.4 oz)   LMP 07/18/2015 (Approximate)   SpO2 99%   BMI 36.80 kg/m          Physical Exam  Vitals and nursing note reviewed.   Constitutional:       General: She is not in acute distress.     Appearance: She is not diaphoretic.   HENT:      Head: Normocephalic and atraumatic.      Right Ear: Tympanic membrane, ear canal and external ear normal.      Left Ear: Tympanic membrane, ear canal and external ear normal.      Nose: Septal deviation (mild, leftward) present. No mucosal edema.      Right Turbinates: Not enlarged.      Left Turbinates: Not enlarged.      Mouth/Throat:      Lips: Pink.      Mouth: Mucous membranes are moist.      Pharynx: Oropharynx is clear. No pharyngeal swelling, oropharyngeal exudate or posterior oropharyngeal erythema.   Eyes:      General:         Right eye: No discharge.         Left eye: No discharge.      Conjunctiva/sclera: Conjunctivae normal.   Cardiovascular:      Rate and Rhythm: Normal rate and regular rhythm.      Heart sounds: Normal heart sounds. No murmur heard.  Pulmonary:      Effort: Pulmonary effort is normal. No respiratory distress.      Breath sounds: Normal breath sounds and air entry. No stridor, decreased air movement or transmitted upper airway sounds. No decreased breath sounds, wheezing, rhonchi or rales. "   Neurological:      Mental Status: She is alert and oriented to person, place, and time.   Psychiatric:         Mood and Affect: Mood normal.         Behavior: Behavior normal.                WORKUP:     SPIROMETRY       FVC 2.31L (69% of predicted).     FEV1 1.92L (74% of predicted).     FEV1/FVC 83%      I have reviewed and interpreted these results.  The office spirometry performed today suggests mild obstruction, restrictive pattern cannot be excluded considering equally decreased FEV1 and FVC.    Asthma Control Test (ACT) total score: 24       ASSESSMENT/PLAN:        Moderate persistent asthma without complication  The patient's asthma is well-controlled with Advair 250/50 mcg, 1 puff twice daily, montelukast 10 mg by mouth once daily, and albuterol as needed.    Continue current regimen. No changes are required at this time.    - Adult Allergy Clinic Follow-Up Order  - Spirometry, Breathing Capacity  - montelukast (SINGULAIR) 10 MG tablet  Dispense: 90 tablet; Refill: 3  - ADVAIR DISKUS 250-50 MCG/ACT inhaler  Dispense: 3 each; Refill: 3  - albuterol (PROAIR HFA/PROVENTIL HFA/VENTOLIN HFA) 108 (90 Base) MCG/ACT inhaler  Dispense: 18 g; Refill: 3      Chronic seasonal allergic rhinitis due to pollen  Recurrent sinus infections    The patient's symptoms are fairly well-controlled with allergen immunotherapy, sinus rinses as needed, intranasal fluticasone (1-2 sprays in each nostril once daily), azelastine (2 sprays in each nostril once or twice daily as needed), montelukast 10 mg by mouth once daily, and oral antihistamines as needed.    Continue allergen immunotherapy and the current medication regimen.  Notify immediately if any systemic reactions occur.      - montelukast (SINGULAIR) 10 MG tablet  Dispense: 90 tablet; Refill: 3  - fluticasone (FLONASE) 50 MCG/ACT nasal spray  Dispense: 48 g; Refill: 1  - azelastine (ASTELIN) 0.1 % nasal spray  Dispense: 90 mL; Refill: 3       Follow-up in 6 months or sooner  if needed.    Thank you for allowing us to participate in the care of this patient. Please feel free to contact us if there are any questions or concerns about the patient.    Disclaimer: This note consists of symbols derived from keyboarding, dictation and/or voice recognition software. As a result, there may be errors in the script that have gone undetected. Please consider this when interpreting information found in this chart.    Consent was obtained from the patient to use an AI documentation tool in the creation of this note.     Michael Lujan MD, FAAAAI, FACAAI  Allergy and Asthma     [facility]       Again, thank you for allowing me to participate in the care of your patient.        Sincerely,        Michael Lujan MD

## 2024-09-12 NOTE — RESULT ENCOUNTER NOTE
I have reviewed and interpreted these results.  The office spirometry performed today suggests mild obstruction, restrictive pattern cannot be excluded considering equally decreased FEV1 and FVC.

## 2024-09-12 NOTE — PATIENT INSTRUCTIONS
Prescription Assistance  If you need assistance with your prescriptions (cost, coverage, etc) please contact: [place] [facility] Assistance [facility] [phone]           If labs have been ordered/completed, please allow 7-14 business days for final interpretation of results to be sent on My Chart, phone or mail. Some lab results can take up to 28 days for results.         Allergy Staff Appt Hours Shot Hours Locations    Physician      Michael Lujan MD         Support Staff      Kaylee Beltre MA     Tuesday:   [place] :  [place]: :         :  [place] 7-3     [place]        Tuesday: : : :10        Tuesday: :10        Thursday: 7-3:10     [facility]       Tues & Wed: :10       Thurs: 12:10       Fri:             [facility]  [address] [place] [address]  Appt Line: [phone]        [facility]  [address] [place] [address]  Appt Line: [phone]     Pulmonary Function Scheduling:  [place]: [phone]  [place]: [phone]  [phone]

## 2024-09-14 ENCOUNTER — TELEPHONE (OUTPATIENT)
Dept: ALLERGY | Facility: CLINIC | Age: 59
End: 2024-09-14

## 2024-09-14 NOTE — TELEPHONE ENCOUNTER
Prior Authorization required on Advair 250/50 (shweta)  Insurance Phone [phone]  Patient ID [id number]  Please contact the pharmacy with Prior Auth status (approved/denied)    Thank you  [facility]  [phone]

## 2024-09-18 NOTE — TELEPHONE ENCOUNTER
PA Initiation    Medication: ADVAIR DISKUS 250-50 MCG/ACT IN AEPB  Insurance Company: [employer] Non-Specialty PA's - Phone [phone] Fax [phone]  Pharmacy Filling the Rx: [facility] [place], [place] - [address]  Filling Pharmacy Phone: [phone]  Filling Pharmacy Fax: [phone]  Start Date: 9/18/2024

## 2024-09-18 NOTE — TELEPHONE ENCOUNTER
PRIOR AUTHORIZATION DENIED    Medication: ADVAIR DISKUS 250-50 MCG/ACT IN AEPB    Insurance Company: [employer] Non-Specialty PA's - Phone [phone] Fax [phone]    Denial Date: 9/18/2024    Denial Reason(s):       Appeal Information:

## 2024-09-21 NOTE — PATIENT INSTRUCTIONS
You likely had flare of allergies due to the rabbit  Would recommend you continue anti-histamine for one more day as things are improving - then go to using them on an as needed basis  Okay to get your allergy shot on Friday  Would have to check with allergy on further testing.  Return to clinic as needed       [facility] ~ [phone]  One Day Weekly- Alternating Days    [place] ~ [phone]  Every other Monday or Wednesday   & one Saturday morning a month    [place] ~ [phone]  Every Other Monday Afternoon    [place] ~ [phone]  Every Other Monday Morning    [place] ~ [phone]  Every Monday morning  Every Tuesday afternoon  Wed, Thurs, Friday morning & afternoon    
severe

## 2024-10-01 NOTE — PATIENT INSTRUCTIONS
If you have questions or concerns on any instructions given to you by your provider today, you can reach us at [phone] or if you need to schedule an appointment.                 Detail Level: Detailed Products Recommended: CeraVe ultralight spf samples given General Sunscreen Counseling: I recommended a broad spectrum sunscreen with a SPF of 30 or higher.  I explained that SPF 30 sunscreens block approximately 97 percent of the sun's harmful rays.  Sunscreens should be applied at least 15 minutes prior to expected sun exposure and then every 2 hours after that as long as sun exposure continues. If swimming or exercising sunscreen should be reapplied every 45 minutes to an hour after getting wet or sweating.  One ounce, or the equivalent of a shot glass full of sunscreen, is adequate to protect the skin not covered by a bathing suit. I also recommended a lip balm with a sunscreen as well. Sun protective clothing can be used in lieu of sunscreen but must be worn the entire time you are exposed to the sun's rays.

## 2024-10-16 ENCOUNTER — ALLIED HEALTH/NURSE VISIT (OUTPATIENT)
Dept: ALLERGY | Facility: CLINIC | Age: 59
End: 2024-10-16
Payer: COMMERCIAL

## 2024-10-16 DIAGNOSIS — J30.89 ALLERGIC RHINITIS DUE TO DUST MITE: ICD-10-CM

## 2024-10-16 DIAGNOSIS — J30.81 CHRONIC ALLERGIC RHINITIS DUE TO ANIMAL HAIR AND DANDER: Primary | ICD-10-CM

## 2024-10-16 DIAGNOSIS — H10.13 ALLERGIC CONJUNCTIVITIS OF BOTH EYES: ICD-10-CM

## 2024-10-16 DIAGNOSIS — J30.1 CHRONIC SEASONAL ALLERGIC RHINITIS DUE TO POLLEN: ICD-10-CM

## 2024-10-16 DIAGNOSIS — J30.89 ALLERGIC RHINITIS DUE TO MOLD: ICD-10-CM

## 2024-10-16 PROCEDURE — 95117 IMMUNOTHERAPY INJECTIONS: CPT

## 2024-10-30 NOTE — TELEPHONE ENCOUNTER
My chart message sent to verify if pt was able to get.    Kaylee GRANT RN  Specialty/Allergy Clinics

## 2024-10-31 NOTE — TELEPHONE ENCOUNTER
Pt states she is receiving this medication in generic form.     Kaylee GRANT RN  Specialty/Allergy Clinics

## 2024-11-07 ENCOUNTER — ANCILLARY PROCEDURE (OUTPATIENT)
Dept: GENERAL RADIOLOGY | Facility: CLINIC | Age: 59
End: 2024-11-07
Payer: COMMERCIAL

## 2024-11-07 ENCOUNTER — OFFICE VISIT (OUTPATIENT)
Dept: URGENT CARE | Facility: URGENT CARE | Age: 59
End: 2024-11-07
Payer: COMMERCIAL

## 2024-11-07 VITALS
WEIGHT: 218 LBS | OXYGEN SATURATION: 100 % | SYSTOLIC BLOOD PRESSURE: 139 MMHG | BODY MASS INDEX: 37.42 KG/M2 | TEMPERATURE: 97.6 F | DIASTOLIC BLOOD PRESSURE: 84 MMHG | RESPIRATION RATE: 18 BRPM | HEART RATE: 71 BPM

## 2024-11-07 DIAGNOSIS — M79.672 LEFT FOOT PAIN: Primary | ICD-10-CM

## 2024-11-07 DIAGNOSIS — M76.822 POSTERIOR TIBIAL TENDON DYSFUNCTION, LEFT: ICD-10-CM

## 2024-11-07 DIAGNOSIS — M79.672 LEFT FOOT PAIN: ICD-10-CM

## 2024-11-07 PROCEDURE — 73630 X-RAY EXAM OF FOOT: CPT | Mod: TC | Performed by: RADIOLOGY

## 2024-11-08 NOTE — PROGRESS NOTES
Assessment & Plan     Left foot pain  Posterior tibial tendon dysfunction, left  Left foot pain over posterior tibial tendon started acutely last night after her dog jumped on her bed landing her foot.  X-rays negative for fracture.  She does have overlying varicose veins and hallux valgus deformity, however these do not seem to have caused her acute pain.  Is able to ambulate on it and do heel raises.  Suspect posterior tibial tendinitis.  Patient will start wearing insoles, do ibuprofen, Tylenol, ice, rest, and stretching/exercises as able.  Will follow-up with PCP or podiatry as needed if not improving.  - XR Foot Left G/E 3 Views      Return if symptoms worsen or fail to improve.    Jennifer Nelson DO  [facility]    Pedro uLis Dobbs is a 59 year old female who presents to clinic today for the following health issues:  Chief Complaint   Patient presents with    Musculoskeletal Problem     Since last night, Pt has left foot pain on the top towards the middle.  Pt denies any injury, pt would like an xray.  Pt has hx of plantar faucitis.      HPI    Dilia is a 59 year old female with PMH halux valgus, carpal tunnel, asthma who presents to Urgent Care for evaluation of left foot pain. Started acutely last night after the dog jumped on her bed, landing near her foot.     No prior episodes.     Pain is left medial foot.  She does have a history of plantar fasciitis, however she reports that this pain is a little bit more anterior.  She is able to do things like stand on her toes and do heel raises, however this does provoke her pain.  No significant skin changes, swelling, bruising, or other abnormality noted by patient.    Has tried some ice with perhaps a bit of relief.  Is able to ambulate on it, although this causes some pain.  No pain elsewhere in her foot.  Normal sensation.  Has varicose veins in that same area, these are not new, and she does not feel like the pain is  related to her varicose veins.    No other new symptoms, no systemic symptoms.      Review of Systems  Constitutional, HEENT, cardiovascular, pulmonary, gi and gu systems are negative, except as otherwise noted.      Objective    /84   Pulse 71   Temp 97.6  F (36.4  C) (Tympanic)   Resp 18   Wt 98.9 kg (218 lb)   LMP 07/18/2015 (Approximate)   SpO2 100%   BMI 37.42 kg/m    Physical Exam   GENERAL: alert and no distress  EYES: Eyes grossly normal to inspection, PERRL and conjunctivae and sclerae normal  RESP: lungs clear to auscultation - no rales, rhonchi or wheezes  CV: regular rate and rhythm, normal S1 S2, no S3 or S4, no murmur, click or rub, no peripheral edema  MS: Left foot with hallux valgus, varicose veins over left medial foot, tenderness to palpation over left posterior tibial tendon insertion area, no other tenderness to palpation over foot or ankle, including over high ankle.  Full range of motion in foot and ankle is not limited by pain, symmetric to right side.  No significant effusion, no erythema, no warmth, no obvious injuries.  Antalgic gait.  Able to do heel raises although with some pain.  SKIN: no suspicious lesions or rashes  NEURO: Normal strength and tone, mentation intact and speech normal    Xray - Reviewed and interpreted by me hallux valgus, no obvious acute fractures, dislocations.  Narrative & Impression   EXAM: XR FOOT LEFT G/E 3 VIEWS  LOCATION: [facility]  DATE: 11/7/2024     INDICATION:  Left foot pain.  COMPARISON: None.                                                                      IMPRESSION: Hallux valgus, lateral subluxation of the sesamoids and mild first MTP joint osteoarthrosis. Nonspecific soft tissue fullness lateral to the fifth MTP joint and medial to the first MTP joint. Small calcaneal enthesophytes. Mild to moderate   osteoarthrosis of the TMT joints. There are also degenerative changes between the bases of the third and  fourth metatarsals. No evidence of fracture.

## 2024-11-20 ENCOUNTER — ALLIED HEALTH/NURSE VISIT (OUTPATIENT)
Dept: ALLERGY | Facility: CLINIC | Age: 59
End: 2024-11-20
Payer: COMMERCIAL

## 2024-11-20 DIAGNOSIS — J30.89 ALLERGIC RHINITIS DUE TO DUST MITE: ICD-10-CM

## 2024-11-20 DIAGNOSIS — J30.81 CHRONIC ALLERGIC RHINITIS DUE TO ANIMAL HAIR AND DANDER: Primary | ICD-10-CM

## 2024-11-20 DIAGNOSIS — J30.1 CHRONIC SEASONAL ALLERGIC RHINITIS DUE TO POLLEN: ICD-10-CM

## 2024-11-20 DIAGNOSIS — H10.13 ALLERGIC CONJUNCTIVITIS OF BOTH EYES: ICD-10-CM

## 2024-11-20 DIAGNOSIS — J30.89 ALLERGIC RHINITIS DUE TO MOLD: ICD-10-CM

## 2024-11-29 ENCOUNTER — TELEPHONE (OUTPATIENT)
Dept: ALLERGY | Facility: CLINIC | Age: 59
End: 2024-11-29
Payer: COMMERCIAL

## 2024-11-29 DIAGNOSIS — J45.40 MODERATE PERSISTENT ASTHMA WITHOUT COMPLICATION: Primary | ICD-10-CM

## 2024-11-29 NOTE — TELEPHONE ENCOUNTER
Prior Authorization needed on [employer] 250/50     Insurance Name paid/[employer]     Insurance Phone # [phone]     Patients ID# [id number]     Please contact the pharmacy with Prior Auth status (approved/denied)    Thank you,  Deepthi Smith, [address] [id number] [phone]

## 2024-12-03 RX ORDER — FLUTICASONE PROPIONATE AND SALMETEROL 250; 50 UG/1; UG/1
1 POWDER RESPIRATORY (INHALATION) EVERY 12 HOURS
Qty: 60 EACH | Refills: 3 | Status: SHIPPED | OUTPATIENT
Start: 2024-12-03

## 2024-12-03 NOTE — TELEPHONE ENCOUNTER
Discussed with pt 10/31 and pt states she is getting the generic form from her pharmacy. Pt stated she would notify staff if there are any issues getting her medication.     Kaylee GRANT RN  Specialty/Allergy Clinics

## 2024-12-04 ENCOUNTER — IMMUNIZATION (OUTPATIENT)
Dept: FAMILY MEDICINE | Facility: CLINIC | Age: 59
End: 2024-12-04
Payer: COMMERCIAL

## 2024-12-04 DIAGNOSIS — Z23 NEED FOR PROPHYLACTIC VACCINATION AND INOCULATION AGAINST INFLUENZA: Primary | ICD-10-CM

## 2024-12-04 DIAGNOSIS — Z23 HIGH PRIORITY FOR 2019-NCOV VACCINE: ICD-10-CM

## 2024-12-04 PROCEDURE — 91320 SARSCV2 VAC 30MCG TRS-SUC IM: CPT

## 2024-12-04 PROCEDURE — 90472 IMMUNIZATION ADMIN EACH ADD: CPT

## 2024-12-04 PROCEDURE — 90673 RIV3 VACCINE NO PRESERV IM: CPT

## 2024-12-18 ENCOUNTER — ALLIED HEALTH/NURSE VISIT (OUTPATIENT)
Dept: ALLERGY | Facility: CLINIC | Age: 59
End: 2024-12-18
Payer: COMMERCIAL

## 2024-12-18 DIAGNOSIS — J30.89 ALLERGIC RHINITIS DUE TO MOLD: ICD-10-CM

## 2024-12-18 DIAGNOSIS — J30.81 CHRONIC ALLERGIC RHINITIS DUE TO ANIMAL HAIR AND DANDER: Primary | ICD-10-CM

## 2024-12-18 DIAGNOSIS — J30.89 ALLERGIC RHINITIS DUE TO DUST MITE: ICD-10-CM

## 2024-12-18 DIAGNOSIS — J30.1 CHRONIC SEASONAL ALLERGIC RHINITIS DUE TO POLLEN: ICD-10-CM

## 2024-12-18 PROCEDURE — 95117 IMMUNOTHERAPY INJECTIONS: CPT

## 2025-01-13 ENCOUNTER — OFFICE VISIT (OUTPATIENT)
Dept: FAMILY MEDICINE | Facility: CLINIC | Age: 60
End: 2025-01-13
Payer: COMMERCIAL

## 2025-01-13 VITALS
HEART RATE: 78 BPM | HEIGHT: 64 IN | SYSTOLIC BLOOD PRESSURE: 118 MMHG | WEIGHT: 212 LBS | DIASTOLIC BLOOD PRESSURE: 66 MMHG | OXYGEN SATURATION: 98 % | RESPIRATION RATE: 18 BRPM | BODY MASS INDEX: 36.19 KG/M2

## 2025-01-13 DIAGNOSIS — Z82.49 FAMILY HISTORY OF ISCHEMIC HEART DISEASE: ICD-10-CM

## 2025-01-13 DIAGNOSIS — E03.8 SUBCLINICAL HYPOTHYROIDISM: ICD-10-CM

## 2025-01-13 DIAGNOSIS — E78.5 HYPERLIPIDEMIA LDL GOAL <100: ICD-10-CM

## 2025-01-13 DIAGNOSIS — Z12.4 CERVICAL CANCER SCREENING: ICD-10-CM

## 2025-01-13 DIAGNOSIS — Z12.31 ENCOUNTER FOR SCREENING MAMMOGRAM FOR BREAST CANCER: ICD-10-CM

## 2025-01-13 DIAGNOSIS — Z00.00 ROUTINE GENERAL MEDICAL EXAMINATION AT A HEALTH CARE FACILITY: Primary | ICD-10-CM

## 2025-01-13 PROBLEM — E66.812 CLASS 2 SEVERE OBESITY DUE TO EXCESS CALORIES WITH SERIOUS COMORBIDITY IN ADULT (H): Status: RESOLVED | Noted: 2024-02-27 | Resolved: 2025-01-13

## 2025-01-13 PROBLEM — E66.01 CLASS 2 SEVERE OBESITY DUE TO EXCESS CALORIES WITH SERIOUS COMORBIDITY IN ADULT (H): Status: RESOLVED | Noted: 2024-02-27 | Resolved: 2025-01-13

## 2025-01-13 LAB
CHOLEST SERPL-MCNC: 200 MG/DL
FASTING STATUS PATIENT QL REPORTED: YES
HDLC SERPL-MCNC: 47 MG/DL
LDLC SERPL CALC-MCNC: 129 MG/DL
NONHDLC SERPL-MCNC: 153 MG/DL
TRIGL SERPL-MCNC: 122 MG/DL
TSH SERPL DL<=0.005 MIU/L-ACNC: 3.74 UIU/ML (ref 0.3–4.2)

## 2025-01-13 PROCEDURE — 99214 OFFICE O/P EST MOD 30 MIN: CPT | Mod: 25 | Performed by: STUDENT IN AN ORGANIZED HEALTH CARE EDUCATION/TRAINING PROGRAM

## 2025-01-13 PROCEDURE — 36415 COLL VENOUS BLD VENIPUNCTURE: CPT | Performed by: STUDENT IN AN ORGANIZED HEALTH CARE EDUCATION/TRAINING PROGRAM

## 2025-01-13 PROCEDURE — 99396 PREV VISIT EST AGE 40-64: CPT | Mod: 25 | Performed by: STUDENT IN AN ORGANIZED HEALTH CARE EDUCATION/TRAINING PROGRAM

## 2025-01-13 PROCEDURE — 80061 LIPID PANEL: CPT | Performed by: STUDENT IN AN ORGANIZED HEALTH CARE EDUCATION/TRAINING PROGRAM

## 2025-01-13 PROCEDURE — 84443 ASSAY THYROID STIM HORMONE: CPT | Performed by: STUDENT IN AN ORGANIZED HEALTH CARE EDUCATION/TRAINING PROGRAM

## 2025-01-13 SDOH — HEALTH STABILITY: PHYSICAL HEALTH: ON AVERAGE, HOW MANY DAYS PER WEEK DO YOU ENGAGE IN MODERATE TO STRENUOUS EXERCISE (LIKE A BRISK WALK)?: 3 DAYS

## 2025-01-13 ASSESSMENT — PAIN SCALES - GENERAL: PAINLEVEL_OUTOF10: NO PAIN (0)

## 2025-01-13 ASSESSMENT — SOCIAL DETERMINANTS OF HEALTH (SDOH): HOW OFTEN DO YOU GET TOGETHER WITH FRIENDS OR RELATIVES?: MORE THAN THREE TIMES A WEEK

## 2025-01-13 NOTE — PROGRESS NOTES
"Preventive Care Visit  [facility]  Lindsay Cage MD, Family Medicine  Jan 13, 2025      Assessment & Plan     Routine general medical examination at a health care facility  Patient is a pleasant 59 year old who presents today for annual visit. UTD on colon cancer screening. Will have her do mammo later this year. Updated pap today.     Hyperlipidemia LDL goal <100  History of hyperlipidemia, will obtain lipid panel this year. She also asks about screening for coronary disease, so CAC ordered today.  - Lipid panel reflex to direct LDL Non-fasting  - CT Coronary Calcium Scan    Subclinical hypothyroidism  Previous elevated TSH with normal T4 recheck this year.   - TSH with free T4 reflex    Encounter for screening mammogram for breast cancer  Due later this year, ordered.   - MA Screen Bilateral w/Seth    Cervical cancer screening  Attempted to obtain today. She was reporting too much discomfort with smallest size speculum insertion. Aborted attempt.     Family history of ischemic heart disease  Patient asks about coronary screening, ordered CAC. No s/s CAD.   - CT Coronary Calcium Scan      Patient has been advised of split billing requirements and indicates understanding: Yes        BMI  Estimated body mass index is 36.39 kg/m  as calculated from the following:    Height as of this encounter: 1.626 m (5' 4\").    Weight as of this encounter: 96.2 kg (212 lb).     Counseling  Appropriate preventive services were addressed with this patient via screening, questionnaire, or discussion as appropriate for fall prevention, nutrition, physical activity, Tobacco-use cessation, social engagement, weight loss and cognition.  Checklist reviewing preventive services available has been given to the patient.  Reviewed patient's diet, addressing concerns and/or questions.   She is at risk for lack of exercise and has been provided with information to increase physical activity for the benefit of her " well-being.       Subjective   Dilia is a 59 year old, presenting for the following:  Physical        1/13/2025     7:51 AM   Additional Questions   Roomed by Deepthi TOTH   Accompanied by Self          HPI    Health Care Directive  Patient does not have a Health Care Directive: Discussed advance care planning with patient; information given to patient to review.      1/13/2025   General Health   How would you rate your overall physical health? Excellent   Feel stress (tense, anxious, or unable to sleep) Not at all         1/13/2025   Nutrition   Three or more servings of calcium each day? Yes   Diet: Regular (no restrictions)   How many servings of fruit and vegetables per day? (!) 2-3   How many sweetened beverages each day? (!) 2         1/13/2025   Exercise   Days per week of moderate/strenous exercise 3 days         1/13/2025   Social Factors   Frequency of gathering with friends or relatives More than three times a week   Worry food won't last until get money to buy more No   Food not last or not have enough money for food? No   Do you have housing? (Housing is defined as stable permanent housing and does not include staying ouside in a car, in a tent, in an abandoned building, in an overnight shelter, or couch-surfing.) Yes   Are you worried about losing your housing? No   Lack of transportation? No   Unable to get utilities (heat,electricity)? Patient declined         1/13/2025   Fall Risk   Fallen 2 or more times in the past year? No   Trouble with walking or balance? No          1/13/2025   Dental   Dentist two times every year? Yes         1/13/2025   TB Screening   Were you born outside of the [place]? No         Today's PHQ-2 Score:       1/13/2025     7:44 AM   PHQ-2 ( 1999 [employer])   Q1: Little interest or pleasure in doing things 0   Q2: Feeling down, depressed or hopeless 0   PHQ-2 Score 0    Q1: Little interest or pleasure in doing things Not at all   Q2: Feeling down, depressed or hopeless Not at all    PHQ-2 Score 0       Patient-reported           1/13/2025   Substance Use   Alcohol more than 3/day or more than 7/wk No   Do you use any other substances recreationally? No     Social History     Tobacco Use    Smoking status: Never    Smokeless tobacco: Never   Vaping Use    Vaping status: Never Used   Substance Use Topics    Alcohol use: No    Drug use: No           10/24/2023   LAST FHS-7 RESULTS   1st degree relative breast or ovarian cancer No   Any relative bilateral breast cancer No   Any male have breast cancer No   Any ONE woman have BOTH breast AND ovarian cancer No   Any woman with breast cancer before 50yrs Unknown   2 or more relatives with breast AND/OR ovarian cancer No   2 or more relatives with breast AND/OR bowel cancer No        Mammogram Screening - Mammogram every 1-2 years updated in Health Maintenance based on mutual decision making        1/13/2025   STI Screening   New sexual partner(s) since last STI/HIV test? No     History of abnormal Pap smear: No - age 30- 64 PAP with HPV every 5 years recommended        Latest Ref Rng & Units 1/29/2019    10:54 AM 1/29/2019     8:53 AM 12/5/2013    12:00 AM   PAP / HPV   PAP (Historical)   NIL  OTHER-NIL, See Result    HPV 16 DNA NEG^Negative Negative      HPV 18 DNA NEG^Negative Negative      Other HR HPV NEG^Negative Negative        ASCVD Risk   The 10-year ASCVD risk score (Tesha PORTILLO, et al., 2019) is: 2.3%    Values used to calculate the score:      Age: 59 years      Sex: Female      Is Non- : No      Diabetic: No      Tobacco smoker: No      Systolic Blood Pressure: 118 mmHg      Is BP treated: No      HDL Cholesterol: 57 mg/dL      Total Cholesterol: 202 mg/dL         Reviewed and updated as needed this visit by Provider                      Review of Systems  Constitutional, HEENT, cardiovascular, pulmonary, gi and gu systems are negative, except as otherwise noted.     Objective    Exam  /66 (BP  "Location: Right arm, Patient Position: Sitting, Cuff Size: Adult Regular)   Pulse 78   Resp 18   Ht 1.626 m (5' 4\")   Wt 96.2 kg (212 lb)   LMP 07/18/2015 (Approximate)   SpO2 98%   BMI 36.39 kg/m     Estimated body mass index is 36.39 kg/m  as calculated from the following:    Height as of this encounter: 1.626 m (5' 4\").    Weight as of this encounter: 96.2 kg (212 lb).    Physical Exam  GENERAL: alert and no distress  EYES: Eyes grossly normal to inspection, wears corrective lenses. and conjunctivae and sclerae normal  HENT: ear canals and TM's normal, nose and mouth without ulcers or lesions  NECK: no adenopathy, no asymmetry, masses, or scars  RESP: lungs clear to auscultation - no rales, rhonchi or wheezes  CV: regular rate and rhythm, normal S1 S2, no S3 or S4, no murmur, click or rub, no peripheral edema  ABDOMEN: soft, nontender, no hepatosplenomegaly, no masses and bowel sounds normal   (female): normal female external genitalia, normal urethral meatus, unable to visualize vagina or cervix due to patient discomfort.   MS: no gross musculoskeletal defects noted, no edema  SKIN: no suspicious lesions or rashes  NEURO: Normal strength and tone, mentation intact and speech normal  PSYCH: mentation appears normal, affect normal/bright        Signed Electronically by: Lindsay Cage MD    "

## 2025-01-13 NOTE — PATIENT INSTRUCTIONS
Patient Education   Preventive Care Advice   This is general advice given by our system to help you stay healthy. However, your care team may have specific advice just for you. Please talk to your care team about your preventive care needs.  Nutrition  Eat 5 or more servings of fruits and vegetables each day.  Try wheat bread, brown rice and whole grain pasta (instead of white bread, rice, and pasta).  Get enough calcium and vitamin D. Check the label on foods and aim for 100% of the RDA (recommended daily allowance).  Lifestyle  Exercise at least 150 minutes each week  (30 minutes a day, 5 days a week).  Do muscle strengthening activities 2 days a week. These help control your weight and prevent disease.  No smoking.  Wear sunscreen to prevent skin cancer.  Have a dental exam and cleaning every 6 months.  Yearly exams  See your health care team every year to talk about:  Any changes in your health.  Any medicines your care team has prescribed.  Preventive care, family planning, and ways to prevent chronic diseases.  Shots (vaccines)   HPV shots (up to age 26), if you've never had them before.  Hepatitis B shots (up to age 59), if you've never had them before.  COVID-19 shot: Get this shot when it's due.  Flu shot: Get a flu shot every year.  Tetanus shot: Get a tetanus shot every 10 years.  Pneumococcal, hepatitis A, and RSV shots: Ask your care team if you need these based on your risk.  Shingles shot (for age 50 and up)  General health tests  Diabetes screening:  Starting at age 35, Get screened for diabetes at least every 3 years.  If you are younger than age 35, ask your care team if you should be screened for diabetes.  Cholesterol test: At age 39, start having a cholesterol test every 5 years, or more often if advised.  Bone density scan (DEXA): At age 50, ask your care team if you should have this scan for osteoporosis (brittle bones).  Hepatitis C: Get tested at least once in your life.  STIs (sexually  transmitted infections)  Before age 24: Ask your care team if you should be screened for STIs.  After age 24: Get screened for STIs if you're at risk. You are at risk for STIs (including HIV) if:  You are sexually active with more than one person.  You don't use condoms every time.  You or a partner was diagnosed with a sexually transmitted infection.  If you are at risk for HIV, ask about PrEP medicine to prevent HIV.  Get tested for HIV at least once in your life, whether you are at risk for HIV or not.  Cancer screening tests  Cervical cancer screening: If you have a cervix, begin getting regular cervical cancer screening tests starting at age 21.  Breast cancer scan (mammogram): If you've ever had breasts, begin having regular mammograms starting at age 40. This is a scan to check for breast cancer.  Colon cancer screening: It is important to start screening for colon cancer at age 45.  Have a colonoscopy test every 10 years (or more often if you're at risk) Or, ask your provider about stool tests like a FIT test every year or Cologuard test every 3 years.  To learn more about your testing options, visit:   .  For help making a decision, visit:   https://bit.ly/wl38143.  Prostate cancer screening test: If you have a prostate, ask your care team if a prostate cancer screening test (PSA) at age 55 is right for you.  Lung cancer screening: If you are a current or former smoker ages 50 to 80, ask your care team if ongoing lung cancer screenings are right for you.  For informational purposes only. Not to replace the advice of your health care provider. Copyright   2023 [employer]. All rights reserved. Clinically reviewed by the [facility] Transitions Program. [employer] [id number] - REV 01/24.

## 2025-01-14 ENCOUNTER — PATIENT OUTREACH (OUTPATIENT)
Dept: CARE COORDINATION | Facility: CLINIC | Age: 60
End: 2025-01-14

## 2025-01-16 ENCOUNTER — TELEPHONE (OUTPATIENT)
Dept: ALLERGY | Facility: CLINIC | Age: 60
End: 2025-01-16

## 2025-01-16 ENCOUNTER — OFFICE VISIT (OUTPATIENT)
Dept: ALLERGY | Facility: CLINIC | Age: 60
End: 2025-01-16
Payer: COMMERCIAL

## 2025-01-16 VITALS
DIASTOLIC BLOOD PRESSURE: 74 MMHG | HEART RATE: 85 BPM | BODY MASS INDEX: 38.73 KG/M2 | HEIGHT: 63 IN | WEIGHT: 218.6 LBS | TEMPERATURE: 97.4 F | SYSTOLIC BLOOD PRESSURE: 118 MMHG | OXYGEN SATURATION: 98 %

## 2025-01-16 DIAGNOSIS — J30.89 ALLERGIC RHINITIS DUE TO MOLD: ICD-10-CM

## 2025-01-16 DIAGNOSIS — J30.1 CHRONIC SEASONAL ALLERGIC RHINITIS DUE TO POLLEN: ICD-10-CM

## 2025-01-16 DIAGNOSIS — J30.89 ALLERGIC RHINITIS DUE TO DUST MITE: ICD-10-CM

## 2025-01-16 DIAGNOSIS — J30.81 CHRONIC ALLERGIC RHINITIS DUE TO ANIMAL HAIR AND DANDER: ICD-10-CM

## 2025-01-16 DIAGNOSIS — J45.40 MODERATE PERSISTENT ASTHMA WITHOUT COMPLICATION: Primary | ICD-10-CM

## 2025-01-16 DIAGNOSIS — J32.9 RECURRENT SINUS INFECTIONS: ICD-10-CM

## 2025-01-16 LAB
EXPTIME-PRE: 2.48 SEC
FEF2575-%PRED-PRE: 169 %
FEF2575-PRE: 3.87 L/SEC
FEF2575-PRED: 2.29 L/SEC
FEFMAX-%PRED-PRE: 99 %
FEFMAX-PRE: 6.24 L/SEC
FEFMAX-PRED: 6.26 L/SEC
FEV1-%PRED-PRE: 87 %
FEV1-PRE: 2.16 L
FEV1FEV6-PRE: 92 %
FEV1FEV6-PRED: 81 %
FEV1FVC-PRE: 89 %
FEV1FVC-PRED: 80 %
FIFMAX-PRE: 1.73 L/SEC
FVC-%PRED-PRE: 77 %
FVC-PRE: 2.43 L
FVC-PRED: 3.13 L

## 2025-01-16 RX ORDER — BUDESONIDE 0.5 MG/2ML
0.5 INHALANT ORAL DAILY
Qty: 120 ML | Refills: 3 | Status: SHIPPED | OUTPATIENT
Start: 2025-01-16 | End: 2025-01-16

## 2025-01-16 RX ORDER — BUDESONIDE 0.5 MG/2ML
INHALANT ORAL
Qty: 120 ML | Refills: 2 | Status: SHIPPED | OUTPATIENT
Start: 2025-01-16

## 2025-01-16 RX ORDER — FLUTICASONE FUROATE AND VILANTEROL TRIFENATATE 200; 25 UG/1; UG/1
1 POWDER RESPIRATORY (INHALATION) DAILY
Qty: 60 EACH | Refills: 11 | Status: SHIPPED | OUTPATIENT
Start: 2025-01-16

## 2025-01-16 ASSESSMENT — ASTHMA QUESTIONNAIRES: ACT_TOTALSCORE: 14

## 2025-01-16 NOTE — PROGRESS NOTES
SUBJECTIVE:                                                                   Dilia Solomon presents today to our Allergy Clinic at [facility] for a follow up visit. She is a 59 year old female with moderate persistent asthma and allergic rhinitis.   She was diagnosed with asthma approximately in [id number]. Triggers are environmental allergies and viral respiratory infections.   Serum IgE for regional aeroallergen panel performed in June 2017 with multiple levels of various sensitivities to molds, cat, dog, dust mite, pollen of trees, grasses, and weeds.  She had 2 sinus surgeries in the past. The last one was in 2018.   She started allergen immunotherapy in July 2017. She was on allergen immunotherapy before that with Dr. Haines, and it was helpful at that time; however, symptoms got worse soon after stopping it.  PFT within normal limits in December 2020 and July 2022.      Allergy Immunotherapy (started on 7/19/2017)  Date/time of injection(s): 12/18/2024     Vial Color                               Content                                  Dose  Red 1:1                                   Weeds                                     0.5  Red 1:1                                   Grass, Trees                            0.5         Red 1:1                                   Molds                                       0.5  Red 1:1                                   Cat, Dog, Dust Mite                 0.5        She tolerates injections well without persistent large local reactions or any systemic reactions.   She has been using sinus saline rinses once-twice daily, intranasal fluticasone 2 sprays in each nostril once daily, azelastine 2 sprays in each nostril once-twice daily, montelukast 10 mg by mouth once daily, and oral antihistamines as needed. She uses Optivar occasionally.  Overall, she finds allergen immunotherapy helpful, but at the same time she does have episodes of recurrent sinus  infections.      Between Christmas and New Year's, the patient traveled to [place]. She initially developed a sore throat, which she attributed to the mountain environment, but it resolved spontaneously. Subsequently, she experienced sinus pressure and green nasal discharge, which seemed to improve without the need for antibiotics. She reports occasional sinus pressure and sometimes coughing up phlegm, although the phlegm is not discolored.    The patient feels that Wixela does not work as well as Advair, but Advair is not covered by her insurance. She is currently using Wixela 250/50 mcg (1 puff twice daily), montelukast 10 mg once daily, and albuterol as needed.    Over the past couple of weeks, she has noticed increased chest tightness, occurring about once daily, particularly with exertion. She uses albuterol at least once daily for relief. During a physical exam on January 13, 2025, she was informed of wheezing, although she has not noticed it herself. She denies any fevers.  The patient reports being evaluated by her family practice physician for a routine medical examination, during which she was informed of wheezing and advised to follow up with our clinic promptly. However, the progress note from that visit did not document any findings of wheezing or a recommendation for urgent follow-up. After clarification with Dr. Cage, it was confirmed that there was no wheezing on examination, nor was there any recommendation for follow-up with our clinic ASAP..    Patient Active Problem List   Diagnosis    Need for prophylactic vaccination and inoculation against influenza    Sprain of interphalangeal (joint) of hand    Radial styloid tenosynovitis    Carpal tunnel syndrome    Synovial cyst of popliteal space    Pain in joint, lower leg    Hyperlipidemia LDL goal <130    Moderate persistent asthma    Allergic rhinitis due to dust mite    Food allergy    FH: diabetes mellitus    Chronic allergic rhinitis due to  animal hair and dander    Allergic rhinitis due to mold    Chronic seasonal allergic rhinitis due to pollen    Allergic conjunctivitis, bilateral    History of endoscopic sinus surgery    Chronic pansinusitis    Liver cyst       Past Medical History:   Diagnosis Date    Injury, other and unspecified, shoulder and upper arm 9/03      Problem (# of Occurrences) Relation (Name,Age of Onset)    Cancer (1) Father: brain    Diabetes (1) Mother    Breast Cancer (1) Maternal Aunt    C.A.D. (1) Mother           Negative family history of: Cancer - colorectal          Past Surgical History:   Procedure Laterality Date    COLONOSCOPY N/A 10/6/2016    Procedure: COLONOSCOPY;  Surgeon: Shiva Johns MD;  Location: [facility]    ETHMOIDECTOMY  12/30/2013    Procedure: ETHMOIDECTOMY;  Bilateral Submucousal Reduction of InferiorTurbinates and Total Ethmoidectomy with Multiple Sinusotomies;  Surgeon: Lio More MD;  Location: [facility]    ETHMOIDECTOMY Bilateral 2/14/2018    Procedure: ETHMOIDECTOMY;  Bilateral anterior ethmoidectomy, bilateral maxillary antrostomy;  Surgeon: Santhosh Tierney MD;  Location: [facility]    SURGICAL HISTORY OF -   5/04    carpal tunnel release, bilateral     Social History     Socioeconomic History    Marital status: Single   Occupational History     Employer: [employer]    Tobacco Use    Smoking status: Never    Smokeless tobacco: Never   Vaping Use    Vaping status: Never Used   Substance and Sexual Activity    Alcohol use: No    Drug use: No    Sexual activity: Never   Other Topics Concern    Parent/sibling w/ CABG, MI or angioplasty before 65F 55M? Yes     Comment: mother   Social History Narrative    01/16/25        ENVIRONMENTAL HISTORY: [place] is heated with a forced air. They do not have central air conditioning. The patient's bedroom is furnished with hard paewl in bedroom, allergen mattress cover, allergen pillowcase cover and  fabric window coverings.  Pets inside the house include 1 dog. There is not history of cockroach or mice infestation. There are no smokers in the house.  The house does have a damp basement.     Patient is currently living and taking care of a friend in the friends home.     Social Drivers of Health     Financial Resource Strain: Unknown (1/13/2025)    Financial Resource Strain     Within the past 12 months, have you or your family members you live with been unable to get utilities (heat, electricity) when it was really needed?: Patient declined   Food Insecurity: Low Risk  (1/13/2025)    Food Insecurity     Within the past 12 months, did you worry that your food would run out before you got money to buy more?: No     Within the past 12 months, did the food you bought just not last and you didn t have money to get more?: No   Transportation Needs: Low Risk  (1/13/2025)    Transportation Needs     Within the past 12 months, has lack of transportation kept you from medical appointments, getting your medicines, non-medical meetings or appointments, work, or from getting things that you need?: No   Physical Activity: Unknown (1/13/2025)    Exercise Vital Sign     Days of Exercise per Week: 3 days   Stress: No Stress Concern Present (1/13/2025)    [employer] - Occupational Stress Questionnaire     Feeling of Stress : Not at all   Social Connections: Unknown (1/13/2025)    Social Connection and Isolation Panel [NHANES]     Frequency of Social Gatherings with Friends and Family: More than three times a week   Interpersonal Safety: Low Risk  (1/13/2025)    Interpersonal Safety     Do you feel physically and emotionally safe where you currently live?: Yes     Within the past 12 months, have you been hit, slapped, kicked or otherwise physically hurt by someone?: No     Within the past 12 months, have you been humiliated or emotionally abused in other ways by your partner or ex-partner?: No    Housing Stability: Low Risk  (1/13/2025)    Housing Stability     Do you have housing? : Yes     Are you worried about losing your housing?: No             Current Outpatient Medications:     BREO ELLIPTA 200-25 MCG/ACT inhaler, Inhale 1 puff into the lungs daily., Disp: 60 each, Rfl: 11    budesonide (PULMICORT) 0.5 MG/2ML neb solution, Mix respule of 0.5mg/2 mL budesonide vial in 8oz normal saline sinus rinse bottle. Irrigate each nostril with half of the bottle twice daily, Disp: 120 mL, Rfl: 2    albuterol (2.5 MG/3ML) 0.083% neb solution, Take 1 vial (2.5 mg) by nebulization every 4 hours as needed, Disp: 1 Box, Rfl: 3    albuterol (PROAIR HFA/PROVENTIL HFA/VENTOLIN HFA) 108 (90 Base) MCG/ACT inhaler, Inhale 2 puffs into the lungs every 4 hours as needed for shortness of breath or wheezing., Disp: 18 g, Rfl: 3    azelastine (ASTELIN) 0.1 % nasal spray, Spray 2 sprays into both nostrils 2 times daily as needed for rhinitis or allergies., Disp: 90 mL, Rfl: 3    azelastine (OPTIVAR) 0.05 % ophthalmic solution, Apply 1 drop to eye 2 times daily as needed (itchy/watery eyes), Disp: 18 mL, Rfl: 1    cetirizine (ZYRTEC) 10 MG tablet, Take 1 tablet (10 mg) by mouth daily as needed for allergies, Disp: 90 tablet, Rfl: 3    Emollient (CERAVE) CREA, Externally apply 1 dose. topically 2 times daily, Disp: 2 Bottle, Rfl: 11    EPINEPHrine (ANY BX GENERIC EQUIV) 0.3 MG/0.3ML injection 2-pack, Inject 0.3 mLs (0.3 mg) into the muscle once as needed for anaphylaxis. NEED TO SEE PROVIDER!, Disp: 2 each, Rfl: 0    fluticasone (FLONASE) 50 MCG/ACT nasal spray, Spray 2 sprays into both nostrils daily., Disp: 48 g, Rfl: 1    loratadine (CLARITIN) 10 MG tablet, Take 1 tablet (10 mg) by mouth daily as needed for allergies, Disp: 90 tablet, Rfl: 3    montelukast (SINGULAIR) 10 MG tablet, Take 1 tablet (10 mg) by mouth at bedtime., Disp: 90 tablet, Rfl: 3    MULTIVITAMIN OR, 1 tab daily, Disp: , Rfl:     ORDER FOR ALLERGEN  IMMUNOTHERAPY, Name of Mix: Mix #1  Mold Alternaria Tenuis 1:10 w/v, HS  0.5 ml Aspergillus Fumigatus 1:10 w/v, HS  0.5 ml Epicoccum Nigrum 1:10 w/v, HS 0.5 ml Hormodendrum Cladosporioides 1:10 w/v, HS 0.5 ml Penicillium Mix 1:10 w/v, HS  0.5 ml Diluent: HSA 2.5mL to 5ml, Disp: , Rfl:     ORDER FOR ALLERGEN IMMUNOTHERAPY, Name of Mix: Mix #2  Dust Mite, Cat, Dog Cat Hair, Standardized A.P. 10,000 BAU/mL, HS  2.0 ml  Dog Hair-Dander, UF  1:650 w/v, HS  1.0 ml   Dust Mites F 30,000AU/mL, HS  0.3 ml Dust Mites P. 30,000 AU/mL, HS  0.3 ml  Diluent: HSA 1.4mL to 5ml, Disp: , Rfl:     ORDER FOR ALLERGEN IMMUNOTHERAPY, Name of Mix: Mix #3 Grass,Tree  Dmitry,White 1:20w/v, HS 0.5ml Birch Mix 1:20w/v, HS 0.5ml Boxelder-Maple Mix BHR (Boxelder Hard Red) 1:20w/v, HS 0.5ml [place],Common 1:20w/v, HS 0.5ml Elm,American 1:20w/v, HS 0.5ml [place] Mix RW 1:20w/v, HS 0.5ml Oak Mix RVW 1:20w/v, HS 0.5ml [place] Tree,Black 1:20w/v, HS 0.5ml Suresh Grass (Std) 100,000 BAU/mL, HS 0.4ml Jonathan Grass 1:20w/v, HS 0.5ml Diluent: HSA 0.1mL to 5ml, Disp: , Rfl:     ORDER FOR ALLERGEN IMMUNOTHERAPY, Name of Mix: Mix #4  Weeds Kochia 1:20 w/v, HS 0.5 ml Lamb's Quarters 1:20 w/v, HS 0.5 ml Nettle 1:20 w/v, HS 0.5 ml Plantain, English 1:20 w/v, HS 0.5 ml Ragweed Mixed 1:20 w/v ALK  0.5 ml Russian Thistle 1:20 w/v, HS 0.5 ml Sagebrush, Mugwort 1:20 w/v, HS 0.5 ml Sorrel, Sheep 1:20 w/v, HS 0.5 ml Diluent: HSA 1mL to 5ml, Disp: , Rfl:     order for DME, Equipment being ordered: Silicone heel cup, Disp: 1 Units, Rfl: 0    order for DME, Equipment being ordered: Silicone heel cup, Disp: 1 Units, Rfl: 0    triamcinolone (KENALOG) 0.1 % external cream, Apply sparingly to affected area two times daily as needed but not more than 14 days in a row. Spare face, armpits, neck, and groin., Disp: 80 g, Rfl: 1    vitamin D3 (CHOLECALCIFEROL) 10 MCG (400 UNIT) capsule, Take 2 capsules by mouth daily, Disp: , Rfl:     Current Facility-Administered Medications:  "    lidocaine 1 % injection 2 mL, 2 mL, , , Nicol Walker MD, 2 mL at 03/29/23 0918    triamcinolone (KENALOG-40) injection 40 mg, 40 mg, , , Nicol Walker MD, 40 mg at 03/29/23 0918  Immunization History   Administered Date(s) Administered    COVID-19 12+ (Pfizer) 11/14/2023, 12/04/2024    COVID-19 Monovalent 18+ (Moderna) 03/19/2021, 04/16/2021, 11/02/2021    Influenza (IIV3) PF 12/12/2002, 11/28/2003, 10/24/2006, 02/11/2009, 10/05/2011, 11/15/2012, 10/07/2014    Influenza Vaccine 18-64 (Flublok) 10/02/2018, 10/30/2019, 10/28/2020, 10/15/2021, 03/09/2023, 09/26/2023    Influenza Vaccine >6 months,quad, PF 10/16/2013, 10/20/2015, 10/26/2016, 11/21/2017    Influenza Vaccine Trivalent (FluBlok) 12/04/2024    Mantoux Tuberculin Skin Test 02/11/2009    Measles 10/22/1979    Pneumococcal 20 valent Conjugate (Prevnar 20) 09/26/2023    Pneumococcal 23 valent 10/05/2011    TDAP Vaccine (Adacel) 02/11/2009, 07/06/2018    Zoster recombinant adjuvanted (SHINGRIX) 12/02/2021, 02/02/2022     Allergies   Allergen Reactions    Clinoril [Sulindac]      Hives    Niacin Hives    Nsaids Hives     Tolerates ibuprofen and aspirin without hives      Pineapple Hives     OBJECTIVE:                                                                 /74 (BP Location: Left arm, Patient Position: Sitting, Cuff Size: Adult Large)   Pulse 85   Temp 97.4  F (36.3  C) (Tympanic)   Ht 1.61 m (5' 3.39\")   Wt 99.2 kg (218 lb 9.6 oz)   LMP 07/18/2015 (Approximate)   SpO2 98%   BMI 38.25 kg/m          Physical Exam  Vitals and nursing note reviewed.   Constitutional:       General: She is not in acute distress.     Appearance: She is not diaphoretic.   HENT:      Head: Normocephalic and atraumatic.      Right Ear: Tympanic membrane, ear canal and external ear normal.      Left Ear: Tympanic membrane, ear canal and external ear normal.      Nose: Septal deviation (mild, leftward) present. No mucosal edema.      Right Turbinates: Not " enlarged.      Left Turbinates: Not enlarged.      Mouth/Throat:      Lips: Pink.      Mouth: Mucous membranes are moist.      Pharynx: Oropharynx is clear. No pharyngeal swelling, oropharyngeal exudate or posterior oropharyngeal erythema.   Eyes:      General:         Right eye: No discharge.         Left eye: No discharge.      Conjunctiva/sclera: Conjunctivae normal.   Cardiovascular:      Rate and Rhythm: Normal rate and regular rhythm.      Heart sounds: Normal heart sounds. No murmur heard.  Pulmonary:      Effort: Pulmonary effort is normal. No respiratory distress.      Breath sounds: Normal breath sounds and air entry. No stridor, decreased air movement or transmitted upper airway sounds. No decreased breath sounds, wheezing, rhonchi or rales.   Neurological:      Mental Status: She is alert and oriented to person, place, and time.   Psychiatric:         Mood and Affect: Mood normal.         Behavior: Behavior normal.           WORKUP:         My interpretation:The office spirometry performed today doesn't suggest an obstruction.     ACT: 14         ASSESSMENT/PLAN:        Moderate persistent asthma without complication    The patient demonstrates good air entry and does not appear to be in respiratory distress. Examination reveals no wheezing or abnormal breath sounds. Office spirometry is benign, and SpO? is 98%. There is no evidence of an asthma exacerbation at this time, and prednisone is not indicated.  Dilia perceives that Wixela is less effective for her than the Advair brand.    -Discontinue Wixela.  -Initiate Breo Ellipta 200/25 mcg, 1 puff once daily. Rinse/gargle/spit water after use   -Continue albuterol as needed for symptom relief.    - General PFT Lab (Please always keep checked)  - Spirometry, Breathing Capacity  - BREO ELLIPTA 200-25 MCG/ACT inhaler  Dispense: 60 each; Refill: 11    Recurrent sinus infections  Chronic allergic rhinitis due to animal hair and dander  Allergic rhinitis due to  dust mite  Chronic seasonal allergic rhinitis due to pollen  Allergic rhinitis due to mold    Allergic rhinitis per se, is fairly well-controlled.  - Continue allergen immunotherapy.  Notify of a systemic reaction.  - Continue using the combination of intranasal fluticasone and azelastine.  With the first signs of a sinus infection, stop intranasal fluticasone, and the use saline/budesonide irrigations.  Once improved, she can stop budesonide and resume intranasal fluticasone.    - budesonide (PULMICORT) 0.5 MG/2ML neb solution  Dispense: 120 mL; Refill: 2    Follow-up in 6 weeks or sooner if needed.    Thank you for allowing us to participate in the care of this patient. Please feel free to contact us if there are any questions or concerns about the patient.    Disclaimer: This note consists of symbols derived from keyboarding, dictation and/or voice recognition software. As a result, there may be errors in the script that have gone undetected. Please consider this when interpreting information found in this chart.    Consent was obtained from the patient to use an AI documentation tool in the creation of this note.    Michael Lujan MD, FAAAAI, FACAAI  Allergy and Asthma     [facility]

## 2025-01-16 NOTE — LETTER
1/16/2025      Dilia Solomon  [address] [facility] [address]      Dear Colleague,    Thank you for referring your patient, Dilia Solomon, to the [facility]. Please see a copy of my visit note below.    SUBJECTIVE:                                                                   Dilia Solomon presents today to our Allergy Clinic at [facility] for a follow up visit. She is a 59 year old female with moderate persistent asthma and allergic rhinitis.   She was diagnosed with asthma approximately in [id number]. Triggers are environmental allergies and viral respiratory infections.   Serum IgE for regional aeroallergen panel performed in June 2017 with multiple levels of various sensitivities to molds, cat, dog, dust mite, pollen of trees, grasses, and weeds.  She had 2 sinus surgeries in the past. The last one was in 2018.   She started allergen immunotherapy in July 2017. She was on allergen immunotherapy before that with Dr. Haines, and it was helpful at that time; however, symptoms got worse soon after stopping it.  PFT within normal limits in December 2020 and July 2022.      Allergy Immunotherapy (started on 7/19/2017)  Date/time of injection(s): 12/18/2024     Vial Color                               Content                                  Dose  Red 1:1                                   Weeds                                     0.5  Red 1:1                                   Grass, Trees                            0.5         Red 1:1                                   Molds                                       0.5  Red 1:1                                   Cat, Dog, Dust Mite                 0.5        She tolerates injections well without persistent large local reactions or any systemic reactions.   She has been using sinus saline rinses once-twice daily, intranasal fluticasone 2 sprays in each nostril once daily, azelastine 2 sprays in each nostril  once-twice daily, montelukast 10 mg by mouth once daily, and oral antihistamines as needed. She uses Optivar occasionally.  Overall, she finds allergen immunotherapy helpful, but at the same time she does have episodes of recurrent sinus infections.      Between Christmas and New Year's, the patient traveled to [place]. She initially developed a sore throat, which she attributed to the mountain environment, but it resolved spontaneously. Subsequently, she experienced sinus pressure and green nasal discharge, which seemed to improve without the need for antibiotics. She reports occasional sinus pressure and sometimes coughing up phlegm, although the phlegm is not discolored.    The patient feels that Wixela does not work as well as Advair, but Advair is not covered by her insurance. She is currently using Wixela 250/50 mcg (1 puff twice daily), montelukast 10 mg once daily, and albuterol as needed.    Over the past couple of weeks, she has noticed increased chest tightness, occurring about once daily, particularly with exertion. She uses albuterol at least once daily for relief. During a physical exam on January 13, 2025, she was informed of wheezing, although she has not noticed it herself. She denies any fevers.  The patient reports being evaluated by her family practice physician for a routine medical examination, during which she was informed of wheezing and advised to follow up with our clinic promptly. However, the progress note from that visit did not document any findings of wheezing or a recommendation for urgent follow-up. After clarification with Dr. Cage, it was confirmed that there was no wheezing on examination, nor was there any recommendation for follow-up with our clinic ASAP..    Patient Active Problem List   Diagnosis     Need for prophylactic vaccination and inoculation against influenza     Sprain of interphalangeal (joint) of hand     Radial styloid tenosynovitis     Carpal tunnel syndrome      Synovial cyst of popliteal space     Pain in joint, lower leg     Hyperlipidemia LDL goal <130     Moderate persistent asthma     Allergic rhinitis due to dust mite     Food allergy     FH: diabetes mellitus     Chronic allergic rhinitis due to animal hair and dander     Allergic rhinitis due to mold     Chronic seasonal allergic rhinitis due to pollen     Allergic conjunctivitis, bilateral     History of endoscopic sinus surgery     Chronic pansinusitis     Liver cyst       Past Medical History:   Diagnosis Date     Injury, other and unspecified, shoulder and upper arm 9/03      Problem (# of Occurrences) Relation (Name,Age of Onset)    Cancer (1) Father: brain    Diabetes (1) Mother    Breast Cancer (1) Maternal Aunt    C.A.D. (1) Mother           Negative family history of: Cancer - colorectal          Past Surgical History:   Procedure Laterality Date     COLONOSCOPY N/A 10/6/2016    Procedure: COLONOSCOPY;  Surgeon: Shiva Johns MD;  Location: [facility]     ETHMOIDECTOMY  12/30/2013    Procedure: ETHMOIDECTOMY;  Bilateral Submucousal Reduction of InferiorTurbinates and Total Ethmoidectomy with Multiple Sinusotomies;  Surgeon: Lio More MD;  Location: [facility]     ETHMOIDECTOMY Bilateral 2/14/2018    Procedure: ETHMOIDECTOMY;  Bilateral anterior ethmoidectomy, bilateral maxillary antrostomy;  Surgeon: Santhosh Tierney MD;  Location: [facility]     SURGICAL HISTORY OF -   5/04    carpal tunnel release, bilateral     Social History     Socioeconomic History     Marital status: Single   Occupational History     Employer: [employer]    Tobacco Use     Smoking status: Never     Smokeless tobacco: Never   Vaping Use     Vaping status: Never Used   Substance and Sexual Activity     Alcohol use: No     Drug use: No     Sexual activity: Never   Other Topics Concern     Parent/sibling w/ CABG, MI or angioplasty before 65F 55M? Yes     Comment: mother   Social History Narrative    01/16/25         ENVIRONMENTAL HISTORY: [place] is heated with a forced air. They do not have central air conditioning. The patient's bedroom is furnished with hard pawel in bedroom, allergen mattress cover, allergen pillowcase cover and fabric window coverings.  Pets inside the house include 1 dog. There is not history of cockroach or mice infestation. There are no smokers in the house.  The house does have a damp basement.     Patient is currently living and taking care of a friend in the friends home.     Social Drivers of Health     Financial Resource Strain: Unknown (1/13/2025)    Financial Resource Strain      Within the past 12 months, have you or your family members you live with been unable to get utilities (heat, electricity) when it was really needed?: Patient declined   Food Insecurity: Low Risk  (1/13/2025)    Food Insecurity      Within the past 12 months, did you worry that your food would run out before you got money to buy more?: No      Within the past 12 months, did the food you bought just not last and you didn t have money to get more?: No   Transportation Needs: Low Risk  (1/13/2025)    Transportation Needs      Within the past 12 months, has lack of transportation kept you from medical appointments, getting your medicines, non-medical meetings or appointments, work, or from getting things that you need?: No   Physical Activity: Unknown (1/13/2025)    Exercise Vital Sign      Days of Exercise per Week: 3 days   Stress: No Stress Concern Present (1/13/2025)    [employer] - Occupational Stress Questionnaire      Feeling of Stress : Not at all   Social Connections: Unknown (1/13/2025)    Social Connection and Isolation Panel [NHANES]      Frequency of Social Gatherings with Friends and Family: More than three times a week   Interpersonal Safety: Low Risk  (1/13/2025)    Interpersonal Safety      Do you feel physically and  emotionally safe where you currently live?: Yes      Within the past 12 months, have you been hit, slapped, kicked or otherwise physically hurt by someone?: No      Within the past 12 months, have you been humiliated or emotionally abused in other ways by your partner or ex-partner?: No   Housing Stability: Low Risk  (1/13/2025)    Housing Stability      Do you have housing? : Yes      Are you worried about losing your housing?: No             Current Outpatient Medications:      BREO ELLIPTA 200-25 MCG/ACT inhaler, Inhale 1 puff into the lungs daily., Disp: 60 each, Rfl: 11     budesonide (PULMICORT) 0.5 MG/2ML neb solution, Mix respule of 0.5mg/2 mL budesonide vial in 8oz normal saline sinus rinse bottle. Irrigate each nostril with half of the bottle twice daily, Disp: 120 mL, Rfl: 2     albuterol (2.5 MG/3ML) 0.083% neb solution, Take 1 vial (2.5 mg) by nebulization every 4 hours as needed, Disp: 1 Box, Rfl: 3     albuterol (PROAIR HFA/PROVENTIL HFA/VENTOLIN HFA) 108 (90 Base) MCG/ACT inhaler, Inhale 2 puffs into the lungs every 4 hours as needed for shortness of breath or wheezing., Disp: 18 g, Rfl: 3     azelastine (ASTELIN) 0.1 % nasal spray, Spray 2 sprays into both nostrils 2 times daily as needed for rhinitis or allergies., Disp: 90 mL, Rfl: 3     azelastine (OPTIVAR) 0.05 % ophthalmic solution, Apply 1 drop to eye 2 times daily as needed (itchy/watery eyes), Disp: 18 mL, Rfl: 1     cetirizine (ZYRTEC) 10 MG tablet, Take 1 tablet (10 mg) by mouth daily as needed for allergies, Disp: 90 tablet, Rfl: 3     Emollient (CERAVE) CREA, Externally apply 1 dose. topically 2 times daily, Disp: 2 Bottle, Rfl: 11     EPINEPHrine (ANY BX GENERIC EQUIV) 0.3 MG/0.3ML injection 2-pack, Inject 0.3 mLs (0.3 mg) into the muscle once as needed for anaphylaxis. NEED TO SEE PROVIDER!, Disp: 2 each, Rfl: 0     fluticasone (FLONASE) 50 MCG/ACT nasal spray, Spray 2 sprays into both nostrils daily., Disp: 48 g, Rfl: 1     loratadine  (CLARITIN) 10 MG tablet, Take 1 tablet (10 mg) by mouth daily as needed for allergies, Disp: 90 tablet, Rfl: 3     montelukast (SINGULAIR) 10 MG tablet, Take 1 tablet (10 mg) by mouth at bedtime., Disp: 90 tablet, Rfl: 3     MULTIVITAMIN OR, 1 tab daily, Disp: , Rfl:      ORDER FOR ALLERGEN IMMUNOTHERAPY, Name of Mix: Mix #1  Mold Alternaria Tenuis 1:10 w/v, HS  0.5 ml Aspergillus Fumigatus 1:10 w/v, HS  0.5 ml Epicoccum Nigrum 1:10 w/v, HS 0.5 ml Hormodendrum Cladosporioides 1:10 w/v, HS 0.5 ml Penicillium Mix 1:10 w/v, HS  0.5 ml Diluent: HSA 2.5mL to 5ml, Disp: , Rfl:      ORDER FOR ALLERGEN IMMUNOTHERAPY, Name of Mix: Mix #2  Dust Mite, Cat, Dog Cat Hair, Standardized A.P. 10,000 BAU/mL, HS  2.0 ml  Dog Hair-Dander, UF  1:650 w/v, HS  1.0 ml   Dust Mites F 30,000AU/mL, HS  0.3 ml Dust Mites P. 30,000 AU/mL, HS  0.3 ml  Diluent: HSA 1.4mL to 5ml, Disp: , Rfl:      ORDER FOR ALLERGEN IMMUNOTHERAPY, Name of Mix: Mix #3 Grass,Tree  Dmitry,White 1:20w/v, HS 0.5ml Birch Mix 1:20w/v, HS 0.5ml Boxelder-Maple Mix BHR (Boxelder Hard Red) 1:20w/v, HS 0.5ml [place],Common 1:20w/v, HS 0.5ml Elm,American 1:20w/v, HS 0.5ml [place] Mix RW 1:20w/v, HS 0.5ml Oak Mix RVW 1:20w/v, HS 0.5ml [place] Tree,Black 1:20w/v, HS 0.5ml Suresh Grass (Std) 100,000 BAU/mL, HS 0.4ml Jonathan Grass 1:20w/v, HS 0.5ml Diluent: HSA 0.1mL to 5ml, Disp: , Rfl:      ORDER FOR ALLERGEN IMMUNOTHERAPY, Name of Mix: Mix #4  Weeds Kochia 1:20 w/v, HS 0.5 ml Lamb's Quarters 1:20 w/v, HS 0.5 ml Nettle 1:20 w/v, HS 0.5 ml Plantain, English 1:20 w/v, HS 0.5 ml Ragweed Mixed 1:20 w/v ALK  0.5 ml Russian Thistle 1:20 w/v, HS 0.5 ml Sagebrush, Mugwort 1:20 w/v, HS 0.5 ml Sorrel, Sheep 1:20 w/v, HS 0.5 ml Diluent: HSA 1mL to 5ml, Disp: , Rfl:      order for DME, Equipment being ordered: Silicone heel cup, Disp: 1 Units, Rfl: 0     order for DME, Equipment being ordered: Silicone heel cup, Disp: 1 Units, Rfl: 0     triamcinolone (KENALOG) 0.1 % external cream, Apply  "sparingly to affected area two times daily as needed but not more than 14 days in a row. Spare face, armpits, neck, and groin., Disp: 80 g, Rfl: 1     vitamin D3 (CHOLECALCIFEROL) 10 MCG (400 UNIT) capsule, Take 2 capsules by mouth daily, Disp: , Rfl:     Current Facility-Administered Medications:      lidocaine 1 % injection 2 mL, 2 mL, , , Nicol Walker MD, 2 mL at 03/29/23 0918     triamcinolone (KENALOG-40) injection 40 mg, 40 mg, , , Nicol Walker MD, 40 mg at 03/29/23 0918  Immunization History   Administered Date(s) Administered     COVID-19 12+ (Pfizer) 11/14/2023, 12/04/2024     COVID-19 Monovalent 18+ (Moderna) 03/19/2021, 04/16/2021, 11/02/2021     Influenza (IIV3) PF 12/12/2002, 11/28/2003, 10/24/2006, 02/11/2009, 10/05/2011, 11/15/2012, 10/07/2014     Influenza Vaccine 18-64 (Flublok) 10/02/2018, 10/30/2019, 10/28/2020, 10/15/2021, 03/09/2023, 09/26/2023     Influenza Vaccine >6 months,quad, PF 10/16/2013, 10/20/2015, 10/26/2016, 11/21/2017     Influenza Vaccine Trivalent (FluBlok) 12/04/2024     Mantoux Tuberculin Skin Test 02/11/2009     Measles 10/22/1979     Pneumococcal 20 valent Conjugate (Prevnar 20) 09/26/2023     Pneumococcal 23 valent 10/05/2011     TDAP Vaccine (Adacel) 02/11/2009, 07/06/2018     Zoster recombinant adjuvanted (SHINGRIX) 12/02/2021, 02/02/2022     Allergies   Allergen Reactions     Clinoril [Sulindac]      Hives     Niacin Hives     Nsaids Hives     Tolerates ibuprofen and aspirin without hives       Pineapple Hives     OBJECTIVE:                                                                 /74 (BP Location: Left arm, Patient Position: Sitting, Cuff Size: Adult Large)   Pulse 85   Temp 97.4  F (36.3  C) (Tympanic)   Ht 1.61 m (5' 3.39\")   Wt 99.2 kg (218 lb 9.6 oz)   LMP 07/18/2015 (Approximate)   SpO2 98%   BMI 38.25 kg/m          Physical Exam  Vitals and nursing note reviewed.   Constitutional:       General: She is not in acute distress.     " Appearance: She is not diaphoretic.   HENT:      Head: Normocephalic and atraumatic.      Right Ear: Tympanic membrane, ear canal and external ear normal.      Left Ear: Tympanic membrane, ear canal and external ear normal.      Nose: Septal deviation (mild, leftward) present. No mucosal edema.      Right Turbinates: Not enlarged.      Left Turbinates: Not enlarged.      Mouth/Throat:      Lips: Pink.      Mouth: Mucous membranes are moist.      Pharynx: Oropharynx is clear. No pharyngeal swelling, oropharyngeal exudate or posterior oropharyngeal erythema.   Eyes:      General:         Right eye: No discharge.         Left eye: No discharge.      Conjunctiva/sclera: Conjunctivae normal.   Cardiovascular:      Rate and Rhythm: Normal rate and regular rhythm.      Heart sounds: Normal heart sounds. No murmur heard.  Pulmonary:      Effort: Pulmonary effort is normal. No respiratory distress.      Breath sounds: Normal breath sounds and air entry. No stridor, decreased air movement or transmitted upper airway sounds. No decreased breath sounds, wheezing, rhonchi or rales.   Neurological:      Mental Status: She is alert and oriented to person, place, and time.   Psychiatric:         Mood and Affect: Mood normal.         Behavior: Behavior normal.           WORKUP:         My interpretation:The office spirometry performed today doesn't suggest an obstruction.     ACT: 14         ASSESSMENT/PLAN:        Moderate persistent asthma without complication    The patient demonstrates good air entry and does not appear to be in respiratory distress. Examination reveals no wheezing or abnormal breath sounds. Office spirometry is benign, and SpO2 is 98%. There is no evidence of an asthma exacerbation at this time, and prednisone is not indicated.  Dilia perceives that Wixela is less effective for her than the Advair brand.    -Discontinue Wixela.  -Initiate Breo Ellipta 200/25 mcg, 1 puff once daily. Rinse/gargle/spit water after  use   -Continue albuterol as needed for symptom relief.    - General PFT Lab (Please always keep checked)  - Spirometry, Breathing Capacity  - BREO ELLIPTA 200-25 MCG/ACT inhaler  Dispense: 60 each; Refill: 11    Recurrent sinus infections  Chronic allergic rhinitis due to animal hair and dander  Allergic rhinitis due to dust mite  Chronic seasonal allergic rhinitis due to pollen  Allergic rhinitis due to mold    Allergic rhinitis per se, is fairly well-controlled.  - Continue allergen immunotherapy.  Notify of a systemic reaction.  - Continue using the combination of intranasal fluticasone and azelastine.  With the first signs of a sinus infection, stop intranasal fluticasone, and the use saline/budesonide irrigations.  Once improved, she can stop budesonide and resume intranasal fluticasone.    - budesonide (PULMICORT) 0.5 MG/2ML neb solution  Dispense: 120 mL; Refill: 2    Follow-up in 6 weeks or sooner if needed.    Thank you for allowing us to participate in the care of this patient. Please feel free to contact us if there are any questions or concerns about the patient.    Disclaimer: This note consists of symbols derived from keyboarding, dictation and/or voice recognition software. As a result, there may be errors in the script that have gone undetected. Please consider this when interpreting information found in this chart.    Consent was obtained from the patient to use an AI documentation tool in the creation of this note.    Michael Lujan MD, FAAAAI, FACAAI  Allergy and Asthma     [facility]       Again, thank you for allowing me to participate in the care of your patient.        Sincerely,        Michael Lujna MD    Electronically signed

## 2025-01-16 NOTE — PROGRESS NOTES
SUBJECTIVE:                                                                   Dilia Solomon presents today to our Allergy Clinic at [facility] for a follow up visit. She is a 59 year old female with ***.  The {[facility]:[id number]} accompanies the patient and helps to provide history.          Patient Active Problem List   Diagnosis    Need for prophylactic vaccination and inoculation against influenza    Sprain of interphalangeal (joint) of hand    Radial styloid tenosynovitis    Carpal tunnel syndrome    Synovial cyst of popliteal space    Pain in joint, lower leg    Hyperlipidemia LDL goal <130    Moderate persistent asthma    Allergic rhinitis due to dust mite    Food allergy    FH: diabetes mellitus    Chronic allergic rhinitis due to animal hair and dander    Allergic rhinitis due to mold    Chronic seasonal allergic rhinitis due to pollen    Allergic conjunctivitis, bilateral    History of endoscopic sinus surgery    Chronic pansinusitis    Liver cyst       Past Medical History:   Diagnosis Date    Injury, other and unspecified, shoulder and upper arm 9/03      Problem (# of Occurrences) Relation (Name,Age of Onset)    Cancer (1) Father: brain    Diabetes (1) Mother    Breast Cancer (1) Maternal Aunt    C.A.D. (1) Mother           Negative family history of: Cancer - colorectal          Past Surgical History:   Procedure Laterality Date    COLONOSCOPY N/A 10/6/2016    Procedure: COLONOSCOPY;  Surgeon: Shiva Johns MD;  Location: [facility]    ETHMOIDECTOMY  12/30/2013    Procedure: ETHMOIDECTOMY;  Bilateral Submucousal Reduction of InferiorTurbinates and Total Ethmoidectomy with Multiple Sinusotomies;  Surgeon: Lio More MD;  Location: [facility]    ETHMOIDECTOMY Bilateral 2/14/2018    Procedure: ETHMOIDECTOMY;  Bilateral anterior ethmoidectomy, bilateral maxillary antrostomy;  Surgeon: Santhosh Tierney MD;  Location: [facility]    SURGICAL HISTORY OF -   5/04    carpal tunnel  release, bilateral     Social History     Socioeconomic History    Marital status: Single   Occupational History     Employer: [employer]    Tobacco Use    Smoking status: Never    Smokeless tobacco: Never   Vaping Use    Vaping status: Never Used   Substance and Sexual Activity    Alcohol use: No    Drug use: No    Sexual activity: Never   Other Topics Concern    Parent/sibling w/ CABG, MI or angioplasty before 65F 55M? Yes     Comment: mother   Social History Narrative    01/16/25        ENVIRONMENTAL HISTORY: [place] The [place] is heated with a forced air. They do not have central air conditioning. The patient's bedroom is furnished with hard pawel in bedroom, allergen mattress cover, allergen pillowcase cover and fabric window coverings.  Pets inside the house include 1 dog. There is not history of cockroach or mice infestation. There are no smokers in the house.  The house does have a damp basement.     Patient is currently living and taking care of a friend in the friends home.     Social Drivers of Health     Financial Resource Strain: Unknown (1/13/2025)    Financial Resource Strain     Within the past 12 months, have you or your family members you live with been unable to get utilities (heat, electricity) when it was really needed?: Patient declined   Food Insecurity: Low Risk  (1/13/2025)    Food Insecurity     Within the past 12 months, did you worry that your food would run out before you got money to buy more?: No     Within the past 12 months, did the food you bought just not last and you didn t have money to get more?: No   Transportation Needs: Low Risk  (1/13/2025)    Transportation Needs     Within the past 12 months, has lack of transportation kept you from medical appointments, getting your medicines, non-medical meetings or appointments, work, or from getting things that you need?: No   Physical Activity: Unknown (1/13/2025)    Exercise  Vital Sign     Days of Exercise per Week: 3 days   Stress: No Stress Concern Present (1/13/2025)    [employer] - Occupational Stress Questionnaire     Feeling of Stress : Not at all   Social Connections: Unknown (1/13/2025)    Social Connection and Isolation Panel [NHANES]     Frequency of Social Gatherings with Friends and Family: More than three times a week   Interpersonal Safety: Low Risk  (1/13/2025)    Interpersonal Safety     Do you feel physically and emotionally safe where you currently live?: Yes     Within the past 12 months, have you been hit, slapped, kicked or otherwise physically hurt by someone?: No     Within the past 12 months, have you been humiliated or emotionally abused in other ways by your partner or ex-partner?: No   Housing Stability: Low Risk  (1/13/2025)    Housing Stability     Do you have housing? : Yes     Are you worried about losing your housing?: No             Current Outpatient Medications:     albuterol (2.5 MG/3ML) 0.083% neb solution, Take 1 vial (2.5 mg) by nebulization every 4 hours as needed, Disp: 1 Box, Rfl: 3    albuterol (PROAIR HFA/PROVENTIL HFA/VENTOLIN HFA) 108 (90 Base) MCG/ACT inhaler, Inhale 2 puffs into the lungs every 4 hours as needed for shortness of breath or wheezing., Disp: 18 g, Rfl: 3    azelastine (ASTELIN) 0.1 % nasal spray, Spray 2 sprays into both nostrils 2 times daily as needed for rhinitis or allergies., Disp: 90 mL, Rfl: 3    azelastine (OPTIVAR) 0.05 % ophthalmic solution, Apply 1 drop to eye 2 times daily as needed (itchy/watery eyes), Disp: 18 mL, Rfl: 1    cetirizine (ZYRTEC) 10 MG tablet, Take 1 tablet (10 mg) by mouth daily as needed for allergies, Disp: 90 tablet, Rfl: 3    Emollient (CERAVE) CREA, Externally apply 1 dose. topically 2 times daily, Disp: 2 Bottle, Rfl: 11    EPINEPHrine (ANY BX GENERIC EQUIV) 0.3 MG/0.3ML injection 2-pack, Inject 0.3 mLs (0.3 mg) into the muscle once as needed for anaphylaxis.  NEED TO SEE PROVIDER!, Disp: 2 each, Rfl: 0    fluticasone (FLONASE) 50 MCG/ACT nasal spray, Spray 2 sprays into both nostrils daily., Disp: 48 g, Rfl: 1    loratadine (CLARITIN) 10 MG tablet, Take 1 tablet (10 mg) by mouth daily as needed for allergies, Disp: 90 tablet, Rfl: 3    montelukast (SINGULAIR) 10 MG tablet, Take 1 tablet (10 mg) by mouth at bedtime., Disp: 90 tablet, Rfl: 3    MULTIVITAMIN OR, 1 tab daily, Disp: , Rfl:     ORDER FOR ALLERGEN IMMUNOTHERAPY, Name of Mix: Mix #1  Mold Alternaria Tenuis 1:10 w/v, HS  0.5 ml Aspergillus Fumigatus 1:10 w/v, HS  0.5 ml Epicoccum Nigrum 1:10 w/v, HS 0.5 ml Hormodendrum Cladosporioides 1:10 w/v, HS 0.5 ml Penicillium Mix 1:10 w/v, HS  0.5 ml Diluent: HSA 2.5mL to 5ml, Disp: , Rfl:     ORDER FOR ALLERGEN IMMUNOTHERAPY, Name of Mix: Mix #2  Dust Mite, Cat, Dog Cat Hair, Standardized A.P. 10,000 BAU/mL, HS  2.0 ml  Dog Hair-Dander, UF  1:650 w/v, HS  1.0 ml   Dust Mites F 30,000AU/mL, HS  0.3 ml Dust Mites P. 30,000 AU/mL, HS  0.3 ml  Diluent: HSA 1.4mL to 5ml, Disp: , Rfl:     ORDER FOR ALLERGEN IMMUNOTHERAPY, Name of Mix: Mix #3 Grass,Tree  Dmitry,White 1:20w/v, HS 0.5ml Birch Mix 1:20w/v, HS 0.5ml Boxelder-Maple Mix BHR (Boxelder Hard Red) 1:20w/v, HS 0.5ml [place],Common 1:20w/v, HS 0.5ml Elm,American 1:20w/v, HS 0.5ml [place] Mix RW 1:20w/v, HS 0.5ml Oak Mix RVW 1:20w/v, HS 0.5ml Flatwoods Tree,Black 1:20w/v, HS 0.5ml Suresh Grass (Std) 100,000 BAU/mL, HS 0.4ml Jonathan Grass 1:20w/v, HS 0.5ml Diluent: HSA 0.1mL to 5ml, Disp: , Rfl:     ORDER FOR ALLERGEN IMMUNOTHERAPY, Name of Mix: Mix #4  Weeds Kochia 1:20 w/v, HS 0.5 ml Lamb's Quarters 1:20 w/v, HS 0.5 ml Nettle 1:20 w/v, HS 0.5 ml Plantain, English 1:20 w/v, HS 0.5 ml Ragweed Mixed 1:20 w/v ALK  0.5 ml Russian Thistle 1:20 w/v, HS 0.5 ml Sagebrush, Mugwort 1:20 w/v, HS 0.5 ml Sorrel, Sheep 1:20 w/v, HS 0.5 ml Diluent: HSA 1mL to 5ml, Disp: , Rfl:     order for DME, Equipment being ordered: Silicone heel cup,  Disp: 1 Units, Rfl: 0    order for DME, Equipment being ordered: Silicone heel cup, Disp: 1 Units, Rfl: 0    triamcinolone (KENALOG) 0.1 % external cream, Apply sparingly to affected area two times daily as needed but not more than 14 days in a row. Spare face, armpits, neck, and groin., Disp: 80 g, Rfl: 1    vitamin D3 (CHOLECALCIFEROL) 10 MCG (400 UNIT) capsule, Take 2 capsules by mouth daily, Disp: , Rfl:     WIXELA INHUB 250-50 MCG/ACT inhaler, Inhale 1 puff into the lungs every 12 hours., Disp: 180 each, Rfl: 1    Current Facility-Administered Medications:     lidocaine 1 % injection 2 mL, 2 mL, , , Nicol Walker MD, 2 mL at 03/29/23 0918    triamcinolone (KENALOG-40) injection 40 mg, 40 mg, , , Nicol Walker MD, 40 mg at 03/29/23 0918  Immunization History   Administered Date(s) Administered    COVID-19 12+ (Pfizer) 11/14/2023, 12/04/2024    COVID-19 Monovalent 18+ (Moderna) 03/19/2021, 04/16/2021, 11/02/2021    Influenza (IIV3) PF 12/12/2002, 11/28/2003, 10/24/2006, 02/11/2009, 10/05/2011, 11/15/2012, 10/07/2014    Influenza Vaccine 18-64 (Flublok) 10/02/2018, 10/30/2019, 10/28/2020, 10/15/2021, 03/09/2023, 09/26/2023    Influenza Vaccine >6 months,quad, PF 10/16/2013, 10/20/2015, 10/26/2016, 11/21/2017    Influenza Vaccine Trivalent (FluBlok) 12/04/2024    Mantoux Tuberculin Skin Test 02/11/2009    Measles 10/22/1979    Pneumococcal 20 valent Conjugate (Prevnar 20) 09/26/2023    Pneumococcal 23 valent 10/05/2011    TDAP Vaccine (Adacel) 02/11/2009, 07/06/2018    Zoster recombinant adjuvanted (SHINGRIX) 12/02/2021, 02/02/2022     Allergies   Allergen Reactions    Clinoril [Sulindac]      Hives    Niacin Hives    Nsaids Hives     Tolerates ibuprofen and aspirin without hives      Pineapple Hives     OBJECTIVE:                                                                 LMP 07/18/2015 (Approximate)         Physical Exam               WORKUP: {Allergy Evaluation:292325}          ACT:          ASSESSMENT/PLAN:           There are no diagnoses linked to this encounter.     Follow up in *** or sooner if needed.    Thank you for allowing us to participate in the care of this patient. Please feel free to contact us if there are any questions or concerns about the patient.    Disclaimer: This note consists of symbols derived from keyboarding, dictation and/or voice recognition software. As a result, there may be errors in the script that have gone undetected. Please consider this when interpreting information found in this chart.    Michael Lujan MD, FAAAAI, FACAAI  Allergy, Asthma and Immunology     [facility]

## 2025-01-16 NOTE — PATIENT INSTRUCTIONS
Stop Wixela.  Start Breo 200/25mcg 1 puff inhaled daily. rinse/gargle/spit water after use    Continue with daily ilene rinses.  -Continue intranasal fluticasone (Flonase) 1-2 sprays in each nostril once daily.  -Add azelastine nasal spray, 2 sprays in each nostril twice daily as needed.     When you develop first signs of a sinus infection, Stop Flonase and use budesonide/saline irrigations.     Restart Flonase and stop budesonide once you feel better.       BUDESONIDE IRRIGATION INSTRUCTIONS     You will be starting Budesonide nasal irrigations and will need to obtain the following:       - NeilMed Sinus Rinse 8 oz Kit  - Distilled or filtered water   - Normal saline salt packets  - Budesonide medication      Place filtered or distilled water into the NeilMed bottle up to the fill line (DO NOT USE TAP OR WELL WATER).  Place the pre-made salt packet in the 8 oz of saline.  Then placed the budesonide medication into the bottle.  Shake the bottle to suspend into solution.  Lean head forward over a sink or a basin.  Rinse each side of the nose with one-half of the bottle (each squeeze is about one-half of the bottle). Rinse the nose twice daily.         Video example: https://www.youtube.com/watch?v=RG7lvOf1Ke6        Prescription Assistance  If you need assistance with your prescriptions (cost, coverage, etc) please contact: [email or web address] Prescription Assistance Program [phone]           If labs have been ordered/completed, please allow 7-14 business days for final interpretation of results to be sent on My Chart, phone or mail. Some lab results can take up to 28 days for results.         Allergy Staff Appt Hours Shot Hours Locations    Physician      Michael Lujan MD         Support Staff      Kaylee Beltre MA     Tuesday:   [place] 7-5 Wednesday:  [place]: 7-5 Thursday:         [place] 7-5 Friday:  [place] 7-3     [place]        Tuesday: 7- 4:20         Wednesday: 7-4:20      : 7-4:10        Tuesday: 7-4:10        Thursday: 7-3:10     [facility]       Tues & Wed: 7:10       Thurs: 12-4:10       Fri: 11            [facility]  [address] [place] [address]  Appt Line: [phone]        [facility]  [address] [place] [address]  Appt Line: [phone]     Pulmonary Function Scheduling:  [place]: [phone]  [place]: [phone]  [phone]

## 2025-01-22 ENCOUNTER — ALLIED HEALTH/NURSE VISIT (OUTPATIENT)
Dept: ALLERGY | Facility: CLINIC | Age: 60
End: 2025-01-22
Payer: COMMERCIAL

## 2025-01-22 DIAGNOSIS — J30.89 ALLERGIC RHINITIS DUE TO MOLD: ICD-10-CM

## 2025-01-22 DIAGNOSIS — J30.89 ALLERGIC RHINITIS DUE TO DUST MITE: ICD-10-CM

## 2025-01-22 DIAGNOSIS — J30.81 CHRONIC ALLERGIC RHINITIS DUE TO ANIMAL HAIR AND DANDER: Primary | ICD-10-CM

## 2025-01-22 DIAGNOSIS — J30.1 CHRONIC SEASONAL ALLERGIC RHINITIS DUE TO POLLEN: ICD-10-CM

## 2025-01-22 DIAGNOSIS — H10.13 ALLERGIC CONJUNCTIVITIS OF BOTH EYES: ICD-10-CM

## 2025-01-22 PROCEDURE — 95117 IMMUNOTHERAPY INJECTIONS: CPT

## 2025-02-05 ENCOUNTER — HOSPITAL ENCOUNTER (OUTPATIENT)
Dept: CT IMAGING | Facility: CLINIC | Age: 60
Discharge: HOME | End: 2025-02-05
Attending: STUDENT IN AN ORGANIZED HEALTH CARE EDUCATION/TRAINING PROGRAM
Payer: COMMERCIAL

## 2025-02-05 ENCOUNTER — HOSPITAL ENCOUNTER (OUTPATIENT)
Dept: MAMMOGRAPHY | Facility: CLINIC | Age: 60
Discharge: HOME | End: 2025-02-05
Attending: STUDENT IN AN ORGANIZED HEALTH CARE EDUCATION/TRAINING PROGRAM
Payer: COMMERCIAL

## 2025-02-05 DIAGNOSIS — Z82.49 FAMILY HISTORY OF ISCHEMIC HEART DISEASE: ICD-10-CM

## 2025-02-05 DIAGNOSIS — Z12.31 ENCOUNTER FOR SCREENING MAMMOGRAM FOR BREAST CANCER: ICD-10-CM

## 2025-02-05 DIAGNOSIS — E78.5 HYPERLIPIDEMIA LDL GOAL <100: ICD-10-CM

## 2025-02-05 PROCEDURE — 77063 BREAST TOMOSYNTHESIS BI: CPT

## 2025-02-05 PROCEDURE — 75571 CT HRT W/O DYE W/CA TEST: CPT

## 2025-02-06 ENCOUNTER — TELEPHONE (OUTPATIENT)
Dept: FAMILY MEDICINE | Facility: CLINIC | Age: 60
End: 2025-02-06
Payer: COMMERCIAL

## 2025-02-06 NOTE — TELEPHONE ENCOUNTER
Patient Quality Outreach    Patient is due for the following:   Cervical Cancer Screening - PAP Needed    Action(s) Taken:   Pt needs to schedule visit to discuss cervical cancer screening.      Type of outreach:    Sent 5gig message.    Questions for provider review:    None           Dee Davis MA

## 2025-02-21 DIAGNOSIS — J30.89 ALLERGIC RHINITIS DUE TO MOLD: ICD-10-CM

## 2025-02-21 DIAGNOSIS — J30.1 CHRONIC SEASONAL ALLERGIC RHINITIS DUE TO POLLEN: ICD-10-CM

## 2025-02-21 DIAGNOSIS — J30.89 ALLERGIC RHINITIS DUE TO DUST MITE: ICD-10-CM

## 2025-02-21 DIAGNOSIS — J30.81 CHRONIC ALLERGIC RHINITIS DUE TO ANIMAL HAIR AND DANDER: ICD-10-CM

## 2025-02-21 NOTE — TELEPHONE ENCOUNTER
ALLERGY SOLUTION RE-ORDER REQUEST    Dilia Solomon 1965 MRN: [id number]    DATE NEEDED:  3/21/25  Vial Color Content    Vial Size  Red 1:1 Weeds    5 mL  Red 1:1 Grass, Trees   5 mL  Red 1:1 Molds    5 mL  Red 1:1 Cat, Dog, Dust Mite    5 mL      Serum reorder consent signed and patient/parent was advised that new serums would be ordered through the pharmacy and billed to their insurance company when they arrive in clinic. Yes    Shot Clinic Location:  [facility].  Ship to Location: [facility].  Serum billed to:  [facility].    Special Instructions:          Requester Signature  Silver Araujo LPN

## 2025-02-27 DIAGNOSIS — J30.1 CHRONIC SEASONAL ALLERGIC RHINITIS DUE TO POLLEN: ICD-10-CM

## 2025-02-27 DIAGNOSIS — J30.89 ALLERGIC RHINITIS DUE TO MOLD: Primary | ICD-10-CM

## 2025-02-27 NOTE — PROGRESS NOTES
Allergy serums billed to [facility].     Vials billed below:    Vial Color Content                      Vial Size Expiration Date  Red 1:1 Molds 5mL  2/27/26  Red 1:1 Weeds 5mL  2/27/26  Red 1:1 Grass, Trees 5mL  2/27/26      Billed 30 units    Checked by Silver Araujo / LPN        Signature  Silver Araujo LPN

## 2025-03-03 DIAGNOSIS — J30.81 CHRONIC ALLERGIC RHINITIS DUE TO ANIMAL HAIR AND DANDER: ICD-10-CM

## 2025-03-03 DIAGNOSIS — J30.89 ALLERGIC RHINITIS DUE TO DUST MITE: ICD-10-CM

## 2025-03-03 DIAGNOSIS — J30.89 ALLERGIC RHINITIS DUE TO MOLD: ICD-10-CM

## 2025-03-03 DIAGNOSIS — J30.1 CHRONIC SEASONAL ALLERGIC RHINITIS DUE TO POLLEN: ICD-10-CM

## 2025-03-03 NOTE — TELEPHONE ENCOUNTER
Requested Prescriptions   Pending Prescriptions Disp Refills    fluticasone (FLONASE) 50 MCG/ACT nasal spray 48 g 1     Sig: Spray 2 sprays into both nostrils daily.       There is no refill protocol information for this order          Last office visit: 2/21/2025 ; last virtual visit: Visit date not found with prescribing provider:  Michael Lujan   Future Office Visit:   Next 5 appointments (look out 90 days)      Mar 18, 2025 7:00 AM  (Arrive by 6:55 AM)  [facility] with [facility] [address] [address]  [phone]     Apr 15, 2025 7:00 AM  (Arrive by 6:55 AM)  [facility] All[facility] Injection with ALLERGY [facility] ([facility] [address] [address]  [phone]           Thank you,  Cookie Siddiqui  [facility]  [address] [place] [address]  Priority line: [phone] (please do not share number with patient)   Employed by [employer]

## 2025-03-04 RX ORDER — FLUTICASONE PROPIONATE 50 MCG
2 SPRAY, SUSPENSION (ML) NASAL DAILY
Qty: 48 G | Refills: 1 | Status: SHIPPED | OUTPATIENT
Start: 2025-03-04

## 2025-03-04 NOTE — TELEPHONE ENCOUNTER
Prescription approved per [facility] Refill Protocol.    Kaylee GRANT RN  Specialty/Allergy Clinics

## 2025-03-19 ENCOUNTER — ALLIED HEALTH/NURSE VISIT (OUTPATIENT)
Dept: ALLERGY | Facility: CLINIC | Age: 60
End: 2025-03-19
Payer: COMMERCIAL

## 2025-03-19 DIAGNOSIS — J30.1 CHRONIC SEASONAL ALLERGIC RHINITIS DUE TO POLLEN: ICD-10-CM

## 2025-03-19 DIAGNOSIS — J30.89 ALLERGIC RHINITIS DUE TO MOLD: ICD-10-CM

## 2025-03-19 DIAGNOSIS — J30.81 CHRONIC ALLERGIC RHINITIS DUE TO ANIMAL HAIR AND DANDER: Primary | ICD-10-CM

## 2025-03-19 DIAGNOSIS — J30.89 ALLERGIC RHINITIS DUE TO DUST MITE: ICD-10-CM

## 2025-03-19 PROCEDURE — 95117 IMMUNOTHERAPY INJECTIONS: CPT

## 2025-04-16 ENCOUNTER — ALLIED HEALTH/NURSE VISIT (OUTPATIENT)
Dept: ALLERGY | Facility: CLINIC | Age: 60
End: 2025-04-16
Payer: COMMERCIAL

## 2025-04-16 DIAGNOSIS — J30.89 ALLERGIC RHINITIS DUE TO MOLD: ICD-10-CM

## 2025-04-16 DIAGNOSIS — J45.40 MODERATE PERSISTENT ASTHMA WITHOUT COMPLICATION: ICD-10-CM

## 2025-04-16 DIAGNOSIS — J30.1 CHRONIC SEASONAL ALLERGIC RHINITIS DUE TO POLLEN: ICD-10-CM

## 2025-04-16 DIAGNOSIS — J30.89 ALLERGIC RHINITIS DUE TO DUST MITE: ICD-10-CM

## 2025-04-16 DIAGNOSIS — J30.81 CHRONIC ALLERGIC RHINITIS DUE TO ANIMAL HAIR AND DANDER: Primary | ICD-10-CM

## 2025-04-16 DIAGNOSIS — H10.13 ALLERGIC CONJUNCTIVITIS OF BOTH EYES: ICD-10-CM

## 2025-04-16 PROCEDURE — 95117 IMMUNOTHERAPY INJECTIONS: CPT

## 2025-04-23 ENCOUNTER — ALLIED HEALTH/NURSE VISIT (OUTPATIENT)
Dept: ALLERGY | Facility: CLINIC | Age: 60
End: 2025-04-23
Payer: COMMERCIAL

## 2025-04-23 DIAGNOSIS — H10.13 ALLERGIC CONJUNCTIVITIS OF BOTH EYES: ICD-10-CM

## 2025-04-23 DIAGNOSIS — J30.89 ALLERGIC RHINITIS DUE TO MOLD: ICD-10-CM

## 2025-04-23 DIAGNOSIS — J30.1 CHRONIC SEASONAL ALLERGIC RHINITIS DUE TO POLLEN: ICD-10-CM

## 2025-04-23 DIAGNOSIS — J30.81 CHRONIC ALLERGIC RHINITIS DUE TO ANIMAL HAIR AND DANDER: Primary | ICD-10-CM

## 2025-04-23 DIAGNOSIS — J30.89 ALLERGIC RHINITIS DUE TO DUST MITE: ICD-10-CM

## 2025-04-23 PROCEDURE — 95117 IMMUNOTHERAPY INJECTIONS: CPT

## 2025-04-23 RX ORDER — AZELASTINE HYDROCHLORIDE 0.5 MG/ML
1 SOLUTION/ DROPS OPHTHALMIC 2 TIMES DAILY PRN
Qty: 18 ML | Refills: 1 | Status: SHIPPED | OUTPATIENT
Start: 2025-04-23

## 2025-04-23 NOTE — TELEPHONE ENCOUNTER
Prescription approved per [facility] Refill Protocol.       Gia GUEVARA RN  Specialty/Allergy Clinic

## 2025-04-30 ENCOUNTER — ALLIED HEALTH/NURSE VISIT (OUTPATIENT)
Dept: ALLERGY | Facility: CLINIC | Age: 60
End: 2025-04-30
Payer: COMMERCIAL

## 2025-04-30 DIAGNOSIS — J30.89 ALLERGIC RHINITIS DUE TO DUST MITE: ICD-10-CM

## 2025-04-30 DIAGNOSIS — J30.1 CHRONIC SEASONAL ALLERGIC RHINITIS DUE TO POLLEN: ICD-10-CM

## 2025-04-30 DIAGNOSIS — J30.81 CHRONIC ALLERGIC RHINITIS DUE TO ANIMAL HAIR AND DANDER: ICD-10-CM

## 2025-04-30 DIAGNOSIS — J30.89 ALLERGIC RHINITIS DUE TO MOLD: ICD-10-CM

## 2025-04-30 DIAGNOSIS — H10.13 ALLERGIC CONJUNCTIVITIS OF BOTH EYES: Primary | ICD-10-CM

## 2025-04-30 PROCEDURE — 95117 IMMUNOTHERAPY INJECTIONS: CPT

## 2025-05-28 ENCOUNTER — ALLIED HEALTH/NURSE VISIT (OUTPATIENT)
Dept: ALLERGY | Facility: CLINIC | Age: 60
End: 2025-05-28
Payer: COMMERCIAL

## 2025-05-28 DIAGNOSIS — H10.13 ALLERGIC CONJUNCTIVITIS OF BOTH EYES: Primary | ICD-10-CM

## 2025-05-28 DIAGNOSIS — J30.1 CHRONIC SEASONAL ALLERGIC RHINITIS DUE TO POLLEN: ICD-10-CM

## 2025-05-28 DIAGNOSIS — J30.81 CHRONIC ALLERGIC RHINITIS DUE TO ANIMAL HAIR AND DANDER: ICD-10-CM

## 2025-05-28 DIAGNOSIS — J30.89 ALLERGIC RHINITIS DUE TO DUST MITE: ICD-10-CM

## 2025-05-28 DIAGNOSIS — J30.89 ALLERGIC RHINITIS DUE TO MOLD: ICD-10-CM

## 2025-05-28 PROCEDURE — 95117 IMMUNOTHERAPY INJECTIONS: CPT

## 2025-06-25 ENCOUNTER — ALLIED HEALTH/NURSE VISIT (OUTPATIENT)
Dept: ALLERGY | Facility: CLINIC | Age: 60
End: 2025-06-25
Payer: COMMERCIAL

## 2025-06-25 DIAGNOSIS — J30.89 ALLERGIC RHINITIS DUE TO MOLD: ICD-10-CM

## 2025-06-25 DIAGNOSIS — J30.89 ALLERGIC RHINITIS DUE TO DUST MITE: ICD-10-CM

## 2025-06-25 DIAGNOSIS — J30.1 CHRONIC SEASONAL ALLERGIC RHINITIS DUE TO POLLEN: ICD-10-CM

## 2025-06-25 DIAGNOSIS — H10.13 ALLERGIC CONJUNCTIVITIS OF BOTH EYES: Primary | ICD-10-CM

## 2025-06-25 DIAGNOSIS — Z51.6 NEED FOR DESENSITIZATION TO ALLERGENS: ICD-10-CM

## 2025-06-25 DIAGNOSIS — J30.81 CHRONIC ALLERGIC RHINITIS DUE TO ANIMAL HAIR AND DANDER: ICD-10-CM

## 2025-06-25 PROCEDURE — 95117 IMMUNOTHERAPY INJECTIONS: CPT

## 2025-07-08 DIAGNOSIS — J30.89 ALLERGIC RHINITIS DUE TO MOLD: ICD-10-CM

## 2025-07-08 DIAGNOSIS — J30.89 ALLERGIC RHINITIS DUE TO DUST MITE: ICD-10-CM

## 2025-07-08 DIAGNOSIS — J30.1 CHRONIC SEASONAL ALLERGIC RHINITIS DUE TO POLLEN: ICD-10-CM

## 2025-07-08 DIAGNOSIS — J30.81 CHRONIC ALLERGIC RHINITIS DUE TO ANIMAL HAIR AND DANDER: ICD-10-CM

## 2025-07-08 RX ORDER — AZELASTINE 1 MG/ML
2 SPRAY, METERED NASAL 2 TIMES DAILY PRN
Qty: 90 ML | Refills: 3 | Status: SHIPPED | OUTPATIENT
Start: 2025-07-08

## 2025-07-08 NOTE — TELEPHONE ENCOUNTER
Prescription approved per [facility] Refill Protocol.    Kaylee GRANT RN  Specialty/Allergy Clinics

## 2025-07-08 NOTE — TELEPHONE ENCOUNTER
Date Last Authorized by Provider:  9/12/24  Quantity Last Authorized: 90mL,  # refills: 3   Date Last Filled by Pharmacy: 4/23/25  Last Office Visit: 2/21/2025 ; Last Virtual Visit: Visit date not found   Date Next Appointment Due: ~8/21/25  Future Office Visit Scheduled: none scheduled

## 2025-07-23 ENCOUNTER — ALLIED HEALTH/NURSE VISIT (OUTPATIENT)
Dept: ALLERGY | Facility: CLINIC | Age: 60
End: 2025-07-23
Payer: COMMERCIAL

## 2025-07-23 DIAGNOSIS — J30.1 CHRONIC SEASONAL ALLERGIC RHINITIS DUE TO POLLEN: ICD-10-CM

## 2025-07-23 DIAGNOSIS — J30.89 ALLERGIC RHINITIS DUE TO MOLD: ICD-10-CM

## 2025-07-23 DIAGNOSIS — J30.89 ALLERGIC RHINITIS DUE TO DUST MITE: ICD-10-CM

## 2025-07-23 DIAGNOSIS — J30.81 CHRONIC ALLERGIC RHINITIS DUE TO ANIMAL HAIR AND DANDER: Primary | ICD-10-CM

## 2025-07-23 PROCEDURE — 95117 IMMUNOTHERAPY INJECTIONS: CPT

## 2025-08-20 ENCOUNTER — ALLIED HEALTH/NURSE VISIT (OUTPATIENT)
Dept: ALLERGY | Facility: CLINIC | Age: 60
End: 2025-08-20
Payer: COMMERCIAL

## 2025-08-20 DIAGNOSIS — J30.1 CHRONIC SEASONAL ALLERGIC RHINITIS DUE TO POLLEN: ICD-10-CM

## 2025-08-20 DIAGNOSIS — J30.89 ALLERGIC RHINITIS DUE TO DUST MITE: ICD-10-CM

## 2025-08-20 DIAGNOSIS — J30.89 ALLERGIC RHINITIS DUE TO MOLD: ICD-10-CM

## 2025-08-20 DIAGNOSIS — J30.81 CHRONIC ALLERGIC RHINITIS DUE TO ANIMAL HAIR AND DANDER: Primary | ICD-10-CM

## 2025-08-20 PROCEDURE — 95117 IMMUNOTHERAPY INJECTIONS: CPT

## (undated) DEVICE — TUBING SUCTION 12"X1/4" N612

## (undated) DEVICE — SPONGE COTTONOID 1/2X3" 80-1407

## (undated) DEVICE — DRSG NASOPORE FRAG FIRM 8CM 5400-020-008

## (undated) DEVICE — DECANTER VIAL 2006S

## (undated) DEVICE — BASIN SET MINOR DISP

## (undated) DEVICE — SOL NACL 0.9% IRRIG 1000ML BOTTLE 07138-09

## (undated) DEVICE — BLADE INFERIOR TURBINATE 2.9MMX11CM 1882940HR

## (undated) DEVICE — SOL NACL 0.9% INJ 1000ML BAG 07983-09

## (undated) DEVICE — SYR 10ML FINGER CONTROL W/O NDL 309695

## (undated) DEVICE — LABEL MEDICATION SYSTEM  3304

## (undated) DEVICE — GOWN XLG DISP 9545

## (undated) DEVICE — TUBING IRRIGATOR STRAIGHTSHOT XPS 1895522

## (undated) DEVICE — NDL 27GA 1.25" 305136

## (undated) DEVICE — BLADE KNIFE SURG 15 371115

## (undated) DEVICE — DRSG TELFA 2X3"

## (undated) DEVICE — GLOVE PROTEXIS W/NEU-THERA 8.0  2D73TE80

## (undated) DEVICE — NDL COUNTER 20CT 31142493

## (undated) DEVICE — ANTIFOG SOLUTION W/FOAM PAD 31142527

## (undated) DEVICE — PACK BASIC 9103

## (undated) DEVICE — Device

## (undated) DEVICE — DRAPE U SPLIT 74X120" 29440

## (undated) DEVICE — BLADE SHAVER SINUS 4MM RAD 12DEG CVD 1884012HR

## (undated) DEVICE — SPONGE RAY-TEC 4X8" 7318

## (undated) DEVICE — SOL WATER IRRIG 1000ML BOTTLE 07139-09

## (undated) RX ORDER — ONDANSETRON 2 MG/ML
INJECTION INTRAMUSCULAR; INTRAVENOUS
Status: DISPENSED
Start: 2018-02-14

## (undated) RX ORDER — OXYMETAZOLINE HYDROCHLORIDE 0.05 G/100ML
SPRAY NASAL
Status: DISPENSED
Start: 2018-02-14

## (undated) RX ORDER — EPHEDRINE SULFATE 50 MG/ML
INJECTION, SOLUTION INTRAVENOUS
Status: DISPENSED
Start: 2018-02-14

## (undated) RX ORDER — FENTANYL CITRATE 50 UG/ML
INJECTION, SOLUTION INTRAMUSCULAR; INTRAVENOUS
Status: DISPENSED
Start: 2018-02-14

## (undated) RX ORDER — PROPOFOL 10 MG/ML
INJECTION, EMULSION INTRAVENOUS
Status: DISPENSED
Start: 2018-02-14

## (undated) RX ORDER — HYDROCODONE BITARTRATE AND ACETAMINOPHEN 5; 325 MG/1; MG/1
TABLET ORAL
Status: DISPENSED
Start: 2018-02-14

## (undated) RX ORDER — DEXAMETHASONE SODIUM PHOSPHATE 4 MG/ML
INJECTION, SOLUTION INTRA-ARTICULAR; INTRALESIONAL; INTRAMUSCULAR; INTRAVENOUS; SOFT TISSUE
Status: DISPENSED
Start: 2018-02-14

## (undated) RX ORDER — LIDOCAINE HYDROCHLORIDE AND EPINEPHRINE 10; 10 MG/ML; UG/ML
INJECTION, SOLUTION INFILTRATION; PERINEURAL
Status: DISPENSED
Start: 2018-02-14

## (undated) RX ORDER — HYDROMORPHONE HYDROCHLORIDE 1 MG/ML
INJECTION, SOLUTION INTRAMUSCULAR; INTRAVENOUS; SUBCUTANEOUS
Status: DISPENSED
Start: 2018-02-14

## (undated) RX ORDER — SCOLOPAMINE TRANSDERMAL SYSTEM 1 MG/1
PATCH, EXTENDED RELEASE TRANSDERMAL
Status: DISPENSED
Start: 2018-02-14